# Patient Record
Sex: MALE | Race: WHITE | NOT HISPANIC OR LATINO | Employment: OTHER | URBAN - METROPOLITAN AREA
[De-identification: names, ages, dates, MRNs, and addresses within clinical notes are randomized per-mention and may not be internally consistent; named-entity substitution may affect disease eponyms.]

---

## 2017-01-09 ENCOUNTER — LAB CONVERSION - ENCOUNTER (OUTPATIENT)
Dept: OTHER | Facility: OTHER | Age: 67
End: 2017-01-09

## 2017-01-09 LAB
GLUCOSE (HISTORICAL): 117 MG/DL (ref 65–99)
HBA1C MFR BLD HPLC: 6.1 % OF TOTAL HGB

## 2017-05-25 ENCOUNTER — GENERIC CONVERSION - ENCOUNTER (OUTPATIENT)
Dept: OTHER | Facility: OTHER | Age: 67
End: 2017-05-25

## 2017-06-13 ENCOUNTER — ALLSCRIPTS OFFICE VISIT (OUTPATIENT)
Dept: OTHER | Facility: OTHER | Age: 67
End: 2017-06-13

## 2017-07-10 ENCOUNTER — LAB CONVERSION - ENCOUNTER (OUTPATIENT)
Dept: OTHER | Facility: OTHER | Age: 67
End: 2017-07-10

## 2017-07-10 LAB
A/G RATIO (HISTORICAL): 1.2 (CALC) (ref 1–2.5)
ALBUMIN SERPL BCP-MCNC: 3.8 G/DL (ref 3.6–5.1)
ALP SERPL-CCNC: 118 U/L (ref 40–115)
ALT SERPL W P-5'-P-CCNC: 19 U/L (ref 9–46)
AST SERPL W P-5'-P-CCNC: 16 U/L (ref 10–35)
BASOPHILS # BLD AUTO: 0.5 %
BASOPHILS # BLD AUTO: 40 CELLS/UL (ref 0–200)
BILIRUB SERPL-MCNC: 0.6 MG/DL (ref 0.2–1.2)
BUN SERPL-MCNC: 17 MG/DL (ref 7–25)
BUN/CREA RATIO (HISTORICAL): 12 (CALC) (ref 6–22)
CALCIUM SERPL-MCNC: 8.6 MG/DL (ref 8.6–10.3)
CHLORIDE SERPL-SCNC: 102 MMOL/L (ref 98–110)
CHOLEST SERPL-MCNC: 194 MG/DL (ref 125–200)
CHOLEST/HDLC SERPL: 5.4 (CALC)
CO2 SERPL-SCNC: 25 MMOL/L (ref 20–31)
CREAT SERPL-MCNC: 1.38 MG/DL (ref 0.7–1.25)
CREATININE, RANDOM URINE (HISTORICAL): 77 MG/DL (ref 20–370)
DEPRECATED RDW RBC AUTO: 15 % (ref 11–15)
EGFR AFRICAN AMERICAN (HISTORICAL): 61 ML/MIN/1.73M2
EGFR-AMERICAN CALC (HISTORICAL): 53 ML/MIN/1.73M2
EOSINOPHIL # BLD AUTO: 320 CELLS/UL (ref 15–500)
EOSINOPHIL # BLD AUTO: 4 %
GAMMA GLOBULIN (HISTORICAL): 3.3 G/DL (CALC) (ref 1.9–3.7)
GLUCOSE (HISTORICAL): 130 MG/DL (ref 65–99)
HBA1C MFR BLD HPLC: 6.1 % OF TOTAL HGB
HCT VFR BLD AUTO: 36.1 % (ref 38.5–50)
HDLC SERPL-MCNC: 36 MG/DL
HGB BLD-MCNC: 11.6 G/DL (ref 13.2–17.1)
LDL CHOLESTEROL (HISTORICAL): 109 MG/DL (CALC)
LYMPHOCYTES # BLD AUTO: 1856 CELLS/UL (ref 850–3900)
LYMPHOCYTES # BLD AUTO: 23.2 %
MAGNESIUM, UR (HISTORICAL): 3.1 MG/DL
MCH RBC QN AUTO: 27.1 PG (ref 27–33)
MCHC RBC AUTO-ENTMCNC: 32.1 G/DL (ref 32–36)
MCV RBC AUTO: 84.7 FL (ref 80–100)
MICROALBUMIN/CREATININE RATIO (HISTORICAL): 40 MCG/MG CREAT
MONOCYTES # BLD AUTO: 376 CELLS/UL (ref 200–950)
MONOCYTES (HISTORICAL): 4.7 %
NEUTROPHILS # BLD AUTO: 5408 CELLS/UL (ref 1500–7800)
NEUTROPHILS # BLD AUTO: 67.6 %
NON-HDL-CHOL (CHOL-HDL) (HISTORICAL): 158 MG/DL (CALC)
PLATELET # BLD AUTO: 334 THOUSAND/UL (ref 140–400)
PMV BLD AUTO: 8.1 FL (ref 7.5–12.5)
POTASSIUM SERPL-SCNC: 3.8 MMOL/L (ref 3.5–5.3)
PROSTATE SPECIFIC ANTIGEN TOTAL (HISTORICAL): 4.2 NG/ML
RBC # BLD AUTO: 4.26 MILLION/UL (ref 4.2–5.8)
SODIUM SERPL-SCNC: 136 MMOL/L (ref 135–146)
TOTAL PROTEIN (HISTORICAL): 7.1 G/DL (ref 6.1–8.1)
TRIGL SERPL-MCNC: 243 MG/DL
TSH SERPL DL<=0.05 MIU/L-ACNC: 3.31 MIU/L (ref 0.4–4.5)
URIC ACID (HISTORICAL): 5.9 MG/DL (ref 4–8)
WBC # BLD AUTO: 8 THOUSAND/UL (ref 3.8–10.8)

## 2017-07-11 ENCOUNTER — ALLSCRIPTS OFFICE VISIT (OUTPATIENT)
Dept: OTHER | Facility: OTHER | Age: 67
End: 2017-07-11

## 2017-07-17 ENCOUNTER — GENERIC CONVERSION - ENCOUNTER (OUTPATIENT)
Dept: OTHER | Facility: OTHER | Age: 67
End: 2017-07-17

## 2017-08-29 ENCOUNTER — ALLSCRIPTS OFFICE VISIT (OUTPATIENT)
Dept: OTHER | Facility: OTHER | Age: 67
End: 2017-08-29

## 2017-09-18 ENCOUNTER — LAB CONVERSION - ENCOUNTER (OUTPATIENT)
Dept: OTHER | Facility: OTHER | Age: 67
End: 2017-09-18

## 2017-09-18 LAB
FERRITIN SERPL-MCNC: 6 NG/ML (ref 20–380)
IRON SATN MFR SERPL: 7 % (CALC) (ref 15–60)
IRON SERPL-MCNC: 24 MCG/DL (ref 50–180)
TIBC SERPL-MCNC: 333 MCG/DL (CALC) (ref 250–425)

## 2017-09-26 ENCOUNTER — GENERIC CONVERSION - ENCOUNTER (OUTPATIENT)
Dept: OTHER | Facility: OTHER | Age: 67
End: 2017-09-26

## 2017-09-26 ENCOUNTER — GENERIC CONVERSION - ENCOUNTER (OUTPATIENT)
Dept: FAMILY MEDICINE CLINIC | Facility: HOSPITAL | Age: 67
End: 2017-09-26

## 2017-10-09 ENCOUNTER — GENERIC CONVERSION - ENCOUNTER (OUTPATIENT)
Dept: OTHER | Facility: OTHER | Age: 67
End: 2017-10-09

## 2017-10-23 ENCOUNTER — GENERIC CONVERSION - ENCOUNTER (OUTPATIENT)
Dept: OTHER | Facility: OTHER | Age: 67
End: 2017-10-23

## 2017-11-29 ENCOUNTER — GENERIC CONVERSION - ENCOUNTER (OUTPATIENT)
Dept: OTHER | Facility: OTHER | Age: 67
End: 2017-11-29

## 2018-01-12 NOTE — MISCELLANEOUS
Provider Comments  Provider Comments:   pt no showed for today's appt - task will be sent to have pt reschedule      Signatures   Electronically signed by : Antonino Jamison DO; Aug 29 2017  6:28PM EST                       (Author)

## 2018-01-12 NOTE — PROGRESS NOTES
Assessment    1  Type 2 diabetes mellitus without complication (691 62) (R01 2)   2  Hypertension (401 9) (I10)    Plan  Chronic kidney disease, stage 3, Hypertension    · Lisinopril 40 MG Oral Tablet; TAKE ONE TABLET BY MOUTH ONCE DAILY  Gout    · Allopurinol 100 MG Oral Tablet; TAKE TWO TABLETS BY MOUTH ONCE DAILY  Hypertension    · Metoprolol Tartrate 50 MG Oral Tablet; TAKE ONE-HALF TABLET BY MOUTH TWICE DAILY  Type 2 diabetes mellitus without complication    · (1) CBC/ PLT (NO DIFF); Status:Active; Requested for:55Npy3870;    · (1) COMPREHENSIVE METABOLIC PANEL; Status:Active; Requested for:35Bqk6436;    · (1) HEMOGLOBIN A1C; Status:Active; Requested for:92Xac6770;    · (1) LIPID PANEL, FASTING; Status:Active; Requested for:03Bsg3451;    · (1) MICROALBUMIN CREATININE RATIO, RANDOM URINE; Status:Active; Requested for:86Krl7179;    · (1) TSH WITH FT4 REFLEX; Status:Active; Requested for:43Lwh6255;    · (1) URIC ACID; Status:Active; Requested for:40Mtq7552;     Discussion/Summary  Discussion Summary:   DM type 2 w/o complications - controlled with A1C 7 0 - newly diagnosed at last visit; has lost 18 lbs in 2 mos and is exercising regularly; urged to con't doing so; will recheck FBS/A1C q 6 mo - order given for April; UTD On eye and foot exam    HTN - BP fluctuating at home and better today at end of appt; home cuff is reading higher then our cuff; pt w/o SE; he is eating a low sodium diet and has lost wgt; con't current regimen and see if he can get BP down more with diet/exercise/wgt loss - re-eval in 3 mo    Deferring Prevnar  Counseling Documentation With Imm: The patient was counseled regarding instructions for management, risk factor reductions, impressions  Medication SE Review and Pt Understands Tx: The treatment plan was reviewed with the patient/guardian   The patient/guardian understands and agrees with the treatment plan      Chief Complaint  Chief Complaint Chronic Condition [facility]: Patient is here today for follow up of chronic conditions described in HPI  History of Present Illness  HPI: Pt here for 3 mo follow up    Pts wgt is down 18 lbs since last visit!! He states he is eating much better and is doing stationary bike and walking when he can  He is walking about a mile or so and is on bike for about 5 min  He is newly diagnosed DM  He is not checking his sugars at home and he is on no DM meds  His BP was again elevated  He is checking his BP at home a few times a week and numbers were reviewed he is ranging from 120/69 - 153/85 with an average of low 140/mid 80's  Home cuff read 155/97 and our cuff read 134/82 right before  He notes no HA/dizziness/double vision/CP  Pt is deferring Prevnar today      Review of Systems  Complete-Male:   Constitutional: recent weight loss, but as noted in HPI, no fever and no chills  Eyes: no eyesight problems  Cardiovascular: no chest pain and no palpitations  Respiratory: no shortness of breath and no cough  Gastrointestinal: no nausea and no vomiting  Neurological: no headache and no dizziness  Active Problems    1  Chronic kidney disease, stage 3 (585 3) (N18 3)   2  Colon, diverticulosis (562 10) (K57 30)   3  Colonoscopy (Fiberoptic) Screening   4  Elevated C-reactive protein (790 95) (R79 82)   5  Elevated prostate specific antigen (PSA) (790 93) (R97 2)   6  Essential hypertriglyceridemia (272 1) (E78 1)   7  Gout (274 9) (M10 9)   8  Hypertension (401 9) (I10)   9  Knee stiffness, unspecified laterality (719 56) (M25 669)   10  Need for Tdap vaccination (V06 1) (Z23)   11  Nonspecific low blood pressure reading (796 3) (R03 1)   12  Obesity (278 00) (E66 9)   13  Type 2 diabetes mellitus without complication (672 29) (O71 5)    Past Medical History    1  History of abdominal pain (V13 89) (T20 206)    Surgical History    1  History of Complete Colonoscopy    Family History    1  Family history of Breast cancer   2   Family history of     3  Family history of    4  Family history of Kidney disease   5  Family history of Lung cancer   6  Family history of Smoker    7  Family history of Hypertension    Social History    · Full-time employment   · Marital History - Currently    · Never A Smoker   · Never Drank Alcohol  Social History Reviewed: The social history was reviewed and is unchanged  Current Meds   1  Allopurinol 100 MG Oral Tablet; TAKE TWO TABLETS BY MOUTH ONCE DAILY; Therapy: 2013 to (Evaluate:03Azp4524)  Requested for: 91Snw1599; Last Rx:64Das5880   Ordered   2  AmLODIPine Besylate 10 MG Oral Tablet; take one tablet by mouth daily as directed; Therapy: 20Vea2605 to (Evaluate:2016)  Requested for: ; Last Rx:11Nev8433   Ordered   3  Aspirin 81 MG Oral Tablet; TAKE 1 TABLET DAILY; Therapy: 2013 to (Evaluate:21Cbm0878); Last Rx:2013 Ordered   4  Lisinopril 40 MG Oral Tablet; TAKE ONE TABLET BY MOUTH ONCE DAILY; Therapy: 64UBJ5080 to (Evaluate:04Svh5094)  Requested for: ; Last Rx:02Ccn9616   Ordered   5  Metoprolol Tartrate 50 MG Oral Tablet; TAKE ONE-HALF TABLET BY MOUTH TWICE DAILY; Therapy: 20Rdf1696 to (Evaluate:2016)  Requested for: 83TAX2225; Last Rx:42Etf5948   Ordered  Medication List Reviewed: The medication list was reviewed and updated today  Allergies    1  No Known Drug Allergies    Vitals  Vital Signs [Data Includes: Current Encounter]    Recorded: 61LRM5353 07:14PM Recorded: 97Nke4597 07:07PM   Temperature  97 2 F   Heart Rate  76   Systolic 276 751   Diastolic 82 88   Height  5 ft 9 in   Weight  220 lb    BMI Calculated  32 49   BSA Calculated  2 15     Physical Exam    Constitutional   General appearance: No acute distress, well appearing and well nourished  Pulmonary   Respiratory effort: No increased work of breathing or signs of respiratory distress      Auscultation of lungs: Clear to auscultation, equal breath sounds bilaterally, no wheezes, no rales, no rhonci  Cardiovascular   Auscultation of heart: Normal rate and rhythm, normal S1 and S2, without murmurs      Examination of extremities for edema and/or varicosities: Normal     Musculoskeletal   Gait and station: Normal     Psychiatric   Mood and affect: Normal          Signatures   Electronically signed by : Fady Guevara DO; Feb 2 2016  7:26PM EST                       (Author)

## 2018-01-13 VITALS
SYSTOLIC BLOOD PRESSURE: 160 MMHG | TEMPERATURE: 98.4 F | DIASTOLIC BLOOD PRESSURE: 92 MMHG | HEART RATE: 82 BPM | WEIGHT: 243 LBS | HEIGHT: 69 IN | BODY MASS INDEX: 35.99 KG/M2

## 2018-01-14 VITALS
HEART RATE: 80 BPM | HEIGHT: 69 IN | WEIGHT: 240 LBS | SYSTOLIC BLOOD PRESSURE: 150 MMHG | DIASTOLIC BLOOD PRESSURE: 90 MMHG | TEMPERATURE: 98.6 F | BODY MASS INDEX: 35.55 KG/M2

## 2018-01-16 NOTE — MISCELLANEOUS
Message   Recorded as Task   Date: 05/12/2017 07:32 AM, Created By: Josselyn Lyons   Task Name: Follow Up   Assigned To: EDISON,CLERICAL TEAM   Regarding Patient: Sam Cordero, Status: Active   CommentRostrace Daly - 12 May 2017 7:32 AM     TASK CREATED  please notify pt that I refilled his BP med for 30 days but no further meds will be given until pt is seen - it has been since June 2016 since his last appt with us - has to be seen at least once a year to con't receiving meds   Jesusita Brock - 12 May 2017 8:13 AM     TASK REASSIGNED: Previously Assigned To [address] [facility]  Left message   Chasity Carrizales - 15 May 2017 8:20 AM     TASK REASSIGNED: Previously Assigned To [address] [place] [address] - 25 May 2017 8:32 AM     TASK EDITED  Patient aware and will call back to schedule appt  Active Problems    1  Chronic kidney disease, stage 3 (585 3) (N18 3)   2  Colon cancer screening (V76 51) (Z12 11)   3  Colon, diverticulosis (562 10) (K57 30)   4  Colonoscopy (Fiberoptic) Screening   5  Elevated C-reactive protein (790 95) (R79 82)   6  Elevated prostate specific antigen (PSA) (790 93) (R97 20)   7  Essential hypertriglyceridemia (272 1) (E78 1)   8  Gout (274 9) (M10 9)   9  Hypertension (401 9) (I10)   10  Knee stiffness, unspecified laterality (719 56) (M25 669)   11  Microalbuminuria (791 0) (R80 9)   12  Need for Tdap vaccination (V06 1) (Z23)   13  Obesity (278 00) (E66 9)   14  Thrombocytopenia (287 5) (D69 6)   15  Type 2 diabetes mellitus with microalbuminuria (250 40,791 0) (E11 29,R80 9)    Current Meds   1  Allopurinol 100 MG Oral Tablet; TAKE TWO TABLETS BY MOUTH ONCE DAILY; Therapy: 16Xnj8597 to (Evaluate:53Mnx2981)  Requested for: 20Mar2017; Last   Rx:20Mar2017 Ordered   2  AmLODIPine Besylate 10 MG Oral Tablet; TAKE ONE TABLET BY MOUTH ONCE DAILY   AS  DIRECTED;    Therapy: 70Gso7269 to (Evaluate:11Jun2017)  Requested for: 09RTR9764; Last Rx:10Tts8478 Ordered   3  Aspirin 81 MG TABS; TAKE 1 TABLET DAILY; Therapy: 01Apr2013 to (Evaluate:28Gha9479); Last Rx:01Apr2013 Ordered   4  Lisinopril 40 MG Oral Tablet; TAKE ONE TABLET BY MOUTH ONCE DAILY; Therapy: 17BCG9516 to (Corrie Barrios)  Requested for: 79QZP9152; Last   Rx:[id number] Ordered   5  Metoprolol Tartrate 50 MG Oral Tablet; TAKE ONE-HALF TABLET BY MOUTH TWICE   DAILY; Therapy: 21Jmq2403 to (Katalina Rodriguez)  Requested for: 81FXX0842; Last   Rx:[id number] Ordered    Allergies    1   No Known Drug Allergies    Signatures   Electronically signed by : Viviane Gautam DO; May 25 2017  8:37AM EST                       (Author)

## 2018-01-16 NOTE — RESULT NOTES
Verified Results  (1) CBC/PLT/DIFF 07Oct2016 09:10AM Caitie Magana   REPORT COMMENT:  FASTING:NO     Test Name Result Flag Reference   WHITE BLOOD CELL COUNT 6 5 Thousand/uL  3 8-10 8   RED BLOOD CELL COUNT 5 72 Million/uL  4 20-5 80   HEMOGLOBIN 14 7 g/dL  13 2-17 1   HEMATOCRIT 47 0 %  38 5-50 0   MCV 82 2 fL  80 0-100 0   MCH 25 8 pg L 27 0-33 0   EOSINOPHILS 4 9 %     BASOPHILS 0 9 %     ABSOLUTE MONOCYTES 488 cells/uL  200-950   ABSOLUTE EOSINOPHILS 319 cells/uL     ABSOLUTE BASOPHILS 59 cells/uL  0-200   NEUTROPHILS 66 8 %     LYMPHOCYTES 19 9 %     MONOCYTES 7 5 %     MCHC 31 4 g/dL L 32 0-36 0   RDW 17 2 % H 11 0-15 0   PLATELET COUNT 072 Thousand/uL  140-400   MPV 8 7 fL  7 5-11 5   ABSOLUTE NEUTROPHILS 4342 cells/uL  5455-2534   ABSOLUTE LYMPHOCYTES 1294 cells/uL  850-3900       Discussion/Summary    please notify pt that his CBC was nml - he is not anemic and his platelts were back to normal Patient aware

## 2018-01-18 NOTE — MISCELLANEOUS
Message   Recorded as Task   Date: 07/17/2017 09:50 AM, Created By: Holden Lynn   Task Name: Medical Complaint Callback   Assigned To: [address] [facility]   Regarding Patient: Mika Thurman, Status: Active   Comment:    Holden Lynn - 17 Jul 2017 9:50 AM     TASK CREATED  Caller: PT WIFE; Medical Complaint; [phone]  He is on a new med Pravastatin last week  BP & pulse are ok  He is very tired and huffing and puffing  They spoke to the [place] and he said to stop the med and call Mayi Acevedo - 17 Jul 2017 9:56 AM     TASK REPLIED TO: Previously Assigned To Rubia Dennis          pls advise   Rubia Dennis - 17 Jul 2017 10:47 AM     TASK REPLIED TO: Previously Assigned To Rubia Dennis                      not usual side effects with Pravastatin but if he feels better since stopping the med then stay off of it until next appt - if no better then he needs to be seen as likely not d/t the medication   Mayi Parry - 17 Jul 2017 11:21 AM     TASK REPLIED TO: Previously Assigned To [address] [facility]  pt aware - will call to schedule if not feeling better since stopping (has been only one day)        Active Problems    1  Chronic kidney disease, stage 3 (585 3) (N18 3)   2  Colon cancer screening (V76 51) (Z12 11)   3  Colon, diverticulosis (562 10) (K57 30)   4  Colonoscopy (Fiberoptic) Screening   5  Elevated C-reactive protein (790 95) (R79 82)   6  Elevated prostate specific antigen (PSA) (790 93) (R97 20)   7  Encounter for prostate cancer screening (V76 44) (Z12 5)   8  Essential hypertriglyceridemia (272 1) (E78 1)   9  Gout (274 9) (M10 9)   10  Hypertension (401 9) (I10)   11  Knee stiffness, unspecified laterality (719 56) (M25 669)   12  Microalbuminuria (791 0) (R80 9)   13  Need for Tdap vaccination (V06 1) (Z23)   14  Normocytic anemia (285 9) (D64 9)   15  Obesity (278 00) (E66 9)   16  Thrombocytopenia (287 5) (D69 6)   17   Type 2 diabetes mellitus with microalbuminuria (250 40,791 0) (E11 29,R80 9)    Current Meds   1  Allopurinol 100 MG Oral Tablet; TAKE TWO TABLETS BY MOUTH ONCE DAILY; Therapy: 30Apr2013 to (Evaluate:09Jan2018)  Requested for: 46WFZ5231; Last   Rx:13Jun2017 Ordered   2  AmLODIPine Besylate 10 MG Oral Tablet; TAKE ONE TABLET BY MOUTH ONCE DAILY   AS  DIRECTED; Therapy: 94Bbs6024 to (Evaluate:09Jan2018)  Requested for: 13Jun2017; Last   Rx:13Jun2017 Ordered   3  Aspirin 81 MG TABS; TAKE 1 TABLET DAILY; Therapy: 01Apr2013 to (Evaluate:53Zrj5960); Last Rx:01Apr2013 Ordered   4  Lisinopril 40 MG Oral Tablet; TAKE ONE TABLET BY MOUTH ONCE DAILY; Therapy: 20WFV7161 to [phone])  Requested for: 21VBK0222; Last   Rx:13Jun2017 Ordered   5  Metoprolol Tartrate 50 MG Oral Tablet; TAKE 1 TABLET EVERY 12 HOURS; Therapy: 42Xoa3495 to (Evaluate:08Nov2017)  Requested for: 98DWX6242; Last   Rx:[id number]; Status: ACTIVE - Transmit to [facility] Ordered   6  Pravastatin Sodium 20 MG Oral Tablet; TAKE 1 TABLET AT BEDTIME; Therapy: 03YCD5580 to (Evaluate:06Feb2018)  Requested for: 75KCP0736; Last   Rx:65Lsd4584 Ordered    Allergies    1   No Known Drug Allergies    Signatures   Electronically signed by : Maya Cadet DO; Jul 17 2017 11:44AM EST                       (Author)

## 2018-01-22 VITALS — DIASTOLIC BLOOD PRESSURE: 84 MMHG | SYSTOLIC BLOOD PRESSURE: 128 MMHG

## 2018-01-22 VITALS
TEMPERATURE: 98.4 F | DIASTOLIC BLOOD PRESSURE: 88 MMHG | HEIGHT: 69 IN | SYSTOLIC BLOOD PRESSURE: 152 MMHG | BODY MASS INDEX: 35.99 KG/M2 | WEIGHT: 243 LBS | HEART RATE: 80 BPM

## 2018-02-05 LAB
BASOPHILS # BLD AUTO: 103 CELLS/UL (ref 0–200)
BASOPHILS NFR BLD AUTO: 1.3 %
BUN SERPL-MCNC: 17 MG/DL (ref 7–25)
BUN/CREAT SERPL: ABNORMAL (CALC) (ref 6–22)
CALCIUM SERPL-MCNC: 8.8 MG/DL (ref 8.6–10.3)
CHLORIDE SERPL-SCNC: 104 MMOL/L (ref 98–110)
CHOLEST SERPL-MCNC: 189 MG/DL
CHOLEST/HDLC SERPL: 5.4 (CALC)
CO2 SERPL-SCNC: 25 MMOL/L (ref 20–31)
CREAT SERPL-MCNC: 1.25 MG/DL (ref 0.7–1.25)
EOSINOPHIL # BLD AUTO: 198 CELLS/UL (ref 15–500)
EOSINOPHIL NFR BLD AUTO: 2.5 %
ERYTHROCYTE [DISTWIDTH] IN BLOOD BY AUTOMATED COUNT: 14.2 % (ref 11–15)
FERRITIN SERPL-MCNC: 78 NG/ML (ref 20–380)
GLUCOSE SERPL-MCNC: 134 MG/DL (ref 65–99)
HBA1C MFR BLD: 7 % OF TOTAL HGB
HCT VFR BLD AUTO: 53.5 % (ref 38.5–50)
HDLC SERPL-MCNC: 35 MG/DL
HGB BLD-MCNC: 17.5 G/DL (ref 13.2–17.1)
IRON SATN MFR SERPL: 26 % (CALC) (ref 15–60)
IRON SERPL-MCNC: 66 MCG/DL (ref 50–180)
LDLC SERPL CALC-MCNC: 121 MG/DL (CALC)
LYMPHOCYTES # BLD AUTO: 1912 CELLS/UL (ref 850–3900)
LYMPHOCYTES NFR BLD AUTO: 24.2 %
MCH RBC QN AUTO: 28.5 PG (ref 27–33)
MCHC RBC AUTO-ENTMCNC: 32.7 G/DL (ref 32–36)
MCV RBC AUTO: 87.1 FL (ref 80–100)
MONOCYTES # BLD AUTO: 553 CELLS/UL (ref 200–950)
MONOCYTES NFR BLD AUTO: 7 %
NEUTROPHILS # BLD AUTO: 5135 CELLS/UL (ref 1500–7800)
NEUTROPHILS NFR BLD AUTO: 65 %
NONHDLC SERPL-MCNC: 154 MG/DL (CALC)
PLATELET # BLD AUTO: 260 THOUSAND/UL (ref 140–400)
PMV BLD REES-ECKER: 10.8 FL (ref 7.5–12.5)
POTASSIUM SERPL-SCNC: 4.4 MMOL/L (ref 3.5–5.3)
RBC # BLD AUTO: 6.14 MILLION/UL (ref 4.2–5.8)
SL AMB EGFR AFRICAN AMERICAN: 69 ML/MIN/1.73M2
SL AMB EGFR NON AFRICAN AMERICAN: 59 ML/MIN/1.73M2
SODIUM SERPL-SCNC: 140 MMOL/L (ref 135–146)
TIBC SERPL-MCNC: 255 MCG/DL (CALC) (ref 250–425)
TRIGL SERPL-MCNC: 210 MG/DL
WBC # BLD AUTO: 7.9 THOUSAND/UL (ref 3.8–10.8)

## 2018-02-06 PROBLEM — D50.9 IRON DEFICIENCY ANEMIA: Status: ACTIVE | Noted: 2017-09-26

## 2018-02-06 RX ORDER — METOPROLOL TARTRATE 50 MG/1
1 TABLET, FILM COATED ORAL EVERY 12 HOURS
COMMUNITY
Start: 2012-02-20 | End: 2018-07-13 | Stop reason: SDUPTHER

## 2018-02-06 RX ORDER — MESALAMINE 1.2 G/1
2 TABLET, DELAYED RELEASE ORAL DAILY
COMMUNITY
Start: 2018-01-18 | End: 2018-12-31

## 2018-02-06 RX ORDER — ALLOPURINOL 100 MG/1
2 TABLET ORAL DAILY
COMMUNITY
Start: 2013-04-30 | End: 2018-02-13 | Stop reason: SDUPTHER

## 2018-02-06 RX ORDER — LISINOPRIL 40 MG/1
1 TABLET ORAL DAILY
COMMUNITY
Start: 2012-03-06 | End: 2018-02-13 | Stop reason: SDUPTHER

## 2018-02-06 RX ORDER — AMLODIPINE BESYLATE 10 MG/1
1 TABLET ORAL DAILY
COMMUNITY
Start: 2012-02-20 | End: 2018-08-07 | Stop reason: SDUPTHER

## 2018-02-06 RX ORDER — FERROUS SULFATE 325(65) MG
1 TABLET ORAL DAILY
COMMUNITY
Start: 2017-09-26 | End: 2020-01-24 | Stop reason: HOSPADM

## 2018-02-13 ENCOUNTER — OFFICE VISIT (OUTPATIENT)
Dept: FAMILY MEDICINE CLINIC | Facility: HOSPITAL | Age: 68
End: 2018-02-13
Payer: COMMERCIAL

## 2018-02-13 VITALS
WEIGHT: 247 LBS | HEIGHT: 70 IN | SYSTOLIC BLOOD PRESSURE: 148 MMHG | TEMPERATURE: 98.1 F | HEART RATE: 72 BPM | DIASTOLIC BLOOD PRESSURE: 88 MMHG | BODY MASS INDEX: 35.36 KG/M2

## 2018-02-13 DIAGNOSIS — M1A.9XX0 CHRONIC GOUT WITHOUT TOPHUS, UNSPECIFIED CAUSE, UNSPECIFIED SITE: ICD-10-CM

## 2018-02-13 DIAGNOSIS — E78.5 DYSLIPIDEMIA: ICD-10-CM

## 2018-02-13 DIAGNOSIS — K51.30 ULCERATIVE RECTOSIGMOIDITIS WITHOUT COMPLICATION (HCC): ICD-10-CM

## 2018-02-13 DIAGNOSIS — D50.9 IRON DEFICIENCY ANEMIA, UNSPECIFIED IRON DEFICIENCY ANEMIA TYPE: ICD-10-CM

## 2018-02-13 DIAGNOSIS — R80.9 TYPE 2 DIABETES MELLITUS WITH MICROALBUMINURIA, WITHOUT LONG-TERM CURRENT USE OF INSULIN (HCC): Primary | ICD-10-CM

## 2018-02-13 DIAGNOSIS — I10 HYPERTENSION, UNSPECIFIED TYPE: ICD-10-CM

## 2018-02-13 DIAGNOSIS — E11.29 TYPE 2 DIABETES MELLITUS WITH MICROALBUMINURIA, WITHOUT LONG-TERM CURRENT USE OF INSULIN (HCC): Primary | ICD-10-CM

## 2018-02-13 DIAGNOSIS — E78.1 ESSENTIAL HYPERTRIGLYCERIDEMIA: ICD-10-CM

## 2018-02-13 PROCEDURE — 99214 OFFICE O/P EST MOD 30 MIN: CPT | Performed by: INTERNAL MEDICINE

## 2018-02-13 RX ORDER — PRAVASTATIN SODIUM 20 MG
20 TABLET ORAL DAILY
Qty: 30 TABLET | Refills: 0
Start: 2018-02-13 | End: 2018-02-27 | Stop reason: SINTOL

## 2018-02-13 RX ORDER — LISINOPRIL 40 MG/1
TABLET ORAL
Qty: 45 TABLET | Refills: 3 | Status: SHIPPED | OUTPATIENT
Start: 2018-02-13 | End: 2018-06-18 | Stop reason: SDUPTHER

## 2018-02-13 RX ORDER — ALLOPURINOL 100 MG/1
200 TABLET ORAL DAILY
Qty: 30 TABLET | Refills: 6 | Status: SHIPPED | OUTPATIENT
Start: 2018-02-13 | End: 2018-06-10 | Stop reason: SDUPTHER

## 2018-02-14 NOTE — ASSESSMENT & PLAN NOTE
CBC/iron studies back to normal with current iron supplement, con't current regimen for now - recheck iron studies in 6 mos and if still stable try and cut back to once daily supplement

## 2018-02-14 NOTE — ASSESSMENT & PLAN NOTE
Type 2 DM w/microalbuminuria - borderline uncontrolled with a1C 7 0 - diet treated only - urged low sugar/carb diet and keep active, recheck BW in 6 mos - order given, UTD on foot exam and eye exam due in March, on ACE and going to restart statin

## 2018-02-14 NOTE — ASSESSMENT & PLAN NOTE
New dx based on recent colonoscopy for iron def anemia, started on mesalamine and is doing well - no longer with loose stools, has GI f/u in March, call with new/worsening symptoms, was told would need [place] q 5 yrs now

## 2018-02-14 NOTE — PROGRESS NOTES
Assessment/Plan:    Ulcerative rectosigmoiditis without complication ([facility] [address] )  New dx based on recent colonoscopy for iron def anemia, started on mesalamine and is doing well - no longer with loose stools, has GI f/u in March, call with new/worsening symptoms, was told would need [place] q 5 yrs now    Type 2 diabetes mellitus with microalbuminuria ([facility] [address] )  Type 2 DM w/microalbuminuria - borderline uncontrolled with a1C 7 0 - diet treated only - urged low sugar/carb diet and keep active, recheck BW in 6 mos - order given, UTD on foot exam and eye exam due in March, on ACE and going to restart statin    Hypertension  BP elevated today and high at home - I ncrease Lisinopril to 40 mg 1 5 tab (60 mg) once daily, con't current Metoprolol and Norvasc, recheck BP in 6 - 8 wks, low sodium diet/exercise/wgt loss encouraged    Essential hypertriglyceridemia  Low fat/cholesterol diet and exercise/wgt loss encouraged, going to restart statin    Gout  No current flare, doing well on Allopurinol- needs med refilled - rx sent, call with any flares    Iron deficiency anemia  CBC/iron studies back to normal with current iron supplement, con't current regimen for now - recheck iron studies in 6 mos and if still stable try and cut back to once daily supplement       Diagnoses and all orders for this visit:    Type 2 diabetes mellitus with microalbuminuria, without long-term current use of insulin (HCC)  -     Hemoglobin A1c; Future  -     Glucose, fasting; Future  -     Hemoglobin A1c  -     Glucose, fasting    Dyslipidemia  -     pravastatin (PRAVACHOL) 20 mg tablet; Take 1 tablet (20 mg total) by mouth daily  -     Lipid panel; Future  -     Lipid panel    Essential hypertriglyceridemia  -     Lipid panel; Future  -     Lipid panel    Chronic gout without tophus, unspecified cause, unspecified site  -     allopurinol (ZYLOPRIM) 100 mg tablet; Take 2 tablets (200 mg total) by mouth daily for 30 days  -     Uric acid;  Future  -     Uric acid    Ulcerative rectosigmoiditis without complication (HCC)    Iron deficiency anemia, unspecified iron deficiency anemia type  -     CBC and differential; Future  -     Iron; Future  -     Ferritin; Future  -     TIBC; Future  -     CBC and differential  -     Iron  -     Ferritin  -     TIBC    Hypertension, unspecified type  -     lisinopril (ZESTRIL) 40 mg tablet; 1 5 tab PO q day for total of 60 mg daily          Subjective:      Patient ID: Lisseth Prabhakar is a 80 y o  male  HPI  Pt here for routine follow up appt and BW results  BW results were d/w pt in detail: FBS/A1C up at 134/7 0, CBC with slight elevation of H/H but rest of panel wnl, Ferritin/Iron/TIBC were wnl, rest of BMP was wnl, FLP with TC wnl at 189, HDL low at 35, TG up at 210 and LDL elevated for Dm at 121  Def of controlled vs uncontrolled Dm was reviewed  Diet was reviewed - wgt up 4 lbs from last appt  Pt is diet controlled and not on any DM meds  He is UTD on foot exam and is due for eye exam in march (next month)  He is on ACE and ASA but not a statin  Goal FLP was reviewed with pt  He thinks some of the SE he had with starting the statin was probably related to his iron def anemia (SOB)  He is willing to retry the statin at this time  Pt saw GI since last appt for iron def anemia  He had colo in Oct and was found to have inflammation in rectosigmoid colon c/w Uc  He was started on Mesalamine 2 tab daily  He states it has helped and his stools are more formed  He has f/u appt with him in less then a month - early March  He notes no abd pain/blood in the stool  He is not aware of any family h/o UC or Crohns dz  He was told to f/u with a repeat colo in 5 yrs  He is taking his iron supplement w/o SE bid  He is asking if he can decrease the frequency  Bp up again today  Pt con't to check his BP at home - numbers reviewed and are labile - from 168'D systolic to 062'Y systolic    He is averaging around 140's at this time  He denies missing doses of meds or SE with the meds but did not take them today as he just got out of work and has not gone home yet  He notes no frequent Ha/dizziness/double vision/CP  Review of Systems   Constitutional: Negative for chills, fatigue and fever  HENT: Negative for congestion and sinus pain  Eyes: Negative for pain and redness  Respiratory: Negative for cough, chest tightness and shortness of breath  Cardiovascular: Negative for chest pain, palpitations and leg swelling  Gastrointestinal: Negative for abdominal pain, blood in stool, constipation, diarrhea, nausea and vomiting  Endocrine: Negative for polydipsia and polyuria  Genitourinary: Negative for difficulty urinating and dysuria  Musculoskeletal: Negative for arthralgias and myalgias  Skin: Negative for rash and wound  Neurological: Negative for dizziness and headaches  Hematological: Does not bruise/bleed easily  Psychiatric/Behavioral: Negative for behavioral problems and confusion  Objective:    Vitals:    02/13/18 1935   BP: 148/88   Pulse: 72   Temp: 98 1 °F (36 7 °C)        Physical Exam   Constitutional: He appears well-developed and well-nourished  No distress  HENT:   Head: Normocephalic and atraumatic  Eyes: EOM are normal  Pupils are equal, round, and reactive to light  Right eye exhibits no discharge  Left eye exhibits no discharge  Neck: Neck supple  No tracheal deviation present  Cardiovascular: Normal rate and regular rhythm  No murmur heard  Pulmonary/Chest: Effort normal and breath sounds normal  No respiratory distress  He has no wheezes  He has no rales  Abdominal: Soft  He exhibits no distension  There is no tenderness  Musculoskeletal: He exhibits no edema  Neurological: He is alert  No cranial nerve deficit  Skin: Skin is warm and dry  No rash noted  Psychiatric: He has a normal mood and affect   His behavior is normal    Nursing note and vitals reviewed

## 2018-02-27 ENCOUNTER — OFFICE VISIT (OUTPATIENT)
Dept: FAMILY MEDICINE CLINIC | Facility: HOSPITAL | Age: 68
End: 2018-02-27
Payer: COMMERCIAL

## 2018-02-27 VITALS
BODY MASS INDEX: 35.36 KG/M2 | HEART RATE: 70 BPM | DIASTOLIC BLOOD PRESSURE: 92 MMHG | TEMPERATURE: 97.9 F | HEIGHT: 70 IN | SYSTOLIC BLOOD PRESSURE: 162 MMHG | WEIGHT: 247 LBS

## 2018-02-27 DIAGNOSIS — T50.905A ADVERSE EFFECT OF DRUG, INITIAL ENCOUNTER: Primary | ICD-10-CM

## 2018-02-27 DIAGNOSIS — R06.00 DYSPNEA ON EXERTION: ICD-10-CM

## 2018-02-27 DIAGNOSIS — E78.5 DYSLIPIDEMIA: ICD-10-CM

## 2018-02-27 DIAGNOSIS — I10 HYPERTENSION, UNSPECIFIED TYPE: ICD-10-CM

## 2018-02-27 PROCEDURE — 99214 OFFICE O/P EST MOD 30 MIN: CPT | Performed by: INTERNAL MEDICINE

## 2018-02-27 PROCEDURE — 93000 ELECTROCARDIOGRAM COMPLETE: CPT | Performed by: INTERNAL MEDICINE

## 2018-02-27 RX ORDER — ATORVASTATIN CALCIUM 10 MG/1
10 TABLET, FILM COATED ORAL DAILY
Qty: 30 TABLET | Refills: 3 | Status: SHIPPED | OUTPATIENT
Start: 2018-02-27 | End: 2018-09-04

## 2018-02-27 NOTE — ASSESSMENT & PLAN NOTE
Noting PAGE and sleep issues with Pravastatin - stopped it and symptoms resolved, restarted it and has returned, will try and switch pravastatin to Atorvastatin - if symptoms reoccur will have to keep off statin despite benefit with DM and cholesterol and morbidity and mortality - reviewed with pt and wife

## 2018-02-27 NOTE — PROGRESS NOTES
Assessment/Plan:    Dyslipidemia  Noting PAGE and sleep issues with Pravastatin - stopped it and symptoms resolved, restarted it and has returned, will try and switch pravastatin to Atorvastatin - if symptoms reoccur will have to keep off statin despite benefit with DM and cholesterol and morbidity and mortality - reviewed with pt and wife    Hypertension  Bp better on recheck at end of appt - home numbers look much better -con't current regimen for now       Diagnoses and all orders for this visit:    Adverse effect of drug, initial encounter   - stop Pravastatin d/t PAGE/sleep disorder, pt willing to retry a different statin - rx for atorvastatin sent - advised to start low dose every 2 days and then increase up to qod and then daily if able to tolerate - call with any SE    Dyslipidemia  -     atorvastatin (LIPITOR) 10 mg tablet; Take 1 tablet (10 mg total) by mouth daily for 30 days    Dyspnea on exertion  -     POCT ECG    Hypertension, unspecified type      Risks/benefits of statins d/w pt and wife, ECG done today d/t PAGE and no acute ischemic changes noted, will stop Pravastatin - when symptoms resolve will slowly    Subjective:      Patient ID: Edna Rodriguez is a 80 y o  male  HPI Pt here with SE with new medication  He was started on Pravastatin in July and developed SOB and issues sleeping  He took the med for about 2 wks and then stopped it and symptoms went away  He had been off the med for some time and restarted it 2 wks ago after last appt  He again has noted SOB and difficulty sleeping  He has not been able to go to the [facility] d/t SOB with ambulation  He notes no Cp/palp/edema/orthopnea  He has not been on any other statin  Bp up a bit today on presentation - slightly better by end of appt  He has been checking BP at home and numbers much better - usually 130's/80s  He notes no SE with his med    Review of Systems   Constitutional: Negative for chills, fatigue and fever     HENT: Negative for congestion and sinus pain  Eyes: Negative for pain and redness  Respiratory: Positive for shortness of breath  Negative for cough and chest tightness  Cardiovascular: Negative for chest pain, palpitations and leg swelling  Gastrointestinal: Negative for abdominal pain, diarrhea, nausea and vomiting  Endocrine: Negative for polydipsia and polyuria  Genitourinary: Negative for difficulty urinating and dysuria  Musculoskeletal: Negative for arthralgias and myalgias  Skin: Negative for rash and wound  Neurological: Negative for dizziness and headaches  Hematological: Does not bruise/bleed easily  Psychiatric/Behavioral: Negative for behavioral problems and confusion  Objective:    /92   Pulse 70   Temp 97 9 °F (36 6 °C)   Ht 5' 10" (1 778 m)   Wt 112 kg (247 lb)   BMI 35 44 kg/m²      Physical Exam   Constitutional: He appears well-developed and well-nourished  No distress  HENT:   Head: Normocephalic and atraumatic  Eyes: Conjunctivae are normal  Right eye exhibits no discharge  Left eye exhibits no discharge  Neck: Neck supple  No tracheal deviation present  Cardiovascular: Normal rate and regular rhythm  No murmur heard  Pulmonary/Chest: Effort normal and breath sounds normal  No respiratory distress  He has no wheezes  He has no rales  Abdominal: Soft  He exhibits no distension  There is no tenderness  Musculoskeletal: He exhibits no edema  Neurological: He is alert  No cranial nerve deficit  Skin: Skin is warm and dry  No rash noted  Psychiatric: He has a normal mood and affect  His behavior is normal    Nursing note and vitals reviewed

## 2018-06-10 DIAGNOSIS — M1A.9XX0 CHRONIC GOUT WITHOUT TOPHUS, UNSPECIFIED CAUSE, UNSPECIFIED SITE: ICD-10-CM

## 2018-06-11 RX ORDER — ALLOPURINOL 100 MG/1
TABLET ORAL
Qty: 30 TABLET | Refills: 6 | Status: SHIPPED | OUTPATIENT
Start: 2018-06-11 | End: 2018-09-30 | Stop reason: SDUPTHER

## 2018-06-18 DIAGNOSIS — I10 HYPERTENSION, UNSPECIFIED TYPE: ICD-10-CM

## 2018-06-19 RX ORDER — LISINOPRIL 40 MG/1
TABLET ORAL
Qty: 45 TABLET | Refills: 3 | Status: SHIPPED | OUTPATIENT
Start: 2018-06-19 | End: 2018-09-04 | Stop reason: SDUPTHER

## 2018-07-13 DIAGNOSIS — I10 ESSENTIAL HYPERTENSION: Primary | ICD-10-CM

## 2018-07-13 RX ORDER — METOPROLOL TARTRATE 50 MG/1
TABLET, FILM COATED ORAL
Qty: 60 TABLET | Refills: 6 | Status: SHIPPED | OUTPATIENT
Start: 2018-07-13 | End: 2018-12-31 | Stop reason: SDUPTHER

## 2018-08-07 DIAGNOSIS — I10 ESSENTIAL HYPERTENSION: Primary | ICD-10-CM

## 2018-08-07 RX ORDER — AMLODIPINE BESYLATE 10 MG/1
10 TABLET ORAL DAILY
Qty: 30 TABLET | Refills: 0 | Status: SHIPPED | OUTPATIENT
Start: 2018-08-07 | End: 2018-09-04 | Stop reason: SDUPTHER

## 2018-08-27 LAB
BASOPHILS # BLD AUTO: 92 CELLS/UL (ref 0–200)
BASOPHILS NFR BLD AUTO: 1.5 %
CHOLEST SERPL-MCNC: 226 MG/DL
CHOLEST/HDLC SERPL: 7.5 (CALC)
EOSINOPHIL # BLD AUTO: 360 CELLS/UL (ref 15–500)
EOSINOPHIL NFR BLD AUTO: 5.9 %
ERYTHROCYTE [DISTWIDTH] IN BLOOD BY AUTOMATED COUNT: 13.7 % (ref 11–15)
FERRITIN SERPL-MCNC: 201 NG/ML (ref 20–380)
GLUCOSE SERPL-MCNC: 370 MG/DL (ref 65–99)
HBA1C MFR BLD: 11.9 % OF TOTAL HGB
HCT VFR BLD AUTO: 53.7 % (ref 38.5–50)
HDLC SERPL-MCNC: 30 MG/DL
HGB BLD-MCNC: 17.9 G/DL (ref 13.2–17.1)
IRON SATN MFR SERPL: 25 % (CALC) (ref 15–60)
IRON SERPL-MCNC: 58 MCG/DL (ref 50–180)
LYMPHOCYTES # BLD AUTO: 1647 CELLS/UL (ref 850–3900)
LYMPHOCYTES NFR BLD AUTO: 27 %
MCH RBC QN AUTO: 30.6 PG (ref 27–33)
MCHC RBC AUTO-ENTMCNC: 33.3 G/DL (ref 32–36)
MCV RBC AUTO: 91.8 FL (ref 80–100)
MONOCYTES # BLD AUTO: 390 CELLS/UL (ref 200–950)
MONOCYTES NFR BLD AUTO: 6.4 %
NEUTROPHILS # BLD AUTO: 3611 CELLS/UL (ref 1500–7800)
NEUTROPHILS NFR BLD AUTO: 59.2 %
NONHDLC SERPL-MCNC: 196 MG/DL (CALC)
PLATELET # BLD AUTO: 198 THOUSAND/UL (ref 140–400)
PMV BLD REES-ECKER: 11.1 FL (ref 7.5–12.5)
RBC # BLD AUTO: 5.85 MILLION/UL (ref 4.2–5.8)
TIBC SERPL-MCNC: 232 MCG/DL (CALC) (ref 250–425)
TRIGL SERPL-MCNC: 842 MG/DL
URATE SERPL-MCNC: 5.1 MG/DL (ref 4–8)
WBC # BLD AUTO: 6.1 THOUSAND/UL (ref 3.8–10.8)

## 2018-08-27 PROCEDURE — 3046F HEMOGLOBIN A1C LEVEL >9.0%: CPT | Performed by: INTERNAL MEDICINE

## 2018-09-04 ENCOUNTER — OFFICE VISIT (OUTPATIENT)
Dept: FAMILY MEDICINE CLINIC | Facility: HOSPITAL | Age: 68
End: 2018-09-04
Payer: COMMERCIAL

## 2018-09-04 VITALS
HEIGHT: 70 IN | BODY MASS INDEX: 33.64 KG/M2 | SYSTOLIC BLOOD PRESSURE: 148 MMHG | TEMPERATURE: 98.4 F | HEART RATE: 74 BPM | WEIGHT: 235 LBS | DIASTOLIC BLOOD PRESSURE: 88 MMHG

## 2018-09-04 DIAGNOSIS — E11.29 TYPE 2 DIABETES MELLITUS WITH MICROALBUMINURIA, WITHOUT LONG-TERM CURRENT USE OF INSULIN (HCC): Primary | ICD-10-CM

## 2018-09-04 DIAGNOSIS — E78.1 ESSENTIAL HYPERTRIGLYCERIDEMIA: ICD-10-CM

## 2018-09-04 DIAGNOSIS — R80.9 TYPE 2 DIABETES MELLITUS WITH MICROALBUMINURIA, WITHOUT LONG-TERM CURRENT USE OF INSULIN (HCC): Primary | ICD-10-CM

## 2018-09-04 DIAGNOSIS — N18.30 CHRONIC KIDNEY DISEASE, STAGE 3 (HCC): ICD-10-CM

## 2018-09-04 DIAGNOSIS — I10 HYPERTENSION, UNSPECIFIED TYPE: ICD-10-CM

## 2018-09-04 DIAGNOSIS — D50.9 IRON DEFICIENCY ANEMIA, UNSPECIFIED IRON DEFICIENCY ANEMIA TYPE: ICD-10-CM

## 2018-09-04 DIAGNOSIS — E78.5 DYSLIPIDEMIA: ICD-10-CM

## 2018-09-04 DIAGNOSIS — I10 ESSENTIAL HYPERTENSION: ICD-10-CM

## 2018-09-04 PROCEDURE — 4010F ACE/ARB THERAPY RXD/TAKEN: CPT | Performed by: INTERNAL MEDICINE

## 2018-09-04 PROCEDURE — 3066F NEPHROPATHY DOC TX: CPT | Performed by: INTERNAL MEDICINE

## 2018-09-04 PROCEDURE — 99215 OFFICE O/P EST HI 40 MIN: CPT | Performed by: INTERNAL MEDICINE

## 2018-09-04 PROCEDURE — 3008F BODY MASS INDEX DOCD: CPT | Performed by: INTERNAL MEDICINE

## 2018-09-04 PROCEDURE — 1160F RVW MEDS BY RX/DR IN RCRD: CPT | Performed by: INTERNAL MEDICINE

## 2018-09-04 PROCEDURE — 1036F TOBACCO NON-USER: CPT | Performed by: INTERNAL MEDICINE

## 2018-09-04 RX ORDER — LISINOPRIL 40 MG/1
60 TABLET ORAL DAILY
Qty: 45 TABLET | Refills: 6 | Status: SHIPPED | OUTPATIENT
Start: 2018-09-04 | End: 2018-12-31 | Stop reason: SDUPTHER

## 2018-09-04 RX ORDER — AMLODIPINE BESYLATE 10 MG/1
10 TABLET ORAL DAILY
Qty: 30 TABLET | Refills: 6 | Status: SHIPPED | OUTPATIENT
Start: 2018-09-04 | End: 2019-04-06 | Stop reason: SDUPTHER

## 2018-09-04 RX ORDER — FENOFIBRATE 145 MG/1
145 TABLET, COATED ORAL DAILY
Qty: 30 TABLET | Refills: 3 | Status: SHIPPED | OUTPATIENT
Start: 2018-09-04 | End: 2018-12-31 | Stop reason: SDUPTHER

## 2018-09-04 NOTE — ASSESSMENT & PLAN NOTE
UTT statin, will start fibrate and get TG down and readdress statin in future although pt has been UTT two statins now

## 2018-09-04 NOTE — ASSESSMENT & PLAN NOTE
Lab Results   Component Value Date    HGBA1C 11 9 (H) 08/24/2018       No results for input(s): POCGLU in the last 72 hours  Blood Sugar Average: Last 72 hrs:does not check sugars at home,  today  DM type 2 with microalbuminuria and hyperglycemia - uncontrolled with A1C now 11  9!!!   Having symptoms of hyperglycemia as well - insulin d/w pt but this makes him very anxious as he gets woozy with blood draws, Annie went in and did glucometer teaching with pt today, microvascular SE with  Uncontrolled DM was reviewed, start Invokana and have pt check BS at home - call if readings > 250 after 2 wks, on ASA and ACE but UTT statin, foot exam done today, urged to call for eye exam

## 2018-09-04 NOTE — ASSESSMENT & PLAN NOTE
BP elevated today and only minimally improved by end of appt - pt very anxious and will recheck in 3 mos - if still elevated will need to increase Metoprolol, again diet/exercise/wgt loss encouraged

## 2018-09-04 NOTE — ASSESSMENT & PLAN NOTE
TG very high and in 800's!  Start Fenofibrate and recheck FLP in 3 mos, low fat/cholesterol diet and exercise encouraged

## 2018-09-04 NOTE — PROGRESS NOTES
Assessment/Plan:    Type 2 diabetes mellitus with microalbuminuria (HCC)  Lab Results   Component Value Date    HGBA1C 11 9 (H) 08/24/2018       No results for input(s): POCGLU in the last 72 hours  Blood Sugar Average: Last 72 hrs:does not check sugars at home,  today  DM type 2 with microalbuminuria and hyperglycemia - uncontrolled with A1C now 11  9!!! Having symptoms of hyperglycemia as well - insulin d/w pt but this makes him very anxious as he gets woozy with blood draws, Annie went in and did glucometer teaching with pt today, microvascular SE with  Uncontrolled DM was reviewed, start Invokana and have pt check BS at home - call if readings > 250 after 2 wks, on ASA and ACE but UTT statin, foot exam done today, urged to call for eye exam    Chronic kidney disease, stage 3  Importance of BS/BP control was reviewed - urged avoid NSAIDs and keep hydrated    Essential hypertriglyceridemia  TG very high and in 800's! Start Fenofibrate and recheck FLP in 3 mos, low fat/cholesterol diet and exercise encouraged    Iron deficiency anemia  H/H now elevated and iron studies wnl - decrease iron to 325 mg 1 tab daily and recheck in 3mos - if still good will stop iron all together    Dyslipidemia  UTT statin, will start fibrate and get TG down and readdress statin in future although pt has been UTT two statins now    Hypertension  BP elevated today and only minimally improved by end of appt - pt very anxious and will recheck in 3 mos - if still elevated will need to increase Metoprolol, again diet/exercise/wgt loss encouraged       Diagnoses and all orders for this visit:    Type 2 diabetes mellitus with microalbuminuria, without long-term current use of insulin (HCC)  -     canagliflozin (INVOKANA) 100 mg; Take 1 tablet (100 mg total) by mouth daily before breakfast for 30 days  -     Cancel: Microalbumin / creatinine urine ratio; Future  -     Cancel: Hemoglobin A1C; Future  -     Basic metabolic panel;  Future  - Basic metabolic panel  -     Hemoglobin A1C With EAG; Future  -     Microalbumin, Random Urine (W/Creatinine); Future  -     Hemoglobin A1C With EAG  -     Microalbumin, Random Urine (W/Creatinine)    Essential hypertension  -     amLODIPine (NORVASC) 10 mg tablet; Take 1 tablet (10 mg total) by mouth daily  -     Basic metabolic panel; Future  -     Basic metabolic panel    Hypertension, unspecified type  -     lisinopril (ZESTRIL) 40 mg tablet; Take 1 5 tablets (60 mg total) by mouth daily for 30 days    Essential hypertriglyceridemia  -     fenofibrate (TRICOR) 145 mg tablet; Take 1 tablet (145 mg total) by mouth daily for 30 days    Iron deficiency anemia, unspecified iron deficiency anemia type  -     Iron; Future  -     Ferritin; Future  -     CBC and differential; Future  -     Iron  -     Ferritin  -     CBC and differential    Dyslipidemia    Chronic kidney disease, stage 3      [place] 2017 - good for 5 yrs    I have spent 45 minutes with [facility] and his wife today in which greater than 50% of this time was spent in counseling/coordination of care regarding Diagnostic results, Prognosis, Risks and benefits of tx options, Intructions for management, Patient and family education, Importance of tx compliance, Risk factor reductions and Impressions  Subjective:      Patient ID: Bonnie Mckay is a 80 y o  male  HPI Pt here for follow up appt and BW results    BW results were d/w pt in detail:  FBS/A1C was up significantly at 370 and 11 9, uric acid wnl at 5 1, iron/ferritin wnl, TIBC was a bit low at 232, FLP with TC up at 226, HDL low at 30 and TG up at 842 - LDL unable to be calculated, CBC with elevated H/H at 17 9/53  7  Def of controlled vs uncontrolled DM was reviewed  Diet was reviewed - wgt down 12 lbs from last appt  His wife states his diet was poor with recent vacations but he has gotten back on track the past 2 wks  He is not currently on any DM meds as directed    He has been peeing more and is much more thirsty  He notes dry mouth and is having wgt loss  He is overdue for foot (6/17) and eye exam (3/16)  He is on statin, ASA, and ACE  Goal FLP was d/w pt in detail  SE of uncontrolled TG/cholesterol was reviewed  Pt is not taking Atorvastatin d/t SE - dizziness, stomach ache  Pt is taking his iron twice daily as directed  He notes no SE with the medication  Iron studies reviewed as noted above  BP above goal today and meds were reviewed and no changes have occurred  He denies missing doses of meds or SE with the meds  He does not check his BP outside the office  He notes no frequent Ha's/dizziness/double vision/CP  [place] Oct 2017 - good for 5 yrs    Review of Systems   Constitutional: Positive for unexpected weight change  Negative for chills, fatigue and fever  HENT: Negative for congestion and sinus pain  Eyes: Negative for pain and redness  Respiratory: Negative for cough, chest tightness and shortness of breath  Cardiovascular: Negative for chest pain, palpitations and leg swelling  Gastrointestinal: Negative for abdominal pain, constipation, diarrhea, nausea and vomiting  Endocrine: Positive for polydipsia and polyuria  Genitourinary: Negative for difficulty urinating and dysuria  Musculoskeletal: Negative for arthralgias and myalgias  Skin: Negative for rash and wound  Neurological: Negative for dizziness and headaches  Hematological: Does not bruise/bleed easily  Psychiatric/Behavioral: Negative for behavioral problems and confusion  Objective:    /88   Pulse 74   Temp 98 4 °F (36 9 °C)   Ht 5' 10" (1 778 m)   Wt 107 kg (235 lb)   BMI 33 72 kg/m²      Physical Exam   Constitutional: He appears well-developed and well-nourished  No distress  HENT:   Head: Normocephalic and atraumatic  Mouth/Throat: No oropharyngeal exudate  Eyes: Conjunctivae are normal  Right eye exhibits no discharge   Left eye exhibits no discharge  Neck: Neck supple  No tracheal deviation present  Cardiovascular: Normal rate and regular rhythm  Pulses are no weak pulses  No murmur heard  Pulses:       Dorsalis pedis pulses are 2+ on the right side, and 2+ on the left side  Pulmonary/Chest: Effort normal and breath sounds normal  No respiratory distress  He has no wheezes  He has no rales  Abdominal: Soft  He exhibits no distension  There is no tenderness  Musculoskeletal: He exhibits no deformity  Feet:   Right Foot:   Skin Integrity: Negative for ulcer, skin breakdown, erythema, warmth, callus or dry skin  Left Foot:   Skin Integrity: Negative for ulcer, skin breakdown, erythema, warmth, callus or dry skin  Neurological: He is alert  He exhibits normal muscle tone  Skin: Skin is warm and dry  No rash noted  Psychiatric: He has a normal mood and affect  His behavior is normal    Nursing note and vitals reviewed  Patient's shoes and socks removed  Right Foot/Ankle   Right Foot Inspection  Skin Exam: skin normal and skin intact no dry skin, no warmth, no callus, no erythema, no maceration, no abnormal color, no pre-ulcer, no ulcer and no callus                          Toe Exam: ROM and strength within normal limits  Sensory   Vibration: intact    Monofilament testing: intact  Vascular    The right DP pulse is 2+  Left Foot/Ankle  Left Foot Inspection  Skin Exam: skin normal and skin intactno dry skin, no warmth, no erythema, no maceration, normal color, no pre-ulcer, no ulcer and no callus                         Toe Exam: ROM and strength within normal limits                   Sensory   Vibration: intact    Monofilament: intact  Vascular    The left DP pulse is 2+  Assign Risk Category:  No deformity present; No loss of protective sensation;  No weak pulses       Risk: 0

## 2018-09-04 NOTE — ASSESSMENT & PLAN NOTE
H/H now elevated and iron studies wnl - decrease iron to 325 mg 1 tab daily and recheck in 3mos - if still good will stop iron all together

## 2018-09-06 DIAGNOSIS — R80.9 TYPE 2 DIABETES MELLITUS WITH MICROALBUMINURIA, UNSPECIFIED WHETHER LONG TERM INSULIN USE (HCC): Primary | ICD-10-CM

## 2018-09-06 DIAGNOSIS — E11.29 TYPE 2 DIABETES MELLITUS WITH MICROALBUMINURIA, UNSPECIFIED WHETHER LONG TERM INSULIN USE (HCC): Primary | ICD-10-CM

## 2018-09-06 RX ORDER — LANCETS 28 GAUGE
EACH MISCELLANEOUS
Qty: 100 EACH | Refills: 0 | Status: SHIPPED | OUTPATIENT
Start: 2018-09-06 | End: 2018-09-06 | Stop reason: SDUPTHER

## 2018-09-06 RX ORDER — BLOOD-GLUCOSE METER
1 KIT MISCELLANEOUS AS NEEDED
Qty: 1 EACH | Refills: 0 | Status: SHIPPED | OUTPATIENT
Start: 2018-09-06 | End: 2018-09-06 | Stop reason: SDUPTHER

## 2018-09-06 RX ORDER — LANCETS 28 GAUGE
EACH MISCELLANEOUS AS NEEDED
Qty: 100 EACH | Refills: 0 | Status: SHIPPED | OUTPATIENT
Start: 2018-09-06 | End: 2020-05-28

## 2018-09-06 RX ORDER — BLOOD-GLUCOSE METER
1 KIT MISCELLANEOUS AS NEEDED
Qty: 1 EACH | Refills: 0 | Status: SHIPPED | OUTPATIENT
Start: 2018-09-06 | End: 2020-05-28

## 2018-09-06 NOTE — TELEPHONE ENCOUNTER
DR MCNEAL SAW PATIENT ON TUESDAY - SHE TOLD HIM TO CHECK HIS SUGARS DAILY - THEY DO NOT HAVE A GLUCOMETER AT HOME - THEY WILL NEED AN ORDER FOR MACHINE, TEST STRIPS AND LANCETS - PATIENTS WIFE CALLED THE INSURANCE COMPANY - ALL METERS REQUIRE PRIOR AUTH FIRST  - PLEASE ORDER, PLEASE OBTAIN PRIOR AUTH, AND PLEASE CONTACT ONE OF THESE CONTRACTED PHARMACIES TO PLACE ORDER    [facility] [address] [facility]  [phone]    [address] [place] [address]  [phone]

## 2018-09-10 ENCOUNTER — TELEPHONE (OUTPATIENT)
Dept: FAMILY MEDICINE CLINIC | Facility: HOSPITAL | Age: 68
End: 2018-09-10

## 2018-09-10 NOTE — TELEPHONE ENCOUNTER
Patients wife wanted you to know she purchased a 'smart' meter for [employer] -     Has been using since Saturday    Past three days his morning blood sugar, before breakfast/8 hr fasting - has been in the 152-154 range

## 2018-09-15 LAB
LEFT EYE DIABETIC RETINOPATHY: NORMAL
RIGHT EYE DIABETIC RETINOPATHY: NORMAL

## 2018-09-15 PROCEDURE — 3072F LOW RISK FOR RETINOPATHY: CPT | Performed by: INTERNAL MEDICINE

## 2018-09-30 DIAGNOSIS — M1A.9XX0 CHRONIC GOUT WITHOUT TOPHUS, UNSPECIFIED CAUSE, UNSPECIFIED SITE: ICD-10-CM

## 2018-09-30 RX ORDER — ALLOPURINOL 100 MG/1
TABLET ORAL
Qty: 30 TABLET | Refills: 6 | Status: SHIPPED | OUTPATIENT
Start: 2018-09-30 | End: 2018-12-31 | Stop reason: SDUPTHER

## 2018-11-11 DIAGNOSIS — I10 HYPERTENSION, UNSPECIFIED TYPE: ICD-10-CM

## 2018-11-11 PROCEDURE — 4010F ACE/ARB THERAPY RXD/TAKEN: CPT | Performed by: INTERNAL MEDICINE

## 2018-11-11 RX ORDER — LISINOPRIL 40 MG/1
TABLET ORAL
Qty: 45 TABLET | Refills: 3 | Status: SHIPPED | OUTPATIENT
Start: 2018-11-11 | End: 2018-12-31

## 2018-12-10 DIAGNOSIS — R79.89 ELEVATED SERUM CREATININE: Primary | ICD-10-CM

## 2018-12-10 LAB
ALBUMIN/CREAT UR: 63 MCG/MG CREAT
BASOPHILS # BLD AUTO: 73 CELLS/UL (ref 0–200)
BASOPHILS NFR BLD AUTO: 1.1 %
BUN SERPL-MCNC: 28 MG/DL (ref 7–25)
BUN/CREAT SERPL: 16 (CALC) (ref 6–22)
CALCIUM SERPL-MCNC: 9.2 MG/DL (ref 8.6–10.3)
CHLORIDE SERPL-SCNC: 107 MMOL/L (ref 98–110)
CO2 SERPL-SCNC: 28 MMOL/L (ref 20–32)
CREAT SERPL-MCNC: 1.8 MG/DL (ref 0.7–1.25)
CREAT UR-MCNC: 78 MG/DL (ref 20–320)
EOSINOPHIL # BLD AUTO: 370 CELLS/UL (ref 15–500)
EOSINOPHIL NFR BLD AUTO: 5.6 %
ERYTHROCYTE [DISTWIDTH] IN BLOOD BY AUTOMATED COUNT: 12.4 % (ref 11–15)
EST. AVERAGE GLUCOSE BLD GHB EST-MCNC: 143 (CALC)
EST. AVERAGE GLUCOSE BLD GHB EST-SCNC: 7.9 (CALC)
FERRITIN SERPL-MCNC: 153 NG/ML (ref 20–380)
GLUCOSE SERPL-MCNC: 111 MG/DL (ref 65–99)
HBA1C MFR BLD: 6.6 % OF TOTAL HGB
HCT VFR BLD AUTO: 53.4 % (ref 38.5–50)
HGB BLD-MCNC: 17.7 G/DL (ref 13.2–17.1)
IRON SERPL-MCNC: 128 MCG/DL (ref 50–180)
LYMPHOCYTES # BLD AUTO: 1492 CELLS/UL (ref 850–3900)
LYMPHOCYTES NFR BLD AUTO: 22.6 %
MCH RBC QN AUTO: 29.6 PG (ref 27–33)
MCHC RBC AUTO-ENTMCNC: 33.1 G/DL (ref 32–36)
MCV RBC AUTO: 89.3 FL (ref 80–100)
MICROALBUMIN UR-MCNC: 4.9 MG/DL
MONOCYTES # BLD AUTO: 528 CELLS/UL (ref 200–950)
MONOCYTES NFR BLD AUTO: 8 %
NEUTROPHILS # BLD AUTO: 4138 CELLS/UL (ref 1500–7800)
NEUTROPHILS NFR BLD AUTO: 62.7 %
PLATELET # BLD AUTO: 293 THOUSAND/UL (ref 140–400)
PMV BLD REES-ECKER: 10.2 FL (ref 7.5–12.5)
POTASSIUM SERPL-SCNC: 4.1 MMOL/L (ref 3.5–5.3)
RBC # BLD AUTO: 5.98 MILLION/UL (ref 4.2–5.8)
SL AMB EGFR AFRICAN AMERICAN: 44 ML/MIN/1.73M2
SL AMB EGFR NON AFRICAN AMERICAN: 38 ML/MIN/1.73M2
SODIUM SERPL-SCNC: 143 MMOL/L (ref 135–146)
WBC # BLD AUTO: 6.6 THOUSAND/UL (ref 3.8–10.8)

## 2018-12-28 LAB
BUN SERPL-MCNC: 25 MG/DL (ref 7–25)
BUN/CREAT SERPL: 15 (CALC) (ref 6–22)
CALCIUM SERPL-MCNC: 9.4 MG/DL (ref 8.6–10.3)
CHLORIDE SERPL-SCNC: 105 MMOL/L (ref 98–110)
CO2 SERPL-SCNC: 27 MMOL/L (ref 20–32)
CREAT SERPL-MCNC: 1.67 MG/DL (ref 0.7–1.25)
GLUCOSE SERPL-MCNC: 95 MG/DL (ref 65–99)
POTASSIUM SERPL-SCNC: 4.1 MMOL/L (ref 3.5–5.3)
SL AMB EGFR AFRICAN AMERICAN: 48 ML/MIN/1.73M2
SL AMB EGFR NON AFRICAN AMERICAN: 42 ML/MIN/1.73M2
SODIUM SERPL-SCNC: 142 MMOL/L (ref 135–146)

## 2018-12-31 ENCOUNTER — OFFICE VISIT (OUTPATIENT)
Dept: FAMILY MEDICINE CLINIC | Facility: HOSPITAL | Age: 68
End: 2018-12-31
Payer: COMMERCIAL

## 2018-12-31 VITALS
SYSTOLIC BLOOD PRESSURE: 130 MMHG | BODY MASS INDEX: 31.21 KG/M2 | HEART RATE: 67 BPM | WEIGHT: 218 LBS | HEIGHT: 70 IN | DIASTOLIC BLOOD PRESSURE: 78 MMHG | TEMPERATURE: 97.9 F

## 2018-12-31 DIAGNOSIS — I10 HYPERTENSION, UNSPECIFIED TYPE: ICD-10-CM

## 2018-12-31 DIAGNOSIS — R80.9 MICROALBUMINURIA: ICD-10-CM

## 2018-12-31 DIAGNOSIS — E78.1 ESSENTIAL HYPERTRIGLYCERIDEMIA: ICD-10-CM

## 2018-12-31 DIAGNOSIS — M1A.9XX0 CHRONIC GOUT WITHOUT TOPHUS, UNSPECIFIED CAUSE, UNSPECIFIED SITE: ICD-10-CM

## 2018-12-31 DIAGNOSIS — E11.29 TYPE 2 DIABETES MELLITUS WITH MICROALBUMINURIA, WITHOUT LONG-TERM CURRENT USE OF INSULIN (HCC): Primary | ICD-10-CM

## 2018-12-31 DIAGNOSIS — R80.9 TYPE 2 DIABETES MELLITUS WITH MICROALBUMINURIA, WITHOUT LONG-TERM CURRENT USE OF INSULIN (HCC): Primary | ICD-10-CM

## 2018-12-31 DIAGNOSIS — I10 ESSENTIAL HYPERTENSION: ICD-10-CM

## 2018-12-31 DIAGNOSIS — N18.30 CHRONIC KIDNEY DISEASE, STAGE 3 (HCC): ICD-10-CM

## 2018-12-31 PROCEDURE — 3078F DIAST BP <80 MM HG: CPT | Performed by: INTERNAL MEDICINE

## 2018-12-31 PROCEDURE — 1036F TOBACCO NON-USER: CPT | Performed by: INTERNAL MEDICINE

## 2018-12-31 PROCEDURE — 99214 OFFICE O/P EST MOD 30 MIN: CPT | Performed by: INTERNAL MEDICINE

## 2018-12-31 PROCEDURE — 1160F RVW MEDS BY RX/DR IN RCRD: CPT | Performed by: INTERNAL MEDICINE

## 2018-12-31 PROCEDURE — 3008F BODY MASS INDEX DOCD: CPT | Performed by: INTERNAL MEDICINE

## 2018-12-31 PROCEDURE — 3066F NEPHROPATHY DOC TX: CPT | Performed by: INTERNAL MEDICINE

## 2018-12-31 PROCEDURE — 3075F SYST BP GE 130 - 139MM HG: CPT | Performed by: INTERNAL MEDICINE

## 2018-12-31 PROCEDURE — 4010F ACE/ARB THERAPY RXD/TAKEN: CPT | Performed by: INTERNAL MEDICINE

## 2018-12-31 RX ORDER — LISINOPRIL 40 MG/1
60 TABLET ORAL DAILY
Qty: 135 TABLET | Refills: 1 | Status: SHIPPED | OUTPATIENT
Start: 2018-12-31 | End: 2019-08-20 | Stop reason: SDUPTHER

## 2018-12-31 RX ORDER — METOPROLOL TARTRATE 50 MG/1
50 TABLET, FILM COATED ORAL EVERY 12 HOURS
Qty: 180 TABLET | Refills: 1 | Status: SHIPPED | OUTPATIENT
Start: 2018-12-31 | End: 2019-08-20 | Stop reason: SDUPTHER

## 2018-12-31 RX ORDER — ALLOPURINOL 100 MG/1
200 TABLET ORAL DAILY
Qty: 180 TABLET | Refills: 1 | Status: SHIPPED | OUTPATIENT
Start: 2018-12-31 | End: 2019-07-09 | Stop reason: SDUPTHER

## 2018-12-31 RX ORDER — FENOFIBRATE 145 MG/1
145 TABLET, COATED ORAL DAILY
Qty: 90 TABLET | Refills: 1 | Status: SHIPPED | OUTPATIENT
Start: 2018-12-31 | End: 2019-06-26 | Stop reason: SDUPTHER

## 2018-12-31 NOTE — PROGRESS NOTES
Assessment/Plan:    Type 2 diabetes mellitus with microalbuminuria (HCC)  Lab Results   Component Value Date    HGBA1C 6 6 (H) 12/07/2018       No results for input(s): POCGLU in the last 72 hours  Blood Sugar Average: Last 72 hrs:  Dm type 2 with microalbuminuria/CKD stage 3 - controlled with A1C 6 6 - doing well on Invokana but insurance will not cover it - need to switch to [place] or [place] - d/w pt that Penny recommended avoid use with GFR 30-44 and he is right on borderline at 43 - will try low dose and re-eval in 6 mos, may need to prior auth as Farxiga CI in renal dz as well, UTD on foot (9/18) and eye exam (9/18), on ACE/ASA but no statin    Microalbuminuria  microalbumin up and down - again urged BP/BS control, on an ACE, monitor annually    Chronic kidney disease, stage 3  GFR had gone down early Dec but repeat labs showed back to baseline - again urged to avoid NSAIDs and importance of BP and BS control, con't current meds, repeat BW in 6 mo - order given    Hypertension  Bp better by end of appt and numbers at home lower and acceptable, con't current meds - rx refilled    Essential hypertriglyceridemia  Repeat FLP with next labs - order given    Gout  No recent events, allopurinol refilled upon request today       Diagnoses and all orders for this visit:    Type 2 diabetes mellitus with microalbuminuria, without long-term current use of insulin (HCC)  -     CBC and differential; Future  -     Comprehensive metabolic panel; Future  -     Lipid panel; Future  -     TSH, 3rd generation with Free T4 reflex; Future  -     Hemoglobin A1C With EAG;  Future  -     CBC and differential  -     Comprehensive metabolic panel  -     Lipid panel  -     TSH, 3rd generation with Free T4 reflex  -     Hemoglobin A1C With EAG  -     Empagliflozin 10 MG TABS; Take 1 tablet (10 mg total) by mouth every morning    Chronic kidney disease, stage 3 (HCC)  -     CBC and differential; Future  -     Comprehensive metabolic panel; Future  -     Lipid panel; Future  -     TSH, 3rd generation with Free T4 reflex; Future  -     Hemoglobin A1C With EAG; Future  -     CBC and differential  -     Comprehensive metabolic panel  -     Lipid panel  -     TSH, 3rd generation with Free T4 reflex  -     Hemoglobin A1C With EAG    Essential hypertension  -     metoprolol tartrate (LOPRESSOR) 50 mg tablet; Take 1 tablet (50 mg total) by mouth every 12 (twelve) hours  -     CBC and differential; Future  -     Comprehensive metabolic panel; Future  -     Lipid panel; Future  -     TSH, 3rd generation with Free T4 reflex; Future  -     Hemoglobin A1C With EAG; Future  -     CBC and differential  -     Comprehensive metabolic panel  -     Lipid panel  -     TSH, 3rd generation with Free T4 reflex  -     Hemoglobin A1C With EAG    Hypertension, unspecified type  -     lisinopril (ZESTRIL) 40 mg tablet; Take 1 5 tablets (60 mg total) by mouth daily for 90 days  -     CBC and differential; Future  -     Comprehensive metabolic panel; Future  -     Lipid panel; Future  -     TSH, 3rd generation with Free T4 reflex; Future  -     Hemoglobin A1C With EAG; Future  -     CBC and differential  -     Comprehensive metabolic panel  -     Lipid panel  -     TSH, 3rd generation with Free T4 reflex  -     Hemoglobin A1C With EAG    Essential hypertriglyceridemia  -     fenofibrate (TRICOR) 145 mg tablet; Take 1 tablet (145 mg total) by mouth daily for 90 days  -     CBC and differential; Future  -     Comprehensive metabolic panel; Future  -     Lipid panel; Future  -     TSH, 3rd generation with Free T4 reflex; Future  -     Hemoglobin A1C With EAG; Future  -     CBC and differential  -     Comprehensive metabolic panel  -     Lipid panel  -     TSH, 3rd generation with Free T4 reflex  -     Hemoglobin A1C With EAG    Chronic gout without tophus, unspecified cause, unspecified site  -     allopurinol (ZYLOPRIM) 100 mg tablet;  Take 2 tablets (200 mg total) by mouth daily for 90 days  -     CBC and differential; Future  -     Comprehensive metabolic panel; Future  -     Lipid panel; Future  -     TSH, 3rd generation with Free T4 reflex; Future  -     Hemoglobin A1C With EAG; Future  -     CBC and differential  -     Comprehensive metabolic panel  -     Lipid panel  -     TSH, 3rd generation with Free T4 reflex  -     Hemoglobin A1C With EAG    Microalbuminuria  -     CBC and differential; Future  -     Comprehensive metabolic panel; Future  -     Lipid panel; Future  -     TSH, 3rd generation with Free T4 reflex; Future  -     Hemoglobin A1C With EAG; Future  -     CBC and differential  -     Comprehensive metabolic panel  -     Lipid panel  -     TSH, 3rd generation with Free T4 reflex  -     Hemoglobin A1C With EAG      [place] 2017 - 5 yrs    Subjective:      Patient ID: Aye Dumont is a 80 y o  male  HPI Pt here for follow up appt and BW results    BW results were d/w pt in detail: FBS/A1C 111/6 6, his BUN/Cr did jump up to 28/1 80, rest of BMP/CBC/iron/ferritin was wnl, his urine microalbumin:cr ratio was slightly elevated at 63  Def of controlled vs uncontrolled DM was reviewed  Diet was reviewed - wgt down 17 lbs from Sept 2018  He has changed his diet and is more active as he joined the [email or web address]  He is taking his DM meds as directed but received a letter recently stating Brant Chaudhary will not be covered in new year and needs to switch to Adore Celaya or Adeel Chong He is UTD on foot (9/18) and eye exam (9/18)  He is on ASA and ACE  Pt was notified his kidney tests had gone up with BW early Dec and we subsequently repeated his BMP and his BUN/Cr 12/27/18 was back to baseline 25/1 67 and c/w CKD stage 3  Again importance of avoiding NSAIDs and BP and BS control were reviewed  BP above goal today and meds were reviewed and no changes have occurred  He denies missing doses of meds or SE with the meds    He does check his BP outside the office - he checks every am and states his average at home is usually 130's/70's  He notes no frequent Ha's/dizziness/double vision/CP       [place] 2017 - 6 yrs    Review of Systems      Objective:    /78   Pulse 67   Temp 97 9 °F (36 6 °C)   Ht 5' 10" (1 778 m)   Wt 98 9 kg (218 lb)   BMI 31 28 kg/m²      Physical Exam

## 2018-12-31 NOTE — ASSESSMENT & PLAN NOTE
Lab Results   Component Value Date    HGBA1C 6 6 (H) 12/07/2018       No results for input(s): POCGLU in the last 72 hours      Blood Sugar Average: Last 72 hrs:  Dm type 2 with microalbuminuria/CKD stage 3 - controlled with A1C 6 6 - doing well on Invokana but insurance will not cover it - need to switch to [place] or [place] - d/w pt that Jardiance recommended avoid use with GFR 30-44 and he is right on borderline at 43 - will try low dose and re-eval in 6 mos, may need to prior auth as Farxiga CI in renal dz as well, UTD on foot (9/18) and eye exam (9/18), on ACE/ASA but no statin

## 2018-12-31 NOTE — ASSESSMENT & PLAN NOTE
GFR had gone down early Dec but repeat labs showed back to baseline - again urged to avoid NSAIDs and importance of BP and BS control, con't current meds, repeat BW in 6 mo - order given

## 2019-01-01 DIAGNOSIS — R80.9 TYPE 2 DIABETES MELLITUS WITH MICROALBUMINURIA, WITHOUT LONG-TERM CURRENT USE OF INSULIN (HCC): ICD-10-CM

## 2019-01-01 DIAGNOSIS — E11.29 TYPE 2 DIABETES MELLITUS WITH MICROALBUMINURIA, WITHOUT LONG-TERM CURRENT USE OF INSULIN (HCC): ICD-10-CM

## 2019-01-02 ENCOUNTER — TELEPHONE (OUTPATIENT)
Dept: FAMILY MEDICINE CLINIC | Facility: HOSPITAL | Age: 69
End: 2019-01-02

## 2019-01-02 DIAGNOSIS — E11.29 TYPE 2 DIABETES MELLITUS WITH MICROALBUMINURIA, WITHOUT LONG-TERM CURRENT USE OF INSULIN (HCC): Primary | ICD-10-CM

## 2019-01-02 DIAGNOSIS — R80.9 TYPE 2 DIABETES MELLITUS WITH MICROALBUMINURIA, WITHOUT LONG-TERM CURRENT USE OF INSULIN (HCC): Primary | ICD-10-CM

## 2019-01-02 RX ORDER — CANAGLIFLOZIN 100 MG/1
TABLET, FILM COATED ORAL
Qty: 30 TABLET | Refills: 3 | Status: SHIPPED | OUTPATIENT
Start: 2019-01-02 | End: 2019-08-20

## 2019-01-02 NOTE — TELEPHONE ENCOUNTER
Asap, pt only has 2 invokana pills left and they require prior auth  Pt would prefer to stay on invokana if possible because it works well for him    Prior auth number [id number]

## 2019-01-02 NOTE — TELEPHONE ENCOUNTER
I called and got a prior auth for the invokana for a year  Please order and send to [facility] in [address]  Thanks  I called and canceled Jardiance at [facility]

## 2019-04-06 DIAGNOSIS — I10 ESSENTIAL HYPERTENSION: ICD-10-CM

## 2019-04-07 RX ORDER — AMLODIPINE BESYLATE 10 MG/1
TABLET ORAL
Qty: 30 TABLET | Refills: 6 | Status: SHIPPED | OUTPATIENT
Start: 2019-04-07 | End: 2019-08-20 | Stop reason: SDUPTHER

## 2019-06-26 DIAGNOSIS — E78.1 ESSENTIAL HYPERTRIGLYCERIDEMIA: ICD-10-CM

## 2019-06-26 RX ORDER — FENOFIBRATE 145 MG/1
TABLET, COATED ORAL
Qty: 90 TABLET | Refills: 1 | Status: SHIPPED | OUTPATIENT
Start: 2019-06-26 | End: 2019-12-12 | Stop reason: SDUPTHER

## 2019-07-07 DIAGNOSIS — M1A.9XX0 CHRONIC GOUT WITHOUT TOPHUS, UNSPECIFIED CAUSE, UNSPECIFIED SITE: ICD-10-CM

## 2019-07-09 DIAGNOSIS — M1A.9XX0 CHRONIC GOUT WITHOUT TOPHUS, UNSPECIFIED CAUSE, UNSPECIFIED SITE: ICD-10-CM

## 2019-07-09 RX ORDER — ALLOPURINOL 100 MG/1
200 TABLET ORAL DAILY
Qty: 180 TABLET | Refills: 1 | Status: SHIPPED | OUTPATIENT
Start: 2019-07-09 | End: 2019-08-20

## 2019-07-09 NOTE — TELEPHONE ENCOUNTER
Please send to [phone] [facility] - patient aware tho is out

## 2019-07-14 RX ORDER — ALLOPURINOL 100 MG/1
TABLET ORAL
Qty: 180 TABLET | Refills: 1 | Status: SHIPPED | OUTPATIENT
Start: 2019-07-14 | End: 2020-02-25 | Stop reason: SDUPTHER

## 2019-07-29 DIAGNOSIS — E11.29 TYPE 2 DIABETES MELLITUS WITH MICROALBUMINURIA, WITHOUT LONG-TERM CURRENT USE OF INSULIN (HCC): ICD-10-CM

## 2019-07-29 DIAGNOSIS — R80.9 TYPE 2 DIABETES MELLITUS WITH MICROALBUMINURIA, WITHOUT LONG-TERM CURRENT USE OF INSULIN (HCC): ICD-10-CM

## 2019-07-29 RX ORDER — CANAGLIFLOZIN 100 MG/1
TABLET, FILM COATED ORAL
Qty: 30 TABLET | Refills: 3 | Status: SHIPPED | OUTPATIENT
Start: 2019-07-29 | End: 2019-12-12 | Stop reason: SDUPTHER

## 2019-08-14 LAB
ALBUMIN SERPL-MCNC: 4.3 G/DL (ref 3.6–5.1)
ALBUMIN/GLOB SERPL: 1.4 (CALC) (ref 1–2.5)
ALP SERPL-CCNC: 73 U/L (ref 40–115)
ALT SERPL-CCNC: 15 U/L (ref 9–46)
AST SERPL-CCNC: 17 U/L (ref 10–35)
BASOPHILS # BLD AUTO: 101 CELLS/UL (ref 0–200)
BASOPHILS NFR BLD AUTO: 1.6 %
BILIRUB SERPL-MCNC: 0.7 MG/DL (ref 0.2–1.2)
BUN SERPL-MCNC: 30 MG/DL (ref 7–25)
BUN/CREAT SERPL: 19 (CALC) (ref 6–22)
CALCIUM SERPL-MCNC: 9.2 MG/DL (ref 8.6–10.3)
CHLORIDE SERPL-SCNC: 105 MMOL/L (ref 98–110)
CHOLEST SERPL-MCNC: 182 MG/DL
CHOLEST/HDLC SERPL: 4.1 (CALC)
CO2 SERPL-SCNC: 25 MMOL/L (ref 20–32)
CREAT SERPL-MCNC: 1.62 MG/DL (ref 0.7–1.25)
EOSINOPHIL # BLD AUTO: 309 CELLS/UL (ref 15–500)
EOSINOPHIL NFR BLD AUTO: 4.9 %
ERYTHROCYTE [DISTWIDTH] IN BLOOD BY AUTOMATED COUNT: 13.1 % (ref 11–15)
EST. AVERAGE GLUCOSE BLD GHB EST-MCNC: 134 (CALC)
EST. AVERAGE GLUCOSE BLD GHB EST-SCNC: 7.4 (CALC)
GLOBULIN SER CALC-MCNC: 3.1 G/DL (CALC) (ref 1.9–3.7)
GLUCOSE SERPL-MCNC: 98 MG/DL (ref 65–99)
HBA1C MFR BLD: 6.3 % OF TOTAL HGB
HCT VFR BLD AUTO: 54.8 % (ref 38.5–50)
HDLC SERPL-MCNC: 44 MG/DL
HGB BLD-MCNC: 18.2 G/DL (ref 13.2–17.1)
LDLC SERPL CALC-MCNC: 116 MG/DL (CALC)
LYMPHOCYTES # BLD AUTO: 1399 CELLS/UL (ref 850–3900)
LYMPHOCYTES NFR BLD AUTO: 22.2 %
MCH RBC QN AUTO: 29.2 PG (ref 27–33)
MCHC RBC AUTO-ENTMCNC: 33.2 G/DL (ref 32–36)
MCV RBC AUTO: 87.8 FL (ref 80–100)
MONOCYTES # BLD AUTO: 491 CELLS/UL (ref 200–950)
MONOCYTES NFR BLD AUTO: 7.8 %
NEUTROPHILS # BLD AUTO: 4001 CELLS/UL (ref 1500–7800)
NEUTROPHILS NFR BLD AUTO: 63.5 %
NONHDLC SERPL-MCNC: 138 MG/DL (CALC)
PLATELET # BLD AUTO: 254 THOUSAND/UL (ref 140–400)
PMV BLD REES-ECKER: 10.2 FL (ref 7.5–12.5)
POTASSIUM SERPL-SCNC: 3.9 MMOL/L (ref 3.5–5.3)
PROT SERPL-MCNC: 7.4 G/DL (ref 6.1–8.1)
RBC # BLD AUTO: 6.24 MILLION/UL (ref 4.2–5.8)
SL AMB EGFR AFRICAN AMERICAN: 50 ML/MIN/1.73M2
SL AMB EGFR NON AFRICAN AMERICAN: 43 ML/MIN/1.73M2
SODIUM SERPL-SCNC: 140 MMOL/L (ref 135–146)
TRIGL SERPL-MCNC: 112 MG/DL
TSH SERPL-ACNC: 1.76 MIU/L (ref 0.4–4.5)
WBC # BLD AUTO: 6.3 THOUSAND/UL (ref 3.8–10.8)

## 2019-08-14 PROCEDURE — 3044F HG A1C LEVEL LT 7.0%: CPT | Performed by: INTERNAL MEDICINE

## 2019-08-20 ENCOUNTER — OFFICE VISIT (OUTPATIENT)
Dept: FAMILY MEDICINE CLINIC | Facility: HOSPITAL | Age: 69
End: 2019-08-20
Payer: COMMERCIAL

## 2019-08-20 VITALS
SYSTOLIC BLOOD PRESSURE: 140 MMHG | HEIGHT: 70 IN | TEMPERATURE: 97.8 F | BODY MASS INDEX: 31.5 KG/M2 | HEART RATE: 80 BPM | WEIGHT: 220 LBS | DIASTOLIC BLOOD PRESSURE: 82 MMHG

## 2019-08-20 DIAGNOSIS — I10 ESSENTIAL HYPERTENSION: ICD-10-CM

## 2019-08-20 DIAGNOSIS — Z12.5 PROSTATE CANCER SCREENING: ICD-10-CM

## 2019-08-20 DIAGNOSIS — I10 HYPERTENSION, UNSPECIFIED TYPE: ICD-10-CM

## 2019-08-20 DIAGNOSIS — N18.30 CHRONIC KIDNEY DISEASE, STAGE 3 (HCC): ICD-10-CM

## 2019-08-20 DIAGNOSIS — D75.1 ERYTHROCYTOSIS: ICD-10-CM

## 2019-08-20 DIAGNOSIS — R80.9 TYPE 2 DIABETES MELLITUS WITH MICROALBUMINURIA, WITHOUT LONG-TERM CURRENT USE OF INSULIN (HCC): Primary | ICD-10-CM

## 2019-08-20 DIAGNOSIS — M10.9 GOUT, UNSPECIFIED CAUSE, UNSPECIFIED CHRONICITY, UNSPECIFIED SITE: ICD-10-CM

## 2019-08-20 DIAGNOSIS — E66.9 OBESITY (BMI 30.0-34.9): ICD-10-CM

## 2019-08-20 DIAGNOSIS — E11.29 TYPE 2 DIABETES MELLITUS WITH MICROALBUMINURIA, WITHOUT LONG-TERM CURRENT USE OF INSULIN (HCC): Primary | ICD-10-CM

## 2019-08-20 DIAGNOSIS — E78.5 DYSLIPIDEMIA: ICD-10-CM

## 2019-08-20 PROCEDURE — 3066F NEPHROPATHY DOC TX: CPT | Performed by: INTERNAL MEDICINE

## 2019-08-20 PROCEDURE — 3008F BODY MASS INDEX DOCD: CPT | Performed by: INTERNAL MEDICINE

## 2019-08-20 PROCEDURE — 4010F ACE/ARB THERAPY RXD/TAKEN: CPT | Performed by: INTERNAL MEDICINE

## 2019-08-20 PROCEDURE — 1036F TOBACCO NON-USER: CPT | Performed by: INTERNAL MEDICINE

## 2019-08-20 PROCEDURE — 99214 OFFICE O/P EST MOD 30 MIN: CPT | Performed by: INTERNAL MEDICINE

## 2019-08-20 RX ORDER — AMLODIPINE BESYLATE 10 MG/1
10 TABLET ORAL DAILY
Qty: 30 TABLET | Refills: 6 | Status: ON HOLD | OUTPATIENT
Start: 2019-08-20 | End: 2020-02-03 | Stop reason: SDUPTHER

## 2019-08-20 RX ORDER — LISINOPRIL 40 MG/1
60 TABLET ORAL DAILY
Qty: 45 TABLET | Refills: 6 | Status: SHIPPED | OUTPATIENT
Start: 2019-08-20 | End: 2020-01-24 | Stop reason: HOSPADM

## 2019-08-20 RX ORDER — METOPROLOL TARTRATE 50 MG/1
50 TABLET, FILM COATED ORAL EVERY 12 HOURS
Qty: 60 TABLET | Refills: 6 | Status: SHIPPED | OUTPATIENT
Start: 2019-08-20 | End: 2020-02-25 | Stop reason: SDUPTHER

## 2019-08-20 NOTE — ASSESSMENT & PLAN NOTE
Lab Results   Component Value Date    HGBA1C 6 3 (H) 08/13/2019       No results for input(s): POCGLU in the last 72 hours      Blood Sugar Average: Last 72 hrs:  DM type 2 with microalbuminuria/nephropathy/CKD stage 3 - controlled with A1C 6 3 - encouraged diet/exercise/wgt loss, con't current medication, on ACE but cannot tolerate statin, recheck BW in6 mo - order given, foot exam done today, urged to do eye exam in Sept

## 2019-08-20 NOTE — ASSESSMENT & PLAN NOTE
BP upper end of goal today, advised diet/exercise/ wgt loss - recheck in 6 mos - if still elevated will need to increase BP meds

## 2019-08-20 NOTE — ASSESSMENT & PLAN NOTE
Stable, does not use NSAIDs regularly, importance of BP/BS control reviewed, recheck labs in 6 mo - order given

## 2019-08-20 NOTE — ASSESSMENT & PLAN NOTE
Very slightly elevated - he does not smoke, denies itching/fatigue/bleeding/bruising or blood clots, recheck in 6 mo - will need Heme if con't to go up

## 2019-08-20 NOTE — PATIENT INSTRUCTIONS
Obesity   AMBULATORY CARE:   Obesity  is when your body mass index (BMI) is greater than 30  Your healthcare provider will use your height and weight to measure your BMI  The risks of obesity include  many health problems, such as injuries or physical disability  You may need tests to check for the following:  · Diabetes     · High blood pressure or high cholesterol     · Heart disease     · Gallbladder or liver disease     · Cancer of the colon, breast, prostate, liver, or kidney     · Sleep apnea     · Arthritis or gout  Seek care immediately if:   · You have a severe headache, confusion, or difficulty speaking  · You have weakness on one side of your body  · You have chest pain, sweating, or shortness of breath  Contact your healthcare provider if:   · You have symptoms of gallbladder or liver disease, such as pain in your upper abdomen  · You have knee or hip pain and discomfort while walking  · You have symptoms of diabetes, such as intense hunger and thirst, and frequent urination  · You have symptoms of sleep apnea, such as snoring or daytime sleepiness  · You have questions or concerns about your condition or care  Treatment for obesity  focuses on helping you lose weight to improve your health  Even a small decrease in BMI can reduce the risk for many health problems  Your healthcare provider will help you set a weight-loss goal   · Lifestyle changes  are the first step in treating obesity  These include making healthy food choices and getting regular physical activity  Your healthcare provider may suggest a weight-loss program that involves coaching, education, and therapy  · Medicine  may help you lose weight when it is used with a healthy diet and physical activity  · Surgery  can help you lose weight if you are very obese and have other health problems  There are several types of weight-loss surgery  Ask your healthcare provider for more information    Be successful losing weight:   · Set small, realistic goals  An example of a small goal is to walk for 20 minutes 5 days a week  Anther goal is to lose 5% of your body weight  · Tell friends, family members, and coworkers about your goals  and ask for their support  Ask a friend to lose weight with you, or join a weight-loss support group  · Identify foods or triggers that may cause you to overeat , and find ways to avoid them  Remove tempting high-calorie foods from your home and workplace  Place a bowl of fresh fruit on your kitchen counter  If stress causes you to eat, then find other ways to cope with stress  · Keep a diary to track what you eat and drink  Also write down how many minutes of physical activity you do each day  Weigh yourself once a week and record it in your diary  Eating changes: You will need to eat 500 to 1,000 fewer calories each day than you currently eat to lose 1 to 2 pounds a week  The following changes will help you cut calories:  · Eat smaller portions  Use small plates, no larger than 9 inches in diameter  Fill your plate half full of fruits and vegetables  Measure your food using measuring cups until you know what a serving size looks like  · Eat 3 meals and 1 or 2 snacks each day  Plan your meals in advance  Radhika Malagon and eat at home most of the time  Eat slowly  · Eat fruits and vegetables at every meal   They are low in calories and high in fiber, which makes you feel full  Do not add butter, margarine, or cream sauce to vegetables  Use herbs to season steamed vegetables  · Eat less fat and fewer fried foods  Eat more baked or grilled chicken and fish  These protein sources are lower in calories and fat than red meat  Limit fast food  Dress your salads with olive oil and vinegar instead of bottled dressing  · Limit the amount of sugar you eat  Do not drink sugary beverages  Limit alcohol  Activity changes:  Physical activity is good for your body in many ways   It helps you burn calories and build strong muscles  It decreases stress and depression, and improves your mood  It can also help you sleep better  Talk to your healthcare provider before you begin an exercise program   · Exercise for at least 30 minutes 5 days a week  Start slowly  Set aside time each day for physical activity that you enjoy and that is convenient for you  It is best to do both weight training and an activity that increases your heart rate, such as walking, bicycling, or swimming  · Find ways to be more active  Do yard work and housecleaning  Walk up the stairs instead of using elevators  Spend your leisure time going to events that require walking, such as outdoor festivals or fairs  This extra physical activity can help you lose weight and keep it off  Follow up with your healthcare provider as directed: You may need to meet with a dietitian  Write down your questions so you remember to ask them during your visits  © 2017 [employer] Information is for End User's use only and may not be sold, redistributed or otherwise used for commercial purposes  All illustrations and images included in CareNotes® are the copyrighted property of [employer]  or [employer] Mcqueen  The above information is an  only  It is not intended as medical advice for individual conditions or treatments  Talk to your doctor, nurse or pharmacist before following any medical regimen to see if it is safe and effective for you  Weight Management   AMBULATORY CARE:   Why it is important to manage your weight:  Being overweight increases your risk of health conditions such as heart disease, high blood pressure, type 2 diabetes, and certain types of cancer  It can also increase your risk for osteoarthritis, sleep apnea, and other respiratory problems  Aim for a slow, steady weight loss  Even a small amount of weight loss can lower your risk of health problems    How to lose weight safely:  A safe and healthy way to lose weight is to eat fewer calories and get regular exercise  You can lose up about 1 pound a week by decreasing the number of calories you eat by 500 calories each day  You can decrease calories by eating smaller portion sizes or by cutting out high-calorie foods  Read labels to find out how many calories are in the foods you eat  You can also burn calories with exercise such as walking, swimming, or biking  You will be more likely to keep weight off if you make these changes part of your lifestyle  Healthy meal plan for weight management:  A healthy meal plan includes a variety of foods, contains fewer calories, and helps you stay healthy  A healthy meal plan includes the following:  · Eat whole-grain foods more often  A healthy meal plan should contain fiber  Fiber is the part of grains, fruits, and vegetables that is not broken down by your body  Whole-grain foods are healthy and provide extra fiber in your diet  Some examples of whole-grain foods are whole-wheat breads and pastas, oatmeal, brown rice, and bulgur  · Eat a variety of vegetables every day  Include dark, leafy greens such as spinach, kale, samreen greens, and mustard greens  Eat yellow and orange vegetables such as carrots, sweet potatoes, and winter squash  · Eat a variety of fruits every day  Choose fresh or canned fruit (canned in its own juice or light syrup) instead of juice  Fruit juice has very little or no fiber  · Eat low-fat dairy foods  Drink fat-free (skim) milk or 1% milk  Eat fat-free yogurt and low-fat cottage cheese  Try low-fat cheeses such as mozzarella and other reduced-fat cheeses  · Choose meat and other protein foods that are low in fat  Choose beans or other legumes such as split peas or lentils  Choose fish, skinless poultry (chicken or turkey), or lean cuts of red meat (beef or pork)  Before you cook meat or poultry, cut off any visible fat  · Use less fat and oil  Try baking foods instead of frying them  Add less fat, such as margarine, sour cream, regular salad dressing and mayonnaise to foods  Eat fewer high-fat foods  Some examples of high-fat foods include french fries, doughnuts, ice cream, and cakes  · Eat fewer sweets  Limit foods and drinks that are high in sugar  This includes candy, cookies, regular soda, and sweetened drinks  Ways to decrease calories:   · Eat smaller portions  ¨ Use a small plate with smaller servings  ¨ Do not eat second helpings  ¨ When you eat at a restaurant, ask for a box and place half of your meal in the box before you eat  ¨ Share an entrée with someone else  · Replace high-calorie snacks with healthy, low-calorie snacks  ¨ Choose fresh fruit, vegetables, fat-free rice cakes, or air-popped popcorn instead of potato chips, nuts, or chocolate  ¨ Choose water or calorie-free drinks instead of soda or sweetened drinks  · Eat regular meals  Skipping meals can lead to overeating later in the day  Eat a healthy snack in place of a meal if you do not have time to eat a regular meal      · Do not shop for groceries when you are hungry  You may be more likely to make unhealthy food choices  Take a grocery list of healthy foods and shop after you have eaten  Exercise:  Exercise at least 30 minutes per day on most days of the week  Some examples of exercise include walking, biking, dancing, and swimming  You can also fit in more physical activity by taking the stairs instead of the elevator or parking farther away from stores  Ask your healthcare provider about the best exercise plan for you  Other things to consider as you try to lose weight:   · Be aware of situations that may give you the urge to overeat, such as eating while watching television  Find ways to avoid these situations  For example, read a book, go for a walk, or do crafts      · Meet with a weight loss support group or friends who are also trying to lose weight  This may help you stay motivated to continue working on your weight loss goals  © 2017 [employer] Information is for End User's use only and may not be sold, redistributed or otherwise used for commercial purposes  All illustrations and images included in CareNotes® are the copyrighted property of [employer]  or [employer]queen  The above information is an  only  It is not intended as medical advice for individual conditions or treatments  Talk to your doctor, nurse or pharmacist before following any medical regimen to see if it is safe and effective for you  Heart Healthy Diet   AMBULATORY CARE:   A heart healthy diet  is an eating plan low in total fat, unhealthy fats, and sodium (salt)  A heart healthy diet helps decrease your risk for heart disease and stroke  Limit the amount of fat you eat to 25% to 35% of your total daily calories  Limit sodium to less than 2,300 mg each day  Healthy fats:  Healthy fats can help improve cholesterol levels  The risk for heart disease is decreased when cholesterol levels are normal  Choose healthy fats, such as the following:  · Unsaturated fat  is found in foods such as soybean, canola, olive, corn, and safflower oils  It is also found in soft tub margarine that is made with liquid vegetable oil  · Omega-3 fat  is found in certain fish, such as salmon, tuna, and trout, and in walnuts and flaxseed  Unhealthy fats:  Unhealthy fats can cause unhealthy cholesterol levels in your blood and increase your risk of heart disease  Limit unhealthy fats, such as the following:  · Cholesterol  is found in animal foods, such as eggs and lobster, and in dairy products made from whole milk  Limit cholesterol to less than 300 milligrams (mg) each day  You may need to limit cholesterol to 200 mg each day if you have heart disease  · Saturated fat  is found in meats, such as staton and hamburger   It is also found in chicken or turkey skin, whole milk, and butter  Limit saturated fat to less than 7% of your total daily calories  Limit saturated fat to less than 6% if you have heart disease or are at increased risk for it  · Trans fat  is found in packaged foods, such as potato chips and cookies  It is also in hard margarine, some fried foods, and shortening  Avoid trans fats as much as possible    Heart healthy foods and drinks to include:  Ask your dietitian or healthcare provider how many servings to have from each of the following food groups:  · Grains:      ¨ Whole-wheat breads, cereals, and pastas, and brown rice    ¨ Low-fat, low-sodium crackers and chips    · Vegetables:      ¨ Broccoli, green beans, green peas, and spinach    ¨ Collards, kale, and lima beans    ¨ Carrots, sweet potatoes, tomatoes, and peppers    ¨ Canned vegetables with no salt added    · Fruits:      ¨ Bananas, peaches, pears, and pineapple    ¨ Grapes, raisins, and dates    ¨ Oranges, tangerines, grapefruit, orange juice, and grapefruit juice    ¨ Apricots, mangoes, melons, and papaya    ¨ Raspberries and strawberries    ¨ Canned fruit with no added sugar    · Low-fat dairy products:      ¨ Nonfat (skim) milk, 1% milk, and low-fat almond, cashew, or soy milks fortified with calcium    ¨ Low-fat cheese, regular or frozen yogurt, and cottage cheese    · Meats and proteins , such as lean cuts of beef and pork (loin, leg, round), skinless chicken and turkey, legumes, soy products, egg whites, and nuts  Foods and drinks to limit or avoid:  Ask your dietitian or healthcare provider about these and other foods that are high in unhealthy fat, sodium, and sugar:  · Snack or packaged foods , such as frozen dinners, cookies, macaroni and cheese, and cereals with more than 300 mg of sodium per serving    · Canned or dry mixes  for cakes, soups, sauces, or gravies    · Vegetables with added sodium , such as instant potatoes, vegetables with added sauces, or regular canned vegetables    · Other foods high in sodium , such as ketchup, barbecue sauce, salad dressing, pickles, olives, soy sauce, and miso    · High-fat dairy foods  such as whole or 2% milk, cream cheese, or sour cream, and cheeses     · High-fat protein foods  such as high-fat cuts of beef (T-bone steaks, ribs), chicken or turkey with skin, and organ meats, such as liver    · Cured or smoked meats , such as hot dogs, staton, and sausage    · Unhealthy fats and oils , such as butter, stick margarine, shortening, and cooking oils such as coconut or palm oil    · Food and drinks high in sugar , such as soft drinks (soda), sports drinks, sweetened tea, candy, cake, cookies, pies, and doughnuts  Other diet guidelines to follow:   · Eat more foods containing omega-3 fats  Eat fish high in omega-3 fats at least 2 times a week  · Limit alcohol  Too much alcohol can damage your heart and raise your blood pressure  Women should limit alcohol to 1 drink a day  Men should limit alcohol to 2 drinks a day  A drink of alcohol is 12 ounces of beer, 5 ounces of wine, or 1½ ounces of liquor  · Choose low-sodium foods  High-sodium foods can lead to high blood pressure  Add little or no salt to food you prepare  Use herbs and spices in place of salt  · Eat more fiber  to help lower cholesterol levels  Eat at least 5 servings of fruits and vegetables each day  Eat 3 ounces of whole-grain foods each day  Legumes (beans) are also a good source of fiber  Lifestyle guidelines:   · Do not smoke  Nicotine and other chemicals in cigarettes and cigars can cause lung and heart damage  Ask your healthcare provider for information if you currently smoke and need help to quit  E-cigarettes or smokeless tobacco still contain nicotine  Talk to your healthcare provider before you use these products  · Exercise regularly  to help you maintain a healthy weight and improve your blood pressure and cholesterol levels   Ask your healthcare provider about the best exercise plan for you  Do not start an exercise program without asking your healthcare provider  Follow up with your healthcare provider as directed:  Write down your questions so you remember to ask them during your visits  © 2017 [employer] Information is for End User's use only and may not be sold, redistributed or otherwise used for commercial purposes  All illustrations and images included in CareNotes® are the copyrighted property of [employer]  or [employer]que[employer]  The above information is an  only  It is not intended as medical advice for individual conditions or treatments  Talk to your doctor, nurse or pharmacist before following any medical regimen to see if it is safe and effective for you

## 2019-08-20 NOTE — PROGRESS NOTES
Assessment/Plan:    Type 2 diabetes mellitus with microalbuminuria (HCC)  Lab Results   Component Value Date    HGBA1C 6 3 (H) 08/13/2019       No results for input(s): POCGLU in the last 72 hours  Blood Sugar Average: Last 72 hrs:  DM type 2 with microalbuminuria/nephropathy/CKD stage 3 - controlled with A1C 6 3 - encouraged diet/exercise/wgt loss, con't current medication, on ACE but cannot tolerate statin, recheck BW in6 mo - order given, foot exam done today, urged to do eye exam in Sept    Hypertension  BP upper end of goal today, advised diet/exercise/ wgt loss - recheck in 6 mos - if still elevated will need to increase BP meds    Chronic kidney disease, stage 3  Stable, does not use NSAIDs regularly, importance of BP/BS control reviewed, recheck labs in 6 mo - order given    Dyslipidemia  LDL not at goal for DM - 10 yr ASCVD was 43 7% - diet/exercise/wgt loss encouraged, statin recommended - urged pt to call if he is willing to try another one, recheck FLP in 6 mos    Gout  NO recent events, on allopurinol, check uric acid with next labs - order given    Erythrocytosis  Very slightly elevated - he does not smoke, denies itching/fatigue/bleeding/bruising or blood clots, recheck in 6 mo - will need Heme if con't to go up       Diagnoses and all orders for this visit:    Type 2 diabetes mellitus with microalbuminuria, without long-term current use of insulin (HCC)  -     CBC and differential; Future  -     Lipid panel; Future  -     Basic metabolic panel; Future  -     Hemoglobin A1C With EAG; Future  -     Microalbumin, Random Urine (W/Creatinine); Future  -     CBC and differential  -     Lipid panel  -     Basic metabolic panel  -     Hemoglobin A1C With EAG  -     Microalbumin, Random Urine (W/Creatinine)    Obesity (BMI 30 0-34 9)  -     CBC and differential; Future  -     Lipid panel; Future  -     Basic metabolic panel; Future  -     Hemoglobin A1C With EAG;  Future  -     Microalbumin, Random Urine (W/Creatinine); Future  -     CBC and differential  -     Lipid panel  -     Basic metabolic panel  -     Hemoglobin A1C With EAG  -     Microalbumin, Random Urine (W/Creatinine)    BMI 31 0-31 9,adult  Comments:  Diet/exercise/wgt loss encouraged  Orders:  -     CBC and differential; Future  -     Lipid panel; Future  -     Basic metabolic panel; Future  -     Hemoglobin A1C With EAG; Future  -     Microalbumin, Random Urine (W/Creatinine); Future  -     CBC and differential  -     Lipid panel  -     Basic metabolic panel  -     Hemoglobin A1C With EAG  -     Microalbumin, Random Urine (W/Creatinine)    Dyslipidemia  -     CBC and differential; Future  -     Lipid panel; Future  -     Basic metabolic panel; Future  -     Hemoglobin A1C With EAG; Future  -     Microalbumin, Random Urine (W/Creatinine); Future  -     CBC and differential  -     Lipid panel  -     Basic metabolic panel  -     Hemoglobin A1C With EAG  -     Microalbumin, Random Urine (W/Creatinine)    Chronic kidney disease, stage 3 (HCC)  -     CBC and differential; Future  -     Lipid panel; Future  -     Basic metabolic panel; Future  -     Hemoglobin A1C With EAG; Future  -     Microalbumin, Random Urine (W/Creatinine); Future  -     CBC and differential  -     Lipid panel  -     Basic metabolic panel  -     Hemoglobin A1C With EAG  -     Microalbumin, Random Urine (W/Creatinine)    Hypertension, unspecified type  -     lisinopril (ZESTRIL) 40 mg tablet; Take 1 5 tablets (60 mg total) by mouth daily for 30 days    Erythrocytosis  -     CBC and differential; Future  -     Lipid panel; Future  -     Basic metabolic panel; Future  -     Hemoglobin A1C With EAG; Future  -     Microalbumin, Random Urine (W/Creatinine);  Future  -     CBC and differential  -     Lipid panel  -     Basic metabolic panel  -     Hemoglobin A1C With EAG  -     Microalbumin, Random Urine (W/Creatinine)    Essential hypertension  -     CBC and differential; Future  -     Lipid panel; Future  -     Basic metabolic panel; Future  -     Hemoglobin A1C With EAG; Future  -     Microalbumin, Random Urine (W/Creatinine); Future  -     CBC and differential  -     Lipid panel  -     Basic metabolic panel  -     Hemoglobin A1C With EAG  -     Microalbumin, Random Urine (W/Creatinine)  -     metoprolol tartrate (LOPRESSOR) 50 mg tablet; Take 1 tablet (50 mg total) by mouth every 12 (twelve) hours  -     amLODIPine (NORVASC) 10 mg tablet; Take 1 tablet (10 mg total) by mouth daily    Gout, unspecified cause, unspecified chronicity, unspecified site  -     Uric acid; Future  -     Uric acid    Prostate cancer screening  -     PSA, Total Screen; Future  -     PSA, Total Screen      Colonoscopy 10/17 - 5 yrs    Subjective:      Patient ID: Dora Schmidt is a 77 y o  male  HPI Pt here for follow up appt and BW results    BW results were d/w pt in detail: FBS/A1C 98/6 3, BUN/Cr 30/1 62 (GFR 43), rest of CMP/TSH was wnl, FLP with LDL borderline at 116 but TC good at 182, , and HDL 44, CBC with elevated H/H at 18;2/54 8, WBC/plt wnl  Nml H/H values reviewed  He does not smoke  He denies known family h/o stroke/blood clots  Def of controlled vs uncontrolled DM was reviewed  Diet was reviewed - wgt up 2 lbs from Dec 2018  He is taking his DM meds as directed  He is UTD on foot (9/18) and eye exam (9/18)  He is on fenofibrate daily but not on a statin  He is on an ACE  BMI was reviewed  CKD was reviewed    Goal FLP was d/w pt in detail  Diet/exercise reviewed as noted above  He is taking his fenofibrate daily as directed w/o Se but he is NOT on a statin  He has tried 2 different statins and had SE to both  He notes no stroke/TIA symptoms/CP    The 10-year ASCVD risk score (Stef Delacruz et al , 2013) is: 37%    Values used to calculate the score:      Age: 77 years      Sex: Male      Is Non- : No      Diabetic: Yes      Tobacco smoker: No      Systolic Blood Pressure: 140 mmHg      Is BP treated: Yes      HDL Cholesterol: 44 mg/dL      Total Cholesterol: 182 mg/dL    BP above goal today and meds were reviewed and no changes have occurred  He denies missing doses of meds or SE with the meds  He does check his BP outside the office - average BP is about 120-130's/70's  He notes no frequent Ha's/dizziness/double vision/CP  Colonoscopy 10/17 - 5 yrs    Review of Systems   Constitutional: Negative for chills, fatigue and fever  HENT: Negative for congestion and sinus pain  Eyes: Negative for pain and visual disturbance  Respiratory: Negative for cough, chest tightness and shortness of breath  Cardiovascular: Negative for chest pain, palpitations and leg swelling  Gastrointestinal: Negative for abdominal pain, blood in stool, constipation, diarrhea, nausea and vomiting  Endocrine: Negative for polydipsia and polyuria  Genitourinary: Negative for difficulty urinating and dysuria  Musculoskeletal: Negative for arthralgias and myalgias  Skin: Negative for rash and wound  Neurological: Negative for dizziness and headaches  Hematological: Does not bruise/bleed easily  Psychiatric/Behavioral: Negative for behavioral problems and confusion  Objective:    /82   Pulse 80   Temp 97 8 °F (36 6 °C)   Ht 5' 10" (1 778 m)   Wt 99 8 kg (220 lb)   BMI 31 57 kg/m²      Physical Exam   Constitutional: He appears well-developed and well-nourished  No distress  HENT:   Head: Normocephalic and atraumatic  Mouth/Throat: No oropharyngeal exudate  Eyes: Conjunctivae are normal  Right eye exhibits no discharge  Left eye exhibits no discharge  Neck: Neck supple  No tracheal deviation present  Cardiovascular: Normal rate, regular rhythm and normal heart sounds  Pulses are no weak pulses  No murmur heard  Pulses:       Dorsalis pedis pulses are 2+ on the right side, and 2+ on the left side     Pulmonary/Chest: Effort normal and breath sounds normal  No respiratory distress  He has no wheezes  He has no rales  Abdominal: Soft  He exhibits no distension  There is no tenderness  Musculoskeletal: He exhibits no deformity  Feet:   Right Foot:   Skin Integrity: Negative for ulcer, skin breakdown, erythema, warmth, callus or dry skin  Left Foot:   Skin Integrity: Negative for ulcer, skin breakdown, erythema, warmth, callus or dry skin  Neurological: He is alert  He exhibits normal muscle tone  Skin: Skin is warm and dry  No rash noted  Psychiatric: He has a normal mood and affect  His behavior is normal    Nursing note and vitals reviewed  Patient's shoes and socks removed  Right Foot/Ankle   Right Foot Inspection  Skin Exam: skin normal and skin intact no dry skin, no warmth, no callus, no erythema, no maceration, no abnormal color, no pre-ulcer, no ulcer and no callus                          Toe Exam: ROM and strength within normal limits  Sensory   Vibration: intact    Monofilament testing: intact  Vascular    The right DP pulse is 2+  Left Foot/Ankle  Left Foot Inspection  Skin Exam: skin normal and skin intactno dry skin, no warmth, no erythema, no maceration, normal color, no pre-ulcer, no ulcer and no callus                         Toe Exam: ROM and strength within normal limits                   Sensory   Vibration: intact    Monofilament: intact  Vascular    The left DP pulse is 2+  Assign Risk Category:  No deformity present; No loss of protective sensation; No weak pulses       Risk: 0      BMI Counseling: Body mass index is 31 57 kg/m²  Discussed the patient's BMI with him  The BMI is above average  BMI counseling and education was provided to the patient  Nutrition recommendations include reducing portion sizes, 3-5 servings of fruits/vegetables daily, consuming healthier snacks, moderation in carbohydrate intake, increasing intake of lean protein and reducing intake of saturated fat and trans fat   Exercise recommendations include moderate aerobic physical activity for 150 minutes/week and exercising 3-5 times per week

## 2019-08-20 NOTE — ASSESSMENT & PLAN NOTE
LDL not at goal for DM - 10 yr ASCVD was 43 7% - diet/exercise/wgt loss encouraged, statin recommended - urged pt to call if he is willing to try another one, recheck FLP in 6 mos

## 2019-10-19 LAB
LEFT EYE DIABETIC RETINOPATHY: NORMAL
RIGHT EYE DIABETIC RETINOPATHY: NORMAL

## 2019-12-12 DIAGNOSIS — E11.29 TYPE 2 DIABETES MELLITUS WITH MICROALBUMINURIA, WITHOUT LONG-TERM CURRENT USE OF INSULIN (HCC): ICD-10-CM

## 2019-12-12 DIAGNOSIS — E78.1 ESSENTIAL HYPERTRIGLYCERIDEMIA: ICD-10-CM

## 2019-12-12 DIAGNOSIS — R80.9 TYPE 2 DIABETES MELLITUS WITH MICROALBUMINURIA, WITHOUT LONG-TERM CURRENT USE OF INSULIN (HCC): ICD-10-CM

## 2019-12-13 RX ORDER — CANAGLIFLOZIN 100 MG/1
TABLET, FILM COATED ORAL
Qty: 30 TABLET | Refills: 3 | Status: SHIPPED | OUTPATIENT
Start: 2019-12-13 | End: 2020-02-25 | Stop reason: SDUPTHER

## 2019-12-13 RX ORDER — FENOFIBRATE 145 MG/1
TABLET, COATED ORAL
Qty: 90 TABLET | Refills: 1 | Status: SHIPPED | OUTPATIENT
Start: 2019-12-13 | End: 2020-02-25 | Stop reason: SDUPTHER

## 2020-01-22 ENCOUNTER — APPOINTMENT (EMERGENCY)
Dept: RADIOLOGY | Facility: HOSPITAL | Age: 70
DRG: 066 | End: 2020-01-22
Payer: COMMERCIAL

## 2020-01-22 ENCOUNTER — HOSPITAL ENCOUNTER (INPATIENT)
Facility: HOSPITAL | Age: 70
LOS: 2 days | Discharge: HOME | DRG: 066 | End: 2020-01-24
Attending: EMERGENCY MEDICINE | Admitting: FAMILY MEDICINE
Payer: COMMERCIAL

## 2020-01-22 ENCOUNTER — APPOINTMENT (EMERGENCY)
Dept: CT IMAGING | Facility: HOSPITAL | Age: 70
DRG: 066 | End: 2020-01-22
Payer: COMMERCIAL

## 2020-01-22 ENCOUNTER — TELEPHONE (OUTPATIENT)
Dept: OTHER | Facility: OTHER | Age: 70
End: 2020-01-22

## 2020-01-22 DIAGNOSIS — G45.9 TIA (TRANSIENT ISCHEMIC ATTACK): Primary | ICD-10-CM

## 2020-01-22 DIAGNOSIS — D75.1 ERYTHROCYTOSIS: ICD-10-CM

## 2020-01-22 DIAGNOSIS — I66.01 OCCLUSION AND STENOSIS OF RIGHT MIDDLE CEREBRAL ARTERY: ICD-10-CM

## 2020-01-22 DIAGNOSIS — R80.9 TYPE 2 DIABETES MELLITUS WITH MICROALBUMINURIA, WITHOUT LONG-TERM CURRENT USE OF INSULIN (HCC): ICD-10-CM

## 2020-01-22 DIAGNOSIS — E11.29 TYPE 2 DIABETES MELLITUS WITH MICROALBUMINURIA, WITHOUT LONG-TERM CURRENT USE OF INSULIN (HCC): ICD-10-CM

## 2020-01-22 DIAGNOSIS — I63.9 CVA (CEREBRAL VASCULAR ACCIDENT) (HCC): ICD-10-CM

## 2020-01-22 LAB
ANION GAP SERPL CALCULATED.3IONS-SCNC: 7 MMOL/L (ref 4–13)
APTT PPP: 33 SECONDS (ref 23–37)
BUN SERPL-MCNC: 27 MG/DL (ref 5–25)
CALCIUM SERPL-MCNC: 9.7 MG/DL (ref 8.3–10.1)
CHLORIDE SERPL-SCNC: 107 MMOL/L (ref 100–108)
CO2 SERPL-SCNC: 32 MMOL/L (ref 21–32)
CREAT SERPL-MCNC: 1.56 MG/DL (ref 0.6–1.3)
ERYTHROCYTE [DISTWIDTH] IN BLOOD BY AUTOMATED COUNT: 14.9 % (ref 11.6–15.1)
GFR SERPL CREATININE-BSD FRML MDRD: 45 ML/MIN/1.73SQ M
GLUCOSE SERPL-MCNC: 100 MG/DL (ref 65–140)
GLUCOSE SERPL-MCNC: 116 MG/DL (ref 65–140)
HCT VFR BLD AUTO: 57.7 % (ref 36.5–49.3)
HGB BLD-MCNC: 18.8 G/DL (ref 12–17)
INR PPP: 0.97 (ref 0.84–1.19)
MCH RBC QN AUTO: 29.2 PG (ref 26.8–34.3)
MCHC RBC AUTO-ENTMCNC: 32.6 G/DL (ref 31.4–37.4)
MCV RBC AUTO: 90 FL (ref 82–98)
PLATELET # BLD AUTO: 260 THOUSANDS/UL (ref 149–390)
PMV BLD AUTO: 10 FL (ref 8.9–12.7)
POTASSIUM SERPL-SCNC: 3.8 MMOL/L (ref 3.5–5.3)
PROTHROMBIN TIME: 12.6 SECONDS (ref 11.6–14.5)
RBC # BLD AUTO: 6.44 MILLION/UL (ref 3.88–5.62)
SODIUM SERPL-SCNC: 146 MMOL/L (ref 136–145)
TROPONIN I SERPL-MCNC: <0.02 NG/ML
WBC # BLD AUTO: 8.32 THOUSAND/UL (ref 4.31–10.16)

## 2020-01-22 PROCEDURE — 84484 ASSAY OF TROPONIN QUANT: CPT | Performed by: EMERGENCY MEDICINE

## 2020-01-22 PROCEDURE — 3066F NEPHROPATHY DOC TX: CPT | Performed by: PSYCHIATRY & NEUROLOGY

## 2020-01-22 PROCEDURE — 85027 COMPLETE CBC AUTOMATED: CPT | Performed by: EMERGENCY MEDICINE

## 2020-01-22 PROCEDURE — 85730 THROMBOPLASTIN TIME PARTIAL: CPT | Performed by: EMERGENCY MEDICINE

## 2020-01-22 PROCEDURE — 96360 HYDRATION IV INFUSION INIT: CPT

## 2020-01-22 PROCEDURE — 36415 COLL VENOUS BLD VENIPUNCTURE: CPT | Performed by: EMERGENCY MEDICINE

## 2020-01-22 PROCEDURE — 70450 CT HEAD/BRAIN W/O DYE: CPT

## 2020-01-22 PROCEDURE — 85610 PROTHROMBIN TIME: CPT | Performed by: EMERGENCY MEDICINE

## 2020-01-22 PROCEDURE — 82948 REAGENT STRIP/BLOOD GLUCOSE: CPT

## 2020-01-22 PROCEDURE — 99285 EMERGENCY DEPT VISIT HI MDM: CPT

## 2020-01-22 PROCEDURE — 80048 BASIC METABOLIC PNL TOTAL CA: CPT | Performed by: EMERGENCY MEDICINE

## 2020-01-22 PROCEDURE — 71045 X-RAY EXAM CHEST 1 VIEW: CPT

## 2020-01-22 PROCEDURE — 93005 ELECTROCARDIOGRAM TRACING: CPT

## 2020-01-22 PROCEDURE — 99285 EMERGENCY DEPT VISIT HI MDM: CPT | Performed by: EMERGENCY MEDICINE

## 2020-01-22 RX ORDER — CLOPIDOGREL BISULFATE 75 MG/1
300 TABLET ORAL ONCE
Status: COMPLETED | OUTPATIENT
Start: 2020-01-22 | End: 2020-01-22

## 2020-01-22 RX ORDER — ATORVASTATIN CALCIUM 40 MG/1
20 TABLET, FILM COATED ORAL ONCE
Status: COMPLETED | OUTPATIENT
Start: 2020-01-22 | End: 2020-01-22

## 2020-01-22 RX ORDER — ASPIRIN 81 MG/1
324 TABLET, CHEWABLE ORAL ONCE
Status: COMPLETED | OUTPATIENT
Start: 2020-01-22 | End: 2020-01-22

## 2020-01-22 RX ADMIN — ASPIRIN 81 MG 324 MG: 81 TABLET ORAL at 21:23

## 2020-01-22 RX ADMIN — CLOPIDOGREL BISULFATE 300 MG: 75 TABLET ORAL at 21:23

## 2020-01-22 RX ADMIN — ATORVASTATIN CALCIUM 20 MG: 40 TABLET, FILM COATED ORAL at 22:04

## 2020-01-22 RX ADMIN — SODIUM CHLORIDE 1000 ML: 0.9 INJECTION, SOLUTION INTRAVENOUS at 21:03

## 2020-01-23 ENCOUNTER — APPOINTMENT (INPATIENT)
Dept: NON INVASIVE DIAGNOSTICS | Facility: HOSPITAL | Age: 70
DRG: 066 | End: 2020-01-23
Payer: COMMERCIAL

## 2020-01-23 ENCOUNTER — APPOINTMENT (INPATIENT)
Dept: MRI IMAGING | Facility: HOSPITAL | Age: 70
DRG: 066 | End: 2020-01-23
Payer: COMMERCIAL

## 2020-01-23 ENCOUNTER — APPOINTMENT (INPATIENT)
Dept: CT IMAGING | Facility: HOSPITAL | Age: 70
DRG: 066 | End: 2020-01-23
Payer: COMMERCIAL

## 2020-01-23 PROBLEM — G45.9 TIA (TRANSIENT ISCHEMIC ATTACK): Status: ACTIVE | Noted: 2020-01-23

## 2020-01-23 PROBLEM — I63.9 CVA (CEREBRAL VASCULAR ACCIDENT) (HCC): Status: ACTIVE | Noted: 2020-01-23

## 2020-01-23 LAB
ANION GAP SERPL CALCULATED.3IONS-SCNC: 10 MMOL/L (ref 4–13)
BASOPHILS # BLD AUTO: 0.09 THOUSANDS/ΜL (ref 0–0.1)
BASOPHILS NFR BLD AUTO: 1 % (ref 0–1)
BUN SERPL-MCNC: 26 MG/DL (ref 5–25)
CALCIUM SERPL-MCNC: 8.5 MG/DL (ref 8.3–10.1)
CHLORIDE SERPL-SCNC: 111 MMOL/L (ref 100–108)
CHOLEST SERPL-MCNC: 156 MG/DL (ref 50–200)
CO2 SERPL-SCNC: 24 MMOL/L (ref 21–32)
CREAT SERPL-MCNC: 1.39 MG/DL (ref 0.6–1.3)
EOSINOPHIL # BLD AUTO: 0.36 THOUSAND/ΜL (ref 0–0.61)
EOSINOPHIL NFR BLD AUTO: 5 % (ref 0–6)
ERYTHROCYTE [DISTWIDTH] IN BLOOD BY AUTOMATED COUNT: 14.1 % (ref 11.6–15.1)
EST. AVERAGE GLUCOSE BLD GHB EST-MCNC: 131 MG/DL
GFR SERPL CREATININE-BSD FRML MDRD: 51 ML/MIN/1.73SQ M
GLUCOSE SERPL-MCNC: 102 MG/DL (ref 65–140)
GLUCOSE SERPL-MCNC: 125 MG/DL (ref 65–140)
GLUCOSE SERPL-MCNC: 151 MG/DL (ref 65–140)
GLUCOSE SERPL-MCNC: 89 MG/DL (ref 65–140)
HBA1C MFR BLD: 6.2 % (ref 4.2–6.3)
HCT VFR BLD AUTO: 53.1 % (ref 36.5–49.3)
HDLC SERPL-MCNC: 31 MG/DL
HGB BLD-MCNC: 17.2 G/DL (ref 12–17)
IMM GRANULOCYTES # BLD AUTO: 0.02 THOUSAND/UL (ref 0–0.2)
IMM GRANULOCYTES NFR BLD AUTO: 0 % (ref 0–2)
LDLC SERPL CALC-MCNC: 93 MG/DL (ref 0–100)
LYMPHOCYTES # BLD AUTO: 1.03 THOUSANDS/ΜL (ref 0.6–4.47)
LYMPHOCYTES NFR BLD AUTO: 15 % (ref 14–44)
MAGNESIUM SERPL-MCNC: 1.9 MG/DL (ref 1.6–2.6)
MCH RBC QN AUTO: 29.2 PG (ref 26.8–34.3)
MCHC RBC AUTO-ENTMCNC: 32.4 G/DL (ref 31.4–37.4)
MCV RBC AUTO: 90 FL (ref 82–98)
MONOCYTES # BLD AUTO: 0.57 THOUSAND/ΜL (ref 0.17–1.22)
MONOCYTES NFR BLD AUTO: 8 % (ref 4–12)
NEUTROPHILS # BLD AUTO: 4.99 THOUSANDS/ΜL (ref 1.85–7.62)
NEUTS SEG NFR BLD AUTO: 71 % (ref 43–75)
NRBC BLD AUTO-RTO: 0 /100 WBCS
PHOSPHATE SERPL-MCNC: 3 MG/DL (ref 2.3–4.1)
PLATELET # BLD AUTO: 222 THOUSANDS/UL (ref 149–390)
PMV BLD AUTO: 9.7 FL (ref 8.9–12.7)
POTASSIUM SERPL-SCNC: 3.6 MMOL/L (ref 3.5–5.3)
RBC # BLD AUTO: 5.89 MILLION/UL (ref 3.88–5.62)
SODIUM SERPL-SCNC: 145 MMOL/L (ref 136–145)
TRIGL SERPL-MCNC: 162 MG/DL
WBC # BLD AUTO: 7.06 THOUSAND/UL (ref 4.31–10.16)

## 2020-01-23 PROCEDURE — 97161 PT EVAL LOW COMPLEX 20 MIN: CPT

## 2020-01-23 PROCEDURE — 80061 LIPID PANEL: CPT | Performed by: PHYSICIAN ASSISTANT

## 2020-01-23 PROCEDURE — 99255 IP/OBS CONSLTJ NEW/EST HI 80: CPT | Performed by: PSYCHIATRY & NEUROLOGY

## 2020-01-23 PROCEDURE — 83036 HEMOGLOBIN GLYCOSYLATED A1C: CPT | Performed by: PHYSICIAN ASSISTANT

## 2020-01-23 PROCEDURE — 70551 MRI BRAIN STEM W/O DYE: CPT

## 2020-01-23 PROCEDURE — 99223 1ST HOSP IP/OBS HIGH 75: CPT | Performed by: PHYSICIAN ASSISTANT

## 2020-01-23 PROCEDURE — 83735 ASSAY OF MAGNESIUM: CPT | Performed by: PHYSICIAN ASSISTANT

## 2020-01-23 PROCEDURE — 82948 REAGENT STRIP/BLOOD GLUCOSE: CPT

## 2020-01-23 PROCEDURE — 85025 COMPLETE CBC W/AUTO DIFF WBC: CPT | Performed by: PHYSICIAN ASSISTANT

## 2020-01-23 PROCEDURE — 70496 CT ANGIOGRAPHY HEAD: CPT

## 2020-01-23 PROCEDURE — 97165 OT EVAL LOW COMPLEX 30 MIN: CPT

## 2020-01-23 PROCEDURE — 70498 CT ANGIOGRAPHY NECK: CPT

## 2020-01-23 PROCEDURE — 93306 TTE W/DOPPLER COMPLETE: CPT

## 2020-01-23 PROCEDURE — 84100 ASSAY OF PHOSPHORUS: CPT | Performed by: PHYSICIAN ASSISTANT

## 2020-01-23 PROCEDURE — 93306 TTE W/DOPPLER COMPLETE: CPT | Performed by: INTERNAL MEDICINE

## 2020-01-23 PROCEDURE — 80048 BASIC METABOLIC PNL TOTAL CA: CPT | Performed by: PHYSICIAN ASSISTANT

## 2020-01-23 RX ORDER — ATORVASTATIN CALCIUM 40 MG/1
40 TABLET, FILM COATED ORAL
Status: DISCONTINUED | OUTPATIENT
Start: 2020-01-24 | End: 2020-01-24 | Stop reason: HOSPADM

## 2020-01-23 RX ORDER — FERROUS SULFATE 325(65) MG
325 TABLET ORAL DAILY
Status: DISCONTINUED | OUTPATIENT
Start: 2020-01-23 | End: 2020-01-24

## 2020-01-23 RX ORDER — CLOPIDOGREL BISULFATE 75 MG/1
75 TABLET ORAL DAILY
Status: DISCONTINUED | OUTPATIENT
Start: 2020-01-23 | End: 2020-01-24 | Stop reason: HOSPADM

## 2020-01-23 RX ORDER — ASPIRIN 81 MG/1
81 TABLET, CHEWABLE ORAL DAILY
Status: DISCONTINUED | OUTPATIENT
Start: 2020-01-23 | End: 2020-01-24 | Stop reason: HOSPADM

## 2020-01-23 RX ORDER — METOPROLOL TARTRATE 50 MG/1
50 TABLET, FILM COATED ORAL EVERY 12 HOURS
Status: DISCONTINUED | OUTPATIENT
Start: 2020-01-23 | End: 2020-01-24 | Stop reason: HOSPADM

## 2020-01-23 RX ORDER — HEPARIN SODIUM 5000 [USP'U]/ML
5000 INJECTION, SOLUTION INTRAVENOUS; SUBCUTANEOUS EVERY 8 HOURS SCHEDULED
Status: DISCONTINUED | OUTPATIENT
Start: 2020-01-23 | End: 2020-01-24 | Stop reason: HOSPADM

## 2020-01-23 RX ORDER — AMLODIPINE BESYLATE 5 MG/1
10 TABLET ORAL DAILY
Status: DISCONTINUED | OUTPATIENT
Start: 2020-01-23 | End: 2020-01-24 | Stop reason: HOSPADM

## 2020-01-23 RX ORDER — ALLOPURINOL 100 MG/1
200 TABLET ORAL DAILY
Status: DISCONTINUED | OUTPATIENT
Start: 2020-01-23 | End: 2020-01-24 | Stop reason: HOSPADM

## 2020-01-23 RX ORDER — ATORVASTATIN CALCIUM 20 MG/1
20 TABLET, FILM COATED ORAL
Status: DISCONTINUED | OUTPATIENT
Start: 2020-01-23 | End: 2020-01-23

## 2020-01-23 RX ADMIN — HEPARIN SODIUM 5000 UNITS: 5000 INJECTION, SOLUTION INTRAVENOUS; SUBCUTANEOUS at 18:25

## 2020-01-23 RX ADMIN — HEPARIN SODIUM 5000 UNITS: 5000 INJECTION, SOLUTION INTRAVENOUS; SUBCUTANEOUS at 10:08

## 2020-01-23 RX ADMIN — METOPROLOL TARTRATE 50 MG: 50 TABLET, FILM COATED ORAL at 10:07

## 2020-01-23 RX ADMIN — IODIXANOL 85 ML: 320 INJECTION, SOLUTION INTRAVASCULAR at 15:16

## 2020-01-23 RX ADMIN — INSULIN LISPRO 1 UNITS: 100 INJECTION, SOLUTION INTRAVENOUS; SUBCUTANEOUS at 12:18

## 2020-01-23 RX ADMIN — ASPIRIN 81 MG 81 MG: 81 TABLET ORAL at 10:07

## 2020-01-23 RX ADMIN — HEPARIN SODIUM 5000 UNITS: 5000 INJECTION, SOLUTION INTRAVENOUS; SUBCUTANEOUS at 00:15

## 2020-01-23 RX ADMIN — CLOPIDOGREL BISULFATE 75 MG: 75 TABLET ORAL at 10:08

## 2020-01-23 RX ADMIN — ATORVASTATIN CALCIUM 20 MG: 20 TABLET, FILM COATED ORAL at 18:25

## 2020-01-23 RX ADMIN — ALLOPURINOL 200 MG: 100 TABLET ORAL at 10:07

## 2020-01-23 RX ADMIN — FERROUS SULFATE TAB 325 MG (65 MG ELEMENTAL FE) 325 MG: 325 (65 FE) TAB at 10:08

## 2020-01-23 RX ADMIN — AMLODIPINE BESYLATE 10 MG: 5 TABLET ORAL at 10:08

## 2020-01-23 NOTE — PHYSICAL THERAPY NOTE
PHYSICAL THERAPY EVAL       01/23/20 0940   Note Type   Note type Eval only   Pain Assessment   Pain Assessment No/denies pain   Pain Score No Pain   Home Living   Type of [address] One level;Stairs to enter with rails  (4STE)   Prior Function   Level of [place] Independent with ADLs and functional mobility   Lives With Spouse   ADL Assistance Independent   IADLs Independent   Falls in the last 6 months 0   Vocational Full time employment   Comments +drives   Restrictions/Precautions   Weight Bearing Precautions Per Order No   General   Family/Caregiver Present Yes   Cognition   Overall Cognitive Status WFL   Arousal/Participation Alert   Orientation Level Oriented X4   Memory Within functional limits   Following Commands Follows all commands and directions without difficulty   Comments Patient reports slow processing with word finding difficulty  (Also reports that this is improving)   RLE Assessment   RLE Assessment WFL   LLE Assessment   LLE Assessment WFL   Coordination   Movements are Fluid and Coordinated 1   Sensation WFL   Light Touch   RLE Light Touch Grossly intact   LLE Light Touch Grossly intact   Proprioception   RLE Proprioception Grossly intact   LLE Proprioception Grossly Intact   Bed Mobility   Supine to Sit 7  Independent   Additional Comments seated edge of bed at end of session with call bell and all needs met     Transfers   Sit to Stand 6  Modified independent   Additional items Armrests   Stand to Sit 6  Modified independent   Additional items Armrests   Ambulation/Elevation   Gait pattern   (slow steady gait)   Gait Assistance 7  Independent   Distance 100ft   Stair Management Assistance 6  Modified independent   Stair Management Technique One rail R;Reciprocal   Number of Stairs 12   Balance   Static Sitting Normal   Dynamic Sitting Normal   Static Standing Normal   Dynamic Standing Normal Ambulatory Normal   Endurance Deficit   Endurance Deficit No   Activity Tolerance   Activity Tolerance Patient tolerated treatment well   Medical Staff Made Aware LUKASZ chen   Nurse Made Aware BRUCE Bellamy   Assessment   Prognosis Good   Assessment Patient is a 65y/o M Who presented with facial droop, slurred speech and inability to identify objects  CT: negative  MRI ordered  PMH significant for dylipidemia, DM, anemia, gout, CKD  Patient lives with spouse in a single story home with steps to enter  Was independent with all mobility prior to admission and was working full time  Current medical status includes mossimo, word finding difficulties  Patient performed bed mobility, transfers, amb and stairs independently  No strength, sensation, coordination or proprioceptive deficits observed  Patient appears to be at his functional baseline  No further inpatient P T  Needs at this time  Discharge P T  Please re-consult if status changes  Barriers to Discharge None   Goals   Patient Goals To go home   Recommendation   Recommendation D/C PT   Additional Comments Patient at functional baseline   Modified [place] Scale   Modified [place] Scale 0   Vidhi Westbrook, PT            Patient Name: Linda De Los Santos  FZRRM'C Date: 1/23/2020

## 2020-01-23 NOTE — PLAN OF CARE
Problem: Potential for Falls  Goal: Patient will remain free of falls  Description  INTERVENTIONS:  - Assess patient frequently for physical needs  -  Identify cognitive and physical deficits and behaviors that affect risk of falls    -  [place] fall precautions as indicated by assessment   - Educate patient/family on patient safety including physical limitations  - Instruct patient to call for assistance with activity based on assessment  - Modify environment to reduce risk of injury  - Consider OT/PT consult to assist with strengthening/mobility  1/23/2020 1147 by Ariel Awad RN  Outcome: Progressing  1/23/2020 0725 by Ariel Awad RN  Outcome: Progressing     Problem: PAIN - ADULT  Goal: Verbalizes/displays adequate comfort level or baseline comfort level  Description  Interventions:  - Encourage patient to monitor pain and request assistance  - Assess pain using appropriate pain scale  - Administer analgesics based on type and severity of pain and evaluate response  - Implement non-pharmacological measures as appropriate and evaluate response  - Consider cultural and social influences on pain and pain management  - Notify physician/advanced practitioner if interventions unsuccessful or patient reports new pain  1/23/2020 1147 by Ariel Awad RN  Outcome: Progressing  1/23/2020 0725 by Ariel Awad RN  Outcome: Progressing     Problem: INFECTION - ADULT  Goal: Absence or prevention of progression during hospitalization  Description  INTERVENTIONS:  - Assess and monitor for signs and symptoms of infection  - Monitor lab/diagnostic results  - Monitor all insertion sites, i e  indwelling lines, tubes, and drains  - Monitor endotracheal if appropriate and nasal secretions for changes in amount and color  - [place] appropriate cooling/warming therapies per order  - Administer medications as ordered  - Instruct and encourage patient and family to use good hand hygiene technique  - Identify and instruct in appropriate isolation precautions for identified infection/condition  1/23/2020 1147 by Michelle Butt RN  Outcome: Progressing  1/23/2020 0725 by Michelle Butt RN  Outcome: Progressing     Problem: SAFETY ADULT  Goal: Maintain or return to baseline ADL function  Description  INTERVENTIONS:  -  Assess patient's ability to carry out ADLs; assess patient's baseline for ADL function and identify physical deficits which impact ability to perform ADLs (bathing, care of mouth/teeth, toileting, grooming, dressing, etc )  - Assess/evaluate cause of self-care deficits   - Assess range of motion  - Assess patient's mobility; develop plan if impaired  - Assess patient's need for assistive devices and provide as appropriate  - Encourage maximum independence but intervene and supervise when necessary  - Involve family in performance of ADLs  - Assess for home care needs following discharge   - Consider OT consult to assist with ADL evaluation and planning for discharge  - Provide patient education as appropriate  1/23/2020 1147 by Michelle Butt RN  Outcome: Progressing  1/23/2020 0725 by Michelle Butt RN  Outcome: Progressing  Goal: Maintain or return mobility status to optimal level  Description  INTERVENTIONS:  - Assess patient's baseline mobility status (ambulation, transfers, stairs, etc )    - Identify cognitive and physical deficits and behaviors that affect mobility  - Identify mobility aids required to assist with transfers and/or ambulation (gait belt, sit-to-stand, lift, walker, cane, etc )  - [place] fall precautions as indicated by assessment  - Record patient progress and toleration of activity level on Mobility SBAR; progress patient to next Phase/Stage  - Instruct patient to call for assistance with activity based on assessment  - Consider rehabilitation consult to assist with strengthening/weightbearing, etc   1/23/2020 1147 by Michelle Butt RN  Outcome: Progressing  1/23/2020 0725 by Kisha aWng RN  Outcome: Progressing     Problem: DISCHARGE PLANNING  Goal: Discharge to home or other facility with appropriate resources  Description  INTERVENTIONS:  - Identify barriers to discharge w/patient and caregiver  - Arrange for needed discharge resources and transportation as appropriate  - Identify discharge learning needs (meds, wound care, etc )  - Arrange for interpretive services to assist at discharge as needed  - Refer to Case Management Department for coordinating discharge planning if the patient needs post-hospital services based on physician/advanced practitioner order or complex needs related to functional status, cognitive ability, or social support system  1/23/2020 1147 by Kisha Wang RN  Outcome: Progressing  1/23/2020 0725 by Kisha Wang RN  Outcome: Progressing     Problem: Knowledge Deficit  Goal: Patient/family/caregiver demonstrates understanding of disease process, treatment plan, medications, and discharge instructions  Description  Complete learning assessment and assess knowledge base  Interventions:  - Provide teaching at level of understanding  - Provide teaching via preferred learning methods  1/23/2020 1147 by Kisha Wang RN  Outcome: Progressing  1/23/2020 0725 by Kisha Wang RN  Outcome: Progressing     Problem: Nutrition/Hydration-ADULT  Goal: Nutrient/Hydration intake appropriate for improving, restoring or maintaining nutritional needs  Description  Monitor and assess patient's nutrition/hydration status for malnutrition  Collaborate with interdisciplinary team and initiate plan and interventions as ordered  Monitor patient's weight and dietary intake as ordered or per policy  Utilize nutrition screening tool and intervene as necessary  Determine patient's food preferences and provide high-protein, high-caloric foods as appropriate       INTERVENTIONS:  - Monitor oral intake, urinary output, labs, and treatment plans  - Assess nutrition and hydration status and recommend course of action  - Evaluate amount of meals eaten  - Assist patient with eating if necessary   - Allow adequate time for meals  - Recommend/ encourage appropriate diets, oral nutritional supplements, and vitamin/mineral supplements  - Order, calculate, and assess calorie counts as needed  - Recommend, monitor, and adjust tube feedings and TPN/PPN based on assessed needs  - Assess need for intravenous fluids  - Provide specific nutrition/hydration education as appropriate  - Include patient/family/caregiver in decisions related to nutrition  1/23/2020 1147 by Wing Leonidas RN  Outcome: Progressing  1/23/2020 0725 by Wing Leonidas RN  Outcome: Progressing     Problem: DISCHARGE PLANNING - CARE MANAGEMENT  Goal: Discharge to post-acute care or home with appropriate resources  Description  INTERVENTIONS:  - Conduct assessment to determine patient/family and health care team treatment goals, and need for post-acute services based on payer coverage, community resources, and patient preferences, and barriers to discharge  - Address psychosocial, clinical, and financial barriers to discharge as identified in assessment in conjunction with the patient/family and health care team  - Arrange appropriate level of post-acute services according to patient's   needs and preference and payer coverage in collaboration with the physician and health care team  - Communicate with and update the patient/family, physician, and health care team regarding progress on the discharge plan  - Arrange appropriate transportation to post-acute venues   1/23/2020 1147 by Wing Leonidas RN  Outcome: Progressing  1/23/2020 0725 by Wing Leonidas RN  Outcome: Progressing     Problem: NEUROSENSORY - ADULT  Goal: Achieves stable or improved neurological status  Description  INTERVENTIONS  - Monitor and report changes in neurological status  - Monitor vital signs such as temperature, blood pressure, glucose, and any other labs ordered   - Initiate measures to prevent increased intracranial pressure  - Monitor for seizure activity and implement precautions if appropriate      1/23/2020 1147 by Mateus Allen RN  Outcome: Progressing  1/23/2020 0725 by Mateus Allen RN  Outcome: Progressing  Goal: Remains free of injury related to seizures activity  Description  INTERVENTIONS  - Maintain airway, patient safety  and administer oxygen as ordered  - Monitor patient for seizure activity, document and report duration and description of seizure to physician/advanced practitioner  - If seizure occurs,  ensure patient safety during seizure  - Reorient patient post seizure  - Seizure pads on all 4 side rails  - Instruct patient/family to notify RN of any seizure activity including if an aura is experienced  - Instruct patient/family to call for assistance with activity based on nursing assessment  - Administer anti-seizure medications if ordered    1/23/2020 1147 by Mateus Allen RN  Outcome: Nigel Mast  1/23/2020 0725 by Mateus Allen RN  Outcome: Progressing  Goal: Achieves maximal functionality and self care  Description  INTERVENTIONS  - Monitor swallowing and airway patency with patient fatigue and changes in neurological status  - Encourage and assist patient to increase activity and self care     - Encourage visually impaired, hearing impaired and aphasic patients to use assistive/communication devices  1/23/2020 1147 by Mateus Allen RN  Outcome: Progressing  1/23/2020 0725 by Mateus Allen RN  Outcome: Progressing     Problem: METABOLIC, FLUID AND ELECTROLYTES - ADULT  Goal: Electrolytes maintained within normal limits  Description  INTERVENTIONS:  - Monitor labs and assess patient for signs and symptoms of electrolyte imbalances  - Administer electrolyte replacement as ordered  - Monitor response to electrolyte replacements, including repeat lab results as appropriate  - Instruct patient on fluid and nutrition as appropriate  1/23/2020 1147 by Wolf Tomlinson RN  Outcome: Progressing  1/23/2020 0725 by Wolf Tomlinson RN  Outcome: Progressing  Goal: Fluid balance maintained  Description  INTERVENTIONS:  - Monitor labs   - Monitor I/O and WT  - Instruct patient on fluid and nutrition as appropriate  - Assess for signs & symptoms of volume excess or deficit  1/23/2020 1147 by Wolf Tomlinson RN  Outcome: Progressing  1/23/2020 0725 by Wolf Tomlinson RN  Outcome: Progressing  Goal: Glucose maintained within target range  Description  INTERVENTIONS:  - Monitor Blood Glucose as ordered  - Assess for signs and symptoms of hyperglycemia and hypoglycemia  - Administer ordered medications to maintain glucose within target range  - Assess nutritional intake and initiate nutrition service referral as needed  1/23/2020 1147 by Wolf Tomlinson RN  Outcome: Progressing  1/23/2020 0725 by Wolf Tomlinson RN  Outcome: Progressing

## 2020-01-23 NOTE — UTILIZATION REVIEW
Initial Clinical Review    Admission: Date/Time/Statement: Inpatient Admission Orders (From admission, onward)     Ordered        01/22/20 2138  Inpatient Admission (expected length of stay for this patient Order details is greater than two midnights)  Once                   Orders Placed This Encounter   Procedures    Inpatient Admission (expected length of stay for this patient Order details is greater than two midnights)     Standing Status:   Standing     Number of Occurrences:   1     Order Specific Question:   Admitting Physician     Answer:   Jodi Sanders [[id number]]     Order Specific Question:   Level of Care     Answer:   Med Surg [[id number]]     Order Specific Question:   Estimated length of stay     Answer:   More than 2 Midnights     Order Specific Question:   Certification     Answer:   I certify that inpatient services are medically necessary for this patient for a duration of greater than two midnights  See H&P and MD Progress Notes for additional information about the patient's course of treatment  ED Arrival Information     Expected Arrival Acuity Means of Arrival Escorted By Service Admission Type    - 1/22/2020 19:39 Urgent Walk-In Spouse Hospitalist Urgent    Arrival Complaint    Possible Stroke        Chief Complaint   Patient presents with    CVA/TIA-like Symptoms     wife states pt was confused and displayed facial droop at approximately 1830 hours today  currently complaint free  Assessment/Plan: 73 yo male to ED from home w/ stroke like symptoms started in am , when she came home he had a mild facial droop   Made him a sandwich he took a nap and when he woke up had slurred speech and unable to identify objects  No deficits on arrival   Admitted IP status to r/o TIA/stroke  Plan to consult neuro , outside of window  for thrombolytics, started on asa, plavix , neuro consult , CT head in am , MRI brain and echo pending        1/23 Neuro consult   Ct head neg -TIA symptoms versus small stroke  Dyslipidemia  Stroke protocol , MRI brain , echo , tele, lipid profile , statin, BP control , cont antiplatelet , asa, plavix for 21 days then plavix     ED Triage Vitals   Temperature Pulse Respirations Blood Pressure SpO2   01/22/20 1954 01/22/20 1948 01/22/20 1948 01/22/20 1948 01/22/20 1948   98 °F (36 7 °C) 84 20 (!) 179/11 98 %      Temp Source Heart Rate Source Patient Position - Orthostatic VS BP Location FiO2 (%)   01/23/20 0744 01/22/20 1948 01/22/20 1948 01/22/20 1948 --   Oral Monitor Lying Right arm       Pain Score       01/22/20 1948       No Pain        Wt Readings from Last 1 Encounters:   01/23/20 97 7 kg (215 lb 6 4 oz)     Additional Vital Signs:   01/23/20 14:28:13  98 2 °F (36 8 °C)  72  18  132/85  101  94 %  None (Room air)     01/23/20 10:06:43    83  18  152/102Abnormal   119  93 %       01/23/20 07:44:06  98 °F (36 7 °C)  75  17  155/94  114  97 %       01/23/20 06:01:09  98 °F (36 7 °C)  71  18  142/90  107  97 %       01/23/20 04:04:36  98 3 °F (36 8 °C)  69  16  138/84  102  94 %       01/23/20 0200  98 7 °F (37 1 °C)   80   18   124/75            Temp: taken during downtime at 01/23/20 0200   Pulse: taken during downtime at 01/23/20 0200   Resp: taken during downtime at 01/23/20 0200   BP: taken during downtime at 01/23/20 0200   01/23/20 01:03:53  98 7 °F (37 1 °C)  72  18  129/79  96  94 %       01/23/20 00:06:39  98 6 °F (37 °C)  76  20  145/92  110  92 %       01/22/20 22:45:37  98 4 °F (36 9 °C)  78  17  154/100  118  96 %       01/22/20 2200    78  22  165/86    93 %  None (Room air)  Lying   01/22/20 2130    77  21  157/80    94 %  None (Room air)     01/22/20 2100    77  25Abnormal   154/84    94 %       01/22/20 1959              None (Room air)     01/22/20 1954  98 °F (36 7 °C)                     Pertinent Labs/Diagnostic Test Results:   1/23 EKG -NSR   1/23 Echo - pending   1/22 EKG - NSR  1/22 CT head - wnl   1/22 PCXR - wnl 1/23 MRI brain- pending   1/23 CTA head - pending   Results from last 7 days   Lab Units 01/23/20  0527 01/22/20  1956   WBC Thousand/uL 7 06 8 32   HEMOGLOBIN g/dL 17 2* 18 8*   HEMATOCRIT % 53 1* 57 7*   PLATELETS Thousands/uL 222 260   NEUTROS ABS Thousands/µL 4 99  --      Results from last 7 days   Lab Units 01/23/20  0527 01/22/20  1956   SODIUM mmol/L 145 146*   POTASSIUM mmol/L 3 6 3 8   CHLORIDE mmol/L 111* 107   CO2 mmol/L 24 32   ANION GAP mmol/L 10 7   BUN mg/dL 26* 27*   CREATININE mg/dL 1 39* 1 56*   EGFR ml/min/1 73sq m 51 45   CALCIUM mg/dL 8 5 9 7   MAGNESIUM mg/dL 1 9  --    PHOSPHORUS mg/dL 3 0  --      Results from last 7 days   Lab Units 01/23/20  1202 01/23/20  0811 01/22/20  1953   POC GLUCOSE mg/dl 151* 125 100     Results from last 7 days   Lab Units 01/23/20  0527 01/22/20 1956   GLUCOSE RANDOM mg/dL 102 116     Results from last 7 days   Lab Units 01/23/20 0527   HEMOGLOBIN A1C % 6 2   EAG mg/dl 131     Results from last 7 days   Lab Units 01/22/20 1956   TROPONIN I ng/mL <0 02     Results from last 7 days   Lab Units 01/22/20 1956   PROTIME seconds 12 6   INR  0 97   PTT seconds 33     ED Treatment:   Medication Administration from 01/22/2020 1939 to 01/22/2020 2240       Date/Time Order Dose Route Action     01/22/2020 2103 sodium chloride 0 9 % bolus 1,000 mL 1,000 mL Intravenous New Bag     01/22/2020 2123 clopidogrel (PLAVIX) tablet 300 mg 300 mg Oral Given     01/22/2020 2123 aspirin chewable tablet 324 mg 324 mg Oral Given     01/22/2020 2204 atorvastatin (LIPITOR) tablet 20 mg 20 mg Oral Given        Past Medical History:   Diagnosis Date    Diabetes mellitus ([facility] [address] )     Hypertension     Renal disorder      Present on Admission:   Chronic kidney disease, stage 3 ([facility] [address] )   TIA (transient ischemic attack)   Iron deficiency anemia   Gout   Dyslipidemia   Type 2 diabetes mellitus with microalbuminuria (HCC)      Admitting Diagnosis: TIA (transient ischemic attack) [G45 9]  CVA (cerebral vascular accident) ([facility] [address] ) [I63 9]  Age/Sex: 72 y o  male  Admission Orders:  Scheduled Medications:    Medications:  allopurinol 200 mg Oral Daily   amLODIPine 10 mg Oral Daily   aspirin 81 mg Oral Daily   atorvastatin 20 mg Oral Daily With Dinner   clopidogrel 75 mg Oral Daily   ferrous sulfate 325 mg Oral Daily   heparin (porcine) 5,000 Units Subcutaneous Q8H [address]   insulin lispro 1-5 Units Subcutaneous HS   insulin lispro 1-6 Units Subcutaneous TID AC   metoprolol tartrate 50 mg Oral Q12H     Continuous IV Infusions:     PRN Meds:     Reg diet   Daily weight   Neuro checks q4hr   Speech eval   Tele   Fingerstick ac and hs     IP CONSULT TO NEUROLOGY  IP CONSULT TO PHYSICAL MEDICINE REHAB  IP CONSULT TO NEUROLOGY  IP CONSULT TO CASE MANAGEMENT  IP CONSULT TO NUTRITION SERVICES    Network Utilization Review Department  [email or web address]  ATTENTION: Please call with any questions or concerns to [phone] and carefully listen to the prompts so that you are directed to the right person  All voicemails are confidential   Felicitas Gonzalez all requests for admission clinical reviews, approved or denied determinations and any other requests to dedicated fax number below belonging to the campus where the patient is receiving treatment   List of dedicated fax numbers for the Facilities:  [address] [place] [address] DENIALS (Administrative/Medical Necessity) [phone]   [address] (Maternity/NICU/Pediatrics) [phone]   Tomás Caicedo [phone]   [facility] [phone]   Vic Chaves [phone]   Margret Roe [phone]   [address] [place] [address] [phone]   Onel Lorenzo [phone]   [address] [facility] [address]  [address] [facility] [address] [facility] [phone]

## 2020-01-23 NOTE — H&P
H&P- Tomasz Nicholson 1950, 72 y o  male MRN: [id number]    Unit/Bed#: -01 Encounter: [id number]    Primary Care Provider: Awa Haro DO   Date and time admitted to hospital: 1/22/2020  7:45 PM        * TIA (transient ischemic attack)  Assessment & Plan  Neurology was called admitted for stroke stroke pathway TIA  CTA head and CTA of the head and neck was negative  Was outside the window for thrombolytics  At this point NIH scale is 0  Started on aspirin loading dose 325 mg now 81 mg daily  Plavix 300 mg loaded start on 75 mg daily  Neurology counsult   CT head in the morning, MRI brain and echocardiogram is pending      Dyslipidemia  Assessment & Plan  Continue statin    Type 2 diabetes mellitus with microalbuminuria ([facility] [address] )  Assessment & Plan  Lab Results   Component Value Date    HGBA1C 6 3 (H) 08/13/2019       Recent Labs     01/22/20 1953   POCGLU 100       Blood Sugar Average: Last 72 hrs:  (P) 100     Insulin sliding scale    Iron deficiency anemia  Assessment & Plan  Continue iron    Gout  Assessment & Plan  Continue up urine a    Chronic kidney disease, stage 3 (HCC)  Assessment & Plan  Stable at the baseline rate of 1 4-1 8  Avoid NSAID  Avoid nephrotoxic       VTE Prophylaxis: Heparin  / sequential compression device   Code Status: level 1  POLST: POLST form is not discussed and not completed at this time  Anticipated Length of Stay:  Patient will be admitted on an Inpatient basis with an anticipated length of stay of  > 2 midnights  Justification for Hospital Stay: TIA    Total Time for Visit, including Counseling / Coordination of Care: 30 minutes  Greater than 50% of this total time spent on direct patient counseling and coordination of care      Chief Complaint:   TIA    History of Present Illness:    Tomasz Nicholson is a 72 y o  male brought in by his wife for evaluation of stroke-like symptoms she stated as he had the 1st symptoms in the morning and then in the evening when she came home he had a mild facial droop but stated that he did not eat at all during the day she made him a sandwich and he get better than after nap on 7:00 p m  His wife for work him up and he had slurred speech and was unable to identify objects she would then brought the patient to the ER  During my evaluation patient's NIH score was 0 follow all commands did not show any deficit  Will admit to TIA stroke all studies were negative so far    Review of Systems:    Review of Systems   Constitutional: Positive for fatigue  Negative for appetite change, chills and fever  HENT: Negative for drooling, ear pain, facial swelling, rhinorrhea, trouble swallowing and voice change  Eyes: Negative  Negative for pain and redness  Respiratory: Negative  Negative for cough, chest tightness, shortness of breath, wheezing and stridor  Cardiovascular: Negative  Negative for chest pain, palpitations and leg swelling  Gastrointestinal: Negative  Negative for abdominal pain, blood in stool, nausea and vomiting  Endocrine: Negative  Negative for polydipsia, polyphagia and polyuria  Genitourinary: Negative for difficulty urinating, dysuria, flank pain, frequency, hematuria and urgency  Musculoskeletal: Negative  Negative for arthralgias, joint swelling, myalgias, neck pain and neck stiffness  Skin: Negative  Negative for pallor, rash and wound  Allergic/Immunologic: Negative  Negative for environmental allergies, food allergies and immunocompromised state  Neurological: Positive for facial asymmetry, speech difficulty and weakness  Negative for dizziness, tremors, seizures, syncope, light-headedness, numbness and headaches  Hematological: Negative  Does not bruise/bleed easily  Psychiatric/Behavioral: Negative  Negative for agitation, confusion, hallucinations, self-injury and sleep disturbance  The patient is not hyperactive          Past Medical and Surgical History:     Past Medical History: Diagnosis Date    Diabetes mellitus ([facility] [address] )     Hypertension     Renal disorder        Past Surgical History:   Procedure Laterality Date    COLONOSCOPY  09/18/2006    complete; 10 yrs    COLONOSCOPY  10/23/2017    complete; 5 yrs       Meds/Allergies:    Prior to Admission medications    Medication Sig Start Date End Date Taking? Authorizing Provider   allopurinol (ZYLOPRIM) 100 mg tablet TAKE 2 TABLETS BY MOUTH DAILY 7/14/19  Yes Ammy Jackson DO   amLODIPine (NORVASC) 10 mg tablet Take 1 tablet (10 mg total) by mouth daily 8/20/19  Yes Ammy Jackson DO   aspirin 81 MG tablet Take 1 tablet by mouth daily 4/1/13  Yes Historical Provider, MD   fenofibrate (TRICOR) 145 mg tablet TAKE 1 TABLET BY MOUTH ONCE DAILY 12/13/19  Yes Ammy Jackson DO   glucose monitoring kit (FREESTYLE) monitoring kit 1 each by Does not apply route as needed (Test daily) 9/6/18  Yes Ammy Jackson DO   INVOKANA 100 MG TAKE 1 TABLET BY MOUTH ONCE DAILY BEFORE BREAKFAST 12/13/19  Yes Ammy Jackson DO   Lancets (FREESTYLE) lancets by Other route as needed (Test Daily) Use as instructed--test Daily 9/6/18  Yes Ammy Jackson DO   metoprolol tartrate (LOPRESSOR) 50 mg tablet Take 1 tablet (50 mg total) by mouth every 12 (twelve) hours 8/20/19  Yes Ammy Jackson DO   ferrous sulfate 325 (65 Fe) mg tablet Take 1 tablet by mouth daily  9/26/17   Historical Provider, MD   lisinopril (ZESTRIL) 40 mg tablet Take 1 5 tablets (60 mg total) by mouth daily for 30 days 8/20/19 9/19/19  Ammy Jackson DO     I have reviewed home medications using allscripts      Allergies: No Known Allergies    Social History:     Marital Status: /Civil Union   Occupation: employed  Patient Pre-hospital Living Situation: home   Patient Pre-hospital Level of Mobility: normal  Patient Pre-hospital Diet Restrictions:  Diet  Substance Use History:   Social History     Substance and Sexual Activity   Alcohol Use Never    Frequency: Never    Binge frequency: Never     Social History     Tobacco Use   Smoking Status Never Smoker   Smokeless Tobacco Never Used     Social History     Substance and Sexual Activity   Drug Use Never       Family History:    Family History   Problem Relation Age of Onset   Georgi Lau Breast cancer Mother     Kidney disease Father     Lung cancer Father     Other Father         smoker    Hypertension Other        Physical Exam:     Vitals:   Blood Pressure: 145/92 (01/23/20 0006)  Pulse: 76 (01/23/20 0006)  Temperature: 98 6 °F (37 °C) (01/23/20 0006)  Respirations: 20 (01/23/20 0006)  Weight - Scale: 101 kg (223 lb 1 7 oz) (01/23/20 0006)  SpO2: 92 % (01/23/20 0006)    Physical Exam   Constitutional: He is oriented to person, place, and time  He appears well-developed and well-nourished  No distress  HENT:   Head: Normocephalic and atraumatic  Eyes: Pupils are equal, round, and reactive to light  Conjunctivae and EOM are normal  No scleral icterus  Neck: Normal range of motion  Neck supple  No JVD present  No tracheal deviation present  Cardiovascular: Normal rate, regular rhythm, normal heart sounds and intact distal pulses  Exam reveals no friction rub  No murmur heard  Pulmonary/Chest: Effort normal and breath sounds normal  No stridor  No respiratory distress  He has no wheezes  He has no rales  Abdominal: Soft  Bowel sounds are normal    Musculoskeletal: Normal range of motion  He exhibits edema  Neurological: He is alert and oriented to person, place, and time  He has normal reflexes  GCS eye subscore is 4  GCS verbal subscore is 5  GCS motor subscore is 6  NIH scale 0   Skin: Skin is warm and dry  He is not diaphoretic  Nursing note and vitals reviewed  Additional Data:     Lab Results: I have personally reviewed pertinent reports        Results from last 7 days   Lab Units 01/22/20 1956   WBC Thousand/uL 8 32   HEMOGLOBIN g/dL 18 8*   HEMATOCRIT % 57 7*   PLATELETS Thousands/uL 260     Results from last 7 days   Lab Units 01/22/20 1956   POTASSIUM mmol/L 3 8   CHLORIDE mmol/L 107   CO2 mmol/L 32   BUN mg/dL 27*   CREATININE mg/dL 1 56*   CALCIUM mg/dL 9 7     Results from last 7 days   Lab Units 01/22/20 1956   INR  0 97       Imaging: I have personally reviewed pertinent reports  Ct Head Without Contrast    Result Date: 1/22/2020  Narrative: CT BRAIN - WITHOUT CONTRAST INDICATION:   TIA, initial exam   15-year-old male with history of diabetes and hypertension  COMPARISON:  None  TECHNIQUE:  CT examination of the brain was performed  In addition to axial images, coronal 2D reformatted images were created and submitted for interpretation  Radiation dose length product (DLP) for this visit:  840 mGy-cm   This examination, like all CT scans performed in the [facility], was performed utilizing techniques to minimize radiation dose exposure, including the use of iterative reconstruction and automated exposure control  IMAGE QUALITY:  Diagnostic  FINDINGS: PARENCHYMA:  No intracranial mass, mass effect or midline shift  No CT signs of acute infarction  No acute parenchymal hemorrhage  Decreased white matter attenuation, most likely due to chronic small vessel white matter ischemic changes  VENTRICLES AND EXTRA-AXIAL SPACES:  Normal for the patient's age  No extra-axial blood  VISUALIZED ORBITS AND PARANASAL SINUSES:  Unremarkable  CALVARIUM AND EXTRACRANIAL SOFT TISSUES:  Normal      Impression: No acute intracranial abnormality  Workstation performed: [id number]       EKG, Pathology, and Other Studies Reviewed on Admission:   · EKG: NSR    Epic / Care Everywhere Records Reviewed: Yes     ** Please Note: This note has been constructed using a voice recognition system   **

## 2020-01-23 NOTE — SOCIAL WORK
LOS: 1 day  PATIENT IS NOT A MEDICARE BUNDLE OR A 30 DAY READMISSION  Met with patient and spouse Mike Pierce at bedside  Explained role of care management  Patient lives with spouse in a one story home with 4 XAVIER  He is independent adl's and ambulation, drives, works  DME - glucometer, BP cuff  Past services - denies history of VNA, SNF, MH or D&A  He does not have a POA/AD, information given  Pharmacy of choice is [facility] in [place]  He has a prescription plan and is able to afford his medications  Spouse or children would help at home if needed  Patient prefers to return home at discharge and does not anticipate any discharge needs  Will follow  CM reviewed d/c planning process including the following: identifying help at home, patient preference for d/c planning needs,  availability of treatment team to discuss questions or concerns patient and/or family may have regarding understanding medications and recognizing signs and symptoms once discharged  CM also encouraged patient to follow up with all recommended appointments after discharge  Patient advised of importance for patient and family to participate in managing patients medical well being

## 2020-01-23 NOTE — PROGRESS NOTES
Pleasant man  Spouse at bedside  Both receptive to   Both reported that they were coping well  Both closely connected to their Temple  Their  visits and supports  Erika is a major source of comfort, strength, and hope  Good family support  01/23/20 [address] Involvement Patient active with Temple;Yazidism active in [address] Aware/Contacted Leader/Temple aware of patient's status   Spiritual Beliefs/Perceptions   Concept of God Accepting; Other (Comment)  (Source of strength and hope)   Relationship with God Close   Spiritual Strengths Deep long term [employer] erika  Prayer is important  Bible as source of strength  Support Systems Spouse/significant other;Family members;Yazidism/erika community   Stress Factors   Patient Stress Factors Health changes   Family Stress Factors Health changes   Coping Responses   Patient Coping Accepting; Fearful;Open/discussion   Family Coping Accepting; Fearful;Open/discussion   Plan of Care   Assessment Completed by: Unit visit

## 2020-01-23 NOTE — PROGRESS NOTES
Pastoral Care Progress Note    2020  Patient: Valente Brady : 1950  Admission Date & Time: 2020  MRN: [id number] CSN: [id number]                     Chaplaincy Interventions Utilized:   Empowerment: Clarified, confirmed, or reviewed information from treatment team  and Provided chaplaincy education    Exploration: Explored emotional needs & resources, Explored relational needs & resources, Explored spiritual needs & resources and Facilitated story telling    Relationship Building: Cultivated a relationship of care and support and Listened empathically    Ritual: Provided prayer    Chaplaincy Outcomes Achieved:  Expressed gratitude, Expressed intermediate hope, Expressed ultimate hope and Identified priorities    Spiritual Coping Strategies Utilized:   Spiritual comfort, Spiritual empowerment, Spiritual meaning and Spiritual community       [address] Involvement Patient active with [employer];Protestant active in [address] Aware/Contacted Leader/[employer] aware of patient's status   Spiritual Beliefs/Perceptions   Concept of God Accepting; Other (Comment)  (Source of strength and hope)   Relationship with God Close   Spiritual Strengths Deep long term Christianity rima  Prayer is important  Bible as source of strength  Support Systems Spouse/significant other;Family members;Protestant/rima community   Stress Factors   Patient Stress Factors Health changes   Family Stress Factors Health changes   Coping Responses   Patient Coping Accepting; Fearful;Open/discussion   Family Coping Accepting; Fearful;Open/discussion   Plan of Care   Assessment Completed by: Unit visit

## 2020-01-23 NOTE — CONSULTS
Consultation - Neurology   Krystle Tesfaye 72 y o  male MRN: [id number]  Unit/Bed#: -01 Encounter: [id number]    Assessment/Plan   41-year-old male with past medical history as listed below no prior neurological encounters in the chart, yesterday around 7:00 a m  he did develop difficulty with speech, words was slow and seemed to be searching for his words and confused, later that evening his wife noted left facial drooling/droop stumbling of his walk and slow slurred speech, later that evening after a nap at 7:00 p m  this persisted and the patient was brought to the hospital   Ct of head was completed - which showed no acute intracranial abnormality  TIA symptoms versus small stroke  Dyslipidemia  Type 2 diabetes  Gout  CKD    -  Stroke protocol   -  MRI of brain with out contrast if able  -  Echocardiogram  -  Telemetry  -  Check Lipid panel and hemoglobin A1c  -  Secondary stroke / TIA  risk factor modification  -- Intensive statin medication, however, intolerant in past, started on Lipitor 20 mg daily  -- Blood pressure control   -  Continue Antiplatelet, ASA and Plavix for 21 days than, Plavix  -  Keep euvolemic, normothermic  -  Treat high blood sugars  -  Dysphagia screen  -  Therapies to include PT/OT/ST  -  Frequent neurological check and notify neurology with any changes in neurological condition  -  Continue to monitor for infectious and metabolic derangements and treat as arises  -  Follow up with neurology as an out patient  History of Present Illness     Reason for Consult / Principal Problem: TIA    HPI: Krystle Tesfaye is a 72 y o   male with past medical history as listed below, patient does have a history of diabetes and hypertension, no prior neurological encounters in the chart  He presents to [facility] for his wife yesterday for stroke-like symptoms - per documentation it was reported that the patient wife arrived home at 5:00 p m   Yesterday evening after work and his speech head seemed weaker than usual he was drooling from the left side of his mouth with a mild facial droop  Is reported that the blood sugars were checked, which were 128 at that time  The patient took a nap at 7:00 p m , his wife noted that he had slurred speech, he seemed confused, symptoms lasted for 20 minutes prior to resolving  Is reported that he did call a friend in the morning at 7:00 a m , and may have been experiencing changes in speech and confusion at that time  Per patient at bedside, no prior neurological hx  The wife and patient report that symptoms started at 7 a m , friends he talked to on the radio, words slow /slurred and searching for words, confused  He went to work all day and no one reported he was slurring his words, he did not notice this, although he works alone  At 5 p m  after wife returned from work, she noticed that his speech was slurred and slow, stumbling, left side of face drooping and drooling onto his chest   He said he did not eat all day, made salad, sit on couch  He had difficulty with eating slow, he feel asleep on the  after at 7 p m , asked for remote control, handed wrong remote  Eyes were glazed starring past him  At that point he brought her to hospital    His symptoms improved when he got to the hospital, he still has some left sided facial drooping  CT of the head was completed which showed no acute intracranial abnormality  Labs were completed as below the patient did have a BMP completed which did show an elevated BUN and creatinine and GFR 45  CBC did not show leukocytosis, hemoglobin was 18 8 which was high  INR was normal, please see results below       Inpatient consult to Neurology  Consult performed by: Nilda Larson PA-C  Consult ordered by: Len Canales PA-C    Inpatient consult to Neurology  Consult performed by: Nilda Larson PA-C  Consult ordered by: Len Canales PA-C        Review of Systems Constitutional: Negative  Eyes: Negative  Respiratory: Negative  Cardiovascular: Negative  Gastrointestinal: Negative  Genitourinary: Negative  Musculoskeletal: Negative  Allergic/Immunologic: Negative  Neurological: Positive for facial asymmetry and speech difficulty  Hematological: Negative  Psychiatric/Behavioral: Negative  Historical Information   Past Medical History:   Diagnosis Date    Diabetes mellitus ([address] )     Hypertension     Renal disorder      Past Surgical History:   Procedure Laterality Date    COLONOSCOPY  09/18/2006    complete; 10 yrs    COLONOSCOPY  10/23/2017    complete; 5 yrs     Social History   Social History     Substance and Sexual Activity   Alcohol Use Never    Frequency: Never    Binge frequency: Never     Social History     Substance and Sexual Activity   Drug Use Never     Social History     Tobacco Use   Smoking Status Never Smoker   Smokeless Tobacco Never Used     Family History:   Family History   Problem Relation Age of Onset   [facility] Breast cancer Mother     Kidney disease Father     Lung cancer Father     Other Father         smoker    Hypertension Other        Review of previous medical records was completed, no prior neurological encounters noted in the chart       Meds/Allergies   all current active meds have been reviewed, current meds:   Current Facility-Administered Medications   Medication Dose Route Frequency    allopurinol (ZYLOPRIM) tablet 200 mg  200 mg Oral Daily    amLODIPine (NORVASC) tablet 10 mg  10 mg Oral Daily    aspirin chewable tablet 81 mg  81 mg Oral Daily    atorvastatin (LIPITOR) tablet 20 mg  20 mg Oral Daily With Dinner    clopidogrel (PLAVIX) tablet 75 mg  75 mg Oral Daily    ferrous sulfate tablet 325 mg  325 mg Oral Daily    heparin (porcine) subcutaneous injection 5,000 Units  5,000 Units Subcutaneous Q8H [facility]    insulin lispro (HumaLOG) 100 units/mL subcutaneous injection 1-5 Units  1-5 Units Subcutaneous HS    insulin lispro (HumaLOG) 100 units/mL subcutaneous injection 1-6 Units  1-6 Units Subcutaneous TID AC    metoprolol tartrate (LOPRESSOR) tablet 50 mg  50 mg Oral Q12H    and PTA meds:   Prior to Admission Medications   Prescriptions Last Dose Informant Patient Reported? Taking? INVOKANA 100 MG 2020 at Unknown time  No Yes   Sig: TAKE 1 TABLET BY MOUTH ONCE DAILY BEFORE BREAKFAST   Lancets (FREESTYLE) lancets 2020 at Unknown time  No Yes   Sig: by Other route as needed (Test Daily) Use as instructed--test Daily   allopurinol (ZYLOPRIM) 100 mg tablet 2020 at Unknown time  No Yes   Sig: TAKE 2 TABLETS BY MOUTH DAILY   amLODIPine (NORVASC) 10 mg tablet 2020 at Unknown time  No Yes   Sig: Take 1 tablet (10 mg total) by mouth daily   aspirin 81 MG tablet 2020 at Unknown time  Yes Yes   Sig: Take 1 tablet by mouth daily   fenofibrate (TRICOR) 145 mg tablet 2020 at Unknown time  No Yes   Sig: TAKE 1 TABLET BY MOUTH ONCE DAILY   ferrous sulfate 325 (65 Fe) mg tablet   Yes No   Sig: Take 1 tablet by mouth daily    glucose monitoring kit (FREESTYLE) monitoring kit 2020 at Unknown time  No Yes   Si each by Does not apply route as needed (Test daily)   lisinopril (ZESTRIL) 40 mg tablet   No No   Sig: Take 1 5 tablets (60 mg total) by mouth daily for 30 days   metoprolol tartrate (LOPRESSOR) 50 mg tablet 2020 at    No Yes   Sig: Take 1 tablet (50 mg total) by mouth every 12 (twelve) hours      Facility-Administered Medications: None     No Known Allergies    Objective   Vitals:Blood pressure 142/90, pulse 71, temperature 98 °F (36 7 °C), resp  rate 18, height 5' 9" (1 753 m), weight 97 7 kg (215 lb 6 4 oz), SpO2 97 %  ,Body mass index is 31 81 kg/m²  No intake or output data in the 24 hours ending 20 0713    Invasive Devices:    Invasive Devices     Peripheral Intravenous Line            Peripheral IV 20 Left Antecubital less than 1 day Physical Exam   Constitutional: He is oriented to person, place, and time  He appears well-developed and well-nourished  No distress  HENT:   Head: Normocephalic and atraumatic  Eyes: EOM are normal    Neck: No thyromegaly present  Cardiovascular: Normal rate  Pulmonary/Chest: Effort normal and breath sounds normal  No respiratory distress  Abdominal: He exhibits no distension  Musculoskeletal: Normal range of motion  He exhibits no edema  Neurological: He is oriented to person, place, and time  He has normal strength  He has an abnormal Finger-Nose-Finger Test (mild decrease to ftn on the right no ataxia)  Gait normal    Reflex Scores:       Tricep reflexes are 2+ on the right side and 2+ on the left side  Bicep reflexes are 2+ on the right side and 2+ on the left side  Brachioradialis reflexes are 2+ on the right side and 2+ on the left side  Patellar reflexes are 1+ on the left side  Achilles reflexes are 2+ on the right side and 1+ on the left side  Skin: Skin is warm and dry  He is not diaphoretic  Psychiatric: His speech is normal    Vitals reviewed  Neurologic Exam     Mental Status   Oriented to person, place, and time  Oriented to year, month, date, day and season  Follows 2 step commands  Attention: normal  Concentration: normal    Speech: speech is normal   Level of consciousness: alert  Knowledge: good  Able to perform simple calculations  Able to name object  Able to read  Able to repeat  Normal comprehension  No evidence of dysarthria or aphasia, there is slowness to his speech, slow to respond at times  However able to recognize objects  He was able to do calculations, thought slower on the more complex  He was able to spell world forward and backwards  Cranial Nerves   Cranial nerves II through XII intact  CN III, IV, VI   Extraocular motions are normal    Except mild decrease in nasolabial fold on the left     Hearing loss to testing bilaterally  Motor Exam   Muscle bulk: normal  Overall muscle tone: normal  Right arm pronator drift: absent  Left arm pronator drift: absent    Strength   Strength 5/5 throughout  Sensory Exam   Light touch normal    Vibration normal    No dense neglect, no focal weakness to touch, temperature and vibratory sense  Gait, Coordination, and Reflexes     Gait  Gait: normal    Coordination   Finger to nose coordination: abnormal (mild decrease to ftn on the right no ataxia)    Tremor   Resting tremor: absent  Intention tremor: absent    Reflexes   Right brachioradialis: 2+  Left brachioradialis: 2+  Right biceps: 2+  Left biceps: 2+  Right triceps: 2+  Left triceps: 2+  Left patellar: 1+  Right achilles: 2+  Left achilles: 1+  Right plantar: normal  Left plantar: upgoingNo evidence of myelopathy, no evidence of parkinsonism features  No seizure activity observed        Lab Results:    Recent Results (from the past 24 hour(s))   Fingerstick Glucose (POCT)    Collection Time: 01/22/20  7:53 PM   Result Value Ref Range    POC Glucose 100 65 - 140 mg/dl   Basic metabolic panel    Collection Time: 01/22/20  7:56 PM   Result Value Ref Range    Sodium 146 (H) 136 - 145 mmol/L    Potassium 3 8 3 5 - 5 3 mmol/L    Chloride 107 100 - 108 mmol/L    CO2 32 21 - 32 mmol/L    ANION GAP 7 4 - 13 mmol/L    BUN 27 (H) 5 - 25 mg/dL    Creatinine 1 56 (H) 0 60 - 1 30 mg/dL    Glucose 116 65 - 140 mg/dL    Calcium 9 7 8 3 - 10 1 mg/dL    eGFR 45 ml/min/1 73sq m   CBC and Platelet    Collection Time: 01/22/20  7:56 PM   Result Value Ref Range    WBC 8 32 4 31 - 10 16 Thousand/uL    RBC 6 44 (H) 3 88 - 5 62 Million/uL    Hemoglobin 18 8 (H) 12 0 - 17 0 g/dL    Hematocrit 57 7 (H) 36 5 - 49 3 %    MCV 90 82 - 98 fL    MCH 29 2 26 8 - 34 3 pg    MCHC 32 6 31 4 - 37 4 g/dL    RDW 14 9 11 6 - 15 1 %    Platelets 069 189 - 468 Thousands/uL    MPV 10 0 8 9 - 12 7 fL   Protime-INR    Collection Time: 01/22/20  7:56 PM Result Value Ref Range    Protime 12 6 11 6 - 14 5 seconds    INR 0 97 0 84 - 1 19   APTT    Collection Time: 01/22/20  7:56 PM   Result Value Ref Range    PTT 33 23 - 37 seconds   Troponin I    Collection Time: 01/22/20  7:56 PM   Result Value Ref Range    Troponin I <0 02 <=0 04 ng/mL   Lipid Panel with Direct LDL reflex    Collection Time: 01/23/20  5:27 AM   Result Value Ref Range    Cholesterol 156 50 - 200 mg/dL    Triglycerides 162 (H) <=150 mg/dL    HDL, Direct 31 (L) >=40 mg/dL    LDL Calculated 93 0 - 100 mg/dL   Basic metabolic panel    Collection Time: 01/23/20  5:27 AM   Result Value Ref Range    Sodium 145 136 - 145 mmol/L    Potassium 3 6 3 5 - 5 3 mmol/L    Chloride 111 (H) 100 - 108 mmol/L    CO2 24 21 - 32 mmol/L    ANION GAP 10 4 - 13 mmol/L    BUN 26 (H) 5 - 25 mg/dL    Creatinine 1 39 (H) 0 60 - 1 30 mg/dL    Glucose 102 65 - 140 mg/dL    Calcium 8 5 8 3 - 10 1 mg/dL    eGFR 51 ml/min/1 73sq m   Magnesium    Collection Time: 01/23/20  5:27 AM   Result Value Ref Range    Magnesium 1 9 1 6 - 2 6 mg/dL   Phosphorus    Collection Time: 01/23/20  5:27 AM   Result Value Ref Range    Phosphorus 3 0 2 3 - 4 1 mg/dL   CBC and differential    Collection Time: 01/23/20  5:27 AM   Result Value Ref Range    WBC 7 06 4 31 - 10 16 Thousand/uL    RBC 5 89 (H) 3 88 - 5 62 Million/uL    Hemoglobin 17 2 (H) 12 0 - 17 0 g/dL    Hematocrit 53 1 (H) 36 5 - 49 3 %    MCV 90 82 - 98 fL    MCH 29 2 26 8 - 34 3 pg    MCHC 32 4 31 4 - 37 4 g/dL    RDW 14 1 11 6 - 15 1 %    MPV 9 7 8 9 - 12 7 fL    Platelets 493 542 - 234 Thousands/uL    nRBC 0 /100 WBCs    Neutrophils Relative 71 43 - 75 %    Immat GRANS % 0 0 - 2 %    Lymphocytes Relative 15 14 - 44 %    Monocytes Relative 8 4 - 12 %    Eosinophils Relative 5 0 - 6 %    Basophils Relative 1 0 - 1 %    Neutrophils Absolute 4 99 1 85 - 7 62 Thousands/µL    Immature Grans Absolute 0 02 0 00 - 0 20 Thousand/uL    Lymphocytes Absolute 1 03 0 60 - 4 47 Thousands/µL Monocytes Absolute 0 57 0 17 - 1 22 Thousand/µL    Eosinophils Absolute 0 36 0 00 - 0 61 Thousand/µL    Basophils Absolute 0 09 0 00 - 0 10 Thousands/µL     Per radiology interpretation -    "  Procedure: Ct Head Without Contrast    Result Date: 1/22/2020  Narrative: CT BRAIN - WITHOUT CONTRAST INDICATION:   TIA, initial exam   52-year-old male with history of diabetes and hypertension  COMPARISON:  None  TECHNIQUE:  CT examination of the brain was performed  In addition to axial images, coronal 2D reformatted images were created and submitted for interpretation  Radiation dose length product (DLP) for this visit:  840 mGy-cm   This examination, like all CT scans performed in the [facility], was performed utilizing techniques to minimize radiation dose exposure, including the use of iterative reconstruction and automated exposure control  IMAGE QUALITY:  Diagnostic  FINDINGS: PARENCHYMA:  No intracranial mass, mass effect or midline shift  No CT signs of acute infarction  No acute parenchymal hemorrhage  Decreased white matter attenuation, most likely due to chronic small vessel white matter ischemic changes  VENTRICLES AND EXTRA-AXIAL SPACES:  Normal for the patient's age  No extra-axial blood  VISUALIZED ORBITS AND PARANASAL SINUSES:  Unremarkable  CALVARIUM AND EXTRACRANIAL SOFT TISSUES:  Normal      Impression: No acute intracranial abnormality  Workstation performed: [id number]   "  Imaging Studies: I have personally reviewed pertinent reports  EKG, Pathology, and Other Studies: I have personally reviewed pertinent reports  Code Status: Level 1 - Full Code    Counseling / Coordination of Care  Total time spent today 60 minutes  Greater than 50% of total time was spent with the patient and / or family counseling and / or coordination of care   A description of the counseling / coordination of care: floor time, reviewing records physical examination

## 2020-01-23 NOTE — ED PROVIDER NOTES
History  Chief Complaint   Patient presents with    CVA/TIA-like Symptoms     wife states pt was confused and displayed facial droop at approximately 1830 hours today  currently complaint free  71-year-old male brought in by his wife for evaluation of stroke-like symptoms  Patient's wife states that he arrived home at 5:00 p m  this evening after work and his speech had seemed weaker than usual and he was drooling from the left side of the mouth with mild facial droop  She checked his blood sugar which was 128 and had him eat a sandwich as he had not eaten lunch today due to forgetting to bring it with him to work  Patient took a nap and at 7:00 p m , his wife woke him and noticed that he had slurred speech   She states he seemed confused at that time  Patient's wife states that she had asked him to hand her the remote control, but he gave her something else instead insisting it was the remote control  She states she asked him to say the pedge of the allegiance and he was unable to do so which she states is not typical for him  Symptoms lasted for 20 minutes prior to resolving  Patient's wife states his voice still sounds weaker than usual, but he no longer sounds slurred to her nor does she notice at droop  Patient's wife states that after calling his friends who he he had spoken with by ham radio this morning at 7 am, patient may have been exhibiting changes in his speech with mild confusion at that time        History provided by:  Patient and spouse  CVA/TIA-like Symptoms   Presenting symptoms: confusion and language symptoms    Presenting symptoms: no headaches and no weakness    Language symptoms:     Difficulty in expression: no      Difficulty understanding: no      Slurring of speech: yes      Severity:  Mild  Date/time of last known well:  1/22/2020 7:00 AM  Onset quality:  Gradual  Duration:  12 hours  Timing:  Intermittent  Progression:  Waxing and waning  Associated symptoms: no chest pain, no fever, no nausea, no neck pain and no vomiting        Prior to Admission Medications   Prescriptions Last Dose Informant Patient Reported? Taking? INVOKANA 100 MG 2020 at Unknown time  No Yes   Sig: TAKE 1 TABLET BY MOUTH ONCE DAILY BEFORE BREAKFAST   Lancets (FREESTYLE) lancets 2020 at Unknown time  No Yes   Sig: by Other route as needed (Test Daily) Use as instructed--test Daily   allopurinol (ZYLOPRIM) 100 mg tablet 2020 at Unknown time  No Yes   Sig: TAKE 2 TABLETS BY MOUTH DAILY   amLODIPine (NORVASC) 10 mg tablet 2020 at Unknown time  No Yes   Sig: Take 1 tablet (10 mg total) by mouth daily   aspirin 81 MG tablet 2020 at Unknown time  Yes Yes   Sig: Take 1 tablet by mouth daily   fenofibrate (TRICOR) 145 mg tablet 2020 at Unknown time  No Yes   Sig: TAKE 1 TABLET BY MOUTH ONCE DAILY   ferrous sulfate 325 (65 Fe) mg tablet   Yes No   Sig: Take 1 tablet by mouth daily    glucose monitoring kit (FREESTYLE) monitoring kit 2020 at Unknown time  No Yes   Si each by Does not apply route as needed (Test daily)   lisinopril (ZESTRIL) 40 mg tablet   No No   Sig: Take 1 5 tablets (60 mg total) by mouth daily for 30 days   metoprolol tartrate (LOPRESSOR) 50 mg tablet 2020 at    No Yes   Sig: Take 1 tablet (50 mg total) by mouth every 12 (twelve) hours      Facility-Administered Medications: None       Past Medical History:   Diagnosis Date    Diabetes mellitus ([facility] [address] )     Hypertension     Renal disorder        Past Surgical History:   Procedure Laterality Date    COLONOSCOPY  2006    complete; 10 yrs    COLONOSCOPY  10/23/2017    complete; 5 yrs       Family History   Problem Relation Age of Onset    Breast cancer Mother     Kidney disease Father     Lung cancer Father     Other Father         smoker    Hypertension Other      I have reviewed and agree with the history as documented      Social History     Tobacco Use    Smoking status: Never Smoker    Smokeless tobacco: Never Used   Substance Use Topics    Alcohol use: Never     Frequency: Never     Binge frequency: Never    Drug use: Never        Review of Systems   Constitutional: Negative for appetite change, diaphoresis, fatigue and fever  HENT: Negative for congestion, rhinorrhea and sore throat  Respiratory: Negative for cough, chest tightness and shortness of breath  Cardiovascular: Negative for chest pain, palpitations and leg swelling  Gastrointestinal: Negative for abdominal pain, constipation, diarrhea, nausea and vomiting  Genitourinary: Negative for difficulty urinating, dysuria, frequency and hematuria  Musculoskeletal: Negative for myalgias, neck pain and neck stiffness  Skin: Negative for pallor  Neurological: Positive for speech difficulty  Negative for syncope, weakness and headaches  Psychiatric/Behavioral: Positive for confusion  All other systems reviewed and are negative  Physical Exam  Physical Exam   Constitutional: He is oriented to person, place, and time  He appears well-developed and well-nourished  Non-toxic appearance  No distress  HENT:   Head: Normocephalic and atraumatic  Eyes: Pupils are equal, round, and reactive to light  EOM are normal    Neck: Normal range of motion  No tracheal deviation present  No thyromegaly present  Cardiovascular: Normal rate, regular rhythm, normal heart sounds and intact distal pulses  Pulmonary/Chest: Effort normal and breath sounds normal    Abdominal: Soft  Bowel sounds are normal  He exhibits no distension  There is no tenderness  Lymphadenopathy:     He has no cervical adenopathy  Neurological: He is alert and oriented to person, place, and time  He has normal strength  No cranial nerve deficit or sensory deficit  He exhibits normal muscle tone  Coordination normal  GCS eye subscore is 4  GCS verbal subscore is 5  GCS motor subscore is 6  Skin: Skin is warm and dry  He is not diaphoretic     Nursing note and vitals reviewed        Vital Signs  ED Triage Vitals   Temperature Pulse Respirations Blood Pressure SpO2   01/22/20 1954 01/22/20 1948 01/22/20 1948 01/22/20 1948 01/22/20 1948   98 °F (36 7 °C) 84 20 (!) 179/11 98 %      Temp src Heart Rate Source Patient Position - Orthostatic VS BP Location FiO2 (%)   -- 01/22/20 1948 01/22/20 1948 01/22/20 1948 --    Monitor Lying Right arm       Pain Score       01/22/20 1948       No Pain           Vitals:    01/22/20 2100 01/22/20 2130 01/22/20 2200 01/22/20 2245   BP: 154/84 157/80 165/86 154/100   Pulse: 77 77 78 78   Patient Position - Orthostatic VS:   Lying          Visual Acuity  Visual Acuity      Most Recent Value   L Pupil Size (mm)  3   R Pupil Size (mm)  3          ED Medications  Medications   sodium chloride 0 9 % bolus 1,000 mL (1,000 mL Intravenous New Bag 1/22/20 2103)   clopidogrel (PLAVIX) tablet 300 mg (300 mg Oral Given 1/22/20 2123)   aspirin chewable tablet 324 mg (324 mg Oral Given 1/22/20 2123)   atorvastatin (LIPITOR) tablet 20 mg (20 mg Oral Given 1/22/20 2204)       Diagnostic Studies  Results Reviewed     Procedure Component Value Units Date/Time    Basic metabolic panel [[id number]]  (Abnormal) Collected:  01/22/20 1956    Lab Status:  Final result Specimen:  Blood from Arm, Left Updated:  01/22/20 2108     Sodium 146 mmol/L      Potassium 3 8 mmol/L      Chloride 107 mmol/L      CO2 32 mmol/L      ANION GAP 7 mmol/L      BUN 27 mg/dL      Creatinine 1 56 mg/dL      Glucose 116 mg/dL      Calcium 9 7 mg/dL      eGFR 45 ml/min/1 73sq m     Narrative:       [place] guidelines for Chronic Kidney Disease (CKD):     Stage 1 with normal or high GFR (GFR > 90 mL/min/1 73 square meters)    Stage 2 Mild CKD (GFR = 60-89 mL/min/1 73 square meters)    Stage 3A Moderate CKD (GFR = 45-59 mL/min/1 73 square meters)    Stage 3B Moderate CKD (GFR = 30-44 mL/min/1 73 square meters)    Stage 4 Severe CKD (GFR = 15-29 mL/min/1 73 square meters)    Stage 5 End Stage CKD (GFR <15 mL/min/1 73 square meters)  Note: GFR calculation is accurate only with a steady state creatinine    Troponin I [[id number]]  (Normal) Collected:  01/22/20 1956    Lab Status:  Final result Specimen:  Blood from Arm, Left Updated:  01/22/20 2058     Troponin I <0 02 ng/mL     APTT [[id number]]  (Normal) Collected:  01/22/20 1956    Lab Status:  Final result Specimen:  Blood from Arm, Left Updated:  01/22/20 2043     PTT 33 seconds     Protime-INR [[id number]]  (Normal) Collected:  01/22/20 1956    Lab Status:  Final result Specimen:  Blood from Arm, Left Updated:  01/22/20 2043     Protime 12 6 seconds      INR 0 97    CBC and Platelet [[id number]]  (Abnormal) Collected:  01/22/20 1956    Lab Status:  Final result Specimen:  Blood from Arm, Left Updated:  01/22/20 2008     WBC 8 32 Thousand/uL      RBC 6 44 Million/uL      Hemoglobin 18 8 g/dL      Hematocrit 57 7 %      MCV 90 fL      MCH 29 2 pg      MCHC 32 6 g/dL      RDW 14 9 %      Platelets 146 Thousands/uL      MPV 10 0 fL     Fingerstick Glucose (POCT) [[id number]]  (Normal) Collected:  01/22/20 1953    Lab Status:  Final result Updated:  01/22/20 1954     POC Glucose 100 mg/dl                  X-ray chest 1 view portable   ED Interpretation by Gerardo Landaverde MD (01/22 2018)   No acute pulmonary pathology      CT head without contrast   Final Result by Edi Verdin MD (01/22 2030)      No acute intracranial abnormality                    Workstation performed: [id number]                    Procedures  ECG 12 Lead Documentation Only  Date/Time: 1/22/2020 7:51 PM  Performed by: Gerardo Landaverde MD  Authorized by: Gerardo Landaverde MD     Indications / Diagnosis:  TIA  ECG reviewed by me, the ED Provider: yes    Patient location:  ED  Previous ECG:     Previous ECG:  Unavailable  Interpretation:     Interpretation: non-specific    Rate:     ECG rate:  82    ECG rate assessment: normal    Rhythm:     Rhythm: sinus rhythm    Ectopy:     Ectopy: none    QRS:     QRS axis:  Normal    QRS intervals:  Normal  Conduction:     Conduction: normal    ST segments:     ST segments:  Non-specific    Depression:  II and aVF  T waves:     T waves: normal               ED Course  ED Course as of Jan 22 2259 Wed Jan 22, 2020 2116 1 62 five months ago   Creatinine(!): 1 56                               MDM  Number of Diagnoses or Management Options  TIA (transient ischemic attack): new and requires workup  Diagnosis management comments: 69-year-old male brought in by his wife for evaluation of stroke-like symptoms toe since resolved  NIH Stroke Scale on arrival 0  CT head negative  Last known normal 7:00 a m  History of CKD which is stable  Elevated hemoglobin  Mild dehydration  IV fluids ordered  Case discussed with Dr Marv Kelly from Neurology  Patient given 300 mg of Plavix as well as 324 of aspirin  Patient currently taking fenofibrate for hypercholesterolemia  He states that in the past he has had stomach upset with statins  Neurology recommending switching from fenofibrate to statin  Patient agreeable to re-attempting Lipitor  Patient admitted for further evaluation and management         Amount and/or Complexity of Data Reviewed  Clinical lab tests: ordered and reviewed  Tests in the radiology section of CPT®: ordered and reviewed  Discuss the patient with other providers: yes  Independent visualization of images, tracings, or specimens: yes    Patient Progress  Patient progress: stable        Disposition  Final diagnoses:   TIA (transient ischemic attack)     Time reflects when diagnosis was documented in both MDM as applicable and the Disposition within this note     Time User Action Codes Description Comment    1/22/2020  9:37 PM Hugo Christian Add [G45 9] TIA (transient ischemic attack)       ED Disposition     ED Disposition Condition Date/Time Comment    Admit Stable Wed Jan 22, 2020  9:37 PM Case was discussed with SLIM and the patient's admission status was agreed to be Admission Status: inpatient status to the service of Dr Mireille Munoz           Follow-up Information    None         Current Discharge Medication List      CONTINUE these medications which have NOT CHANGED    Details   allopurinol (ZYLOPRIM) 100 mg tablet TAKE 2 TABLETS BY MOUTH DAILY  Qty: 180 tablet, Refills: 1    Associated Diagnoses: Chronic gout without tophus, unspecified cause, unspecified site      amLODIPine (NORVASC) 10 mg tablet Take 1 tablet (10 mg total) by mouth daily  Qty: 30 tablet, Refills: 6    Comments: Please consider 90 day supplies to promote better adherence  Associated Diagnoses: Essential hypertension      aspirin 81 MG tablet Take 1 tablet by mouth daily      fenofibrate (TRICOR) 145 mg tablet TAKE 1 TABLET BY MOUTH ONCE DAILY  Qty: 90 tablet, Refills: 1    Associated Diagnoses: Essential hypertriglyceridemia      glucose monitoring kit (FREESTYLE) monitoring kit 1 each by Does not apply route as needed (Test daily)  Qty: 1 each, Refills: 0    Associated Diagnoses: Type 2 diabetes mellitus with microalbuminuria, unspecified whether long term insulin use (HCC)      INVOKANA 100 MG TAKE 1 TABLET BY MOUTH ONCE DAILY BEFORE BREAKFAST  Qty: 30 tablet, Refills: 3    Comments: Please consider 90 day supplies to promote better adherence  Associated Diagnoses: Type 2 diabetes mellitus with microalbuminuria, without long-term current use of insulin (HCC)      Lancets (FREESTYLE) lancets by Other route as needed (Test Daily) Use as instructed--test Daily  Qty: 100 each, Refills: 0    Associated Diagnoses: Type 2 diabetes mellitus with microalbuminuria, unspecified whether long term insulin use (HCC)      metoprolol tartrate (LOPRESSOR) 50 mg tablet Take 1 tablet (50 mg total) by mouth every 12 (twelve) hours  Qty: 60 tablet, Refills: 6    Comments: Please consider 90 day supplies to promote better adherence  Associated Diagnoses: Essential hypertension      ferrous sulfate 325 (65 Fe) mg tablet Take 1 tablet by mouth daily       lisinopril (ZESTRIL) 40 mg tablet Take 1 5 tablets (60 mg total) by mouth daily for 30 days  Qty: 45 tablet, Refills: 6    Comments: Please consider 90 day supplies to promote better adherence  Associated Diagnoses: Hypertension, unspecified type           No discharge procedures on file      ED Provider  Electronically Signed by           Ranjith Otoole MD  01/22/20 4141

## 2020-01-23 NOTE — ASSESSMENT & PLAN NOTE
Lab Results   Component Value Date    HGBA1C 6 3 (H) 08/13/2019       Recent Labs     01/22/20 1953   POCGLU 100       Blood Sugar Average: Last 72 hrs:  (P) 100     Insulin sliding scale

## 2020-01-23 NOTE — OCCUPATIONAL THERAPY NOTE
Occupational Therapy Evaluation      Aimee Deluca    1/23/2020    Principal Problem:    TIA (transient ischemic attack)  Active Problems:    Chronic kidney disease, stage 3 (HCC)    Gout    Iron deficiency anemia    Type 2 diabetes mellitus with microalbuminuria ([facility] [address] )    Dyslipidemia      Past Medical History:   Diagnosis Date    Diabetes mellitus ([facility] [address] )     Hypertension     Renal disorder        Past Surgical History:   Procedure Laterality Date    COLONOSCOPY  09/18/2006    complete; 10 yrs    COLONOSCOPY  10/23/2017    complete; 5 yrs        01/23/20 8775   Note Type   Note type Eval only   Restrictions/Precautions   Weight Bearing Precautions Per Order No   Pain Assessment   Pain Assessment No/denies pain   Home Living   Type of [address] [facility] One level  (4STE w railing)   Home Equipment   (denies)   Prior Function   Level of [place] Independent with ADLs and functional mobility   Lives With Spouse   ADL Assistance Independent   IADLs Independent   Falls in the last 6 months 0   Vocational Full time employment   Comments Pt works for [employer] in [place] and reports he is mostly sedentary at work   Psychosocial   Psychosocial ([employer] [facility] of Time/Family Visitation   (Wife, son, DIL)   Subjective   Subjective "I feel normal"   ADL   Eating Assistance 7  Independent   Grooming Assistance 7  Independent   UB Bathing Assistance 7  Independent   LB Bathing Assistance 7  Independent   UB Dressing Assistance 7  Independent   LB Dressing Assistance 7  [address]   Supine to Sit 7  Independent   Sit to Supine 7  Independent   Transfers   Sit to Stand 7  Independent   Stand to Sit 7  Independent   Stand pivot 7  Independent   Functional Mobility   Functional Mobility 7  Independent   Activity Tolerance   Activity Tolerance Patient tolerated treatment well   Medical Staff Made Aware PT Karo   Nurse Made Aware BRUCE GAONA Assessment   RUE Assessment WFL   LUE Assessment   LUE Assessment WFL   Hand Function   Gross Motor Coordination Functional   Fine Motor Coordination Functional   Sensation   Additional Comments No sensation changes noted   Vision-Basic Assessment   Current Vision Wears glasses all the time   Vision - Complex Assessment   Additional Comments No visual changes noted   Perception   Inattention/Neglect Appears intact   Motor Planning Appears intact   Cognition   Overall Cognitive Status WFL   Arousal/Participation Alert; Cooperative   Attention Within functional limits   Orientation Level Oriented X4   Memory Within functional limits   Following Commands Follows all commands and directions without difficulty   Comments Pt notes slight word-finding difficulties; better than it was but not completely resolved   Assessment   Prognosis Good   Assessment Pt is a 72 y o  male seen for OT evaluation at [address] [place] [address], admitted 1/22/2020 w/ TIA (transient ischemic attack)  OT completed brief review of pt's medical and social history  Comorbidities affecting pt's functional performance at time of assessment include: CKD, gout, anemia, DM type 2, HTN, obesity  Prior to admission, pt was living in [facility] with spouse and was independent with ADL, IADL, ambulation, driving, and full time employment  Upon evaluation, pt presents to OT at functional baseline  Based on findings, pt is of low complexity  At this time, OT recommendations at time of discharge are home with family support  No further acute OT needs indicated at this time - Recommend pt continue to be OOB for meals, ambulation to/from BR, perform self care tasks, and mobility in hallway with nursing  D/C from OT caseload with above recommendations     Goals   Patient Goals go home today   Plan   OT Frequency Eval only   Recommendation   OT Discharge Recommendation Home with family support   OT - OK to Discharge Yes  (when medically cleared)     Negra Jackson MS, OTR/L

## 2020-01-23 NOTE — PLAN OF CARE
Problem: Potential for Falls  Goal: Patient will remain free of falls  Description  INTERVENTIONS:  - Assess patient frequently for physical needs  -  Identify cognitive and physical deficits and behaviors that affect risk of falls    -  [place] fall precautions as indicated by assessment   - Educate patient/family on patient safety including physical limitations  - Instruct patient to call for assistance with activity based on assessment  - Modify environment to reduce risk of injury  - Consider OT/PT consult to assist with strengthening/mobility  1/23/2020 1149 by Michelle Butt RN  Outcome: Progressing  1/23/2020 1148 by Michelle Butt RN  Outcome: Progressing  1/23/2020 1147 by Michelle Butt RN  Outcome: Progressing  1/23/2020 1147 by Michelle Butt RN  Outcome: Progressing  1/23/2020 0725 by Michelle Butt RN  Outcome: Progressing     Problem: PAIN - ADULT  Goal: Verbalizes/displays adequate comfort level or baseline comfort level  Description  Interventions:  - Encourage patient to monitor pain and request assistance  - Assess pain using appropriate pain scale  - Administer analgesics based on type and severity of pain and evaluate response  - Implement non-pharmacological measures as appropriate and evaluate response  - Consider cultural and social influences on pain and pain management  - Notify physician/advanced practitioner if interventions unsuccessful or patient reports new pain  1/23/2020 1149 by Michelle Butt RN  Outcome: Progressing  1/23/2020 1148 by Michelle Butt RN  Outcome: Progressing  1/23/2020 1147 by Michelle Butt RN  Outcome: Progressing  1/23/2020 1147 by Michelle Butt RN  Outcome: Progressing  1/23/2020 0725 by Michelle Butt RN  Outcome: Progressing     Problem: INFECTION - ADULT  Goal: Absence or prevention of progression during hospitalization  Description  INTERVENTIONS:  - Assess and monitor for signs and symptoms of infection  - Monitor lab/diagnostic results  - Monitor all insertion sites, i e  indwelling lines, tubes, and drains  - Monitor endotracheal if appropriate and nasal secretions for changes in amount and color  - [place] appropriate cooling/warming therapies per order  - Administer medications as ordered  - Instruct and encourage patient and family to use good hand hygiene technique  - Identify and instruct in appropriate isolation precautions for identified infection/condition  1/23/2020 1149 by Alexa Vieira RN  Outcome: Progressing  1/23/2020 1148 by Alexa Vieira RN  Outcome: Progressing  1/23/2020 1147 by Alexa Vieira RN  Outcome: Progressing  1/23/2020 1147 by Alexa Vieira RN  Outcome: Progressing  1/23/2020 0725 by Alexa Vieira RN  Outcome: Progressing     Problem: SAFETY ADULT  Goal: Maintain or return to baseline ADL function  Description  INTERVENTIONS:  -  Assess patient's ability to carry out ADLs; assess patient's baseline for ADL function and identify physical deficits which impact ability to perform ADLs (bathing, care of mouth/teeth, toileting, grooming, dressing, etc )  - Assess/evaluate cause of self-care deficits   - Assess range of motion  - Assess patient's mobility; develop plan if impaired  - Assess patient's need for assistive devices and provide as appropriate  - Encourage maximum independence but intervene and supervise when necessary  - Involve family in performance of ADLs  - Assess for home care needs following discharge   - Consider OT consult to assist with ADL evaluation and planning for discharge  - Provide patient education as appropriate  1/23/2020 1149 by Alexa Vieira RN  Outcome: Progressing  1/23/2020 1148 by Alexa Vieira RN  Outcome: Progressing  1/23/2020 1147 by Alexa Vieira RN  Outcome: Progressing  1/23/2020 1147 by Alexa Vieira RN  Outcome: Progressing  1/23/2020 0725 by Alexa Vieira RN  Outcome: Progressing  Goal: Maintain or return mobility status to optimal level  Description  INTERVENTIONS:  - Assess patient's baseline mobility status (ambulation, transfers, stairs, etc )    - Identify cognitive and physical deficits and behaviors that affect mobility  - Identify mobility aids required to assist with transfers and/or ambulation (gait belt, sit-to-stand, lift, walker, cane, etc )  - [place] fall precautions as indicated by assessment  - Record patient progress and toleration of activity level on Mobility SBAR; progress patient to next Phase/Stage  - Instruct patient to call for assistance with activity based on assessment  - Consider rehabilitation consult to assist with strengthening/weightbearing, etc   1/23/2020 1149 by Brianna Martinez RN  Outcome: Progressing  1/23/2020 1148 by Brianna Martinez RN  Outcome: Progressing  1/23/2020 1147 by Brianna Martinez RN  Outcome: Progressing  1/23/2020 1147 by Brianna Martinez RN  Outcome: Progressing  1/23/2020 0725 by Brianna Martinez RN  Outcome: Progressing     Problem: DISCHARGE PLANNING  Goal: Discharge to home or other facility with appropriate resources  Description  INTERVENTIONS:  - Identify barriers to discharge w/patient and caregiver  - Arrange for needed discharge resources and transportation as appropriate  - Identify discharge learning needs (meds, wound care, etc )  - Arrange for interpretive services to assist at discharge as needed  - Refer to Case Management Department for coordinating discharge planning if the patient needs post-hospital services based on physician/advanced practitioner order or complex needs related to functional status, cognitive ability, or social support system  1/23/2020 1149 by Brianna Martinez RN  Outcome: Progressing  1/23/2020 1148 by Brianna Martinez RN  Outcome: Progressing  1/23/2020 1147 by Brianna Martinez RN  Outcome: Progressing  1/23/2020 1147 by Devika Leon Stephen Mcwilliams RN  Outcome: Progressing  1/23/2020 0725 by Liz Mcnulty RN  Outcome: Progressing     Problem: Knowledge Deficit  Goal: Patient/family/caregiver demonstrates understanding of disease process, treatment plan, medications, and discharge instructions  Description  Complete learning assessment and assess knowledge base  Interventions:  - Provide teaching at level of understanding  - Provide teaching via preferred learning methods  1/23/2020 1149 by Liz Mcnulty RN  Outcome: Progressing  1/23/2020 1148 by Liz Mcnulty RN  Outcome: Progressing  1/23/2020 1147 by Liz Mcnulty RN  Outcome: Progressing  1/23/2020 1147 by Liz Mcnulty RN  Outcome: Progressing  1/23/2020 0725 by Liz Mcnulty RN  Outcome: Progressing     Problem: Nutrition/Hydration-ADULT  Goal: Nutrient/Hydration intake appropriate for improving, restoring or maintaining nutritional needs  Description  Monitor and assess patient's nutrition/hydration status for malnutrition  Collaborate with interdisciplinary team and initiate plan and interventions as ordered  Monitor patient's weight and dietary intake as ordered or per policy  Utilize nutrition screening tool and intervene as necessary  Determine patient's food preferences and provide high-protein, high-caloric foods as appropriate       INTERVENTIONS:  - Monitor oral intake, urinary output, labs, and treatment plans  - Assess nutrition and hydration status and recommend course of action  - Evaluate amount of meals eaten  - Assist patient with eating if necessary   - Allow adequate time for meals  - Recommend/ encourage appropriate diets, oral nutritional supplements, and vitamin/mineral supplements  - Order, calculate, and assess calorie counts as needed  - Recommend, monitor, and adjust tube feedings and TPN/PPN based on assessed needs  - Assess need for intravenous fluids  - Provide specific nutrition/hydration education as appropriate  - Include patient/family/caregiver in decisions related to nutrition  1/23/2020 1149 by Marianna Galeas RN  Outcome: Progressing  1/23/2020 1148 by Marianna Galeas RN  Outcome: Progressing  1/23/2020 1147 by Marianna Galeas RN  Outcome: Progressing  1/23/2020 1147 by Marianna Galeas RN  Outcome: Progressing  1/23/2020 0725 by Marianna Galeas RN  Outcome: Progressing     Problem: DISCHARGE PLANNING - CARE MANAGEMENT  Goal: Discharge to post-acute care or home with appropriate resources  Description  INTERVENTIONS:  - Conduct assessment to determine patient/family and health care team treatment goals, and need for post-acute services based on payer coverage, community resources, and patient preferences, and barriers to discharge  - Address psychosocial, clinical, and financial barriers to discharge as identified in assessment in conjunction with the patient/family and health care team  - Arrange appropriate level of post-acute services according to patient's   needs and preference and payer coverage in collaboration with the physician and health care team  - Communicate with and update the patient/family, physician, and health care team regarding progress on the discharge plan  - Arrange appropriate transportation to post-acute venues   1/23/2020 1149 by Marianna Galeas RN  Outcome: Progressing  1/23/2020 1148 by Marianna Galeas RN  Outcome: Progressing  1/23/2020 1147 by Marianna Galeas RN  Outcome: Progressing  1/23/2020 1147 by Marianna Galeas RN  Outcome: Progressing  1/23/2020 0725 by Marianna Galeas RN  Outcome: Progressing     Problem: NEUROSENSORY - ADULT  Goal: Achieves stable or improved neurological status  Description  INTERVENTIONS  - Monitor and report changes in neurological status  - Monitor vital signs such as temperature, blood pressure, glucose, and any other labs ordered   - Initiate measures to prevent increased intracranial pressure  - Monitor for seizure activity and implement precautions if appropriate      1/23/2020 1149 by Skylar Lay RN  Outcome: Progressing  1/23/2020 1148 by Skylar Lay RN  Outcome: Progressing  1/23/2020 1147 by Skylar Lay RN  Outcome: Progressing  1/23/2020 1147 by Skylar Lay RN  Outcome: Progressing  1/23/2020 0725 by Skylar Lay RN  Outcome: Progressing  Goal: Remains free of injury related to seizures activity  Description  INTERVENTIONS  - Maintain airway, patient safety  and administer oxygen as ordered  - Monitor patient for seizure activity, document and report duration and description of seizure to physician/advanced practitioner  - If seizure occurs,  ensure patient safety during seizure  - Reorient patient post seizure  - Seizure pads on all 4 side rails  - Instruct patient/family to notify RN of any seizure activity including if an aura is experienced  - Instruct patient/family to call for assistance with activity based on nursing assessment  - Administer anti-seizure medications if ordered    1/23/2020 1149 by Skylar Lay RN  Outcome: Progressing  1/23/2020 1148 by Skylar Lay RN  Outcome: Progressing  1/23/2020 1147 by Skylar Lay RN  Outcome: Progressing  1/23/2020 1147 by Skylar Lay RN  Outcome: Progressing  1/23/2020 0725 by Skylar Lay RN  Outcome: Progressing  Goal: Achieves maximal functionality and self care  Description  INTERVENTIONS  - Monitor swallowing and airway patency with patient fatigue and changes in neurological status  - Encourage and assist patient to increase activity and self care     - Encourage visually impaired, hearing impaired and aphasic patients to use assistive/communication devices  1/23/2020 1149 by Skylar Lay RN  Outcome: Progressing  1/23/2020 1148 by Skylar Lay RN  Outcome: Progressing  1/23/2020 1147 by Skylar Lay RN  Outcome: Progressing  1/23/2020 1147 by Shahnaz Sanchez BRUCE Hawkins  Outcome: Progressing  1/23/2020 0725 by Zeeshan Campbell RN  Outcome: Progressing     Problem: METABOLIC, FLUID AND ELECTROLYTES - ADULT  Goal: Electrolytes maintained within normal limits  Description  INTERVENTIONS:  - Monitor labs and assess patient for signs and symptoms of electrolyte imbalances  - Administer electrolyte replacement as ordered  - Monitor response to electrolyte replacements, including repeat lab results as appropriate  - Instruct patient on fluid and nutrition as appropriate  1/23/2020 1149 by Zeeshan Campbell RN  Outcome: Progressing  1/23/2020 1148 by Zeeshan Campbell RN  Outcome: Progressing  1/23/2020 1147 by Zeeshan Campbell RN  Outcome: Progressing  1/23/2020 1147 by Zeeshan Campbell RN  Outcome: Progressing  1/23/2020 0725 by Zeeshan Campbell RN  Outcome: Progressing  Goal: Fluid balance maintained  Description  INTERVENTIONS:  - Monitor labs   - Monitor I/O and WT  - Instruct patient on fluid and nutrition as appropriate  - Assess for signs & symptoms of volume excess or deficit  1/23/2020 1149 by Zeeshan Campebll RN  Outcome: Progressing  1/23/2020 1148 by Zeeshan Campbell RN  Outcome: Progressing  1/23/2020 1147 by Zeeshan Campbell RN  Outcome: Progressing  1/23/2020 1147 by Zeeshan Campbell RN  Outcome: Progressing  1/23/2020 0725 by Zeeshan Campbell RN  Outcome: Progressing  Goal: Glucose maintained within target range  Description  INTERVENTIONS:  - Monitor Blood Glucose as ordered  - Assess for signs and symptoms of hyperglycemia and hypoglycemia  - Administer ordered medications to maintain glucose within target range  - Assess nutritional intake and initiate nutrition service referral as needed  1/23/2020 1149 by Zeeshan Campbell RN  Outcome: Progressing  1/23/2020 1148 by Zeeshan Campbell RN  Outcome: Progressing  1/23/2020 1147 by Zeeshan Campbell RN  Outcome: Progressing  1/23/2020 1147 by Reva Grove Shruthi RN  Outcome: Progressing  1/23/2020 0725 by Liz Mcnulty RN  Outcome: Progressing

## 2020-01-23 NOTE — PLAN OF CARE
Problem: Potential for Falls  Goal: Patient will remain free of falls  Description  INTERVENTIONS:  - Assess patient frequently for physical needs  -  Identify cognitive and physical deficits and behaviors that affect risk of falls    -  [place] fall precautions as indicated by assessment   - Educate patient/family on patient safety including physical limitations  - Instruct patient to call for assistance with activity based on assessment  - Modify environment to reduce risk of injury  - Consider OT/PT consult to assist with strengthening/mobility  1/23/2020 1148 by Julianna Mendez RN  Outcome: Progressing  1/23/2020 1147 by Julianna Mendez RN  Outcome: Progressing  1/23/2020 1147 by Julianna Mendez RN  Outcome: Progressing  1/23/2020 0725 by Julianna Mendez RN  Outcome: Progressing     Problem: PAIN - ADULT  Goal: Verbalizes/displays adequate comfort level or baseline comfort level  Description  Interventions:  - Encourage patient to monitor pain and request assistance  - Assess pain using appropriate pain scale  - Administer analgesics based on type and severity of pain and evaluate response  - Implement non-pharmacological measures as appropriate and evaluate response  - Consider cultural and social influences on pain and pain management  - Notify physician/advanced practitioner if interventions unsuccessful or patient reports new pain  1/23/2020 1148 by Julianna Mendez RN  Outcome: Progressing  1/23/2020 1147 by Julianna Mendez RN  Outcome: Progressing  1/23/2020 1147 by Julianna Mendez RN  Outcome: Progressing  1/23/2020 0725 by Julianna Mendez RN  Outcome: Progressing     Problem: INFECTION - ADULT  Goal: Absence or prevention of progression during hospitalization  Description  INTERVENTIONS:  - Assess and monitor for signs and symptoms of infection  - Monitor lab/diagnostic results  - Monitor all insertion sites, i e  indwelling lines, tubes, and drains  - Monitor endotracheal if appropriate and nasal secretions for changes in amount and color  - [place] appropriate cooling/warming therapies per order  - Administer medications as ordered  - Instruct and encourage patient and family to use good hand hygiene technique  - Identify and instruct in appropriate isolation precautions for identified infection/condition  1/23/2020 1148 by Xiang Pendleton RN  Outcome: Progressing  1/23/2020 1147 by Xiang Pendleton RN  Outcome: Progressing  1/23/2020 1147 by Xiang Pendleton RN  Outcome: Progressing  1/23/2020 0725 by Xiang Pendleton RN  Outcome: Progressing     Problem: SAFETY ADULT  Goal: Maintain or return to baseline ADL function  Description  INTERVENTIONS:  -  Assess patient's ability to carry out ADLs; assess patient's baseline for ADL function and identify physical deficits which impact ability to perform ADLs (bathing, care of mouth/teeth, toileting, grooming, dressing, etc )  - Assess/evaluate cause of self-care deficits   - Assess range of motion  - Assess patient's mobility; develop plan if impaired  - Assess patient's need for assistive devices and provide as appropriate  - Encourage maximum independence but intervene and supervise when necessary  - Involve family in performance of ADLs  - Assess for home care needs following discharge   - Consider OT consult to assist with ADL evaluation and planning for discharge  - Provide patient education as appropriate  1/23/2020 1148 by Xiang Pendleton RN  Outcome: Progressing  1/23/2020 1147 by Xiang Pendleton RN  Outcome: Progressing  1/23/2020 1147 by Xiang Pendleton RN  Outcome: Progressing  1/23/2020 0725 by Xiang Pendleton RN  Outcome: Progressing  Goal: Maintain or return mobility status to optimal level  Description  INTERVENTIONS:  - Assess patient's baseline mobility status (ambulation, transfers, stairs, etc )    - Identify cognitive and physical deficits and behaviors that affect mobility  - Identify mobility aids required to assist with transfers and/or ambulation (gait belt, sit-to-stand, lift, walker, cane, etc )  - [place] fall precautions as indicated by assessment  - Record patient progress and toleration of activity level on Mobility SBAR; progress patient to next Phase/Stage  - Instruct patient to call for assistance with activity based on assessment  - Consider rehabilitation consult to assist with strengthening/weightbearing, etc   1/23/2020 1148 by Wing Leonidas RN  Outcome: Progressing  1/23/2020 1147 by Wing Leonidas RN  Outcome: Progressing  1/23/2020 1147 by Wing Leonidas RN  Outcome: Progressing  1/23/2020 0725 by Wing Leonidas RN  Outcome: Progressing     Problem: DISCHARGE PLANNING  Goal: Discharge to home or other facility with appropriate resources  Description  INTERVENTIONS:  - Identify barriers to discharge w/patient and caregiver  - Arrange for needed discharge resources and transportation as appropriate  - Identify discharge learning needs (meds, wound care, etc )  - Arrange for interpretive services to assist at discharge as needed  - Refer to Case Management Department for coordinating discharge planning if the patient needs post-hospital services based on physician/advanced practitioner order or complex needs related to functional status, cognitive ability, or social support system  1/23/2020 1148 by Wing Leonidas RN  Outcome: Progressing  1/23/2020 1147 by Wing Leonidas RN  Outcome: Progressing  1/23/2020 1147 by Wing Leonidas RN  Outcome: Progressing  1/23/2020 0725 by Wing Leonidas RN  Outcome: Progressing     Problem: Knowledge Deficit  Goal: Patient/family/caregiver demonstrates understanding of disease process, treatment plan, medications, and discharge instructions  Description  Complete learning assessment and assess knowledge base    Interventions:  - Provide teaching at level of understanding  - Provide teaching via preferred learning methods  1/23/2020 1148 by Skylar Lay RN  Outcome: Progressing  1/23/2020 1147 by Skylar Lay RN  Outcome: Progressing  1/23/2020 1147 by Skylar Lay RN  Outcome: Progressing  1/23/2020 0725 by Skylar Lay RN  Outcome: Progressing     Problem: Nutrition/Hydration-ADULT  Goal: Nutrient/Hydration intake appropriate for improving, restoring or maintaining nutritional needs  Description  Monitor and assess patient's nutrition/hydration status for malnutrition  Collaborate with interdisciplinary team and initiate plan and interventions as ordered  Monitor patient's weight and dietary intake as ordered or per policy  Utilize nutrition screening tool and intervene as necessary  Determine patient's food preferences and provide high-protein, high-caloric foods as appropriate       INTERVENTIONS:  - Monitor oral intake, urinary output, labs, and treatment plans  - Assess nutrition and hydration status and recommend course of action  - Evaluate amount of meals eaten  - Assist patient with eating if necessary   - Allow adequate time for meals  - Recommend/ encourage appropriate diets, oral nutritional supplements, and vitamin/mineral supplements  - Order, calculate, and assess calorie counts as needed  - Recommend, monitor, and adjust tube feedings and TPN/PPN based on assessed needs  - Assess need for intravenous fluids  - Provide specific nutrition/hydration education as appropriate  - Include patient/family/caregiver in decisions related to nutrition  1/23/2020 1148 by Skylar Lay RN  Outcome: Progressing  1/23/2020 1147 by Skylar Lay RN  Outcome: Progressing  1/23/2020 1147 by Skylar Lay RN  Outcome: Progressing  1/23/2020 0725 by Skylar Lay RN  Outcome: Progressing     Problem: DISCHARGE PLANNING - CARE MANAGEMENT  Goal: Discharge to post-acute care or home with appropriate resources  Description  INTERVENTIONS:  - Conduct assessment to determine patient/family and health care team treatment goals, and need for post-acute services based on payer coverage, community resources, and patient preferences, and barriers to discharge  - Address psychosocial, clinical, and financial barriers to discharge as identified in assessment in conjunction with the patient/family and health care team  - Arrange appropriate level of post-acute services according to patient's   needs and preference and payer coverage in collaboration with the physician and health care team  - Communicate with and update the patient/family, physician, and health care team regarding progress on the discharge plan  - Arrange appropriate transportation to post-acute venues   1/23/2020 1148 by Guadalupe Rome RN  Outcome: Progressing  1/23/2020 1147 by Guadalupe Rome RN  Outcome: Progressing  1/23/2020 1147 by Guadalupe Rome RN  Outcome: Progressing  1/23/2020 0725 by Guadalupe Rome RN  Outcome: Progressing     Problem: NEUROSENSORY - ADULT  Goal: Achieves stable or improved neurological status  Description  INTERVENTIONS  - Monitor and report changes in neurological status  - Monitor vital signs such as temperature, blood pressure, glucose, and any other labs ordered   - Initiate measures to prevent increased intracranial pressure  - Monitor for seizure activity and implement precautions if appropriate      1/23/2020 1148 by Guadalupe Rome RN  Outcome: Progressing  1/23/2020 1147 by Guadalupe Rome RN  Outcome: Progressing  1/23/2020 1147 by Guadalupe Rome RN  Outcome: Progressing  1/23/2020 0725 by Guadalupe Rome RN  Outcome: Progressing  Goal: Remains free of injury related to seizures activity  Description  INTERVENTIONS  - Maintain airway, patient safety  and administer oxygen as ordered  - Monitor patient for seizure activity, document and report duration and description of seizure to physician/advanced practitioner  - If seizure occurs,  ensure patient safety during seizure  - Reorient patient post seizure  - Seizure pads on all 4 side rails  - Instruct patient/family to notify RN of any seizure activity including if an aura is experienced  - Instruct patient/family to call for assistance with activity based on nursing assessment  - Administer anti-seizure medications if ordered    1/23/2020 1148 by Pat Monte RN  Outcome: Progressing  1/23/2020 1147 by Pat Monte RN  Outcome: Progressing  1/23/2020 1147 by Pat Monte RN  Outcome: Progressing  1/23/2020 0725 by Pat Monte RN  Outcome: Progressing  Goal: Achieves maximal functionality and self care  Description  INTERVENTIONS  - Monitor swallowing and airway patency with patient fatigue and changes in neurological status  - Encourage and assist patient to increase activity and self care     - Encourage visually impaired, hearing impaired and aphasic patients to use assistive/communication devices  1/23/2020 1148 by Pat Monte RN  Outcome: Progressing  1/23/2020 1147 by Pat Monte RN  Outcome: Progressing  1/23/2020 1147 by Pat Monte RN  Outcome: Progressing  1/23/2020 0725 by Pat Monte RN  Outcome: Progressing     Problem: METABOLIC, FLUID AND ELECTROLYTES - ADULT  Goal: Electrolytes maintained within normal limits  Description  INTERVENTIONS:  - Monitor labs and assess patient for signs and symptoms of electrolyte imbalances  - Administer electrolyte replacement as ordered  - Monitor response to electrolyte replacements, including repeat lab results as appropriate  - Instruct patient on fluid and nutrition as appropriate  1/23/2020 1148 by Pat Monte RN  Outcome: Progressing  1/23/2020 1147 by Pat Monte RN  Outcome: Progressing  1/23/2020 1147 by Pat Monte RN  Outcome: Progressing  1/23/2020 0725 by Pat Monte RN  Outcome: Progressing  Goal: Fluid balance maintained  Description  INTERVENTIONS:  - Monitor labs   - Monitor I/O and WT  - Instruct patient on fluid and nutrition as appropriate  - Assess for signs & symptoms of volume excess or deficit  1/23/2020 1148 by Curtis Mitchell RN  Outcome: Progressing  1/23/2020 1147 by Curtis Mitchell RN  Outcome: Progressing  1/23/2020 1147 by Curtis Mitchell RN  Outcome: Progressing  1/23/2020 0725 by Curtis Mitchell RN  Outcome: Progressing  Goal: Glucose maintained within target range  Description  INTERVENTIONS:  - Monitor Blood Glucose as ordered  - Assess for signs and symptoms of hyperglycemia and hypoglycemia  - Administer ordered medications to maintain glucose within target range  - Assess nutritional intake and initiate nutrition service referral as needed  1/23/2020 1148 by Curtis Mitchell RN  Outcome: Progressing  1/23/2020 1147 by Curtis Mitchell RN  Outcome: Progressing  1/23/2020 1147 by Curtis Mitchell RN  Outcome: Progressing  1/23/2020 0725 by Curtis Mitchell RN  Outcome: Progressing

## 2020-01-23 NOTE — ASSESSMENT & PLAN NOTE
Neurology was called admitted for stroke stroke pathway TIA  CTA head and CTA of the head and neck was negative  Was outside the window for thrombolytics  At this point NIH scale is 0  Started on aspirin loading dose 325 mg now 81 mg daily  Plavix 300 mg loaded start on 75 mg daily  Neurology counsult   CT head in the morning, MRI brain and echocardiogram is pending

## 2020-01-23 NOTE — SPEECH THERAPY NOTE
Speech Language/Pathology  Pt adm w/ speech disturbance, facial droop  NIH 0, passed nsg screen  Has been tolerating po, some word finding noted by pt to staff which is improving  No formal dysphagia eval at this time  Out patient  Speech services should word finding issues persist during stay  Has been making needs known so far

## 2020-01-23 NOTE — PLAN OF CARE
Problem: Potential for Falls  Goal: Patient will remain free of falls  Description  INTERVENTIONS:  - Assess patient frequently for physical needs  -  Identify cognitive and physical deficits and behaviors that affect risk of falls    -  [place] fall precautions as indicated by assessment   - Educate patient/family on patient safety including physical limitations  - Instruct patient to call for assistance with activity based on assessment  - Modify environment to reduce risk of injury  - Consider OT/PT consult to assist with strengthening/mobility  Outcome: Progressing     Problem: PAIN - ADULT  Goal: Verbalizes/displays adequate comfort level or baseline comfort level  Description  Interventions:  - Encourage patient to monitor pain and request assistance  - Assess pain using appropriate pain scale  - Administer analgesics based on type and severity of pain and evaluate response  - Implement non-pharmacological measures as appropriate and evaluate response  - Consider cultural and social influences on pain and pain management  - Notify physician/advanced practitioner if interventions unsuccessful or patient reports new pain  Outcome: Progressing     Problem: INFECTION - ADULT  Goal: Absence or prevention of progression during hospitalization  Description  INTERVENTIONS:  - Assess and monitor for signs and symptoms of infection  - Monitor lab/diagnostic results  - Monitor all insertion sites, i e  indwelling lines, tubes, and drains  - Monitor endotracheal if appropriate and nasal secretions for changes in amount and color  - [place] appropriate cooling/warming therapies per order  - Administer medications as ordered  - Instruct and encourage patient and family to use good hand hygiene technique  - Identify and instruct in appropriate isolation precautions for identified infection/condition  Outcome: Progressing     Problem: SAFETY ADULT  Goal: Maintain or return to baseline ADL function  Description  INTERVENTIONS:  -  Assess patient's ability to carry out ADLs; assess patient's baseline for ADL function and identify physical deficits which impact ability to perform ADLs (bathing, care of mouth/teeth, toileting, grooming, dressing, etc )  - Assess/evaluate cause of self-care deficits   - Assess range of motion  - Assess patient's mobility; develop plan if impaired  - Assess patient's need for assistive devices and provide as appropriate  - Encourage maximum independence but intervene and supervise when necessary  - Involve family in performance of ADLs  - Assess for home care needs following discharge   - Consider OT consult to assist with ADL evaluation and planning for discharge  - Provide patient education as appropriate  Outcome: Progressing  Goal: Maintain or return mobility status to optimal level  Description  INTERVENTIONS:  - Assess patient's baseline mobility status (ambulation, transfers, stairs, etc )    - Identify cognitive and physical deficits and behaviors that affect mobility  - Identify mobility aids required to assist with transfers and/or ambulation (gait belt, sit-to-stand, lift, walker, cane, etc )  - [place] fall precautions as indicated by assessment  - Record patient progress and toleration of activity level on Mobility SBAR; progress patient to next Phase/Stage  - Instruct patient to call for assistance with activity based on assessment  - Consider rehabilitation consult to assist with strengthening/weightbearing, etc   Outcome: Progressing     Problem: DISCHARGE PLANNING  Goal: Discharge to home or other facility with appropriate resources  Description  INTERVENTIONS:  - Identify barriers to discharge w/patient and caregiver  - Arrange for needed discharge resources and transportation as appropriate  - Identify discharge learning needs (meds, wound care, etc )  - Arrange for interpretive services to assist at discharge as needed  - Refer to Case Management Department for coordinating discharge planning if the patient needs post-hospital services based on physician/advanced practitioner order or complex needs related to functional status, cognitive ability, or social support system  Outcome: Progressing     Problem: Knowledge Deficit  Goal: Patient/family/caregiver demonstrates understanding of disease process, treatment plan, medications, and discharge instructions  Description  Complete learning assessment and assess knowledge base  Interventions:  - Provide teaching at level of understanding  - Provide teaching via preferred learning methods  Outcome: Progressing     Problem: Nutrition/Hydration-ADULT  Goal: Nutrient/Hydration intake appropriate for improving, restoring or maintaining nutritional needs  Description  Monitor and assess patient's nutrition/hydration status for malnutrition  Collaborate with interdisciplinary team and initiate plan and interventions as ordered  Monitor patient's weight and dietary intake as ordered or per policy  Utilize nutrition screening tool and intervene as necessary  Determine patient's food preferences and provide high-protein, high-caloric foods as appropriate       INTERVENTIONS:  - Monitor oral intake, urinary output, labs, and treatment plans  - Assess nutrition and hydration status and recommend course of action  - Evaluate amount of meals eaten  - Assist patient with eating if necessary   - Allow adequate time for meals  - Recommend/ encourage appropriate diets, oral nutritional supplements, and vitamin/mineral supplements  - Order, calculate, and assess calorie counts as needed  - Recommend, monitor, and adjust tube feedings and TPN/PPN based on assessed needs  - Assess need for intravenous fluids  - Provide specific nutrition/hydration education as appropriate  - Include patient/family/caregiver in decisions related to nutrition  Outcome: Progressing     Problem: DISCHARGE PLANNING - CARE MANAGEMENT  Goal: Discharge to post-acute care or home with appropriate resources  Description  INTERVENTIONS:  - Conduct assessment to determine patient/family and health care team treatment goals, and need for post-acute services based on payer coverage, community resources, and patient preferences, and barriers to discharge  - Address psychosocial, clinical, and financial barriers to discharge as identified in assessment in conjunction with the patient/family and health care team  - Arrange appropriate level of post-acute services according to patient's   needs and preference and payer coverage in collaboration with the physician and health care team  - Communicate with and update the patient/family, physician, and health care team regarding progress on the discharge plan  - Arrange appropriate transportation to post-acute venues   Outcome: Progressing     Problem: NEUROSENSORY - ADULT  Goal: Achieves stable or improved neurological status  Description  INTERVENTIONS  - Monitor and report changes in neurological status  - Monitor vital signs such as temperature, blood pressure, glucose, and any other labs ordered   - Initiate measures to prevent increased intracranial pressure  - Monitor for seizure activity and implement precautions if appropriate      Outcome: Progressing  Goal: Remains free of injury related to seizures activity  Description  INTERVENTIONS  - Maintain airway, patient safety  and administer oxygen as ordered  - Monitor patient for seizure activity, document and report duration and description of seizure to physician/advanced practitioner  - If seizure occurs,  ensure patient safety during seizure  - Reorient patient post seizure  - Seizure pads on all 4 side rails  - Instruct patient/family to notify RN of any seizure activity including if an aura is experienced  - Instruct patient/family to call for assistance with activity based on nursing assessment  - Administer anti-seizure medications if ordered    Outcome: Progressing  Goal: Achieves maximal functionality and self care  Description  INTERVENTIONS  - Monitor swallowing and airway patency with patient fatigue and changes in neurological status  - Encourage and assist patient to increase activity and self care     - Encourage visually impaired, hearing impaired and aphasic patients to use assistive/communication devices  Outcome: Progressing     Problem: METABOLIC, FLUID AND ELECTROLYTES - ADULT  Goal: Electrolytes maintained within normal limits  Description  INTERVENTIONS:  - Monitor labs and assess patient for signs and symptoms of electrolyte imbalances  - Administer electrolyte replacement as ordered  - Monitor response to electrolyte replacements, including repeat lab results as appropriate  - Instruct patient on fluid and nutrition as appropriate  Outcome: Progressing  Goal: Fluid balance maintained  Description  INTERVENTIONS:  - Monitor labs   - Monitor I/O and WT  - Instruct patient on fluid and nutrition as appropriate  - Assess for signs & symptoms of volume excess or deficit  Outcome: Progressing  Goal: Glucose maintained within target range  Description  INTERVENTIONS:  - Monitor Blood Glucose as ordered  - Assess for signs and symptoms of hyperglycemia and hypoglycemia  - Administer ordered medications to maintain glucose within target range  - Assess nutritional intake and initiate nutrition service referral as needed  Outcome: Progressing

## 2020-01-24 ENCOUNTER — TELEPHONE (OUTPATIENT)
Dept: HEMATOLOGY ONCOLOGY | Facility: CLINIC | Age: 70
End: 2020-01-24

## 2020-01-24 ENCOUNTER — TRANSITIONAL CARE MANAGEMENT (OUTPATIENT)
Dept: FAMILY MEDICINE CLINIC | Facility: HOSPITAL | Age: 70
End: 2020-01-24

## 2020-01-24 ENCOUNTER — TELEPHONE (OUTPATIENT)
Dept: SURGICAL ONCOLOGY | Facility: CLINIC | Age: 70
End: 2020-01-24

## 2020-01-24 VITALS
WEIGHT: 212.3 LBS | BODY MASS INDEX: 31.44 KG/M2 | SYSTOLIC BLOOD PRESSURE: 153 MMHG | HEART RATE: 61 BPM | TEMPERATURE: 98.4 F | RESPIRATION RATE: 24 BRPM | OXYGEN SATURATION: 95 % | HEIGHT: 69 IN | DIASTOLIC BLOOD PRESSURE: 93 MMHG

## 2020-01-24 PROBLEM — I63.9 CVA (CEREBRAL VASCULAR ACCIDENT) (HCC): Status: ACTIVE | Noted: 2020-01-23

## 2020-01-24 LAB
ANION GAP SERPL CALCULATED.3IONS-SCNC: 10 MMOL/L (ref 4–13)
ATRIAL RATE: 82 BPM
BASOPHILS # BLD AUTO: 0.06 THOUSANDS/ΜL (ref 0–0.1)
BASOPHILS NFR BLD AUTO: 1 % (ref 0–1)
BUN SERPL-MCNC: 26 MG/DL (ref 5–25)
CALCIUM SERPL-MCNC: 9.3 MG/DL (ref 8.3–10.1)
CHLORIDE SERPL-SCNC: 111 MMOL/L (ref 100–108)
CO2 SERPL-SCNC: 23 MMOL/L (ref 21–32)
CREAT SERPL-MCNC: 1.36 MG/DL (ref 0.6–1.3)
EOSINOPHIL # BLD AUTO: 0.36 THOUSAND/ΜL (ref 0–0.61)
EOSINOPHIL NFR BLD AUTO: 6 % (ref 0–6)
ERYTHROCYTE [DISTWIDTH] IN BLOOD BY AUTOMATED COUNT: 14 % (ref 11.6–15.1)
GFR SERPL CREATININE-BSD FRML MDRD: 53 ML/MIN/1.73SQ M
GLUCOSE SERPL-MCNC: 101 MG/DL (ref 65–140)
GLUCOSE SERPL-MCNC: 104 MG/DL (ref 65–140)
GLUCOSE SERPL-MCNC: 148 MG/DL (ref 65–140)
GLUCOSE SERPL-MCNC: 99 MG/DL (ref 65–140)
HCT VFR BLD AUTO: 54 % (ref 36.5–49.3)
HGB BLD-MCNC: 17.6 G/DL (ref 12–17)
IMM GRANULOCYTES # BLD AUTO: 0.02 THOUSAND/UL (ref 0–0.2)
IMM GRANULOCYTES NFR BLD AUTO: 0 % (ref 0–2)
LYMPHOCYTES # BLD AUTO: 1.14 THOUSANDS/ΜL (ref 0.6–4.47)
LYMPHOCYTES NFR BLD AUTO: 19 % (ref 14–44)
MAGNESIUM SERPL-MCNC: 2.3 MG/DL (ref 1.6–2.6)
MCH RBC QN AUTO: 29.3 PG (ref 26.8–34.3)
MCHC RBC AUTO-ENTMCNC: 32.6 G/DL (ref 31.4–37.4)
MCV RBC AUTO: 90 FL (ref 82–98)
MONOCYTES # BLD AUTO: 0.51 THOUSAND/ΜL (ref 0.17–1.22)
MONOCYTES NFR BLD AUTO: 8 % (ref 4–12)
NEUTROPHILS # BLD AUTO: 4.01 THOUSANDS/ΜL (ref 1.85–7.62)
NEUTS SEG NFR BLD AUTO: 66 % (ref 43–75)
NRBC BLD AUTO-RTO: 0 /100 WBCS
P AXIS: 61 DEGREES
PLATELET # BLD AUTO: 232 THOUSANDS/UL (ref 149–390)
PMV BLD AUTO: 10.1 FL (ref 8.9–12.7)
POTASSIUM SERPL-SCNC: 3.7 MMOL/L (ref 3.5–5.3)
PR INTERVAL: 158 MS
QRS AXIS: 52 DEGREES
QRSD INTERVAL: 98 MS
QT INTERVAL: 402 MS
QTC INTERVAL: 469 MS
RBC # BLD AUTO: 6.01 MILLION/UL (ref 3.88–5.62)
SODIUM SERPL-SCNC: 144 MMOL/L (ref 136–145)
T WAVE AXIS: 34 DEGREES
VENTRICULAR RATE: 82 BPM
WBC # BLD AUTO: 6.1 THOUSAND/UL (ref 4.31–10.16)

## 2020-01-24 PROCEDURE — 82668 ASSAY OF ERYTHROPOIETIN: CPT | Performed by: INTERNAL MEDICINE

## 2020-01-24 PROCEDURE — 80048 BASIC METABOLIC PNL TOTAL CA: CPT | Performed by: INTERNAL MEDICINE

## 2020-01-24 PROCEDURE — 99239 HOSP IP/OBS DSCHRG MGMT >30: CPT | Performed by: INTERNAL MEDICINE

## 2020-01-24 PROCEDURE — 93010 ELECTROCARDIOGRAM REPORT: CPT | Performed by: INTERNAL MEDICINE

## 2020-01-24 PROCEDURE — 82948 REAGENT STRIP/BLOOD GLUCOSE: CPT

## 2020-01-24 PROCEDURE — 99233 SBSQ HOSP IP/OBS HIGH 50: CPT | Performed by: NURSE PRACTITIONER

## 2020-01-24 PROCEDURE — 81270 JAK2 GENE: CPT | Performed by: INTERNAL MEDICINE

## 2020-01-24 PROCEDURE — 85025 COMPLETE CBC W/AUTO DIFF WBC: CPT | Performed by: INTERNAL MEDICINE

## 2020-01-24 PROCEDURE — 83735 ASSAY OF MAGNESIUM: CPT | Performed by: INTERNAL MEDICINE

## 2020-01-24 RX ORDER — ATORVASTATIN CALCIUM 40 MG/1
40 TABLET, FILM COATED ORAL
Qty: 30 TABLET | Refills: 0 | Status: SHIPPED | OUTPATIENT
Start: 2020-01-24 | End: 2020-02-25 | Stop reason: SDUPTHER

## 2020-01-24 RX ORDER — CLOPIDOGREL BISULFATE 75 MG/1
75 TABLET ORAL DAILY
Qty: 30 TABLET | Refills: 0 | Status: SHIPPED | OUTPATIENT
Start: 2020-01-25 | End: 2020-02-25 | Stop reason: SDUPTHER

## 2020-01-24 RX ADMIN — METOPROLOL TARTRATE 50 MG: 50 TABLET, FILM COATED ORAL at 08:46

## 2020-01-24 RX ADMIN — AMLODIPINE BESYLATE 10 MG: 5 TABLET ORAL at 08:46

## 2020-01-24 RX ADMIN — FERROUS SULFATE TAB 325 MG (65 MG ELEMENTAL FE) 325 MG: 325 (65 FE) TAB at 08:46

## 2020-01-24 RX ADMIN — CLOPIDOGREL BISULFATE 75 MG: 75 TABLET ORAL at 08:46

## 2020-01-24 RX ADMIN — METOPROLOL TARTRATE 50 MG: 50 TABLET, FILM COATED ORAL at 00:29

## 2020-01-24 RX ADMIN — HEPARIN SODIUM 5000 UNITS: 5000 INJECTION, SOLUTION INTRAVENOUS; SUBCUTANEOUS at 08:45

## 2020-01-24 RX ADMIN — HEPARIN SODIUM 5000 UNITS: 5000 INJECTION, SOLUTION INTRAVENOUS; SUBCUTANEOUS at 00:30

## 2020-01-24 RX ADMIN — ALLOPURINOL 200 MG: 100 TABLET ORAL at 08:46

## 2020-01-24 RX ADMIN — ASPIRIN 81 MG 81 MG: 81 TABLET ORAL at 08:46

## 2020-01-24 NOTE — ASSESSMENT & PLAN NOTE
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    Moderate to severe focal left carotid stenosis at the paraophthalmic segment intracranially    Mild stenosis right carotid bifurcation the neck with atheromatous plaque noted  Patient did not receive tPA  At this point NIH scale is 0  MRI brain showed  1  Punctate acute infarcts in the right frontoparietal region in a watershed territory distribution consistent with known proximal right MCA occlusion or high-grade stenosis  2   Chronic deep white matter, basal ganglia and brainstem lacunar infarcts  Advanced microangiopathic disease  3   Nonspecific foci of chronic microhemorrhage in the right cerebellar hemisphere and brachium pontis  Echocardiogram showed ejection fraction of 55% with no regional wall motion abnormality  Neurology consult and follow-up instructions noted  As per Neurology patient needs to be on aspirin and Plavix for 3 months and then switched to Plavix alone  Continue on Lipitor  Outpatient follow-up with Neurosurgery and Neurology  P T /OT saw the patient  Patient will be discharged home and does not require any home care services

## 2020-01-24 NOTE — PLAN OF CARE
Problem: Potential for Falls  Goal: Patient will remain free of falls  Description  INTERVENTIONS:  - Assess patient frequently for physical needs  -  Identify cognitive and physical deficits and behaviors that affect risk of falls    -  [place] fall precautions as indicated by assessment   - Educate patient/family on patient safety including physical limitations  - Instruct patient to call for assistance with activity based on assessment  - Modify environment to reduce risk of injury  - Consider OT/PT consult to assist with strengthening/mobility  Outcome: Adequate for Discharge     Problem: PAIN - ADULT  Goal: Verbalizes/displays adequate comfort level or baseline comfort level  Description  Interventions:  - Encourage patient to monitor pain and request assistance  - Assess pain using appropriate pain scale  - Administer analgesics based on type and severity of pain and evaluate response  - Implement non-pharmacological measures as appropriate and evaluate response  - Consider cultural and social influences on pain and pain management  - Notify physician/advanced practitioner if interventions unsuccessful or patient reports new pain  Outcome: Adequate for Discharge     Problem: INFECTION - ADULT  Goal: Absence or prevention of progression during hospitalization  Description  INTERVENTIONS:  - Assess and monitor for signs and symptoms of infection  - Monitor lab/diagnostic results  - Monitor all insertion sites, i e  indwelling lines, tubes, and drains  - Monitor endotracheal if appropriate and nasal secretions for changes in amount and color  - [place] appropriate cooling/warming therapies per order  - Administer medications as ordered  - Instruct and encourage patient and family to use good hand hygiene technique  - Identify and instruct in appropriate isolation precautions for identified infection/condition  Outcome: Adequate for Discharge     Problem: SAFETY ADULT  Goal: Maintain or return to baseline ADL function  Description  INTERVENTIONS:  -  Assess patient's ability to carry out ADLs; assess patient's baseline for ADL function and identify physical deficits which impact ability to perform ADLs (bathing, care of mouth/teeth, toileting, grooming, dressing, etc )  - Assess/evaluate cause of self-care deficits   - Assess range of motion  - Assess patient's mobility; develop plan if impaired  - Assess patient's need for assistive devices and provide as appropriate  - Encourage maximum independence but intervene and supervise when necessary  - Involve family in performance of ADLs  - Assess for home care needs following discharge   - Consider OT consult to assist with ADL evaluation and planning for discharge  - Provide patient education as appropriate  Outcome: Adequate for Discharge  Goal: Maintain or return mobility status to optimal level  Description  INTERVENTIONS:  - Assess patient's baseline mobility status (ambulation, transfers, stairs, etc )    - Identify cognitive and physical deficits and behaviors that affect mobility  - Identify mobility aids required to assist with transfers and/or ambulation (gait belt, sit-to-stand, lift, walker, cane, etc )  - [place] fall precautions as indicated by assessment  - Record patient progress and toleration of activity level on Mobility SBAR; progress patient to next Phase/Stage  - Instruct patient to call for assistance with activity based on assessment  - Consider rehabilitation consult to assist with strengthening/weightbearing, etc   Outcome: Adequate for Discharge     Problem: DISCHARGE PLANNING  Goal: Discharge to home or other facility with appropriate resources  Description  INTERVENTIONS:  - Identify barriers to discharge w/patient and caregiver  - Arrange for needed discharge resources and transportation as appropriate  - Identify discharge learning needs (meds, wound care, etc )  - Arrange for interpretive services to assist at discharge as needed  - Refer to Case Management Department for coordinating discharge planning if the patient needs post-hospital services based on physician/advanced practitioner order or complex needs related to functional status, cognitive ability, or social support system  Outcome: Adequate for Discharge     Problem: Knowledge Deficit  Goal: Patient/family/caregiver demonstrates understanding of disease process, treatment plan, medications, and discharge instructions  Description  Complete learning assessment and assess knowledge base  Interventions:  - Provide teaching at level of understanding  - Provide teaching via preferred learning methods  Outcome: Adequate for Discharge     Problem: Nutrition/Hydration-ADULT  Goal: Nutrient/Hydration intake appropriate for improving, restoring or maintaining nutritional needs  Description  Monitor and assess patient's nutrition/hydration status for malnutrition  Collaborate with interdisciplinary team and initiate plan and interventions as ordered  Monitor patient's weight and dietary intake as ordered or per policy  Utilize nutrition screening tool and intervene as necessary  Determine patient's food preferences and provide high-protein, high-caloric foods as appropriate       INTERVENTIONS:  - Monitor oral intake, urinary output, labs, and treatment plans  - Assess nutrition and hydration status and recommend course of action  - Evaluate amount of meals eaten  - Assist patient with eating if necessary   - Allow adequate time for meals  - Recommend/ encourage appropriate diets, oral nutritional supplements, and vitamin/mineral supplements  - Order, calculate, and assess calorie counts as needed  - Recommend, monitor, and adjust tube feedings and TPN/PPN based on assessed needs  - Assess need for intravenous fluids  - Provide specific nutrition/hydration education as appropriate  - Include patient/family/caregiver in decisions related to nutrition  Outcome: Adequate for Discharge     Problem: DISCHARGE PLANNING - CARE MANAGEMENT  Goal: Discharge to post-acute care or home with appropriate resources  Description  INTERVENTIONS:  - Conduct assessment to determine patient/family and health care team treatment goals, and need for post-acute services based on payer coverage, community resources, and patient preferences, and barriers to discharge  - Address psychosocial, clinical, and financial barriers to discharge as identified in assessment in conjunction with the patient/family and health care team  - Arrange appropriate level of post-acute services according to patient's   needs and preference and payer coverage in collaboration with the physician and health care team  - Communicate with and update the patient/family, physician, and health care team regarding progress on the discharge plan  - Arrange appropriate transportation to post-acute venues   Outcome: Adequate for Discharge     Problem: NEUROSENSORY - ADULT  Goal: Achieves stable or improved neurological status  Description  INTERVENTIONS  - Monitor and report changes in neurological status  - Monitor vital signs such as temperature, blood pressure, glucose, and any other labs ordered   - Initiate measures to prevent increased intracranial pressure  - Monitor for seizure activity and implement precautions if appropriate      Outcome: Adequate for Discharge  Goal: Remains free of injury related to seizures activity  Description  INTERVENTIONS  - Maintain airway, patient safety  and administer oxygen as ordered  - Monitor patient for seizure activity, document and report duration and description of seizure to physician/advanced practitioner  - If seizure occurs,  ensure patient safety during seizure  - Reorient patient post seizure  - Seizure pads on all 4 side rails  - Instruct patient/family to notify RN of any seizure activity including if an aura is experienced  - Instruct patient/family to call for assistance with activity based on nursing assessment  - Administer anti-seizure medications if ordered    Outcome: Adequate for Discharge  Goal: Achieves maximal functionality and self care  Description  INTERVENTIONS  - Monitor swallowing and airway patency with patient fatigue and changes in neurological status  - Encourage and assist patient to increase activity and self care  - Encourage visually impaired, hearing impaired and aphasic patients to use assistive/communication devices  Outcome: Adequate for Discharge     Problem: METABOLIC, FLUID AND ELECTROLYTES - ADULT  Goal: Electrolytes maintained within normal limits  Description  INTERVENTIONS:  - Monitor labs and assess patient for signs and symptoms of electrolyte imbalances  - Administer electrolyte replacement as ordered  - Monitor response to electrolyte replacements, including repeat lab results as appropriate  - Instruct patient on fluid and nutrition as appropriate  Outcome: Adequate for Discharge  Goal: Fluid balance maintained  Description  INTERVENTIONS:  - Monitor labs   - Monitor I/O and WT  - Instruct patient on fluid and nutrition as appropriate  - Assess for signs & symptoms of volume excess or deficit  Outcome: Adequate for Discharge  Goal: Glucose maintained within target range  Description  INTERVENTIONS:  - Monitor Blood Glucose as ordered  - Assess for signs and symptoms of hyperglycemia and hypoglycemia  - Administer ordered medications to maintain glucose within target range  - Assess nutritional intake and initiate nutrition service referral as needed  Outcome: Adequate for Discharge     Problem: Neurological Deficit  Goal: Neurological status is stable or improving  Description  Interventions:  - Monitor and assess patient's level of consciousness, motor function, sensory function, and level of assistance needed for ADLs  - Monitor and report changes from baseline  Collaborate with interdisciplinary team to initiate plan and implement interventions as ordered     - Provide and maintain a safe environment  - Consider seizure precautions  - Consider fall precautions  - Consider aspiration precautions  - Consider bleeding precautions  Outcome: Adequate for Discharge     Problem: Activity Intolerance/Impaired Mobility  Goal: Mobility/activity is maintained at optimum level for patient  Description  Interventions:  - Assess and monitor patient  barriers to mobility and need for assistive/adaptive devices  - Assess patient's emotional response to limitations  - Collaborate with interdisciplinary team and initiate plans and interventions as ordered  - Encourage independent activity per ability   - Maintain proper body alignment  - Perform active/passive rom as tolerated/ordered  - Plan activities to conserve energy   - Turn patient as appropriate  Outcome: Adequate for Discharge     Problem: Communication Impairment  Goal: Ability to express needs and understand communication  Description  Assess patient's communication skills and ability to understand information  Patient will demonstrate use of effective communication techniques, alternative methods of communication and understanding even if not able to speak  - Encourage communication and provide alternate methods of communication as needed  - Collaborate with case management/ for discharge needs  - Include patient/family/caregiver in decisions related to communication  Outcome: Adequate for Discharge     Problem: Potential for Aspiration  Goal: Non-ventilated patient's risk of aspiration is minimized  Description  Assess and monitor vital signs, respiratory status, and labs (WBC)  Monitor for signs of aspiration (tachypnea, cough, rales, wheezing, cyanosis, fever)  - Assess and monitor patient's ability to swallow  - Place patient up in chair to eat if possible  - HOB up at 90 degrees to eat if unable to get patient up into chair   - Supervise patient during oral intake     - Instruct patient/ family to take small bites  - Instruct patient/ family to take small single sips when taking liquids  - Follow patient-specific strategies generated by speech pathologist   Outcome: Adequate for Discharge  Goal: Ventilated patient's risk of aspiration is minimized  Description  Assess and monitor vital signs, respiratory status, airway cuff pressure, and labs (WBC)  Monitor for signs of aspiration (tachypnea, cough, rales, wheezing, cyanosis, fever)  - Elevate head of bed 30 degrees if patient has tube feeding   - Monitor tube feeding  Outcome: Adequate for Discharge     Problem: Nutrition  Goal: Nutrition/Hydration status is improving  Description  Monitor and assess patient's nutrition/hydration status for malnutrition (ex- brittle hair, bruises, dry skin, pale skin and conjunctiva, muscle wasting, smooth red tongue, and disorientation)  Collaborate with interdisciplinary team and initiate plan and interventions as ordered  Monitor patient's weight and dietary intake as ordered or per policy  Utilize nutrition screening tool and intervene per policy  Determine patient's food preferences and provide high-protein, high-caloric foods as appropriate  - Assist patient with eating   - Allow adequate time for meals   - Encourage patient to take dietary supplement as ordered  - Collaborate with clinical nutritionist   - Include patient/family/caregiver in decisions related to nutrition    Outcome: Adequate for Discharge

## 2020-01-24 NOTE — NURSING NOTE
Pt's wife reported to the nurse that she noticed drool on both sides of mouth  This is new onset for the pt  Nurse at that time assessed the pt and no drool was present, pt was eating cheerio's with out difficulty, face symmetrical, no slurred speech, no difficulty finding words  Nurse informed Neurologist Ronel

## 2020-01-24 NOTE — PLAN OF CARE
Problem: Potential for Falls  Goal: Patient will remain free of falls  Description  INTERVENTIONS:  - Assess patient frequently for physical needs  -  Identify cognitive and physical deficits and behaviors that affect risk of falls    -  [place] fall precautions as indicated by assessment   - Educate patient/family on patient safety including physical limitations  - Instruct patient to call for assistance with activity based on assessment  - Modify environment to reduce risk of injury  - Consider OT/PT consult to assist with strengthening/mobility  Outcome: Progressing     Problem: PAIN - ADULT  Goal: Verbalizes/displays adequate comfort level or baseline comfort level  Description  Interventions:  - Encourage patient to monitor pain and request assistance  - Assess pain using appropriate pain scale  - Administer analgesics based on type and severity of pain and evaluate response  - Implement non-pharmacological measures as appropriate and evaluate response  - Consider cultural and social influences on pain and pain management  - Notify physician/advanced practitioner if interventions unsuccessful or patient reports new pain  Outcome: Progressing     Problem: INFECTION - ADULT  Goal: Absence or prevention of progression during hospitalization  Description  INTERVENTIONS:  - Assess and monitor for signs and symptoms of infection  - Monitor lab/diagnostic results  - Monitor all insertion sites, i e  indwelling lines, tubes, and drains  - Monitor endotracheal if appropriate and nasal secretions for changes in amount and color  - [place] appropriate cooling/warming therapies per order  - Administer medications as ordered  - Instruct and encourage patient and family to use good hand hygiene technique  - Identify and instruct in appropriate isolation precautions for identified infection/condition  Outcome: Progressing     Problem: SAFETY ADULT  Goal: Maintain or return to baseline ADL function  Description  INTERVENTIONS:  -  Assess patient's ability to carry out ADLs; assess patient's baseline for ADL function and identify physical deficits which impact ability to perform ADLs (bathing, care of mouth/teeth, toileting, grooming, dressing, etc )  - Assess/evaluate cause of self-care deficits   - Assess range of motion  - Assess patient's mobility; develop plan if impaired  - Assess patient's need for assistive devices and provide as appropriate  - Encourage maximum independence but intervene and supervise when necessary  - Involve family in performance of ADLs  - Assess for home care needs following discharge   - Consider OT consult to assist with ADL evaluation and planning for discharge  - Provide patient education as appropriate  Outcome: Progressing  Goal: Maintain or return mobility status to optimal level  Description  INTERVENTIONS:  - Assess patient's baseline mobility status (ambulation, transfers, stairs, etc )    - Identify cognitive and physical deficits and behaviors that affect mobility  - Identify mobility aids required to assist with transfers and/or ambulation (gait belt, sit-to-stand, lift, walker, cane, etc )  - [place] fall precautions as indicated by assessment  - Record patient progress and toleration of activity level on Mobility SBAR; progress patient to next Phase/Stage  - Instruct patient to call for assistance with activity based on assessment  - Consider rehabilitation consult to assist with strengthening/weightbearing, etc   Outcome: Progressing     Problem: DISCHARGE PLANNING  Goal: Discharge to home or other facility with appropriate resources  Description  INTERVENTIONS:  - Identify barriers to discharge w/patient and caregiver  - Arrange for needed discharge resources and transportation as appropriate  - Identify discharge learning needs (meds, wound care, etc )  - Arrange for interpretive services to assist at discharge as needed  - Refer to Case Management Department for coordinating discharge planning if the patient needs post-hospital services based on physician/advanced practitioner order or complex needs related to functional status, cognitive ability, or social support system  Outcome: Progressing     Problem: Knowledge Deficit  Goal: Patient/family/caregiver demonstrates understanding of disease process, treatment plan, medications, and discharge instructions  Description  Complete learning assessment and assess knowledge base  Interventions:  - Provide teaching at level of understanding  - Provide teaching via preferred learning methods  Outcome: Progressing     Problem: Nutrition/Hydration-ADULT  Goal: Nutrient/Hydration intake appropriate for improving, restoring or maintaining nutritional needs  Description  Monitor and assess patient's nutrition/hydration status for malnutrition  Collaborate with interdisciplinary team and initiate plan and interventions as ordered  Monitor patient's weight and dietary intake as ordered or per policy  Utilize nutrition screening tool and intervene as necessary  Determine patient's food preferences and provide high-protein, high-caloric foods as appropriate       INTERVENTIONS:  - Monitor oral intake, urinary output, labs, and treatment plans  - Assess nutrition and hydration status and recommend course of action  - Evaluate amount of meals eaten  - Assist patient with eating if necessary   - Allow adequate time for meals  - Recommend/ encourage appropriate diets, oral nutritional supplements, and vitamin/mineral supplements  - Order, calculate, and assess calorie counts as needed  - Recommend, monitor, and adjust tube feedings and TPN/PPN based on assessed needs  - Assess need for intravenous fluids  - Provide specific nutrition/hydration education as appropriate  - Include patient/family/caregiver in decisions related to nutrition  Outcome: Progressing     Problem: DISCHARGE PLANNING - CARE MANAGEMENT  Goal: Discharge to post-acute care or home with appropriate resources  Description  INTERVENTIONS:  - Conduct assessment to determine patient/family and health care team treatment goals, and need for post-acute services based on payer coverage, community resources, and patient preferences, and barriers to discharge  - Address psychosocial, clinical, and financial barriers to discharge as identified in assessment in conjunction with the patient/family and health care team  - Arrange appropriate level of post-acute services according to patient's   needs and preference and payer coverage in collaboration with the physician and health care team  - Communicate with and update the patient/family, physician, and health care team regarding progress on the discharge plan  - Arrange appropriate transportation to post-acute venues   Outcome: Progressing     Problem: NEUROSENSORY - ADULT  Goal: Achieves stable or improved neurological status  Description  INTERVENTIONS  - Monitor and report changes in neurological status  - Monitor vital signs such as temperature, blood pressure, glucose, and any other labs ordered   - Initiate measures to prevent increased intracranial pressure  - Monitor for seizure activity and implement precautions if appropriate      Outcome: Progressing  Goal: Remains free of injury related to seizures activity  Description  INTERVENTIONS  - Maintain airway, patient safety  and administer oxygen as ordered  - Monitor patient for seizure activity, document and report duration and description of seizure to physician/advanced practitioner  - If seizure occurs,  ensure patient safety during seizure  - Reorient patient post seizure  - Seizure pads on all 4 side rails  - Instruct patient/family to notify RN of any seizure activity including if an aura is experienced  - Instruct patient/family to call for assistance with activity based on nursing assessment  - Administer anti-seizure medications if ordered    Outcome: Progressing  Goal: Achieves maximal functionality and self care  Description  INTERVENTIONS  - Monitor swallowing and airway patency with patient fatigue and changes in neurological status  - Encourage and assist patient to increase activity and self care     - Encourage visually impaired, hearing impaired and aphasic patients to use assistive/communication devices  Outcome: Progressing     Problem: METABOLIC, FLUID AND ELECTROLYTES - ADULT  Goal: Electrolytes maintained within normal limits  Description  INTERVENTIONS:  - Monitor labs and assess patient for signs and symptoms of electrolyte imbalances  - Administer electrolyte replacement as ordered  - Monitor response to electrolyte replacements, including repeat lab results as appropriate  - Instruct patient on fluid and nutrition as appropriate  Outcome: Progressing  Goal: Fluid balance maintained  Description  INTERVENTIONS:  - Monitor labs   - Monitor I/O and WT  - Instruct patient on fluid and nutrition as appropriate  - Assess for signs & symptoms of volume excess or deficit  Outcome: Progressing  Goal: Glucose maintained within target range  Description  INTERVENTIONS:  - Monitor Blood Glucose as ordered  - Assess for signs and symptoms of hyperglycemia and hypoglycemia  - Administer ordered medications to maintain glucose within target range  - Assess nutritional intake and initiate nutrition service referral as needed  Outcome: Progressing

## 2020-01-24 NOTE — TELEPHONE ENCOUNTER
New Patient Encounter    New Patient Intake Form   Patient Details:  Chris Bray  1950  [id number]    Background Information:  [id number] starts by opening a telephone encounter and gathering the following information   Who is calling to schedule? If not self, relationship to patient? wife   Referring Provider Hospital discharge   What is the diagnosis? Elevated hemoglobin   Is this diagnosis confirmed Yes   Is there a confirmed diagnosis from a biopsy/tissue reviewed by pathology? n/a   Is there any prior history of Cancer? No   If yes, please explain    When was the diagnosis? Is patient aware of diagnosis? Yes   Reason for visit? Hospital F/U   Have you had any testing done? If so: when, where? Yes   Are records in [place]? yes   Was the patient told to bring a disk? no   Scheduling Information:   Preferred [place]:  Mira Mantilla     Requesting Specific Provider? ali   Are there any dates/time the patient cannot be seen? no      Miscellaneous: n/a   After completing the above information, please route to Financial Counselor and the appropriate Nurse Navigator for review

## 2020-01-24 NOTE — PROGRESS NOTES
Progress Note - Neurology   Ruth Buckley 72 y o  male MRN: [id number]  Unit/Bed#: -01 Encounter: [id number]        Assessment/Plan :  73 yo male with HTN, DM type 2, HLD who presented to ED on 1/22/20 with left facial droop, drooling, gait imbalance, and speech disturbance  MRI Brain with "punctate acute infarcts in the right frontoparietal region in a watershed territory distribution consistent with known proximal right MCA occlusion or high-grade stenosis  Chronic deep white matter, basal ganglia and brainstem lacunar infarcts "     Plan:  - recommend referral to Vascular Neurosurgery, Dr [facility] for right MCA occlusion  - patient also with elevated Hgb and Hematocrit during admission  Concern for possible polycemia vera  Recommend follow up with Hematology outpatient for further workup  - continue DAPT: ASA 81 mg and Plavix 75 mg daily x 3 months; then remain on Plavix 75 mg daily   - continue Lipitor 40 mg daily  - recommend permissive elevation in BP goal with SBP goal 140-150 mmHg   - recommend glycemic control  - stroke education   - PT/OT/SL following; appreciate recommendations  - recommend referral to Sleep medicine   - appointment requested for follow up with [address] [place] [address] [facility] within 4-6 weeks    Results reviewed:  - labs: HgA1c 6 2, triglycerides 162, HDL 31, LDL 93, troponin < 0 02, Hgb 18 8, 17 2, 17 6 and HCT 57, 53 1, 54  - Echocardiogram: LVEF 55, no regional wall abnormalities, grade 1 diastolic dysfunction, trace TR,   - 1/23/20 MRI Brain wo contrast: Punctate acute infarcts in the right frontoparietal region in a watershed territory distribution consistent with known proximal right MCA occlusion or high-grade stenosis  Chronic deep white matter, basal ganglia and brainstem lacunar infarcts  Advanced microangiopathic disease    Nonspecific foci of chronic microhemorrhage in the right cerebellar hemisphere and brachium pontis  - 1/23/20 CTA head and neck: Right M2 occlusion at the MCA bifurcation  - 1/22/20 CT Head wo contrast: No acute intracranial abnormality  Moderate to severe focal left carotid stenosis at the paraophthalmic segment intracranially  Mild stenosis right carotid bifurcation the neck with atheromatous plaque noted  Subjective: Today, patient reports he is back close to his baseline  He has no new complaints at this time  Spouse reports, patient's speech is improved and he is walking better  She does however note that patients lips appear to darken to a bluish color at times and he had and episode of drooling earlier today  Review of Systems - conducted and negative except as noted in subjective  Vitals: Blood pressure 150/89, pulse 59, temperature 98 °F (36 7 °C), resp  rate 20, height 5' 9" (1 753 m), weight 96 3 kg (212 lb 4 9 oz), SpO2 96 %  ,Body mass index is 31 35 kg/m²  MEDS:    Current Facility-Administered Medications:  allopurinol 200 mg Oral Daily Rosalva Mckeon PA-C   amLODIPine 10 mg Oral Daily Rosalva I LUIS ANTONIO Mckeon   aspirin 81 mg Oral Daily [place] I LUIS ANTONIO Mckeon   atorvastatin 40 mg Oral Daily With [employer] C Ronel, DO   clopidogrel 75 mg Oral Daily [place] I LUIS ANTONIO Mcekon   ferrous sulfate 325 mg Oral Daily [place] I LUIS ANTONIO Mckeon   heparin (porcine) 5,000 Units Subcutaneous Q8H [address] [place] I LUIS ANTONIO Mckeon   insulin lispro 1-5 Units Subcutaneous HS Rosalva I LUIS ANTONIO Mckeon   insulin lispro 1-6 Units Subcutaneous TID AC [place] I LUIS ANTONIO Mckeon   metoprolol tartrate 50 mg Oral Q12H [place] I LUIS ANTONIO Mckeon     Physical Exam   Constitutional: He is oriented to person, place, and time  He appears well-developed and well-nourished  No distress  HENT:   Head: Normocephalic and atraumatic  Mouth/Throat: Oropharynx is clear and moist    Eyes: Pupils are equal, round, and reactive to light  Conjunctivae and EOM are normal  Right eye exhibits no discharge   Left eye exhibits no discharge  Neck: Normal range of motion  Cardiovascular: Exam reveals no gallop and no friction rub  No murmur heard  Pulmonary/Chest: Effort normal and breath sounds normal  No respiratory distress  He has no wheezes  He has no rales  He exhibits no tenderness  Abdominal: Soft  Bowel sounds are normal  He exhibits no distension  There is no tenderness  Musculoskeletal: Normal range of motion  He exhibits no edema  Neurological: He is alert and oriented to person, place, and time  He has normal strength  Finger-nose-finger test: Slow LUE  Skin: Skin is warm and dry  He is not diaphoretic  Psychiatric: He has a normal mood and affect  His speech is normal and behavior is normal  Judgment and thought content normal    Vitals reviewed  Neurologic Exam     Mental Status   Oriented to person, place, and time  Oriented to person  Oriented to year, month and date  Attention: normal  Concentration: normal    Speech: speech is normal   Level of consciousness: alert  Knowledge: good  Able to perform simple calculations  Able to name object  Able to read  Able to repeat  Cranial Nerves     CN II   Visual fields full to confrontation  CN III, IV, VI   Pupils are equal, round, and reactive to light  Extraocular motions are normal    Right pupil: Size: 2 mm  Shape: regular  Left pupil: Size: 2 mm  Shape: regular  CN V-XII intact except mild left facial droop     Motor Exam   Muscle bulk: normal  Overall muscle tone: normal  Right arm pronator drift: absent  Left arm pronator drift: absent    Strength   Strength 5/5 throughout  Sensory Exam   Light touch normal      Gait, Coordination, and Reflexes     Coordination   Finger-nose-finger test: Slow LUE  Lab Results:   I have personally reviewed pertinent reports      CBC:   Results from last 7 days   Lab Units 01/24/20  0500 01/23/20  0527 01/22/20  1956   WBC Thousand/uL 6 10 7 06 8 32   RBC Million/uL 6 01* 5 89* 6 44* HEMOGLOBIN g/dL 17 6* 17 2* 18 8*   HEMATOCRIT % 54 0* 53 1* 57 7*   MCV fL 90 90 90   PLATELETS Thousands/uL 232 222 260    BMP/CMP:   Results from last 7 days   Lab Units 01/24/20  0500 01/23/20  0527 01/22/20 1956   SODIUM mmol/L 144 145 146*   POTASSIUM mmol/L 3 7 3 6 3 8   CHLORIDE mmol/L 111* 111* 107   CO2 mmol/L 23 24 32   BUN mg/dL 26* 26* 27*   CREATININE mg/dL 1 36* 1 39* 1 56*   CALCIUM mg/dL 9 3 8 5 9 7   EGFR ml/min/1 73sq m 53 51 45   , Vitamin B12:   , HgBA1C:   Results from last 7 days   Lab Units 01/23/20 0527   HEMOGLOBIN A1C % 6 2   Coagulation:   Results from last 7 days   Lab Units 01/22/20 1956   INR  0 97    Lipid Profile:   Results from last 7 days   Lab Units 01/23/20 0527   HDL mg/dL 31*   LDL CALC mg/dL 93   TRIGLYCERIDES mg/dL 162*     Imaging Studies: I have personally reviewed pertinent reports  and I have personally reviewed pertinent films in PACS     EEG, Pathology, and Other Studies: I have personally reviewed pertinent reports  and I have personally reviewed pertinent films in PACS    VTE Prophylaxis: Sequential compression device (Venodyne)      Counseling / Coordination of Care  Total time spent today 40 minutes  Greater than 50% of total time was spent with the patient and / or family counseling and / or coordination of care  A description of the counseling / coordination of care: coordination of care with SLIM Attending   Discussion of diagnsotic test results, plan of care reguarding medication regimen, and follow up with Stroke clinic, Vascular neurosurgy, Hematology and Sleep medicine

## 2020-01-24 NOTE — DISCHARGE SUMMARY
Discharge- Lance Pay 1950, 72 y o  male MRN: [id number]    Unit/Bed#: -01 Encounter: [id number]    Primary Care Provider: Robert Muñiz DO   Date and time admitted to hospital: 1/22/2020  7:45 PM        Erythrocytosis  Assessment & Plan  Hemoglobin this morning is 17 6  Will order erythropoietin level and JAK2 mutation  Discussed with patient and wife at length and recommended to follow-up with hematologist as outpatient for workup of polycythemia  Patient verbalized the understanding of instruction will follow-up with Dr Marcia Lo as outpatient  Repeat CBC in 1 week    Dyslipidemia  Assessment & Plan  Continue statin    Type 2 diabetes mellitus with microalbuminuria [facility])  Assessment & Plan  Lab Results   Component Value Date    HGBA1C 6 2 01/23/2020       Recent Labs     01/23/20  1712 01/24/20  0023 01/24/20  0739 01/24/20  1057   POCGLU 89 104 99 148*       Blood Sugar Average: Last 72 hrs:  (P) 116 [id number]     Insulin sliding scale    Gout  Assessment & Plan  Continue on allopurinol    Chronic kidney disease, stage 3 (HCC)  Assessment & Plan  Stable at the baseline rate of 1 4-1 8  At baseline  Avoid NSAID      * CVA (cerebral vascular accident) [facility])  Assessment & Plan  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    Moderate to severe focal left carotid stenosis at the paraophthalmic segment intracranially    Mild stenosis right carotid bifurcation the neck with atheromatous plaque noted  Patient did not receive tPA  At this point NIH scale is 0  MRI brain showed  1  Punctate acute infarcts in the right frontoparietal region in a watershed territory distribution consistent with known proximal right MCA occlusion or high-grade stenosis  2   Chronic deep white matter, basal ganglia and brainstem lacunar infarcts  Advanced microangiopathic disease    3   Nonspecific foci of chronic microhemorrhage in the right cerebellar hemisphere and brachium pontis  Echocardiogram showed ejection fraction of 55% with no regional wall motion abnormality  Neurology consult and follow-up instructions noted  As per Neurology patient needs to be on aspirin and Plavix for 3 months and then switched to Plavix alone  Continue on Lipitor  Outpatient follow-up with Neurosurgery and Neurology  P T /OT saw the patient  Patient will be discharged home and does not require any home care services  Hospital Course:     Chris Bray is a 72 y o  male patient who originally presented to the hospital on   Admission Orders (From admission, onward)     Ordered        01/22/20 2138  Inpatient Admission (expected length of stay for this patient Order details is greater than two midnights)  Once                  due to stroke-like symptoms  Patient was noticed to have left-sided facial droop and slurred speech and was brought to ER  Patient was admitted and started on stroke pathway  Patient had CT head and CT head and neck done in ER  Please refer to results as above  Patient was seen by Neurology  Patient was placed on telemetry and did not have any evidence of atrial fibrillation  Patient had MRI of brain which showed multiple punctate area of acute ischemia in the right MCA territory  Echocardiogram was unremarkable  As per Neurology patient can be discharged on dual antiplatelet therapy for 3 months and then Plavix alone  Continue on Lipitor  Patient needs to follow-up with neurosurgery as outpatient  Patient was also found to have erythrocytosis with hemoglobin of 17 6  Erythropoietin and JAK2 mutation blood work was done prior to discharge  Patient was recommended to follow-up with Hematology Dr Avila Valdez as outpatient  As per wife patient was on iron supplementation in the past but stopped it months ago    Patient and wife understand the instructions and will follow up with Neurosurgery, Neurology and Hematology as outpatient  They were also instructed to repeat CBC in 1 week  Patient was seen by Physical therapy and  and does not require home care services  Follow-up with PCP in 2 weeks  Follow-up with neurology as outpatient  Follow-up with neurosurgery in 1-2 weeks  Follow-up with Hematology in 1-2 weeks  Repeat CBC in 1 week  Patient is cleared to return to work on January 27th without restrictions  Return to ER with any new symptoms of facial droop, slurred speech, weakness of extremities, numbness or tingling or any other alarming symptoms    Please see above list of diagnoses and related plan for additional information  Condition at Discharge:  good      Discharge instructions/Information to patient and family:   See after visit summary for information provided to patient and family  Provisions for Follow-Up Care:  See after visit summary for information related to follow-up care and any pertinent home health orders  Disposition:     Home       Discharge Statement:  I spent 40 minutes discharging the patient  This time was spent on the day of discharge  I had direct contact with the patient on the day of discharge  Greater than 50% of the total time was spent examining patient, answering all patient questions, arranging and discussing plan of care with patient as well as directly providing post-discharge instructions  Additional time then spent on discharge activities  Discharge Medications:  See after visit summary for reconciled discharge medications provided to patient and family        ** Please Note: This note has been constructed using a voice recognition system **

## 2020-01-24 NOTE — DISCHARGE INSTRUCTIONS
Neurology recommendations:  - you were started on ASA 81 mg and Plavix 75 mg daily x 3 months  Then after3 months please remain on the Plavix 75 daily only  - continue Lipitor 40 mg daily  - recommend higher systolic blood pressure 286-347'O mmHg  - recommend follow up with Vascular Neurosurgery Dr Maile Logan or Dr Tami Desouza outpatient  - follow up appointment was requested with the Stroke clinic in 4-6 weeks      Follow-up with PCP in 2 weeks  Follow-up with neurology as outpatient  Follow-up with neurosurgery in 1-2 weeks  Follow-up with Hematology in 1-2 weeks  Repeat CBC in 1 week  Patient is cleared to return to work on January 27th without restrictions  Return to ER with any new symptoms of facial droop, slurred speech, weakness of extremities, numbness or tingling or any other alarming symptoms

## 2020-01-24 NOTE — ASSESSMENT & PLAN NOTE
Lab Results   Component Value Date    HGBA1C 6 2 01/23/2020       Recent Labs     01/23/20  1712 01/24/20  0023 01/24/20  0739 01/24/20  1057   POCGLU 89 104 99 148*       Blood Sugar Average: Last 72 hrs:  (P) 116 [id number]     Insulin sliding scale

## 2020-01-24 NOTE — ASSESSMENT & PLAN NOTE
Hemoglobin this morning is 17 6  Will order erythropoietin level and JAK2 mutation  Discussed with patient and wife at length and recommended to follow-up with hematologist as outpatient for workup of polycythemia  Patient verbalized the understanding of instruction will follow-up with Dr Lore Bose as outpatient    Repeat CBC in 1 week

## 2020-01-25 LAB — EPO SERPL-ACNC: 10.8 MIU/ML (ref 2.6–18.5)

## 2020-01-27 LAB
CREAT ?TM UR-SCNC: 93 UMOL/L
EXT MICROALBUMIN URINE RANDOM: 11.1
HBA1C MFR BLD HPLC: 6.2 %
MICROALBUMIN/CREAT UR: 119 MG/G{CREAT}

## 2020-01-28 ENCOUNTER — APPOINTMENT (EMERGENCY)
Dept: RADIOLOGY | Facility: HOSPITAL | Age: 70
End: 2020-01-28
Payer: COMMERCIAL

## 2020-01-28 ENCOUNTER — APPOINTMENT (EMERGENCY)
Dept: CT IMAGING | Facility: HOSPITAL | Age: 70
End: 2020-01-28
Payer: COMMERCIAL

## 2020-01-28 ENCOUNTER — HOSPITAL ENCOUNTER (EMERGENCY)
Facility: HOSPITAL | Age: 70
End: 2020-01-28
Attending: EMERGENCY MEDICINE | Admitting: EMERGENCY MEDICINE
Payer: COMMERCIAL

## 2020-01-28 ENCOUNTER — HOSPITAL ENCOUNTER (INPATIENT)
Facility: HOSPITAL | Age: 70
LOS: 6 days | DRG: 065 | End: 2020-02-03
Attending: INTERNAL MEDICINE | Admitting: INTERNAL MEDICINE
Payer: COMMERCIAL

## 2020-01-28 ENCOUNTER — TELEPHONE (OUTPATIENT)
Dept: FAMILY MEDICINE CLINIC | Facility: HOSPITAL | Age: 70
End: 2020-01-28

## 2020-01-28 VITALS
BODY MASS INDEX: 29.39 KG/M2 | TEMPERATURE: 96 F | RESPIRATION RATE: 17 BRPM | WEIGHT: 198.41 LBS | HEART RATE: 92 BPM | OXYGEN SATURATION: 98 % | DIASTOLIC BLOOD PRESSURE: 80 MMHG | HEIGHT: 69 IN | SYSTOLIC BLOOD PRESSURE: 161 MMHG

## 2020-01-28 DIAGNOSIS — I10 ESSENTIAL HYPERTENSION: ICD-10-CM

## 2020-01-28 DIAGNOSIS — I63.9 CEREBROVASCULAR ACCIDENT (CVA), UNSPECIFIED MECHANISM (HCC): Primary | ICD-10-CM

## 2020-01-28 DIAGNOSIS — I63.9 STROKE (HCC): Primary | ICD-10-CM

## 2020-01-28 DIAGNOSIS — D75.1 ERYTHROCYTOSIS: ICD-10-CM

## 2020-01-28 LAB
ALBUMIN/CREAT UR: 119 MCG/MG CREAT
ANION GAP SERPL CALCULATED.3IONS-SCNC: 13 MMOL/L (ref 4–13)
APTT PPP: 34 SECONDS (ref 23–37)
ATRIAL RATE: 91 BPM
BACTERIA UR QL AUTO: ABNORMAL /HPF
BASOPHILS # BLD AUTO: 110 CELLS/UL (ref 0–200)
BASOPHILS NFR BLD AUTO: 1.5 %
BILIRUB UR QL STRIP: NEGATIVE
BUN SERPL-MCNC: 33 MG/DL (ref 7–25)
BUN SERPL-MCNC: 41 MG/DL (ref 5–25)
BUN/CREAT SERPL: 20 (CALC) (ref 6–22)
CALCIUM SERPL-MCNC: 9.4 MG/DL (ref 8.3–10.1)
CALCIUM SERPL-MCNC: 9.7 MG/DL (ref 8.6–10.3)
CHLORIDE SERPL-SCNC: 106 MMOL/L (ref 98–110)
CHLORIDE SERPL-SCNC: 108 MMOL/L (ref 100–108)
CHOLEST SERPL-MCNC: 139 MG/DL
CHOLEST/HDLC SERPL: 3.8 (CALC)
CLARITY UR: ABNORMAL
CLARITY, POC: ABNORMAL
CO2 SERPL-SCNC: 22 MMOL/L (ref 21–32)
CO2 SERPL-SCNC: 27 MMOL/L (ref 20–32)
COLOR UR: YELLOW
COLOR, POC: YELLOW
CREAT SERPL-MCNC: 1.61 MG/DL (ref 0.7–1.25)
CREAT SERPL-MCNC: 1.82 MG/DL (ref 0.6–1.3)
CREAT UR-MCNC: 93 MG/DL (ref 20–320)
EOSINOPHIL # BLD AUTO: 219 CELLS/UL (ref 15–500)
EOSINOPHIL NFR BLD AUTO: 3 %
ERYTHROCYTE [DISTWIDTH] IN BLOOD BY AUTOMATED COUNT: 14.9 % (ref 11.6–15.1)
ERYTHROCYTE [DISTWIDTH] IN BLOOD BY AUTOMATED COUNT: 15 % (ref 11–15)
EST. AVERAGE GLUCOSE BLD GHB EST-MCNC: 131 (CALC)
EST. AVERAGE GLUCOSE BLD GHB EST-SCNC: 7.3 (CALC)
EXT BILIRUBIN, UA: NEGATIVE
EXT BLOOD URINE: ABNORMAL
EXT GLUCOSE, UA: ABNORMAL
EXT KETONES: ABNORMAL
EXT NITRITE, UA: POSITIVE
EXT PH, UA: 5
EXT PROTEIN, UA: NEGATIVE
EXT SPECIFIC GRAVITY, UA: 1.01
EXT UROBILINOGEN: 0.2
GFR SERPL CREATININE-BSD FRML MDRD: 37 ML/MIN/1.73SQ M
GLUCOSE SERPL-MCNC: 140 MG/DL (ref 65–140)
GLUCOSE SERPL-MCNC: 142 MG/DL (ref 65–140)
GLUCOSE SERPL-MCNC: 86 MG/DL (ref 65–140)
GLUCOSE SERPL-MCNC: 90 MG/DL (ref 65–99)
GLUCOSE UR STRIP-MCNC: ABNORMAL MG/DL
HBA1C MFR BLD: 6.2 % OF TOTAL HGB
HCT VFR BLD AUTO: 57.1 % (ref 36.5–49.3)
HCT VFR BLD AUTO: 59.7 % (ref 38.5–50)
HDLC SERPL-MCNC: 37 MG/DL
HGB BLD-MCNC: 19.1 G/DL (ref 12–17)
HGB BLD-MCNC: 19.7 G/DL (ref 13.2–17.1)
HGB UR QL STRIP.AUTO: ABNORMAL
INR PPP: 1.03 (ref 0.84–1.19)
KETONES UR STRIP-MCNC: NEGATIVE MG/DL
LDLC SERPL CALC-MCNC: 79 MG/DL (CALC)
LEUKOCYTE ESTERASE UR QL STRIP: ABNORMAL
LYMPHOCYTES # BLD AUTO: 1380 CELLS/UL (ref 850–3900)
LYMPHOCYTES NFR BLD AUTO: 18.9 %
MCH RBC QN AUTO: 28.9 PG (ref 26.8–34.3)
MCH RBC QN AUTO: 29.1 PG (ref 27–33)
MCHC RBC AUTO-ENTMCNC: 33 G/DL (ref 32–36)
MCHC RBC AUTO-ENTMCNC: 33.5 G/DL (ref 31.4–37.4)
MCV RBC AUTO: 87 FL (ref 82–98)
MCV RBC AUTO: 88.1 FL (ref 80–100)
MICROALBUMIN UR-MCNC: 11.1 MG/DL
MONOCYTES # BLD AUTO: 460 CELLS/UL (ref 200–950)
MONOCYTES NFR BLD AUTO: 6.3 %
NEUTROPHILS # BLD AUTO: 5132 CELLS/UL (ref 1500–7800)
NEUTROPHILS NFR BLD AUTO: 70.3 %
NITRITE UR QL STRIP: POSITIVE
NON-SQ EPI CELLS URNS QL MICRO: ABNORMAL /HPF
NONHDLC SERPL-MCNC: 102 MG/DL (CALC)
P AXIS: 51 DEGREES
PH UR STRIP.AUTO: 5.5 [PH]
PLATELET # BLD AUTO: 244 THOUSANDS/UL (ref 149–390)
PLATELET # BLD AUTO: 274 THOUSAND/UL (ref 140–400)
PMV BLD AUTO: 10.4 FL (ref 8.9–12.7)
PMV BLD REES-ECKER: 10.3 FL (ref 7.5–12.5)
POTASSIUM SERPL-SCNC: 4 MMOL/L (ref 3.5–5.3)
POTASSIUM SERPL-SCNC: 4 MMOL/L (ref 3.5–5.3)
PR INTERVAL: 154 MS
PROT UR STRIP-MCNC: NEGATIVE MG/DL
PROTHROMBIN TIME: 13.2 SECONDS (ref 11.6–14.5)
PSA SERPL-MCNC: 5.1 NG/ML
QRS AXIS: -11 DEGREES
QRSD INTERVAL: 96 MS
QT INTERVAL: 378 MS
QTC INTERVAL: 464 MS
RBC # BLD AUTO: 6.6 MILLION/UL (ref 3.88–5.62)
RBC # BLD AUTO: 6.78 MILLION/UL (ref 4.2–5.8)
RBC #/AREA URNS AUTO: ABNORMAL /HPF
SL AMB EGFR AFRICAN AMERICAN: 50 ML/MIN/1.73M2
SL AMB EGFR NON AFRICAN AMERICAN: 43 ML/MIN/1.73M2
SODIUM SERPL-SCNC: 143 MMOL/L (ref 136–145)
SODIUM SERPL-SCNC: 144 MMOL/L (ref 135–146)
SP GR UR STRIP.AUTO: 1.01 (ref 1–1.03)
T WAVE AXIS: 76 DEGREES
TRIGL SERPL-MCNC: 130 MG/DL
TROPONIN I SERPL-MCNC: <0.02 NG/ML
URATE SERPL-MCNC: 6.4 MG/DL (ref 4–8)
UROBILINOGEN UR QL STRIP.AUTO: 0.2 E.U./DL
VENTRICULAR RATE: 91 BPM
WBC # BLD AUTO: 7.3 THOUSAND/UL (ref 3.8–10.8)
WBC # BLD AUTO: 8.32 THOUSAND/UL (ref 4.31–10.16)
WBC # BLD EST: NEGATIVE 10*3/UL
WBC #/AREA URNS AUTO: ABNORMAL /HPF
WBC CLUMPS # UR AUTO: PRESENT /UL

## 2020-01-28 PROCEDURE — 96360 HYDRATION IV INFUSION INIT: CPT

## 2020-01-28 PROCEDURE — 93005 ELECTROCARDIOGRAM TRACING: CPT

## 2020-01-28 PROCEDURE — 70496 CT ANGIOGRAPHY HEAD: CPT

## 2020-01-28 PROCEDURE — 96366 THER/PROPH/DIAG IV INF ADDON: CPT

## 2020-01-28 PROCEDURE — 70498 CT ANGIOGRAPHY NECK: CPT

## 2020-01-28 PROCEDURE — 93010 ELECTROCARDIOGRAM REPORT: CPT | Performed by: INTERNAL MEDICINE

## 2020-01-28 PROCEDURE — 96365 THER/PROPH/DIAG IV INF INIT: CPT

## 2020-01-28 PROCEDURE — 81001 URINALYSIS AUTO W/SCOPE: CPT | Performed by: EMERGENCY MEDICINE

## 2020-01-28 PROCEDURE — 96361 HYDRATE IV INFUSION ADD-ON: CPT

## 2020-01-28 PROCEDURE — 85730 THROMBOPLASTIN TIME PARTIAL: CPT | Performed by: EMERGENCY MEDICINE

## 2020-01-28 PROCEDURE — 36415 COLL VENOUS BLD VENIPUNCTURE: CPT | Performed by: EMERGENCY MEDICINE

## 2020-01-28 PROCEDURE — 99285 EMERGENCY DEPT VISIT HI MDM: CPT

## 2020-01-28 PROCEDURE — 99241 PR OFFICE CONSULTATION NEW/ESTAB PATIENT 15 MIN: CPT | Performed by: NEUROLOGICAL SURGERY

## 2020-01-28 PROCEDURE — 99285 EMERGENCY DEPT VISIT HI MDM: CPT | Performed by: EMERGENCY MEDICINE

## 2020-01-28 PROCEDURE — 84484 ASSAY OF TROPONIN QUANT: CPT | Performed by: EMERGENCY MEDICINE

## 2020-01-28 PROCEDURE — 87077 CULTURE AEROBIC IDENTIFY: CPT | Performed by: EMERGENCY MEDICINE

## 2020-01-28 PROCEDURE — 85610 PROTHROMBIN TIME: CPT | Performed by: EMERGENCY MEDICINE

## 2020-01-28 PROCEDURE — 3061F NEG MICROALBUMINURIA REV: CPT | Performed by: PSYCHIATRY & NEUROLOGY

## 2020-01-28 PROCEDURE — 82948 REAGENT STRIP/BLOOD GLUCOSE: CPT

## 2020-01-28 PROCEDURE — 99245 OFF/OP CONSLTJ NEW/EST HI 55: CPT | Performed by: PSYCHIATRY & NEUROLOGY

## 2020-01-28 PROCEDURE — 85027 COMPLETE CBC AUTOMATED: CPT | Performed by: EMERGENCY MEDICINE

## 2020-01-28 PROCEDURE — 87086 URINE CULTURE/COLONY COUNT: CPT | Performed by: EMERGENCY MEDICINE

## 2020-01-28 PROCEDURE — 80048 BASIC METABOLIC PNL TOTAL CA: CPT | Performed by: EMERGENCY MEDICINE

## 2020-01-28 PROCEDURE — 71045 X-RAY EXAM CHEST 1 VIEW: CPT

## 2020-01-28 PROCEDURE — 87186 SC STD MICRODIL/AGAR DIL: CPT | Performed by: EMERGENCY MEDICINE

## 2020-01-28 RX ORDER — CEPHALEXIN 250 MG/1
250 CAPSULE ORAL ONCE
Status: COMPLETED | OUTPATIENT
Start: 2020-01-28 | End: 2020-01-28

## 2020-01-28 RX ORDER — HEPARIN SODIUM 10000 [USP'U]/100ML
3-20 INJECTION, SOLUTION INTRAVENOUS
Status: DISCONTINUED | OUTPATIENT
Start: 2020-01-28 | End: 2020-01-28 | Stop reason: HOSPADM

## 2020-01-28 RX ADMIN — IOHEXOL 100 ML: 350 INJECTION, SOLUTION INTRAVENOUS at 17:45

## 2020-01-28 RX ADMIN — CEPHALEXIN 250 MG: 250 CAPSULE ORAL at 22:23

## 2020-01-28 RX ADMIN — HEPARIN SODIUM AND DEXTROSE 11.1 UNITS/KG/HR: 10000; 5 INJECTION INTRAVENOUS at 19:48

## 2020-01-28 RX ADMIN — SODIUM CHLORIDE 500 ML: 0.9 INJECTION, SOLUTION INTRAVENOUS at 18:10

## 2020-01-28 NOTE — CONSULTS
Consultation - Stroke   Anju Pichardo 72 y o  male MRN: [id number]  Unit/Bed#: ED 05 Encounter: [id number]      Assessment/Plan   Discussed patient with neurovascular and it was suggest that patient be started on heparin and transferred to [facility] for further evaluation and treatment  TPA Decision: Patient not a TPA candidate  Recent significant trauma/stroke  01/28/2020:  CT head:  IMPRESSION:   1   No evidence of acute vascular territorial infarction however given recent subcortical/watershed territory infarcts, MRI may be helpful for further evaluation  2   No intracranial hemorrhage or mass effect  1/28/2020 - CTA head -   IMPRESSION:   1   Stable moderate (50-69%) stenosis of the paraclinoid segments of the bilateral internal carotid arteries, more prominent on the right  2   Stable occlusion and severe stenoses in proximal right M2 branches with distal reperfusion suggesting good collateral flow  3   Stable severe (70-90%) stenosis within a left M2 branch, with normal distal flow, suggesting atherosclerotic plaque      Reason for Consult / Principal Problem:  Worsening of his stroke symptoms  Hx and PE limited by:  None  Patient last known well:  Early this morning  Stroke alert called:  5:40  Neurology time of arrival:  5:44    HPI: Anju Pichardo is a 72 y o  right handed male who presents with left facial droop, left lower extremity weakness and difficulty walking  Per family patient early this morning noted that he was having more difficulty walking due to left lower extremity weakness which is new for the patient  NIH stroke scale:  5    From his previous note:  " past medical history as listed below no prior neurological encounters in the chart, yesterday around 7:00 a m (1/22/2020)   he did develop difficulty with speech, words was slow and seemed to be searching for his words and confused, later that evening his wife noted left facial drooling/droop stumbling of his walk and slow slurred speech, later that evening after a nap at 7:00 p m  this persisted and the patient was brought to the hospital   Ct of head was completed - which showed no acute intracranial abnormality "      Historical Information   Past Medical History:   Diagnosis Date    Diabetes mellitus ([address] )     Hypertension     Renal disorder      Past Surgical History:   Procedure Laterality Date    COLONOSCOPY  2006    complete; 10 yrs    COLONOSCOPY  10/23/2017    complete; 5 yrs     Social History   Social History     Substance and Sexual Activity   Alcohol Use Never    Frequency: Never    Binge frequency: Never     Social History     Substance and Sexual Activity   Drug Use Never     Social History     Tobacco Use   Smoking Status Never Smoker   Smokeless Tobacco Never Used       No Known Allergies    Objective   Vitals:Height 5' 9" (1 753 m), weight 96 3 kg (212 lb 4 9 oz)  ,Body mass index is 31 35 kg/m²  No intake or output data in the 24 hours ending 20 1730    Invasive Devices: Invasive Devices     None                 Physical Exam  Neurologic Exam    NIHSS:  1a Level of Consciousness: 0 = Alert   1b  LOC Questions: 0 = Answers both correctly   1c  LOC Commands: 0 = Obeys both correctly   2  Best Gaze: 0 = Normal   3  Visual: 0 = No visual field loss   4  Facial Palsy: 2=Partial paralysis (total or near total paralysis of the lower face)   5a  Motor Right Arm: 0=No drift, limb holds 90 (or 45) degrees for full 10 seconds   5b  Motor Left Arm: 0=No drift, limb holds 90 (or 45) degrees for full 10 seconds   6a  Motor Right Le=No drift, limb holds 90 (or 45) degrees for full 10 seconds   6b  Motor Left Le=Drift, limb holds 90 (or 45) degrees but drifts down before full 10 seconds: does not hit bed   7  Limb Ataxia:  1=Present in one limb   8  Sensory: 0=Normal; no sensory loss   9  Best Language:  0=No aphasia, normal   10  Dysarthria: 0=Normal articulation   11   Extinction and Inattention (formerly Neglect): 0=No abnormality   Total Score: 4     Time NIHSS was completed:  6:30    Lab Results: I have personally reviewed pertinent reports  Imaging Studies: I have personally reviewed pertinent reports  EKG, Pathology, and Other Studies: I have personally reviewed pertinent reports

## 2020-01-28 NOTE — TELEPHONE ENCOUNTER
SOUMYAI     Wife called   Kallie Espinoza went back to work today  Was coming home early     due to fatigue   Employer called wife to come  patient   Shruthi Duong he was     disoriented left leg weak and vicente   encouraged wife  to take to ED for     evaluated

## 2020-01-28 NOTE — ED PROVIDER NOTES
History  Chief Complaint   Patient presents with    Extremity Weakness     pt presents to ED c/o left leg weakness that began last wednesday     Slurred Speech       History provided by:  Patient   used: No      Patient is a 72year old male presenting to emergency department with left leg and left arm weakness  Patient was in the hospital last week, had a stroke, was discharged with antiplatelets  Today morning noticed weakness and left leg and left arm  Difficulty ambulating  Presents to getting worse  Does have an old facial droop on left  No falls  No trauma  No nausea vomiting  No chest pain pain or shortness of breath  MDM based on new symptoms will call stroke alert, patient is not tPA candidate since symptoms onset is unknown and potential more than 4 5 hours      Prior to Admission Medications   Prescriptions Last Dose Informant Patient Reported? Taking?    INVOKANA 100 MG 2020 at Unknown time  No Yes   Sig: TAKE 1 TABLET BY MOUTH ONCE DAILY BEFORE BREAKFAST   Lancets (FREESTYLE) lancets 2020 at Unknown time  No Yes   Sig: by Other route as needed (Test Daily) Use as instructed--test Daily   allopurinol (ZYLOPRIM) 100 mg tablet 2020 at Unknown time  No Yes   Sig: TAKE 2 TABLETS BY MOUTH DAILY   amLODIPine (NORVASC) 10 mg tablet 2020 at Unknown time  No Yes   Sig: Take 1 tablet (10 mg total) by mouth daily   aspirin 81 MG tablet 2020 at Unknown time  Yes Yes   Sig: Take 1 tablet by mouth daily   atorvastatin (LIPITOR) 40 mg tablet 2020 at Unknown time  No Yes   Sig: Take 1 tablet (40 mg total) by mouth daily with dinner   clopidogrel (PLAVIX) 75 mg tablet 2020 at Unknown time  No Yes   Sig: Take 1 tablet (75 mg total) by mouth daily   fenofibrate (TRICOR) 145 mg tablet 2020 at Unknown time  No Yes   Sig: TAKE 1 TABLET BY MOUTH ONCE DAILY   glucose monitoring kit (FREESTYLE) monitoring kit 2020 at Unknown time  No Yes   Si each by Does not apply route as needed (Test daily)   metoprolol tartrate (LOPRESSOR) 50 mg tablet 1/28/2020 at Unknown time  No Yes   Sig: Take 1 tablet (50 mg total) by mouth every 12 (twelve) hours      Facility-Administered Medications: None       Past Medical History:   Diagnosis Date    Diabetes mellitus ([facility] [address] )     Hypertension     Renal disorder        Past Surgical History:   Procedure Laterality Date    COLONOSCOPY  09/18/2006    complete; 10 yrs    COLONOSCOPY  10/23/2017    complete; 5 yrs       Family History   Problem Relation Age of Onset   [de-identified] Breast cancer Mother     Kidney disease Father     Lung cancer Father     Other Father         smoker    Hypertension Other      I have reviewed and agree with the history as documented  Social History     Tobacco Use    Smoking status: Never Smoker    Smokeless tobacco: Never Used   Substance Use Topics    Alcohol use: Never     Frequency: Never     Binge frequency: Never    Drug use: Never        Review of Systems   Constitutional: Negative for chills, diaphoresis and fever  HENT: Negative for congestion and sore throat  Respiratory: Negative for cough, shortness of breath, wheezing and stridor  Cardiovascular: Negative for chest pain, palpitations and leg swelling  Gastrointestinal: Negative for abdominal pain, blood in stool, diarrhea, nausea and vomiting  Genitourinary: Negative for dysuria, frequency and urgency  Musculoskeletal: Negative for neck pain and neck stiffness  Skin: Negative for pallor and rash  Neurological: Positive for facial asymmetry and weakness  Negative for dizziness, syncope, light-headedness and headaches  All other systems reviewed and are negative  Physical Exam  Physical Exam   Constitutional: He is oriented to person, place, and time  He appears well-developed and well-nourished  HENT:   Head: Normocephalic and atraumatic  Eyes: Pupils are equal, round, and reactive to light     Neck: Neck supple  Cardiovascular: Normal rate, regular rhythm, normal heart sounds and intact distal pulses  Pulmonary/Chest: Effort normal and breath sounds normal  No respiratory distress  Abdominal: Soft  Bowel sounds are normal  There is no tenderness  Musculoskeletal: Normal range of motion  He exhibits no edema, tenderness or deformity  Neurological: He is alert and oriented to person, place, and time  Left-sided facial droop  Left upper lower extremity weakness  Ataxia left upper extremity   Skin: Skin is warm and dry  Capillary refill takes less than 2 seconds  No rash noted  No erythema  No pallor  Vitals reviewed        Vital Signs  ED Triage Vitals   Temperature Pulse Respirations Blood Pressure SpO2   01/28/20 1712 01/28/20 1700 01/28/20 1701 01/28/20 1717 01/28/20 1700   (!) 96 °F (35 6 °C) 94 (!) 23 165/89 94 %      Temp Source Heart Rate Source Patient Position - Orthostatic VS BP Location FiO2 (%)   01/28/20 1717 01/28/20 1717 01/28/20 1845 -- --   Temporal Monitor Lying        Pain Score       --                  Vitals:    01/28/20 2215 01/28/20 2230 01/28/20 2245 01/28/20 2300   BP: 152/85 148/78 153/74 161/80   Pulse: 94 90 88 92   Patient Position - Orthostatic VS: Lying Lying Lying Lying         Visual Acuity  Visual Acuity      Most Recent Value   L Pupil Size (mm)  3   R Pupil Size (mm)  3          ED Medications  Medications   iohexol (OMNIPAQUE) 350 MG/ML injection (MULTI-DOSE) 100 mL (100 mL Intravenous Given 1/28/20 1745)   sodium chloride 0 9 % bolus 500 mL (0 mL Intravenous Stopped 1/28/20 1948)   cephalexin (KEFLEX) capsule 250 mg (250 mg Oral Given 1/28/20 2223)       Diagnostic Studies  Results Reviewed     Procedure Component Value Units Date/Time    Urine Microscopic [[id number]]  (Abnormal) Collected:  01/28/20 2125    Lab Status:  Final result Specimen:  Urine, Clean Catch Updated:  01/28/20 2156     RBC, UA 2-4 /hpf      WBC, UA 10-20 /hpf      Epithelial Cells None Seen /hpf      Bacteria, UA Moderate /hpf      WBC Clumps Present    Urine culture [[id number]] Collected:  01/28/20 2125    Lab Status: In process Specimen:  Urine, Clean Catch Updated:  01/28/20 2153    UA w Reflex to Microscopic w Reflex to Culture [[id number]]  (Abnormal) Collected:  01/28/20 2125    Lab Status:  Final result Specimen:  Urine, Clean Catch Updated:  01/28/20 2138     Color, UA Yellow     Clarity, UA Slightly Cloudy     Specific McGee, UA 1 010     pH, UA 5 5     Leukocytes, UA Elevated glucose may cause decreased leukocyte values   See urine microscopic for [facility] result/     Nitrite, UA Positive     Protein, UA Negative mg/dl      Glucose, UA >=1000 (1%) mg/dl      Ketones, UA Negative mg/dl      Urobilinogen, UA 0 2 E U /dl      Bilirubin, UA Negative     Blood, UA Small    POCT urinalysis dipstick [[id number]]  (Abnormal) Resulted:  01/28/20 2107    Lab Status:  Final result Specimen:  Urine Updated:  01/28/20 2108     Color, UA YELLOW     Clarity, UA HAZY     Glucose, UA (Ref: Negative) 2,000+     Bilirubin, UA (Ref: Negative) NEGATIVE     Ketones, UA (Ref: Negative) TRACE     Spec Grav, UA (Ref:1 003-1 030) 1 010     Blood, UA (Ref: Negative) MODERATE     pH, UA (Ref: 4 5-8 0) 5 0     Protein, UA (Ref: Negative) NEGATIVE     Urobilinogen, UA (Ref: 0 2- 1 0) 0 2      Leukocytes, UA (Ref: Negative) NEGATIVE     Nitrite, UA (Ref: Negative) POSITIVE    Fingerstick Glucose (POCT) [[id number]]  (Normal) Collected:  01/28/20 1900    Lab Status:  Final result Updated:  01/28/20 1901     POC Glucose 86 mg/dl     Troponin I [[id number]]  (Normal) Collected:  01/28/20 1757    Lab Status:  Final result Specimen:  Blood from Arm, Left Updated:  01/28/20 1833     Troponin I <0 02 ng/mL     Basic metabolic panel [[id number]]  (Abnormal) Collected:  01/28/20 1723    Lab Status:  Final result Specimen:  Blood from Arm, Right Updated:  01/28/20 1750     Sodium 143 mmol/L      Potassium 4 0 mmol/L      Chloride 108 mmol/L      CO2 22 mmol/L      ANION GAP 13 mmol/L      BUN 41 mg/dL      Creatinine 1 82 mg/dL      Glucose 140 mg/dL      Calcium 9 4 mg/dL      eGFR 37 ml/min/1 73sq m     Narrative:       [place] guidelines for Chronic Kidney Disease (CKD):     Stage 1 with normal or high GFR (GFR > 90 mL/min/1 73 square meters)    Stage 2 Mild CKD (GFR = 60-89 mL/min/1 73 square meters)    Stage 3A Moderate CKD (GFR = 45-59 mL/min/1 73 square meters)    Stage 3B Moderate CKD (GFR = 30-44 mL/min/1 73 square meters)    Stage 4 Severe CKD (GFR = 15-29 mL/min/1 73 square meters)    Stage 5 End Stage CKD (GFR <15 mL/min/1 73 square meters)  Note: GFR calculation is accurate only with a steady state creatinine    Protime-INR [[id number]]  (Normal) Collected:  01/28/20 1723    Lab Status:  Final result Specimen:  Blood from Arm, Right Updated:  01/28/20 1745     Protime 13 2 seconds      INR 1 03    APTT [[id number]]  (Normal) Collected:  01/28/20 1723    Lab Status:  Final result Specimen:  Blood from Arm, Right Updated:  01/28/20 1745     PTT 34 seconds     CBC and Platelet [[id number]]  (Abnormal) Collected:  01/28/20 1722    Lab Status:  Final result Specimen:  Blood from Arm, Right Updated:  01/28/20 1728     WBC 8 32 Thousand/uL      RBC 6 60 Million/uL      Hemoglobin 19 1 g/dL      Hematocrit 57 1 %      MCV 87 fL      MCH 28 9 pg      MCHC 33 5 g/dL      RDW 14 9 %      Platelets 401 Thousands/uL      MPV 10 4 fL     Fingerstick Glucose (POCT) [[id number]]  (Abnormal) Collected:  01/28/20 1705    Lab Status:  Final result Updated:  01/28/20 1706     POC Glucose 142 mg/dl                  CTA stroke alert (head/neck)   Final Result by Baldemar Freeman MD (01/28 1833)         1  Stable moderate (50-69%) stenosis of the paraclinoid segments of the bilateral internal carotid arteries, more prominent on the right     2   Stable occlusion and severe stenoses in proximal right M2 branches with distal reperfusion suggesting good collateral flow  3   Stable severe (70-90%) stenosis within a left M2 branch, with normal distal flow, suggesting atherosclerotic plaque  Findings were directly discussed with CHACHA HALEY on 1/28/2020 5:55 PM                      Workstation performed: [id number]         CT stroke alert brain   Final Result by Phyllis Chihsolm MD (01/28 1832)         1  No evidence of acute vascular territorial infarction however given recent subcortical/watershed territory infarcts, MRI may be helpful for further evaluation  2   No intracranial hemorrhage or mass effect  Findings were directly discussed with CHACHA HALEY on 1/28/2020 5:48 PM       Workstation performed: [id number]         X-ray chest 1 view portable    (Results Pending)              Procedures  Procedures         ED Course  ED Course as of Jan 29 0720 Tue Jan 28, 2020   1752 ECG shows rate of 91, sinus, normal axis, nonspecific ST and T-waves, normal intervals, independently interpreted by me                Stroke Assessment     Row Name 01/28/20 1834             NIH Stroke Scale    Interval  Baseline      Level of Consciousness (1a )  0      LOC Questions (1b )  0      LOC Commands (1c )  0      Best Gaze (2 )  0      Visual (3 )  0      Facial Palsy (4 )  2      Motor Arm, Left (5a )  1      Motor Arm, Right (5b )  0      Motor Leg, Left (6a )  1      Motor Leg, Right (6b )  0      Limb Ataxia (7 )  1      Sensory (8 )  0      Best Language (9 )  0      Dysarthria (10 )  0      Extinction and Inattention (11 ) (Formerly Neglect)  0      Total  5          First Filed Value   TPA Decision  Patient not a TPA candidate  Patient is not a candidate options  Unclear time of onset outside appropriate time window                          MDM      Disposition  Final diagnoses:   Stroke [facility])     Time reflects when diagnosis was documented in both MDM as applicable and the Disposition within this note     Time User Action Codes Description Comment    1/28/2020  8:02 PM Rachid GAYTAN Add [I63 9] Stroke [facility])       ED Disposition     ED Disposition Condition Date/Time Comment    Transfer to Another Facility-In Network  Tue Jan 28, 2020  8:07 PM Mark Young should be transferred out to [facility]          MD Documentation      Most Recent Value   Patient Condition  The patient has been stabilized such that within reasonable medical probability, no material deterioration of the patient condition or the condition of the unborn child(macy) is likely to result from the transfer   Benefits of Transfer  Specialized equipment and/or services available at the receiving facility (Include comment)________________________ Bernabe Claude intervention]   Accepting Physician  Dr Guillermina Parham Name, Rima Perez   Sending MD  Dr Sandra Saleem   Provider Certification  General risk, such as traffic hazards, adverse weather conditions, rough terrain or turbulence, possible failure of equipment (including vehicle or aircraft), or consequences of actions of persons outside the control of the transport personnel, Unanticipated needs of medical equipment and personnel during transport, Risk of worsening condition, The possibility of a transport vehicle being unavailable      RN Documentation      [facility] [address] [facility] Name, [address]   [facility]      Follow-up Information    None         Discharge Medication List as of 1/28/2020 11:41 PM      CONTINUE these medications which have NOT CHANGED    Details   allopurinol (ZYLOPRIM) 100 mg tablet TAKE 2 TABLETS BY MOUTH DAILY, Normal      amLODIPine (NORVASC) 10 mg tablet Take 1 tablet (10 mg total) by mouth daily, Starting Tue 8/20/2019, Normal      aspirin 81 MG tablet Take 1 tablet by mouth daily, Starting Mon 4/1/2013, Historical Med      atorvastatin (LIPITOR) 40 mg tablet Take 1 tablet (40 mg total) by mouth daily with dinner, Starting Fri 1/24/2020, Until Sun 2/23/2020, Normal      clopidogrel (PLAVIX) 75 mg tablet Take 1 tablet (75 mg total) by mouth daily, Starting Sat 1/25/2020, Until Mon 2/24/2020, Normal      fenofibrate (TRICOR) 145 mg tablet TAKE 1 TABLET BY MOUTH ONCE DAILY, Normal      INVOKANA 100 MG TAKE 1 TABLET BY MOUTH ONCE DAILY BEFORE BREAKFAST, Normal      metoprolol tartrate (LOPRESSOR) 50 mg tablet Take 1 tablet (50 mg total) by mouth every 12 (twelve) hours, Starting Tue 8/20/2019, Normal      glucose monitoring kit (FREESTYLE) monitoring kit 1 each by Does not apply route as needed (Test daily), Starting Thu 9/6/2018, Normal      Lancets (FREESTYLE) lancets by Other route as needed (Test Daily) Use as instructed--test Daily, Starting Thu 9/6/2018, Normal           No discharge procedures on file      ED Provider  Electronically Signed by           Anni Moran MD  01/29/20 6351

## 2020-01-28 NOTE — UTILIZATION REVIEW
Notification of Discharge  This is a Notification of Discharge from our facility [address]  Please be advised that this patient has been discharge from our facility  Below you will find the admission and discharge date and time including the patients disposition  PRESENTATION DATE: 1/22/2020  7:45 PM  OBS ADMISSION DATE:   IP ADMISSION DATE: 1/22/20 2138   DISCHARGE DATE: 1/24/2020  3:30 PM  DISPOSITION: Home/Self Care Home/Self Care   Admission Orders listed below:  Admission Orders (From admission, onward)     Ordered        01/22/20 2138  Inpatient Admission (expected length of stay for this patient Order details is greater than two midnights)  Once                   Please contact the UR Department if additional information is required to close this patient's authorization/case  [facility] Utilization Review Department  Main: [phone] x carefully listen to the prompts  All voicemails are confidential   [email or web address]  Send all requests for admission clinical reviews, approved or denied determinations and any other requests to dedicated fax number below belonging to the campus where the patient is receiving treatment   List of dedicated fax numbers:  [address] DENIALS (Administrative/Medical Necessity) [phone]   [address]  (Maternity/NICU/Pediatrics) [phone]   Jaqueline Killian [phone]   Veryl Lagjuan carlos [phone]   Senthil Watters [phone]   Sindy Alfonso [facility] [address] [facility] [phone]   [facility]  [phone]   [address] [place] [address], [facility]  [address] [facility] 1000 [facility] [phone]

## 2020-01-28 NOTE — ED CARE HANDOFF
Emergency Department Sign Out Note        Sign out and transfer of care from Dr Ashley Wilson  See Separate Emergency Department note  The patient, Erik Orourke, was evaluated by the previous provider for stroke with left sided weakness and dysmetria  Workup Completed:  Patient has severe left M2 stenosis and moderated bilateral internal carotid stenosis on imaging  Neurology consulted  Interventional reviewing the case to determine if patient needs to be started on heparin and transferred to \[facility]\"" for intervention  ED Course / Workup Pending (followup): Discussed case with Dr Jonathan Alfaro  Patient started on Heparin ACS low and transferred to [facility] for possible intervention  Stroke Assessment     Row Name 01/28/20 1834             NIH Stroke Scale    Interval  Baseline      Level of Consciousness (1a )  0      LOC Questions (1b )  0      LOC Commands (1c )  0      Best Gaze (2 )  0      Visual (3 )  0      Facial Palsy (4 )  2      Motor Arm, Left (5a )  1      Motor Arm, Right (5b )  0      Motor Leg, Left (6a )  1      Motor Leg, Right (6b )  0      Limb Ataxia (7 )  1      Sensory (8 )  0      Best Language (9 )  0      Dysarthria (10 )  0      Extinction and Inattention (11 ) (Formerly Neglect)  0      Total  5          First Filed Value   TPA Decision  Patient not a TPA candidate  Patient is not a candidate options  Unclear time of onset outside appropriate time window  ED Course as of Jan 29 0048   Tue Jan 28, 2020   1935 Spoke with Dr Jonathan Alfaro   Patient to be started on heparin acs low, no bolus and then transferred to \[facility]\"" for possible intervention      2205 Bacteria, UA(!): Moderate     Procedures  MDM    Disposition  Final diagnoses:   Stroke [facility])     Time reflects when diagnosis was documented in both MDM as applicable and the Disposition within this note     Time User Action Codes Description Comment    1/28/2020  8:02 PM Katja Tomlinson Add [I63 9] Stroke [facility]) ED Disposition     ED Disposition Condition Date/Time Comment    Transfer to Another Facility-In Network  Tue Jan 28, 2020  8:07 PM Britney Vieyra should be transferred out to [facility]          MD Documentation      Most Recent Value   Patient Condition  The patient has been stabilized such that within reasonable medical probability, no material deterioration of the patient condition or the condition of the unborn child(macy) is likely to result from the transfer   Benefits of Transfer  Specialized equipment and/or services available at the receiving facility (Include comment)________________________ Ben Garcia intervention]   Accepting Physician  Dr Michael Felton Name, [address]    Sending MD  Dr Nelson Patricio   Provider Certification  General risk, such as traffic hazards, adverse weather conditions, rough terrain or turbulence, possible failure of equipment (including vehicle or aircraft), or consequences of actions of persons outside the control of the transport personnel, Unanticipated needs of medical equipment and personnel during transport, Risk of worsening condition, The possibility of a transport vehicle being unavailable      RN Documentation      [facility] [address] [facility] Name, [address]   [facility]      Follow-up Information    None       Discharge Medication List as of 1/28/2020 11:41 PM      CONTINUE these medications which have NOT CHANGED    Details   allopurinol (ZYLOPRIM) 100 mg tablet TAKE 2 TABLETS BY MOUTH DAILY, Normal      amLODIPine (NORVASC) 10 mg tablet Take 1 tablet (10 mg total) by mouth daily, Starting Tue 8/20/2019, Normal      aspirin 81 MG tablet Take 1 tablet by mouth daily, Starting Mon 4/1/2013, Historical Med      atorvastatin (LIPITOR) 40 mg tablet Take 1 tablet (40 mg total) by mouth daily with dinner, Starting Fri 1/24/2020, Until Sun 2/23/2020, Normal      clopidogrel (PLAVIX) 75 mg tablet Take 1 tablet (75 mg total) by mouth daily, Starting Sat 1/25/2020, Until Mon 2/24/2020, Normal      fenofibrate (TRICOR) 145 mg tablet TAKE 1 TABLET BY MOUTH ONCE DAILY, Normal      INVOKANA 100 MG TAKE 1 TABLET BY MOUTH ONCE DAILY BEFORE BREAKFAST, Normal      metoprolol tartrate (LOPRESSOR) 50 mg tablet Take 1 tablet (50 mg total) by mouth every 12 (twelve) hours, Starting Tue 8/20/2019, Normal      glucose monitoring kit (FREESTYLE) monitoring kit 1 each by Does not apply route as needed (Test daily), Starting Thu 9/6/2018, Normal      Lancets (FREESTYLE) lancets by Other route as needed (Test Daily) Use as instructed--test Daily, Starting Thu 9/6/2018, Normal           No discharge procedures on file         ED Provider  Electronically Signed by     Karmen Curtis MD  01/29/20 9585

## 2020-01-29 ENCOUNTER — APPOINTMENT (INPATIENT)
Dept: RADIOLOGY | Facility: HOSPITAL | Age: 70
DRG: 065 | End: 2020-01-29
Payer: COMMERCIAL

## 2020-01-29 LAB
ALBUMIN SERPL BCP-MCNC: 3.7 G/DL (ref 3.5–5)
ALP SERPL-CCNC: 85 U/L (ref 46–116)
ALT SERPL W P-5'-P-CCNC: 27 U/L (ref 12–78)
ANION GAP SERPL CALCULATED.3IONS-SCNC: 9 MMOL/L (ref 4–13)
APTT PPP: 51 SECONDS (ref 23–37)
APTT PPP: 74 SECONDS (ref 23–37)
APTT PPP: 82 SECONDS (ref 23–37)
AST SERPL W P-5'-P-CCNC: 21 U/L (ref 5–45)
BACTERIA UR QL AUTO: ABNORMAL /HPF
BILIRUB SERPL-MCNC: 0.91 MG/DL (ref 0.2–1)
BILIRUB UR QL STRIP: NEGATIVE
BUN SERPL-MCNC: 34 MG/DL (ref 5–25)
CALCIUM SERPL-MCNC: 9.2 MG/DL (ref 8.3–10.1)
CHLORIDE SERPL-SCNC: 115 MMOL/L (ref 100–108)
CHOLEST SERPL-MCNC: 122 MG/DL (ref 50–200)
CLARITY UR: CLEAR
CO2 SERPL-SCNC: 23 MMOL/L (ref 21–32)
COLOR UR: YELLOW
CREAT SERPL-MCNC: 1.6 MG/DL (ref 0.6–1.3)
ERYTHROCYTE [DISTWIDTH] IN BLOOD BY AUTOMATED COUNT: 14.8 % (ref 11.6–15.1)
EST. AVERAGE GLUCOSE BLD GHB EST-MCNC: 126 MG/DL
GFR SERPL CREATININE-BSD FRML MDRD: 43 ML/MIN/1.73SQ M
GLUCOSE SERPL-MCNC: 103 MG/DL (ref 65–140)
GLUCOSE SERPL-MCNC: 118 MG/DL (ref 65–140)
GLUCOSE SERPL-MCNC: 118 MG/DL (ref 65–140)
GLUCOSE SERPL-MCNC: 121 MG/DL (ref 65–140)
GLUCOSE SERPL-MCNC: 79 MG/DL (ref 65–140)
GLUCOSE SERPL-MCNC: 96 MG/DL (ref 65–140)
GLUCOSE UR STRIP-MCNC: ABNORMAL MG/DL
HBA1C MFR BLD: 6 % (ref 4.2–6.3)
HCT VFR BLD AUTO: 56.9 % (ref 36.5–49.3)
HDLC SERPL-MCNC: 35 MG/DL
HGB BLD-MCNC: 19 G/DL (ref 12–17)
HGB UR QL STRIP.AUTO: NEGATIVE
HYALINE CASTS #/AREA URNS LPF: ABNORMAL /LPF
INR PPP: 1.08 (ref 0.84–1.19)
KETONES UR STRIP-MCNC: NEGATIVE MG/DL
LDLC SERPL CALC-MCNC: 67 MG/DL (ref 0–100)
LEUKOCYTE ESTERASE UR QL STRIP: ABNORMAL
MAGNESIUM SERPL-MCNC: 2.5 MG/DL (ref 1.6–2.6)
MCH RBC QN AUTO: 29.5 PG (ref 26.8–34.3)
MCHC RBC AUTO-ENTMCNC: 33.4 G/DL (ref 31.4–37.4)
MCV RBC AUTO: 89 FL (ref 82–98)
NITRITE UR QL STRIP: NEGATIVE
NON-SQ EPI CELLS URNS QL MICRO: ABNORMAL /HPF
PA ADP BLD-ACNC: 74 PRU (ref 194–418)
PH UR STRIP.AUTO: 5 [PH]
PHOSPHATE SERPL-MCNC: 2.8 MG/DL (ref 2.3–4.1)
PLATELET # BLD AUTO: 264 THOUSANDS/UL (ref 149–390)
PMV BLD AUTO: 10.2 FL (ref 8.9–12.7)
POTASSIUM SERPL-SCNC: 3.9 MMOL/L (ref 3.5–5.3)
PROT SERPL-MCNC: 7.9 G/DL (ref 6.4–8.2)
PROT UR STRIP-MCNC: NEGATIVE MG/DL
PROTHROMBIN TIME: 13.6 SECONDS (ref 11.6–14.5)
RBC # BLD AUTO: 6.43 MILLION/UL (ref 3.88–5.62)
RBC #/AREA URNS AUTO: ABNORMAL /HPF
SODIUM SERPL-SCNC: 147 MMOL/L (ref 136–145)
SP GR UR STRIP.AUTO: 1.03 (ref 1–1.03)
TRIGL SERPL-MCNC: 99 MG/DL
TSH SERPL DL<=0.05 MIU/L-ACNC: 3.46 UIU/ML (ref 0.36–3.74)
UROBILINOGEN UR QL STRIP.AUTO: 1 E.U./DL
WBC # BLD AUTO: 7.81 THOUSAND/UL (ref 4.31–10.16)
WBC #/AREA URNS AUTO: ABNORMAL /HPF

## 2020-01-29 PROCEDURE — 83036 HEMOGLOBIN GLYCOSYLATED A1C: CPT | Performed by: INTERNAL MEDICINE

## 2020-01-29 PROCEDURE — 80061 LIPID PANEL: CPT | Performed by: INTERNAL MEDICINE

## 2020-01-29 PROCEDURE — 85027 COMPLETE CBC AUTOMATED: CPT | Performed by: INTERNAL MEDICINE

## 2020-01-29 PROCEDURE — 85730 THROMBOPLASTIN TIME PARTIAL: CPT | Performed by: INTERNAL MEDICINE

## 2020-01-29 PROCEDURE — 80053 COMPREHEN METABOLIC PANEL: CPT | Performed by: INTERNAL MEDICINE

## 2020-01-29 PROCEDURE — 85610 PROTHROMBIN TIME: CPT | Performed by: INTERNAL MEDICINE

## 2020-01-29 PROCEDURE — 84100 ASSAY OF PHOSPHORUS: CPT | Performed by: INTERNAL MEDICINE

## 2020-01-29 PROCEDURE — 99233 SBSQ HOSP IP/OBS HIGH 50: CPT | Performed by: PSYCHIATRY & NEUROLOGY

## 2020-01-29 PROCEDURE — 84443 ASSAY THYROID STIM HORMONE: CPT | Performed by: INTERNAL MEDICINE

## 2020-01-29 PROCEDURE — 99255 IP/OBS CONSLTJ NEW/EST HI 80: CPT | Performed by: PHYSICIAN ASSISTANT

## 2020-01-29 PROCEDURE — 81001 URINALYSIS AUTO W/SCOPE: CPT | Performed by: INTERNAL MEDICINE

## 2020-01-29 PROCEDURE — NC001 PR NO CHARGE: Performed by: PSYCHIATRY & NEUROLOGY

## 2020-01-29 PROCEDURE — 70551 MRI BRAIN STEM W/O DYE: CPT

## 2020-01-29 PROCEDURE — 99222 1ST HOSP IP/OBS MODERATE 55: CPT | Performed by: INTERNAL MEDICINE

## 2020-01-29 PROCEDURE — 99254 IP/OBS CNSLTJ NEW/EST MOD 60: CPT | Performed by: PHYSICAL MEDICINE & REHABILITATION

## 2020-01-29 PROCEDURE — 99223 1ST HOSP IP/OBS HIGH 75: CPT | Performed by: INTERNAL MEDICINE

## 2020-01-29 PROCEDURE — 97163 PT EVAL HIGH COMPLEX 45 MIN: CPT

## 2020-01-29 PROCEDURE — 85576 BLOOD PLATELET AGGREGATION: CPT | Performed by: PHYSICIAN ASSISTANT

## 2020-01-29 PROCEDURE — 83735 ASSAY OF MAGNESIUM: CPT | Performed by: INTERNAL MEDICINE

## 2020-01-29 PROCEDURE — 92610 EVALUATE SWALLOWING FUNCTION: CPT

## 2020-01-29 PROCEDURE — 82948 REAGENT STRIP/BLOOD GLUCOSE: CPT

## 2020-01-29 RX ORDER — HEPARIN SODIUM 1000 [USP'U]/ML
4000 INJECTION, SOLUTION INTRAVENOUS; SUBCUTANEOUS AS NEEDED
Status: DISCONTINUED | OUTPATIENT
Start: 2020-01-29 | End: 2020-01-30

## 2020-01-29 RX ORDER — DOCUSATE SODIUM 100 MG/1
100 CAPSULE, LIQUID FILLED ORAL 2 TIMES DAILY PRN
Status: DISCONTINUED | OUTPATIENT
Start: 2020-01-29 | End: 2020-02-03 | Stop reason: HOSPADM

## 2020-01-29 RX ORDER — ATORVASTATIN CALCIUM 40 MG/1
40 TABLET, FILM COATED ORAL
Status: DISCONTINUED | OUTPATIENT
Start: 2020-01-29 | End: 2020-02-03 | Stop reason: HOSPADM

## 2020-01-29 RX ORDER — HEPARIN SODIUM 1000 [USP'U]/ML
2000 INJECTION, SOLUTION INTRAVENOUS; SUBCUTANEOUS AS NEEDED
Status: DISCONTINUED | OUTPATIENT
Start: 2020-01-29 | End: 2020-01-30

## 2020-01-29 RX ORDER — METOPROLOL TARTRATE 50 MG/1
50 TABLET, FILM COATED ORAL EVERY 12 HOURS SCHEDULED
Status: DISCONTINUED | OUTPATIENT
Start: 2020-01-29 | End: 2020-01-29

## 2020-01-29 RX ORDER — AMLODIPINE BESYLATE 10 MG/1
10 TABLET ORAL DAILY
Status: DISCONTINUED | OUTPATIENT
Start: 2020-01-29 | End: 2020-01-29

## 2020-01-29 RX ORDER — ASPIRIN 81 MG/1
81 TABLET, CHEWABLE ORAL DAILY
Status: DISCONTINUED | OUTPATIENT
Start: 2020-01-29 | End: 2020-02-03 | Stop reason: HOSPADM

## 2020-01-29 RX ORDER — HYDRALAZINE HYDROCHLORIDE 20 MG/ML
5 INJECTION INTRAMUSCULAR; INTRAVENOUS EVERY 6 HOURS PRN
Status: DISCONTINUED | OUTPATIENT
Start: 2020-01-29 | End: 2020-02-03 | Stop reason: HOSPADM

## 2020-01-29 RX ORDER — ALLOPURINOL 100 MG/1
200 TABLET ORAL DAILY
Status: DISCONTINUED | OUTPATIENT
Start: 2020-01-29 | End: 2020-02-03 | Stop reason: HOSPADM

## 2020-01-29 RX ORDER — ACETAMINOPHEN 325 MG/1
650 TABLET ORAL EVERY 4 HOURS PRN
Status: DISCONTINUED | OUTPATIENT
Start: 2020-01-29 | End: 2020-02-03 | Stop reason: HOSPADM

## 2020-01-29 RX ORDER — ONDANSETRON 2 MG/ML
4 INJECTION INTRAMUSCULAR; INTRAVENOUS EVERY 6 HOURS PRN
Status: DISCONTINUED | OUTPATIENT
Start: 2020-01-29 | End: 2020-02-03 | Stop reason: HOSPADM

## 2020-01-29 RX ORDER — CLOPIDOGREL BISULFATE 75 MG/1
75 TABLET ORAL DAILY
Status: DISCONTINUED | OUTPATIENT
Start: 2020-01-29 | End: 2020-01-29

## 2020-01-29 RX ORDER — HEPARIN SODIUM 10000 [USP'U]/100ML
3-20 INJECTION, SOLUTION INTRAVENOUS
Status: DISCONTINUED | OUTPATIENT
Start: 2020-01-29 | End: 2020-01-30

## 2020-01-29 RX ORDER — FENOFIBRATE 145 MG/1
145 TABLET, COATED ORAL DAILY
Status: DISCONTINUED | OUTPATIENT
Start: 2020-01-29 | End: 2020-02-03 | Stop reason: HOSPADM

## 2020-01-29 RX ORDER — MAGNESIUM HYDROXIDE/ALUMINUM HYDROXICE/SIMETHICONE 120; 1200; 1200 MG/30ML; MG/30ML; MG/30ML
5 SUSPENSION ORAL EVERY 6 HOURS PRN
Status: DISCONTINUED | OUTPATIENT
Start: 2020-01-29 | End: 2020-02-03 | Stop reason: HOSPADM

## 2020-01-29 RX ADMIN — CLOPIDOGREL BISULFATE 75 MG: 75 TABLET ORAL at 08:29

## 2020-01-29 RX ADMIN — HEPARIN SODIUM AND DEXTROSE 13.1 UNITS/KG/HR: 10000; 5 INJECTION INTRAVENOUS at 16:04

## 2020-01-29 RX ADMIN — HEPARIN SODIUM 2000 UNITS: 1000 INJECTION INTRAVENOUS; SUBCUTANEOUS at 01:35

## 2020-01-29 RX ADMIN — ATORVASTATIN CALCIUM 40 MG: 40 TABLET, FILM COATED ORAL at 17:14

## 2020-01-29 RX ADMIN — ALLOPURINOL 200 MG: 100 TABLET ORAL at 08:29

## 2020-01-29 RX ADMIN — ASPIRIN 81 MG 81 MG: 81 TABLET ORAL at 08:29

## 2020-01-29 RX ADMIN — METOPROLOL TARTRATE 50 MG: 50 TABLET, FILM COATED ORAL at 08:29

## 2020-01-29 RX ADMIN — METOPROLOL TARTRATE 50 MG: 50 TABLET, FILM COATED ORAL at 00:41

## 2020-01-29 RX ADMIN — AMLODIPINE BESYLATE 10 MG: 10 TABLET ORAL at 08:30

## 2020-01-29 RX ADMIN — FENOFIBRATE 145 MG: 145 TABLET, FILM COATED ORAL at 08:30

## 2020-01-29 NOTE — PROGRESS NOTES
[facility] Internal Medicine  Progress Note - Bonni Kawasaki 1950, 72 y o  male MRN: [id number]    [facility] Encounter: [id number]    Primary Care Provider: Rashel Andrea DO   Date and time admitted to hospital: 1/28/2020 11:56 PM        * CVA (cerebral vascular accident) [facility])  Assessment & Plan  · Patient recently admitted and discharged last Friday, with CVA and left-sided weakness and the assessment as follows:     · Noted worsening of left-sided weakness with no dysphonia  Currently significant for left-sided drifting; placed on stroke protocol  As per recommendations of Neurology; started heparin drip at low dose  Dr Iveth Abdi of neurosurgery was also consulted and hence a consult is placed  Here does recommend the P2Y12 platelet assay  Results pending      · Continue stroke protocol  MRI to detect any changes from last admission to current admission--Ischemic burden within the right MCA distribution has progressed  No hemorrhage or significant mass effect  Abnormal flow characteristics right MCA branches  · Permissive hypertension   · Continue Plavix and Lipitor    Chronic kidney disease, stage 3 (HCC)  Assessment & Plan  · Creat yesterday 1 82 and 1 60 today   · Baseline recently appears to be 1 35-1 67   · Continue to monitor     Erythrocytosis  Assessment & Plan  · Will continue to watch  Currently hemoglobin is at 19  Hematology consult  Reticulocyte count  17-18 last admission last week     Hypertension  Assessment & Plan  · Continue metoprolol 50 mg Q 12; amlodipine 10 mg daily  Essential hypertriglyceridemia  Assessment & Plan  · Continue Tricor and Lipitor  Type 2 diabetes mellitus with microalbuminuria [facility])  Assessment & Plan  Lab Results   Component Value Date    HGBA1C 6 0 01/29/2020     Patient without dysphonia or dysphagia; will continue diabetic/cardiac diet  Invokana is non formulary    However will check blood sugars before meals and bedtime with Accu-Cheks ordered and insulin sliding scale as per protocol  Recent Labs     20  1705 20  1900 20  0638 20  0718   POCGLU 142* 86 79 118       Blood Sugar Average: Last 72 hrs:  (P) 98 5    Gout  Assessment & Plan  · Continue allopurinol 200 mg daily  VTE Pharmacologic Prophylaxis:   Pharmacologic: Heparin Drip  Mechanical VTE Prophylaxis in Place: Yes    Patient Centered Rounds: I have performed bedside rounds with nursing staff today  Discussions with Specialists or Other Care Team Provider: none    Education and Discussions with Family / Patient: patient    Time Spent for Care: 20 minutes  More than 50% of total time spent on counseling and coordination of care as described above  Current Length of Stay: 1 day(s)    Current Patient Status: Inpatient   Certification Statement: The patient will continue to require additional inpatient hospital stay due to further evaluation and monitor sp stroke     Discharge Plan: pending clinical improvement     Code Status: Level 1 - Full Code      Subjective:   Patient resting in bed  States he still feels slight left-sided weakness but improved since yesterday  Offers no further complaints  Objective:     Vitals:   Temp (24hrs), Av 4 °F (36 3 °C), Min:96 °F (35 6 °C), Max:98 2 °F (36 8 °C)    Temp:  [96 °F (35 6 °C)-98 2 °F (36 8 °C)] 98 2 °F (36 8 °C)  HR:  [64-97] 64  Resp:  [13-23] 18  BP: (132-174)/() 153/91  SpO2:  [94 %-98 %] 95 %  Body mass index is 30 24 kg/m²  Input and Output Summary (last 24 hours): Intake/Output Summary (Last 24 hours) at 2020 1038  Last data filed at 2020 0800  Gross per 24 hour   Intake 303 92 ml   Output    Net 303 92 ml       Physical Exam:     Physical Exam   Constitutional: He is oriented to person, place, and time  He appears well-developed and well-nourished  Obese   HENT:   Head: Normocephalic and atraumatic  Eyes: Pupils are equal, round, and reactive to light  EOM are normal    Neck: Normal range of motion  Pulmonary/Chest: Effort normal and breath sounds normal    Abdominal: Soft  Bowel sounds are normal    Musculoskeletal: Normal range of motion  He exhibits no edema  Neurological: He is alert and oriented to person, place, and time  Left sided grasp 4/5    Skin: Skin is warm and dry  Psychiatric: He has a normal mood and affect  His behavior is normal  Judgment and thought content normal          Additional Data:     Labs:    Results from last 7 days   Lab Units 01/29/20  0015  01/27/20  1303   WBC Thousand/uL 7 81   < >  --    WHITE BLOOD CELL COUNT  Thousand/uL  --   --  7 3   HEMOGLOBIN g/dL 19 0*   < >  --    HEMOGLOBIN  g/dL  --   --  19 7*   HEMATOCRIT % 56 9*   < >  --    HEMATOCRIT  %  --   --  59 7*   PLATELETS Thousands/uL 264   < >  --    PLATELETS  Thousand/uL  --   --  274   NEUTROS PCT  %  --   --  70 3   LYMPHS PCT  %  --   --  18 9   MONOS PCT  %  --   --  6 3   EOS PCT  %  --   --  3 0    < > = values in this interval not displayed  Results from last 7 days   Lab Units 01/29/20  0636   SODIUM mmol/L 147*   POTASSIUM mmol/L 3 9   CHLORIDE mmol/L 115*   CO2 mmol/L 23   BUN mg/dL 34*   CREATININE mg/dL 1 60*   ANION GAP mmol/L 9   CALCIUM mg/dL 9 2   ALBUMIN g/dL 3 7   TOTAL BILIRUBIN mg/dL 0 91   ALK PHOS U/L 85   ALT U/L 27   AST U/L 21   GLUCOSE RANDOM mg/dL 96     Results from last 7 days   Lab Units 01/29/20  0015   INR  1 08     Results from last 7 days   Lab Units 01/29/20  0718 01/29/20  0638 01/28/20  1900 01/28/20  1705 01/24/20  1057 01/24/20  0739 01/24/20  0023 01/23/20  1712 01/23/20  1202 01/23/20  0811 01/22/20  1953   POC GLUCOSE mg/dl 118 79 86 142* 148* 99 104 89 151* 125 100     Results from last 7 days   Lab Units 01/29/20  0015 01/27/20  1303 01/23/20  0527   HEMOGLOBIN A1C % 6 0 6 2* 6 2               * I Have Reviewed All Lab Data Listed Above  * Additional Pertinent Lab Tests Reviewed:  Elmer Gaspar Admission Reviewed    Imaging:    Imaging Reports Reviewed Today Include: mri  Imaging Personally Reviewed by Myself Includes:  none    Recent Cultures (last 7 days):           Last 24 Hours Medication List:     Current Facility-Administered Medications:  acetaminophen 650 mg Oral Q4H PRN Thea Agosto MD    allopurinol 200 mg Oral Daily Thea Agosto MD    aluminum-magnesium hydroxide-simethicone 5 mL Oral Q6H PRN Thea Agosto MD    amLODIPine 10 mg Oral Daily Thea Agosto MD    aspirin 81 mg Oral Daily Thea Agosto MD    atorvastatin 40 mg Oral Daily With Vic Hickey MD    clopidogrel 75 mg Oral Daily Thea Agosto MD    docusate sodium 100 mg Oral BID PRN Thea Agosto MD    fenofibrate 145 mg Oral Daily Thea Agosto MD    heparin (porcine) 3-20 Units/kg/hr (Order-Specific) Intravenous Titrated Thea Agosto MD Last Rate: 13 1 Units/kg/hr (01/29/20 0136)   heparin (porcine) 2,000 Units Intravenous PRN Thea Agosto MD    heparin (porcine) 4,000 Units Intravenous PRN Thea Agosto MD    insulin lispro 1-5 Units Subcutaneous HS Thea Agosto MD    insulin lispro 1-6 Units Subcutaneous TID AC Thea Agosto MD    metoprolol tartrate 50 mg Oral Q12H [facility] Thea Agosto MD    ondansetron 4 mg Intravenous Q6H PRN Thea Agosto MD         Today, Patient Was Seen By: ROSALINA Wilson    ** Please Note: Dictation voice to text software may have been used in the creation of this document   **

## 2020-01-29 NOTE — ASSESSMENT & PLAN NOTE
Lab Results   Component Value Date    HGBA1C 6 0 01/29/2020     Patient without dysphonia or dysphagia; will continue diabetic/cardiac diet  Invokana is non formulary  However will check blood sugars before meals and bedtime with Accu-Cheks ordered and insulin sliding scale as per protocol    Recent Labs     01/28/20  1705 01/28/20  1900 01/29/20  0638 01/29/20  0718   POCGLU 142* 86 79 118       Blood Sugar Average: Last 72 hrs:  (P) 98 5

## 2020-01-29 NOTE — CONSULTS
PHYSICAL MEDICINE AND REHABILITATION CONSULT NOTE  Joanna Wisdom 72 y o  male MRN: [id number]  Unit/Bed#: [facility] 725-01 Encounter: [id number]    Requested by (Physician/Service): Yady Wadsworth MD  Reason for Consultation:  Assessment of rehabilitation needs    Assessment:  Rehabilitation Diagnosis:    CVA with left sided weakness   Right M2 occlusion     Recommendations:  Rehabilitation Plan:   Continue PT/OT while on acute care   Therapy evaluations are pending however the patient may be a candidate for acute inpatient rehabilitation once medically stable  Medical Co-morbidities Plan:  · Right frontoparietal infarcts  · M2 occlusion on heparin drip  · Left sided weakness  · Erythrocytosis  · Dyslipidemia  · Diabetes type 2  · Gout  · CDK stage 3    Thank you for this consultation  Do not hesitate to contact service with further questions  ROSALINA Harmon  PM&R    History of Present Illness:  Joanna Wisdom is a 72 y o  male with a PMH of CVA with residual left sided weakness who [facility] on 1/29/2020 with increased weakness of the left side  He was recently discharged from [facility] on 1/24/2020  Plan at that time was to d/c on DAPT for 3 months then montherapy with plavix  He was also found to have Erythrocytosis and was to follow up with hematology for further work up  Imaging on this admission showed known severe occlusion and stenosis but no new vascular findings  He was on DAPT and P2Y12 was drawn  He was placed on a heparin drip and allowed permissive hypertension  He was transferred from [facility] to [place] for formal arteriogram and possible intervention  MRI showed punctate acute infarcts in the right frontoparietal region in a watershed territory distribution consistent with known proximal right MCA occlusion and high grade stenosis    There was also chronic micro-hemorrhage in the right cerebellar hemisphere and brachium pontis  He is currently on bed rest   PM&R are consulted for rehabilitation recommendations  The patient was see in his room with his spouse at bedside  He continues with left sided weakness and feels it is worse then his baseline  He denies any decreased sensation on the left  Per spouse he was discharged with no needs last time and attempted to go back to work, however his boss called her and said he was not ready to back due to his weakness  He has no other complaints  Review of Systems: 10 point ROS negative except for what is noted in HPI    Function:  Prior level of function and living situation:  He was independent and still working for a [employer] company  He lives with his spouse in a one story  home with 4 XAVIER  Current level of function:  Physical Therapy:  Pending  Occupational Therapy:  Pending      Physical Exam:  /91   Pulse 64   Temp 98 2 °F (36 8 °C)   Resp 18   Ht 5' 9" (1 753 m)   Wt 92 9 kg (204 lb 12 8 oz)   SpO2 95%   BMI 30 24 kg/m²        Intake/Output Summary (Last 24 hours) at 1/29/2020 0912  Last data filed at 1/29/2020 0701  Gross per 24 hour   Intake 63 92 ml   Output    Net 63 92 ml       Body mass index is 30 24 kg/m²  Physical Exam   Constitutional: He is oriented to person, place, and time  He appears well-developed and well-nourished  HENT:   Head: Normocephalic and atraumatic  Cardiovascular: Regular rhythm  Pulmonary/Chest: Effort normal    Musculoskeletal:   LUE:  3+/5 SAB otherwise 4/5 throughout  LLE: 4/5 throughout   Neurological: He is alert and oriented to person, place, and time  Skin: Skin is warm and dry  Psychiatric: He has a normal mood and affect              Social History:    Social History     Socioeconomic History    Marital status: /Civil Union     Spouse name: Not on file    Number of children: Not on file    Years of education: Not on file    Highest education level: Not on file   Occupational History  Occupation: full-time employment   Social Needs    Financial resource strain: Not on file    Food insecurity:     Worry: Not on file     Inability: Not on file    Transportation needs:     Medical: Not on file     Non-medical: Not on file   Tobacco Use    Smoking status: Never Smoker    Smokeless tobacco: Never Used   Substance and Sexual Activity    Alcohol use: Never     Frequency: Never     Binge frequency: Never    Drug use: Never    Sexual activity: Yes   Lifestyle    Physical activity:     Days per week: Not on file     Minutes per session: Not on file    Stress: Not on file   Relationships    Social connections:     Talks on phone: Not on file     Gets together: Not on file     Attends Latter-day service: Not on file     Active member of club or organization: Not on file     Attends meetings of clubs or organizations: Not on file     Relationship status: Not on file    Intimate partner violence:     Fear of current or ex partner: Not on file     Emotionally abused: Not on file     Physically abused: Not on file     Forced sexual activity: Not on file   Other Topics Concern    Not on file   Social History Narrative    Not on file        Family History:    Family History   Problem Relation Age of Onset    Breast cancer Mother     Kidney disease Father     Lung cancer Father     Other Father         smoker    Hypertension Other          Medications:     Current Facility-Administered Medications:     acetaminophen (TYLENOL) tablet 650 mg, 650 mg, Oral, Q4H PRN, Scott Fox MD    allopurinol (ZYLOPRIM) tablet 200 mg, 200 mg, Oral, Daily, Scott Fox MD, 200 mg at 01/29/20 0829    aluminum-magnesium hydroxide-simethicone (MYLANTA) 200-200-20 mg/5 mL oral suspension 5 mL, 5 mL, Oral, Q6H PRN, Scott Fox MD    amLODIPine (NORVASC) tablet 10 mg, 10 mg, Oral, Daily, Scott Fox MD, 10 mg at 01/29/20 0830    aspirin chewable tablet 81 mg, 81 mg, Oral, Daily, Donya Pain Damion May MD, 81 mg at 01/29/20 2743    atorvastatin (LIPITOR) tablet 40 mg, 40 mg, Oral, Daily With Dinner, Bhupendra Cui MD    clopidogrel (PLAVIX) tablet 75 mg, 75 mg, Oral, Daily, Bhupendra Cui MD, 75 mg at 01/29/20 3543    docusate sodium (COLACE) capsule 100 mg, 100 mg, Oral, BID PRN, Bhupendra Cui MD    fenofibrate (TRICOR) tablet 145 mg, 145 mg, Oral, Daily, Bhupendra Cui MD, 145 mg at 01/29/20 0830    heparin (porcine) 25,000 units in 250 mL infusion (premix), 3-20 Units/kg/hr (Order-Specific), Intravenous, Titrated, Bhupendra Cui MD, Last Rate: 11 8 mL/hr at 01/29/20 0136, 13 1 Units/kg/hr at 01/29/20 0136    heparin (porcine) injection 2,000 Units, 2,000 Units, Intravenous, PRN, Bhupendra Cui MD, 2,000 Units at 01/29/20 0135    heparin (porcine) injection 4,000 Units, 4,000 Units, Intravenous, PRN, Bhupendra Cui MD    insulin lispro (HumaLOG) 100 units/mL subcutaneous injection 1-5 Units, 1-5 Units, Subcutaneous, HS, Bhupendra Cui MD    insulin lispro (HumaLOG) 100 units/mL subcutaneous injection 1-6 Units, 1-6 Units, Subcutaneous, TID AC **AND** Fingerstick Glucose (POCT), , , TID AC, Bhupendra Cui MD    metoprolol tartrate (LOPRESSOR) tablet 50 mg, 50 mg, Oral, Q12H [address], Bhupendra Cui MD, 50 mg at 01/29/20 0829    ondansetron (ZOFRAN) injection 4 mg, 4 mg, Intravenous, Q6H PRN, Bhupendra Cui MD    Past Medical History:     Past Medical History:   Diagnosis Date    Diabetes mellitus ([facility] [address] )     Hypertension     Renal disorder         Past Surgical History:     Past Surgical History:   Procedure Laterality Date    COLONOSCOPY  09/18/2006    complete; 10 yrs    COLONOSCOPY  10/23/2017    complete; 5 yrs         Allergies:     No Known Allergies        LABORATORY RESULTS:      Lab Results   Component Value Date    HGB 19 0 (H) 01/29/2020    HGB 11 6 (L) 07/07/2017    HCT 56 9 (H) 01/29/2020    HCT 36 1 (L) 07/07/2017    WBC 7 81 01/29/2020    WBC 8 0 07/07/2017 Lab Results   Component Value Date    BUN 34 (H) 01/29/2020    BUN 33 (H) 01/27/2020     07/07/2017    K 3 9 01/29/2020    K 4 0 01/27/2020     (H) 01/29/2020     01/27/2020    CREATININE 1 60 (H) 01/29/2020    CREATININE 1 38 (H) 07/07/2017     Lab Results   Component Value Date    PROTIME 13 6 01/29/2020    INR 1 08 01/29/2020        DIAGNOSTIC STUDIES: Reviewed  X-ray Chest 1 View Portable    Result Date: 1/29/2020  Impression: No acute cardiopulmonary disease  Workstation performed: [id number]     Mri Brain Wo Contrast    Result Date: 1/29/2020  Impression: Ischemic burden within the right MCA distribution has progressed  No hemorrhage or significant mass effect  Abnormal flow characteristics right MCA branches  Moderately advanced chronic small vessel disease  Workstation performed: [id number]     Ct Stroke Alert Brain    Result Date: 1/28/2020  Impression: 1  No evidence of acute vascular territorial infarction however given recent subcortical/watershed territory infarcts, MRI may be helpful for further evaluation  2   No intracranial hemorrhage or mass effect  Findings were directly discussed with CHACHA HALEY on 1/28/2020 5:48 PM  Workstation performed: [id number]     Cta Stroke Alert (head/neck)    Result Date: 1/28/2020  Impression: 1  Stable moderate (50-69%) stenosis of the paraclinoid segments of the bilateral internal carotid arteries, more prominent on the right  2   Stable occlusion and severe stenoses in proximal right M2 branches with distal reperfusion suggesting good collateral flow  3   Stable severe (70-90%) stenosis within a left M2 branch, with normal distal flow, suggesting atherosclerotic plaque   Findings were directly discussed with CHACHA HALEY on 1/28/2020 5:55 PM  Workstation performed: [id number]

## 2020-01-29 NOTE — ASSESSMENT & PLAN NOTE
· Creat yesterday 1 82 and 1 60 today   · Baseline recently appears to be 1 35-1 67   · Continue to monitor

## 2020-01-29 NOTE — CONSULTS
Consult- Lance Shannon 1950, 72 y o  male MRN: [id number]    [facility] Encounter: [id number]    Primary Care Provider: Robert Muñiz DO   Date and time admitted to hospital: 1/28/2020 11:56 PM    Inpatient consult to Neurosurgery  Consult performed by: Chandrika Hastings PA-C  Consult ordered by: Rubi Rowell MD        * CVA (cerebral vascular accident) [facility])  Assessment & Plan  · Patient originally presented with left facial and arm weakness  Low NIH score and was discharge home  · Presented again yesterday with new concern for facial weakness and confusion per patients wife  Imaging:   · CTA 1/28/2020: Stable moderate stenosis of bilateral paraclinoid ICA  Stable occlusion and severe stenosis of proximal right M2 with distal reperfusion  Stable severe stenosis within left M2 branch  · MRI brain 1/29/2020: Ischemic burden within the right MCA distribution has progressed  No hemorrhage or mass effect  Abnormal flow characteristics of right MCA distribution  Plan:   · Hep gtt   · Permissive HTN but recommend systolic <967   · Continue ASA and plavix at this time  If patient will require intervention then it is vital that he remain on Plavix for AP function  · Will review imaging further and discuss with neurology determination and diagnostic/theraputic benefit of cerebral angiogram    · Timing to be determined  · Appreciate neurology recommendations and management entailing stroke ischemic workup  · No other recommendation from neurosurgical standpoint at this time  · Will continue to see patient as needed until timing determined for angiogram  Call with other questions or concerns       Hypertension  Assessment & Plan  · Allowing permissive HTN at this time   · Would still recommend treating BP >585 systolic       History of Present Illness   HPI: Lance Shannon is a 72 y o  male with PMH including CKD, hypertension, diabetes who presents with complaint of stroke-like symptoms  Patient 1st presented to the hospital on 01/22/2020 as a stroke alert initially after presenting with left-sided facial and arm weakness  He initially had an NIH scale of 0 and was subsequently discharged home afterward a brief hospital stay  Patient's wife states he was doing well and slowly improving until the other day when he began to have a deeper more slurred worse  This was also slowly improving  Patient states that yesterday he was feeling a bit foggy in the morning but still decided to drive to work in [address]  He works for approximately 4 hours before he was trying to leave and his boss told them that he was not allowed to drive  Patient's wife came affective up and took him home  They called their PCP who did recommend that the patient go to the ER for evaluation  Patient's wife also states that she request the patient to pass the remote and he was not able to tell her with a remote was so she took him to [address] where he was found to have new facial weakness again  Repeat CTA revealed stable intracranial atherosclerotic disease causing more right-sided MCA distribution stenosis  Neurosurgery was called and recommended patient be placed on heparin drip with permissive hypertension in transferred to [facility] for further evaluation  Today patient states he feels lose doing quite well as does his wife  He denies any headaches, changes in vision, trouble speech, dizziness, lightheadedness, confusion  Patient denies any arm or leg weakness, sensation changes  Patient's wife admits that he was previously complaining of mild left-sided weakness  She states that his speech has continued to improve  Review of Systems   Constitutional: Positive for activity change and fatigue  Negative for appetite change and diaphoresis  HENT: Positive for voice change  Negative for congestion and facial swelling  Eyes: Negative for visual disturbance     Respiratory: Negative for cough and chest tightness  Cardiovascular: Negative for chest pain and leg swelling  Gastrointestinal: Negative for abdominal pain, diarrhea, nausea and vomiting  Genitourinary: Negative for difficulty urinating  Musculoskeletal: Negative for back pain, neck pain and neck stiffness  Skin: Negative for rash and wound  Neurological: Positive for speech difficulty and weakness  Negative for dizziness, numbness and headaches  Psychiatric/Behavioral: Negative for agitation, behavioral problems and confusion         Historical Information   Past Medical History:   Diagnosis Date    Diabetes mellitus ([address] )     Hypertension     Renal disorder      Past Surgical History:   Procedure Laterality Date    COLONOSCOPY  09/18/2006    complete; 10 yrs    COLONOSCOPY  10/23/2017    complete; 5 yrs     Social History     Substance and Sexual Activity   Alcohol Use Never    Frequency: Never    Binge frequency: Never     Social History     Substance and Sexual Activity   Drug Use Never     Social History     Tobacco Use   Smoking Status Never Smoker   Smokeless Tobacco Never Used     Family History   Problem Relation Age of Onset    Breast cancer Mother     Kidney disease Father     Lung cancer Father     Other Father         smoker    Hypertension Other        Meds/Allergies   all current active meds have been reviewed, current meds:   Current Facility-Administered Medications   Medication Dose Route Frequency    acetaminophen (TYLENOL) tablet 650 mg  650 mg Oral Q4H PRN    allopurinol (ZYLOPRIM) tablet 200 mg  200 mg Oral Daily    aluminum-magnesium hydroxide-simethicone (MYLANTA) 200-200-20 mg/5 mL oral suspension 5 mL  5 mL Oral Q6H PRN    aspirin chewable tablet 81 mg  81 mg Oral Daily    atorvastatin (LIPITOR) tablet 40 mg  40 mg Oral Daily With Dinner    docusate sodium (COLACE) capsule 100 mg  100 mg Oral BID PRN    fenofibrate (TRICOR) tablet 145 mg  145 mg Oral Daily    heparin (porcine) 25,000 units in 250 mL infusion (premix)  3-20 Units/kg/hr (Order-Specific) Intravenous Titrated    heparin (porcine) injection 2,000 Units  2,000 Units Intravenous PRN    heparin (porcine) injection 4,000 Units  4,000 Units Intravenous PRN    hydrALAZINE (APRESOLINE) injection 5 mg  5 mg Intravenous Q6H PRN    insulin lispro (HumaLOG) 100 units/mL subcutaneous injection 1-5 Units  1-5 Units Subcutaneous HS    insulin lispro (HumaLOG) 100 units/mL subcutaneous injection 1-6 Units  1-6 Units Subcutaneous TID AC    ondansetron (ZOFRAN) injection 4 mg  4 mg Intravenous Q6H PRN    and PTA meds:   Prior to Admission Medications   Prescriptions Last Dose Informant Patient Reported? Taking? INVOKANA 100 MG   No Yes   Sig: TAKE 1 TABLET BY MOUTH ONCE DAILY BEFORE BREAKFAST   Lancets (FREESTYLE) lancets   No No   Sig: by Other route as needed (Test Daily) Use as instructed--test Daily   allopurinol (ZYLOPRIM) 100 mg tablet   No Yes   Sig: TAKE 2 TABLETS BY MOUTH DAILY   amLODIPine (NORVASC) 10 mg tablet   No Yes   Sig: Take 1 tablet (10 mg total) by mouth daily   aspirin 81 MG tablet   Yes Yes   Sig: Take 1 tablet by mouth daily   atorvastatin (LIPITOR) 40 mg tablet   No Yes   Sig: Take 1 tablet (40 mg total) by mouth daily with dinner   clopidogrel (PLAVIX) 75 mg tablet   No Yes   Sig: Take 1 tablet (75 mg total) by mouth daily   fenofibrate (TRICOR) 145 mg tablet   No Yes   Sig: TAKE 1 TABLET BY MOUTH ONCE DAILY   glucose monitoring kit (FREESTYLE) monitoring kit   No No   Si each by Does not apply route as needed (Test daily)   metoprolol tartrate (LOPRESSOR) 50 mg tablet   No Yes   Sig: Take 1 tablet (50 mg total) by mouth every 12 (twelve) hours      Facility-Administered Medications: None     No Known Allergies    Objective   I/O        07    P  O    240    I V  (mL/kg)  28 3 (0 3) 35 6 (0 4)    Total Intake(mL/kg)  28 3 (0 3) 275 6 (3)    Urine (mL/kg/hr)   220 (0 3)    Total Output   220    Net  +28 3 +55 6                 Physical Exam   Constitutional: He is oriented to person, place, and time  He appears well-developed and well-nourished  HENT:   Head: Normocephalic  Eyes: Pupils are equal, round, and reactive to light  Conjunctivae and EOM are normal  Right eye exhibits no discharge  Neck: Normal range of motion  Neck supple  No JVD present  Cardiovascular: Normal rate  Pulmonary/Chest: Effort normal    Abdominal: Soft  He exhibits no distension  There is no tenderness  Musculoskeletal: Normal range of motion  He exhibits no edema, tenderness or deformity  Neurological: He is alert and oriented to person, place, and time  He has normal reflexes  A cranial nerve deficit is present  No sensory deficit  He exhibits normal muscle tone  Finger-nose-finger test: slight past point with LUE  Reflex Scores:       Bicep reflexes are 2+ on the right side and 2+ on the left side  Brachioradialis reflexes are 2+ on the right side and 2+ on the left side  Patellar reflexes are 2+ on the right side and 2+ on the left side  Achilles reflexes are 2+ on the right side and 2+ on the left side  Skin: Skin is warm and dry  Psychiatric: He has a normal mood and affect  His speech is normal and behavior is normal  Thought content normal      Neurologic Exam     Mental Status   Oriented to person, place, and time  Follows 2 step commands  Attention: normal  Concentration: normal    Speech: speech is normal   Level of consciousness: alert  Knowledge: good and consistent with education  Able to perform simple calculations  Able to name object  Able to repeat  Normal comprehension  Oriented to person, place, month and year  Cranial Nerves     CN II   Visual fields full to confrontation  CN III, IV, VI   Pupils are equal, round, and reactive to light  Extraocular motions are normal    Right pupil: Size: 4 mm   Shape: regular  Reactivity: brisk  Left pupil: Size: 4 mm  Shape: regular  Reactivity: brisk  CN III: no CN III palsy  CN VI: no CN VI palsy  Upgaze: normal  Downgaze: normal    CN V   Facial sensation intact  CN VII   Left facial weakness: central (some correction of weakness with expression  )    CN VIII   CN VIII normal    Hearing: intact    CN XI   CN XI normal      CN XII   CN XII normal    Tongue: not atrophic  Tongue deviation: none    Motor Exam   Muscle bulk: normal  Overall muscle tone: normal  Right arm tone: normal  Left arm tone: normal  Right arm pronator drift: absent  Left arm pronator drift: present  Right leg tone: normal  Left leg tone: normal    Strength   Right deltoid: 5/5  Left deltoid: 4/5  Right biceps: 5/5  Left biceps: 4/5  Right triceps: 5/5  Left triceps: 5/5  Right wrist flexion: 5/5  Left wrist flexion: 5/5  Right wrist extension: 5/5  Left wrist extension: 5/5  Right iliopsoas: 5/5  Left iliopsoas: 5/5  Right quadriceps: 5/5  Left quadriceps: 5/5  Right hamstrin/5  Left hamstrin/5  Right anterior tibial: 5/5  Left anterior tibial: 5/5  Right gastroc: 5/5  Left gastroc: 5/5    Sensory Exam   Light touch normal    Proprioception normal      Gait, Coordination, and Reflexes     Coordination   Finger-nose-finger test: slight past point with LUE  Tremor   Resting tremor: absent  Action tremor: absent    Reflexes   Right brachioradialis: 2+  Left brachioradialis: 2+  Right biceps: 2+  Left biceps: 2+  Right patellar: 2+  Left patellar: 2+  Right achilles: 2+  Left achilles: 2+  Right Sorensen: absent  Left Sorensen: absent      Vitals:Blood pressure 146/88, pulse 65, temperature 98 5 °F (36 9 °C), resp  rate 18, height 5' 9" (1 753 m), weight 92 9 kg (204 lb 12 8 oz), SpO2 93 %  ,Body mass index is 30 24 kg/m²       Lab Results:   Results from last 7 days   Lab Units 20  0015 20  1722 20  1303 20  0500 20  0527   WBC Thousand/uL 7 81 8 32  --  6 10 7  06   WHITE BLOOD CELL COUNT  Thousand/uL  --   --  7 3  --   --    HEMOGLOBIN g/dL 19 0* 19 1*  --  17 6* 17 2*   HEMOGLOBIN  g/dL  --   --  19 7*  --   --    HEMATOCRIT % 56 9* 57 1*  --  54 0* 53 1*   HEMATOCRIT  %  --   --  59 7*  --   --    PLATELETS Thousands/uL 264 244  --  232 222   PLATELETS  Thousand/uL  --   --  274  --   --    NEUTROS PCT %  --   --   --  66 71   NEUTROS PCT  %  --   --  70 3  --   --    MONOS PCT %  --   --   --  8 8   MONOS PCT  %  --   --  6 3  --   --      Results from last 7 days   Lab Units 01/29/20  0636 01/28/20  1723 01/27/20  1303   POTASSIUM mmol/L 3 9 4 0 4 0   CHLORIDE mmol/L 115* 108 106   CO2 mmol/L 23 22 27   BUN mg/dL 34* 41* 33*   CREATININE mg/dL 1 60* 1 82* 1 61*   SL AMB CALCIUM mg/dL  --   --  9 7   CALCIUM mg/dL 9 2 9 4  --    ALK PHOS U/L 85  --   --    ALT U/L 27  --   --    AST U/L 21  --   --      Results from last 7 days   Lab Units 01/29/20  0636 01/24/20  0500 01/23/20  0527   MAGNESIUM mg/dL 2 5 2 3 1 9     Results from last 7 days   Lab Units 01/29/20  0636 01/23/20  0527   PHOSPHORUS mg/dL 2 8 3 0     Results from last 7 days   Lab Units 01/29/20  0636 01/29/20  0015 01/28/20  1723 01/22/20  1956   INR   --  1 08 1 03 0 97   PTT seconds 82* 51* 34 33     No results found for: TROPONINT  ABG:No results found for: PHART, KEW7HKN, PO2ART, DDK4WHW, G5XHBAJC, BEART, SOURCE    Imaging Studies: I have personally reviewed pertinent reports  and I have personally reviewed pertinent films in PACS    X-ray Chest 1 View Portable    Result Date: 1/29/2020  Impression: No acute cardiopulmonary disease  Workstation performed: [id number]     Mri Brain Wo Contrast    Result Date: 1/29/2020  Impression: Ischemic burden within the right MCA distribution has progressed  No hemorrhage or significant mass effect  Abnormal flow characteristics right MCA branches  Moderately advanced chronic small vessel disease   Workstation performed: [id number]     Ct Stroke Alert Brain    Result Date: 1/28/2020  Impression: 1  No evidence of acute vascular territorial infarction however given recent subcortical/watershed territory infarcts, MRI may be helpful for further evaluation  2   No intracranial hemorrhage or mass effect  Findings were directly discussed with CHACHA HALEY on 1/28/2020 5:48 PM  Workstation performed: [id number]     Cta Stroke Alert (head/neck)    Result Date: 1/28/2020  Impression: 1  Stable moderate (50-69%) stenosis of the paraclinoid segments of the bilateral internal carotid arteries, more prominent on the right  2   Stable occlusion and severe stenoses in proximal right M2 branches with distal reperfusion suggesting good collateral flow  3   Stable severe (70-90%) stenosis within a left M2 branch, with normal distal flow, suggesting atherosclerotic plaque  Findings were directly discussed with CHACHA HALEY on 1/28/2020 5:55 PM  Workstation performed: [id number]       EKG, Pathology, and Other Studies: I have personally reviewed pertinent reports  and I have personally reviewed pertinent films in PACS    VTE Prophylaxis: Sequential compression device (Venodyne)  and Heparin gtt, ASA, plavix    Code Status: Level 1 - Full Code  Advance Directive and Living Will:      Power of :    POLST:      Counseling / Coordination of Care  I spent 30 minutes with the patient

## 2020-01-29 NOTE — UTILIZATION REVIEW
Initial Clinical Review    Admission: Date/Time/Statement: Inpatient Admission Orders (From admission, onward)     Ordered        01/29/20 0001  Inpatient Admission  Once                   Orders Placed This Encounter   Procedures    Inpatient Admission     Standing Status:   Standing     Number of Occurrences:   1     Order Specific Question:   Admitting Physician     Answer:   Claudia Crews [[id number]]     Order Specific Question:   Level of Care     Answer:   Med Surg [[id number]]     Order Specific Question:   Estimated length of stay     Answer:   More than 2 Midnights     Order Specific Question:   Certification     Answer:   I certify that inpatient services are medically necessary for this patient for a duration of greater than two midnights  See H&P and MD Progress Notes for additional information about the patient's course of treatment  1/28/2020 (6 hours)  [address] Emergency Department  Stroke  Transfer to [facility]  PRIor to arrival : keflex, heparin gtt       Assessment/Plan:    72year old male received from [facility] ed for evaluation and treatment of stroke  PMHX recent stroke with residual left sided weakness  Discharged on lipitor, aspirin and plavix  He has developed progressive left sided weakness especially within the past 2 days  CTA shows moderate to severe occlusion and stenosis  MRI shows MCA ischemia burden has progressed  Admit to inpatient medical surgical for CVA  Plan includes consult neurology, hematology,physical medicine/rehab,neuro surgery, continue heparin gtt,  PT/OT/Speech eval and treat, telemetry, neuro checks q4 hr, dysphagia assessment prior to po intake, Ct cerebral perfusion study, lipid panel, hba1c aptt, p2y12 platelet inhibitor          Reason for not initiating IV thrombolytic  Last known well >4 5 hr  or  unable to determine    Consult neurology   Worsening of patient's left-sided symptoms in setting of known significant right MCA stenosis and progression of patient's right MCA distribution CVA              -MRI brain demonstrates extensive embolic and watershed appearing ischemic burden in the right MCA territory distribution without over hemorrhage or mass effect              -CTA head and neck grossly stable from previous, demonstrating moderate stenosis of the paraclinoid segments of the bilateral internal carotid arteries, more prominent on the right, stable occlusion and severe stenosis in the proximal right M2 branch, and stable severe stenosis within the left M2 branch              -CT head without acute intracranial abnormality              -CT perfusion scan pending              -recommend permissive hypertension with correction of SBP >180 only  Baseline, patient will likely need to be above high normal for blood pressure management moving forward                          -recommend discontinue amlodipine and metoprolol                          -per Neurosurgery, head of bed flat              -continue heparin GGT and aspirin                          -P2Y12 found to be therapeutic, will discontinue Plavix for now, unsure if this is representative of Plavix failure verses insufficient time on dual antiplatelet therapy and continuation of prior CVA process              -appreciate Neurosurgery and await their recommendations              -PT/OT/speech/ PM&R              -will continue to follow closely, please monitor exam and notify with changes      Neuro surgery consult  Assessment & Plan  · Patient originally presented with left facial and arm weakness  Low NIH score and was discharge home  · Presented again yesterday with new concern for facial weakness and confusion per patients wife       Plan:   · Hep gtt   · Permissive HTN but recommend systolic <656   · Continue ASA and plavix at this time  If patient will require intervention then it is vital that he remain on Plavix for AP function     · Will review imaging further and discuss with neurology determination and diagnostic/theraputic benefit of cerebral angiogram    · Timing to be determined  · Appreciate neurology recommendations and management entailing stroke ischemic workup  · No other recommendation from neurosurgical standpoint at this time  · Will continue to see patient as needed until timing determined for angiogram        Hematology consult   ) stroke  2) polycythemia      - polycythemia, unclear this is primary versus secondary  Reviewing his lab, this appears to be relatively new  usually polycythemia is a small risk factor for  Thrombosis, especially secondary polycythemia    - proceed with checking BCR-ABL and JAK2 mutation to differentiate primary versus secondary      Will start gentle IV fluid, if hemoglobin and hematocrit is not able to decrease, patient's symptom is not improving much, will consider phlebotomy  Usually are called for phlebotomy if hemoglobin above 20 or HCT is more than 60% for 2nd polycythemia            ED Triage Vitals   01/29/20 0004 01/29/20 0004 01/29/20 0004 01/29/20 0004 01/29/20 0004   98 1 °F (36 7 °C) 96 17 (!) 162/108 95 %      Oral          No Pain       01/29/20 92 9 kg (204 lb 12 8 oz)     Additional Vital Signs:    Date/Time  Temp  Pulse  Resp  BP  MAP (mmHg)  SpO2   01/29/20 15:17:02  98 5 °F (36 9 °C)  65  18  146/88  107  93 %   01/29/20 10:56:36    67  18  150/91  111  95 %   01/29/20 07:16:32  98 2 °F (36 8 °C)  64  18  153/91  112  95 %   01/29/20 03:10:05  98 1 °F (36 7 °C)  66  18  132/88  103  94 %   01/29/20 0045  98 1 °F (36 7 °C)    18  162/108Abnormal                    Pertinent Labs/Diagnostic Test Results:     MRI brain wo contrast   Final  (01/29 0756)      Ischemic burden within the right MCA distribution has progressed  No hemorrhage or significant mass effect  Abnormal flow characteristics right MCA branches  Moderately advanced chronic small vessel disease              CT cerebral perfusion    (Results Pending)     Results from last 7 days   Lab Units 01/29/20  0015 01/28/20  1722 01/27/20  1303 01/24/20  0500 01/23/20  0527   WBC Thousand/uL 7 81 8 32  --  6 10 7 06   WHITE BLOOD CELL COUNT  Thousand/uL  --   --  7 3  --   --    HEMOGLOBIN g/dL 19 0* 19 1*  --  17 6* 17 2*   HEMOGLOBIN  g/dL  --   --  19 7*  --   --    HEMATOCRIT % 56 9* 57 1*  --  54 0* 53 1*   HEMATOCRIT  %  --   --  59 7*  --   --    PLATELETS Thousands/uL 264 244  --  232 222   PLATELETS  Thousand/uL  --   --  274  --   --    NEUTROS ABS Thousands/µL  --   --   --  4 01 4 99   NEUTROS ABS   cells/uL  --   --  5,132  --   --          Results from last 7 days   Lab Units 01/29/20  0636 01/28/20  1723 01/27/20  1303 01/24/20  0500 01/23/20  0527 01/22/20  1956   SODIUM mmol/L 147* 143 144 144 145 146*   POTASSIUM mmol/L 3 9 4 0 4 0 3 7 3 6 3 8   CHLORIDE mmol/L 115* 108 106 111* 111* 107   CO2 mmol/L 23 22 27 23 24 32   ANION GAP mmol/L 9 13  --  10 10 7   BUN mg/dL 34* 41* 33* 26* 26* 27*   CREATININE mg/dL 1 60* 1 82* 1 61* 1 36* 1 39* 1 56*   EGFR ml/min/1 73sq m 43 37  --  53 51 45   SL AMB CALCIUM mg/dL  --   --  9 7  --   --   --    CALCIUM mg/dL 9 2 9 4  --  9 3 8 5 9 7   MAGNESIUM mg/dL 2 5  --   --  2 3 1 9  --    PHOSPHORUS mg/dL 2 8  --   --   --  3 0  --      Results from last 7 days   Lab Units 01/29/20  0636   AST U/L 21   ALT U/L 27   ALK PHOS U/L 85   TOTAL PROTEIN g/dL 7 9   ALBUMIN g/dL 3 7   TOTAL BILIRUBIN mg/dL 0 91     Results from last 7 days   Lab Units 01/29/20  1055 01/29/20  0718 01/29/20  0638 01/28/20  1900 01/28/20  1705 01/24/20  1057 01/24/20  0739 01/24/20  0023 01/23/20  1712 01/23/20  1202   POC GLUCOSE mg/dl 121 118 79 86 142* 148* 99 104 89 151*     Results from last 7 days   Lab Units 01/29/20  0636 01/28/20  1723 01/27/20  1303 01/24/20  0500 01/23/20  0527 01/22/20  1956   GLUCOSE RANDOM mg/dL 96 140 90 101 102 116         Results from last 7 days   Lab Units 01/29/20  0015 01/27/20  1303 01/23/20  0527   HEMOGLOBIN A1C % 6 0 6 2* 6 2   EAG mg/dl 126 131  7 3 131       Results from last 7 days   Lab Units 01/28/20  1757 01/22/20 1956   TROPONIN I ng/mL <0 02 <0 02         Results from last 7 days   Lab Units 01/29/20  0636 01/29/20  0015 01/28/20  1723 01/22/20  1956   PROTIME seconds  --  13 6 13 2 12 6   INR   --  1 08 1 03 0 97   PTT seconds 82* 51* 34 33     Results from last 7 days   Lab Units 01/29/20  0636   TSH 3RD GENERATON uIU/mL 3 460       Results from last 7 days   Lab Units 01/29/20  1345 01/28/20  2125 01/28/20  2107 01/27/20  1303   CLARITY UA  Clear Slightly Cloudy HAZY  --    COLOR UA  Yellow Yellow YELLOW  --    SPEC GRAV UA  1 034* 1 010  --   --    SPEC GRAV US   --   --  1 010  --    PH UA  5 0 5 5 5 0  --    GLUCOSE UA mg/dl >=1000 (1%)* >=1000 (1%)* 2,000+  --    KETONES UA mg/dl Negative Negative TRACE  --    BLOOD UA  Negative Small* MODERATE  --    PROTEIN UA mg/dl Negative Negative NEGATIVE  --    NITRITE UA  Negative Positive* POSITIVE  --    BILIRUBIN UA  Negative Negative  --   --    BILIRUBIN, UA   --   --  NEGATIVE  --    UROBILINOGEN UA E U /dl 1 0 0 2 0 2  --    LEUKOCYTES UA  Small* Elevated glucose may cause decreased leukocyte values   See urine microscopic for [facility] result/* NEGATIVE  --    WBC UA /hpf Innumerable* 10-20*  --   --    RBC UA /hpf None Seen 2-4*  --   --    BACTERIA UA /hpf Occasional Moderate*  --   --    EPITHELIAL CELLS WET PREP /hpf None Seen None Seen  --   --    CREATININE UR mg/dL  --   --   --  93       ED Treatment:   Medication Administration - No Administrations Displayed (No Start Event Found)     None        Past Medical History:   Diagnosis Date    Diabetes mellitus ([facility] )     Hypertension     Renal disorder      Present on Admission:   Chronic kidney disease, stage 3 (HCC)   Essential hypertriglyceridemia   Gout   Hypertension   Type 2 diabetes mellitus with microalbuminuria (HCC)   Erythrocytosis   CVA (cerebral vascular accident) [facility])      Admitting Diagnosis:   Stroke (cerebrum) ([facility] ) [I63 9]    Age/Sex: 72 y o  male     Admission Orders:    Scheduled Medications:    Medications:  allopurinol 200 mg Oral Daily   aspirin 81 mg Oral Daily   atorvastatin 40 mg Oral Daily With Dinner   fenofibrate 145 mg Oral Daily   insulin lispro 1-5 Units Subcutaneous HS   insulin lispro 1-6 Units Subcutaneous TID AC     Continuous IV Infusions:    heparin (porcine) 3-20 Units/kg/hr (Order-Specific) Intravenous Titrated     PRN Meds:    acetaminophen 650 mg Oral Q4H PRN   aluminum-magnesium hydroxide-simethicone 5 mL Oral Q6H PRN   docusate sodium 100 mg Oral BID PRN   heparin (porcine) 2,000 Units Intravenous PRN   heparin (porcine) 4,000 Units Intravenous PRN   hydrALAZINE 5 mg Intravenous Q6H PRN   ondansetron 4 mg Intravenous Q6H PRN       IP CONSULT TO PHYSICAL MEDICINE REHAB  IP CONSULT TO NEUROLOGY  IP CONSULT TO CASE MANAGEMENT  IP CONSULT TO NUTRITION SERVICES  IP CONSULT TO NEUROSURGERY  IP CONSULT TO HEMATOLOGY    Network Utilization Review Department  [email or web address]  ATTENTION: Please call with any questions or concerns to [phone] and carefully listen to the prompts so that you are directed to the right person  All voicemails are confidential   [employer] all requests for admission clinical reviews, approved or denied determinations and any other requests to dedicated fax number below belonging to the campus where the patient is receiving treatment   List of dedicated fax numbers for the Facilities:  FACILITY NAME UR FAX NUMBER   ADMISSION DENIALS (Administrative/Medical Necessity) [phone]   [address]  (Maternity/NICU/Pediatrics) [phone]   Parveen Zaidi [phone]   True Constant [phone]   Jose D Standing [phone]   [address] [facility] [phone]   [address] [facility]  [phone]   [address] [place] [address] [facility] [phone]

## 2020-01-29 NOTE — SPEECH THERAPY NOTE
Speech-Language Pathology Bedside Swallow Evaluation    Patient Name: Tiffany Cornejo    ENTCZ'Z Date: 1/29/2020     Problem List  Principal Problem:    CVA (cerebral vascular accident) [facility])  Active Problems:    Chronic kidney disease, stage 3 ([facility] )    Essential hypertriglyceridemia    Gout    Hypertension    Type 2 diabetes mellitus with microalbuminuria ([facility] )    Erythrocytosis      Past Medical History  Past Medical History:   Diagnosis Date    Diabetes mellitus ([place] [address] )     Hypertension     Renal disorder        Past Surgical History  Past Surgical History:   Procedure Laterality Date    COLONOSCOPY  09/18/2006    complete; 10 yrs    COLONOSCOPY  10/23/2017    complete; 5 yrs       Summary   Pt presented with s/s suggestive of mild oral dysphagia and suspected WFL pharyngeal swallow  Symptoms or concerns included L side facial weakness, L lingual residual coating, L labial residual with reduced sensation to same  Speech is mildly dysarthric  Pharyngeal swallow appears WFL, and no s/s aspiration were observed during evaluation  Risk/s for Aspiration: suspect low    Recommended Diet: regular diet and thin liquids   Recommended Form of Meds: whole with liquid   Aspiration precautions and swallowing strategies: upright posture, only feed when fully alert, slow rate of feeding, small bites/sips and lingual speep/liquid wash to clear oral residual      Current Medical Status per Dr Quinn Ferguson H&P 1/29/2020  Tiffany Cornejo is a 72 y o  male who has a recent history of acute stroke with residual left-sided weakness  The pertinent findings as noted above  He is also discharged with Lipitor, it aspirin and Plavix  However over the past week especially within the past 2 days, there is increasing weakness of the left side  Although he does not have any dysphonia or dysphagia, the patient was noted to be dragging his left foot and somewhat trying to throw it in front of his body in order to be able to walk  Likewise noted to was drifting and somewhat tendency to go towards the left side  Patient does not have any judy-neglect  He also denies any numbness or tingling sensation  Because of that, the patient's family was very concerned and therefore brought him to [facility]; while the findings revealed moderate to severe occlusion and stenosis; there were no new vascular findings  Likewise, there is no evidence of acute vascular territorial infarction however still they recommended MRI to give a better assessment  The patient was referred to neurology as well as Neurosurgery; and they recommended patient transfer for further evaluation here in [facility]  Currently, patient is generally stable  He shows left-sided weakness with note of drifting  There is also left facial droop  Noted the patient is able to speak with no problem and no dysphonia or dysphagia  No drooling  Current Precautions:  Fall     Special Studies:  MRI 1/29/2020 IMPRESSION:  Ischemic burden within the right MCA distribution has progressed  No hemorrhage or significant mass effect  Abnormal flow characteristics right MCA branches  Moderately advanced chronic small vessel disease  CXR 1/28/20 IMPRESSION:  No acute cardiopulmonary disease  Social/Education/Vocational Hx:  Pt lives with family    Swallow Information   Current Risks for Dysphagia & Aspiration: CVA  Current Diet: regular diet and thin liquids   Baseline Diet: regular diet and thin liquids      Baseline Assessment   Behavior/Cognition: alert  Speech/Language Status: able to participate in basic conversation and able to follow commands  Patient Positioning: upright in bed  Pain Status/Interventions/Response to Interventions:  No report of or nonverbal indications of pain         Swallow Mechanism Exam  Facial: left facial droop  Labial: Decreased ROM R side  Lingual: Suspect reduced sensation R side  Velum: symmetrical  Mandible: adequate ROM  Dentition: adequate  Vocal quality:clear/adequate       Consistencies Assessed and Performance   Consistencies Administered: pt seen eating his lunch - water, salad, and stuffed shells     Oral Stage: mild  Mastication was adequate with the materials administered today  Bolus formation and transfer were mildly disorganized for regular solids with L lingual residue noted however no excessive pocketing  No anterior bolus loss with liquids  Pharyngeal Stage: WFL  Swallow Mechanics: Swallowing initiation appeared prompt  Laryngeal rise was palpated and judged to be within functional limits  No coughing, throat clearing, change in vocal quality or respiratory status noted today  Esophageal Concerns: none reported    Strategies and Efficacy: Liquid wash for oral residual clearance    Summary and Recommendations (see above)    Results Reviewed with: patient, RN and family       Treatment Recommended: ST f/u for oral dysphagia - exercises    Frequency of treatment: 2-3x      Dysphagia LTG per SLP  -Patient will demonstrate optimum safety and efficacy of oral intake and swallowing function without overt s/sx of penetration or aspiration for the highest appropriate diet level       Short Term Goals:  -Pt will tolerate regular diet and thin liquid with no significant s/s oral or pharyngeal dysphagia across 1-3 diagnostic sessions/    -Patient will complete daily labial and facial exercises to increase buccal tension to [facility] to eliminate pocketing/retention of food in the anterior and lateral sulci with no cues needed     -Patient will complete daily oral motor exercise to increase labial strength, range of motion and coordination with min cues to 90% effectiveness to strengthen oral musculature and prevent food or liquid spillage from the oral cavity/maximize oral control with no cues needed

## 2020-01-29 NOTE — ASSESSMENT & PLAN NOTE
Patient recently admitted and discharged last Wednesday, with CVA and left-sided weakness and the assessment as follows:  Assessment & Plan  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    Moderate to severe focal left carotid stenosis at the paraophthalmic segment intracranially    Mild stenosis right carotid bifurcation the neck with atheromatous plaque noted  Patient did not receive tPA  At this point NIH scale is 0  MRI brain showed  1  Punctate acute infarcts in the right frontoparietal region in a watershed territory distribution consistent with known proximal right MCA occlusion or high-grade stenosis  2   Chronic deep white matter, basal ganglia and brainstem lacunar infarcts   Advanced microangiopathic disease  3   Nonspecific foci of chronic microhemorrhage in the right cerebellar hemisphere and brachium pontis  Echocardiogram showed ejection fraction of 55% with no regional wall motion abnormality  Neurology consult and follow-up instructions noted  As per Neurology patient needs to be on aspirin and Plavix for 3 months and then switched to Plavix alone  Continue on Lipitor  Outpatient follow-up with Neurosurgery and Neurology  P T /OT saw the patient  Patient will be discharged home and does not require any home care services      Noted worsening of left-sided weakness with no dysphonia  Currently significant for left-sided drifting; placed on stroke protocol  As per recommendations of Neurology; started heparin drip at low dose  Dr Jaxson Romero of neurosurgery was also consulted and hence a consult is placed  Here does recommend the P2Y12 platelet assay  Ordered for tomorrow  Continue stroke protocol  MRI to detect any changes from last admission to current admission  Continue Plavix and Lipitor

## 2020-01-29 NOTE — RESTORATIVE TECHNICIAN NOTE
Restorative Specialist Mobility Note       Activity: Ambulate in purcell, Ambulate in room, Bathroom privileges, Chair, Dangle, Stand at bedside(Educated/encouraged pt to ambulate with assistance 3-4 x's/day  Bed alarm on   Pt callbell, phone/tray within reach )     Assistive Device: Front wheel walker       Shaun MAC, Restorative Technician, [employer]

## 2020-01-29 NOTE — PROGRESS NOTES
Progress Note - Neurology   Britney Vieyra 72 y o  male MRN: [id number]  [facility] Encounter: [id number]    Assessment/ Plan:  1)  Worsening of patient's left-sided symptoms in setting of known significant right MCA stenosis and progression of patient's right MCA distribution CVA   -MRI brain demonstrates extensive embolic and watershed appearing ischemic burden in the right MCA territory distribution without over hemorrhage or mass effect   -CTA head and neck grossly stable from previous, demonstrating moderate stenosis of the paraclinoid segments of the bilateral internal carotid arteries, more prominent on the right, stable occlusion and severe stenosis in the proximal right M2 branch, and stable severe stenosis within the left M2 branch   -CT head without acute intracranial abnormality   -CT perfusion scan pending   -recommend permissive hypertension with correction of SBP >180 only  Baseline, patient will likely need to be above high normal for blood pressure management moving forward    -recommend discontinue amlodipine and metoprolol    -per Neurosurgery, head of bed flat   -continue heparin GGT and aspirin    -P2Y12 found to be therapeutic, will discontinue Plavix for now, unsure if this is representative of Plavix failure verses insufficient time on dual antiplatelet therapy and continuation of prior CVA process   -appreciate Neurosurgery and await their recommendations   -PT/OT/speech/ PM&R   -will continue to follow closely, please monitor exam and notify with changes       Subjective:   Patient is a 71-year-old male with HTN, DM 2, HLD, who originally presented to the [facility] Emergency Department on 01/22/2020 with left facial droop, drooling, and gait imbalance with speech disturbance  The patient was found to have an acute right frontal temporal watershed distribution CVA, consistent with known proximal right MCA high-grade stenosis    The patient was stabilized and discharged, with plan for dual anti-platelet therapy x3 months then Plavix monotherapy  On 01/28/2019, the patient re-presented to the [facility] upper buttocks and urgency Department secondary to worsening of his CVA symptoms  The patient was transferred to [facility] for higher level of care and possible neuro vascular intervention  Heparin drip was initiated  MRI brain demonstrated worsening of patient's right MCA territory infarctions, both embolic appearing and watershed  No additional acute events overnight  On exam today, the patient is resting comfortably in bed in no acute distress  Twelve point review systems was completed as negative  Patient demonstrates a grossly nonfocal neurological exam with exception of left central facial droop and left upper extremity pronator drift  Strength is full throughout  Remainder of neurological exam as detailed below          ROS:  See Subjective     Medication:  Scheduled Meds:  Current Facility-Administered Medications:  acetaminophen 650 mg Oral Q4H PRN Danika Lambert MD    allopurinol 200 mg Oral Daily Danika Lambert MD    aluminum-magnesium hydroxide-simethicone 5 mL Oral Q6H PRN Danika Lambert MD    amLODIPine 10 mg Oral Daily Danika Lambert MD    aspirin 81 mg Oral Daily Danika Lambert MD    atorvastatin 40 mg Oral Daily With Claudette Darnel, MD    clopidogrel 75 mg Oral Daily Danika Lambert MD    docusate sodium 100 mg Oral BID PRN Danika Lambert MD    fenofibrate 145 mg Oral Daily Danika Lambert MD    heparin (porcine) 3-20 Units/kg/hr (Order-Specific) Intravenous Titrated Danika Lambert MD Last Rate: 13 1 Units/kg/hr (01/29/20 0136)   heparin (porcine) 2,000 Units Intravenous PRN Danika Lambert MD    heparin (porcine) 4,000 Units Intravenous PRN Danika Lambert MD    insulin lispro 1-5 Units Subcutaneous HS Danika Lambert MD    insulin lispro 1-6 Units Subcutaneous TID AC Danika Lambert MD    metoprolol tartrate 50 mg Oral Q12H [facility] Medardo Silva MD    ondansetron 4 mg Intravenous Q6H PRN Medardo Silva MD      Continuous Infusions:  heparin (porcine) 3-20 Units/kg/hr (Order-Specific) Last Rate: 13 1 Units/kg/hr (01/29/20 0136)     PRN Meds:   acetaminophen    aluminum-magnesium hydroxide-simethicone    docusate sodium    heparin (porcine)    heparin (porcine)    ondansetron      Vitals: Blood pressure 153/91, pulse 64, temperature 98 2 °F (36 8 °C), resp  rate 18, height 5' 9" (1 753 m), weight 92 9 kg (204 lb 12 8 oz), SpO2 95 %  ,Body mass index is 30 24 kg/m²  Physical Exam:  Physical Exam   Constitutional: He is oriented to person, place, and time  He appears well-developed and well-nourished  No distress  HENT:   Head: Normocephalic and atraumatic  Right Ear: External ear normal    Left Ear: External ear normal    Mouth/Throat: Oropharynx is clear and moist  No oropharyngeal exudate  Eyes: Conjunctivae are normal  Right eye exhibits no discharge  Left eye exhibits no discharge  No scleral icterus  Neck: Normal range of motion  Neck supple  Pulmonary/Chest: Effort normal  No respiratory distress  Musculoskeletal: Normal range of motion  He exhibits no edema, tenderness or deformity  Neurological: He is oriented to person, place, and time  He has normal strength  Skin: Skin is warm and dry  No rash noted  He is not diaphoretic  No erythema  No pallor  Psychiatric: He has a normal mood and affect  His behavior is normal    Nursing note and vitals reviewed  Neurologic Exam     Mental Status   Oriented to person, place, and time  Follows 2 step commands  Attention: normal  Concentration: normal    Level of consciousness: alert  Knowledge: good  Normal comprehension  Cranial Nerves   Cranial nerves II through XII intact     With exception of left central facial droop     Motor Exam   Muscle bulk: normal  Overall muscle tone: normal  Right arm pronator drift: absent  Left arm pronator drift: present    Strength   Strength 5/5 throughout  Sensory Exam   Light touch normal      Gait, Coordination, and Reflexes     Gait  Gait: (Deferred for safety)    Tremor   Resting tremor: absent    Reflexes   Right plantar: normal  Left plantar: upgoing      Lab, Imaging and other studies: I have personally reviewed pertinent reports       I have personally reviewed pertinent imaging in PACs  Recent Results (from the past 24 hour(s))   ECG 12 lead    Collection Time: 01/28/20  5:02 PM   Result Value Ref Range    Ventricular Rate 91 BPM    Atrial Rate 91 BPM    SD Interval 154 ms    QRSD Interval 96 ms    QT Interval 378 ms    QTC Interval 464 ms    P [place] 51 degrees    QRS Axis -11 degrees    T Wave Axis 76 degrees   Fingerstick Glucose (POCT)    Collection Time: 01/28/20  5:05 PM   Result Value Ref Range    POC Glucose 142 (H) 65 - 140 mg/dl   CBC and Platelet    Collection Time: 01/28/20  5:22 PM   Result Value Ref Range    WBC 8 32 4 31 - 10 16 Thousand/uL    RBC 6 60 (H) 3 88 - 5 62 Million/uL    Hemoglobin 19 1 (H) 12 0 - 17 0 g/dL    Hematocrit 57 1 (H) 36 5 - 49 3 %    MCV 87 82 - 98 fL    MCH 28 9 26 8 - 34 3 pg    MCHC 33 5 31 4 - 37 4 g/dL    RDW 14 9 11 6 - 15 1 %    Platelets 682 312 - 203 Thousands/uL    MPV 10 4 8 9 - 12 7 fL   Basic metabolic panel    Collection Time: 01/28/20  5:23 PM   Result Value Ref Range    Sodium 143 136 - 145 mmol/L    Potassium 4 0 3 5 - 5 3 mmol/L    Chloride 108 100 - 108 mmol/L    CO2 22 21 - 32 mmol/L    ANION GAP 13 4 - 13 mmol/L    BUN 41 (H) 5 - 25 mg/dL    Creatinine 1 82 (H) 0 60 - 1 30 mg/dL    Glucose 140 65 - 140 mg/dL    Calcium 9 4 8 3 - 10 1 mg/dL    eGFR 37 ml/min/1 73sq m   Protime-INR    Collection Time: 01/28/20  5:23 PM   Result Value Ref Range    Protime 13 2 11 6 - 14 5 seconds    INR 1 03 0 84 - 1 19   APTT    Collection Time: 01/28/20  5:23 PM   Result Value Ref Range    PTT 34 23 - 37 seconds   Troponin I    Collection Time: 01/28/20  5:57 PM   Result Value Ref Range    Troponin I <0 02 <=0 04 ng/mL   Fingerstick Glucose (POCT)    Collection Time: 01/28/20  7:00 PM   Result Value Ref Range    POC Glucose 86 65 - 140 mg/dl   POCT urinalysis dipstick    Collection Time: 01/28/20  9:07 PM   Result Value Ref Range    Color, UA YELLOW     Clarity, UA HAZY     Glucose, UA (Ref: Negative) 2,000+     Bilirubin, UA (Ref: Negative) NEGATIVE     Ketones, UA (Ref: Negative) TRACE     Spec Grav, UA (Ref:1 003-1 030) 1 010     Blood, UA (Ref: Negative) MODERATE     pH, UA (Ref: 4 5-8 0) 5 0     Protein, UA (Ref: Negative) NEGATIVE     Urobilinogen, UA (Ref: 0 2- 1 0) 0 2      Leukocytes, UA (Ref: Negative) NEGATIVE     Nitrite, UA (Ref: Negative) POSITIVE    UA w Reflex to Microscopic w Reflex to Culture    Collection Time: 01/28/20  9:25 PM   Result Value Ref Range    Color, UA Yellow     Clarity, UA Slightly Cloudy     Specific [place], UA 1 010 1 003 - 1 030    pH, UA 5 5 4 5, 5 0, 5 5, 6 0, 6 5, 7 0, 7 5, 8 0    Leukocytes, UA (A) Negative     Elevated glucose may cause decreased leukocyte values   See urine microscopic for [facility] result/    Nitrite, UA Positive (A) Negative    Protein, UA Negative Negative mg/dl    Glucose, UA >=1000 (1%) (A) Negative mg/dl    Ketones, UA Negative Negative mg/dl    Urobilinogen, UA 0 2 0 2, 1 0 E U /dl E U /dl    Bilirubin, UA Negative Negative    Blood, UA Small (A) Negative   Urine Microscopic    Collection Time: 01/28/20  9:25 PM   Result Value Ref Range    RBC, UA 2-4 (A) None Seen, 0-5 /hpf    WBC, UA 10-20 (A) None Seen, 0-5, 5-55, 5-65 /hpf    Epithelial Cells None Seen None Seen, Occasional /hpf    Bacteria, UA Moderate (A) None Seen, Occasional /hpf    WBC Clumps Present    Hemoglobin A1c w/EAG Estimation (Orders if not completed within the last 90 days)    Collection Time: 01/29/20 12:15 AM   Result Value Ref Range    Hemoglobin A1C 6 0 4 2 - 6 3 %     mg/dl   APTT six (6) hours after Heparin bolus/drip initiation or dosing change    Collection Time: 01/29/20 12:15 AM   Result Value Ref Range    PTT 51 (H) 23 - 37 seconds   CBC    Collection Time: 01/29/20 12:15 AM   Result Value Ref Range    WBC 7 81 4 31 - 10 16 Thousand/uL    RBC 6 43 (H) 3 88 - 5 62 Million/uL    Hemoglobin 19 0 (H) 12 0 - 17 0 g/dL    Hematocrit 56 9 (H) 36 5 - 49 3 %    MCV 89 82 - 98 fL    MCH 29 5 26 8 - 34 3 pg    MCHC 33 4 31 4 - 37 4 g/dL    RDW 14 8 11 6 - 15 1 %    Platelets 521 484 - 201 Thousands/uL    MPV 10 2 8 9 - 12 7 fL   Protime-INR    Collection Time: 01/29/20 12:15 AM   Result Value Ref Range    Protime 13 6 11 6 - 14 5 seconds    INR 1 08 0 84 - 1 19   Lipid Panel with Direct LDL reflex    Collection Time: 01/29/20  6:36 AM   Result Value Ref Range    Cholesterol 122 50 - 200 mg/dL    Triglycerides 99 <=150 mg/dL    HDL, Direct 35 (L) >=40 mg/dL    LDL Calculated 67 0 - 100 mg/dL   TSH, 3rd generation    Collection Time: 01/29/20  6:36 AM   Result Value Ref Range    TSH 3RD GENERATON 3 460 0 358 - 3 740 uIU/mL   Comprehensive metabolic panel    Collection Time: 01/29/20  6:36 AM   Result Value Ref Range    Sodium 147 (H) 136 - 145 mmol/L    Potassium 3 9 3 5 - 5 3 mmol/L    Chloride 115 (H) 100 - 108 mmol/L    CO2 23 21 - 32 mmol/L    ANION GAP 9 4 - 13 mmol/L    BUN 34 (H) 5 - 25 mg/dL    Creatinine 1 60 (H) 0 60 - 1 30 mg/dL    Glucose 96 65 - 140 mg/dL    Calcium 9 2 8 3 - 10 1 mg/dL    AST 21 5 - 45 U/L    ALT 27 12 - 78 U/L    Alkaline Phosphatase 85 46 - 116 U/L    Total Protein 7 9 6 4 - 8 2 g/dL    Albumin 3 7 3 5 - 5 0 g/dL    Total Bilirubin 0 91 0 20 - 1 00 mg/dL    eGFR 43 ml/min/1 73sq m   Magnesium    Collection Time: 01/29/20  6:36 AM   Result Value Ref Range    Magnesium 2 5 1 6 - 2 6 mg/dL   Phosphorus    Collection Time: 01/29/20  6:36 AM   Result Value Ref Range    Phosphorus 2 8 2 3 - 4 1 mg/dL   APTT    Collection Time: 01/29/20  6:36 AM   Result Value Ref Range    PTT 82 (H) 23 - 37 seconds Fingerstick Glucose (POCT)    Collection Time: 01/29/20  6:38 AM   Result Value Ref Range    POC Glucose 79 65 - 140 mg/dl   Fingerstick Glucose (POCT)    Collection Time: 01/29/20  7:18 AM   Result Value Ref Range    POC Glucose 118 65 - 140 mg/dl   P2Y12 Platelet inhibitor    Collection Time: 01/29/20 10:29 AM   Result Value Ref Range    P2Y12 74 (L) 194 - 418 PRU   Fingerstick Glucose (POCT)    Collection Time: 01/29/20 10:55 AM   Result Value Ref Range    POC Glucose 121 65 - 140 mg/dl   ]    VTE Prophylaxis: Sequential compression device (Venodyne)  and Heparin GGT      Counseling / Coordination of Care  Total time spent today 35 minutes  Greater than 50% of total time was spent with the patient and / or family counseling and / or coordination of care  A description of the counseling / coordination of care: Sue Starkey The pt was seen and examined by myself and the attending physician  The chart was reviewed thoroughly, including laboratory values and imaging studies  The pt was counseled in the room  The patient was discussed extensively with Internal Medicine and Neurosurgery

## 2020-01-29 NOTE — ASSESSMENT & PLAN NOTE
· Patient originally presented with left facial and arm weakness  Low NIH score and was discharge home  · Presented again yesterday with new concern for facial weakness and confusion per patients wife  Imaging:   · CTA 1/28/2020: Stable moderate stenosis of bilateral paraclinoid ICA  Stable occlusion and severe stenosis of proximal right M2 with distal reperfusion  Stable severe stenosis within left M2 branch  · MRI brain 1/29/2020: Ischemic burden within the right MCA distribution has progressed  No hemorrhage or mass effect  Abnormal flow characteristics of right MCA distribution  Plan:   · Hep gtt   · Permissive HTN but recommend systolic <206   · Continue ASA and plavix at this time  If patient will require intervention then it is vital that he remain on Plavix for AP function  · Will review imaging further and discuss with neurology determination and diagnostic/theraputic benefit of cerebral angiogram    · Timing to be determined  · Appreciate neurology recommendations and management entailing stroke ischemic workup  · No other recommendation from neurosurgical standpoint at this time  · Will continue to see patient as needed until timing determined for angiogram  Call with other questions or concerns

## 2020-01-29 NOTE — PLAN OF CARE
Problem: PHYSICAL THERAPY ADULT  Goal: Performs mobility at highest level of function for planned discharge setting  See evaluation for individualized goals  Description  Treatment/Interventions: Functional transfer training, LE strengthening/ROM, Therapeutic exercise, Elevations, Endurance training, Patient/family training, Equipment eval/education, Bed mobility, Gait training  Equipment Recommended: Francy Mitchell       See flowsheet documentation for full assessment, interventions and recommendations  Note:   Prognosis: Good  Problem List: Decreased strength, Decreased endurance, Impaired balance, Decreased mobility, Decreased coordination, Impaired tone  Assessment: Pt seen for high complexity physical therapy evaluation  Pt is a 71 y/o male w/ history/comorbidities of recent CVA- just d/c home w/ little to no residual, DM, HTN who is now admitted as a transfer rom [address] w/ worsening L sided weakness, L sided lean and foot drag w/ gait  Per wife, had not been taking meds, did not eat on 1st day back to work  Wife also reports some cog changes  Undergoing w/u  Due to acute medical issues, need for hospital re-admit, need for transfer to higher level of care, pain, fall risk, note unstable clinical picture  PT consulted to assess mobility, d/c needs  Pt presents w/ decreased functional mob, standing balance, endurance, L sided strength and coordination, barriers at home  will benefit from skilled PT to correct for the above problems  Recommend rehab at d/c presently        Recommendation: Post acute IP rehab(recommend rehab at d/c)     PT - OK to Discharge: (S) Yes(when stable to rehab)    See flowsheet documentation for full assessment

## 2020-01-29 NOTE — H&P
H&P- Cesar Villarreal 1950, 72 y o  male MRN: [id number]    Unit/Bed#: [facility] 725-01 Encounter: [id number]    Primary Care Provider: Zoe Serrano DO   Date and time admitted to hospital: 1/28/2020 11:56 PM        * CVA (cerebral vascular accident) [facility])  Assessment & Plan  Patient recently admitted and discharged last Wednesday, with CVA and left-sided weakness and the assessment as follows:  Assessment & Plan  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    Moderate to severe focal left carotid stenosis at the paraophthalmic segment intracranially    Mild stenosis right carotid bifurcation the neck with atheromatous plaque noted  Patient did not receive tPA  At this point NIH scale is 0  MRI brain showed  1  Punctate acute infarcts in the right frontoparietal region in a watershed territory distribution consistent with known proximal right MCA occlusion or high-grade stenosis  2   Chronic deep white matter, basal ganglia and brainstem lacunar infarcts   Advanced microangiopathic disease  3   Nonspecific foci of chronic microhemorrhage in the right cerebellar hemisphere and brachium pontis  Echocardiogram showed ejection fraction of 55% with no regional wall motion abnormality  Neurology consult and follow-up instructions noted  As per Neurology patient needs to be on aspirin and Plavix for 3 months and then switched to Plavix alone  Continue on Lipitor  Outpatient follow-up with Neurosurgery and Neurology  P T /OT saw the patient  Patient will be discharged home and does not require any home care services      Noted worsening of left-sided weakness with no dysphonia  Currently significant for left-sided drifting; placed on stroke protocol  As per recommendations of Neurology; started heparin drip at low dose  Dr Shahida Flores of neurosurgery was also consulted and hence a consult is placed    Here does recommend the P2Y12 platelet assay  Ordered for tomorrow  Continue stroke protocol  MRI to detect any changes from last admission to current admission  Continue Plavix and Lipitor  Erythrocytosis  Assessment & Plan  Will continue to watch  Currently hemoglobin is at 19  Hematology consult  Reticulocyte count  Type 2 diabetes mellitus with microalbuminuria [facility])  Assessment & Plan  Lab Results   Component Value Date    HGBA1C 6 2 (H) 01/27/2020     Patient without dysphonia or dysphagia; will continue diabetic/cardiac diet  Invokana is non formulary  However will check blood sugars before meals and bedtime with Accu-Cheks ordered and insulin sliding scale as per protocol  Recent Labs     01/28/20  1705 01/28/20  1900   POCGLU 142* 86       Blood Sugar Average: Last 72 hrs:      Hypertension  Assessment & Plan  Continue metoprolol 50 mg Q 12; amlodipine 10 mg daily  Gout  Assessment & Plan  Continue allopurinol 200 mg daily  Essential hypertriglyceridemia  Assessment & Plan  Continue Tricor and Lipitor  Chronic kidney disease, stage 3 ([address] )  Assessment & Plan  Will continue to watch and metabolic profile for tomorrow  VTE Prophylaxis: Heparin Drip  / foot pump applied   Code Status: Level 1 - Full Code as discussed with patient  POLST: There is no POLST form on file for this patient (pre-hospital)    Anticipated Length of Stay:  Patient will be admitted on an Inpatient basis with an anticipated length of stay of  greater than 2 midnights  Justification for Hospital Stay: Please see detailed plans noted above  Chief Complaint:     Worsening left-sided weakness  History of Present Illness:  Chris Bray is a 72 y o  male who has a recent history of acute stroke with residual left-sided weakness  The pertinent findings as noted above  He is also discharged with Lipitor, it aspirin and Plavix    However over the past week especially within the past 2 days, there is increasing weakness of the left side   Although he does not have any dysphonia or dysphagia, the patient was noted to be dragging his left foot and somewhat trying to throw it in front of his body in order to be able to walk  Likewise noted to was drifting and somewhat tendency to go towards the left side  Patient does not have any judy-neglect  He also denies any numbness or tingling sensation  Because of that, the patient's family was very concerned and therefore brought him to [facility]; while the findings revealed moderate to severe occlusion and stenosis; there were no new vascular findings  Likewise, there is no evidence of acute vascular territorial infarction however still they recommended MRI to give a better assessment  The patient was referred to neurology as well as Neurosurgery; and they recommended patient transfer for further evaluation here in [place]  Currently, patient is generally stable  He shows left-sided weakness with note of drifting  There is also left facial droop  Noted the patient is able to speak with no problem and no dysphonia or dysphagia  No drooling  Review of Systems:    Constitutional:  Denies fever or chills   Eyes:  Denies change in visual acuity   HENT:  Denies nasal congestion or sore throat   Respiratory:  Denies cough or shortness of breath   Cardiovascular:  Denies chest pain or edema   GI:  Denies abdominal pain, nausea, vomiting, bloody stools or diarrhea   :  Denies dysuria   Musculoskeletal:  Denies back pain or joint pain   Integument:  Denies rash   Neurologic:  Denies headache, with note of left-sided drifting and weakness of upper extremity and lower extremity    or sensory changes   Endocrine:  Denies polyuria or polydipsia   Lymphatic:  Denies swollen glands   Psychiatric:  Denies depression or anxiety     Past Medical and Surgical History:   Past Medical History:   Diagnosis Date    Diabetes mellitus ([facility] [address] )     Hypertension     Renal disorder      Past Surgical History:   Procedure Laterality Date    COLONOSCOPY  09/18/2006    complete; 10 yrs    COLONOSCOPY  10/23/2017    complete; 5 yrs       Meds/Allergies:  Medications Prior to Admission   Medication    allopurinol (ZYLOPRIM) 100 mg tablet    amLODIPine (NORVASC) 10 mg tablet    aspirin 81 MG tablet    atorvastatin (LIPITOR) 40 mg tablet    clopidogrel (PLAVIX) 75 mg tablet    fenofibrate (TRICOR) 145 mg tablet    INVOKANA 100 MG    metoprolol tartrate (LOPRESSOR) 50 mg tablet    glucose monitoring kit (FREESTYLE) monitoring kit    Lancets (FREESTYLE) lancets       Allergies: No Known Allergies  History:  Marital Status: /Civil Union   Occupation:  Currently works as [employer] placement with [employer]    Patient Pre-hospital Living Situation:  Lives at home with wife and son  Patient Pre-hospital Level of Mobility:  Normally ambulatory but currently with much difficulty  Patient Pre-hospital Diet Restrictions:  Cardiac and diabetic  Substance Use History:   Social History     Substance and Sexual Activity   Alcohol Use Never    Frequency: Never    Binge frequency: Never     Social History     Tobacco Use   Smoking Status Never Smoker   Smokeless Tobacco Never Used     Social History     Substance and Sexual Activity   Drug Use Never       Family History:  Family History   Problem Relation Age of Onset    Breast cancer Mother     Kidney disease Father     Lung cancer Father     Other Father         smoker    Hypertension Other        Physical Exam:     Vitals:   Blood Pressure: (!) 162/108 (01/29/20 0004)  Pulse: 96 (01/29/20 0004)  Temperature: 98 1 °F (36 7 °C) (01/29/20 0004)  Respirations: 17 (01/29/20 0004)  SpO2: 95 % (01/29/20 0004)    Constitutional:  Well developed, well nourished, no acute distress, non-toxic appearance   Eyes:  PERRL, conjunctiva normal   HENT:  Atraumatic, external ears normal, nose normal, oropharynx moist, no pharyngeal exudates  Neck- normal range of motion, no tenderness, supple   Respiratory:  No respiratory distress, normal breath sounds, no rales, no wheezing   Cardiovascular:  Normal rate, normal rhythm, no murmurs, no gallops, no rubs   GI:  Soft, nondistended, normal bowel sounds, nontender, no organomegaly, no mass, no rebound, no guarding   :  No costovertebral angle tenderness   Musculoskeletal:  No edema, no tenderness, no deformities  Back- no tenderness  Integument:  Well hydrated, no rash   Lymphatic:  No lymphadenopathy noted   Neurologic:  Alert &awake, communicative, with no dysphonia or dysphagia, pupils are equal   They are reactive  Noted left facial droop; no drooling  There is also left-sided upper extremity drifting  The strength at the left side is 4/5 while at the right side is 5/5   Psychiatric:  Speech and behavior appropriate       Lab Results: I have personally reviewed pertinent reports  Results from last 7 days   Lab Units 01/29/20  0015  01/27/20  1303   WBC Thousand/uL 7 81   < >  --    WHITE BLOOD CELL COUNT  Thousand/uL  --   --  7 3   HEMOGLOBIN g/dL 19 0*   < >  --    HEMOGLOBIN  g/dL  --   --  19 7*   HEMATOCRIT % 56 9*   < >  --    HEMATOCRIT  %  --   --  59 7*   PLATELETS Thousands/uL 264   < >  --    PLATELETS  Thousand/uL  --   --  274   NEUTROS PCT  %  --   --  70 3   LYMPHS PCT  %  --   --  18 9   MONOS PCT  %  --   --  6 3   EOS PCT  %  --   --  3 0    < > = values in this interval not displayed  Results from last 7 days   Lab Units 01/28/20  1723   POTASSIUM mmol/L 4 0   CHLORIDE mmol/L 108   CO2 mmol/L 22   BUN mg/dL 41*   CREATININE mg/dL 1 82*   CALCIUM mg/dL 9 4     Results from last 7 days   Lab Units 01/29/20  0015   INR  1 08           Imaging: I have personally reviewed pertinent reports        Cta Head And Neck W Wo Contrast    Result Date: 1/23/2020  Narrative: CTA NECK AND BRAIN WITH AND WITHOUT CONTRAST INDICATION: TIA, initial exam COMPARISON:   CT January 22, 2020 TECHNIQUE:  Routine CT imaging of the Brain without contrast   Post contrast imaging was performed after administration of iodinated contrast through the neck and brain  Post contrast axial 0 625 mm images timed to opacify the arterial system  3D rendering was performed on an independent workstation  MIP reconstructions performed  Coronal reconstructions were performed of the noncontrast portion of the brain  Radiation dose length product (DLP) for this visit:  1178 mGy-cm   This examination, like all CT scans performed in the [facility], was performed utilizing techniques to minimize radiation dose exposure, including the use of iterative reconstruction and automated exposure control  IV Contrast:  85 mL of iodixanol (VISIPAQUE)  IMAGE QUALITY:   Diagnostic FINDINGS: NONCONTRAST BRAIN PARENCHYMA: Decreased attenuation is noted in periventricular and subcortical white matter demonstrating an appearance that is statistically most likely to represent moderate microangiopathic change; this appearance is similar when compared to most recent prior examination  No CT signs of acute infarction  No intracranial mass, mass effect or midline shift  No acute parenchymal hemorrhage  VENTRICLES AND EXTRA-AXIAL SPACES:  Normal for the patient's age  VISUALIZED ORBITS AND PARANASAL SINUSES:  Unremarkable  CERVICAL VASCULATURE AORTIC ARCH AND GREAT VESSELS:  Normal aortic arch and great vessel origins  Normal visualized subclavian vessels  RIGHT VERTEBRAL ARTERY CERVICAL SEGMENT:  Normal origin  The vessel is normal in caliber throughout the neck  LEFT VERTEBRAL ARTERY CERVICAL SEGMENT:  Normal origin  The vessel is normal in caliber throughout the neck  RIGHT EXTRACRANIAL CAROTID SEGMENT:  There is atheromatous plaque right carotid bifurcation resulting in less than 50% stenosis  Remainder of the right ICA is widely patent  LEFT EXTRACRANIAL CAROTID SEGMENT:  Normal caliber common carotid artery  Normal bifurcation and cervical internal carotid artery  No stenosis or dissection  NASCET criteria was used to determine the degree of internal carotid artery diameter stenosis  INTRACRANIAL VASCULATURE INTERNAL CAROTID ARTERIES:  Atherosclerotic calcification and atheromatous plaque at the ophthalmic region bilaterally results in moderate to severe left stenosis at the ophthalmic segment of the left internal carotid artery and mild right paraophthalmic  stenosis  Bilateral internal carotid arteries are patent to the carotid terminus with mild supraclinoid left ICA stenosis  ANTERIOR CIRCULATION:  Symmetric A1 segments and anterior cerebral arteries with normal enhancement  Normal anterior communicating artery  MIDDLE CEREBRAL ARTERY CIRCULATION:  M1 segments are patent bilaterally  There is an occlusion of a right MCA M2 branch at the MCA trifurcation  DISTAL VERTEBRAL ARTERIES:  Normal distal vertebral arteries  Posterior inferior cerebellar artery origins are normal  Normal vertebral basilar junction  BASILAR ARTERY:  Basilar artery is normal in caliber  Normal superior cerebellar arteries  POSTERIOR CEREBRAL ARTERIES: Both posterior cerebral arteries arises from the basilar tip  Both arteries demonstrate normal enhancement  DURAL VENOUS SINUSES:  Normal  NON VASCULAR ANATOMY BONY STRUCTURES:  No acute osseous abnormality  SOFT TISSUES OF THE NECK:  Normal  THORACIC INLET:  Unremarkable  Impression: Right M2 occlusion at the MCA bifurcation  Moderate to severe focal left carotid stenosis at the paraophthalmic segment intracranially  Mild stenosis right carotid bifurcation the neck with atheromatous plaque noted  I personally discussed this study with Lalito Kinsey on 1/23/2020 at 3:57 PM   Workstation performed: [id number]     X-ray Chest 1 View Portable    Result Date: 1/23/2020  Narrative: CHEST INDICATION:   Stroke   COMPARISON:  None EXAM PERFORMED/VIEWS:  XR CHEST PORTABLE FINDINGS: Cardiomediastinal silhouette appears unremarkable  The lungs are clear  No pneumothorax or pleural effusion  Osseous structures appear within normal limits for patient age  Impression: No acute cardiopulmonary disease  Workstation performed: [id number]     Ct Head Without Contrast    Result Date: 1/22/2020  Narrative: CT BRAIN - WITHOUT CONTRAST INDICATION:   TIA, initial exam   72-year-old male with history of diabetes and hypertension  COMPARISON:  None  TECHNIQUE:  CT examination of the brain was performed  In addition to axial images, coronal 2D reformatted images were created and submitted for interpretation  Radiation dose length product (DLP) for this visit:  840 mGy-cm   This examination, like all CT scans performed in the [facility], was performed utilizing techniques to minimize radiation dose exposure, including the use of iterative reconstruction and automated exposure control  IMAGE QUALITY:  Diagnostic  FINDINGS: PARENCHYMA:  No intracranial mass, mass effect or midline shift  No CT signs of acute infarction  No acute parenchymal hemorrhage  Decreased white matter attenuation, most likely due to chronic small vessel white matter ischemic changes  VENTRICLES AND EXTRA-AXIAL SPACES:  Normal for the patient's age  No extra-axial blood  VISUALIZED ORBITS AND PARANASAL SINUSES:  Unremarkable  CALVARIUM AND EXTRACRANIAL SOFT TISSUES:  Normal      Impression: No acute intracranial abnormality  Workstation performed: [id number]     Mri Brain Wo Contrast    Result Date: 1/23/2020  Narrative: MRI BRAIN WITHOUT CONTRAST INDICATION: Transient speech difficulty  COMPARISON:   1/23/2020 fibula  TECHNIQUE:  Sagittal T1, axial T2, axial FLAIR, axial T1, axial [place] and axial diffusion imaging  IMAGE QUALITY:  Diagnostic  FINDINGS: BRAIN PARENCHYMA: Punctate acute to early subacute infarcts in the right frontoparietal subcortical and deep white matter in a watershed distribution    Punctate acute infarct in the right insular subcortical white matter  Periventricular and subcortical T2/FLAIR hyperintense foci, partially confluent, consistent with advanced microangiopathic disease  Chronic lacunar infarct in the left thalamus and chronic deep white matter infarcts in the bilateral frontoparietal region  Chronic lacunar infarct in the right aspect of the scotty  Punctate foci of susceptibility artifact in the right brachium pontis and right cerebellar hemisphere likely to represent chronic microhemorrhage  No evidence of acute intracranial hemorrhage or mass effect  VENTRICLES:  No hydrocephalus or extra-axial collection  SELLA AND PITUITARY GLAND:  Normal  ORBITS:  Normal  PARANASAL SINUSES:  Mucous retention cyst in the left maxillary sinus  VASCULATURE:  Hyperintensity in the proximal right middle cerebral artery involving the genu and at least one proximal M2 branch, consistent with findings on the CT angiogram  CALVARIUM AND SKULL BASE:  Normal  EXTRACRANIAL SOFT TISSUES:  Normal      Impression: 1  Punctate acute infarcts in the right frontoparietal region in a watershed territory distribution consistent with known proximal right MCA occlusion or high-grade stenosis  2   Chronic deep white matter, basal ganglia and brainstem lacunar infarcts  Advanced microangiopathic disease  3   Nonspecific foci of chronic microhemorrhage in the right cerebellar hemisphere and brachium pontis  The study was marked in [facility] for immediate notification  Workstation performed: [id number]     Ct Stroke Alert Brain    Result Date: 1/28/2020  Narrative: CT BRAIN - STROKE ALERT PROTOCOL INDICATION: Left-sided weakness  COMPARISON:  1/23/2020  TECHNIQUE:  CT examination of the brain was performed  In addition to axial images, coronal reformatted images were created and submitted for interpretation  Radiation dose length product (DLP) for this visit:  855 12 mGy-cm     This examination, like all CT scans performed in the [facility], was performed utilizing techniques to minimize radiation dose exposure, including the use of iterative  reconstruction and automated exposure control  IMAGE QUALITY:  Diagnostic  FINDINGS:  PARENCHYMA:  Stable embolic infarcts in the right corona radiata, right lentiform nuclei and subinsular white matter, many of which are subacute and consistent with findings on the recent MRI  Chronic lacunar infarct in the left thalamus  Periventricular and subcortical hypoattenuating foci consistent with microangiopathic disease  No acute intracranial hemorrhage or mass effect  Increased density throughout the major vessels of the [place] and dural venous sinuses possibly secondary to recent contrast administration  VENTRICLES AND EXTRA-AXIAL SPACES:  No hydrocephalus or extra-axial collection  VISUALIZED ORBITS AND PARANASAL SINUSES:  Intact globes and orbits  No paranasal sinus disease  CALVARIUM AND EXTRACRANIAL SOFT TISSUES:  No lytic or blastic lesion  Impression: 1  No evidence of acute vascular territorial infarction however given recent subcortical/watershed territory infarcts, MRI may be helpful for further evaluation  2   No intracranial hemorrhage or mass effect  Findings were directly discussed with CHACHA HALEY on 1/28/2020 5:48 PM  Workstation performed: [id number]     Cta Stroke Alert (head/neck)    Result Date: 1/28/2020  Narrative: CTA NECK AND BRAIN WITH CONTRAST INDICATION: Left-sided weakness COMPARISON:   1/23/2020  TECHNIQUE:   Post contrast imaging was performed after administration of iodinated contrast through the neck and brain  Post contrast axial 0 625 mm images timed to opacify the arterial system  3D rendering was performed on an independent workstation  MIP reconstructions performed  Coronal reconstructions were performed of the noncontrast portion of the brain  Radiation dose length product (DLP) for this visit:  389 91 mGy-cm     This examination, like all CT scans performed in the [facility], was performed utilizing techniques to minimize radiation dose exposure, including the use of iterative  reconstruction and automated exposure control  IV Contrast:  100 mL of iohexol (OMNIPAQUE)  IMAGE QUALITY:   Diagnostic FINDINGS: CERVICAL VASCULATURE AORTIC ARCH AND GREAT VESSELS:  Three-vessel configuration of the aortic arch  No significant stenosis in the subclavian arteries  RIGHT VERTEBRAL ARTERY CERVICAL SEGMENT:  Normal origin  The vessel is normal in caliber throughout the neck  LEFT VERTEBRAL ARTERY CERVICAL SEGMENT:  Normal origin  The vessel is normal in caliber throughout the neck  RIGHT EXTRACRANIAL CAROTID SEGMENT:  Normal caliber common carotid artery  Calcified and significant noncalcified plaque at the internal carotid artery origin resulting in stable, mild (less than 50%) stenosis  LEFT EXTRACRANIAL CAROTID SEGMENT:  Normal caliber common carotid artery  Minimal calcified plaque at the internal carotid artery bifurcation without stenosis  NASCET criteria was used to determine the degree of internal carotid artery diameter stenosis  INTRACRANIAL VASCULATURE INTERNAL CAROTID ARTERIES:  Calcified plaque resulting in stable moderate (50-69%) narrowing the bilateral paraclinoid segments  ANTERIOR CIRCULATION:  Anterior communicating artery not visualized  No stenosis in the anterior cerebral arteries  MIDDLE CEREBRAL ARTERY CIRCULATION:  Stable appearance of the right MCA demonstrating occlusion and severe stenosis in  proximal M2 branches, with distal reperfusion suggesting good collateral flow  Stable focal, severe (70-90%) stenosis in a left M2 branch with normal distal perfusion  DISTAL VERTEBRAL ARTERIES:  Calcified plaque along the intradural segment of the left vertebral artery without significant stenosis  Posterior inferior cerebellar artery origins are normal  Normal vertebral basilar junction   BASILAR ARTERY:  Stable mild irregularity in the proximal basilar artery without significant stenosis  Normal superior cerebellar arteries  Patent superior cerebellar arteries  POSTERIOR CEREBRAL ARTERIES: No stenosis in the posterior cerebral arteries  Posterior communicating arteries not visualized  DURAL VENOUS SINUSES:  Patent  NON VASCULAR ANATOMY BONY STRUCTURES:  No lytic or blastic lesion  SOFT TISSUES OF THE NECK:  No mass or lymphadenopathy  THORACIC INLET:  Clear lung apices  Impression: 1  Stable moderate (50-69%) stenosis of the paraclinoid segments of the bilateral internal carotid arteries, more prominent on the right  2   Stable occlusion and severe stenoses in proximal right M2 branches with distal reperfusion suggesting good collateral flow  3   Stable severe (70-90%) stenosis within a left M2 branch, with normal distal flow, suggesting atherosclerotic plaque  Findings were directly discussed with CHACHA HALEY on 1/28/2020 5:55 PM  Workstation performed: [id number]         ** Please Note: Dragon 360 Dictation voice to text software was used in the creation of this document   **

## 2020-01-29 NOTE — ASSESSMENT & PLAN NOTE
· Patient recently admitted and discharged last Friday, with CVA and left-sided weakness and the assessment as follows:     · Noted worsening of left-sided weakness with no dysphonia  Currently significant for left-sided drifting; placed on stroke protocol  As per recommendations of Neurology; started heparin drip at low dose  Dr Lolita Chandra of neurosurgery was also consulted and hence a consult is placed  Here does recommend the P2Y12 platelet assay  Results pending      · Continue stroke protocol  MRI to detect any changes from last admission to current admission--Ischemic burden within the right MCA distribution has progressed  No hemorrhage or significant mass effect  Abnormal flow characteristics right MCA branches    · Permissive hypertension   · Continue Plavix and Lipitor

## 2020-01-29 NOTE — ASSESSMENT & PLAN NOTE
· Will continue to watch  Currently hemoglobin is at 19  Hematology consult  Reticulocyte count   17-18 last admission last week

## 2020-01-29 NOTE — ASSESSMENT & PLAN NOTE
Lab Results   Component Value Date    HGBA1C 6 2 (H) 01/27/2020     Patient without dysphonia or dysphagia; will continue diabetic/cardiac diet  [employer] is non formulary  However will check blood sugars before meals and bedtime with Accu-Cheks ordered and insulin sliding scale as per protocol    Recent Labs     01/28/20  1705 01/28/20  1900   POCGLU 142* 86       Blood Sugar Average: Last 72 hrs:

## 2020-01-29 NOTE — EMTALA/ACUTE CARE TRANSFER
[facility] EMERGENCY DEPARTMENT  [address]   [facility] [address]  Dept: [phone]      UOXUAM TRANSFER CONSENT    NAME Lyndon DOSS 1950                              MRN [id number]    I have been informed of my rights regarding examination, treatment, and transfer   by Dr Wilfred Box MD    Benefits: Specialized equipment and/or services available at the receiving facility (Include comment)________________________(neuro intervention)    Risks:        Consent for Transfer:  I acknowledge that my medical condition has been evaluated and explained to me by the emergency department physician or other qualified medical person and/or my attending physician, who has recommended that I be transferred to the service of  Accepting Physician: Dr Jak Pelayo at [address] [facility] Name, [address] : [place]  The above potential benefits of such transfer, the potential risks associated with such transfer, and the probable risks of not being transferred have been explained to me, and I fully understand them  The doctor has explained that, in my case, the benefits of transfer outweigh the risks  I agree to be transferred  I authorize the performance of emergency medical procedures and treatments upon me in both transit and upon arrival at the receiving facility  Additionally, I authorize the release of any and all medical records to the receiving facility and request they be transported with me, if possible  I understand that the safest mode of transportation during a medical emergency is an ambulance and that the Hospital advocates the use of this mode of transport  Risks of traveling to the receiving facility by car, including absence of medical control, life sustaining equipment, such as oxygen, and medical personnel has been explained to me and I fully understand them      (EVA CORRECT BOX BELOW)  [  ]  I consent to the stated transfer and to be transported by ambulance/helicopter  [  ]  I consent to the stated transfer, but refuse transportation by ambulance and accept full responsibility for my transportation by car  I understand the risks of non-ambulance transfers and I exonerate the Hospital and its staff from any deterioration in my condition that results from this refusal     X___________________________________________    DATE  20  TIME________  Signature of patient or legally responsible individual signing on patient behalf           RELATIONSHIP TO PATIENT_________________________          Provider Certification    NAME Momo Seo                                         1950                              MRN [id number]    A medical screening exam was performed on the above named patient  Based on the examination:    Condition Necessitating Transfer The encounter diagnosis was Stroke [facility])  Patient Condition: The patient has been stabilized such that within reasonable medical probability, no material deterioration of the patient condition or the condition of the unborn child(macy) is likely to result from the transfer    Reason for Transfer:      Transfer Requirements: Facility [facility]   · Space available and qualified personnel available for treatment as acknowledged by    · Agreed to accept transfer and to provide appropriate medical treatment as acknowledged by       Dr Sima Noyola  · Appropriate medical records of the examination and treatment of the patient are provided at the time of transfer   [address], [address] _______  · Transfer will be performed by qualified personnel from    and appropriate transfer equipment as required, including the use of necessary and appropriate life support measures      Provider Certification: I have examined the patient and explained the following risks and benefits of being transferred/refusing transfer to the patient/family:  General risk, such as traffic hazards, adverse weather conditions, rough terrain or turbulence, possible failure of equipment (including vehicle or aircraft), or consequences of actions of persons outside the control of the transport personnel, Unanticipated needs of medical equipment and personnel during transport, Risk of worsening condition, The possibility of a transport vehicle being unavailable      Based on these reasonable risks and benefits to the patient and/or the unborn child(macy), and based upon the information available at the time of the patients examination, I certify that the medical benefits reasonably to be expected from the provision of appropriate medical treatments at another medical facility outweigh the increasing risks, if any, to the individuals medical condition, and in the case of labor to the unborn child, from effecting the transfer      X____________________________________________ DATE 01/28/20        TIME_______      ORIGINAL - SEND TO MEDICAL RECORDS   COPY - SEND WITH PATIENT DURING TRANSFER

## 2020-01-29 NOTE — PROGRESS NOTES
Patient known to Neurology service with significant right MCA stenosis, recently started on dual anti-platelet therapy  Had originally presented with left facial and arm weakness  His NIHSS was low and was ultimately discharged  Unfortunately he we presents today with new concern for facial weakness  This began early this morning  Repeat CTA reveals stable vascular findings  Would recommended initiation of heparin drip, permissive hypertension, flat head of bed, and transferred to higher acuity care at [facility]  At that time P2Y12 level will be tested and can consider formal arteriogram for evaluation of possible intervention

## 2020-01-29 NOTE — PHYSICAL THERAPY NOTE
Physical Therapy Evaluation    Patient's Name: Aravind Lanier    Admitting Diagnosis  Stroke (cerebrum) [facility]) [I63 9]    Problem List  Patient Active Problem List   Diagnosis    Chronic kidney disease, stage 3 ([place] [address] )    Colon, diverticulosis    Elevated C-reactive protein    Elevated prostate specific antigen (PSA)    Essential hypertriglyceridemia    Gout    Hypertension    Microalbuminuria    Obesity    Type 2 diabetes mellitus with microalbuminuria ([place] [address] )    Ulcerative rectosigmoiditis without complication (HCC)    Dyslipidemia    Erythrocytosis    CVA (cerebral vascular accident) [facility])       Past Medical History  Past Medical History:   Diagnosis Date    Diabetes mellitus ([place] [address] )     Hypertension     Renal disorder        Past Surgical History  Past Surgical History:   Procedure Laterality Date    COLONOSCOPY  09/18/2006    complete; 10 yrs    COLONOSCOPY  10/23/2017    complete; 5 yrs        01/29/20 1035   Note Type   Note type Eval only   Pain Assessment   Pain Assessment 0-10   Pain Score 2   Pain Type Acute pain;Chronic pain   Pain Location Head   Patient's Stated Pain Goal No pain   Hospital Pain Intervention(s) Ambulation/increased activity   Response to Interventions unchanged   Home Living   Type of Home House   Additional Comments Resides w/ wife in 1 story home, few XAVIER  Indep self care, drives, works FT for [employer]  Was back to work x 1 day p recent admit   Prior Function   Level of [place] Independent with ADLs and functional mobility   Falls in the last 6 months 0   Restrictions/Precautions   Weight Bearing Precautions Per Order No   Other Precautions Multiple lines; Fall Risk;Pain   General   Family/Caregiver Present Yes  (wife)   Cognition   Overall Cognitive Status WFL   Arousal/Participation Responsive   Orientation Level Oriented X4   Memory Unable to assess   Following Commands Follows all commands and directions without difficulty   Comments flat affect but cooperative RLE Assessment   RLE Assessment   (strength grossly 4+/5)   LLE Assessment   LLE Assessment   (strength grossly 3+/5, weaker distally)   Coordination   Movements are Fluid and Coordinated 0   Coordination and Movement Description L sided ataxia   Bed Mobility   Supine to Sit 4  Minimal assistance   Additional items Assist x 1   Sit to Supine 5  Supervision   Additional items Assist x 1   Transfers   Sit to Stand 4  Minimal assistance   Additional items Assist x 1   Stand to Sit 4  Minimal assistance   Additional items Assist x 1   Ambulation/Elevation   Gait pattern   (slow, ataxia, L foot drag, worsening L sided lean as fatigue)   Gait Assistance 4  Minimal assist   Additional items Assist x 1   Assistive Device Rolling walker   Distance 80'x1   Balance   Static Sitting Good   Dynamic Sitting Fair +   Static Standing Fair -   Dynamic Standing Poor +   Ambulatory Poor +   Endurance Deficit   Endurance Deficit Yes   Endurance Deficit Description weakness, fatigue, pain   Activity Tolerance   Activity Tolerance Patient limited by fatigue;Patient limited by pain;Treatment limited secondary to medical complications (Comment)   Nurse Made Aware yes   Assessment   Prognosis Good   Problem List Decreased strength;Decreased endurance; Impaired balance;Decreased mobility; Decreased coordination; Impaired tone   Assessment Pt seen for high complexity physical therapy evaluation  Pt is a 73 y/o male w/ history/comorbidities of recent CVA- just d/c home w/ little to no residual, DM, HTN who is now admitted as a transfer rom [address] w/ worsening L sided weakness, L sided lean and foot drag w/ gait  Per wife, had not been taking meds, did not eat on 1st day back to work  Wife also reports some cog changes  Undergoing w/u  Due to acute medical issues, need for hospital re-admit, need for transfer to higher level of care, pain, fall risk, note unstable clinical picture  PT consulted to assess mobility, d/c needs    Pt presents w/ decreased functional mob, standing balance, endurance, L sided strength and coordination, barriers at home  will benefit from skilled PT to correct for the above problems  Recommend rehab at d/c presently   Goals   Patient Goals to get better   STG Expiration Date 02/12/20   Short Term Goal #1 1-2 wks: bed mob and transfers w  indep, standing balance to good/normal w/ device as needed, ambulate 200-300 ft w  RW vs least restrictive device and S/mod I, increase B LE strength by 1/2 -1 grade, increase B LE strength by 1/2 -1 grade, ambulate 2-3 XAVIER w/ S   PT Treatment Day 0   Plan   Treatment/Interventions Functional transfer training;LE strengthening/ROM; Therapeutic exercise;Elevations; Endurance training;Patient/family training;Equipment eval/education; Bed mobility;Gait training   PT Frequency Weekend; Other (Comment)  (4-6x/wk)   Recommendation   Recommendation Post acute IP rehab  (recommend rehab at d/c)   Equipment Recommended Karolina Bernard   PT - OK to Discharge Yes  (when stable to rehab)   Modified [place] Scale   Modified [place] Scale 4   Barthel Index   Feeding 10   Bathing 0   Grooming Score 5   Dressing Score 5   Bladder Score 10   Bowels Score 10   Toilet Use Score 5   Transfers (Bed/Chair) Score 10   Mobility (Level Surface) Score 0   Stairs Score 0   Barthel Index Score 55           Monalisa Cristina PT, DPT, CSRS

## 2020-01-29 NOTE — CONSULTS
Patient: Lance Shannon  Patient MRN: [id number]  Service date: 1/29/2020  Attending Physician:       CHIEF COMPLAIN  No chief complaint on file  Heme / Oncology history:  Lance Shannon is a 72 y o  male       1, no prior hematology oncology issues    HISTORY OF PRESENT ILLNESS:  Lance Shannon is a 72 y o  male who has history of hypertension diabetes, recently CVA, discharge with aspirin statin, Plavix , presents with worsening of left-sided weakness, patient was found having polycythemia, hematology was consulted for comanagement  Patient reported is a nonsmoker, possible have sleep apnea, sleep study is scheduled to be done  No history of venous thrombosis  No family history of venous thrombosis  Patient reported left-sided weakness slightly better since admission  ASSESSMENT/PLAN:  Lance Shannon is a 72 y o  male with:    1) stroke  2) polycythemia      - polycythemia, unclear this is primary versus secondary  Reviewing his lab, this appears to be relatively new  usually polycythemia is a small risk factor for  Thrombosis, especially secondary polycythemia  - proceed with checking BCR-ABL and JAK2 mutation to differentiate primary versus secondary  Will start gentle IV fluid, if hemoglobin and hematocrit is not able to decrease, patient's symptom is not improving much, will consider phlebotomy  Usually are called for phlebotomy if hemoglobin above 20 or HCT is more than 60% for 2nd polycythemia         - patient should follow Hematology in the months after discharge  minutes were spent face to face with patient with greater than 50% of the time spent in counseling or coordination of care including discussions of treatment instructions  All of the patient's questions were answered to their satisfactory during this discussion         Leela Sears MD PhD  Hematology / Oncology              PROBLEM LIST:  Patient Active Problem List   Diagnosis    Chronic kidney disease, stage 3 (HCC)    Colon, diverticulosis    Elevated C-reactive protein    Elevated prostate specific antigen (PSA)    Essential hypertriglyceridemia    Gout    Hypertension    Microalbuminuria    Obesity    Type 2 diabetes mellitus with microalbuminuria (HCC)    Ulcerative rectosigmoiditis without complication (HCC)    Dyslipidemia    Erythrocytosis    CVA (cerebral vascular accident) ([facility] )                     PAST MEDICAL HISTORY:   has a past medical history of Diabetes mellitus ([facility] ), Hypertension, and Renal disorder  PAST SURGICAL HISTORY:   has a past surgical history that includes Colonoscopy (09/18/2006) and Colonoscopy (10/23/2017)      CURRENT MEDICATIONS  Scheduled Meds:  Current Facility-Administered Medications:  acetaminophen 650 mg Oral Q4H PRN Will MD Aylin    allopurinol 200 mg Oral Daily Will MD Aylin    aluminum-magnesium hydroxide-simethicone 5 mL Oral Q6H PRN Will MD Aylin    aspirin 81 mg Oral Daily Will MD Aylin    atorvastatin 40 mg Oral Daily With Lauren Cummins MD    docusate sodium 100 mg Oral BID PRN Will MD Aylin    fenofibrate 145 mg Oral Daily Will MD Aylin    heparin (porcine) 3-20 Units/kg/hr (Order-Specific) Intravenous Titrated Will MD Aylin Last Rate: 13 1 Units/kg/hr (01/29/20 0136)   heparin (porcine) 2,000 Units Intravenous PRN Will MD Aylin    heparin (porcine) 4,000 Units Intravenous PRN Will MD Aylin    hydrALAZINE 5 mg Intravenous Q6H PRN Doran Libman, CRNP    insulin lispro 1-5 Units Subcutaneous HS Will MD Aylin    insulin lispro 1-6 Units Subcutaneous TID AC Will MD Aylin    ondansetron 4 mg Intravenous Q6H PRN Will MD Aylin      Continuous Infusions:  heparin (porcine) 3-20 Units/kg/hr (Order-Specific) Last Rate: 13 1 Units/kg/hr (01/29/20 0136)     PRN Meds:   acetaminophen    aluminum-magnesium hydroxide-simethicone    docusate sodium    heparin (porcine)   heparin (porcine)    hydrALAZINE    ondansetron    SOCIAL HISTORY:   reports that he has never smoked  He has never used smokeless tobacco  He reports that he does not drink alcohol or use drugs  FAMILY HISTORY:  family history includes Breast cancer in his mother; Hypertension in his other; Kidney disease in his father; Lung cancer in his father; Other in his father  ALLERGIES:  has No Known Allergies  REVIEW OF SYSTEMS:  Please note that a 14-point review of systems was performed to include Constitutional, HEENT, Respiratory, CVS, GI, , Musculoskeletal, Integumentary, Neurologic, Rheumatologic, Endocrinologic, Psychiatric, Lymphatic, and Hematologic/Oncologic systems were reviewed and are negative unless otherwise stated in HPI  Positive and negative findings pertinent to this evaluation are incorporated into the history of present illness  PHYSICAL EXAMINATION:  Vital Signs: /91   Pulse 67   Temp 98 2 °F (36 8 °C)   Resp 18   Ht 5' 9" (1 753 m)   Wt 92 9 kg (204 lb 12 8 oz)   SpO2 95%   BMI 30 24 kg/m²   Body surface area is 2 09 meters squared  Ht Readings from Last 3 Encounters:   01/29/20 5' 9" (1 753 m)   01/28/20 5' 9" (1 753 m)   01/23/20 5' 9" (1 753 m)     Wt Readings from Last 3 Encounters:   01/29/20 92 9 kg (204 lb 12 8 oz)   01/28/20 90 kg (198 lb 6 6 oz)   01/24/20 96 3 kg (212 lb 4 9 oz)      Temp Readings from Last 3 Encounters:   01/29/20 98 2 °F (36 8 °C)   01/28/20 (!) 96 °F (35 6 °C) (Temporal)   01/24/20 98 4 °F (36 9 °C)      BP Readings from Last 3 Encounters:   01/29/20 150/91   01/28/20 161/80   01/24/20 153/93       Pulse Readings from Last 3 Encounters:   01/29/20 67   01/28/20 92   01/24/20 61         Constitutional: Alert and oriented    HEENT: Anicteric, PERRLA  Chest: Decreased breathing sound bilaterally, No wheezes/rales/rhonchi  CVS: Regular rhythm  Normal rate  Abdomen: Soft, nontender, nondistended  No palpable organomegaly    Extremities: No cyanosis/clubbing/edema  Integumentary: No obvious rashes or bruises  Musculoskeletal: No obvious bony or joint deformities  Psychiatric: Appropriate affect and mood  Lymph Node Survey: No palpable preauricular, submandibular, cervical, supraclavicular, axillary, epitrochlear or inguinal lymphadenopathy  LABS:  Results from last 7 days   Lab Units 01/28/20  1757 01/22/20 1956   TROPONIN I ng/mL <0 02 <0 02     CBC with diff: Results from last 7 days   Lab Units 01/29/20  0015 01/28/20  1722 01/27/20  1303 01/24/20  0500 01/23/20  0527 01/22/20 1956   WBC Thousand/uL 7 81 8 32  --  6 10 7 06 8 32   WHITE BLOOD CELL COUNT  Thousand/uL  --   --  7 3  --   --   --    HEMOGLOBIN g/dL 19 0* 19 1*  --  17 6* 17 2* 18 8*   HEMOGLOBIN  g/dL  --   --  19 7*  --   --   --    HEMATOCRIT % 56 9* 57 1*  --  54 0* 53 1* 57 7*   HEMATOCRIT  %  --   --  59 7*  --   --   --    MCV fL 89 87  --  90 90 90   MCV  fL  --   --  88 1  --   --   --    PLATELETS Thousands/uL 264 244  --  232 222 260   PLATELETS   Thousand/uL  --   --  274  --   --   --    MCH pg 29 5 28 9  --  29 3 29 2 29 2   MCH  pg  --   --  29 1  --   --   --    MCHC g/dL 33 4 33 5  --  32 6 32 4 32 6   MCHC  g/dL  --   --  33 0  --   --   --    RDW % 14 8 14 9  --  14 0 14 1 14 9   RDW  %  --   --  15 0  --   --   --    MPV fL 10 2 10 4  --  10 1 9 7 10 0   NRBC AUTO /100 WBCs  --   --   --  0 0  --        CMP:  Results from last 7 days   Lab Units 01/29/20  0636 01/28/20  1723 01/27/20  1303 01/24/20  0500 01/23/20  0527 01/22/20 1956   POTASSIUM mmol/L 3 9 4 0 4 0 3 7 3 6 3 8   CHLORIDE mmol/L 115* 108 106 111* 111* 107   CO2 mmol/L 23 22 27 23 24 32   BUN mg/dL 34* 41* 33* 26* 26* 27*   CREATININE mg/dL 1 60* 1 82* 1 61* 1 36* 1 39* 1 56*   SL AMB CALCIUM mg/dL  --   --  9 7  --   --   --    CALCIUM mg/dL 9 2 9 4  --  9 3 8 5 9 7   AST U/L 21  --   --   --   --   --    ALT U/L 27  --   --   --   --   --    ALK PHOS U/L 85  --   --   --   --   --    EGFR ml/min/1 73sq m 43 37  --  53 51 45       Results from last 7 days   Lab Units 01/29/20  0636 01/29/20  0015 01/28/20  1723 01/22/20 1956   INR   --  1 08 1 03 0 97   PTT seconds 82* 51* 34 33           Invalid input(s): TNI,  PCT              IMAGING:  MRI brain wo contrast   Final Result      Ischemic burden within the right MCA distribution has progressed  No hemorrhage or significant mass effect  Abnormal flow characteristics right MCA branches  Moderately advanced chronic small vessel disease        Workstation performed: [id number]         CT cerebral perfusion    (Results Pending)

## 2020-01-30 ENCOUNTER — APPOINTMENT (INPATIENT)
Dept: RADIOLOGY | Facility: HOSPITAL | Age: 70
DRG: 065 | End: 2020-01-30
Payer: COMMERCIAL

## 2020-01-30 ENCOUNTER — TELEPHONE (OUTPATIENT)
Dept: NEUROLOGY | Facility: CLINIC | Age: 70
End: 2020-01-30

## 2020-01-30 LAB
ANION GAP SERPL CALCULATED.3IONS-SCNC: 4 MMOL/L (ref 4–13)
APTT PPP: 59 SECONDS (ref 23–37)
APTT PPP: 97 SECONDS (ref 23–37)
BUN SERPL-MCNC: 28 MG/DL (ref 5–25)
CALCIUM SERPL-MCNC: 9.2 MG/DL (ref 8.3–10.1)
CHLORIDE SERPL-SCNC: 114 MMOL/L (ref 100–108)
CO2 SERPL-SCNC: 25 MMOL/L (ref 21–32)
CREAT SERPL-MCNC: 1.55 MG/DL (ref 0.6–1.3)
ERYTHROCYTE [DISTWIDTH] IN BLOOD BY AUTOMATED COUNT: 14.9 % (ref 11.6–15.1)
GFR SERPL CREATININE-BSD FRML MDRD: 45 ML/MIN/1.73SQ M
GLUCOSE SERPL-MCNC: 101 MG/DL (ref 65–140)
GLUCOSE SERPL-MCNC: 107 MG/DL (ref 65–140)
GLUCOSE SERPL-MCNC: 121 MG/DL (ref 65–140)
GLUCOSE SERPL-MCNC: 127 MG/DL (ref 65–140)
GLUCOSE SERPL-MCNC: 97 MG/DL (ref 65–140)
HCT VFR BLD AUTO: 54.8 % (ref 36.5–49.3)
HGB BLD-MCNC: 18.2 G/DL (ref 12–17)
MCH RBC QN AUTO: 29.7 PG (ref 26.8–34.3)
MCHC RBC AUTO-ENTMCNC: 33.2 G/DL (ref 31.4–37.4)
MCV RBC AUTO: 90 FL (ref 82–98)
PA ADP BLD-ACNC: 64 PRU (ref 194–418)
PLATELET # BLD AUTO: 233 THOUSANDS/UL (ref 149–390)
PMV BLD AUTO: 10.3 FL (ref 8.9–12.7)
POTASSIUM SERPL-SCNC: 3.5 MMOL/L (ref 3.5–5.3)
RBC # BLD AUTO: 6.12 MILLION/UL (ref 3.88–5.62)
SODIUM SERPL-SCNC: 143 MMOL/L (ref 136–145)
WBC # BLD AUTO: 5.43 THOUSAND/UL (ref 4.31–10.16)

## 2020-01-30 PROCEDURE — 81206 BCR/ABL1 GENE MAJOR BP: CPT | Performed by: INTERNAL MEDICINE

## 2020-01-30 PROCEDURE — 85576 BLOOD PLATELET AGGREGATION: CPT | Performed by: INTERNAL MEDICINE

## 2020-01-30 PROCEDURE — 99232 SBSQ HOSP IP/OBS MODERATE 35: CPT | Performed by: PSYCHIATRY & NEUROLOGY

## 2020-01-30 PROCEDURE — 97167 OT EVAL HIGH COMPLEX 60 MIN: CPT

## 2020-01-30 PROCEDURE — 82948 REAGENT STRIP/BLOOD GLUCOSE: CPT

## 2020-01-30 PROCEDURE — 97116 GAIT TRAINING THERAPY: CPT

## 2020-01-30 PROCEDURE — 85730 THROMBOPLASTIN TIME PARTIAL: CPT | Performed by: INTERNAL MEDICINE

## 2020-01-30 PROCEDURE — 99232 SBSQ HOSP IP/OBS MODERATE 35: CPT | Performed by: NURSE PRACTITIONER

## 2020-01-30 PROCEDURE — 81207 BCR/ABL1 GENE MINOR BP: CPT | Performed by: INTERNAL MEDICINE

## 2020-01-30 PROCEDURE — 99231 SBSQ HOSP IP/OBS SF/LOW 25: CPT | Performed by: INTERNAL MEDICINE

## 2020-01-30 PROCEDURE — 92526 ORAL FUNCTION THERAPY: CPT

## 2020-01-30 PROCEDURE — 85027 COMPLETE CBC AUTOMATED: CPT | Performed by: NURSE PRACTITIONER

## 2020-01-30 PROCEDURE — 81270 JAK2 GENE: CPT | Performed by: INTERNAL MEDICINE

## 2020-01-30 PROCEDURE — 97112 NEUROMUSCULAR REEDUCATION: CPT

## 2020-01-30 PROCEDURE — 0042T HB CT PERFUSION W/CONTRAST CBF: CPT

## 2020-01-30 PROCEDURE — 80048 BASIC METABOLIC PNL TOTAL CA: CPT | Performed by: NURSE PRACTITIONER

## 2020-01-30 RX ORDER — CLOPIDOGREL BISULFATE 75 MG/1
75 TABLET ORAL DAILY
Status: DISCONTINUED | OUTPATIENT
Start: 2020-01-30 | End: 2020-02-03 | Stop reason: HOSPADM

## 2020-01-30 RX ADMIN — CLOPIDOGREL BISULFATE 75 MG: 75 TABLET ORAL at 16:43

## 2020-01-30 RX ADMIN — ASPIRIN 81 MG 81 MG: 81 TABLET ORAL at 08:05

## 2020-01-30 RX ADMIN — ALLOPURINOL 200 MG: 100 TABLET ORAL at 08:05

## 2020-01-30 RX ADMIN — FENOFIBRATE 145 MG: 145 TABLET, FILM COATED ORAL at 08:05

## 2020-01-30 RX ADMIN — IOHEXOL 80 ML: 350 INJECTION, SOLUTION INTRAVENOUS at 22:09

## 2020-01-30 RX ADMIN — ATORVASTATIN CALCIUM 40 MG: 40 TABLET, FILM COATED ORAL at 16:43

## 2020-01-30 NOTE — PLAN OF CARE
Problem: PHYSICAL THERAPY ADULT  Goal: Performs mobility at highest level of function for planned discharge setting  See evaluation for individualized goals  Description  Treatment/Interventions: Functional transfer training, LE strengthening/ROM, Therapeutic exercise, Elevations, Endurance training, Patient/family training, Equipment eval/education, Bed mobility, Gait training  Equipment Recommended: Lizzie Dunaway       See flowsheet documentation for full assessment, interventions and recommendations  Outcome: Progressing  Note:   Prognosis: Good  Problem List: Decreased strength, Decreased endurance, Impaired balance, Decreased mobility, Decreased coordination, Decreased cognition, Impaired sensation, Impaired tone  Assessment: Pt seen for PT session w/ focus on neuro redu gait training and transfers w/ spouse present  MRI (+) per 1/29 read=(Ischemic burden within the right MCA distribution has progressed  No hemorrhage or significant mass effect  Abnormal flow characteristics right MCA branches ) pt able to increased gait distances and trials- initiated therex at wall rail w/ focus on L LLE  Pt continues to require [place] for safety w/ xfers and ambulation w/ o L hemiparetic gait   Pt eager to go to rehab and will benefit from ongoing skilled PT to address deficits s/p CVA- pt has excellent support of spouse  Will cont to follow  Recommendation: Post acute IP rehab     PT - OK to Discharge: (to rehab )    See flowsheet documentation for full assessment

## 2020-01-30 NOTE — PROGRESS NOTES
Progress Note - Neurology   Progress Note - Neurology   Tiffany Cornejo 72 y o  male MRN: [id number]  [facility] Encounter: [id number]    Assessment/ Plan:  1)  Worsening of patient's left-sided symptoms in setting of known significant right MCA stenosis and progression of patient's right MCA distribution CVA   -MRI brain demonstrates extensive embolic and watershed appearing ischemic burden in the right MCA territory distribution without over hemorrhage or mass effect   -CTA head and neck grossly stable from previous, demonstrating moderate stenosis of the paraclinoid segments of the bilateral internal carotid arteries, more prominent on the right, stable occlusion and severe stenosis in the proximal right M2 branch, and stable severe stenosis within the left M2 branch   -CT head without acute intracranial abnormality   -CT perfusion scan pending   -recommend permissive hypertension with correction of SBP >180 only  Baseline, patient will likely need to be above high normal for blood pressure management moving forward   -may discontinue heparin GGT and initiate ASA and Plavix DAPT   -appreciate Neurosurgery and await their recommendations   -PT/OT/speech/ PM&R   -will continue to follow closely, please monitor exam and notify with changes       Subjective:   Patient is a 42-year-old male with HTN, DM 2, HLD, who originally presented to the [facility] Emergency Department on 01/22/2020 with left facial droop, drooling, and gait imbalance with speech disturbance  The patient was found to have an acute right frontal temporal watershed distribution CVA, consistent with known proximal right MCA high-grade stenosis  The patient was stabilized and discharged, with plan for dual anti-platelet therapy x3 months then Plavix monotherapy  On 01/28/2019, the patient re-presented to the [facility] secondary to worsening of his CVA symptoms    The patient was transferred to [facility] for higher level of care and possible neuro vascular intervention  Heparin drip was initiated  MRI brain demonstrated worsening of patient's right MCA territory infarctions, both embolic appearing and watershed  CT perfusion scan is pending  No additional acute events overnight  On exam today, the patient is resting comfortably in bed in no acute distress  He reports that he has been both walking and in a chair today without increased symptoms  Patient demonstrates an improved neurological exam as detailed below        ROS:  See Subjective     Medication:  Scheduled Meds:    Current Facility-Administered Medications:  acetaminophen 650 mg Oral Q4H PRN Mika Freitas MD    allopurinol 200 mg Oral Daily Mika Freitas MD    aluminum-magnesium hydroxide-simethicone 5 mL Oral Q6H PRN Mika Freitas MD    aspirin 81 mg Oral Daily Mika Freitas MD    atorvastatin 40 mg Oral Daily With Micheal Moore MD    docusate sodium 100 mg Oral BID PRN Mika Freitas MD    fenofibrate 145 mg Oral Daily Mika Freitas MD    heparin (porcine) 3-20 Units/kg/hr (Order-Specific) Intravenous Titrated Mika Freitas MD Last Rate: 11 1 Units/kg/hr (01/30/20 0802)   heparin (porcine) 2,000 Units Intravenous PRN Mika Freitas MD    heparin (porcine) 4,000 Units Intravenous PRN Mika Freitas MD    hydrALAZINE 5 mg Intravenous Q6H PRN ROSALINA Fulton    insulin lispro 1-5 Units Subcutaneous HS Mika Freitas MD    insulin lispro 1-6 Units Subcutaneous TID AC Mika Freitas MD    ondansetron 4 mg Intravenous Q6H PRN Mika Freitas MD      Continuous Infusions:    heparin (porcine) 3-20 Units/kg/hr (Order-Specific) Last Rate: 11 1 Units/kg/hr (01/30/20 0802)     PRN Meds:   acetaminophen    aluminum-magnesium hydroxide-simethicone    docusate sodium    heparin (porcine)    heparin (porcine)    hydrALAZINE    ondansetron      Vitals: Blood pressure 146/93, pulse 86, temperature 97 9 °F (36 6 °C), resp  rate 18, height 5' 9" (1 753 m), weight 94 1 kg (207 lb 7 2 oz), SpO2 94 %  ,Body mass index is 30 64 kg/m²  Physical Exam:  Physical Exam   Constitutional: He is oriented to person, place, and time  He appears well-developed and well-nourished  No distress  HENT:   Head: Normocephalic and atraumatic  Right Ear: External ear normal    Left Ear: External ear normal    Mouth/Throat: Oropharynx is clear and moist  No oropharyngeal exudate  Eyes: Conjunctivae are normal  Right eye exhibits no discharge  Left eye exhibits no discharge  No scleral icterus  Neck: Normal range of motion  Pulmonary/Chest: Effort normal  No respiratory distress  Musculoskeletal: Normal range of motion  He exhibits no edema, tenderness or deformity  Neurological: He is oriented to person, place, and time  He has normal strength  Skin: Skin is warm and dry  No rash noted  He is not diaphoretic  No erythema  No pallor  Psychiatric: He has a normal mood and affect  His speech is normal and behavior is normal    Nursing note and vitals reviewed  Neurologic Exam     Mental Status   Oriented to person, place, and time  Follows 2 step commands  Attention: normal  Concentration: normal    Speech: speech is normal   Level of consciousness: alert  Knowledge: good  Normal comprehension  Cranial Nerves   Cranial nerves II through XII intact  Exception of mild left central facial droop     Motor Exam   Muscle bulk: normal  Overall muscle tone: normal    Strength   Strength 5/5 throughout  Patient with left upper extremity pronation but no drift     Sensory Exam   Light touch normal      Gait, Coordination, and Reflexes     Gait  Gait: (Deferred for safety)    Tremor   Resting tremor: absent      Lab, Imaging and other studies: I have personally reviewed pertinent reports       I have personally reviewed pertinent imaging in PACs  Recent Results (from the past 24 hour(s))   Fingerstick Glucose (POCT)    Collection Time: 01/29/20  4:07 PM   Result Value Ref Range    POC Glucose 118 65 - 140 mg/dl   APTT    Collection Time: 01/29/20  4:11 PM   Result Value Ref Range    PTT 74 (H) 23 - 37 seconds   Fingerstick Glucose (POCT)    Collection Time: 01/29/20  9:16 PM   Result Value Ref Range    POC Glucose 103 65 - 140 mg/dl   Fingerstick Glucose (POCT)    Collection Time: 01/30/20  6:36 AM   Result Value Ref Range    POC Glucose 107 65 - 140 mg/dl   P2Y12 Platelet inhibitor    Collection Time: 01/30/20  7:27 AM   Result Value Ref Range    P2Y12 64 (L) 194 - 418 PRU   APTT    Collection Time: 01/30/20  7:27 AM   Result Value Ref Range    PTT 97 (H) 23 - 37 seconds   Basic metabolic panel    Collection Time: 01/30/20  7:37 AM   Result Value Ref Range    Sodium 143 136 - 145 mmol/L    Potassium 3 5 3 5 - 5 3 mmol/L    Chloride 114 (H) 100 - 108 mmol/L    CO2 25 21 - 32 mmol/L    ANION GAP 4 4 - 13 mmol/L    BUN 28 (H) 5 - 25 mg/dL    Creatinine 1 55 (H) 0 60 - 1 30 mg/dL    Glucose 101 65 - 140 mg/dL    Calcium 9 2 8 3 - 10 1 mg/dL    eGFR 45 ml/min/1 73sq m   CBC and Platelet    Collection Time: 01/30/20  7:37 AM   Result Value Ref Range    WBC 5 43 4 31 - 10 16 Thousand/uL    RBC 6 12 (H) 3 88 - 5 62 Million/uL    Hemoglobin 18 2 (H) 12 0 - 17 0 g/dL    Hematocrit 54 8 (H) 36 5 - 49 3 %    MCV 90 82 - 98 fL    MCH 29 7 26 8 - 34 3 pg    MCHC 33 2 31 4 - 37 4 g/dL    RDW 14 9 11 6 - 15 1 %    Platelets 126 224 - 911 Thousands/uL    MPV 10 3 8 9 - 12 7 fL   Fingerstick Glucose (POCT)    Collection Time: 01/30/20 10:57 AM   Result Value Ref Range    POC Glucose 121 65 - 140 mg/dl   ]    VTE Prophylaxis: Sequential compression device (Venodyne)  and Heparin GGT      Counseling / Coordination of Care  Total time spent today 25 minutes  Greater than 50% of total time was spent with the patient and / or family counseling and / or coordination of care   A description of the counseling / coordination of care: Librado Willis The pt was seen and examined by myself and the attending physician  The chart was reviewed thoroughly, including laboratory values and imaging studies  The pt was counseled in the room

## 2020-01-30 NOTE — PROGRESS NOTES
Patient: Aravind Lanier  Patient MRN: [id number]  Service date: 1/30/2020  Attending Physician:       CHIEF COMPLAIN  No chief complaint on file  Heme / Oncology history:  Aravind Lanier is a 72 y o  male       1, no prior hematology oncology issues    HISTORY OF PRESENT ILLNESS:  Aravind Lanier is a 72 y o  male who has history of hypertension diabetes, recently CVA, discharge with aspirin statin, Plavix , presents with worsening of left-sided weakness, patient was found having polycythemia, hematology was consulted for comanagement  Doing okay, no new focal neuro deficits  ASSESSMENT/PLAN:  Aravind Lanier is a 72 y o  male with:    1) stroke  2) polycythemia      - patient's symptoms fairly stable  - Hemoglobin is trending down  Will follow JAK2, BCR-ABL status  No indication for phlebotomy for now                   minutes were spent face to face with patient with greater than 50% of the time spent in counseling or coordination of care including discussions of treatment instructions  All of the patient's questions were answered to their satisfactory during this discussion  Sundar Esteban MD PhD  Hematology / Oncology              PROBLEM LIST:    Patient Active Problem List   Diagnosis    Chronic kidney disease, stage 3 (HCC)    Colon, diverticulosis    Elevated C-reactive protein    Elevated prostate specific antigen (PSA)    Essential hypertriglyceridemia    Gout    Hypertension    Microalbuminuria    Obesity    Type 2 diabetes mellitus with microalbuminuria (HCC)    Ulcerative rectosigmoiditis without complication (HCC)    Dyslipidemia    Erythrocytosis    CVA (cerebral vascular accident) ([address] )                     PAST MEDICAL HISTORY:   has a past medical history of Diabetes mellitus ([address] ), Hypertension, and Renal disorder  PAST SURGICAL HISTORY:   has a past surgical history that includes Colonoscopy (09/18/2006) and Colonoscopy (10/23/2017)      CURRENT MEDICATIONS  Scheduled Meds:    Current Facility-Administered Medications:  acetaminophen 650 mg Oral Q4H PRN Scott Fox MD    allopurinol 200 mg Oral Daily Scott Fox MD    aluminum-magnesium hydroxide-simethicone 5 mL Oral Q6H PRN Scott Fox MD    aspirin 81 mg Oral Daily Scott Fox MD    atorvastatin 40 mg Oral Daily With Micah Sylvester MD    docusate sodium 100 mg Oral BID PRN Scott Fox MD    fenofibrate 145 mg Oral Daily Scott Fox MD    heparin (porcine) 3-20 Units/kg/hr (Order-Specific) Intravenous Titrated Scott Fox MD Last Rate: 11 1 Units/kg/hr (01/30/20 0802)   heparin (porcine) 2,000 Units Intravenous PRN Scott Fox MD    heparin (porcine) 4,000 Units Intravenous PRN Scott Fox MD    hydrALAZINE 5 mg Intravenous Q6H PRN ROSALINA Sol    insulin lispro 1-5 Units Subcutaneous HS Scott Fox MD    insulin lispro 1-6 Units Subcutaneous TID AC Scott Fox MD    ondansetron 4 mg Intravenous Q6H PRN Scott Fox MD      Continuous Infusions:    heparin (porcine) 3-20 Units/kg/hr (Order-Specific) Last Rate: 11 1 Units/kg/hr (01/30/20 0802)     PRN Meds:   acetaminophen    aluminum-magnesium hydroxide-simethicone    docusate sodium    heparin (porcine)    heparin (porcine)    hydrALAZINE    ondansetron    SOCIAL HISTORY:   reports that he has never smoked  He has never used smokeless tobacco  He reports that he does not drink alcohol or use drugs  FAMILY HISTORY:  family history includes Breast cancer in his mother; Hypertension in his other; Kidney disease in his father; Lung cancer in his father; Other in his father  ALLERGIES:  has No Known Allergies      REVIEW OF SYSTEMS:  Please note that a 14-point review of systems was performed to include Constitutional, HEENT, Respiratory, CVS, GI, , Musculoskeletal, Integumentary, Neurologic, Rheumatologic, Endocrinologic, Psychiatric, Lymphatic, and Hematologic/Oncologic systems were reviewed and are negative unless otherwise stated in HPI  Positive and negative findings pertinent to this evaluation are incorporated into the history of present illness  PHYSICAL EXAMINATION:  Vital Signs: /93   Pulse 86   Temp 97 9 °F (36 6 °C)   Resp 18   Ht 5' 9" (1 753 m)   Wt 94 1 kg (207 lb 7 2 oz)   SpO2 94%   BMI 30 64 kg/m²   Body surface area is 2 1 meters squared  Ht Readings from Last 3 Encounters:   01/29/20 5' 9" (1 753 m)   01/28/20 5' 9" (1 753 m)   01/23/20 5' 9" (1 753 m)       Wt Readings from Last 3 Encounters:   01/30/20 94 1 kg (207 lb 7 2 oz)   01/28/20 90 kg (198 lb 6 6 oz)   01/24/20 96 3 kg (212 lb 4 9 oz)        Temp Readings from Last 3 Encounters:   01/30/20 97 9 °F (36 6 °C)   01/28/20 (!) 96 °F (35 6 °C) (Temporal)   01/24/20 98 4 °F (36 9 °C)        BP Readings from Last 3 Encounters:   01/30/20 146/93   01/28/20 161/80   01/24/20 153/93         Pulse Readings from Last 3 Encounters:   01/30/20 86   01/28/20 92   01/24/20 61         Constitutional: Alert and oriented    HEENT: Anicteric, PERRLA  Chest: Decreased breathing sound bilaterally, No wheezes/rales/rhonchi  CVS: Regular rhythm  Normal rate  Abdomen: Soft, nontender, nondistended  No palpable organomegaly  Extremities: No cyanosis/clubbing/edema  Integumentary: No obvious rashes or bruises  Musculoskeletal: No obvious bony or joint deformities  Psychiatric: Appropriate affect and mood  Lymph Node Survey: No palpable preauricular, submandibular, cervical, supraclavicular, axillary, epitrochlear or inguinal lymphadenopathy  LABS:    Results from last 7 days   Lab Units 01/28/20  1757   TROPONIN I ng/mL <0 02     CBC with diff:   Results from last 7 days   Lab Units 01/30/20  0737 01/29/20  0015 01/28/20  1722 01/27/20  1303 01/24/20  0500   WBC Thousand/uL 5 43 7 81 8 32  --  6 10   WHITE BLOOD CELL COUNT   Thousand/uL  --   --   --  7 3  --    HEMOGLOBIN g/dL 18 2* 19 0* 19 1*  --  17 6*   HEMOGLOBIN  g/dL  --   --   --  19 7*  --    HEMATOCRIT % 54 8* 56 9* 57 1*  --  54 0*   HEMATOCRIT  %  --   --   --  59 7*  --    MCV fL 90 89 87  --  90   MCV  fL  --   --   --  88 1  --    PLATELETS Thousands/uL 233 264 244  --  232   PLATELETS  Thousand/uL  --   --   --  274  --    MCH pg 29 7 29 5 28 9  --  29 3   MCH  pg  --   --   --  29 1  --    MCHC g/dL 33 2 33 4 33 5  --  32 6   MCHC  g/dL  --   --   --  33 0  --    RDW % 14 9 14 8 14 9  --  14 0   RDW  %  --   --   --  15 0  --    MPV fL 10 3 10 2 10 4  --  10 1   NRBC AUTO /100 WBCs  --   --   --   --  0       CMP:  Results from last 7 days   Lab Units 01/30/20  0737 01/29/20  0636 01/28/20  1723 01/27/20  1303 01/24/20  0500   POTASSIUM mmol/L 3 5 3 9 4 0 4 0 3 7   CHLORIDE mmol/L 114* 115* 108 106 111*   CO2 mmol/L 25 23 22 27 23   BUN mg/dL 28* 34* 41* 33* 26*   CREATININE mg/dL 1 55* 1 60* 1 82* 1 61* 1 36*   SL AMB CALCIUM mg/dL  --   --   --  9 7  --    CALCIUM mg/dL 9 2 9 2 9 4  --  9 3   AST U/L  --  21  --   --   --    ALT U/L  --  27  --   --   --    ALK PHOS U/L  --  85  --   --   --    EGFR ml/min/1 73sq m 45 43 37  --  53         Results from last 7 days   Lab Units 01/30/20  0727 01/29/20  1611 01/29/20  0636 01/29/20  0015 01/28/20  1723   INR   --   --   --  1 08 1 03   PTT seconds 97* 74* 82* 51* 34           Invalid input(s): TNI,  PCT              IMAGING:    MRI brain wo contrast   Final Result      Ischemic burden within the right MCA distribution has progressed  No hemorrhage or significant mass effect  Abnormal flow characteristics right MCA branches  Moderately advanced chronic small vessel disease        Workstation performed: [id number]         CT cerebral perfusion    (Results Pending)

## 2020-01-30 NOTE — PLAN OF CARE
Problem: OCCUPATIONAL THERAPY ADULT  Goal: Performs self-care activities at highest level of function for planned discharge setting  See evaluation for individualized goals  Description  Treatment Interventions: ADL retraining, Functional transfer training, UE strengthening/ROM, Endurance training, Cognitive reorientation, Patient/family training, Equipment evaluation/education, Compensatory technique education, Energy conservation, Activityengagement, Fine motor coordination activities          See flowsheet documentation for full assessment, interventions and recommendations  Note:   Limitation: Decreased ADL status, Decreased UE strength, Decreased endurance, Decreased self-care trans, Decreased high-level ADLs, Decreased fine motor control     Assessment: Pt is a 72 y o  male seen for OT evaluation s/p admit to [facility] on 1/28/2020 w/ CVA (cerebral vascular accident) [address] [address] )  Pt with L leg weakness, L facial droop  Comorbidities affecting pt's functional performance at time of assessment include: DM, HTN, obesity and gout, diverticulosis, R MCA stenosis  Personal factors affecting pt at time of IE include:steps to enter environment, difficulty performing ADLS and difficulty performing IADLS   Prior to admission, pt was living at home w wife in one story home  He was fully independent w all self care, mobiltiy, home management as well as driving and working full time  He is L handed at baseline  Upon evaluation: Pt requires min assist for completion of most self care (mod assist LB dressing), min assist for sit to stand and spt using RW  He does present with   the following deficits impacting occupational performance: weakness, decreased strength, decreased balance, decreased tolerance and impaired [address] [place]  Pt with 4/5 LUE strength, decreased coordination speed and FM skills  Pt is L handed at baseline   Pt to benefit from continued skilled OT tx while in the hospital to address deficits as defined above and maximize level of functional independence w ADL's and functional mobility  Occupational Performance areas to address include: grooming, bathing/shower, toilet hygiene, dressing, functional mobility and clothing management  Pt scored   50/100 on the barthel index  Based on findings from OT evaluation and functional performance deficits, pt has been identified as a high complexity evaluation  From OT standpoint, recommendation at time of d/c would be short term rehab as patient is currently functioning below baseline  OT Discharge Recommendation: Short Term Rehab  OT - OK to Discharge:  Yes

## 2020-01-30 NOTE — ASSESSMENT & PLAN NOTE
· Will continue to watch  Currently hemoglobin is at 19 yesterday 18 2 today   · Hematology consult    Trending labs, does not neef phlebotomy at this time

## 2020-01-30 NOTE — TELEPHONE ENCOUNTER
----- Message from Sherri Sterling sent at 1/24/2020  4:51 PM EST -----  Regarding: FW: HFU  Rick Middleton  Update:   Patient will remain on DAPT x 3 months, then plavix  Referral to Neurosurgery Dr Wtason Sahu for occlusion,  Referral to hematology for elevated Hgb and HCT  Referral to sleep medicine    ----- Message -----  From: ROSALINA Sterling  Sent: 1/24/2020  10:13 AM EST  To: Giancarlo Schilling RN, Neurology [facility]  Subject: HFU                                              Diagnosis/Reason for follow-up: punctate acute infarct right frontoparietal region, Right M2 occlusion  Subspecialty for follow-up: stroke clinic  Existing neurologist: none  Recommended timing for follow-up: within 4-6 weeks  Tests/Labs/Imaging ordered: none  AP/Attending: attending  Additional notes: DAPT x 3 weeks  ASA and plavix then remain on plavix only    Thank You so much!

## 2020-01-30 NOTE — ASSESSMENT & PLAN NOTE
· Creat on admission 1 82 and 1 60 yesterday 1 55 today   · Baseline recently appears to be 1 35-1 67   · Continue to monitor

## 2020-01-30 NOTE — SPEECH THERAPY NOTE
Speech Language/Pathology    Speech/Language Pathology Progress Note    Patient Name: Bibi Alvarado  QGBBR'I Date: 1/30/2020     Problem List  Principal Problem:    CVA (cerebral vascular accident) [facility])  Active Problems:    Chronic kidney disease, stage 3 ([facility] [address] )    Essential hypertriglyceridemia    Gout    Hypertension    Type 2 diabetes mellitus with microalbuminuria ([facility] [address] )    Erythrocytosis       Past Medical History  Past Medical History:   Diagnosis Date    [facility]     Hypertension     Renal disorder         Past Surgical History  Past Surgical History:   Procedure Laterality Date    COLONOSCOPY  09/18/2006    complete; 10 yrs    COLONOSCOPY  10/23/2017    complete; 5 yrs         Subjective:  Pt seen for dysphagia tx  Pt sitting upright in bed, wife at bedside  Pt cooperative, somewhat flat affect  Objective:  Pt is on a regular diet and thin liquids  Both pt and his wife report he is tolerating this well, and without difficulty  During tx, pt ate several bites of bong food cake, and vini crackers  He drank water from a water bottle  Lip seal was good  Mastication was prompt, as was bolus formation and transfer  No oral residue or pocketing  Pharyngeal swallows appeared prompt, with hyolaryngeal elevation observed  There were no s/s of aspiration  Mild L side labial droop/weakness was noted, but does not appear to effect the swallow negatively  Speech and language appear WNL during conversation; pt and wife confirm no deficits  Exercises to improve lingual/labial strength were reviewed with patient, and he was encouraged to complete independently if facial/lingual weakness does not fully resolve  He demonstrated and verbalized understanding  Assessment:  Good toleration of regular diet and thin liquids  No s/s of aspiration  Speech/language appear WNL  Plan/Recommendations:  No further ST recommended at this time   Discharge from [address] [place] [address]

## 2020-01-30 NOTE — OCCUPATIONAL THERAPY NOTE
Occupational Therapy Evaluation      Beronica Galeana    1/30/2020    Principal Problem:    CVA (cerebral vascular accident) ([facility] )  Active Problems:    Chronic kidney disease, stage 3 ([facility] )    Essential hypertriglyceridemia    Gout    Hypertension    Type 2 diabetes mellitus with microalbuminuria ([facility] )    Erythrocytosis      Past Medical History:   Diagnosis Date    Diabetes mellitus ([facility] )     Hypertension     Renal disorder        Past Surgical History:   Procedure Laterality Date    COLONOSCOPY  09/18/2006    complete; 10 yrs    COLONOSCOPY  10/23/2017    complete; 5 yrs        01/30/20 1110   Note Type   Note type Eval only   Restrictions/Precautions   Weight Bearing Precautions Per Order No   Other Precautions Fall Risk;Multiple lines;Telemetry   Pain Assessment   Pain Assessment No/denies pain   Pain Score No Pain   Home Living   Type of [address] One level  (4STE)   Bathroom Shower/Tub Tub/shower unit   Bathroom Toilet Standard   Bathroom Equipment Shower chair   Additional Comments wife reports buying a shower seat   Prior Function   Level of [place] Independent with ADLs and functional mobility   Lives With Spouse  (works outside of home)   [address] [employer] in the last 6 months 0   Vocational Full time employment   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[employer], programming department   Lifestyle   Autonomy pt reports being fully independt w all self care, mobility, driving and working full time   Reciprocal Relationships supportive wife   Psychosocial   Psychosocial (WDL) WDL   ADL   Eating Assistance 5  Supervision/Setup   Eating Deficit Increased time to complete  (started using his L hand to feed)   Grooming Assistance 4  Minimal Assistance   Grooming Deficit Increased time to complete;Wash/dry hands; Wash/dry face; Teeth care;Brushing hair;Shaving   UB Bathing Assistance 4  Minimal Assistance   UB Bathing Deficit Increased time to complete   LB Bathing Assistance 4  Minimal Assistance   LB Bathing Deficit Increased time to complete   UB Dressing Assistance 4  Minimal Assistance   LB Dressing Assistance 3  Moderate Assistance   LB Dressing Deficit Thread LLE into pants; Don/doff L sock;Pull up over hips; Fasteners   Bed Mobility   Additional Comments pt up in chair upon arrival   Transfers   Sit to Stand 4  Minimal assistance   Additional items Assist x 1; Increased time required;Verbal cues   Stand to Sit 4  Minimal assistance   Additional items Assist x 1; Increased time required;Verbal cues   Stand pivot 4  Minimal assistance   Additional items Assist x 1; Increased time required  (use of RW)   Functional Mobility   Functional Mobility 4  Minimal assistance   Additional Comments pt able to walk using RW, min assist needed for steadying  more assist needed when fatigued   Additional items Rolling walker   Balance   Static Sitting Good   Dynamic Sitting Fair +   Static Standing Fair   Dynamic Standing Poor +   Activity Tolerance   Activity Tolerance Patient limited by fatigue;Patient tolerated treatment well   Nurse Made Aware appropriate to see   RUE Assessment   RUE Assessment WFL   LUE Assessment   LUE Assessment X  (slightly slower to get to full ROM)   LUE Strength   LUE Overall Strength Deficits   Hand Function   Gross Motor Coordination Functional  (slower L UE)   Fine Motor Coordination Functional  (Slower L hand, decreased coordination speed)   Vision - Complex Assessment   Tracking Able to track stimulus in all quads without difficulty   Acuity Able to read newspaper without difficulty   Perception   Inattention/Neglect Appears intact   Cognition   Overall Cognitive Status WFL   Attention Within functional limits   Orientation Level Oriented X4   Memory Within functional limits   Following Commands Follows one step commands without difficulty   Assessment   Limitation Decreased ADL status; Decreased UE strength;Decreased endurance;Decreased self-care trans;Decreased high-level ADLs; Decreased fine motor control   Assessment Pt is a 72 y o  male seen for OT evaluation s/p admit to [facility] on 1/28/2020 w/ CVA (cerebral vascular accident) ([facility] )  Pt with L leg weakness, L facial droop  Comorbidities affecting pt's functional performance at time of assessment include: DM, HTN, obesity and gout, diverticulosis, R MCA stenosis  Personal factors affecting pt at time of IE include:steps to enter environment, difficulty performing ADLS and difficulty performing IADLS   Prior to admission, pt was living at home w wife in one story home  He was fully independent w all self care, mobiltiy, home management as well as driving and working full time  He is L handed at baseline  Upon evaluation: Pt requires min assist for completion of most self care (mod assist LB dressing), min assist for sit to stand and spt using RW  He does present with   the following deficits impacting occupational performance: weakness, decreased strength, decreased balance, decreased tolerance and impaired [address]  Pt with 4/5 LUE strength, decreased coordination speed and FM skills  Pt is L handed at baseline  Pt to benefit from continued skilled OT tx while in the hospital to address deficits as defined above and maximize level of functional independence w ADL's and functional mobility  Occupational Performance areas to address include: grooming, bathing/shower, toilet hygiene, dressing, functional mobility and clothing management  Pt scored   50/100 on the barthel index  Based on findings from OT evaluation and functional performance deficits, pt has been identified as a high complexity evaluation  From OT standpoint, recommendation at time of d/c would be short term rehab as patient is currently functioning below baseline      Goals   Patient Goals to go to rehab and get better   STG Time Frame 3-5   Short Term Goal #1 pt will complete bedmobiltiy mod I w good safety    Short Term Goal #2 tolearte sitting unsupported x 15 min for increased independence w self care   Short Term Goal  complete simple grooming mod I standing at sinkside w min assist for steadying   LTG Time Frame 7-10   Long Term Goal #1 pt will tolerate standing x 10 min w good balance for increased safety w self care, home management  Long Term Goal #2 pt will demonstrate good ECT/self pacing skills w self care/simulated home managment activites   Long Term Goal complete functional mobiltiy in room and bathroom indpendently w good safety    Functional Transfer Goals   Pt Will Perform All Functional Transfers With mod indep; With good judgment/safety   ADL Goals   Pt Will Perform Grooming Independently   Pt Will Perform Bathing With mod indep   Pt Will Perform UE Dressing Independently   Pt Will Perform LE Dressing With mod indep   Pt Will Perform Toileting With mod indep   Plan   Treatment Interventions ADL retraining;Functional transfer training;UE strengthening/ROM; Endurance training;Cognitive reorientation;Patient/family training;Equipment evaluation/education; Compensatory technique education; Energy conservation; Activityengagement; Fine motor coordination activities   Goal Expiration Date 02/10/20   OT Frequency 3-5x/wk   Recommendation   OT Discharge Recommendation Short Term Rehab   OT - OK to Discharge Yes   Barthel Index   Feeding 10   Bathing 0   Grooming Score 0   Dressing Score 5   Bladder Score 10   Bowels Score 10   Toilet Use Score 5   Transfers (Bed/Chair) Score 10   Mobility (Level Surface) Score 0   Stairs Score 0   Barthel Index Score 50 no

## 2020-01-30 NOTE — PHYSICAL THERAPY NOTE
Physical Therapy Treatment   Patient Name: Stewart Ngo    FTREO'Q Date: 1/30/2020     Problem List  Principal Problem:    CVA (cerebral vascular accident) [facility])  Active Problems:    Chronic kidney disease, stage 3 ([facility] [address] )    Essential hypertriglyceridemia    Gout    Hypertension    Type 2 diabetes mellitus with [facility]albuminuria [facility] )    Erythrocytosis       Past Medical History  Past Medical History:   Diagnosis Date    Diabetes mellitus ([facility] )     Hypertension     Renal disorder         Past Surgical History  Past Surgical History:   Procedure Laterality Date    COLONOSCOPY  09/18/2006    complete; 10 yrs    COLONOSCOPY  10/23/2017    complete; 5 yrs           01/30/20 1125   Pain Assessment   Pain Assessment No/denies pain   Pain Score No Pain   Restrictions/Precautions   Other Precautions Fall Risk;Telemetry;Multiple lines;Visual impairment   General   Chart Reviewed Yes   Family/Caregiver Present Yes   Cognition   Overall Cognitive Status WFL   Attention Within functional limits   Orientation Level Oriented X4   Comments flat affect- cooperative and motivated    Subjective   Subjective pt and spouse report eager to get to rehab    Bed Mobility   Additional Comments OOB on arrival    Transfers   Sit to Stand 4  Minimal assistance   Additional items Increased time required;Verbal cues   Stand to Sit 4  Minimal assistance   Additional items Assist x 1; Increased time required;Verbal cues   Stand pivot 4  Minimal assistance   Additional items Assist x 1; Increased time required;Verbal cues   Ambulation/Elevation   Gait pattern Decreased foot clearance;L Foot drag;L Hemiparesis; Improper Weight shift; Ataxia   Gait Assistance 4  Minimal assist   Additional items Assist x 1;Verbal cues; Tactile cues   Assistive Device Rolling walker;None;Other (Comment)  (wall rail)   Distance 40' 60' 70' initially w/ RW then to wall rail - pt w/ 0 heel strike L poor motor control and planning - L llateral lean w/ LOB to L w/ increaesd fatigue (pt is LHD)    Balance   Static Sitting Good   Dynamic Sitting Fair +   Static Standing Fair   Dynamic Standing Poor +   Ambulatory Poor +   Endurance Deficit   Endurance Deficit Yes   Endurance Deficit Description weakness fatigue    Activity Tolerance   Activity Tolerance Patient limited by fatigue   Nurse Made Aware cleared for session    Exercises   Heel Cord Stretch 5 reps   Balance Training Standing   Squat Standing;10 reps  (x2 w/ B/L UE support on RW)   Neuro re-ed standing trunk rotations- reaching past midline   Balance training  STS from chair and bed; marching; weight shifting adn forward backward walking at [place] rail- manual input for L knee stability    Assessment   Prognosis Good   Problem List Decreased strength;Decreased endurance; Impaired balance;Decreased mobility; Decreased coordination;Decreased cognition; Impaired sensation; Impaired tone   Assessment Pt seen for PT session w/ focus on neuro redu gait training and transfers w/ spouse present  MRI (+) per 1/29 read=(Ischemic burden within the right MCA distribution has progressed  No hemorrhage or significant mass effect  Abnormal flow characteristics right MCA branches ) pt able to increased gait distances and trials- initiated therex at [facility] w/ focus on L LLE  Pt continues to require [place] for safety w/ xfers and ambulation w/ o L hemiparetic gait   Pt eager to go to rehab and will benefit from ongoing skilled PT to address deficits s/p CVA- pt has excellent support of spouse  Will cont to follow  Goals   Patient Goals go to rehab   PT Treatment Day 1   Plan   Treatment/Interventions Functional transfer training;LE strengthening/ROM; Elevations; Therapeutic exercise; Endurance training;Patient/family training;Equipment eval/education; Bed mobility;Gait training;Spoke to nursing;Spoke to case management; Family;OT;Spoke to MD   Progress Progressing toward goals   PT Frequency (4-6x/wk )   Recommendation   Recommendation Post acute IP rehab   PT - OK to Discharge   (to rehab )     Dianna Pardo PT

## 2020-01-30 NOTE — PROGRESS NOTES
[facility]  Progress Note - Lyndon Ballard 1950, 72 y o  male MRN: [id number]    Unit/Bed#: [facility] 725-01 Encounter: [id number]    Primary Care Provider: Finn Wilson DO   Date and time admitted to hospital: 1/28/2020 11:56 PM        * CVA (cerebral vascular accident) [facility])  Assessment & Plan  · Patient recently admitted and discharged last Friday, with CVA and left-sided weakness and the assessment as follows:     · Noted worsening of left-sided weakness with no dysphonia  Currently significant for left-sided drifting; placed on stroke protocol  As per recommendations of Neurology; started heparin drip at low dose  Dr Oli Keller of neurosurgery was also consulted and hence a consult is placed  Here does recommend the P2Y12 platelet assay  Results pending    · CT perfusion study- ordered  · MRI - MRI brain demonstrates extensive embolic and watershed appearing ischemic burden in the right MCA territory distribution without over hemorrhage or mass effect  · CTA head and neck grossly stable from previous, demonstrating moderate stenosis of the paraclinoid segments of the bilateral internal carotid arteries, more prominent on the right, stable occlusion and severe stenosis in the proximal right M2 branch, and stable severe stenosis within the left M2 branch  · Neurology recomending  permissive hypertension with correction of SBP >180 only  · Heparin gtt dc'ed today  ASA and Plavix ordered   · PT OT     Chronic kidney disease, stage 3 (HCC)  Assessment & Plan  · Creat on admission 1 82 and 1 60 yesterday 1 55 today   · Baseline recently appears to be 1 35-1 67   · Continue to monitor     Erythrocytosis  Assessment & Plan  · Will continue to watch  Currently hemoglobin is at 19 yesterday 18 2 today   · Hematology consult  Trending labs, does not neef phlebotomy at this time    Hypertension  Assessment & Plan  · Continue metoprolol 50 mg Q 12; amlodipine 10 mg daily    · Permissive htn    Essential hypertriglyceridemia  Assessment & Plan  · Continue Tricor and Lipitor  Type 2 diabetes mellitus with microalbuminuria [facility])  Assessment & Plan  Lab Results   Component Value Date    HGBA1C 6 0 2020     Patient without dysphonia or dysphagia; will continue diabetic/cardiac diet  Invokana is non formulary  However will check blood sugars before meals and bedtime with Accu-Cheks ordered and insulin sliding scale as per protocol  Recent Labs     20  2116 20  0636 20  1057 20  1551   POCGLU 103 107 121 127       Blood Sugar Average: Last 72 hrs:  (P) 111 75    Gout  Assessment & Plan  · Continue allopurinol 200 mg daily  VTE Pharmacologic Prophylaxis:   Pharmacologic: Heparin Drip  Mechanical VTE Prophylaxis in Place: Yes    Patient Centered Rounds: I have performed bedside rounds with nursing staff today  Discussions with Specialists or Other Care Team Provider: neurology and case management     Education and Discussions with Family / Patient: patient and spouse    Time Spent for Care: 20 minutes  More than 50% of total time spent on counseling and coordination of care as described above  Current Length of Stay: 2 day(s)    Current Patient Status: Inpatient   Certification Statement: The patient will continue to require additional inpatient hospital stay due to further evaluation and monitoring    Discharge Plan: pending clinical improvement     Code Status: Level 1 - Full Code      Subjective:   Patient sitting up just got washed  Reporting some weakness still in left hand  Offers no further complaints  Objective:     Vitals:   Temp (24hrs), Av °F (36 7 °C), Min:97 6 °F (36 4 °C), Max:98 4 °F (36 9 °C)    Temp:  [97 6 °F (36 4 °C)-98 4 °F (36 9 °C)] 98 1 °F (36 7 °C)  HR:  [67-86] 79  Resp:  [18-20] 18  BP: (141-151)/(85-96) 148/96  SpO2:  [91 %-96 %] 94 %  Body mass index is 30 64 kg/m²       Input and Output Summary (last 24 hours): Intake/Output Summary (Last 24 hours) at 1/30/2020 1644  Last data filed at 1/30/2020 1530  Gross per 24 hour   Intake 1349 86 ml   Output 375 ml   Net 974 86 ml       Physical Exam:     Physical Exam   Constitutional: He is oriented to person, place, and time  He appears well-developed and well-nourished  HENT:   Head: Normocephalic and atraumatic  Eyes: Pupils are equal, round, and reactive to light  Neck: Normal range of motion  Neck supple  Cardiovascular: Normal rate and regular rhythm  Murmur heard  Pulmonary/Chest: He is in respiratory distress  He has wheezes  Abdominal: Soft  Bowel sounds are normal    Musculoskeletal: Normal range of motion  He exhibits edema  Neurological: He is alert and oriented to person, place, and time  Skin: Skin is warm and dry  Psychiatric: He has a normal mood and affect  His behavior is normal  Judgment and thought content normal    Vitals reviewed  Additional Data:     Labs:    Results from last 7 days   Lab Units 01/30/20  0737  01/27/20  1303   WBC Thousand/uL 5 43   < >  --    WHITE BLOOD CELL COUNT  Thousand/uL  --   --  7 3   HEMOGLOBIN g/dL 18 2*   < >  --    HEMOGLOBIN  g/dL  --   --  19 7*   HEMATOCRIT % 54 8*   < >  --    HEMATOCRIT  %  --   --  59 7*   PLATELETS Thousands/uL 233   < >  --    PLATELETS  Thousand/uL  --   --  274   NEUTROS PCT  %  --   --  70 3   LYMPHS PCT  %  --   --  18 9   MONOS PCT  %  --   --  6 3   EOS PCT  %  --   --  3 0    < > = values in this interval not displayed       Results from last 7 days   Lab Units 01/30/20  0737 01/29/20  0636   SODIUM mmol/L 143 147*   POTASSIUM mmol/L 3 5 3 9   CHLORIDE mmol/L 114* 115*   CO2 mmol/L 25 23   BUN mg/dL 28* 34*   CREATININE mg/dL 1 55* 1 60*   ANION GAP mmol/L 4 9   CALCIUM mg/dL 9 2 9 2   ALBUMIN g/dL  --  3 7   TOTAL BILIRUBIN mg/dL  --  0 91   ALK PHOS U/L  --  85   ALT U/L  --  27   AST U/L  --  21   GLUCOSE RANDOM mg/dL 101 96     Results from last 7 days   Lab Units 01/29/20  0015   INR  1 08     Results from last 7 days   Lab Units 01/30/20  1551 01/30/20  1057 01/30/20  0636 01/29/20  2116 01/29/20  1607 01/29/20  1055 01/29/20  0718 01/29/20  0638 01/28/20  1900 01/28/20  1705 01/24/20  1057 01/24/20  0739   POC GLUCOSE mg/dl 127 121 107 103 118 121 118 79 86 142* 148* 99     Results from last 7 days   Lab Units 01/29/20  0015 01/27/20  1303   HEMOGLOBIN A1C % 6 0 6 2*               * I Have Reviewed All Lab Data Listed Above  * Additional Pertinent Lab Tests Reviewed: [facility] [facility] Admission Reviewed    Imaging:    Imaging Reports Reviewed Today Include: none  Imaging Personally Reviewed by Myself Includes:  none    Recent Cultures (last 7 days):     Results from last 7 days   Lab Units 01/28/20  2125   URINE CULTURE  >100,000 cfu/ml Escherichia coli*       Last 24 Hours Medication List:     Current Facility-Administered Medications:  acetaminophen 650 mg Oral Q4H PRN Talib Baptiste MD   allopurinol 200 mg Oral Daily Talib Baptiste MD   aluminum-magnesium hydroxide-simethicone 5 mL Oral Q6H PRN Talib Baptiste MD   aspirin 81 mg Oral Daily Talib Baptiste MD   atorvastatin 40 mg Oral Daily With Ina Leiva MD   clopidogrel 75 mg Oral Daily ROSALINA Ortega   docusate sodium 100 mg Oral BID PRN Talib Baptiste MD   fenofibrate 145 mg Oral Daily Talib Baptiste MD   hydrALAZINE 5 mg Intravenous Q6H PRN ROSALINA Ortega   insulin lispro 1-5 Units Subcutaneous HS Talib Baptiste MD   insulin lispro 1-6 Units Subcutaneous TID AC Talib Baptiste MD   ondansetron 4 mg Intravenous Q6H PRN Talib Baptiste MD        Today, Patient Was Seen By: ROSALINA Ortega    ** Please Note: Dictation voice to text software may have been used in the creation of this document   **

## 2020-01-30 NOTE — RESTORATIVE TECHNICIAN NOTE
Restorative Specialist Mobility Note       Activity: Ambulate in purcell, Ambulate in room, Bathroom privileges, Chair, Dangle, Stand at bedside(Educated/encouraged pt to ambulate with assistance 3-4 x's/day  Bed alarm on   Pt callbell, phone/tray within reach )     Assistive Device: Front wheel walker       [employer] BS, Restorative Technician, [employer]

## 2020-01-30 NOTE — ASSESSMENT & PLAN NOTE
Lab Results   Component Value Date    HGBA1C 6 0 01/29/2020     Patient without dysphonia or dysphagia; will continue diabetic/cardiac diet  Invokana is non formulary  However will check blood sugars before meals and bedtime with Accu-Cheks ordered and insulin sliding scale as per protocol    Recent Labs     01/29/20  2116 01/30/20  0636 01/30/20  1057 01/30/20  1551   POCGLU 103 107 121 127       Blood Sugar Average: Last 72 hrs:  (P) 111 75

## 2020-01-31 PROBLEM — K62.5 BRBPR (BRIGHT RED BLOOD PER RECTUM): Status: ACTIVE | Noted: 2020-01-31

## 2020-01-31 LAB
ANION GAP SERPL CALCULATED.3IONS-SCNC: 5 MMOL/L (ref 4–13)
BACTERIA UR CULT: ABNORMAL
BUN SERPL-MCNC: 24 MG/DL (ref 5–25)
CALCIUM SERPL-MCNC: 9.2 MG/DL (ref 8.3–10.1)
CHLORIDE SERPL-SCNC: 112 MMOL/L (ref 100–108)
CO2 SERPL-SCNC: 25 MMOL/L (ref 21–32)
CREAT SERPL-MCNC: 1.51 MG/DL (ref 0.6–1.3)
ERYTHROCYTE [DISTWIDTH] IN BLOOD BY AUTOMATED COUNT: 14.6 % (ref 11.6–15.1)
GFR SERPL CREATININE-BSD FRML MDRD: 46 ML/MIN/1.73SQ M
GLUCOSE SERPL-MCNC: 112 MG/DL (ref 65–140)
GLUCOSE SERPL-MCNC: 124 MG/DL (ref 65–140)
GLUCOSE SERPL-MCNC: 132 MG/DL (ref 65–140)
GLUCOSE SERPL-MCNC: 142 MG/DL (ref 65–140)
GLUCOSE SERPL-MCNC: 64 MG/DL (ref 65–140)
GLUCOSE SERPL-MCNC: 97 MG/DL (ref 65–140)
HCT VFR BLD AUTO: 55.4 % (ref 36.5–49.3)
HGB BLD-MCNC: 17.9 G/DL (ref 12–17)
MCH RBC QN AUTO: 28.8 PG (ref 26.8–34.3)
MCHC RBC AUTO-ENTMCNC: 32.3 G/DL (ref 31.4–37.4)
MCV RBC AUTO: 89 FL (ref 82–98)
PLATELET # BLD AUTO: 222 THOUSANDS/UL (ref 149–390)
PMV BLD AUTO: 10 FL (ref 8.9–12.7)
POTASSIUM SERPL-SCNC: 3.5 MMOL/L (ref 3.5–5.3)
RBC # BLD AUTO: 6.21 MILLION/UL (ref 3.88–5.62)
SODIUM SERPL-SCNC: 142 MMOL/L (ref 136–145)
WBC # BLD AUTO: 6 THOUSAND/UL (ref 4.31–10.16)

## 2020-01-31 PROCEDURE — 99232 SBSQ HOSP IP/OBS MODERATE 35: CPT | Performed by: PHYSICIAN ASSISTANT

## 2020-01-31 PROCEDURE — 82948 REAGENT STRIP/BLOOD GLUCOSE: CPT

## 2020-01-31 PROCEDURE — 99232 SBSQ HOSP IP/OBS MODERATE 35: CPT | Performed by: PSYCHIATRY & NEUROLOGY

## 2020-01-31 PROCEDURE — 80048 BASIC METABOLIC PNL TOTAL CA: CPT | Performed by: PHYSICIAN ASSISTANT

## 2020-01-31 PROCEDURE — 85027 COMPLETE CBC AUTOMATED: CPT | Performed by: PHYSICIAN ASSISTANT

## 2020-01-31 RX ORDER — METOPROLOL TARTRATE 50 MG/1
50 TABLET, FILM COATED ORAL EVERY 12 HOURS SCHEDULED
Status: DISCONTINUED | OUTPATIENT
Start: 2020-01-31 | End: 2020-02-03 | Stop reason: HOSPADM

## 2020-01-31 RX ORDER — AMLODIPINE BESYLATE 5 MG/1
5 TABLET ORAL DAILY
Status: DISCONTINUED | OUTPATIENT
Start: 2020-02-01 | End: 2020-02-03 | Stop reason: HOSPADM

## 2020-01-31 RX ADMIN — ASPIRIN 81 MG 81 MG: 81 TABLET ORAL at 10:41

## 2020-01-31 RX ADMIN — FENOFIBRATE 145 MG: 145 TABLET, FILM COATED ORAL at 10:41

## 2020-01-31 RX ADMIN — METOPROLOL TARTRATE 50 MG: 50 TABLET, FILM COATED ORAL at 21:16

## 2020-01-31 RX ADMIN — ALLOPURINOL 200 MG: 100 TABLET ORAL at 10:41

## 2020-01-31 RX ADMIN — ATORVASTATIN CALCIUM 40 MG: 40 TABLET, FILM COATED ORAL at 17:53

## 2020-01-31 RX ADMIN — CLOPIDOGREL BISULFATE 75 MG: 75 TABLET ORAL at 10:41

## 2020-01-31 NOTE — ASSESSMENT & PLAN NOTE
· Will continue to watch  Most recent hemoglobin 18 2  · Hematology consult appreciated     Trending labs, does not need phlebotomy at this time per Heme/Onc  · CBC now and in AM as above

## 2020-01-31 NOTE — RESTORATIVE TECHNICIAN NOTE
Restorative Specialist Mobility Note       Activity: Ambulate in purcell, Ambulate in room, Bathroom privileges, Chair, Dangle, Stand at bedside(Educated/encouraged pt to ambulate with assistance 3-4 x's/day  Bed alarm on   Pt callbell, phone/tray within reach )     Assistive Device: Front wheel walker       Kristen MAC, Restorative Technician, [employer] [facility] Epidermal Sutures: 4-0 [place]

## 2020-01-31 NOTE — SOCIAL WORK
Initial interview:     CIPRIANO met with the patient and his wife Michelle Olsen at bedside to review the CM role and discuss possible dc needs  Pt lives with his wife in a single story home in [place], [place]  [address]  Pt is independent, drives and works full-time  No assist device use  No DME  No hx of VNA or IP rehab  Pt denied drug, etoh or mental illness history  No POA or LW  Prescriptions are filled at [facility] in [place]  Main contact:   Wife Helder Dietz [phone]  Dtr Sandy Sims [phone]    BG Plan:  A post acute care recommendation was made by your care team for Acute Rehab  Discussed [place] of Choice with patient  List of facilities given to patient via in person  patient aware the list is custom filtered for them by preference  and that [facility] post acute providers are designated  The patient and his wife Michelle Olsen are agreeable to Acute rehab, are reviewing the rehab list and stated they wish to speak with their children to assist with making choices  MSW CIPRIANO Kilpatrick advised  CM will follow  Wife reported that she will have Short-term disability paperwork soon and requested assist with having MD complete the initial form  CM reviewed d/c planning process including the following: identifying help at home, patient preference for d/c planning needs, Discharge Lounge, [facility] Meds to Bed program, availability of treatment team to discuss questions or concerns patient and/or family may have regarding understanding medications and recognizing signs and symptoms once discharged  CM also encouraged patient to follow up with all recommended appointments after discharge  Patient advised of importance for patient and family to participate in managing patients medical well being

## 2020-01-31 NOTE — ASSESSMENT & PLAN NOTE
Lab Results   Component Value Date    HGBA1C 6 0 01/29/2020     Recent Labs     01/30/20  1551 01/30/20  2123 01/31/20  0640 01/31/20  1113   POCGLU 127 97 97 64*       Blood Sugar Average: Last 72 hrs:  (P) 104 [id number]     · Orals on hold while hospitalized  · With HYPOglycemia at lunch today, glucose 64  · Hypoglycemia protocol   · Continue glucose checks TID AC and QHS with SSI coverage PRN

## 2020-01-31 NOTE — ASSESSMENT & PLAN NOTE
· Noted by patient's wife overnight; reported drops of bright red blood on Depends  · Patient has history of hemorrhoids, suspect hemorrhoidal bleeding  · Heparin GTT was discontinued on 01/30/2020  · Currently on DAPT as above  · Hemoglobin stable but was down-trending- repeat CBC now and monitor   · FOBT pending

## 2020-01-31 NOTE — ASSESSMENT & PLAN NOTE
· Patient was recently admitted at [address] [place] [address] on 01/22/2020 due to stroke-like symptoms (facial droop and slurred speech)  An MRI of the brain that admission revealed punctate acute infarcts in the right frontoparietal region in a watershed territory distribution consistent with known proximal right MCA occlusion or high-grade stenosis  Chronic deep white matter basal ganglia and brainstem lacunar infarcts with advanced microangiopathic disease also noted  Patient was discharged on 01/24/2020 with plan for DAPT x3 months, then Plavix alone  · On 01/28/2020 the patient re-presented to [address] [place] [address] with worsening symptoms (left facial droop, LLE weakness, and difficulty walking)  Patient was deemed not to be a TPA candidate given recent stroke  He was placed on a heparin GTT transferred to [facility]  · MRI brain this admission revealed progression of ischemic burden within the right MCA distribution with no hemorrhage or significant mass effect    · CT cerebral perfusion performed - defer antiplatelet decision vs neurosurgical intervention to Neurology and Neurosurgery  · Heparin GTT was discontinued on 01/30/2020  · Currently on DAPT  · Neurology recommending permissive hypertension with correction only if SBP>180  · Home antihypertensives are on hold

## 2020-01-31 NOTE — SOCIAL WORK
Acute Rehab Follow up:     CM received call from [facility], [phone]  [facility] reported that they can accept the patient and asked CM to initiate insurance authorization  [facility] is patient's preferred choice  Auth intiated  [facility] NPI #[id number]  Dr Thien Morton NPI #[id number]  [address] [facility], [address] [place] [address] Ave  Report [phone]  Fax [phone]    CM called and spoke with Ruma at [facility]; [phone]  Case was opened:    [id number]  Clinicals were faxed to [phone] at 1658 hrs  1735 hrs:   CM met with the patient and his wife Philip Brock to provide update on referral process and pending authorization to go to [facility] rehab; pt and wife agreeable to dc plan  CM will follow

## 2020-01-31 NOTE — PROGRESS NOTES
Reviewed chart, patient on stroke pathway  Met with patient and wife Ruthie Bosworth to discuss follow up care, stroke education, and discharge planning  Explained neurovascular nurse navigator role  Patient resides with wife  She manages appointments and medications for patient  Provided them a stroke education binder and my card  Theyr bother verbalize understanding of stroke type, symptoms, personal risk factors and management, and medications  Reviewed resources  Plan, per therapy recommendations, at this time is for patient to be discharged to inpatient rehab when medically cleared  Answered all their questions  Neither wife nor patient have any further questions or concerns at this time

## 2020-01-31 NOTE — PROGRESS NOTES
Progress Note - Neurology   Progress Note - Neurology   Sugey Connor 72 y o  male MRN: [id number]  [facility] Encounter: [id number]    Assessment/ Plan:  1)  Worsening of patient's left-sided symptoms in setting of known significant right MCA stenosis and progression of patient's right MCA distribution CVA   -MRI brain demonstrates extensive embolic and watershed appearing ischemic burden in the right MCA territory distribution without over hemorrhage or mass effect   -CTA head and neck grossly stable from previous, demonstrating moderate stenosis of the paraclinoid segments of the bilateral internal carotid arteries, more prominent on the right, stable occlusion and severe stenosis in the proximal right M2 branch, and stable severe stenosis within the left M2 branch   -CT head without acute intracranial abnormality   -CT perfusion scan demonstrates 67 milliliters of mismatch   -recommend slowly reintroducing antihypertensives  Would recommend continue home dose of metoprolol 50 milligrams p o  B i d  And half of home Norvasc dose    -will initiate amlodipine 5 mg p o  B i d    -patient is s/p heparin GGT, currently maintained on ASA and Plavix     -in setting of patient's worsened hemorrhoidal bleeding, will not initiate 30 anti-platelet agent   -appreciate Neurosurgery and await their recommendations   -PT/OT/speech/ PM&R   -will continue to follow closely, please monitor exam and notify with changes       Subjective:   Patient is a 23-year-old male with HTN, DM 2, HLD, who originally presented to the [address] [place] [address] Emergency Department on 01/22/2020 with left facial droop, drooling, and gait imbalance with speech disturbance  The patient was found to have an acute right frontal temporal watershed distribution CVA, consistent with known proximal right MCA high-grade stenosis    The patient was stabilized and discharged, with plan for dual anti-platelet therapy x3 months then Plavix monotherapy  On 01/28/2019, the patient re-presented to the [facility] and urgency Department secondary to worsening of his CVA symptoms  The patient was transferred to [facility] for higher level of care and possible neuro vascular intervention  Heparin drip was initiated  MRI brain demonstrated worsening of patient's right MCA territory infarctions, both embolic appearing and watershed  CT perfusion scan was completed and does demonstrate mismatch, indicative of ischemic penumbra at risk for infarction  Patient's blood pressures have been high stable over the course of the day  He reports no increase in symptoms with ambulation or positional changes  Of note, the patient's wife reports that he has had increased bleeding in his hemorrhoids recently  He has maintained normal hemoglobin, though it is slightly trending down throughout hospitalization  On exam today, the patient is resting comfortably in bed in no acute distress  Twelve point review systems was completed and is negative  Patient demonstrates an improved neurological exam as detailed below        ROS:  See Subjective     Medication:  Scheduled Meds:    Current Facility-Administered Medications:  acetaminophen 650 mg Oral Q4H PRN Bereket Chun MD   allopurinol 200 mg Oral Daily Bereket Chun MD   aluminum-magnesium hydroxide-simethicone 5 mL Oral Q6H PRN Bereket Chun MD   aspirin 81 mg Oral Daily Bereket Chun MD   atorvastatin 40 mg Oral Daily With Ala Lipoma, MD   clopidogrel 75 mg Oral Daily ROSALINA Harris   docusate sodium 100 mg Oral BID PRN Bereket Chun MD   fenofibrate 145 mg Oral Daily Bereket Chun MD   hydrALAZINE 5 mg Intravenous Q6H PRN ROSALINA Harris   insulin lispro 1-5 Units Subcutaneous HS Bereket Chun MD   insulin lispro 1-6 Units Subcutaneous TID AC Bereket Chun MD   ondansetron 4 mg Intravenous Q6H PRN Bereket Chun MD Continuous Infusions:     PRN Meds:   acetaminophen    aluminum-magnesium hydroxide-simethicone    docusate sodium    hydrALAZINE    ondansetron      Vitals: Blood pressure (!) 160/101, pulse 98, temperature 98 4 °F (36 9 °C), resp  rate 17, height 5' 9" (1 753 m), weight 94 1 kg (207 lb 7 2 oz), SpO2 96 %  ,Body mass index is 30 64 kg/m²  Physical Exam:  Physical Exam   Constitutional: He is oriented to person, place, and time  He appears well-developed and well-nourished  No distress  HENT:   Head: Normocephalic and atraumatic  Right Ear: External ear normal    Left Ear: External ear normal    Mouth/Throat: Oropharynx is clear and moist  No oropharyngeal exudate  Eyes: Conjunctivae are normal  Right eye exhibits no discharge  Left eye exhibits no discharge  No scleral icterus  Neck: Normal range of motion  Neck supple  Pulmonary/Chest: Effort normal  No respiratory distress  Musculoskeletal: Normal range of motion  He exhibits no edema, tenderness or deformity  Neurological: He is oriented to person, place, and time  He has normal strength  Skin: Skin is warm and dry  No rash noted  He is not diaphoretic  No erythema  No pallor  Psychiatric: He has a normal mood and affect  His behavior is normal    Nursing note and vitals reviewed  Neurologic Exam     Mental Status   Oriented to person, place, and time  Follows 2 step commands  Attention: normal  Concentration: normal    Level of consciousness: alert  Knowledge: good  Normal comprehension  Cranial Nerves   Cranial nerves II through XII intact  Improved left central facial droop     Motor Exam   Muscle bulk: normal  Overall muscle tone: normal  Left arm pronator drift: present    Strength   Strength 5/5 throughout   Patient now with pronation without drift on the left upper extremity     Sensory Exam   Light touch normal      Gait, Coordination, and Reflexes     Gait  Gait: (Deferred for safety)    Tremor   Resting tremor: absent      Lab, Imaging and other studies: I have personally reviewed pertinent reports  I have personally reviewed pertinent imaging in PACs  Recent Results (from the past 24 hour(s))   Fingerstick Glucose (POCT)    Collection Time: 01/30/20 10:57 AM   Result Value Ref Range    POC Glucose 121 65 - 140 mg/dl   APTT    Collection Time: 01/30/20  2:24 PM   Result Value Ref Range    PTT 59 (H) 23 - 37 seconds   Fingerstick Glucose (POCT)    Collection Time: 01/30/20  3:51 PM   Result Value Ref Range    POC Glucose 127 65 - 140 mg/dl   Fingerstick Glucose (POCT)    Collection Time: 01/30/20  9:23 PM   Result Value Ref Range    POC Glucose 97 65 - 140 mg/dl   Fingerstick Glucose (POCT)    Collection Time: 01/31/20  6:40 AM   Result Value Ref Range    POC Glucose 97 65 - 140 mg/dl   ]    VTE Prophylaxis: Sequential compression device (Venodyne)  and Heparin GGT      Counseling / Coordination of Care  Total time spent today 25 minutes  Greater than 50% of total time was spent with the patient and / or family counseling and / or coordination of care  A description of the counseling / coordination of care: Meghann Delacruz The pt was seen and examined by myself and the attending physician  The chart was reviewed thoroughly, including laboratory values and imaging studies  The pt was counseled in the room

## 2020-01-31 NOTE — PROGRESS NOTES
Progress Note - Momo Seo 1950, 72 y o  male MRN: [id number]    Unit/Bed#: [facility] 725-01 Encounter: [id number]    Primary Care Provider: Saleem Lake DO   Date and time admitted to hospital: 1/28/2020 11:56 PM        * CVA (cerebral vascular accident) [facility])  Assessment & Plan  · Patient was recently admitted at [address] [place] [address] on 01/22/2020 due to stroke-like symptoms (facial droop and slurred speech)  An MRI of the brain that admission revealed punctate acute infarcts in the right frontoparietal region in a watershed territory distribution consistent with known proximal right MCA occlusion or high-grade stenosis  Chronic deep white matter basal ganglia and brainstem lacunar infarcts with advanced microangiopathic disease also noted  Patient was discharged on 01/24/2020 with plan for DAPT x3 months, then Plavix alone  · On 01/28/2020 the patient re-presented to [address] [place] [address] with worsening symptoms (left facial droop, LLE weakness, and difficulty walking)  Patient was deemed not to be a TPA candidate given recent stroke  He was placed on a heparin GTT transferred to [facility]  · MRI brain this admission revealed progression of ischemic burden within the right MCA distribution with no hemorrhage or significant mass effect    · CT cerebral perfusion performed - defer antiplatelet decision vs neurosurgical intervention to Neurology and Neurosurgery  · Heparin GTT was discontinued on 01/30/2020  · Currently on DAPT  · Neurology recommending permissive hypertension with correction only if SBP>180  · Home antihypertensives are on hold     BRBPR (bright red blood per rectum)  Assessment & Plan  · Noted by patient's wife overnight; reported drops of bright red blood on Depends  · Patient has history of hemorrhoids, suspect hemorrhoidal bleeding  · Heparin GTT was discontinued on 01/30/2020  · Currently on DAPT as above  · Hemoglobin stable but was down-trending- repeat CBC now and monitor   · FOBT pending     Chronic kidney disease, stage 3 (HCC)  Assessment & Plan  · Creat on admission 1 82   · Baseline recently appears to be 1 3s-1 6s  · Continue to monitor     Erythrocytosis  Assessment & Plan  · Will continue to watch  Most recent hemoglobin 18 2  · Hematology consult appreciated   Trending labs, does not need phlebotomy at this time per Heme/Onc  · CBC now and in AM as above    Type 2 diabetes mellitus with stage 3 chronic kidney disease, without long-term current use of insulin [facility])  Assessment & Plan  Lab Results   Component Value Date    HGBA1C 6 0 01/29/2020     Recent Labs     01/30/20  1551 01/30/20  2123 01/31/20  0640 01/31/20  1113   POCGLU 127 97 97 64*       Blood Sugar Average: Last 72 hrs:  (P) 104 [id number]     · Orals on hold while hospitalized  · With HYPOglycemia at lunch today, glucose 64  · Hypoglycemia protocol   · Continue glucose checks TID AC and QHS with SSI coverage PRN    Hypertension  Assessment & Plan  · Antihypertensives on hold to allow for permissive hypertension as above     Gout  Assessment & Plan  · Continue allopurinol 200 mg daily  VTE Pharmacologic Prophylaxis:   Pharmacologic: Pharmacologic VTE Prophylaxis contraindicated due to BRBPR, on DAPT, hemoglobin downtrending   Mechanical VTE Prophylaxis in Place: Yes    Patient Centered Rounds: I have performed bedside rounds with nursing staff today  Discussions with Specialists or Other Care Team Provider: Neurology    Education and Discussions with Family / Patient: patient and wife at bedside     Time Spent for Care: 30 minutes  More than 50% of total time spent on counseling and coordination of care as described above  Current Length of Stay: 3 day(s)    Current Patient Status: Inpatient   Certification Statement: The patient will continue to require additional inpatient hospital stay due to pending Neurology/Neurosurgical plan regarding perfusion scan   ALso with reported BRBPR with down-trending hemoglobin    Discharge Plan: pending neurology/neurosurgical recs and hemoglobin stability, possibly 24 hours    Code Status: Level 1 - Full Code      Subjective:   Mr  Nithya Murphy reports he is feeling well today  Denies complaint  Per his wife his left-sided facial droop is improved    Objective:     Vitals:   Temp (24hrs), Av 2 °F (36 8 °C), Min:98 1 °F (36 7 °C), Max:98 5 °F (36 9 °C)    Temp:  [98 1 °F (36 7 °C)-98 5 °F (36 9 °C)] 98 4 °F (36 9 °C)  HR:  [78-98] 98  Resp:  [17-18] 17  BP: (148-171)/() 160/101  SpO2:  [90 %-96 %] 96 %  Body mass index is 30 64 kg/m²  Input and Output Summary (last 24 hours): Intake/Output Summary (Last 24 hours) at 2020 1157  Last data filed at 2020 0700  Gross per 24 hour   Intake 659 86 ml   Output 375 ml   Net 284 86 ml       Physical Exam:     Physical Exam   Constitutional: He is oriented to person, place, and time  Patient seen sitting upright comfortably resting in bedside chair with wife and nurse present  Pleasant and cooperative  NAD   Cardiovascular: Normal rate and regular rhythm  Pulmonary/Chest: Effort normal and breath sounds normal  No respiratory distress  He has no wheezes  Abdominal: Soft  Bowel sounds are normal  There is no tenderness  Musculoskeletal: He exhibits no edema  Neurological: He is alert and oriented to person, place, and time  Left facial droop   Skin: Skin is warm  He is not diaphoretic  Psychiatric: He has a normal mood and affect  His behavior is normal    Vitals reviewed  Additional Data:     Labs:    Results from last 7 days   Lab Units 20  0737  20  1303   WBC Thousand/uL 5 43   < >  --    WHITE BLOOD CELL COUNT  Thousand/uL  --   --  7 3   HEMOGLOBIN g/dL 18 2*   < >  --    HEMOGLOBIN  g/dL  --   --  19 7*   HEMATOCRIT % 54 8*   < >  --    HEMATOCRIT  %  --   --  59 7*   PLATELETS Thousands/uL 233   < >  --    PLATELETS   Thousand/uL  --   --  274   NEUTROS PCT  %  --   --  70 3   LYMPHS PCT  %  --   -- 18 9   MONOS PCT  %  --   --  6 3   EOS PCT  %  --   --  3 0    < > = values in this interval not displayed  Results from last 7 days   Lab Units 01/30/20  0737 01/29/20  0636   SODIUM mmol/L 143 147*   POTASSIUM mmol/L 3 5 3 9   CHLORIDE mmol/L 114* 115*   CO2 mmol/L 25 23   BUN mg/dL 28* 34*   CREATININE mg/dL 1 55* 1 60*   ANION GAP mmol/L 4 9   CALCIUM mg/dL 9 2 9 2   ALBUMIN g/dL  --  3 7   TOTAL BILIRUBIN mg/dL  --  0 91   ALK PHOS U/L  --  85   ALT U/L  --  27   AST U/L  --  21   GLUCOSE RANDOM mg/dL 101 96     Results from last 7 days   Lab Units 01/29/20  0015   INR  1 08     Results from last 7 days   Lab Units 01/31/20  1113 01/31/20  0640 01/30/20  2123 01/30/20  1551 01/30/20  1057 01/30/20  0636 01/29/20  2116 01/29/20  1607 01/29/20  1055 01/29/20  0718 01/29/20  0638 01/28/20  1900   POC GLUCOSE mg/dl 64* 97 97 127 121 107 103 118 121 118 79 86     Results from last 7 days   Lab Units 01/29/20  0015 01/27/20  1303   HEMOGLOBIN A1C % 6 0 6 2*               * I Have Reviewed All Lab Data Listed Above  * Additional Pertinent Lab Tests Reviewed:  All Labs Within Last 24 Hours Reviewed    Imaging:    Imaging Reports Reviewed Today Include: MRIs and CTs as above  Imaging Personally Reviewed by Myself Includes:  None    Recent Cultures (last 7 days):     Results from last 7 days   Lab Units 01/28/20 2125   URINE CULTURE  >100,000 cfu/ml Escherichia coli*       Last 24 Hours Medication List:     Current Facility-Administered Medications:  acetaminophen 650 mg Oral Q4H PRN Clearance MD Ankit   allopurinol 200 mg Oral Daily Clearance MD Ankit   aluminum-magnesium hydroxide-simethicone 5 mL Oral Q6H PRN Clearance MD Ankit   aspirin 81 mg Oral Daily Clearance MD Ankit   atorvastatin 40 mg Oral Daily With Hyun Fair MD   clopidogrel 75 mg Oral Daily Alfornia Rash, CRNP   docusate sodium 100 mg Oral BID PRN Clearance MD Ankit   fenofibrate 145 mg Oral Daily Clearance MD Ankit hydrALAZINE 5 mg Intravenous Q6H PRN ROSALINA Sol   insulin lispro 1-5 Units Subcutaneous HS Scott Fox MD   insulin lispro 1-6 Units Subcutaneous TID AC Scott Fox MD   ondansetron 4 mg Intravenous Q6H PRN Scott Fox MD        Today, Patient Was Seen By: Karine Hamilton PA-C    ** Please Note: Dictation voice to text software may have been used in the creation of this document   **

## 2020-01-31 NOTE — SOCIAL WORK
Acute Rehab choice follow up:    CM received call from pt's Wife Theo Fiore with request for referrals to:  1st choice -[facility]  2nd choice - [facility] [address] referrals sent as requested

## 2020-02-01 LAB
ANION GAP SERPL CALCULATED.3IONS-SCNC: 4 MMOL/L (ref 4–13)
BUN SERPL-MCNC: 24 MG/DL (ref 5–25)
CALCIUM SERPL-MCNC: 9.1 MG/DL (ref 8.3–10.1)
CHLORIDE SERPL-SCNC: 113 MMOL/L (ref 100–108)
CO2 SERPL-SCNC: 25 MMOL/L (ref 21–32)
CREAT SERPL-MCNC: 1.5 MG/DL (ref 0.6–1.3)
ERYTHROCYTE [DISTWIDTH] IN BLOOD BY AUTOMATED COUNT: 14.6 % (ref 11.6–15.1)
GFR SERPL CREATININE-BSD FRML MDRD: 47 ML/MIN/1.73SQ M
GLUCOSE SERPL-MCNC: 101 MG/DL (ref 65–140)
GLUCOSE SERPL-MCNC: 107 MG/DL (ref 65–140)
GLUCOSE SERPL-MCNC: 112 MG/DL (ref 65–140)
GLUCOSE SERPL-MCNC: 90 MG/DL (ref 65–140)
GLUCOSE SERPL-MCNC: 98 MG/DL (ref 65–140)
HCT VFR BLD AUTO: 54.4 % (ref 36.5–49.3)
HGB BLD-MCNC: 17.7 G/DL (ref 12–17)
MCH RBC QN AUTO: 28.9 PG (ref 26.8–34.3)
MCHC RBC AUTO-ENTMCNC: 32.5 G/DL (ref 31.4–37.4)
MCV RBC AUTO: 89 FL (ref 82–98)
PLATELET # BLD AUTO: 233 THOUSANDS/UL (ref 149–390)
PMV BLD AUTO: 10.6 FL (ref 8.9–12.7)
POTASSIUM SERPL-SCNC: 4 MMOL/L (ref 3.5–5.3)
RBC # BLD AUTO: 6.13 MILLION/UL (ref 3.88–5.62)
SODIUM SERPL-SCNC: 142 MMOL/L (ref 136–145)
WBC # BLD AUTO: 6.73 THOUSAND/UL (ref 4.31–10.16)

## 2020-02-01 PROCEDURE — 85027 COMPLETE CBC AUTOMATED: CPT | Performed by: PHYSICIAN ASSISTANT

## 2020-02-01 PROCEDURE — 97535 SELF CARE MNGMENT TRAINING: CPT

## 2020-02-01 PROCEDURE — 80048 BASIC METABOLIC PNL TOTAL CA: CPT | Performed by: PHYSICIAN ASSISTANT

## 2020-02-01 PROCEDURE — 82948 REAGENT STRIP/BLOOD GLUCOSE: CPT

## 2020-02-01 PROCEDURE — 99232 SBSQ HOSP IP/OBS MODERATE 35: CPT | Performed by: PHYSICIAN ASSISTANT

## 2020-02-01 PROCEDURE — 97110 THERAPEUTIC EXERCISES: CPT

## 2020-02-01 PROCEDURE — 97116 GAIT TRAINING THERAPY: CPT

## 2020-02-01 RX ADMIN — ATORVASTATIN CALCIUM 40 MG: 40 TABLET, FILM COATED ORAL at 18:06

## 2020-02-01 RX ADMIN — ALLOPURINOL 200 MG: 100 TABLET ORAL at 09:32

## 2020-02-01 RX ADMIN — METOPROLOL TARTRATE 50 MG: 50 TABLET, FILM COATED ORAL at 09:32

## 2020-02-01 RX ADMIN — AMLODIPINE BESYLATE 5 MG: 5 TABLET ORAL at 09:32

## 2020-02-01 RX ADMIN — FENOFIBRATE 145 MG: 145 TABLET, FILM COATED ORAL at 09:32

## 2020-02-01 RX ADMIN — CLOPIDOGREL BISULFATE 75 MG: 75 TABLET ORAL at 09:32

## 2020-02-01 RX ADMIN — ASPIRIN 81 MG 81 MG: 81 TABLET ORAL at 09:32

## 2020-02-01 RX ADMIN — METOPROLOL TARTRATE 50 MG: 50 TABLET, FILM COATED ORAL at 22:54

## 2020-02-01 NOTE — OCCUPATIONAL THERAPY NOTE
Occupational Therapy Treatment Note       02/01/20 1418   Restrictions/Precautions   Weight Bearing Precautions Per Order No   Other Precautions Fall Risk;Visual impairment;Telemetry   Lifestyle   Autonomy pt reports being fully independt w all self care, mobility, driving and working full time   Reciprocal Relationships supportive wife   Pain Assessment   Pain Assessment No/denies pain   Pain Score No Pain   ADL   Where Assessed Standing at sink   Grooming Assistance 5  Supervision/Setup   Grooming Deficit Teeth care   LB Dressing Assistance 4  Minimal Assistance   LB Dressing Deficit Thread LLE into pants   LB Dressing Comments while seated at edge of pt bed    Bed Mobility   Supine to Sit 5  Supervision   Additional items Increased time required   Transfers   Sit to Stand 4  Minimal assistance   Additional items Assist x 1   Stand to Sit 4  Minimal assistance   Additional items Assist x 1   Functional Mobility   Functional Mobility 4  Minimal assistance   Additional items Rolling walker   Cognition   Overall Cognitive Status WFL   Arousal/Participation Alert; Cooperative   Attention Within functional limits   Orientation Level Oriented X4   Memory Within functional limits   Following Commands Follows one step commands without difficulty   Assessment   Assessment Pt participated in occupational therapy with focus on activity tolerance,  bed mob, functional transfers/mob, standing balance and tolerance for pt engagement in functional self-care task/oral care and LB self-care  Pt cleared by RN/Nella for pt participation in occupational therapy  Pt received HOB raised/supine pt sitting upright and agreeable to therapy following pt Identifiers confirmed  Pt reported his goal to go to rehab to get stronger  Pt family member/ pt wife Latanya Cash present during session and supportive of pt throughout session  Pt requires assist with functional  Transfers/mob with RW 2* pt noted ataxic gait   Pt described feeling as though "my hip is going to give out"  Pt able to complete grooming task in stance at bathroom sink with chair set up  Pt will benefit from in-pt rehab to continue to address pt deficits with decreased over strength and coordination which currently impair pt ADL and functional mob  Plan   Treatment Interventions ADL retraining;Patient/family training; Endurance training   Goal Expiration Date 02/10/20   OT Treatment Day 1   OT Frequency 3-5x/wk   Recommendation   OT Discharge Recommendation Short Term Rehab   Barthel Index   Feeding 10   Bathing 0   Grooming Score 0   Dressing Score 5   Bladder Score 10   Bowels Score 10   Toilet Use Score 5   Transfers (Bed/Chair) Score 10   Mobility (Level Surface) Score 0   Stairs Score 0   Barthel Index Score 50   Modified [place] Scale   Modified Nasra Scale 4     Wendy OROZCO/L

## 2020-02-01 NOTE — TREATMENT PLAN
Discussion was held yesterday between myself and Neurology over further management moving forward  At this time given patient's stability will defer any further neurosurgical intervention  Plan was for triple anti-platelet therapy but given patient's lower GI bleed Neurology planning for dual anti-platelet therapy  Agree with this management  Given presence of lower GI bleed, this would be more of an indication that no intervention should be performed acutely as patient would not be able to stop taking his DAPT therapy if he would be status post stenting  At this time patient should continue follow-up with stroke Neurology  No planned follow-up for Neurosurgery at this time  If patient presents with further ischemic events would recommend re-consultation Neurosurgery or outpatient referral placed for potential intracranial stenting  Call if any questions or concerns  We appreciate you allowing us to partake in this patient's care

## 2020-02-01 NOTE — ASSESSMENT & PLAN NOTE
· Will continue to watch  Most recent hemoglobin 17 7  · Hematology consult appreciated     Trending labs, does not need phlebotomy at this time per Heme/Onc  · CBC in AM

## 2020-02-01 NOTE — PLAN OF CARE
Problem: OCCUPATIONAL THERAPY ADULT  Goal: Performs self-care activities at highest level of function for planned discharge setting  See evaluation for individualized goals  Description  Treatment Interventions: ADL retraining, Functional transfer training, UE strengthening/ROM, Endurance training, Cognitive reorientation, Patient/family training, Equipment evaluation/education, Compensatory technique education, Energy conservation, Activityengagement, Fine motor coordination activities          See flowsheet documentation for full assessment, interventions and recommendations  Outcome: Progressing  Note:   Limitation: Decreased ADL status, Decreased UE strength, Decreased endurance, Decreased self-care trans, Decreased high-level ADLs, Decreased fine motor control     Assessment: Pt participated in occupational therapy with focus on activity tolerance,  bed mob, functional transfers/mob, standing balance and tolerance for pt engagement in functional self-care task/oral care and LB self-care  Pt cleared by RN/Nella for pt participation in occupational therapy  Pt received HOB raised/supine pt sitting upright and agreeable to therapy following pt Identifiers confirmed  Pt reported his goal to go to rehab to get stronger  Pt family member/ pt wife Beau Dave present during session and supportive of pt throughout session  Pt requires assist with functional  Transfers/mob with RW 2* pt noted ataxic gait  Pt described feeling as though "my hip is going to give out"  Pt able to complete grooming task in stance at bathroom sink with chair set up  Pt will benefit from in-pt rehab to continue to address pt deficits with decreased over strength and coordination which currently impair pt ADL and functional mob  OT Discharge Recommendation: Short Term Rehab  OT - OK to Discharge:  Yes

## 2020-02-01 NOTE — PLAN OF CARE
Problem: PHYSICAL THERAPY ADULT  Goal: Performs mobility at highest level of function for planned discharge setting  See evaluation for individualized goals  Description  Treatment/Interventions: Functional transfer training, LE strengthening/ROM, Therapeutic exercise, Elevations, Endurance training, Patient/family training, Equipment eval/education, Bed mobility, Gait training  Equipment Recommended: William Paget       See flowsheet documentation for full assessment, interventions and recommendations  Outcome: Progressing  Note:   Prognosis: Good  Problem List: Decreased strength, Decreased endurance, Impaired balance, Decreased mobility, Decreased coordination, Decreased cognition, Impaired sensation  Assessment: Patient seated in bed side recliner, agreeable and eager to participate in therapy  He was able to stand min A at chair and ambulate with use of RW  Patient cued to improve GLEN throughout session and assisted with standing in the RW appropriately, cued for ataxia correction  He was able to ambulate near household distances with no losses of balance while focusing forward, but challenged with dynamic head movements during mobility  He was able to perform seated TE with assistance, noted fatigue  He is motivated to participate in therapy with supportive wife and would continue to benefit from skilled PT to maximize functional independence  Recommendation: Post acute IP rehab     PT - OK to Discharge: Yes(to rehab when medically stable)    See flowsheet documentation for full assessment

## 2020-02-01 NOTE — ASSESSMENT & PLAN NOTE
· Noted by patient's wife overnight 01/31/2020; reported drops of bright red blood on Depends  · Patient has history of hemorrhoids, suspect hemorrhoidal bleeding  · Heparin GTT was discontinued on 01/30/2020  · Currently on DAPT as above  · Hemoglobin stable but slight down-trending- repeat CBC now and monitor   · FOBT pending

## 2020-02-01 NOTE — PROGRESS NOTES
Progress Note - Sugey Connor 1950, 72 y o  male MRN: [id number]    Unit/Bed#: [facility] 725-01 Encounter: [id number]    Primary Care Provider: Ann Marie Inman DO   Date and time admitted to hospital: 1/28/2020 11:56 PM        * CVA (cerebral vascular accident) [facility])  Assessment & Plan  · Patient was recently admitted at [address] [place] [address] on 01/22/2020 due to stroke-like symptoms (facial droop and slurred speech)  An MRI of the brain that admission revealed punctate acute infarcts in the right frontoparietal region in a watershed territory distribution consistent with known proximal right MCA occlusion or high-grade stenosis  Chronic deep white matter basal ganglia and brainstem lacunar infarcts with advanced microangiopathic disease also noted  Patient was discharged on 01/24/2020 with plan for DAPT x3 months, then Plavix alone  · On 01/28/2020 the patient re-presented to [address] [place] [address] with worsening symptoms (left facial droop, LLE weakness, and difficulty walking)  Patient was deemed not to be a TPA candidate given recent stroke  He was placed on a heparin GTT transferred to [facility]  · MRI brain this admission revealed progression of ischemic burden within the right MCA distribution with no hemorrhage or significant mass effect  · CT cerebral perfusion performed  · Appreciate Neurology and Neurosurgery recs  They recommend DAPT and no neurosurgical intervention at this time   · Heparin GTT was discontinued on 01/30/2020  · Neurology recommending re-introducing antihypertensives   On metoprolol tartrate 50mg BID (home dose) and amlodipine 5mg daily (1/2 home dose) at this time    BRBPR (bright red blood per rectum)  Assessment & Plan  · Noted by patient's wife overnight 01/31/2020; reported drops of bright red blood on Depends  · Patient has history of hemorrhoids, suspect hemorrhoidal bleeding  · Heparin GTT was discontinued on 01/30/2020  · Currently on DAPT as above  · Hemoglobin stable but slight down-trending- repeat CBC now and monitor   · FOBT pending     Chronic kidney disease, stage 3 (HCC)  Assessment & Plan  · Creatinine on admission 1 82   · Baseline recently appears to be 1 3s-1 6s  · Renal function stable  Monitor     Erythrocytosis  Assessment & Plan  · Will continue to watch  Most recent hemoglobin 17 7  · Hematology consult appreciated   Trending labs, does not need phlebotomy at this time per Heme/Onc  · CBC in AM    Type 2 diabetes mellitus with stage 3 chronic kidney disease, without long-term current use of insulin [facility])  Assessment & Plan  Lab Results   Component Value Date    HGBA1C 6 0 01/29/2020     Recent Labs     01/31/20  1605 01/31/20  2113 02/01/20  0725 02/01/20  1111   POCGLU 112 124 90 112       Blood Sugar Average: Last 72 hrs:  (P) 108 25     · Orals on hold while hospitalized  · Hypoglycemia protocol on board  · Continue glucose checks TID AC and QHS with SSI coverage PRN    Hypertension  Assessment & Plan  · Antihypertensives reintroduced on 01/31/2020 as above    Gout  Assessment & Plan  · Continue allopurinol 200 mg daily  VTE Pharmacologic Prophylaxis:   Pharmacologic: Pharmacologic VTE Prophylaxis contraindicated due to hemorrhoidal bleeding  Mechanical VTE Prophylaxis in Place: Yes    Patient Centered Rounds: I have performed bedside rounds with nursing staff today  Lexie Olmos    Discussions with Specialists or Other Care Team Provider:  and Neurologist     Education and Discussions with Family / Patient: patient and wife at bedside    Time Spent for Care: 30 minutes  More than 50% of total time spent on counseling and coordination of care as described above      Current Length of Stay: 4 day(s)    Current Patient Status: Inpatient   Certification Statement: The patient will continue to require additional inpatient hospital stay due to pending insurance [facility] for rehab    Discharge Plan: to rehab once insurance auth obtained    Code Status: Level 1 - Full Code      Subjective:     Madeline Head reports feeling well today  Wife reports bright red streak in Depends and when patient wipes after BM but no blood reported in stool  Objective:     Vitals:   Temp (24hrs), Av 5 °F (36 9 °C), Min:98 °F (36 7 °C), Max:99 3 °F (37 4 °C)    Temp:  [98 °F (36 7 °C)-99 3 °F (37 4 °C)] 98 °F (36 7 °C)  HR:  [61-82] 61  Resp:  [16-20] 20  BP: (124-148)/(80-99) 133/80  SpO2:  [91 %-93 %] 93 %  Body mass index is 30 49 kg/m²  Input and Output Summary (last 24 hours): Intake/Output Summary (Last 24 hours) at 2020 1328  Last data filed at 2020 1114  Gross per 24 hour   Intake 420 ml   Output 525 ml   Net -105 ml       Physical Exam:     Physical Exam   Constitutional: He is oriented to person, place, and time  No distress  Patient seen lying in bed comfortably resting with his wife present at bedside  Pleasant and cooperative  No acute distress  Cardiovascular: Normal rate and regular rhythm  Pulmonary/Chest: Effort normal and breath sounds normal  No respiratory distress  He has no wheezes  Abdominal: Soft  Bowel sounds are normal  There is no tenderness  Musculoskeletal: He exhibits no edema  Neurological: He is alert and oriented to person, place, and time  Left facial droop noted   Skin: Skin is warm  He is not diaphoretic  Psychiatric: He has a normal mood and affect  His behavior is normal    Vitals reviewed  Additional Data:     Labs:    Results from last 7 days   Lab Units 20  0611  20  1303   WBC Thousand/uL 6 73   < >  --    WHITE BLOOD CELL COUNT  Thousand/uL  --   --  7 3   HEMOGLOBIN g/dL 17 7*   < >  --    HEMOGLOBIN  g/dL  --   --  19 7*   HEMATOCRIT % 54 4*   < >  --    HEMATOCRIT  %  --   --  59 7*   PLATELETS Thousands/uL 233   < >  --    PLATELETS  Thousand/uL  --   --  274   NEUTROS PCT  %  --   --  70 3   LYMPHS PCT  %  --   --  18 9   MONOS PCT  %  --   --  6 3   EOS PCT  %  --   --  3 0    < > = values in this interval not displayed  Results from last 7 days   Lab Units 02/01/20  0611  01/29/20  0636   SODIUM mmol/L 142   < > 147*   POTASSIUM mmol/L 4 0   < > 3 9   CHLORIDE mmol/L 113*   < > 115*   CO2 mmol/L 25   < > 23   BUN mg/dL 24   < > 34*   CREATININE mg/dL 1 50*   < > 1 60*   ANION GAP mmol/L 4   < > 9   CALCIUM mg/dL 9 1   < > 9 2   ALBUMIN g/dL  --   --  3 7   TOTAL BILIRUBIN mg/dL  --   --  0 91   ALK PHOS U/L  --   --  85   ALT U/L  --   --  27   AST U/L  --   --  21   GLUCOSE RANDOM mg/dL 101   < > 96    < > = values in this interval not displayed  Results from last 7 days   Lab Units 01/29/20  0015   INR  1 08     Results from last 7 days   Lab Units 02/01/20  1111 02/01/20  0725 01/31/20  2113 01/31/20  1605 01/31/20  1315 01/31/20  1113 01/31/20  0640 01/30/20  2123 01/30/20  1551 01/30/20  1057 01/30/20  0636 01/29/20  2116   POC GLUCOSE mg/dl 112 90 124 112 142* 64* 97 97 127 121 107 103     Results from last 7 days   Lab Units 01/29/20  0015 01/27/20  1303   HEMOGLOBIN A1C % 6 0 6 2*               * I Have Reviewed All Lab Data Listed Above  * Additional Pertinent Lab Tests Reviewed:  All Labs Within Last 24 Hours Reviewed    Imaging:    Imaging Reports Reviewed Today Include: None  Imaging Personally Reviewed by Myself Includes:  None    Recent Cultures (last 7 days):     Results from last 7 days   Lab Units 01/28/20 2125   URINE CULTURE  >100,000 cfu/ml Escherichia coli*       Last 24 Hours Medication List:     Current Facility-Administered Medications:  acetaminophen 650 mg Oral Q4H PRN Mayte Mckinley MD   allopurinol 200 mg Oral Daily Mayte Mckinley MD   aluminum-magnesium hydroxide-simethicone 5 mL Oral Q6H PRN Mayte Mckinley MD   amLODIPine 5 mg Oral Daily Deanna Killian PA-C   aspirin 81 mg Oral Daily Mayte Mckinley MD   atorvastatin 40 mg Oral Daily With Carlos Mantilla MD   clopidogrel 75 mg Oral Daily ROSALINA Garnica   docusate sodium 100 mg Oral BID PRN Mayte Mckinley MD fenofibrate 145 mg Oral Daily Maycol Hitchcock MD   hydrALAZINE 5 mg Intravenous Q6H PRN ROSALINA Mckeon   insulin lispro 1-5 Units Subcutaneous HS Maycol Hitchcock MD   insulin lispro 1-6 Units Subcutaneous TID AC Maycol Hitchcock MD   metoprolol tartrate 50 mg Oral Q12H [address] Deanna Killian PA-C   ondansetron 4 mg Intravenous Q6H PRN Maycol Hitchcock MD        Today, Patient Was Seen By: Tiny Casiano PA-C    ** Please Note: Dictation voice to text software may have been used in the creation of this document   **

## 2020-02-01 NOTE — ASSESSMENT & PLAN NOTE
· Patient was recently admitted at [address] [place] [address] on 01/22/2020 due to stroke-like symptoms (facial droop and slurred speech)  An MRI of the brain that admission revealed punctate acute infarcts in the right frontoparietal region in a watershed territory distribution consistent with known proximal right MCA occlusion or high-grade stenosis  Chronic deep white matter basal ganglia and brainstem lacunar infarcts with advanced microangiopathic disease also noted  Patient was discharged on 01/24/2020 with plan for DAPT x3 months, then Plavix alone  · On 01/28/2020 the patient re-presented to [address] [place] [address] with worsening symptoms (left facial droop, LLE weakness, and difficulty walking)  Patient was deemed not to be a TPA candidate given recent stroke  He was placed on a heparin GTT transferred to [facility]  · MRI brain this admission revealed progression of ischemic burden within the right MCA distribution with no hemorrhage or significant mass effect  · CT cerebral perfusion performed  · Appreciate Neurology and Neurosurgery recs  They recommend DAPT and no neurosurgical intervention at this time   · Heparin GTT was discontinued on 01/30/2020  · Neurology recommending re-introducing antihypertensives   On metoprolol tartrate 50mg BID (home dose) and amlodipine 5mg daily (1/2 home dose) at this time

## 2020-02-01 NOTE — ASSESSMENT & PLAN NOTE
· Creatinine on admission 1 82   · Baseline recently appears to be 1 3s-1 6s  · Renal function stable   Monitor

## 2020-02-01 NOTE — SOCIAL WORK
Acute Rehab Auth follow up:    LUIS ANTONIO Lara advised that the patient is medically cleared for dc  CM called and spoke with Chastity Vogel at [facility], [phone] to ask if they had received a fax with an authorization; Chastity Vogel advised no Florissant Butner has been received yet  CM called and spoke with Cynthia Segura at [facility], [phone], he advised that [id number] is still processing and he reported that the auth will probably be completed Monday  CM advised LUIS ANTONIO King

## 2020-02-01 NOTE — PHYSICAL THERAPY NOTE
Physical Therapy Progress Note        02/01/20 0847   Pain Assessment   Pain Assessment No/denies pain   Pain Score No Pain   Hospital Pain Intervention(s) Ambulation/increased activity;Repositioned   Response to Interventions Tolerated   Restrictions/Precautions   Weight Bearing Precautions Per Order No   Other Precautions Fall Risk;Telemetry; Visual impairment   General   Chart Reviewed Yes   Response to Previous Treatment Patient with no complaints from previous session  Family/Caregiver Present Yes   Cognition   Overall Cognitive Status WFL   Arousal/Participation Alert; Cooperative   Comments Cooperative throughout session   Transfers   Sit to Stand 4  Minimal assistance   Additional items Assist x 1; Armrests; Increased time required;Verbal cues   Stand to Sit 4  Minimal assistance   Additional items Assist x 1; Armrests; Increased time required;Verbal cues   Stand pivot 4  Minimal assistance   Additional items Assist x 1; Increased time required;Verbal cues   Ambulation/Elevation   Gait pattern Decreased foot clearance;L Foot drag;L Hemiparesis; Ataxia; Excessively slow; Improper Weight shift   Gait Assistance 4  Minimal assist   Additional items Assist x 1; Tactile cues; Verbal cues   Assistive Device Rolling walker   Distance 70 feet x 2    Balance   Static Sitting Good   Dynamic Sitting Fair +   Static Standing Fair   Dynamic Standing Fair -   Ambulatory Poor +   Endurance Deficit   Endurance Deficit Yes   Endurance Deficit Description Fatigue, weakness   Activity Tolerance   Activity Tolerance Patient limited by fatigue   Nurse Made Aware Appropriate to see per RN   Exercises   Hip Flexion Sitting;10 reps;AROM; Bilateral   Hip Abduction Sitting;10 reps;AROM; Bilateral   Hip Adduction Sitting;10 reps;AROM; Bilateral   Knee AROM Long Arc Quad Sitting;10 reps;AROM; Bilateral   Assessment   Prognosis Good   Problem List Decreased strength;Decreased endurance; Impaired balance;Decreased mobility; Decreased coordination;Decreased cognition; Impaired sensation   Assessment Patient seated in bed side recliner, agreeable and eager to participate in therapy  He was able to stand min A at chair and ambulate with use of RW  Patient cued to improve GLEN throughout session and assisted with standing in the RW appropriately, cued for ataxia correction  He was able to ambulate near household distances with no losses of balance while focusing forward, but challenged with dynamic head movements during mobility  He was able to perform seated TE with assistance, noted fatigue  He is motivated to participate in therapy with supportive wife and would continue to benefit from skilled PT to maximize functional independence  Goals   Patient Goals To go to rehab   STG Expiration Date 02/12/20   PT Treatment Day 2   Plan   Treatment/Interventions Functional transfer training;LE strengthening/ROM; Therapeutic exercise; Endurance training;Gait training;Spoke to nursing;Family   Progress Progressing toward goals   PT Frequency   (4-6x a week)   Recommendation   Recommendation Post acute IP rehab   Equipment Recommended Walker  (RW)   PT - OK to Discharge Yes  (to rehab when medically stable)     Fransisco Castañedagm, PTA

## 2020-02-02 LAB
ERYTHROCYTE [DISTWIDTH] IN BLOOD BY AUTOMATED COUNT: 14.2 % (ref 11.6–15.1)
GLUCOSE SERPL-MCNC: 106 MG/DL (ref 65–140)
GLUCOSE SERPL-MCNC: 115 MG/DL (ref 65–140)
GLUCOSE SERPL-MCNC: 126 MG/DL (ref 65–140)
GLUCOSE SERPL-MCNC: 93 MG/DL (ref 65–140)
HCT VFR BLD AUTO: 53.7 % (ref 36.5–49.3)
HGB BLD-MCNC: 17.3 G/DL (ref 12–17)
MCH RBC QN AUTO: 28.8 PG (ref 26.8–34.3)
MCHC RBC AUTO-ENTMCNC: 32.2 G/DL (ref 31.4–37.4)
MCV RBC AUTO: 89 FL (ref 82–98)
PLATELET # BLD AUTO: 238 THOUSANDS/UL (ref 149–390)
PMV BLD AUTO: 10.5 FL (ref 8.9–12.7)
RBC # BLD AUTO: 6.01 MILLION/UL (ref 3.88–5.62)
WBC # BLD AUTO: 6.7 THOUSAND/UL (ref 4.31–10.16)

## 2020-02-02 PROCEDURE — 85027 COMPLETE CBC AUTOMATED: CPT | Performed by: PHYSICIAN ASSISTANT

## 2020-02-02 PROCEDURE — 82948 REAGENT STRIP/BLOOD GLUCOSE: CPT

## 2020-02-02 PROCEDURE — 99232 SBSQ HOSP IP/OBS MODERATE 35: CPT | Performed by: PHYSICIAN ASSISTANT

## 2020-02-02 RX ADMIN — ALLOPURINOL 200 MG: 100 TABLET ORAL at 09:46

## 2020-02-02 RX ADMIN — ATORVASTATIN CALCIUM 40 MG: 40 TABLET, FILM COATED ORAL at 17:16

## 2020-02-02 RX ADMIN — FENOFIBRATE 145 MG: 145 TABLET, FILM COATED ORAL at 09:46

## 2020-02-02 RX ADMIN — METOPROLOL TARTRATE 50 MG: 50 TABLET, FILM COATED ORAL at 21:40

## 2020-02-02 RX ADMIN — CLOPIDOGREL BISULFATE 75 MG: 75 TABLET ORAL at 09:46

## 2020-02-02 RX ADMIN — AMLODIPINE BESYLATE 5 MG: 5 TABLET ORAL at 09:46

## 2020-02-02 RX ADMIN — METOPROLOL TARTRATE 50 MG: 50 TABLET, FILM COATED ORAL at 09:46

## 2020-02-02 RX ADMIN — ASPIRIN 81 MG 81 MG: 81 TABLET ORAL at 09:46

## 2020-02-02 NOTE — ASSESSMENT & PLAN NOTE
· Noted by patient's wife overnight 01/31/2020; reported drops of bright red blood on Depends  · Patient has history of hemorrhoids, suspect hemorrhoidal bleeding  · Heparin GTT was discontinued on 01/30/2020  · Currently on DAPT as above  · Hemoglobin stable but continued slight down-trending- repeat CBC in AM and monitor   · FOBT pending

## 2020-02-02 NOTE — PROGRESS NOTES
Progress Note - Luca Escalante 1950, 72 y o  male MRN: [id number]    Unit/Bed#: [facility] 725-01 Encounter: [id number]    Primary Care Provider: Chip Simms DO   Date and time admitted to hospital: 1/28/2020 11:56 PM        * CVA (cerebral vascular accident) [facility])  Assessment & Plan  · Patient was recently admitted at [address] [place] [address] on 01/22/2020 due to stroke-like symptoms (facial droop and slurred speech)  An MRI of the brain that admission revealed punctate acute infarcts in the right frontoparietal region in a watershed territory distribution consistent with known proximal right MCA occlusion or high-grade stenosis  Chronic deep white matter basal ganglia and brainstem lacunar infarcts with advanced microangiopathic disease also noted  Patient was discharged on 01/24/2020 with plan for DAPT x3 months, then Plavix alone  · On 01/28/2020 the patient re-presented to [address] [place] [address] with worsening symptoms (left facial droop, LLE weakness, and difficulty walking)  Patient was deemed not to be a TPA candidate given recent stroke  He was placed on a heparin GTT transferred to [facility]  · MRI brain this admission revealed progression of ischemic burden within the right MCA distribution with no hemorrhage or significant mass effect  · CT cerebral perfusion performed  · Appreciate Neurology and Neurosurgery recs  They recommend DAPT and no neurosurgical intervention at this time   · Heparin GTT was discontinued on 01/30/2020  · Neurology recommending re-introducing antihypertensives   On metoprolol tartrate 50mg BID (home dose) and amlodipine 5mg daily (1/2 home dose) at this time    BRBPR (bright red blood per rectum)  Assessment & Plan  · Noted by patient's wife overnight 01/31/2020; reported drops of bright red blood on Depends  · Patient has history of hemorrhoids, suspect hemorrhoidal bleeding  · Heparin GTT was discontinued on 01/30/2020  · Currently on DAPT as above  · Hemoglobin stable but continued slight down-trending- repeat CBC in AM and monitor   · FOBT pending     Chronic kidney disease, stage 3 (HCC)  Assessment & Plan  · Creatinine on admission 1 82   · Baseline recently appears to be 1 3s-1 6s  · Renal function stable  Monitor   · BMP in AM    Erythrocytosis  Assessment & Plan  · Will continue to watch  Most recent hemoglobin 17 3  · Hematology consult appreciated   Trending labs, does not need phlebotomy at this time per Heme/Onc  · CBC in AM    Type 2 diabetes mellitus with stage 3 chronic kidney disease, without long-term current use of insulin [facility])  Assessment & Plan  Lab Results   Component Value Date    HGBA1C 6 0 01/29/2020     Recent Labs     02/01/20  1111 02/01/20  1623 02/01/20  2056 02/02/20  0701   POCGLU 112 98 107 106       Blood Sugar Average: Last 72 hrs:  (P) 285 [id number]     · Orals on hold while hospitalized  · Hypoglycemia protocol on board  · Continue glucose checks TID AC and QHS with SSI coverage PRN    Hypertension  Assessment & Plan  · Antihypertensives reintroduced on 01/31/2020 as above    Gout  Assessment & Plan  · Continue allopurinol 200 mg daily  Essential hypertriglyceridemia  Assessment & Plan  · Continue Tricor and Lipitor  VTE Pharmacologic Prophylaxis:   Pharmacologic: Pharmacologic VTE Prophylaxis contraindicated due to hemorrhoidal bleeding  Mechanical VTE Prophylaxis in Place: Yes     Patient Centered Rounds: I have performed bedside rounds with nursing staff today  Michael Alcocer    Discussions with Specialists or Other Care Team Provider: nurse    Education and Discussions with Family / Patient: patient and wife at bedside     Time Spent for Care: 20 minutes  More than 50% of total time spent on counseling and coordination of care as described above      Current Length of Stay: 5 day(s)    Current Patient Status: Inpatient   Certification Statement: The patient will continue to require additional inpatient hospital stay due to pending placement    Discharge Plan: to rehab  Per discussion with CM yesterday likely will not have auth until Monday    Code Status: Level 1 - Full Code      Subjective:   Mr Iram Vora denies complaint this AM  Says he is feeling well  His wife reports his voice is stronger this AM  Denies num,bness, tingling, weakness, CP, SOB, abdominal pain  Reported bright red blood when he wiped after AM BM    Objective:     Vitals:   Temp (24hrs), Av 3 °F (36 8 °C), Min:97 9 °F (36 6 °C), Max:98 8 °F (37 1 °C)    Temp:  [97 9 °F (36 6 °C)-98 8 °F (37 1 °C)] 98 1 °F (36 7 °C)  HR:  [60-77] 60  Resp:  [17-18] 18  BP: (116-158)/(78-95) 116/79  SpO2:  [91 %-96 %] 96 %  Body mass index is 30 42 kg/m²  Input and Output Summary (last 24 hours): Intake/Output Summary (Last 24 hours) at 2020 0837  Last data filed at 2020 0827  Gross per 24 hour   Intake 420 ml   Output 225 ml   Net 195 ml       Physical Exam:     Physical Exam   Constitutional: He is oriented to person, place, and time  No distress  Patient seen lying in bed comfortably resting with his nurse and wife present  Pleasant and cooperative  No acute distress   Cardiovascular: Normal rate and regular rhythm  Pulmonary/Chest: Effort normal and breath sounds normal  No respiratory distress  He has no wheezes  Abdominal: Soft  Bowel sounds are normal  There is no tenderness  Musculoskeletal: He exhibits no edema  Neurological: He is alert and oriented to person, place, and time  Left-sided facial droop  Skin: Skin is warm  He is not diaphoretic  Psychiatric: He has a normal mood and affect  His behavior is normal    Vitals reviewed  Additional Data:     Labs:    Results from last 7 days   Lab Units 20  0531  20  1303   WBC Thousand/uL 6 70   < >  --    WHITE BLOOD CELL COUNT   Thousand/uL  --   --  7 3   HEMOGLOBIN g/dL 17 3*   < >  --    HEMOGLOBIN  g/dL  --   --  19 7*   HEMATOCRIT % 53 7*   < >  --    HEMATOCRIT  %  --   --  59 7*   PLATELETS Thousands/uL 238 < >  --    PLATELETS  Thousand/uL  --   --  274   NEUTROS PCT  %  --   --  70 3   LYMPHS PCT  %  --   --  18 9   MONOS PCT  %  --   --  6 3   EOS PCT  %  --   --  3 0    < > = values in this interval not displayed  Results from last 7 days   Lab Units 02/01/20  0611  01/29/20  0636   SODIUM mmol/L 142   < > 147*   POTASSIUM mmol/L 4 0   < > 3 9   CHLORIDE mmol/L 113*   < > 115*   CO2 mmol/L 25   < > 23   BUN mg/dL 24   < > 34*   CREATININE mg/dL 1 50*   < > 1 60*   ANION GAP mmol/L 4   < > 9   CALCIUM mg/dL 9 1   < > 9 2   ALBUMIN g/dL  --   --  3 7   TOTAL BILIRUBIN mg/dL  --   --  0 91   ALK PHOS U/L  --   --  85   ALT U/L  --   --  27   AST U/L  --   --  21   GLUCOSE RANDOM mg/dL 101   < > 96    < > = values in this interval not displayed  Results from last 7 days   Lab Units 01/29/20  0015   INR  1 08     Results from last 7 days   Lab Units 02/02/20  0701 02/01/20  2056 02/01/20  1623 02/01/20  1111 02/01/20  0725 01/31/20  2113 01/31/20  1605 01/31/20  1315 01/31/20  1113 01/31/20  0640 01/30/20  2123 01/30/20  1551   POC GLUCOSE mg/dl 106 107 98 112 90 124 112 142* 64* 97 97 127     Results from last 7 days   Lab Units 01/29/20  0015 01/27/20  1303   HEMOGLOBIN A1C % 6 0 6 2*               * I Have Reviewed All Lab Data Listed Above  * Additional Pertinent Lab Tests Reviewed:  All Labs Within Last 24 Hours Reviewed    Imaging:    Imaging Reports Reviewed Today Include: None  Imaging Personally Reviewed by Myself Includes:  None    Recent Cultures (last 7 days):     Results from last 7 days   Lab Units 01/28/20 2125   URINE CULTURE  >100,000 cfu/ml Escherichia coli*       Last 24 Hours Medication List:     Current Facility-Administered Medications:  acetaminophen 650 mg Oral Q4H PRN Medardo Silva MD   allopurinol 200 mg Oral Daily Medardo Silva MD   aluminum-magnesium hydroxide-simethicone 5 mL Oral Q6H PRN Medardo Silva MD   amLODIPine 5 mg Oral Daily Deanna Killian PA-C   aspirin 81 mg Oral Daily Mayte Mckinley MD   atorvastatin 40 mg Oral Daily With Carlos Mantilla MD   clopidogrel 75 mg Oral Daily ROSALINA Garnica   docusate sodium 100 mg Oral BID PRN Mayte Mckinley MD   fenofibrate 145 mg Oral Daily Mayte Mckinley MD   hydrALAZINE 5 mg Intravenous Q6H PRN ROSALINA Garnica   insulin lispro 1-5 Units Subcutaneous HS Mayte Mckinley MD   insulin lispro 1-6 Units Subcutaneous TID AC Mayte Mckinley MD   metoprolol tartrate 50 mg Oral Q12H [address] Deannaanthony Killian PA-C   ondansetron 4 mg Intravenous Q6H PRN Mayte Mckinley MD        Today, Patient Was Seen By: Enrique Hunter PA-C    ** Please Note: Dictation voice to text software may have been used in the creation of this document   **

## 2020-02-02 NOTE — ASSESSMENT & PLAN NOTE
· Creatinine on admission 1 82   · Baseline recently appears to be 1 3s-1 6s  · Renal function stable   Monitor   · BMP in AM

## 2020-02-02 NOTE — ASSESSMENT & PLAN NOTE
· Patient was recently admitted at [address] [place] [address] on 01/22/2020 due to stroke-like symptoms (facial droop and slurred speech)  An MRI of the brain that admission revealed punctate acute infarcts in the right frontoparietal region in a watershed territory distribution consistent with known proximal right MCA occlusion or high-grade stenosis  Chronic deep white matter basal ganglia and brainstem lacunar infarcts with advanced microangiopathic disease also noted  Patient was discharged on 01/24/2020 with plan for DAPT x3 months, then Plavix alone  · On 01/28/2020 the patient re-presented to [address] [place] [address] with worsening symptoms (left facial droop, LLE weakness, and difficulty walking)  Patient was deemed not to be a TPA candidate given recent stroke  He was placed on a heparin GTT transferred to \[facility]\""  · MRI brain this admission revealed progression of ischemic burden within the right MCA distribution with no hemorrhage or significant mass effect  · CT cerebral perfusion performed  · Appreciate Neurology and Neurosurgery recs  They recommend DAPT and no neurosurgical intervention at this time   · Heparin GTT was discontinued on 01/30/2020  · Neurology recommending re-introducing antihypertensives   On metoprolol tartrate 50mg BID (home dose) and amlodipine 5mg daily (1/2 home dose) at this time

## 2020-02-02 NOTE — ASSESSMENT & PLAN NOTE
· Will continue to watch  Most recent hemoglobin 17 3  · Hematology consult appreciated     Trending labs, does not need phlebotomy at this time per Heme/Onc  · CBC in AM

## 2020-02-02 NOTE — ASSESSMENT & PLAN NOTE
Lab Results   Component Value Date    HGBA1C 6 0 01/29/2020     Recent Labs     02/01/20  1111 02/01/20  1623 02/01/20 2056 02/02/20  0701   POCGLU 112 98 107 106       Blood Sugar Average: Last 72 hrs:  (P) 802 [id number]     · Orals on hold while hospitalized  · Hypoglycemia protocol on board  · Continue glucose checks TID AC and QHS with SSI coverage PRN

## 2020-02-03 VITALS
DIASTOLIC BLOOD PRESSURE: 81 MMHG | SYSTOLIC BLOOD PRESSURE: 143 MMHG | RESPIRATION RATE: 18 BRPM | TEMPERATURE: 98.7 F | WEIGHT: 214.95 LBS | OXYGEN SATURATION: 94 % | BODY MASS INDEX: 31.84 KG/M2 | HEIGHT: 69 IN | HEART RATE: 73 BPM

## 2020-02-03 LAB
ANION GAP SERPL CALCULATED.3IONS-SCNC: 7 MMOL/L (ref 4–13)
BUN SERPL-MCNC: 30 MG/DL (ref 5–25)
CALCIUM SERPL-MCNC: 9.2 MG/DL (ref 8.3–10.1)
CHLORIDE SERPL-SCNC: 112 MMOL/L (ref 100–108)
CO2 SERPL-SCNC: 24 MMOL/L (ref 21–32)
CREAT SERPL-MCNC: 1.53 MG/DL (ref 0.6–1.3)
ERYTHROCYTE [DISTWIDTH] IN BLOOD BY AUTOMATED COUNT: 14.4 % (ref 11.6–15.1)
GFR SERPL CREATININE-BSD FRML MDRD: 46 ML/MIN/1.73SQ M
GLUCOSE SERPL-MCNC: 112 MG/DL (ref 65–140)
GLUCOSE SERPL-MCNC: 117 MG/DL (ref 65–140)
GLUCOSE SERPL-MCNC: 139 MG/DL (ref 65–140)
GLUCOSE SERPL-MCNC: 96 MG/DL (ref 65–140)
HCT VFR BLD AUTO: 54.4 % (ref 36.5–49.3)
HGB BLD-MCNC: 17.6 G/DL (ref 12–17)
MCH RBC QN AUTO: 29 PG (ref 26.8–34.3)
MCHC RBC AUTO-ENTMCNC: 32.4 G/DL (ref 31.4–37.4)
MCV RBC AUTO: 90 FL (ref 82–98)
PLATELET # BLD AUTO: 240 THOUSANDS/UL (ref 149–390)
PMV BLD AUTO: 10.6 FL (ref 8.9–12.7)
POTASSIUM SERPL-SCNC: 3.6 MMOL/L (ref 3.5–5.3)
RBC # BLD AUTO: 6.07 MILLION/UL (ref 3.88–5.62)
SCAN RESULT: NORMAL
SODIUM SERPL-SCNC: 143 MMOL/L (ref 136–145)
WBC # BLD AUTO: 7.88 THOUSAND/UL (ref 4.31–10.16)

## 2020-02-03 PROCEDURE — 80048 BASIC METABOLIC PNL TOTAL CA: CPT | Performed by: PHYSICIAN ASSISTANT

## 2020-02-03 PROCEDURE — 97110 THERAPEUTIC EXERCISES: CPT

## 2020-02-03 PROCEDURE — 97116 GAIT TRAINING THERAPY: CPT

## 2020-02-03 PROCEDURE — 85027 COMPLETE CBC AUTOMATED: CPT | Performed by: PHYSICIAN ASSISTANT

## 2020-02-03 PROCEDURE — 82948 REAGENT STRIP/BLOOD GLUCOSE: CPT

## 2020-02-03 PROCEDURE — 97535 SELF CARE MNGMENT TRAINING: CPT

## 2020-02-03 PROCEDURE — 99239 HOSP IP/OBS DSCHRG MGMT >30: CPT | Performed by: PHYSICIAN ASSISTANT

## 2020-02-03 RX ORDER — AMLODIPINE BESYLATE 10 MG/1
5 TABLET ORAL DAILY
Qty: 15 TABLET | Refills: 0
Start: 2020-02-04 | End: 2020-02-25 | Stop reason: SDUPTHER

## 2020-02-03 RX ADMIN — ALLOPURINOL 200 MG: 100 TABLET ORAL at 09:17

## 2020-02-03 RX ADMIN — CLOPIDOGREL BISULFATE 75 MG: 75 TABLET ORAL at 09:17

## 2020-02-03 RX ADMIN — ATORVASTATIN CALCIUM 40 MG: 40 TABLET, FILM COATED ORAL at 17:07

## 2020-02-03 RX ADMIN — ASPIRIN 81 MG 81 MG: 81 TABLET ORAL at 09:17

## 2020-02-03 RX ADMIN — AMLODIPINE BESYLATE 5 MG: 5 TABLET ORAL at 09:17

## 2020-02-03 RX ADMIN — FENOFIBRATE 145 MG: 145 TABLET, FILM COATED ORAL at 09:17

## 2020-02-03 RX ADMIN — METOPROLOL TARTRATE 50 MG: 50 TABLET, FILM COATED ORAL at 09:18

## 2020-02-03 NOTE — ASSESSMENT & PLAN NOTE
Lab Results   Component Value Date    HGBA1C 6 0 01/29/2020     Recent Labs     02/02/20  1612 02/02/20  2127 02/03/20  0649 02/03/20  1231   POCGLU 115 126 117 139       Blood Sugar Average: Last 72 hrs:  (P) 169 [id number]     · Orals on hold while hospitalized  · Hypoglycemia protocol on board  · Continue glucose checks TID AC and QHS with SSI coverage PRN

## 2020-02-03 NOTE — UTILIZATION REVIEW
Continued Stay Review    Date: 2-3-20                 Current Patient Class: inpatient  Current Level of Care: med surg    HPI:70 y o  male initially admitted on 1-28-29  For CVA    Assessment/Plan    PT and OT continue to treat for functional disabilities and recommend acute level rehab when medically stable for discharge  Continue to monitor creatinine and bun closely           Pertinent Labs/Diagnostic Results:         Results from last 7 days   Lab Units 02/03/20  0454 02/02/20  0531 02/01/20  0611 01/31/20  1833 01/30/20  0737   WBC Thousand/uL 7 88 6 70 6 73 6 00 5 43   HEMOGLOBIN g/dL 17 6* 17 3* 17 7* 17 9* 18 2*   HEMATOCRIT % 54 4* 53 7* 54 4* 55 4* 54 8*   PLATELETS Thousands/uL 240 238 233 222 233         Results from last 7 days   Lab Units 02/03/20  0454 02/01/20  0611 01/31/20  1833 01/30/20  0737 01/29/20  0636   SODIUM mmol/L 143 142 142 143 147*   POTASSIUM mmol/L 3 6 4 0 3 5 3 5 3 9   CHLORIDE mmol/L 112* 113* 112* 114* 115*   CO2 mmol/L 24 25 25 25 23   ANION GAP mmol/L 7 4 5 4 9   BUN mg/dL 30* 24 24 28* 34*   CREATININE mg/dL 1 53* 1 50* 1 51* 1 55* 1 60*   EGFR ml/min/1 73sq m 46 47 46 45 43   CALCIUM mg/dL 9 2 9 1 9 2 9 2 9 2   MAGNESIUM mg/dL  --   --   --   --  2 5   PHOSPHORUS mg/dL  --   --   --   --  2 8     Results from last 7 days   Lab Units 01/29/20  0636   AST U/L 21   ALT U/L 27   ALK PHOS U/L 85   TOTAL PROTEIN g/dL 7 9   ALBUMIN g/dL 3 7   TOTAL BILIRUBIN mg/dL 0 91     Results from last 7 days   Lab Units 02/03/20  1231 02/03/20  0649 02/02/20  2127 02/02/20  1612 02/02/20  1115 02/02/20  0701 02/01/20  2056 02/01/20  1623 02/01/20  1111 02/01/20  0725   POC GLUCOSE mg/dl 139 117 126 115 93 106 107 98 112 90     Results from last 7 days   Lab Units 02/03/20  0454 02/01/20  0611 01/31/20  1833 01/30/20  0737 01/29/20  0636 01/28/20  1723   GLUCOSE RANDOM mg/dL 112 101 132 101 96 140         Results from last 7 days   Lab Units 01/29/20  0015   HEMOGLOBIN A1C % 6 0   EAG mg/dl 126       Results from last 7 days   Lab Units 01/28/20  1757   TROPONIN I ng/mL <0 02         Results from last 7 days   Lab Units 01/30/20  1424 01/30/20  0727 01/29/20  1611  01/29/20  0015 01/28/20  1723   PROTIME seconds  --   --   --   --  13 6 13 2   INR   --   --   --   --  1 08 1 03   PTT seconds 59* 97* 74*   < > 51* 34    < > = values in this interval not displayed  Results from last 7 days   Lab Units 01/29/20  0636   TSH 3RD GENERATON uIU/mL 3 460       Results from last 7 days   Lab Units 01/29/20  1345 01/28/20 2125 01/28/20 2107   CLARITY UA  Clear Slightly Cloudy HAZY   COLOR UA  Yellow Yellow YELLOW   SPEC GRAV UA  1 034* 1 010  --    SPEC GRAV US   --   --  1 010   PH UA  5 0 5 5 5 0   GLUCOSE UA mg/dl >=1000 (1%)* >=1000 (1%)* 2,000+   KETONES UA mg/dl Negative Negative TRACE   BLOOD UA  Negative Small* MODERATE   PROTEIN UA mg/dl Negative Negative NEGATIVE   NITRITE UA  Negative Positive* POSITIVE   BILIRUBIN UA  Negative Negative  --    BILIRUBIN, UA   --   --  NEGATIVE   UROBILINOGEN UA E U /dl 1 0 0 2 0 2   LEUKOCYTES UA  Small* Elevated glucose may cause decreased leukocyte values   See urine microscopic for UWBC result/* NEGATIVE   WBC UA /hpf Innumerable* 10-20*  --    RBC UA /hpf None Seen 2-4*  --    BACTERIA UA /hpf Occasional Moderate*  --    EPITHELIAL CELLS WET PREP /hpf None Seen None Seen  --        Results from last 7 days   Lab Units 01/28/20 2125   URINE CULTURE  >100,000 cfu/ml Escherichia coli*               Vital Signs:    Vitals:    02/03/20 0600 02/03/20 0755 02/03/20 0834 02/03/20 0921   BP:  (!) 155/131 131/80 143/81   BP Location:    Right arm   Pulse:  78  73   Resp:  18     Temp:  98 7 °F (37 1 °C)     TempSrc:       SpO2:  94%     Weight: 97 5 kg (214 lb 15 2 oz)      Height:           Medications:   Scheduled Medications:    Medications:  allopurinol 200 mg Oral Daily   amLODIPine 5 mg Oral Daily   aspirin 81 mg Oral Daily   atorvastatin 40 mg Oral Daily With Dinner   clopidogrel 75 mg Oral Daily   fenofibrate 145 mg Oral Daily   insulin lispro 1-5 Units Subcutaneous HS   insulin lispro 1-6 Units Subcutaneous TID AC   metoprolol tartrate 50 mg Oral Q12H [address]     Continuous IV Infusions:     PRN Meds:    acetaminophen 650 mg Oral Q4H PRN   aluminum-magnesium hydroxide-simethicone 5 mL Oral Q6H PRN   docusate sodium 100 mg Oral BID PRN   hydrALAZINE 5 mg Intravenous Q6H PRN   ondansetron 4 mg Intravenous Q6H PRN       Discharge Plan: to be determined     Network Utilization Review Department  [email or web address]  ATTENTION: Please call with any questions or concerns to [phone] and carefully listen to the prompts so that you are directed to the right person  All voicemails are confidential   Román Wood all requests for admission clinical reviews, approved or denied determinations and any other requests to dedicated fax number below belonging to the campus where the patient is receiving treatment   List of dedicated fax numbers for the Facilities:  [address] DENIALS (Administrative/Medical Necessity) [phone]   [address] (Maternity/NICU/Pediatrics) [phone]   [facility] [phone]   Mitchell Leger [phone]   Geno Dorado [phone]   Héctor Varela [phone]   [address] [place] [address] [phone]   [facility]  [phone]   [address] [facility]  [address] [place] [address] [facility] [phone]

## 2020-02-03 NOTE — ASSESSMENT & PLAN NOTE
· Patient was recently admitted at [address] [place] [address] on 01/22/2020 due to stroke-like symptoms (facial droop and slurred speech)  An MRI of the brain that admission revealed punctate acute infarcts in the right frontoparietal region in a watershed territory distribution consistent with known proximal right MCA occlusion or high-grade stenosis  Chronic deep white matter basal ganglia and brainstem lacunar infarcts with advanced microangiopathic disease also noted  Patient was discharged on 01/24/2020 with plan for DAPT x3 months, then Plavix alone  · On 01/28/2020 the patient re-presented to [address] [place] [address] with worsening symptoms (left facial droop, LLE weakness, and difficulty walking)  Patient was deemed not to be a TPA candidate given recent stroke  He was placed on a heparin GTT transferred to [facility]  · MRI brain this admission revealed progression of ischemic burden within the right MCA distribution with no hemorrhage or significant mass effect  · CT cerebral perfusion performed  · Appreciate Neurology and Neurosurgery recs  They recommend DAPT and no neurosurgical intervention at this time   · Heparin GTT was discontinued on 01/30/2020  · Neurology recommending re-introducing antihypertensives   On metoprolol tartrate 50mg BID (home dose) and amlodipine 5mg daily (1/2 home dose) at this time

## 2020-02-03 NOTE — ASSESSMENT & PLAN NOTE
· Noted by patient's wife overnight 01/31/2020; reported drops of bright red blood on Depends  · Patient has history of hemorrhoids, suspect hemorrhoidal bleeding  · Heparin GTT was discontinued on 01/30/2020  · Currently on DAPT as above  · Hemoglobin stable  · FOBT pending

## 2020-02-03 NOTE — PHYSICAL THERAPY NOTE
Physical Therapy Progress Note     02/03/20 1158   Pain Assessment   Pain Assessment No/denies pain   Pain Score No Pain   Restrictions/Precautions   Weight Bearing Precautions Per Order No   Other Precautions Impulsive; Fall Risk   General   Family/Caregiver Present Yes  (wife)   Cognition   Overall Cognitive Status WFL   Arousal/Participation Alert; Cooperative   Transfers   Sit to Stand 5  Supervision   Additional items Assist x 1;Verbal cues; Impulsive   Stand to Sit 5  Supervision   Additional items Assist x 1;Verbal cues   Ambulation/Elevation   Gait pattern L Foot drag;Narrow GLEN  (slow, short step length)   Gait Assistance 4  Minimal assist   Additional items Assist x 1;Verbal cues   Assistive Device Rolling walker   Distance 50 feet x 2   Balance   Static Sitting Fair   Static Standing Fair   Dynamic Standing Fair -   Ambulatory Poor +   Endurance Deficit   Endurance Deficit Yes   Endurance Deficit Description fatigue   Activity Tolerance   Activity Tolerance Patient limited by fatigue   Nurse [address] to see per BRUCE Sibley   Exercises   Hip Flexion Sitting;20 reps;AROM; Bilateral   Knee AROM Long Arc Quad Sitting;20 reps;AROM; Bilateral   Ankle Pumps Sitting;20 reps;AROM; Bilateral   Assessment   Prognosis Good   Problem List Decreased strength;Decreased endurance; Impaired balance;Decreased mobility; Decreased coordination; Impaired judgement;Decreased safety awareness   Assessment Pt is making steady progress with the use of a rolling walker  Wife present today  Impulsive with transfers requiring education for safety  Cues for hand placement as well for sit to stand transfers  Gait is limited by L foot drag requiring min assist for safe walker management  Completed seated BLE exercises to conclude session  Pt would benefit from continued physical therapy to maximize functional independence     Goals   Patient Goals Get better   STG Expiration Date 02/12/20   Short Term Goal #1 1-2 wks: bed mob and transfers w  indep, standing balance to good/normal w/ device as needed, ambulate 200-300 ft w  RW vs least restrictive device and S/mod I, increase B LE strength by 1/2 -1 grade, increase B LE strength by 1/2 -1 grade, ambulate 2-3 XAVIER w/ S   PT Treatment Day 3   Plan   Treatment/Interventions Functional transfer training;LE strengthening/ROM; Therapeutic exercise; Endurance training;Patient/family training;Bed mobility;Gait training;Spoke to nursing   Progress Progressing toward goals   PT Frequency   (4-6x/week)   Recommendation   Recommendation Post acute IP rehab   Equipment Recommended Brenita Laigail; Wheelchair  (RW)     Kd Kulkarni, KELLY

## 2020-02-03 NOTE — PLAN OF CARE
Problem: OCCUPATIONAL THERAPY ADULT  Goal: Performs self-care activities at highest level of function for planned discharge setting  See evaluation for individualized goals  Description  Treatment Interventions: ADL retraining, Functional transfer training, UE strengthening/ROM, Endurance training, Cognitive reorientation, Patient/family training, Equipment evaluation/education, Compensatory technique education, Energy conservation, Activityengagement, Fine motor coordination activities          See flowsheet documentation for full assessment, interventions and recommendations  Note:   Limitation: Decreased ADL status, Decreased UE strength, Decreased endurance, Decreased self-care trans, Decreased high-level ADLs, Decreased fine motor control     Assessment: pt seen for skilled OT Treatment session w focus on self care and functional mobility in room and bathoom  Pt c/o still having decreased FM skills/clumsiness LUE (pt is L handed)  Pt able to get OOB w S/elevated HOB  good balance sitting at EOB  Min assist needed for mobility walking into bathroom w use of RW  slow, ataxic movments, cues for safety  He was able to get on/off standard toilet w min assist, min assist for steadying standing at sinkside for simple grooming  Pt w fair - balance in stance  He tolerated standing x 3 min w min assist  Pt also particiapted in functional FM skills exercises L hand such as trying to tie/untie strap, open/closing containers/items  educated on continued use of LUE as much as possible  based on PLOF, and current deficits, pt will need inpatient rehab upon dc from acute care  His goal is to be able to return to work for [employer] as a   this also involves driving 46-26 min to get to work  OT will continue to follow while in acute care  OT Discharge Recommendation: Short Term Rehab  OT - OK to Discharge:  Yes

## 2020-02-03 NOTE — SOCIAL WORK
Acute rehab auth follow up:    CIPRIANO received voicemail from Tram Banuelos at [facility]; she reported that they have not yet received an authorization from the insurance  CIPRIANO advised RN CIPRIANO Villegas and MSNARENDRA Lombardo via LoggedIn      [id number] hrs:   CIPRIANO called and advised RN CIPRIANO Villegas

## 2020-02-03 NOTE — ASSESSMENT & PLAN NOTE
· Will continue to watch  Most recent hemoglobin 17 6  · Hematology consult appreciated     Trending labs, does not need phlebotomy at this time per Heme/Onc  · CBC in AM

## 2020-02-03 NOTE — DISCHARGE SUMMARY
Discharge Summary - [place] [address] Internal Medicine    Patient Information: Mariela Ruiz 72 y o  male MRN: [id number]  Unit/Bed#: -01 Encounter: [id number]    Discharging Physician / Practitioner: Tiny Casiano PA-C  PCP: Sadie Velez DO  Admission Date: 1/28/2020  Discharge Date: 02/03/20    Reason for Admission: stroke-like symptoms     Discharge Diagnoses:     Principal Problem:    CVA (cerebral vascular accident) [facility]  Active Problems:    BRBPR (bright red blood per rectum)    Chronic kidney disease, stage 3 ([facility] )    Erythrocytosis    Type 2 diabetes mellitus with stage 3 chronic kidney disease, without long-term current use of insulin ([place] [address] )    Hypertension    Gout    Essential hypertriglyceridemia  Resolved Problems:    * No resolved hospital problems  *      Consultations During Hospital Stay:  · Neurology  · Neurosurgery  · PMR  · Heme/Onc  · PT  · OT  · SLP    Procedures Performed:   · CT stroke alert brain  · CTA stroke alert head/neck  · Chest x-ray  · MRI brain without contrast  · CT cerebral perfusion  · CBC  · BMP/CMP  · Troponin  · UA  · Urine culture  · HbA1c  · Lipid panel  · TSH  · P2Y12  · BCR/ABL  · JAK2    Significant Findings:   · CT stroke alert brain:  No evidence of acute vascular territorial infarct however given recent subcortical/watershed territory infarcts, MRI may be helpful for further evaluation  No intracranial hemorrhage or mass effect  · CTA stroke alert head/neck:  Stable moderate 50-69% stenosis of the para clinoid segments of the bilateral internal carotid arteries more prominent on the right  Stable occlusion and severe stenosis in proximal right M2 branches with distal reperfusion suggesting good collateral flow    Stable severe 70-90% stenosis within a left M2 branch with normal distal flow suggesting atherosclerotic plaque  · Chest x-ray:  No acute cardiopulmonary disease  · MRI brain without contrast:  Ischemic burden within the right MCA distribution has progressed  No hemorrhage or significant mass effect  Abnormal flow characteristics right MCA branches  Moderately advanced chronic small-vessel disease  · CT cerebral perfusion was performed  Data available on PACS  · Hemoglobin:  19 1, 19 0, 18 2, 17 9, 17 7, 17 3, 17 6  · Creatinine:  1 82, 1 60, 1 55, 1 51, 1 50, 1 53  · Troponin: <0 02  · Hemoglobin A1c:  6 0  · Lipid panel:  Cholesterol 122, HDL 35, triglycerides 99, LDL 67  · TSH:  3 460  · P2Y12:  74, 64  · Urine culture: >100,000 pan-susceptible E  coli    Incidental Findings:   · None     Test Results Pending at Discharge (will require follow up): · BCR/ABL  · JAK2     Outpatient Tests Requested:  · Follow up with Neurology  · Follow up with PCP  · Follow up with Hematology    Complications:  None    Hospital Course:     Erik Orourke is a 72 y o  male with CKD3, erythrocytosis, DM2, HTN, and gout who was recently admitted at [address] [place] [address] on 01/22/2020 due to stroke-like symptoms (facial droop and slurred speech)  An MRI was done that admission and revealed punctate acute infarcts in the right frontoparietal region in a watershed territory distribution consistent with known proximal right MCA occlusion or high-grade stenosis  Chronic deep white matter basal ganglia and brainstem lacunar infarcts with advanced microangiopathic disease also noted  The patient was discharged on 01/24/2020 with plan for DAPT x3 months then monotherapy with Plavix alone  He presented back to the hospital on 1/28/2020 due to worsening symptoms (left facial droop, LLE weakness, and difficulty walking)  He was a stroke alert and had CT and CTA head/neck  He was not a tPA candidate given recent stroke  He was placed on e heparin GTT and transferred to [facility]    MRI revealed progression of ischemic burden within the right MCA distribution, no hemorrhage or significant mass effect, abnormal flow characteristics of the right MCA branches and moderately advanced chronic small-vessel disease  A CT cerebral perfusion was also performed with results in PACS - confirms area of hypoperfusion in territory of right M2   He was seen in consultation by both Neurology and Neurosurgery  Initial consideration was made for possible triple antiplatelet therapy, however patient had hemorrhoidal bleeding and they recommended DAPT and no surgical intervention at this time  Heparin GTT was discontinued on 01/30/2020  Her BP was gradually reduced  Hemoglobin was monitored and is stable, 17 6, on day of discharge  Creatinine was monitored and is at baseline  For his erythrocytosis he was seen in consultation by Heme/Onc who felt that there was no need for phlebotomy  BCR/ABL and JAK2 are pending at time of discharge and will need to follow up with Heme/Onc  If patient has further symptoms or ischemic events would recommend re-consult with Neurosurgery for possible intracranial stenting  He was seen in consult by PT/OT/PMR who recommended rehab and they wanted to go to [facility] arranged  Of note, the patient had a UA with culture performed which revealed >100,000 pan-susceptible E  coli however denies urinary complaint and is afebrile and without leukocytosis, suspect asymptomatic bacteriuria  Condition at Discharge: stable     Discharge Day Visit / Exam:     Subjective:    Mr Yolanda Angel reports he is feeling well today  Denies any new symptoms  The ice new numbness, tingling, weakness, vision change, headache, chest pain, shortness of breath, abdominal pain  Vitals: Blood Pressure: 143/81 (02/03/20 0921)  Pulse: 73 (02/03/20 0921)  Temperature: 98 7 °F (37 1 °C) (02/03/20 0755)  Temp Source: Oral (02/01/20 2251)  Respirations: 18 (02/03/20 0755)  Height: 5' 9" (175 3 cm) (01/29/20 0045)  Weight - Scale: 97 5 kg (214 lb 15 2 oz) (02/03/20 0600)  SpO2: 94 % (02/03/20 0755)  Exam:   Physical Exam   Constitutional: He is oriented to person, place, and time  No distress     Patient seen sitting upright comfortably resting in bedside chair  Pleasant and cooperative  No acute distress  Wife and nurse present   Cardiovascular: Normal rate and regular rhythm  Pulmonary/Chest: Effort normal and breath sounds normal  No respiratory distress  He has no wheezes  Abdominal: Soft  Bowel sounds are normal  He exhibits no distension  There is no tenderness  Musculoskeletal: He exhibits no edema  Neurological: He is alert and oriented to person, place, and time  Left facial droop   Skin: Skin is warm  He is not diaphoretic  Psychiatric: He has a normal mood and affect  His behavior is normal    Vitals reviewed  Discharge instructions/Information to patient and family:   See after visit summary for information provided to patient and family  Provisions for Follow-Up Care:  See after visit summary for information related to follow-up care and any pertinent home health orders  Disposition:     Acute Rehab at [facility]    For Discharges to [facility] SNF:   · Not Applicable to this Patient - Not Applicable to this Patient    Planned Readmission: None     Discharge Statement:  I spent 46 minutes discharging the patient  This time was spent on the day of discharge  I had direct contact with the patient on the day of discharge  Greater than 50% of the total time was spent examining patient, answering all patient questions, arranging and discussing plan of care with patient as well as directly providing post-discharge instructions  Additional time then spent on discharge activities  Discharge Medications:  See after visit summary for reconciled discharge medications provided to patient and family  ** Please Note: Dragon 360 Dictation voice to text software may have been used in the creation of this document   **

## 2020-02-03 NOTE — PLAN OF CARE
Problem: PHYSICAL THERAPY ADULT  Goal: Performs mobility at highest level of function for planned discharge setting  See evaluation for individualized goals  Description  Treatment/Interventions: Functional transfer training, LE strengthening/ROM, Therapeutic exercise, Elevations, Endurance training, Patient/family training, Equipment eval/education, Bed mobility, Gait training  Equipment Recommended: Audelia Palafox       See flowsheet documentation for full assessment, interventions and recommendations  Outcome: Progressing  Note:   Prognosis: Good  Problem List: Decreased strength, Decreased endurance, Impaired balance, Decreased mobility, Decreased coordination, Impaired judgement, Decreased safety awareness  Assessment: Pt is making steady progress with the use of a rolling walker  Wife present today  Impulsive with transfers requiring education for safety  Cues for hand placement as well for sit to stand transfers  Gait is limited by L foot drag requiring min assist for safe walker management  Completed seated BLE exercises to conclude session  Pt would benefit from continued physical therapy to maximize functional independence  Recommendation: Post acute IP rehab     PT - OK to Discharge: Yes(to rehab when medically stable)    See flowsheet documentation for full assessment

## 2020-02-03 NOTE — OCCUPATIONAL THERAPY NOTE
Occupational Therapy Treatment Note      Abelinoalisa Crew    2/3/2020    Principal Problem:    CVA (cerebral vascular accident) [facility])  Active Problems:    Chronic kidney disease, stage 3 ([facility] )    Essential hypertriglyceridemia    Gout    Hypertension    Type 2 diabetes mellitus with stage 3 chronic kidney disease, without long-term current use of insulin ([facility])    Erythrocytosis    BRBPR (bright red blood per rectum)      Past Medical History:   Diagnosis Date    Diabetes mellitus ([facility] )     Hypertension     Renal disorder        Past Surgical History:   Procedure Laterality Date    COLONOSCOPY  09/18/2006    complete; 10 yrs    COLONOSCOPY  10/23/2017    complete; 5 yrs        02/03/20 1134   Restrictions/Precautions   Weight Bearing Precautions Per Order No   Other Precautions Fall Risk   General   Response to Previous Treatment Patient with no complaints from previous session   Lifestyle   Autonomy pt reports being fully independt w all self care, mobility, driving and working full time   Reciprocal Relationships supportive wife   Pain Assessment   Pain Assessment No/denies pain   Pain Score No Pain   ADL   Grooming Assistance 5  Supervision/Setup   Grooming Deficit Increased time to complete;Standing with assistive device; Wash/dry hands; Wash/dry face; Teeth care   UB Bathing Assistance 4  Minimal Assistance   UB Bathing Deficit Increased time to complete;Right arm;Perineal area; Buttocks   LB Bathing Assistance 4  Minimal Assistance   LB Bathing Deficit Increased time to complete;Right upper leg;Left upper leg;Right lower leg including foot; Left lower leg including foot   UB Dressing Assistance 4  Minimal Assistance   UB Dressing Deficit Increased time to complete;Pull over head;Pull around back   LB Dressing Assistance 4  Minimal Assistance   LB Dressing Deficit Increased time to complete; Thread LLE into pants;Pull up over hips   Toileting Assistance  4  Minimal Assistance   Functional Standing Tolerance   Time 3 min    Activity standing at sinkside w support of RW, min assist of one for steadying   Bed Mobility   Supine to Sit 5  Supervision   Additional items Increased time required;HOB elevated   Transfers   Sit to Stand 5  Supervision   Additional items Verbal cues   Stand to Sit 5  Supervision   Additional items Verbal cues   Stand pivot 4  Minimal assistance   Additional items Assist x 1; Increased time required   Toilet transfer 4  Minimal assistance   Additional items Assist x 1;Verbal cues;Standard toilet   Functional Mobility   Functional Mobility 4  Minimal assistance   Additional items Rolling walker   Toilet Transfers   Toilet Transfer From Rolling walker   Toilet Transfer Type To and from   Toilet Transfer to Standard toilet   Toilet Transfer Technique Ambulating   Toilet Transfers Minimal assistance;Verbal cues   Cognition   Overall Cognitive Status [facility]   Arousal/Participation Alert; Cooperative   Attention Within functional limits   Orientation Level Oriented X4   Memory Within functional limits   Following Commands Follows multistep commands without difficulty   Comments pleasant, cooperative and motivated   Activity Tolerance   Activity Tolerance Patient tolerated treatment well   Assessment   Assessment pt seen for skilled OT Treatment session w focus on self care and functional mobility in room and bathoom  Pt c/o still having decreased FM skills/clumsiness LUE (pt is L handed)  Pt able to get OOB w S/elevated HOB  good balance sitting at EOB  Min assist needed for mobility walking into bathroom w use of RW  slow, ataxic movments, cues for safety  He was able to get on/off standard toilet w min assist, min assist for steadying standing at sinkside for simple grooming  Pt w fair - balance in stance  He tolerated standing x 3 min w min assist  Pt also particiapted in functional FM skills exercises L hand such as trying to tie/untie strap, open/closing containers/items   educated on continued use of LUE as much as possible  based on PLOF, and current deficits, pt will need inpatient rehab upon dc from acute care  His goal is to be able to return to work for [employer] as a   this also involves driving 85-32 min to get to work  OT will continue to follow while in acute care  Plan   Treatment Interventions ADL retraining;Functional transfer training;UE strengthening/ROM; Endurance training;Patient/family training; Compensatory technique education; Energy conservation; Activityengagement; Neuromuscular reeducation   Goal Expiration Date 02/10/20   OT Treatment Day 2   OT Frequency 3-5x/wk   Recommendation   OT Discharge Recommendation Short Term Rehab   OT - OK to Discharge Yes

## 2020-02-03 NOTE — SOCIAL WORK
Cm reviewed patient  Cm received phone call from Cheri Vora at [facility]  She stated that she will have an RN review chart, but that updated PT/OT is needed for insurance authorization  Cm sent messages to therapies requesting updated notes  Cm received a phone call from [address] [place] [address] stating that they finale received authorization for transfer  Authorization is good from 2/3-2/10  Cm spoke with patient patient and patient's wife in regards to transport  Per liaison with [facility], the team there will assist with patient getting out of family's car  Patient's wife stated she and son will assist with patient's transfer  Son is en route to transport  Patient will need a CD of imaging prior to dc  CM informed that CD will be sent to nursing station  All parties aware and in agreement with dc plan

## 2020-02-04 LAB — SCAN RESULT: NORMAL

## 2020-02-07 ENCOUNTER — TELEPHONE (OUTPATIENT)
Dept: NEUROLOGY | Facility: CLINIC | Age: 70
End: 2020-02-07

## 2020-02-07 NOTE — TELEPHONE ENCOUNTER
Post hospital discharge follow up call    Hospitalization 1/28-2/3/20    The purpose of this phone call is to assess patient's general wellbeing or for any assistance needed with follow-up care  -    According to chart, patient dischagred to [facility] [phone]  Called facility, reached Jeff Baca  States nurse caring for patient is not available at this time  Left my number for call back

## 2020-02-07 NOTE — TELEPHONE ENCOUNTER
Received message from [address] [place] [address] stating to call her back at [phone]  Called, reached unit clerk [facility]  Transferred call to [facility]  Since discharge, patient has not experienced any new or worsening stroke symptoms  Continues to have left leg weakness and drags at times  No weakness of the left arm  Regular diet, no dysphagia noted  Oriented, continent  Ambulates with a walker and supervision  Transferred with supervision  Performing his own ADLs with supervision  Last /81, average blood glucose 107-92  I reviewed medications with her  There have not been any changes since discharge  No reported missed doses or medication side effects  No signs of bleeding  Estimated discharge date 2/11, plan for home with wife  Stroke hospital follow up appointment scheduled 3/4 with Dr Linda Silver  Nurse Magda does not have any questions or concerns at this time

## 2020-02-10 ENCOUNTER — TELEPHONE (OUTPATIENT)
Dept: INFUSION CENTER | Facility: HOSPITAL | Age: 70
End: 2020-02-10

## 2020-02-10 ENCOUNTER — OFFICE VISIT (OUTPATIENT)
Dept: HEMATOLOGY ONCOLOGY | Facility: HOSPITAL | Age: 70
End: 2020-02-10
Payer: COMMERCIAL

## 2020-02-10 VITALS
DIASTOLIC BLOOD PRESSURE: 86 MMHG | SYSTOLIC BLOOD PRESSURE: 142 MMHG | RESPIRATION RATE: 16 BRPM | OXYGEN SATURATION: 96 % | TEMPERATURE: 97.3 F | HEART RATE: 61 BPM

## 2020-02-10 DIAGNOSIS — D75.1 ERYTHROCYTOSIS: Primary | ICD-10-CM

## 2020-02-10 PROCEDURE — 1111F DSCHRG MED/CURRENT MED MERGE: CPT | Performed by: INTERNAL MEDICINE

## 2020-02-10 PROCEDURE — 1160F RVW MEDS BY RX/DR IN RCRD: CPT | Performed by: INTERNAL MEDICINE

## 2020-02-10 PROCEDURE — 99214 OFFICE O/P EST MOD 30 MIN: CPT | Performed by: INTERNAL MEDICINE

## 2020-02-10 PROCEDURE — 3077F SYST BP >= 140 MM HG: CPT | Performed by: INTERNAL MEDICINE

## 2020-02-10 PROCEDURE — 1036F TOBACCO NON-USER: CPT | Performed by: INTERNAL MEDICINE

## 2020-02-10 PROCEDURE — 2022F DILAT RTA XM EVC RTNOPTHY: CPT | Performed by: INTERNAL MEDICINE

## 2020-02-10 PROCEDURE — 3044F HG A1C LEVEL LT 7.0%: CPT | Performed by: INTERNAL MEDICINE

## 2020-02-10 PROCEDURE — 3079F DIAST BP 80-89 MM HG: CPT | Performed by: INTERNAL MEDICINE

## 2020-02-10 PROCEDURE — 3066F NEPHROPATHY DOC TX: CPT | Performed by: INTERNAL MEDICINE

## 2020-02-10 NOTE — PROGRESS NOTES
Hematology/Oncology Outpatient Follow- up Note  Rosa Nuñez 72 y o  male MRN: @ Encounter: [id number]        Date:  2/10/2020    Presenting Complaint/Diagnosis : Recent stroke with an elevated hemoglobin      Previous Hematologic/ Oncologic History:    Workup    Current Hematologic/ Oncologic Treatment:    Workup    Interval History:    The patient returns for follow-up visit  He was last seen in the hospital by my partner Dr Jayce Cordova when his hemoglobin was elevated and he had had a CVA  Myeloproliferative workup was done which showed a negative JAK2 mutation and negative ECR for BCR/ABL  The patient's white count has always been normal  Hemoglobin has been elevated in January 2020  Prior to that it was normal  Hematocrit has been running above 50  The most recent blood work shows a white count of 7 8 with a hemoglobin of 17 6 and a platelet count of 579  This was on 3 February  On the sixth ER she had more recent blood work which showed a white count of 5 8 hemoglobin 17 7 platelet count was 978  The patient is currently in rehabilitation  Denies any nausea denies any vomiting denies any diarrhea  Has left-sided weakness in both his arm and leg  The rest of his 14 point review of systems today was negative  Test Results:    Imaging: Cta Head And Neck W Wo Contrast    Result Date: 1/23/2020  Narrative: CTA NECK AND BRAIN WITH AND WITHOUT CONTRAST INDICATION: TIA, initial exam COMPARISON:   CT January 22, 2020 TECHNIQUE:  Routine CT imaging of the Brain without contrast   Post contrast imaging was performed after administration of iodinated contrast through the neck and brain  Post contrast axial 0 625 mm images timed to opacify the arterial system  3D rendering was performed on an independent workstation  MIP reconstructions performed  Coronal reconstructions were performed of the noncontrast portion of the brain  Radiation dose length product (DLP) for this visit:  1178 mGy-cm     This examination, like all CT scans performed in the [facility], was performed utilizing techniques to minimize radiation dose exposure, including the use of iterative reconstruction and automated exposure control  IV Contrast:  85 mL of iodixanol (VISIPAQUE)  IMAGE QUALITY:   Diagnostic FINDINGS: NONCONTRAST BRAIN PARENCHYMA: Decreased attenuation is noted in periventricular and subcortical white matter demonstrating an appearance that is statistically most likely to represent moderate microangiopathic change; this appearance is similar when compared to most recent prior examination  No CT signs of acute infarction  No intracranial mass, mass effect or midline shift  No acute parenchymal hemorrhage  VENTRICLES AND EXTRA-AXIAL SPACES:  Normal for the patient's age  VISUALIZED ORBITS AND PARANASAL SINUSES:  Unremarkable  CERVICAL VASCULATURE AORTIC ARCH AND GREAT VESSELS:  Normal aortic arch and great vessel origins  Normal visualized subclavian vessels  RIGHT VERTEBRAL ARTERY CERVICAL SEGMENT:  Normal origin  The vessel is normal in caliber throughout the neck  LEFT VERTEBRAL ARTERY CERVICAL SEGMENT:  Normal origin  The vessel is normal in caliber throughout the neck  RIGHT EXTRACRANIAL CAROTID SEGMENT:  There is atheromatous plaque right carotid bifurcation resulting in less than 50% stenosis  Remainder of the right ICA is widely patent  LEFT EXTRACRANIAL CAROTID SEGMENT:  Normal caliber common carotid artery  Normal bifurcation and cervical internal carotid artery  No stenosis or dissection  NASCET criteria was used to determine the degree of internal carotid artery diameter stenosis  INTRACRANIAL VASCULATURE INTERNAL CAROTID ARTERIES:  Atherosclerotic calcification and atheromatous plaque at the ophthalmic region bilaterally results in moderate to severe left stenosis at the ophthalmic segment of the left internal carotid artery and mild right paraophthalmic  stenosis    Bilateral internal carotid arteries are patent to the carotid terminus with mild supraclinoid left ICA stenosis  ANTERIOR CIRCULATION:  Symmetric A1 segments and anterior cerebral arteries with normal enhancement  Normal anterior communicating artery  MIDDLE CEREBRAL ARTERY CIRCULATION:  M1 segments are patent bilaterally  There is an occlusion of a right MCA M2 branch at the MCA trifurcation  DISTAL VERTEBRAL ARTERIES:  Normal distal vertebral arteries  Posterior inferior cerebellar artery origins are normal  Normal vertebral basilar junction  BASILAR ARTERY:  Basilar artery is normal in caliber  Normal superior cerebellar arteries  POSTERIOR CEREBRAL ARTERIES: Both posterior cerebral arteries arises from the basilar tip  Both arteries demonstrate normal enhancement  DURAL VENOUS SINUSES:  Normal  NON VASCULAR ANATOMY BONY STRUCTURES:  No acute osseous abnormality  SOFT TISSUES OF THE NECK:  Normal  THORACIC INLET:  Unremarkable  Impression: Right M2 occlusion at the MCA bifurcation  Moderate to severe focal left carotid stenosis at the paraophthalmic segment intracranially  Mild stenosis right carotid bifurcation the neck with atheromatous plaque noted  I personally discussed this study with Nikos Moncada on 1/23/2020 at 3:57 PM   Workstation performed: [id number]     X-ray Chest 1 View Portable    Result Date: 1/29/2020  Narrative: CHEST INDICATION:   Stroke  COMPARISON:  1/22/2020 EXAM PERFORMED/VIEWS:  XR CHEST PORTABLE FINDINGS:  Monitor wires are noted  Hypoventilation is seen  Cardiomediastinal silhouette appears unremarkable  The lungs are clear  No pneumothorax or pleural effusion  Osseous structures appear within normal limits for patient age  Impression: No acute cardiopulmonary disease  Workstation performed: [id number]     X-ray Chest 1 View Portable    Result Date: 1/23/2020  Narrative: CHEST INDICATION:   Stroke   COMPARISON:  None EXAM PERFORMED/VIEWS:  XR CHEST PORTABLE FINDINGS: Cardiomediastinal silhouette appears unremarkable  The lungs are clear  No pneumothorax or pleural effusion  Osseous structures appear within normal limits for patient age  Impression: No acute cardiopulmonary disease  Workstation performed: [id number]     Ct Head Without Contrast    Result Date: 1/22/2020  Narrative: CT BRAIN - WITHOUT CONTRAST INDICATION:   TIA, initial exam   80-year-old male with history of diabetes and hypertension  COMPARISON:  None  TECHNIQUE:  CT examination of the brain was performed  In addition to axial images, coronal 2D reformatted images were created and submitted for interpretation  Radiation dose length product (DLP) for this visit:  840 mGy-cm   This examination, like all CT scans performed in the [facility], was performed utilizing techniques to minimize radiation dose exposure, including the use of iterative reconstruction and automated exposure control  IMAGE QUALITY:  Diagnostic  FINDINGS: PARENCHYMA:  No intracranial mass, mass effect or midline shift  No CT signs of acute infarction  No acute parenchymal hemorrhage  Decreased white matter attenuation, most likely due to chronic small vessel white matter ischemic changes  VENTRICLES AND EXTRA-AXIAL SPACES:  Normal for the patient's age  No extra-axial blood  VISUALIZED ORBITS AND PARANASAL SINUSES:  Unremarkable  CALVARIUM AND EXTRACRANIAL SOFT TISSUES:  Normal      Impression: No acute intracranial abnormality  Workstation performed: [id number]     Mri Brain Wo Contrast    Result Date: 1/29/2020  Narrative: MRI BRAIN WITHOUT CONTRAST INDICATION: weakness  COMPARISON:   MR 1/23/2020, CT/CTA 1/28/2020 TECHNIQUE:  Sagittal T1, axial T2, axial FLAIR, axial T1, axial [place] and axial diffusion imaging  IMAGE QUALITY:  Diagnostic  FINDINGS: BRAIN PARENCHYMA:  Ischemic burden within the right MCA arterial distribution has increased with multiple coalescing regions of ischemic edema  No overt hemorrhage  No significant mass effect   Moderately chronic small vessel disease, multifocal stable hemosiderin cerebellum  VENTRICLES:  Normal for the patient's age  SELLA AND PITUITARY GLAND:  Normal  ORBITS:  Normal  PARANASAL SINUSES:  Left maxillary retention cyst or polyp  VASCULATURE:  Abnormal flow characteristics to within several right M2 branches are stable  CALVARIUM AND SKULL BASE:  Normal  EXTRACRANIAL SOFT TISSUES:  Normal      Impression: Ischemic burden within the right MCA distribution has progressed  No hemorrhage or significant mass effect  Abnormal flow characteristics right MCA branches  Moderately advanced chronic small vessel disease  Workstation performed: [id number]     Mri Brain Wo Contrast    Result Date: 1/23/2020  Narrative: MRI BRAIN WITHOUT CONTRAST INDICATION: Transient speech difficulty  COMPARISON:   1/23/2020 fibula  TECHNIQUE:  Sagittal T1, axial T2, axial FLAIR, axial T1, axial [place] and axial diffusion imaging  IMAGE QUALITY:  Diagnostic  FINDINGS: BRAIN PARENCHYMA: Punctate acute to early subacute infarcts in the right frontoparietal subcortical and deep white matter in a watershed distribution  Punctate acute infarct in the right insular subcortical white matter  Periventricular and subcortical T2/FLAIR hyperintense foci, partially confluent, consistent with advanced microangiopathic disease  Chronic lacunar infarct in the left thalamus and chronic deep white matter infarcts in the bilateral frontoparietal region  Chronic lacunar infarct in the right aspect of the scotty  Punctate foci of susceptibility artifact in the right brachium pontis and right cerebellar hemisphere likely to represent chronic microhemorrhage  No evidence of acute intracranial hemorrhage or mass effect  VENTRICLES:  No hydrocephalus or extra-axial collection  SELLA AND PITUITARY GLAND:  Normal  ORBITS:  Normal  PARANASAL SINUSES:  Mucous retention cyst in the left maxillary sinus   VASCULATURE:  Hyperintensity in the proximal right middle cerebral artery involving the genu and at least one proximal M2 branch, consistent with findings on the CT angiogram  CALVARIUM AND SKULL BASE:  Normal  EXTRACRANIAL SOFT TISSUES:  Normal      Impression: 1  Punctate acute infarcts in the right frontoparietal region in a watershed territory distribution consistent with known proximal right MCA occlusion or high-grade stenosis  2   Chronic deep white matter, basal ganglia and brainstem lacunar infarcts  Advanced microangiopathic disease  3   Nonspecific foci of chronic microhemorrhage in the right cerebellar hemisphere and brachium pontis  The study was marked in [facility] for immediate notification  Workstation performed: [id number]     Ct Stroke Alert Brain    Result Date: 1/28/2020  Narrative: CT BRAIN - STROKE ALERT PROTOCOL INDICATION: Left-sided weakness  COMPARISON:  1/23/2020  TECHNIQUE:  CT examination of the brain was performed  In addition to axial images, coronal reformatted images were created and submitted for interpretation  Radiation dose length product (DLP) for this visit:  855 12 mGy-cm   This examination, like all CT scans performed in the [facility], was performed utilizing techniques to minimize radiation dose exposure, including the use of iterative  reconstruction and automated exposure control  IMAGE QUALITY:  Diagnostic  FINDINGS:  PARENCHYMA:  Stable embolic infarcts in the right corona radiata, right lentiform nuclei and subinsular white matter, many of which are subacute and consistent with findings on the recent MRI  Chronic lacunar infarct in the left thalamus  Periventricular and subcortical hypoattenuating foci consistent with microangiopathic disease  No acute intracranial hemorrhage or mass effect  Increased density throughout the major vessels of the [place] and dural venous sinuses possibly secondary to recent contrast administration   VENTRICLES AND EXTRA-AXIAL SPACES:  No hydrocephalus or extra-axial collection  VISUALIZED ORBITS AND PARANASAL SINUSES:  Intact globes and orbits  No paranasal sinus disease  CALVARIUM AND EXTRACRANIAL SOFT TISSUES:  No lytic or blastic lesion  Impression: 1  No evidence of acute vascular territorial infarction however given recent subcortical/watershed territory infarcts, MRI may be helpful for further evaluation  2   No intracranial hemorrhage or mass effect  Findings were directly discussed with CHACHA HALEY on 1/28/2020 5:48 PM  Workstation performed: [id number]     Ct Cerebral Perfusion    Result Date: 1/31/2020  Narrative: CT PERFUSION INDICATION:  Neuro deficit, acute, persistent or progressing  COMPARISON:  Series of recent exams to include MR performed one day earlier  TECHNIQUE: CT perfusion study was performed  A total of 8 cm of brain tissue was evaluated in two different volumetric acquisitions  iSch[employer] Realtime RAPID software was utilized to calculate cerebral blood flow, cerebral blood volume, mean transit time, and  Tmax  Radiation dose length product (DLP) for this visit:  2756 97 mGy-cm   This examination, like all CT scans performed in the [facility], was performed utilizing techniques to minimize radiation dose exposure, including the use of iterative reconstruction and automated exposure control  IV Contrast:  80 mL of iohexol (OMNIPAQUE)  IMAGE QUALITY:  Diagnostic  FINDINGS: Hemisphere affected:  Right  Total CBF<30% volume:  0 mL Total Tmax>6 0s volume:  67 mL Total Mismatch difference:  67 mL Total Mismatch ratio:  Infinite Hypoperfusion Index (Tmax>10s/Tmax  6s): [0 1 Additional comments:     Impression: CT perfusion performed  Data available on PACS  Workstation performed: [id number]     Cta Stroke Alert (head/neck)    Result Date: 1/28/2020  Narrative: CTA NECK AND BRAIN WITH CONTRAST INDICATION: Left-sided weakness COMPARISON:   1/23/2020   TECHNIQUE:   Post contrast imaging was performed after administration of iodinated contrast through the neck and brain  Post contrast axial 0 625 mm images timed to opacify the arterial system  3D rendering was performed on an independent workstation  MIP reconstructions performed  Coronal reconstructions were performed of the noncontrast portion of the brain  Radiation dose length product (DLP) for this visit:  389 91 mGy-cm   This examination, like all CT scans performed in the [facility], was performed utilizing techniques to minimize radiation dose exposure, including the use of iterative  reconstruction and automated exposure control  IV Contrast:  100 mL of iohexol (OMNIPAQUE)  IMAGE QUALITY:   Diagnostic FINDINGS: CERVICAL VASCULATURE AORTIC ARCH AND GREAT VESSELS:  Three-vessel configuration of the aortic arch  No significant stenosis in the subclavian arteries  RIGHT VERTEBRAL ARTERY CERVICAL SEGMENT:  Normal origin  The vessel is normal in caliber throughout the neck  LEFT VERTEBRAL ARTERY CERVICAL SEGMENT:  Normal origin  The vessel is normal in caliber throughout the neck  RIGHT EXTRACRANIAL CAROTID SEGMENT:  Normal caliber common carotid artery  Calcified and significant noncalcified plaque at the internal carotid artery origin resulting in stable, mild (less than 50%) stenosis  LEFT EXTRACRANIAL CAROTID SEGMENT:  Normal caliber common carotid artery  Minimal calcified plaque at the internal carotid artery bifurcation without stenosis  NASCET criteria was used to determine the degree of internal carotid artery diameter stenosis  INTRACRANIAL VASCULATURE INTERNAL CAROTID ARTERIES:  Calcified plaque resulting in stable moderate (50-69%) narrowing the bilateral paraclinoid segments  ANTERIOR CIRCULATION:  Anterior communicating artery not visualized  No stenosis in the anterior cerebral arteries   MIDDLE CEREBRAL ARTERY CIRCULATION:  Stable appearance of the right MCA demonstrating occlusion and severe stenosis in  proximal M2 branches, with distal reperfusion suggesting good collateral flow  Stable focal, severe (70-90%) stenosis in a left M2 branch with normal distal perfusion  DISTAL VERTEBRAL ARTERIES:  Calcified plaque along the intradural segment of the left vertebral artery without significant stenosis  Posterior inferior cerebellar artery origins are normal  Normal vertebral basilar junction  BASILAR ARTERY:  Stable mild irregularity in the proximal basilar artery without significant stenosis  Normal superior cerebellar arteries  Patent superior cerebellar arteries  POSTERIOR CEREBRAL ARTERIES: No stenosis in the posterior cerebral arteries  Posterior communicating arteries not visualized  DURAL VENOUS SINUSES:  Patent  NON VASCULAR ANATOMY BONY STRUCTURES:  No lytic or blastic lesion  SOFT TISSUES OF THE NECK:  No mass or lymphadenopathy  THORACIC INLET:  Clear lung apices  Impression: 1  Stable moderate (50-69%) stenosis of the paraclinoid segments of the bilateral internal carotid arteries, more prominent on the right  2   Stable occlusion and severe stenoses in proximal right M2 branches with distal reperfusion suggesting good collateral flow  3   Stable severe (70-90%) stenosis within a left M2 branch, with normal distal flow, suggesting atherosclerotic plaque   Findings were directly discussed with CHACHA HALEY on 1/28/2020 5:55 PM  Workstation performed: [id number]       Labs:   Lab Results   Component Value Date    WBC 7 88 02/03/2020    HGB 17 6 (H) 02/03/2020    HCT 54 4 (H) 02/03/2020    MCV 90 02/03/2020     02/03/2020     Lab Results   Component Value Date     07/07/2017    K 3 6 02/03/2020     (H) 02/03/2020    CO2 24 02/03/2020    BUN 30 (H) 02/03/2020    CREATININE 1 53 (H) 02/03/2020    CALCIUM 9 2 02/03/2020    AST 21 01/29/2020    ALT 27 01/29/2020    ALKPHOS 85 01/29/2020    PROT 7 1 07/07/2017    BILITOT 0 6 07/07/2017    EGFR 46 02/03/2020       Lab Results   Component Value Date    PSA 5 1 (H) 01/27/2020 Lab Results   Component Value Date    IRON 128 12/07/2018    TIBC 232 (L) 08/24/2018    FERRITIN 153 12/07/2018     ROS: As stated in the history of present illness otherwise his 14 point review of systems today was negative  Active Problems:   Patient Active Problem List   Diagnosis    Chronic kidney disease, stage 3 (HCC)    Colon, diverticulosis    Elevated C-reactive protein    Elevated prostate specific antigen (PSA)    Essential hypertriglyceridemia    Gout    Hypertension    Microalbuminuria    Obesity    Type 2 diabetes mellitus with stage 3 chronic kidney disease, without long-term current use of insulin (HCC)    Ulcerative rectosigmoiditis without complication (HCC)    Dyslipidemia    Erythrocytosis    CVA [facility]cerebral vascular accident) [facility] [address] [facility]    BRBPR (bright red blood per rectum)       Past Medical History:   Past Medical History:   Diagnosis Date    [facility]     Hypertension     Renal disorder        Surgical History:   Past Surgical History:   Procedure Laterality Date    COLONOSCOPY  09/18/2006    complete; 10 yrs    COLONOSCOPY  10/23/2017    complete; 5 yrs       Family History:    Family History   Problem Relation Age of Onset    Breast cancer Mother     Kidney disease Father     Lung cancer Father     Other Father         smoker    Hypertension Other        Cancer-related family history includes Breast cancer in his mother; Lung cancer in his father      Social History:   Social History     Socioeconomic History    Marital status: /Civil Union     Spouse name: Not on file    Number of children: Not on file    Years of education: Not on file    Highest education level: Not on file   Occupational History    Occupation: full-time employment   Social Needs    Financial resource strain: Not on file    Food insecurity:     Worry: Not on file     Inability: Not on file    Transportation needs:     Medical: Not on file     Non-medical: Not on file   Tobacco Use    Smoking status: Never Smoker    Smokeless tobacco: Never Used   Substance and Sexual Activity    Alcohol use: Never     Frequency: Never     Binge frequency: Never    Drug use: Never    Sexual activity: Yes   Lifestyle    Physical activity:     Days per week: Not on file     Minutes per session: Not on file    Stress: Not on file   Relationships    Social connections:     Talks on phone: Not on file     Gets together: Not on file     Attends Orthodox service: Not on file     Active member of club or organization: Not on file     Attends meetings of clubs or organizations: Not on file     Relationship status: Not on file    Intimate partner violence:     Fear of current or ex partner: Not on file     Emotionally abused: Not on file     Physically abused: Not on file     Forced sexual activity: Not on file   Other Topics Concern    Not on file   Social History Narrative    Not on file       Current Medications:   Current Outpatient Medications   Medication Sig Dispense Refill    allopurinol (ZYLOPRIM) 100 mg tablet TAKE 2 TABLETS BY MOUTH DAILY 180 tablet 1    amLODIPine (NORVASC) 10 mg tablet Take 0 5 tablets (5 mg total) by mouth daily 15 tablet 0    aspirin 81 MG tablet Take 1 tablet by mouth daily      atorvastatin (LIPITOR) 40 mg tablet Take 1 tablet (40 mg total) by mouth daily with dinner 30 tablet 0    clopidogrel (PLAVIX) 75 mg tablet Take 1 tablet (75 mg total) by mouth daily 30 tablet 0    fenofibrate (TRICOR) 145 mg tablet TAKE 1 TABLET BY MOUTH ONCE DAILY 90 tablet 1    glucose monitoring kit (FREESTYLE) monitoring kit 1 each by Does not apply route as needed (Test daily) 1 each 0    INVOKANA 100 MG TAKE 1 TABLET BY MOUTH ONCE DAILY BEFORE BREAKFAST 30 tablet 3    Lancets (FREESTYLE) lancets by Other route as needed (Test Daily) Use as instructed--test Daily 100 each 0    metoprolol tartrate (LOPRESSOR) 50 mg tablet Take 1 tablet (50 mg total) by mouth every 12 (twelve) hours 60 tablet 6     No current facility-administered medications for this visit  Allergies: No Known Allergies    Physical Exam:    There is no height or weight on file to calculate BSA  Wt Readings from Last 3 Encounters:   02/03/20 97 5 kg (214 lb 15 2 oz)   01/28/20 90 kg (198 lb 6 6 oz)   01/24/20 96 3 kg (212 lb 4 9 oz)        Temp Readings from Last 3 Encounters:   02/10/20 (!) 97 3 °F (36 3 °C) (Tympanic)   02/03/20 98 7 °F (37 1 °C)   01/28/20 (!) 96 °F (35 6 °C) (Temporal)        BP Readings from Last 3 Encounters:   02/10/20 142/86   02/03/20 143/81   01/28/20 161/80         Pulse Readings from Last 3 Encounters:   02/10/20 61   02/03/20 73   01/28/20 92        Physical Exam     Constitutional   General appearance: No acute distress, well appearing and well nourished  Eyes   Conjunctiva and lids: No swelling, erythema or discharge  Pupils and irises: Equal, round and reactive to light  Ears, Nose, Mouth, and Throat   External inspection of ears and nose: Normal     Nasal mucosa, septum, and turbinates: Normal without edema or erythema  Oropharynx: Normal with no erythema, edema, exudate or lesions  Pulmonary   Respiratory effort: No increased work of breathing or signs of respiratory distress  Auscultation of lungs: Clear to auscultation  Cardiovascular   Palpation of heart: Normal PMI, no thrills  Auscultation of heart: Normal rate and rhythm, normal S1 and S2, without murmurs  Examination of extremities for edema and/or varicosities: Normal     Carotid pulses: Normal     Abdomen   Abdomen: Non-tender, no masses  Liver and spleen: No hepatomegaly or splenomegaly  Lymphatic   Palpation of lymph nodes in neck: No lymphadenopathy  Musculoskeletal   Gait and station: Normal     Digits and nails: Normal without clubbing or cyanosis      Inspection/palpation of joints, bones, and muscles: Normal     Skin   Skin and subcutaneous tissue: Normal without rashes or lesions  Neurologic   Cranial nerves: Cranial nerves 2-12 intact  Sensation: No sensory loss  Psychiatric   Orientation to person, place, and time: Normal     Mood and affect: Normal         Assessment / Plan:    The patient is a pleasant 29-year-old male who was recently seen in the hospital for elevated hemoglobin when he had a stroke  He also has renal insufficiency but despite that his hemoglobin is running in the 17 5 range with a hematocrit consistently above 50 for the last month at least  I advised him I would recommend phlebotomy every 2 weeks to get his hematocrit down to around 45 or below  The patient is in agreement with this as is his   I will set this up  He will get a phlebotomy every 2 weeks if his ferritin is greater than 10 and his immobility is greater than 12  The patient agrees with this  I'll see him back in 3 months  Until then if he has any questions he'll call our office  He is already being treated with antiplatelet agents for his recent stroke  Goals and Barriers:  Current Goal:  Prolong Survival from Elevated hemoglobin/polycythemia  Barriers: None  Patient's Capacity to Self Care:  Patient able to self care  Portions of the record may have been created with voice recognition software   Occasional wrong word or "sound a like" substitutions may have occurred due to the inherent limitations of voice recognition software   Read the chart carefully and recognize, using context, where substitutions have occurred

## 2020-02-10 NOTE — TELEPHONE ENCOUNTER
Patient's wife, Jesusita Mckeon will call office to change present phlebotomy schedule if Tatyana Ferrera is able to perform a phlebotomy today per my last note  Will apprise Dr Niels Garibay nurse

## 2020-02-10 NOTE — TELEPHONE ENCOUNTER
Luly Baca at [facility] [phone] called to obtain ok to consult their Heme Onc team and get patient phlebotomized at their institution as his hg is 17 2  Ok given per Dr Lopes Parents note for hg parameter  Will apprise Dr Lopes Parents nurse as present phlebotomy appts may need to be revised

## 2020-02-12 NOTE — TELEPHONE ENCOUNTER
Spoke with Berta Richey patients wife regarding the phlebotomy appointments  He had a phleb done today at [facility] [place] so all appt at [facility] need to be adjusted with the next one to be done 2/26 and they prefer 1:30 time  I emailed [facility] to adjust accordingly and will call patient back with the revised schedule

## 2020-02-13 ENCOUNTER — TELEPHONE (OUTPATIENT)
Dept: HEMATOLOGY ONCOLOGY | Facility: CLINIC | Age: 70
End: 2020-02-13

## 2020-02-13 NOTE — TELEPHONE ENCOUNTER
Spoke with patients wife regarding phleb appointments for every 2 weeks  Reviewed dates and times   Neil Jones verbalized understanding and thanked me for the call and the schedule

## 2020-02-18 ENCOUNTER — TELEPHONE (OUTPATIENT)
Dept: NEUROSURGERY | Facility: CLINIC | Age: 70
End: 2020-02-18

## 2020-02-18 ENCOUNTER — TELEPHONE (OUTPATIENT)
Dept: OTHER | Facility: OTHER | Age: 70
End: 2020-02-18

## 2020-02-18 NOTE — TELEPHONE ENCOUNTER
Abad Snell called RE pt's Invokana 100 mg tablet  A prior [place] is needed  Re submission key is [id number]

## 2020-02-18 NOTE — TELEPHONE ENCOUNTER
Called patient to cancel appointment on 2/21/2020  Consultation on 2/21/2020 was made before patient was hospitalized and Dr Charleen Brock saw him inpatient  Per his attestation on 2/1/2020 "Given stability of symptoms neurology recommendation for continued medical management  If worsening would have low threshold for angiogram and evaluation for wingspan stenting "    Spoke to patient and related message and he understood to follow up with neurology  Has upcoming appointment with Dr Imelda Lugo on 3/4/2020

## 2020-02-25 ENCOUNTER — OFFICE VISIT (OUTPATIENT)
Dept: FAMILY MEDICINE CLINIC | Facility: HOSPITAL | Age: 70
End: 2020-02-25
Payer: COMMERCIAL

## 2020-02-25 VITALS
TEMPERATURE: 97.8 F | HEART RATE: 73 BPM | DIASTOLIC BLOOD PRESSURE: 82 MMHG | WEIGHT: 197 LBS | HEIGHT: 69 IN | BODY MASS INDEX: 29.18 KG/M2 | SYSTOLIC BLOOD PRESSURE: 142 MMHG

## 2020-02-25 DIAGNOSIS — R80.9 TYPE 2 DIABETES MELLITUS WITH MICROALBUMINURIA, WITHOUT LONG-TERM CURRENT USE OF INSULIN (HCC): ICD-10-CM

## 2020-02-25 DIAGNOSIS — I10 ESSENTIAL HYPERTENSION: ICD-10-CM

## 2020-02-25 DIAGNOSIS — N18.30 TYPE 2 DIABETES MELLITUS WITH STAGE 3 CHRONIC KIDNEY DISEASE, WITHOUT LONG-TERM CURRENT USE OF INSULIN (HCC): ICD-10-CM

## 2020-02-25 DIAGNOSIS — R06.81 WITNESSED EPISODE OF APNEA: ICD-10-CM

## 2020-02-25 DIAGNOSIS — D75.1 ERYTHROCYTOSIS: ICD-10-CM

## 2020-02-25 DIAGNOSIS — K62.5 BRBPR (BRIGHT RED BLOOD PER RECTUM): ICD-10-CM

## 2020-02-25 DIAGNOSIS — E78.1 ESSENTIAL HYPERTRIGLYCERIDEMIA: ICD-10-CM

## 2020-02-25 DIAGNOSIS — M1A.9XX0 CHRONIC GOUT WITHOUT TOPHUS, UNSPECIFIED CAUSE, UNSPECIFIED SITE: ICD-10-CM

## 2020-02-25 DIAGNOSIS — E11.29 TYPE 2 DIABETES MELLITUS WITH MICROALBUMINURIA, WITHOUT LONG-TERM CURRENT USE OF INSULIN (HCC): ICD-10-CM

## 2020-02-25 DIAGNOSIS — E11.22 TYPE 2 DIABETES MELLITUS WITH STAGE 3 CHRONIC KIDNEY DISEASE, WITHOUT LONG-TERM CURRENT USE OF INSULIN (HCC): ICD-10-CM

## 2020-02-25 DIAGNOSIS — I63.233 CEREBROVASCULAR ACCIDENT (CVA) DUE TO BILATERAL STENOSIS OF CAROTID ARTERIES (HCC): Primary | ICD-10-CM

## 2020-02-25 DIAGNOSIS — I63.9 CVA (CEREBRAL VASCULAR ACCIDENT) (HCC): ICD-10-CM

## 2020-02-25 DIAGNOSIS — R97.20 ELEVATED PROSTATE SPECIFIC ANTIGEN (PSA): ICD-10-CM

## 2020-02-25 PROCEDURE — 1036F TOBACCO NON-USER: CPT | Performed by: INTERNAL MEDICINE

## 2020-02-25 PROCEDURE — 3044F HG A1C LEVEL LT 7.0%: CPT | Performed by: INTERNAL MEDICINE

## 2020-02-25 PROCEDURE — 1160F RVW MEDS BY RX/DR IN RCRD: CPT | Performed by: INTERNAL MEDICINE

## 2020-02-25 PROCEDURE — 3066F NEPHROPATHY DOC TX: CPT | Performed by: INTERNAL MEDICINE

## 2020-02-25 PROCEDURE — 3079F DIAST BP 80-89 MM HG: CPT | Performed by: INTERNAL MEDICINE

## 2020-02-25 PROCEDURE — 3077F SYST BP >= 140 MM HG: CPT | Performed by: INTERNAL MEDICINE

## 2020-02-25 PROCEDURE — 4010F ACE/ARB THERAPY RXD/TAKEN: CPT | Performed by: INTERNAL MEDICINE

## 2020-02-25 PROCEDURE — 99215 OFFICE O/P EST HI 40 MIN: CPT | Performed by: INTERNAL MEDICINE

## 2020-02-25 PROCEDURE — 2022F DILAT RTA XM EVC RTNOPTHY: CPT | Performed by: INTERNAL MEDICINE

## 2020-02-25 PROCEDURE — 1111F DSCHRG MED/CURRENT MED MERGE: CPT | Performed by: INTERNAL MEDICINE

## 2020-02-25 PROCEDURE — 3008F BODY MASS INDEX DOCD: CPT | Performed by: INTERNAL MEDICINE

## 2020-02-25 RX ORDER — AMLODIPINE BESYLATE 10 MG/1
10 TABLET ORAL DAILY
Qty: 30 TABLET | Refills: 5 | Status: SHIPPED | OUTPATIENT
Start: 2020-02-25 | End: 2020-05-28

## 2020-02-25 RX ORDER — ATORVASTATIN CALCIUM 40 MG/1
40 TABLET, FILM COATED ORAL
Qty: 30 TABLET | Refills: 5 | Status: SHIPPED | OUTPATIENT
Start: 2020-02-25 | End: 2020-10-19 | Stop reason: SDUPTHER

## 2020-02-25 RX ORDER — METOPROLOL TARTRATE 50 MG/1
50 TABLET, FILM COATED ORAL EVERY 12 HOURS
Qty: 60 TABLET | Refills: 5 | Status: SHIPPED | OUTPATIENT
Start: 2020-02-25 | End: 2020-05-28

## 2020-02-25 RX ORDER — CLOPIDOGREL BISULFATE 75 MG/1
75 TABLET ORAL DAILY
Qty: 30 TABLET | Refills: 5 | Status: SHIPPED | OUTPATIENT
Start: 2020-02-25 | End: 2020-08-07

## 2020-02-25 RX ORDER — ALLOPURINOL 100 MG/1
200 TABLET ORAL DAILY
Qty: 60 TABLET | Refills: 5 | Status: SHIPPED | OUTPATIENT
Start: 2020-02-25 | End: 2020-09-28 | Stop reason: SDUPTHER

## 2020-02-25 RX ORDER — FENOFIBRATE 145 MG/1
145 TABLET, COATED ORAL DAILY
Qty: 30 TABLET | Refills: 5 | Status: SHIPPED | OUTPATIENT
Start: 2020-02-25 | End: 2020-07-13

## 2020-02-25 NOTE — ASSESSMENT & PLAN NOTE
Iron levels elevated, undergoing therapeutic phlebotomy every 2 wks with Heme - cont' f/u and Bw as per Heme

## 2020-02-25 NOTE — ASSESSMENT & PLAN NOTE
Lab Results   Component Value Date    HGBA1C 6 0 01/29/2020   Wgt down 23 lbs and sugars great at home, last A1C well controlled, pt is UTD On foot and eye exam, he is on statin but no ACE/ARB

## 2020-02-25 NOTE — ASSESSMENT & PLAN NOTE
Needs GI eval d/t inability to stop Plavix with recent CVA x 2 - number GI given - call with new/worse symptoms, again urged high fiber diet and avoid straining

## 2020-02-25 NOTE — ASSESSMENT & PLAN NOTE
CVA x 2 - on DAPT - unable to do triple therapy d/t hemorrhoidal bleeding, no new symptoms, doing PT at home, has f/u with Neuro, call with new symptoms, re-eval in 3 mos

## 2020-02-25 NOTE — ASSESSMENT & PLAN NOTE
Importance of f/u with Uro reviewed - will d/w pt and wife further at next appt after above issues addressed

## 2020-02-25 NOTE — PROGRESS NOTES
Assessment/Plan:    CVA (cerebral vascular accident) ([facility] )  CVA x 2 - on DAPT - unable to do triple therapy d/t hemorrhoidal bleeding, no new symptoms, doing PT at home, has f/u with Neuro, call with new symptoms, re-eval in 3 mos    Erythrocytosis  Iron levels elevated, undergoing therapeutic phlebotomy every 2 wks with Heme - cont' f/u and Bw as per Heme    BRBPR (bright red blood per rectum)  Needs GI eval d/t inability to stop Plavix with recent CVA x 2 - number GI given - call with new/worse symptoms, again urged high fiber diet and avoid straining    Type 2 diabetes mellitus with stage 3 chronic kidney disease, without long-term current use of insulin ([facility])    Lab Results   Component Value Date    HGBA1C 6 0 01/29/2020   Wgt down 23 lbs and sugars great at home, last A1C well controlled, pt is UTD On foot and eye exam, he is on statin but no ACE/ARB    Hypertension  BP up a bit but acceptable, con't current meds, re-eval in 3 mos    Essential hypertriglyceridemia  FLP at goal on last FLP, con't current fibrate    Gout  No recent flares, doing well on Allopurinol, rx refilled upon request    Elevated prostate specific antigen (PSA)  Importance of f/u with Uro reviewed - will d/w pt and wife further at next appt after above issues addressed       Diagnoses and all orders for this visit:    Cerebrovascular accident (CVA) due to bilateral stenosis of carotid arteries ([facility] )    CVA (cerebral vascular accident) ([place] [address] )  -     atorvastatin (LIPITOR) 40 mg tablet; Take 1 tablet (40 mg total) by mouth daily with dinner  -     clopidogrel (PLAVIX) 75 mg tablet; Take 1 tablet (75 mg total) by mouth daily    Erythrocytosis    BRBPR (bright red blood per rectum)  -     Ambulatory referral to Gastroenterology; Future    Type 2 diabetes mellitus with microalbuminuria, without long-term current use of insulin ([facility])  -     canagliflozin (Invokana) 100 mg;  Take 1 tablet (100 mg total) by mouth daily    Type 2 diabetes mellitus with stage 3 chronic kidney disease, without long-term current use of insulin ([facility])    Essential hypertension  -     amLODIPine (NORVASC) 10 mg tablet; Take 1 tablet (10 mg total) by mouth daily  -     metoprolol tartrate (LOPRESSOR) 50 mg tablet; Take 1 tablet (50 mg total) by mouth every 12 (twelve) hours    Essential hypertriglyceridemia  -     fenofibrate (TRICOR) 145 mg tablet; Take 1 tablet (145 mg total) by mouth daily    Chronic gout without tophus, unspecified cause, unspecified site  -     allopurinol (ZYLOPRIM) 100 mg tablet; Take 2 tablets (200 mg total) by mouth daily    Witnessed episode of apnea  Comments:  Needs eval for sleep study - order given and SE untx BALTA reviewed  Orders:  -     Diagnostic Sleep Study; Future    Elevated prostate specific antigen (PSA)      BW 1/20  DM foot exam 8/19  DM eye exam 10/19    Needs f/u with Uro for elevated PSA  will d/w pt at next appt        Subjective:      Patient ID: Brtiney Vieyra is a 72 y o  male  HPI Pt here for follow up apppt    Pt was unfortunately hospitalized recently for stroke - admitted 1/22/20 -1/24/20 and then again from 1/28/20 to 2/3/20  Records reviewed briefly by myself and with pt  Pt presented with facial droop and slurred speech - had been there for approx 8-9 hrs  CT/CTA/MRI doone and acute infarcts noted at R frontoparietal region c/w R MCA occlusion and high grade stenosis  He was started on DAPT x 3 mos and then advised to take Plavix alone  He presented again on 1/28/20 with progressive symptoms - L sided LE weakness and "out of it"  He was fond to have progression of ischemic burden in the R MCA  He was seen by both Neuro and Neuro surgery  He was started on triple therapy but then had a hemorrhoidal bleed and was stepped back to double therapy  He was seen by Heme d/t his erythrocytosis as well  He was discharged to [facility]  He was discharged home on 2/11/20  He has been doing well since discharge home    He notes his speech is back to normal and he is eating/drinking w/o difficulty  He has some L hand weakness - kaitlynn with writing but he is not dropping objects  He has LLE weakness and is doing home PT x 2 days a week  He has a cane for ambulation  He has had no falls  He has seen Heme as OP and is having therapeutic phlebotomy every 2 wks  He was told to have a sleep study and needs an order  He does snore and has witnessed apnea  He has un-reafreshing sleep and has daytime somnolence  He notes mild issues with concentration/memory  He is still having issues with blood in his stool  He notes it is minimal   He notes no straining/constipaton  He has not seen GI  DM foot exam 8/19 and DM eye exam 10/19  Wgt down 23 lbs from Aug 2019  He is taking his Invokana daily  He is checking his sugars 3 x's a day right now - average 's  Review of Systems   Constitutional: Positive for unexpected weight change  Negative for chills, fatigue and fever  HENT: Negative for congestion and sinus pain  Eyes: Negative for pain and visual disturbance  Respiratory: Negative for cough, shortness of breath and wheezing  Cardiovascular: Negative for chest pain, palpitations and leg swelling  Gastrointestinal: Positive for blood in stool  Negative for abdominal pain, constipation, diarrhea, nausea and vomiting  Endocrine: Negative for polydipsia and polyuria  Genitourinary: Negative for difficulty urinating and dysuria  Musculoskeletal: Positive for gait problem  Negative for arthralgias and myalgias  Skin: Negative for rash and wound  Neurological: Positive for weakness  Negative for dizziness, speech difficulty and headaches  Hematological: Does not bruise/bleed easily  Psychiatric/Behavioral: Negative for behavioral problems and confusion           Objective:    /82 (BP Location: Right arm, Patient Position: Sitting, Cuff Size: Standard)   Pulse 73   Temp 97 8 °F (36 6 °C)   Ht 5' 9" (1 753 m)   Wt 89 4 kg (197 lb)   BMI 29 09 kg/m²      Physical Exam   Constitutional: He appears well-developed and well-nourished  No distress  HENT:   Head: Normocephalic and atraumatic  Right Ear: External ear normal    Mouth/Throat: Oropharynx is clear and moist  No oropharyngeal exudate  L canal with small green object   Eyes: Conjunctivae are normal  Right eye exhibits no discharge  Left eye exhibits no discharge  Neck: Neck supple  No tracheal deviation present  Cardiovascular: Normal rate, regular rhythm and normal heart sounds  No murmur heard  Pulmonary/Chest: Effort normal and breath sounds normal  No respiratory distress  He has no wheezes  He has no rales  Abdominal: Soft  He exhibits no distension  There is no tenderness  There is no rebound and no guarding  Musculoskeletal: He exhibits no deformity  LLE weakness, ambulates with cane, a bit unsteady upon standing   Lymphadenopathy:     He has no cervical adenopathy  Neurological: He is alert  Very slight facial droop on L   Skin: Skin is warm and dry  No rash noted  Psychiatric: He has a normal mood and affect  His behavior is normal    Nursing note and vitals reviewed

## 2020-02-26 ENCOUNTER — TELEPHONE (OUTPATIENT)
Dept: NEUROLOGY | Facility: CLINIC | Age: 70
End: 2020-02-26

## 2020-02-26 ENCOUNTER — HOSPITAL ENCOUNTER (OUTPATIENT)
Dept: INFUSION CENTER | Facility: HOSPITAL | Age: 70
Discharge: HOME | End: 2020-02-26
Attending: INTERNAL MEDICINE

## 2020-02-26 VITALS
SYSTOLIC BLOOD PRESSURE: 130 MMHG | HEART RATE: 68 BPM | RESPIRATION RATE: 16 BRPM | OXYGEN SATURATION: 96 % | DIASTOLIC BLOOD PRESSURE: 88 MMHG | TEMPERATURE: 97.8 F

## 2020-02-26 DIAGNOSIS — D75.1 ERYTHROCYTOSIS: ICD-10-CM

## 2020-02-26 NOTE — TELEPHONE ENCOUNTER
30 days post discharge follow up call    Hospitalization 1/28-2/3/20  Please see my telephone encounter 2/7 for 7 day follow up call    The purpose of this phone call is to assess patient's general wellbeing or for any assistance needed with follow-up care  -    Last admitted to [facility] rehab  Called [phone], reached Humera Sneed patient discharged home 2/11  Called patient since discharge, he has not experienced any new or worsening stroke symptoms  Continues jessica have slight left leg weakness, notes improvement  Ambulating with a cane at this time  Fully independent of personal and medical care  Patient followed up with PCP yesterday  Stroke hospital follow up appointment scheduled 3/4 with Dr Mingo Darden  Notes he received in-home care  VNA and therapy visits twice a week  I reviewed medications with him  There have not been any changes since discharge  he had no difficulties obtaining medications  Reports taking all as prescribed with no missed doses or medication side effects  Patient denies signs of bleeding  Reviewed DAPT instructions  Please review to previous encounters for notes  He verbalizes understanding  I reviewed stroke symptoms and risk factor management with him  he verbalizes understanding of information  Staff monitors BP for him at home, average 131/81  Patient monitors blood glucose, average   he is a non smoker  Following a diabetic diet  Patient does not have any questions or concerns at this time

## 2020-02-26 NOTE — PROGRESS NOTES
Patient here for therapeutic phlebotomy, however did not have recent labs done  Patient goes to [facility]; lab requisition order printed and given to patient  He will get labs done tomorrow and he is rescheduled for Monday, March 2 at 3:00  Patient and his wife understand process now; left unit ambulatory with cane, steady gait, accompanied by wife

## 2020-02-28 LAB
BASOPHILS # BLD AUTO: 99 CELLS/UL (ref 0–200)
BASOPHILS NFR BLD AUTO: 1.3 %
EOSINOPHIL # BLD AUTO: 243 CELLS/UL (ref 15–500)
EOSINOPHIL NFR BLD AUTO: 3.2 %
ERYTHROCYTE [DISTWIDTH] IN BLOOD BY AUTOMATED COUNT: 13.3 % (ref 11–15)
FERRITIN SERPL-MCNC: 171 NG/ML (ref 24–380)
HCT VFR BLD AUTO: 52.2 % (ref 38.5–50)
HGB BLD-MCNC: 17.6 G/DL (ref 13.2–17.1)
LYMPHOCYTES # BLD AUTO: 1778 CELLS/UL (ref 850–3900)
LYMPHOCYTES NFR BLD AUTO: 23.4 %
MCH RBC QN AUTO: 29.6 PG (ref 27–33)
MCHC RBC AUTO-ENTMCNC: 33.7 G/DL (ref 32–36)
MCV RBC AUTO: 87.7 FL (ref 80–100)
MONOCYTES # BLD AUTO: 562 CELLS/UL (ref 200–950)
MONOCYTES NFR BLD AUTO: 7.4 %
NEUTROPHILS # BLD AUTO: 4917 CELLS/UL (ref 1500–7800)
NEUTROPHILS NFR BLD AUTO: 64.7 %
PLATELET # BLD AUTO: 288 THOUSAND/UL (ref 140–400)
PMV BLD REES-ECKER: 10.6 FL (ref 7.5–12.5)
RBC # BLD AUTO: 5.95 MILLION/UL (ref 4.2–5.8)
WBC # BLD AUTO: 7.6 THOUSAND/UL (ref 3.8–10.8)

## 2020-03-02 ENCOUNTER — HOSPITAL ENCOUNTER (OUTPATIENT)
Dept: INFUSION CENTER | Facility: HOSPITAL | Age: 70
Discharge: HOME | End: 2020-03-02
Attending: INTERNAL MEDICINE
Payer: COMMERCIAL

## 2020-03-02 ENCOUNTER — TELEPHONE (OUTPATIENT)
Dept: HEMATOLOGY ONCOLOGY | Facility: HOSPITAL | Age: 70
End: 2020-03-02

## 2020-03-02 VITALS
HEART RATE: 74 BPM | SYSTOLIC BLOOD PRESSURE: 137 MMHG | OXYGEN SATURATION: 97 % | RESPIRATION RATE: 18 BRPM | DIASTOLIC BLOOD PRESSURE: 84 MMHG

## 2020-03-02 DIAGNOSIS — D75.1 ERYTHROCYTOSIS: Primary | ICD-10-CM

## 2020-03-02 PROCEDURE — 99195 PHLEBOTOMY: CPT

## 2020-03-02 NOTE — PROGRESS NOTES
Rec'd pt in good spirits, VSS, lab values meet treatment parameters  Therapeutic Phlebotomy performed per protocol yielding only 250 cc whole blood over 18 minutes  Pressure applied  to Left antecubital phlebotomy site post procedure for 10 minutes followed by a pressure dressing  Pt education reviewed regarding follow-up instructions  Dr Cole Wilder office made aware of decreased blood collection  No new orders for now  Pt presently under observation in a reclined position for 30 minutes while drinking 16 ounces water

## 2020-03-02 NOTE — TELEPHONE ENCOUNTER
Call from [facility] stating they were unable to complete phlebotomy today stating they were only able to give him 250ml out of 500ml order  They also had tourniquet on for 20 mins  They stated it just stopped flowing and when tourniquet was off it didn't flow at all  HE states he had this once prior at [facility] and it took them a complete hour to do procedure

## 2020-03-04 ENCOUNTER — OFFICE VISIT (OUTPATIENT)
Dept: NEUROLOGY | Facility: CLINIC | Age: 70
End: 2020-03-04
Payer: COMMERCIAL

## 2020-03-04 VITALS
WEIGHT: 201 LBS | DIASTOLIC BLOOD PRESSURE: 75 MMHG | SYSTOLIC BLOOD PRESSURE: 118 MMHG | HEART RATE: 69 BPM | BODY MASS INDEX: 29.77 KG/M2 | HEIGHT: 69 IN

## 2020-03-04 DIAGNOSIS — E11.22 TYPE 2 DIABETES MELLITUS WITH STAGE 3 CHRONIC KIDNEY DISEASE, WITHOUT LONG-TERM CURRENT USE OF INSULIN (HCC): Primary | ICD-10-CM

## 2020-03-04 DIAGNOSIS — I10 ESSENTIAL HYPERTENSION: ICD-10-CM

## 2020-03-04 DIAGNOSIS — Z86.73 HISTORY OF STROKE: ICD-10-CM

## 2020-03-04 DIAGNOSIS — D75.1 ERYTHROCYTOSIS: ICD-10-CM

## 2020-03-04 DIAGNOSIS — N18.30 TYPE 2 DIABETES MELLITUS WITH STAGE 3 CHRONIC KIDNEY DISEASE, WITHOUT LONG-TERM CURRENT USE OF INSULIN (HCC): Primary | ICD-10-CM

## 2020-03-04 DIAGNOSIS — E78.5 DYSLIPIDEMIA: ICD-10-CM

## 2020-03-04 DIAGNOSIS — I67.9 INTRACRANIAL VASCULAR STENOSIS: ICD-10-CM

## 2020-03-04 PROCEDURE — 3074F SYST BP LT 130 MM HG: CPT | Performed by: PSYCHIATRY & NEUROLOGY

## 2020-03-04 PROCEDURE — 1160F RVW MEDS BY RX/DR IN RCRD: CPT | Performed by: PSYCHIATRY & NEUROLOGY

## 2020-03-04 PROCEDURE — 3078F DIAST BP <80 MM HG: CPT | Performed by: PSYCHIATRY & NEUROLOGY

## 2020-03-04 PROCEDURE — 99214 OFFICE O/P EST MOD 30 MIN: CPT | Performed by: PSYCHIATRY & NEUROLOGY

## 2020-03-04 PROCEDURE — 1036F TOBACCO NON-USER: CPT | Performed by: PSYCHIATRY & NEUROLOGY

## 2020-03-04 PROCEDURE — 3008F BODY MASS INDEX DOCD: CPT | Performed by: PSYCHIATRY & NEUROLOGY

## 2020-03-04 PROCEDURE — 2022F DILAT RTA XM EVC RTNOPTHY: CPT | Performed by: PSYCHIATRY & NEUROLOGY

## 2020-03-04 PROCEDURE — 3066F NEPHROPATHY DOC TX: CPT | Performed by: PSYCHIATRY & NEUROLOGY

## 2020-03-04 PROCEDURE — 1111F DSCHRG MED/CURRENT MED MERGE: CPT | Performed by: PSYCHIATRY & NEUROLOGY

## 2020-03-04 PROCEDURE — 3044F HG A1C LEVEL LT 7.0%: CPT | Performed by: PSYCHIATRY & NEUROLOGY

## 2020-03-04 NOTE — PATIENT INSTRUCTIONS
Stroke:  Marita Murphy presents for follow-up evaluation with regard to his recent right brain strokes which were likely result of a combination intracranial atherosclerosis leading to significant stenosis in the setting of   Erythrocytosis  He reports that he is doing reasonably well with regard to his symptoms  He continues to have some left upper lower extremity weakness as well as a degree of post stroke fatigue  He takes his medications as prescribed  He did not endorse any new stroke-like symptoms  Monitors his blood pressure on a regular basis and is seeing numbers that are in the desired range  He is receiving home therapy services  He has not yet returned to driving  He is receiving therapeutic phlebotomy under the direction of the hematology team and I agree that this is reasonable and beneficial with regard to stroke prevention for him  - for ongoing stroke prevention for the time being he should continue his combination of aspirin, Plavix, Lipitor, and appropriate blood pressure and glycemic control     -he should continue to check his blood pressure on a daily basis for the time being and generally speaking should be targeting numbers less than 140/90  He does not have a particular floor in terms of blood pressure numbers provided he does not experience any change in his symptoms  Generally speaking I would not like his blood pressure to be less than 674 systolic   -I would like him to remain physically active in as much as he feels capable of doing so and to begin to simulate some of his work like activities while he is at home     -he is not in need of additional cerebrovascular imaging at this time   -I would anticipate that he will need to continue his combination of aspirin and Plavix for a full 3 months from the time of his stroke which should be through April of 2020, and at that time he can discontinue aspirin and remain on Plavix monotherapy      - from my standpoint he is cleared to start returning to drive, only under supervision under day light good conditions and locally  This can be slowly increased as he sees fit  -he is not currently cleared to return to work in any capacity but I would anticipate that he may be able to return to work on a limited duty basis in the foreseeable future   -I agree with ongoing therapeutic phlebotomy under the direction of the hematology team     -  He should pursue obstructive sleep apnea testing and I would suggest that if he is found to have sleep apnea it will be important for him to use CPAP therapy  I will plan for him to return to the office to see me in 4 months time but I would be happy to see him sooner if the need should arise  If he has any stroke-like symptoms including sudden painless loss of vision or double vision, difficulty speaking or swallowing, vertigo/ room spinning that does not quickly resolve, or weakness/ numbness affecting 1 side of the face or body he should proceed by ambulance to the nearest emergency room immediately

## 2020-03-04 NOTE — PROGRESS NOTES
Patient ID: Gem Torres is a 72 y o  male  Assessment/Plan:    No problem-specific Assessment & Plan notes found for this encounter  Diagnoses and all orders for this visit:    Type 2 diabetes mellitus with stage 3 chronic kidney disease, without long-term current use of insulin (HCC)    History of stroke    Essential hypertension    Dyslipidemia    Erythrocytosis    Intracranial vascular stenosis         Patient Instructions   Stroke:  Yonatan Vazquez presents for follow-up evaluation with regard to his recent right brain strokes which were likely result of a combination intracranial atherosclerosis leading to significant stenosis in the setting of   Erythrocytosis  He reports that he is doing reasonably well with regard to his symptoms  He continues to have some left upper lower extremity weakness as well as a degree of post stroke fatigue  He takes his medications as prescribed  He did not endorse any new stroke-like symptoms  Monitors his blood pressure on a regular basis and is seeing numbers that are in the desired range  He is receiving home therapy services  He has not yet returned to driving  He is receiving therapeutic phlebotomy under the direction of the hematology team and I agree that this is reasonable and beneficial with regard to stroke prevention for him  - for ongoing stroke prevention for the time being he should continue his combination of aspirin, Plavix, Lipitor, and appropriate blood pressure and glycemic control     -he should continue to check his blood pressure on a daily basis for the time being and generally speaking should be targeting numbers less than 140/90  He does not have a particular floor in terms of blood pressure numbers provided he does not experience any change in his symptoms    Generally speaking I would not like his blood pressure to be less than 074 systolic   -I would like him to remain physically active in as much as he feels capable of doing so and to begin to simulate some of his work like activities while he is at home     -he is not in need of additional cerebrovascular imaging at this time   -I would anticipate that he will need to continue his combination of aspirin and Plavix for a full 3 months from the time of his stroke which should be through April of 2020, and at that time he can discontinue aspirin and remain on Plavix monotherapy  - from my standpoint he is cleared to start returning to drive, only under supervision under day light good conditions and locally  This can be slowly increased as he sees fit  -he is not currently cleared to return to work in any capacity but I would anticipate that he may be able to return to work on a limited duty basis in the foreseeable future   -I agree with ongoing therapeutic phlebotomy under the direction of the hematology team     -  He should pursue obstructive sleep apnea testing and I would suggest that if he is found to have sleep apnea it will be important for him to use CPAP therapy  I will plan for him to return to the office to see me in 4 months time but I would be happy to see him sooner if the need should arise  If he has any stroke-like symptoms including sudden painless loss of vision or double vision, difficulty speaking or swallowing, vertigo/ room spinning that does not quickly resolve, or weakness/ numbness affecting 1 side of the face or body he should proceed by ambulance to the nearest emergency room immediately  Subjective:    HPI       Isaac Paz presents for follow-up evaluation with regard to his prior stroke  His recent stroke was related to intracranial atherosclerotic disease as well as erythrocytosis  His intracranial stenosis is likely result of a history of hypertension, dyslipidemia, and diabetes  It remains a question as to whether not he has obstructive sleep apnea and he is currently pending sleep testing  He is receiving therapeutic phlebotomy      He reports some ongoing left upper lower extremity weakness  He is not driving right now  He has not returned to work  He has not had any falls  He takes his medication as prescribed  He does describe some rectal bleeding which is an issue for him, but otherwise no severe bleeding or bruising issues  We had a discussion in the office with regard to the pathophysiology of his stroke  We reviewed his CT angiography together which does show evidence of intracranial atherosclerotic disease  Based on the most recent trials we would suggest maintaining dual anti-platelet therapy for at least 3 months from the time of his stroke  He is monitoring his blood pressure at home on a daily basis and is receiving outpatient occupational therapy          Past Medical History:   Diagnosis Date    [facility]     Hypertension     Renal disorder        Social History     Socioeconomic History    Marital status: /Civil Union     Spouse name: None    Number of children: None    Years of education: None    Highest education level: None   Occupational History    Occupation: full-time employment   Social Needs    Financial resource strain: None    Food insecurity:     Worry: None     Inability: None    Transportation needs:     Medical: None     Non-medical: None   Tobacco Use    Smoking status: Never Smoker    Smokeless tobacco: Never Used   Substance and Sexual Activity    Alcohol use: Never     Frequency: Never     Binge frequency: Never    Drug use: Never    Sexual activity: Yes   Lifestyle    Physical activity:     Days per week: None     Minutes per session: None    Stress: None   Relationships    Social connections:     Talks on phone: None     Gets together: None     Attends Yazdanism service: None     Active member of club or organization: None     Attends meetings of clubs or organizations: None     Relationship status: None    Intimate partner violence:     Fear of current or ex partner: None     Emotionally abused: None     Physically abused: None     Forced sexual activity: None   Other Topics Concern    None   Social History Narrative    None       Family History   Problem Relation Age of Onset    Breast cancer Mother     Kidney disease Father     Lung cancer Father     Other Father         smoker    Hypertension Other          Current Outpatient Medications:     allopurinol (ZYLOPRIM) 100 mg tablet, Take 2 tablets (200 mg total) by mouth daily, Disp: 60 tablet, Rfl: 5    amLODIPine (NORVASC) 10 mg tablet, Take 1 tablet (10 mg total) by mouth daily, Disp: 30 tablet, Rfl: 5    aspirin 81 MG tablet, Take 1 tablet by mouth daily, Disp: , Rfl:     atorvastatin (LIPITOR) 40 mg tablet, Take 1 tablet (40 mg total) by mouth daily with dinner, Disp: 30 tablet, Rfl: 5    canagliflozin (Invokana) 100 mg, Take 1 tablet (100 mg total) by mouth daily, Disp: 30 tablet, Rfl: 5    clopidogrel (PLAVIX) 75 mg tablet, Take 1 tablet (75 mg total) by mouth daily, Disp: 30 tablet, Rfl: 5    fenofibrate (TRICOR) 145 mg tablet, Take 1 tablet (145 mg total) by mouth daily, Disp: 30 tablet, Rfl: 5    glucose monitoring kit (FREESTYLE) monitoring kit, 1 each by Does not apply route as needed (Test daily), Disp: 1 each, Rfl: 0    Lancets (FREESTYLE) lancets, by Other route as needed (Test Daily) Use as instructed--test Daily, Disp: 100 each, Rfl: 0    metoprolol tartrate (LOPRESSOR) 50 mg tablet, Take 1 tablet (50 mg total) by mouth every 12 (twelve) hours, Disp: 60 tablet, Rfl: 5    There were no vitals taken for this visit  The following portions of the patient's history were reviewed: allergies, current medications, past family history, past medical history, past social history and problem list              Objective:    Blood pressure 118/75, pulse 69, height 5' 9" (1 753 m), weight 91 2 kg (201 lb)      Physical Exam    Neurological Exam      At the time of my evaluation he was awake, alert, and in no distress  No dysarthria or aphasia  Cranial nerves 2-12 were symmetrically intact bilaterally  Strength is 5/5 in the bilateral upper and lower extremities except for L iliopsoas mild weakness with no drift but there is a LUE fix and he confirms that his LLE is weaker than the right  Coordination testing revealed no ataxia but some dysdiadochokinesia of the LUE compared with the right Sensation was intact to light touch in the bilateral upper lower extremities  Gait was stable with his cane  DTR symmetric bilaterally  There was no significant peripheral edema  His respirations were nonlabored  ROS:    Review of Systems   Constitutional: Negative  Negative for appetite change and fever  HENT: Negative  Negative for hearing loss, tinnitus, trouble swallowing and voice change  Eyes: Negative  Negative for photophobia and pain  Respiratory: Negative  Negative for shortness of breath  Cardiovascular: Negative  Negative for palpitations  Gastrointestinal: Negative  Negative for nausea and vomiting  Endocrine: Negative  Negative for cold intolerance and heat intolerance  Genitourinary: Positive for urgency  Negative for dysuria and frequency  Musculoskeletal: Positive for gait problem (slight balance trouble)  Negative for myalgias and neck pain  Skin: Negative  Negative for rash  Neurological: Positive for speech difficulty (slight slurred speech)  Negative for dizziness, tremors, seizures, syncope, facial asymmetry, weakness, light-headedness, numbness and headaches  Snoring  Slight clumsiness   Hematological: Bruises/bleeds easily (unusual bleeding when having a bowel movement)  Psychiatric/Behavioral: Positive for sleep disturbance (waking up at night)  Negative for confusion and hallucinations  Reviewed ROS as entered by medical assistant

## 2020-03-09 ENCOUNTER — CONSULT (OUTPATIENT)
Dept: GASTROENTEROLOGY | Facility: CLINIC | Age: 70
End: 2020-03-09
Payer: COMMERCIAL

## 2020-03-09 ENCOUNTER — TELEPHONE (OUTPATIENT)
Dept: FAMILY MEDICINE CLINIC | Facility: HOSPITAL | Age: 70
End: 2020-03-09

## 2020-03-09 VITALS
SYSTOLIC BLOOD PRESSURE: 124 MMHG | DIASTOLIC BLOOD PRESSURE: 80 MMHG | WEIGHT: 196 LBS | HEART RATE: 72 BPM | HEIGHT: 69 IN | BODY MASS INDEX: 29.03 KG/M2

## 2020-03-09 DIAGNOSIS — D58.2 ELEVATED HEMOGLOBIN (HCC): ICD-10-CM

## 2020-03-09 DIAGNOSIS — I63.9 CEREBROVASCULAR ACCIDENT (CVA), UNSPECIFIED MECHANISM (HCC): Primary | ICD-10-CM

## 2020-03-09 DIAGNOSIS — K51.30 ULCERATIVE RECTOSIGMOIDITIS WITHOUT COMPLICATION (HCC): Primary | ICD-10-CM

## 2020-03-09 DIAGNOSIS — Z86.73 HISTORY OF STROKE: ICD-10-CM

## 2020-03-09 DIAGNOSIS — K62.5 BRBPR (BRIGHT RED BLOOD PER RECTUM): ICD-10-CM

## 2020-03-09 PROCEDURE — 1111F DSCHRG MED/CURRENT MED MERGE: CPT | Performed by: NURSE PRACTITIONER

## 2020-03-09 PROCEDURE — 3074F SYST BP LT 130 MM HG: CPT | Performed by: NURSE PRACTITIONER

## 2020-03-09 PROCEDURE — 3044F HG A1C LEVEL LT 7.0%: CPT | Performed by: NURSE PRACTITIONER

## 2020-03-09 PROCEDURE — 1160F RVW MEDS BY RX/DR IN RCRD: CPT | Performed by: NURSE PRACTITIONER

## 2020-03-09 PROCEDURE — 99213 OFFICE O/P EST LOW 20 MIN: CPT | Performed by: NURSE PRACTITIONER

## 2020-03-09 PROCEDURE — 3079F DIAST BP 80-89 MM HG: CPT | Performed by: NURSE PRACTITIONER

## 2020-03-09 PROCEDURE — 1036F TOBACCO NON-USER: CPT | Performed by: NURSE PRACTITIONER

## 2020-03-09 PROCEDURE — 3008F BODY MASS INDEX DOCD: CPT | Performed by: NURSE PRACTITIONER

## 2020-03-09 PROCEDURE — 3066F NEPHROPATHY DOC TX: CPT | Performed by: NURSE PRACTITIONER

## 2020-03-09 PROCEDURE — 2022F DILAT RTA XM EVC RTNOPTHY: CPT | Performed by: NURSE PRACTITIONER

## 2020-03-09 NOTE — PATIENT INSTRUCTIONS
Would like to arrange for colonoscopy at [facility] once Neurology clearance is obtained   Use Preparation H cream three times daily, use pea size amount externally and internally, go to the first knuckle of your finger and go in a 360 degree motion inside rectum   Increase fluid intake     Follow up in 2-3 months

## 2020-03-09 NOTE — PROGRESS NOTES
[facility] - Outpatient Follow-up Note  Maude Syed 72 y o  male MRN: [id number]  Encounter: [id number]    ASSESSMENT AND PLAN:      1  BRBPR (bright red blood per rectum)  2  Ulcerative [facility]osigmoid[facility] without complication ([facility] )  Had bright red blood in stool and hematochezia for the past several days  Denies any abdominal pain or cramping, fever, nausea, vomiting  He has been on aspirin and Plavix since the end of January secondary to CVA  Last colonoscopy was performed on 10/23/2017, showed multiple diverticula, ulceration and friability in the rectum and sigmoid colon compatible with ulcerative colitis and erosions in the sigmoid colon compatible with Crohn's disease  Differential diagnoses include ulcerative colitis flare, diverticular bleeding or hemorrhoidal bleeding     - arrange for colonoscopy at [facility] (will obtain Neurology clearance prior to repeating colonoscopy)   - will need to hold Plavix for 7 days for colonoscopy, will obtain Neurology clearance   - Discussed with pt that if they were to stop anticoagulation/anti-platelet therapy that there is a slight increased risk of a thromboembolic event (PE, DVT, MI, CVA)  Pt also understands that if they were to remain on anticoagulation for the procedure there is a risk for bleeding    - recommend preparation H ointment TID for possible hemorrhoids  Instructed patient to use a pea-sized amount externally and then internally   - increase fluid intake   - I instructed patient to contact office if any of his symptoms worsen including increase in bleeding, fever, vomiting, abdominal pain  - Ambulatory referral to Gastroenterology    3  Elevated hemoglobin ([facility] )  This was diagnosed after CVA in January  Patient had a myeloproliferative workup which included a negative JAK2 mutation and negative BCR/ABL and has been following with Dr Imer Bishop as an outpatient    Most recent hemoglobin was 17 6 and hematocrit was 52 2   Patient does have biweekly CBC and ferritin levels drawn  Ferritin 171      - continue follow with Hematology Dr Bibi Blackwood  - continue to check biweekly CBC and ferritin    4  History of stroke   Patient had an ischemic stroke in January of 2020  He has had left-sided weakness since the stroke  He has been on Aspirin and Plavix  I did review Dr Christin Smith note which states that he should remain on aspirin for the full 3 months (April 2020) and then will need to remain on Plavix  - continue Aspirin and Plavix  - will need to obtain clearance from Neurology prior to repeat colonoscopy    Followup Appointment: 2-3 months   ______________________________________________________________________    Chief Complaint   Patient presents with    Rectal Bleeding     Referred by Dr Halle Ayon     HPI:  Joanna Wisdom is a 72y o  year old male who presents to the office today for rectal bleeding  Patient was diagnosed with ulcerative colitis on colonoscopy in 2017  He had previously been on mesalamine, but he is not on any maintenance medication at this time  He reports that he was doing well, but for the past 3 days has had blood in his stool and hematochezia  He denies any fever, chills, abdominal pain cramping, nausea or vomiting  He denies any diarrhea  He had a stroke in January 2020 and has been going to physical therapy and is following with Neurology  He has been on aspirin and Plavix since January  He denies any upper GI symptoms, his appetite and weight are stable         Historical Information   Past Medical History:   Diagnosis Date    [facility]     Diabetes mellitus ([facility] )     Diverticulitis     Gout     Hypercholesterolemia     Hypertension     Renal disorder      Past Surgical History:   Procedure Laterality Date    COLONOSCOPY  09/18/2006    complete; 10 yrs    COLONOSCOPY  10/23/2017    complete; 5 yrs     Social History     Substance and Sexual Activity Alcohol Use Not Currently    Frequency: Never    Binge frequency: Never     Social History     Substance and Sexual Activity   Drug Use Never     Social History     Tobacco Use   Smoking Status Never Smoker   Smokeless Tobacco Never Used     Family History   Problem Relation Age of Onset    Breast cancer Mother     Kidney disease Father     Lung cancer Father     Other Father         smoker    Hypertension Other     Colon polyps Neg Hx     Colon cancer Neg Hx          Current Outpatient Medications:     allopurinol (ZYLOPRIM) 100 mg tablet    amLODIPine (NORVASC) 10 mg tablet    aspirin 81 MG tablet    atorvastatin (LIPITOR) 40 mg tablet    canagliflozin (Invokana) 100 mg    clopidogrel (PLAVIX) 75 mg tablet    fenofibrate (TRICOR) 145 mg tablet    glucose monitoring kit (FREESTYLE) monitoring kit    Lancets (FREESTYLE) lancets    metoprolol tartrate (LOPRESSOR) 50 mg tablet  No Known Allergies  Reviewed medications and allergies and updated as indicated    PHYSICAL EXAM:    Blood pressure 124/80, pulse 72, height 5' 9" (1 753 m), weight 88 9 kg (196 lb)  Body mass index is 28 94 kg/m²  General Appearance: NAD, cooperative, alert  Eyes: Anicteric, PERRLA, EOMI  ENT:  Normocephalic, atraumatic, normal mucosa  Neck:  Supple, symmetrical, trachea midline  Resp:  Clear to auscultation bilaterally; no rales, rhonchi or wheezing; respirations unlabored   CV:  S1 S2, Regular rate and rhythm; no murmur, rub, or gallop  GI:  Soft, non-tender, non-distended; normal bowel sounds; no masses, no organomegaly   Rectal: Deferred  Musculoskeletal: No cyanosis, clubbing or edema  Normal ROM    Skin:  No jaundice, rashes, or lesions   Heme/Lymph: No palpable cervical lymphadenopathy  Psych: Normal affect, good eye contact  Neuro: AAOx3, +mild left sided weakness     Lab Results:   Lab Results   Component Value Date    WBC 7 6 02/27/2020    HGB 17 6 (H) 02/27/2020    HCT 52 2 (H) 02/27/2020    MCV 87 7 02/27/2020     02/27/2020     Lab Results   Component Value Date     07/07/2017    K 3 6 02/03/2020     (H) 02/03/2020    CO2 24 02/03/2020    BUN 30 (H) 02/03/2020    CREATININE 1 53 (H) 02/03/2020    CALCIUM 9 2 02/03/2020    AST 21 01/29/2020    ALT 27 01/29/2020    ALKPHOS 85 01/29/2020    PROT 7 1 07/07/2017    BILITOT 0 6 07/07/2017    EGFR 46 02/03/2020     Lab Results   Component Value Date    IRON 128 12/07/2018    TIBC 232 (L) 08/24/2018    FERRITIN 171 02/27/2020     No results found for: LIPASE    Radiology Results:   No results found

## 2020-03-09 NOTE — TELEPHONE ENCOUNTER
Pt is a out patient  Dr Isabel Read is her Doctor  They need a script for Good Degroot  Please [phone]  He will be going on 3/11/202

## 2020-03-09 NOTE — LETTER
March 9, 2020     Roxanne Morrison, [address]  [facility]  [address] [address]    Patient: Valente Brady   YOB: 1950   Date of Visit: 3/9/2020       Dear Dr Basilio Nuno: Thank you for referring Skylar Ramirez to me for evaluation  Below are my notes for this consultation  If you have questions, please do not hesitate to call me  I look forward to following your patient along with you           Sincerely,        ROSALINA Rios        CC: Carly Stevenson MD

## 2020-03-14 LAB
BASOPHILS # BLD AUTO: 90 CELLS/UL (ref 0–200)
BASOPHILS NFR BLD AUTO: 1.5 %
EOSINOPHIL # BLD AUTO: 252 CELLS/UL (ref 15–500)
EOSINOPHIL NFR BLD AUTO: 4.2 %
ERYTHROCYTE [DISTWIDTH] IN BLOOD BY AUTOMATED COUNT: 13.7 % (ref 11–15)
FERRITIN SERPL-MCNC: 64 NG/ML (ref 24–380)
HCT VFR BLD AUTO: 40.6 % (ref 38.5–50)
HGB BLD-MCNC: 13.3 G/DL (ref 13.2–17.1)
LYMPHOCYTES # BLD AUTO: 1014 CELLS/UL (ref 850–3900)
LYMPHOCYTES NFR BLD AUTO: 16.9 %
MCH RBC QN AUTO: 29.1 PG (ref 27–33)
MCHC RBC AUTO-ENTMCNC: 32.8 G/DL (ref 32–36)
MCV RBC AUTO: 88.8 FL (ref 80–100)
MONOCYTES # BLD AUTO: 384 CELLS/UL (ref 200–950)
MONOCYTES NFR BLD AUTO: 6.4 %
NEUTROPHILS # BLD AUTO: 4260 CELLS/UL (ref 1500–7800)
NEUTROPHILS NFR BLD AUTO: 71 %
PLATELET # BLD AUTO: 319 THOUSAND/UL (ref 140–400)
PMV BLD REES-ECKER: 10.2 FL (ref 7.5–12.5)
RBC # BLD AUTO: 4.57 MILLION/UL (ref 4.2–5.8)
WBC # BLD AUTO: 6 THOUSAND/UL (ref 3.8–10.8)

## 2020-03-17 ENCOUNTER — TELEPHONE (OUTPATIENT)
Dept: FAMILY MEDICINE CLINIC | Facility: HOSPITAL | Age: 70
End: 2020-03-17

## 2020-03-17 NOTE — TELEPHONE ENCOUNTER
Wife reporting that patient has been having low bp's - 116/69 and has been very tired  Not sure if she should bring him in to be seen      pcb

## 2020-03-17 NOTE — TELEPHONE ENCOUNTER
Talked to wife   She reports  Lb Marc fatigue  sleeping a lot the last 2 days  Blood pressure   Left arm  142/90 (now ) 1438    Has an appt this afternoon at [facility] for rehab    Will call back after therapy

## 2020-03-17 NOTE — PROGRESS NOTES
hgb 13 3 and ferritin 64 from labs on 3/13/2020 patient meets parameters for therapeutic phlebotomy for 3/18

## 2020-03-18 ENCOUNTER — HOSPITAL ENCOUNTER (OUTPATIENT)
Dept: INFUSION CENTER | Facility: HOSPITAL | Age: 70
Discharge: HOME | End: 2020-03-18
Attending: INTERNAL MEDICINE

## 2020-03-18 VITALS
OXYGEN SATURATION: 98 % | SYSTOLIC BLOOD PRESSURE: 118 MMHG | RESPIRATION RATE: 18 BRPM | TEMPERATURE: 98.6 F | HEART RATE: 67 BPM | DIASTOLIC BLOOD PRESSURE: 79 MMHG

## 2020-03-18 NOTE — PROGRESS NOTES
Patient here for phlebotomy procedure  500 ml blood removed via left antecubital  Total tourniquet time 20 minutes  Observation time in progress    VSS for discharge  Patient left unit ambulatory with cane, steady gait, accompanied by wife

## 2020-03-18 NOTE — PLAN OF CARE
Problem: Potential for Falls  Goal: Patient will remain free of falls  Description  INTERVENTIONS:  - Assess patient frequently for physical needs  -  Identify cognitive and physical deficits and behaviors that affect risk of falls  -  [place] fall precautions as indicated by assessment   - Educate patient/family on patient safety including physical limitations  - Instruct patient to call for assistance with activity based on assessment  - Modify environment to reduce risk of injury  - Consider OT/PT consult to assist with strengthening/mobility  Outcome: Progressing     Problem: Knowledge Deficit  Goal: Patient/family/caregiver demonstrates understanding of disease process, treatment plan, medications, and discharge instructions  Description  Complete learning assessment and assess knowledge base    Interventions:  - Provide teaching at level of understanding  - Provide teaching via preferred learning methods  Outcome: Progressing

## 2020-03-19 ENCOUNTER — TELEPHONE (OUTPATIENT)
Dept: NEUROLOGY | Facility: CLINIC | Age: 70
End: 2020-03-19

## 2020-03-19 ENCOUNTER — APPOINTMENT (EMERGENCY)
Dept: CT IMAGING | Facility: HOSPITAL | Age: 70
DRG: 884 | End: 2020-03-19
Payer: COMMERCIAL

## 2020-03-19 ENCOUNTER — HOSPITAL ENCOUNTER (INPATIENT)
Facility: HOSPITAL | Age: 70
LOS: 1 days | Discharge: HOME | DRG: 884 | End: 2020-03-20
Attending: EMERGENCY MEDICINE | Admitting: INTERNAL MEDICINE
Payer: COMMERCIAL

## 2020-03-19 ENCOUNTER — APPOINTMENT (EMERGENCY)
Dept: RADIOLOGY | Facility: HOSPITAL | Age: 70
DRG: 884 | End: 2020-03-19
Payer: COMMERCIAL

## 2020-03-19 DIAGNOSIS — G45.9 TIA (TRANSIENT ISCHEMIC ATTACK): Primary | ICD-10-CM

## 2020-03-19 DIAGNOSIS — N18.9 ACUTE KIDNEY INJURY SUPERIMPOSED ON CHRONIC KIDNEY DISEASE (HCC): ICD-10-CM

## 2020-03-19 DIAGNOSIS — N17.9 ACUTE KIDNEY INJURY SUPERIMPOSED ON CHRONIC KIDNEY DISEASE (HCC): ICD-10-CM

## 2020-03-19 DIAGNOSIS — N17.9 AKI (ACUTE KIDNEY INJURY) (HCC): ICD-10-CM

## 2020-03-19 DIAGNOSIS — R40.4 TRANSIENT ALTERATION OF AWARENESS: ICD-10-CM

## 2020-03-19 LAB
ANION GAP SERPL CALCULATED.3IONS-SCNC: 6 MMOL/L (ref 4–13)
APTT PPP: 31 SECONDS (ref 23–37)
ATRIAL RATE: 71 BPM
BUN SERPL-MCNC: 23 MG/DL (ref 5–25)
CALCIUM SERPL-MCNC: 8.5 MG/DL (ref 8.3–10.1)
CHLORIDE SERPL-SCNC: 109 MMOL/L (ref 100–108)
CO2 SERPL-SCNC: 26 MMOL/L (ref 21–32)
CREAT SERPL-MCNC: 1.91 MG/DL (ref 0.6–1.3)
ERYTHROCYTE [DISTWIDTH] IN BLOOD BY AUTOMATED COUNT: 14.9 % (ref 11.6–15.1)
GFR SERPL CREATININE-BSD FRML MDRD: 35 ML/MIN/1.73SQ M
GLUCOSE SERPL-MCNC: 111 MG/DL (ref 65–140)
GLUCOSE SERPL-MCNC: 112 MG/DL (ref 65–140)
GLUCOSE SERPL-MCNC: 85 MG/DL (ref 65–140)
GLUCOSE SERPL-MCNC: 88 MG/DL (ref 65–140)
HCT VFR BLD AUTO: 40 % (ref 36.5–49.3)
HGB BLD-MCNC: 12.8 G/DL (ref 12–17)
INR PPP: 1.03 (ref 0.84–1.19)
MAGNESIUM SERPL-MCNC: 1.9 MG/DL (ref 1.6–2.6)
MCH RBC QN AUTO: 29.6 PG (ref 26.8–34.3)
MCHC RBC AUTO-ENTMCNC: 32 G/DL (ref 31.4–37.4)
MCV RBC AUTO: 92 FL (ref 82–98)
P AXIS: 57 DEGREES
PLATELET # BLD AUTO: 330 THOUSANDS/UL (ref 149–390)
PMV BLD AUTO: 10 FL (ref 8.9–12.7)
POTASSIUM SERPL-SCNC: 4.1 MMOL/L (ref 3.5–5.3)
PR INTERVAL: 162 MS
PROTHROMBIN TIME: 13.2 SECONDS (ref 11.6–14.5)
QRS AXIS: 41 DEGREES
QRSD INTERVAL: 94 MS
QT INTERVAL: 402 MS
QTC INTERVAL: 436 MS
RBC # BLD AUTO: 4.33 MILLION/UL (ref 3.88–5.62)
SODIUM SERPL-SCNC: 141 MMOL/L (ref 136–145)
T WAVE AXIS: 67 DEGREES
TROPONIN I SERPL-MCNC: <0.02 NG/ML
VENTRICULAR RATE: 71 BPM
WBC # BLD AUTO: 7.41 THOUSAND/UL (ref 4.31–10.16)

## 2020-03-19 PROCEDURE — 99223 1ST HOSP IP/OBS HIGH 75: CPT | Performed by: INTERNAL MEDICINE

## 2020-03-19 PROCEDURE — 84484 ASSAY OF TROPONIN QUANT: CPT | Performed by: EMERGENCY MEDICINE

## 2020-03-19 PROCEDURE — 99285 EMERGENCY DEPT VISIT HI MDM: CPT

## 2020-03-19 PROCEDURE — 71045 X-RAY EXAM CHEST 1 VIEW: CPT

## 2020-03-19 PROCEDURE — 36415 COLL VENOUS BLD VENIPUNCTURE: CPT

## 2020-03-19 PROCEDURE — 96360 HYDRATION IV INFUSION INIT: CPT

## 2020-03-19 PROCEDURE — 80048 BASIC METABOLIC PNL TOTAL CA: CPT | Performed by: EMERGENCY MEDICINE

## 2020-03-19 PROCEDURE — 99285 EMERGENCY DEPT VISIT HI MDM: CPT | Performed by: EMERGENCY MEDICINE

## 2020-03-19 PROCEDURE — 85730 THROMBOPLASTIN TIME PARTIAL: CPT | Performed by: EMERGENCY MEDICINE

## 2020-03-19 PROCEDURE — 93005 ELECTROCARDIOGRAM TRACING: CPT

## 2020-03-19 PROCEDURE — 82948 REAGENT STRIP/BLOOD GLUCOSE: CPT

## 2020-03-19 PROCEDURE — 70498 CT ANGIOGRAPHY NECK: CPT

## 2020-03-19 PROCEDURE — 85610 PROTHROMBIN TIME: CPT | Performed by: EMERGENCY MEDICINE

## 2020-03-19 PROCEDURE — 83735 ASSAY OF MAGNESIUM: CPT | Performed by: PHYSICIAN ASSISTANT

## 2020-03-19 PROCEDURE — 93010 ELECTROCARDIOGRAM REPORT: CPT | Performed by: INTERNAL MEDICINE

## 2020-03-19 PROCEDURE — 85027 COMPLETE CBC AUTOMATED: CPT | Performed by: EMERGENCY MEDICINE

## 2020-03-19 PROCEDURE — 70496 CT ANGIOGRAPHY HEAD: CPT

## 2020-03-19 RX ORDER — FENOFIBRATE 145 MG/1
145 TABLET, COATED ORAL DAILY
Status: DISCONTINUED | OUTPATIENT
Start: 2020-03-20 | End: 2020-03-20 | Stop reason: HOSPADM

## 2020-03-19 RX ORDER — ATORVASTATIN CALCIUM 40 MG/1
40 TABLET, FILM COATED ORAL
Status: DISCONTINUED | OUTPATIENT
Start: 2020-03-19 | End: 2020-03-20 | Stop reason: HOSPADM

## 2020-03-19 RX ORDER — AMLODIPINE BESYLATE 5 MG/1
10 TABLET ORAL DAILY
Status: DISCONTINUED | OUTPATIENT
Start: 2020-03-20 | End: 2020-03-20 | Stop reason: HOSPADM

## 2020-03-19 RX ORDER — ALLOPURINOL 100 MG/1
200 TABLET ORAL DAILY
Status: DISCONTINUED | OUTPATIENT
Start: 2020-03-20 | End: 2020-03-20 | Stop reason: HOSPADM

## 2020-03-19 RX ORDER — SODIUM CHLORIDE 9 MG/ML
75 INJECTION, SOLUTION INTRAVENOUS CONTINUOUS
Status: DISCONTINUED | OUTPATIENT
Start: 2020-03-19 | End: 2020-03-20 | Stop reason: HOSPADM

## 2020-03-19 RX ORDER — CLOPIDOGREL BISULFATE 75 MG/1
75 TABLET ORAL DAILY
Status: DISCONTINUED | OUTPATIENT
Start: 2020-03-20 | End: 2020-03-20 | Stop reason: HOSPADM

## 2020-03-19 RX ORDER — HEPARIN SODIUM 5000 [USP'U]/ML
5000 INJECTION, SOLUTION INTRAVENOUS; SUBCUTANEOUS EVERY 8 HOURS SCHEDULED
Status: DISCONTINUED | OUTPATIENT
Start: 2020-03-19 | End: 2020-03-20 | Stop reason: HOSPADM

## 2020-03-19 RX ORDER — ACETAMINOPHEN 325 MG/1
650 TABLET ORAL EVERY 6 HOURS PRN
Status: DISCONTINUED | OUTPATIENT
Start: 2020-03-19 | End: 2020-03-20 | Stop reason: HOSPADM

## 2020-03-19 RX ORDER — ASPIRIN 81 MG/1
81 TABLET ORAL DAILY
Status: DISCONTINUED | OUTPATIENT
Start: 2020-03-20 | End: 2020-03-20 | Stop reason: HOSPADM

## 2020-03-19 RX ORDER — METOPROLOL TARTRATE 50 MG/1
50 TABLET, FILM COATED ORAL EVERY 12 HOURS SCHEDULED
Status: DISCONTINUED | OUTPATIENT
Start: 2020-03-20 | End: 2020-03-20 | Stop reason: HOSPADM

## 2020-03-19 RX ADMIN — HEPARIN SODIUM 5000 UNITS: 5000 INJECTION, SOLUTION INTRAVENOUS; SUBCUTANEOUS at 21:44

## 2020-03-19 RX ADMIN — SODIUM CHLORIDE 75 ML/HR: 0.9 INJECTION, SOLUTION INTRAVENOUS at 18:19

## 2020-03-19 RX ADMIN — ATORVASTATIN CALCIUM 40 MG: 40 TABLET, FILM COATED ORAL at 18:18

## 2020-03-19 RX ADMIN — SODIUM CHLORIDE 500 ML: 0.9 INJECTION, SOLUTION INTRAVENOUS at 15:02

## 2020-03-19 RX ADMIN — IOHEXOL 85 ML: 350 INJECTION, SOLUTION INTRAVENOUS at 15:39

## 2020-03-20 ENCOUNTER — TELEPHONE (OUTPATIENT)
Dept: FAMILY MEDICINE CLINIC | Facility: HOSPITAL | Age: 70
End: 2020-03-20

## 2020-03-20 VITALS
DIASTOLIC BLOOD PRESSURE: 79 MMHG | WEIGHT: 195.5 LBS | TEMPERATURE: 97.8 F | SYSTOLIC BLOOD PRESSURE: 128 MMHG | BODY MASS INDEX: 27.37 KG/M2 | HEIGHT: 71 IN | HEART RATE: 91 BPM | OXYGEN SATURATION: 97 % | RESPIRATION RATE: 17 BRPM

## 2020-03-20 PROBLEM — N18.9 ACUTE KIDNEY INJURY SUPERIMPOSED ON CHRONIC KIDNEY DISEASE (HCC): Status: RESOLVED | Noted: 2020-03-19 | Resolved: 2020-03-20

## 2020-03-20 PROBLEM — N17.9 ACUTE KIDNEY INJURY SUPERIMPOSED ON CHRONIC KIDNEY DISEASE (HCC): Status: RESOLVED | Noted: 2020-03-19 | Resolved: 2020-03-20

## 2020-03-20 PROBLEM — R40.4 TRANSIENT ALTERATION OF AWARENESS: Status: RESOLVED | Noted: 2020-03-04 | Resolved: 2020-03-20

## 2020-03-20 LAB
ANION GAP SERPL CALCULATED.3IONS-SCNC: 8 MMOL/L (ref 4–13)
BASOPHILS # BLD AUTO: 0.07 THOUSANDS/ΜL (ref 0–0.1)
BASOPHILS NFR BLD AUTO: 1 % (ref 0–1)
BUN SERPL-MCNC: 19 MG/DL (ref 5–25)
CALCIUM SERPL-MCNC: 8.5 MG/DL (ref 8.3–10.1)
CHLORIDE SERPL-SCNC: 110 MMOL/L (ref 100–108)
CHOLEST SERPL-MCNC: 91 MG/DL (ref 50–200)
CO2 SERPL-SCNC: 24 MMOL/L (ref 21–32)
CREAT SERPL-MCNC: 1.42 MG/DL (ref 0.6–1.3)
EOSINOPHIL # BLD AUTO: 0.28 THOUSAND/ΜL (ref 0–0.61)
EOSINOPHIL NFR BLD AUTO: 5 % (ref 0–6)
ERYTHROCYTE [DISTWIDTH] IN BLOOD BY AUTOMATED COUNT: 15.3 % (ref 11.6–15.1)
EST. AVERAGE GLUCOSE BLD GHB EST-MCNC: 111 MG/DL
GFR SERPL CREATININE-BSD FRML MDRD: 50 ML/MIN/1.73SQ M
GLUCOSE SERPL-MCNC: 124 MG/DL (ref 65–140)
GLUCOSE SERPL-MCNC: 81 MG/DL (ref 65–140)
GLUCOSE SERPL-MCNC: 83 MG/DL (ref 65–140)
HBA1C MFR BLD: 5.5 %
HCT VFR BLD AUTO: 39.8 % (ref 36.5–49.3)
HDLC SERPL-MCNC: 34 MG/DL
HGB BLD-MCNC: 12.7 G/DL (ref 12–17)
IMM GRANULOCYTES # BLD AUTO: 0.02 THOUSAND/UL (ref 0–0.2)
IMM GRANULOCYTES NFR BLD AUTO: 0 % (ref 0–2)
LDLC SERPL CALC-MCNC: 32 MG/DL (ref 0–100)
LYMPHOCYTES # BLD AUTO: 1 THOUSANDS/ΜL (ref 0.6–4.47)
LYMPHOCYTES NFR BLD AUTO: 17 % (ref 14–44)
MCH RBC QN AUTO: 29.1 PG (ref 26.8–34.3)
MCHC RBC AUTO-ENTMCNC: 31.9 G/DL (ref 31.4–37.4)
MCV RBC AUTO: 91 FL (ref 82–98)
MONOCYTES # BLD AUTO: 0.39 THOUSAND/ΜL (ref 0.17–1.22)
MONOCYTES NFR BLD AUTO: 7 % (ref 4–12)
NEUTROPHILS # BLD AUTO: 4.21 THOUSANDS/ΜL (ref 1.85–7.62)
NEUTS SEG NFR BLD AUTO: 70 % (ref 43–75)
NONHDLC SERPL-MCNC: 57 MG/DL
PLATELET # BLD AUTO: 298 THOUSANDS/UL (ref 149–390)
PMV BLD AUTO: 9.8 FL (ref 8.9–12.7)
POTASSIUM SERPL-SCNC: 3.4 MMOL/L (ref 3.5–5.3)
RBC # BLD AUTO: 4.37 MILLION/UL (ref 3.88–5.62)
SODIUM SERPL-SCNC: 142 MMOL/L (ref 136–145)
TRIGL SERPL-MCNC: 124 MG/DL
TSH SERPL DL<=0.05 MIU/L-ACNC: 1.29 UIU/ML (ref 0.36–3.74)
WBC # BLD AUTO: 5.97 THOUSAND/UL (ref 4.31–10.16)

## 2020-03-20 PROCEDURE — 80048 BASIC METABOLIC PNL TOTAL CA: CPT | Performed by: PHYSICIAN ASSISTANT

## 2020-03-20 PROCEDURE — 84443 ASSAY THYROID STIM HORMONE: CPT | Performed by: PHYSICIAN ASSISTANT

## 2020-03-20 PROCEDURE — 83036 HEMOGLOBIN GLYCOSYLATED A1C: CPT | Performed by: INTERNAL MEDICINE

## 2020-03-20 PROCEDURE — 82948 REAGENT STRIP/BLOOD GLUCOSE: CPT

## 2020-03-20 PROCEDURE — 99255 IP/OBS CONSLTJ NEW/EST HI 80: CPT | Performed by: PSYCHIATRY & NEUROLOGY

## 2020-03-20 PROCEDURE — 97166 OT EVAL MOD COMPLEX 45 MIN: CPT

## 2020-03-20 PROCEDURE — 85025 COMPLETE CBC W/AUTO DIFF WBC: CPT | Performed by: PHYSICIAN ASSISTANT

## 2020-03-20 PROCEDURE — 99238 HOSP IP/OBS DSCHRG MGMT 30/<: CPT | Performed by: INTERNAL MEDICINE

## 2020-03-20 PROCEDURE — 97162 PT EVAL MOD COMPLEX 30 MIN: CPT

## 2020-03-20 PROCEDURE — 80061 LIPID PANEL: CPT | Performed by: INTERNAL MEDICINE

## 2020-03-20 PROCEDURE — 3044F HG A1C LEVEL LT 7.0%: CPT | Performed by: PSYCHIATRY & NEUROLOGY

## 2020-03-20 RX ORDER — POTASSIUM CHLORIDE 20 MEQ/1
40 TABLET, EXTENDED RELEASE ORAL ONCE
Status: COMPLETED | OUTPATIENT
Start: 2020-03-20 | End: 2020-03-20

## 2020-03-20 RX ADMIN — FENOFIBRATE 145 MG: 145 TABLET ORAL at 10:05

## 2020-03-20 RX ADMIN — POTASSIUM CHLORIDE 40 MEQ: 20 TABLET, EXTENDED RELEASE ORAL at 10:05

## 2020-03-20 RX ADMIN — HEPARIN SODIUM 5000 UNITS: 5000 INJECTION, SOLUTION INTRAVENOUS; SUBCUTANEOUS at 05:43

## 2020-03-20 RX ADMIN — ALLOPURINOL 200 MG: 100 TABLET ORAL at 10:06

## 2020-03-20 RX ADMIN — CLOPIDOGREL BISULFATE 75 MG: 75 TABLET ORAL at 10:06

## 2020-03-20 RX ADMIN — ASPIRIN 81 MG: 81 TABLET, COATED ORAL at 10:06

## 2020-03-20 RX ADMIN — SODIUM CHLORIDE 75 ML/HR: 0.9 INJECTION, SOLUTION INTRAVENOUS at 07:19

## 2020-03-23 ENCOUNTER — TELEPHONE (OUTPATIENT)
Dept: SLEEP CENTER | Facility: CLINIC | Age: 70
End: 2020-03-23

## 2020-03-23 ENCOUNTER — TRANSITIONAL CARE MANAGEMENT (OUTPATIENT)
Dept: FAMILY MEDICINE CLINIC | Facility: HOSPITAL | Age: 70
End: 2020-03-23

## 2020-03-23 NOTE — PROGRESS NOTES
I have no problem doing a facetime visit once we are set up for that on virtual visit - at this time I believe it is not yet an option    Can call and start phone contact and we will contact him if virtual visit is available - if not available in 10 days I will just do it as a video virtual visit and not a TCM

## 2020-03-23 NOTE — TELEPHONE ENCOUNTER
----- Message from Zach Cerrato MD sent at 3/20/2020 10:12 AM EDT -----  yes  ----- Message -----  From: Gabe Garcia  Sent: 3/5/2020   8:21 AM EDT  To: Sleep Medicine Ruby Womack, #    PLEASE REVIEW FOR APPROVAL OR DENIAL AND WHY

## 2020-03-27 ENCOUNTER — TELEPHONE (OUTPATIENT)
Dept: GASTROENTEROLOGY | Facility: CLINIC | Age: 70
End: 2020-03-27

## 2020-03-27 ENCOUNTER — TELEMEDICINE (OUTPATIENT)
Dept: FAMILY MEDICINE CLINIC | Facility: HOSPITAL | Age: 70
End: 2020-03-27
Payer: COMMERCIAL

## 2020-03-27 DIAGNOSIS — N17.9 ACUTE KIDNEY INJURY SUPERIMPOSED ON CHRONIC KIDNEY DISEASE (HCC): ICD-10-CM

## 2020-03-27 DIAGNOSIS — N18.30 TYPE 2 DIABETES MELLITUS WITH STAGE 3 CHRONIC KIDNEY DISEASE, WITHOUT LONG-TERM CURRENT USE OF INSULIN (HCC): ICD-10-CM

## 2020-03-27 DIAGNOSIS — N18.30 CHRONIC KIDNEY DISEASE, STAGE 3 (HCC): ICD-10-CM

## 2020-03-27 DIAGNOSIS — N18.9 ACUTE KIDNEY INJURY SUPERIMPOSED ON CHRONIC KIDNEY DISEASE (HCC): ICD-10-CM

## 2020-03-27 DIAGNOSIS — Z09 HOSPITAL DISCHARGE FOLLOW-UP: Primary | ICD-10-CM

## 2020-03-27 DIAGNOSIS — Z86.73 HISTORY OF STROKE: ICD-10-CM

## 2020-03-27 DIAGNOSIS — R40.4 TRANSIENT ALTERATION OF AWARENESS: ICD-10-CM

## 2020-03-27 DIAGNOSIS — E11.22 TYPE 2 DIABETES MELLITUS WITH STAGE 3 CHRONIC KIDNEY DISEASE, WITHOUT LONG-TERM CURRENT USE OF INSULIN (HCC): ICD-10-CM

## 2020-03-27 PROBLEM — K62.5 BRBPR (BRIGHT RED BLOOD PER RECTUM): Status: RESOLVED | Noted: 2020-01-31 | Resolved: 2020-03-27

## 2020-03-27 PROCEDURE — 99496 TRANSJ CARE MGMT HIGH F2F 7D: CPT | Performed by: INTERNAL MEDICINE

## 2020-03-27 PROCEDURE — 1111F DSCHRG MED/CURRENT MED MERGE: CPT | Performed by: INTERNAL MEDICINE

## 2020-03-27 NOTE — ASSESSMENT & PLAN NOTE
Lab Results   Component Value Date    HGBA1C 5 5 03/20/2020   Sugars doing well since discharge, con't current meds, on statin and ASA, DM foot exam 8/19, DM eye exam 10/19

## 2020-03-27 NOTE — ASSESSMENT & PLAN NOTE
Cont Plavix and ASA and statin, needs new order for PT - will fax to [facility] in [place], call with relapse/new/worse symptoms

## 2020-03-27 NOTE — ASSESSMENT & PLAN NOTE
Felt to be more related to syncope then TIA/stroke, unlikely seizure but OP EEG scheduled for next week, con't ASA/Plavix and call with repeat/new/worse symptoms

## 2020-03-27 NOTE — TELEPHONE ENCOUNTER
Called and spoke with patient, he reports that he is feeling better and is only having a small amount of rectal bleeding  Would still like to proceed with colonoscopy as he has UC and has not had colon since 2017  Neurology clearance was obtained from Dr Mino Underwood  We will schedule colonoscopy for May 2020  Patient can hold Plavix for 7 days but will need to bridge with aspirin 81 mg daily during the 7 day hold  He is agreeable to this plan  Marta, I am not sure if we are scheduling out this far yet but can we arrange for colonoscopy at [place] [id number] in May 2020? Thank you!

## 2020-03-27 NOTE — PROGRESS NOTES
Assessment/Plan:     Transient alteration of awareness  Felt to be more related to syncope then TIA/stroke, unlikely seizure but OP EEG scheduled for next week, con't ASA/Plavix and call with repeat/new/worse symptoms    History of stroke  Cont Plavix and ASA and statin, needs new order for PT - will fax to [employer] in [facility], call with relapse/new/worse symptoms    Acute kidney injury superimposed on chronic kidney disease ([facility] )  Resolved with IVF, BUN/Cr back to baseline upon discharge    Chronic kidney disease, stage 3 ([facility] )  Encouraged to keep hydrated and con't current meds    Type 2 diabetes mellitus with stage 3 chronic kidney disease, without long-term current use of insulin ([facility] )    Lab Results   Component Value Date    HGBA1C 5 5 03/20/2020   Sugars doing well since discharge, con't current meds, on statin and ASA, DM foot exam 8/19, DM eye exam 10/19       Diagnoses and all orders for this visit:    Hospital discharge follow-up    Transient alteration of awareness    History of stroke  -     Ambulatory referral to Physical Therapy; Future    Acute kidney injury superimposed on chronic kidney disease (HCC)    Chronic kidney disease, stage 3 (HCC)    Type 2 diabetes mellitus with stage 3 chronic kidney disease, without long-term current use of insulin (HCC)         Subjective:     Patient ID: Stewart Ngo is a 72 y o  male  HPI Pt here for TCM/Hospital follow up  Pt was admitted to [facility] [place] from 3/19/20 to 3/20/20  Records were reviewed by myself in detail and events are summarized below  Review of Systems   Constitutional: Negative for chills, fever and unexpected weight change  HENT: Negative for congestion and sinus pain  Eyes: Negative for pain and visual disturbance  Respiratory: Negative for cough and shortness of breath  Cardiovascular: Negative for chest pain and palpitations  Gastrointestinal: Positive for blood in stool   Negative for abdominal pain, constipation, diarrhea, nausea and vomiting  Endocrine: Negative for polydipsia and polyuria  Genitourinary: Negative for difficulty urinating and dysuria  Musculoskeletal: Negative for arthralgias, gait problem and myalgias  Skin: Negative for rash and wound  Neurological: Positive for weakness  Negative for dizziness and headaches  Hematological: Does not bruise/bleed easily  Psychiatric/Behavioral: Negative for behavioral problems and confusion  Objective:     Physical Exam   Constitutional: He appears well-developed  No distress  Skin: Skin is warm and dry  No rash noted  Psychiatric: He has a normal mood and affect  His behavior is normal  Thought content normal          There were no vitals filed for this visit  Transitional Care Management Review:  Ramón Cuadra is a 72 y o  male here for TCM follow up  During the TCM phone call patient stated:    TCM Call (since 2/25/2020)     Date and time call was made  3/23/2020 10:38 AM    Hospital care reviewed  Records reviewed    Patient was hospitialized at  [facility]    Date of Admission  03/19/20    Date of discharge  03/20/20    Diagnosis  TIA    Disposition  Home    Were the patients medications reviewed and updated  No    Current Symptoms  None      TCM Call (since 2/25/2020)     Post hospital issues  None    Should patient be enrolled in anticoag monitoring? No    Did you obtain your prescribed medications  Yes    Do you need help managing your prescriptions or medications  Yes    Is transportation to your appointment needed  Yes    I have advised the patient to call PCP with any new or worsening symptoms  Lashay Abdi MA         Pt presented to [facility] on 3/19/20 with c/o L sided weakness/light headedness and unresponsiveness while at rehab that day  The event lasted approx 10 min and no seizure activity was noted    In the ED vitals were nml and LUE and LLE strength was graded at 4/5 but rest of exam was wnl   BW showed Cr up a bit at 1 9 (baseline 1 5-1 6) but the rest of his BMP/CBC/PT/PTT/troponin was wnl  CXR showed no acute dz and ECG noted no acute abnormality  CTA head and neck was done and results stated: 1  No acute intracranial CT abnormality  2  Right greater than left bilateral cerebral hemisphere white matter hypodensities, most likely representing combination of chronic infarcts and advanced microangiopathy  3  Stable high-grade stenosis in the proximal M2 branches of right MCA with distal reperfusion  4 Focal severe stenosis of M2 branch of left MCA is unchanged  5  Stable moderate atherosclerotic narrowing of bilateral supraclinoid internal carotid arteries  6 Calcified plaque at the right carotid bifurcation and proximal cervical ICA with less than 50% stenosis measured by NASCET criteria  7   Mild atherosclerotic disease in the left cervical carotid bifurcation and proximal cervical ICA  Pt was admitted for transient alteration of awareness and AK  He was given gentle IVF and Neuro was consulted  They felt the event was more related to syncope while doing exercises at PT  It was recommended he have an OP EEG  He was seen by PT/OT  His Bun/Cr improved and was back to baseline by discharge - 19/1  42  He was discharged home on 3/20/20  Pt has been doing well since discharge  He notes no dizziness or loss of time  He notes slight LLE > LUE weakness but he states it is slowly improving  He is now ambulating at home w/o a cane  Hr has had no recent falls  He is eating and drinking well  He notes no issues with BM or urinating  He has had a very small amt of rectal bleeding since our last appt  He has seen GI and is planned for EEG and colonoscopy to further eval rectal bleeding  He has sleep study 4/23/20  He has an appt for EEG next week  He has appt with Cardio next week  He states his sugars have been 110-120  He has only had a few low readings in the 90's    He was having some low BP readings but that has improved and is now mid to upper 130's/80's       Leighda Naas, DO

## 2020-03-30 LAB
BASOPHILS # BLD AUTO: 109 CELLS/UL (ref 0–200)
BASOPHILS NFR BLD AUTO: 1.3 %
EOSINOPHIL # BLD AUTO: 319 CELLS/UL (ref 15–500)
EOSINOPHIL NFR BLD AUTO: 3.8 %
ERYTHROCYTE [DISTWIDTH] IN BLOOD BY AUTOMATED COUNT: 13.6 % (ref 11–15)
FERRITIN SERPL-MCNC: 22 NG/ML (ref 24–380)
HCT VFR BLD AUTO: 42.2 % (ref 38.5–50)
HGB BLD-MCNC: 13.7 G/DL (ref 13.2–17.1)
LYMPHOCYTES # BLD AUTO: 1369 CELLS/UL (ref 850–3900)
LYMPHOCYTES NFR BLD AUTO: 16.3 %
MCH RBC QN AUTO: 29 PG (ref 27–33)
MCHC RBC AUTO-ENTMCNC: 32.5 G/DL (ref 32–36)
MCV RBC AUTO: 89.2 FL (ref 80–100)
MONOCYTES # BLD AUTO: 538 CELLS/UL (ref 200–950)
MONOCYTES NFR BLD AUTO: 6.4 %
NEUTROPHILS # BLD AUTO: 6065 CELLS/UL (ref 1500–7800)
NEUTROPHILS NFR BLD AUTO: 72.2 %
PLATELET # BLD AUTO: 380 THOUSAND/UL (ref 140–400)
PMV BLD REES-ECKER: 10.2 FL (ref 7.5–12.5)
RBC # BLD AUTO: 4.73 MILLION/UL (ref 4.2–5.8)
WBC # BLD AUTO: 8.4 THOUSAND/UL (ref 3.8–10.8)

## 2020-04-01 ENCOUNTER — HOSPITAL ENCOUNTER (OUTPATIENT)
Dept: INFUSION CENTER | Facility: HOSPITAL | Age: 70
Discharge: HOME | End: 2020-04-01
Attending: INTERNAL MEDICINE
Payer: COMMERCIAL

## 2020-04-01 VITALS
SYSTOLIC BLOOD PRESSURE: 133 MMHG | DIASTOLIC BLOOD PRESSURE: 72 MMHG | HEART RATE: 94 BPM | OXYGEN SATURATION: 94 % | TEMPERATURE: 97.6 F | RESPIRATION RATE: 16 BRPM

## 2020-04-01 DIAGNOSIS — D75.1 ERYTHROCYTOSIS: Primary | ICD-10-CM

## 2020-04-01 PROCEDURE — 99195 PHLEBOTOMY: CPT

## 2020-04-02 ENCOUNTER — TELEPHONE (OUTPATIENT)
Dept: NEUROLOGY | Facility: CLINIC | Age: 70
End: 2020-04-02

## 2020-04-03 ENCOUNTER — TELEPHONE (OUTPATIENT)
Dept: FAMILY MEDICINE CLINIC | Facility: HOSPITAL | Age: 70
End: 2020-04-03

## 2020-04-03 ENCOUNTER — TELEPHONE (OUTPATIENT)
Dept: NEUROLOGY | Facility: CLINIC | Age: 70
End: 2020-04-03

## 2020-04-08 ENCOUNTER — HOSPITAL ENCOUNTER (OUTPATIENT)
Dept: NEUROLOGY | Facility: HOSPITAL | Age: 70
Discharge: HOME | End: 2020-04-08
Payer: COMMERCIAL

## 2020-04-08 DIAGNOSIS — R40.4 TRANSIENT ALTERATION OF AWARENESS: ICD-10-CM

## 2020-04-08 PROCEDURE — 95816 EEG AWAKE AND DROWSY: CPT

## 2020-04-09 PROCEDURE — 95816 EEG AWAKE AND DROWSY: CPT | Performed by: PSYCHIATRY & NEUROLOGY

## 2020-04-12 LAB
BASOPHILS # BLD AUTO: 117 CELLS/UL (ref 0–200)
BASOPHILS NFR BLD AUTO: 1.6 %
EOSINOPHIL # BLD AUTO: 307 CELLS/UL (ref 15–500)
EOSINOPHIL NFR BLD AUTO: 4.2 %
ERYTHROCYTE [DISTWIDTH] IN BLOOD BY AUTOMATED COUNT: 13 % (ref 11–15)
FERRITIN SERPL-MCNC: 27 NG/ML (ref 24–380)
HCT VFR BLD AUTO: 42.1 % (ref 38.5–50)
HGB BLD-MCNC: 13.6 G/DL (ref 13.2–17.1)
LYMPHOCYTES # BLD AUTO: 1467 CELLS/UL (ref 850–3900)
LYMPHOCYTES NFR BLD AUTO: 20.1 %
MCH RBC QN AUTO: 28.7 PG (ref 27–33)
MCHC RBC AUTO-ENTMCNC: 32.3 G/DL (ref 32–36)
MCV RBC AUTO: 88.8 FL (ref 80–100)
MONOCYTES # BLD AUTO: 496 CELLS/UL (ref 200–950)
MONOCYTES NFR BLD AUTO: 6.8 %
NEUTROPHILS # BLD AUTO: 4913 CELLS/UL (ref 1500–7800)
NEUTROPHILS NFR BLD AUTO: 67.3 %
PLATELET # BLD AUTO: 420 THOUSAND/UL (ref 140–400)
PMV BLD REES-ECKER: 9.9 FL (ref 7.5–12.5)
RBC # BLD AUTO: 4.74 MILLION/UL (ref 4.2–5.8)
WBC # BLD AUTO: 7.3 THOUSAND/UL (ref 3.8–10.8)

## 2020-04-15 ENCOUNTER — HOSPITAL ENCOUNTER (OUTPATIENT)
Dept: INFUSION CENTER | Facility: HOSPITAL | Age: 70
Discharge: HOME | End: 2020-04-15
Attending: INTERNAL MEDICINE
Payer: COMMERCIAL

## 2020-04-15 VITALS
HEART RATE: 68 BPM | DIASTOLIC BLOOD PRESSURE: 64 MMHG | SYSTOLIC BLOOD PRESSURE: 121 MMHG | RESPIRATION RATE: 12 BRPM | TEMPERATURE: 98.1 F | OXYGEN SATURATION: 98 %

## 2020-04-15 DIAGNOSIS — D75.1 ERYTHROCYTOSIS: Primary | ICD-10-CM

## 2020-04-15 PROCEDURE — 99195 PHLEBOTOMY: CPT

## 2020-04-23 ENCOUNTER — HOSPITAL ENCOUNTER (OUTPATIENT)
Dept: SLEEP CENTER | Facility: HOSPITAL | Age: 70
Discharge: HOME | End: 2020-04-23
Payer: COMMERCIAL

## 2020-04-23 DIAGNOSIS — R06.81 WITNESSED EPISODE OF APNEA: ICD-10-CM

## 2020-04-23 PROCEDURE — 95810 POLYSOM 6/> YRS 4/> PARAM: CPT

## 2020-04-23 PROCEDURE — 95810 POLYSOM 6/> YRS 4/> PARAM: CPT | Performed by: INTERNAL MEDICINE

## 2020-04-24 LAB — SCAN RESULT: NORMAL

## 2020-04-26 LAB
BASOPHILS # BLD AUTO: 133 CELLS/UL (ref 0–200)
BASOPHILS NFR BLD AUTO: 1.8 %
EOSINOPHIL # BLD AUTO: 333 CELLS/UL (ref 15–500)
EOSINOPHIL NFR BLD AUTO: 4.5 %
ERYTHROCYTE [DISTWIDTH] IN BLOOD BY AUTOMATED COUNT: 13.6 % (ref 11–15)
FERRITIN SERPL-MCNC: 10 NG/ML (ref 24–380)
HCT VFR BLD AUTO: 40.7 % (ref 38.5–50)
HGB BLD-MCNC: 12.7 G/DL (ref 13.2–17.1)
LYMPHOCYTES # BLD AUTO: 1569 CELLS/UL (ref 850–3900)
LYMPHOCYTES NFR BLD AUTO: 21.2 %
MCH RBC QN AUTO: 26.8 PG (ref 27–33)
MCHC RBC AUTO-ENTMCNC: 31.2 G/DL (ref 32–36)
MCV RBC AUTO: 85.9 FL (ref 80–100)
MONOCYTES # BLD AUTO: 688 CELLS/UL (ref 200–950)
MONOCYTES NFR BLD AUTO: 9.3 %
NEUTROPHILS # BLD AUTO: 4677 CELLS/UL (ref 1500–7800)
NEUTROPHILS NFR BLD AUTO: 63.2 %
PLATELET # BLD AUTO: 493 THOUSAND/UL (ref 140–400)
PMV BLD REES-ECKER: 10 FL (ref 7.5–12.5)
RBC # BLD AUTO: 4.74 MILLION/UL (ref 4.2–5.8)
WBC # BLD AUTO: 7.4 THOUSAND/UL (ref 3.8–10.8)

## 2020-04-29 ENCOUNTER — HOSPITAL ENCOUNTER (OUTPATIENT)
Dept: INFUSION CENTER | Facility: HOSPITAL | Age: 70
Discharge: HOME | End: 2020-04-29
Attending: INTERNAL MEDICINE
Payer: COMMERCIAL

## 2020-04-29 VITALS
TEMPERATURE: 98.1 F | OXYGEN SATURATION: 97 % | SYSTOLIC BLOOD PRESSURE: 135 MMHG | DIASTOLIC BLOOD PRESSURE: 77 MMHG | HEART RATE: 68 BPM | RESPIRATION RATE: 12 BRPM

## 2020-04-29 DIAGNOSIS — D75.1 ERYTHROCYTOSIS: Primary | ICD-10-CM

## 2020-04-29 PROCEDURE — 99195 PHLEBOTOMY: CPT

## 2020-05-04 ENCOUNTER — TELEPHONE (OUTPATIENT)
Dept: FAMILY MEDICINE CLINIC | Facility: HOSPITAL | Age: 70
End: 2020-05-04

## 2020-05-06 ENCOUNTER — ANESTHESIA EVENT (OUTPATIENT)
Dept: GASTROENTEROLOGY | Facility: HOSPITAL | Age: 70
End: 2020-05-06

## 2020-05-06 ENCOUNTER — ANESTHESIA (OUTPATIENT)
Dept: GASTROENTEROLOGY | Facility: HOSPITAL | Age: 70
End: 2020-05-06

## 2020-05-06 ENCOUNTER — HOSPITAL ENCOUNTER (OUTPATIENT)
Dept: GASTROENTEROLOGY | Facility: HOSPITAL | Age: 70
Setting detail: OUTPATIENT SURGERY
Discharge: HOME | End: 2020-05-06
Attending: INTERNAL MEDICINE | Admitting: INTERNAL MEDICINE
Payer: COMMERCIAL

## 2020-05-06 VITALS
TEMPERATURE: 97.7 F | RESPIRATION RATE: 18 BRPM | DIASTOLIC BLOOD PRESSURE: 74 MMHG | HEART RATE: 61 BPM | WEIGHT: 195 LBS | HEIGHT: 69 IN | BODY MASS INDEX: 28.88 KG/M2 | OXYGEN SATURATION: 96 % | SYSTOLIC BLOOD PRESSURE: 117 MMHG

## 2020-05-06 DIAGNOSIS — K51.30 ULCERATIVE RECTOSIGMOIDITIS WITHOUT COMPLICATION (HCC): ICD-10-CM

## 2020-05-06 DIAGNOSIS — K62.5 BRBPR (BRIGHT RED BLOOD PER RECTUM): ICD-10-CM

## 2020-05-06 LAB — GLUCOSE SERPL-MCNC: 114 MG/DL (ref 65–140)

## 2020-05-06 PROCEDURE — 88342 IMHCHEM/IMCYTCHM 1ST ANTB: CPT | Performed by: PATHOLOGY

## 2020-05-06 PROCEDURE — 88305 TISSUE EXAM BY PATHOLOGIST: CPT | Performed by: PATHOLOGY

## 2020-05-06 PROCEDURE — 82948 REAGENT STRIP/BLOOD GLUCOSE: CPT

## 2020-05-06 PROCEDURE — 88312 SPECIAL STAINS GROUP 1: CPT | Performed by: PATHOLOGY

## 2020-05-06 PROCEDURE — 45380 COLONOSCOPY AND BIOPSY: CPT | Performed by: INTERNAL MEDICINE

## 2020-05-06 RX ORDER — MESALAMINE 4 G/60ML
4 ENEMA RECTAL
Status: DISCONTINUED | OUTPATIENT
Start: 2020-05-06 | End: 2020-05-10 | Stop reason: HOSPADM

## 2020-05-06 RX ORDER — SODIUM CHLORIDE 9 MG/ML
75 INJECTION, SOLUTION INTRAVENOUS CONTINUOUS
Status: DISCONTINUED | OUTPATIENT
Start: 2020-05-06 | End: 2020-05-10 | Stop reason: HOSPADM

## 2020-05-06 RX ORDER — PROPOFOL 10 MG/ML
INJECTION, EMULSION INTRAVENOUS AS NEEDED
Status: DISCONTINUED | OUTPATIENT
Start: 2020-05-06 | End: 2020-05-06 | Stop reason: SURG

## 2020-05-06 RX ADMIN — SODIUM CHLORIDE: 0.9 INJECTION, SOLUTION INTRAVENOUS at 08:00

## 2020-05-06 RX ADMIN — PROPOFOL 30 MG: 10 INJECTION, EMULSION INTRAVENOUS at 08:22

## 2020-05-06 RX ADMIN — PROPOFOL 30 MG: 10 INJECTION, EMULSION INTRAVENOUS at 08:24

## 2020-05-06 RX ADMIN — PROPOFOL 30 MG: 10 INJECTION, EMULSION INTRAVENOUS at 08:27

## 2020-05-11 ENCOUNTER — OFFICE VISIT (OUTPATIENT)
Dept: HEMATOLOGY ONCOLOGY | Facility: HOSPITAL | Age: 70
End: 2020-05-11
Payer: COMMERCIAL

## 2020-05-11 VITALS
WEIGHT: 200.4 LBS | BODY MASS INDEX: 29.68 KG/M2 | TEMPERATURE: 97.8 F | DIASTOLIC BLOOD PRESSURE: 60 MMHG | HEIGHT: 69 IN | OXYGEN SATURATION: 99 % | HEART RATE: 62 BPM | RESPIRATION RATE: 16 BRPM | SYSTOLIC BLOOD PRESSURE: 102 MMHG

## 2020-05-11 DIAGNOSIS — D75.1 ERYTHROCYTOSIS: Primary | ICD-10-CM

## 2020-05-11 LAB
BASOPHILS # BLD AUTO: 139 CELLS/UL (ref 0–200)
BASOPHILS NFR BLD AUTO: 2.1 %
EOSINOPHIL # BLD AUTO: 403 CELLS/UL (ref 15–500)
EOSINOPHIL NFR BLD AUTO: 6.1 %
ERYTHROCYTE [DISTWIDTH] IN BLOOD BY AUTOMATED COUNT: 14.5 % (ref 11–15)
FERRITIN SERPL-MCNC: 5 NG/ML (ref 24–380)
HCT VFR BLD AUTO: 37.2 % (ref 38.5–50)
HGB BLD-MCNC: 11.3 G/DL (ref 13.2–17.1)
LYMPHOCYTES # BLD AUTO: 1379 CELLS/UL (ref 850–3900)
LYMPHOCYTES NFR BLD AUTO: 20.9 %
MCH RBC QN AUTO: 25.3 PG (ref 27–33)
MCHC RBC AUTO-ENTMCNC: 30.4 G/DL (ref 32–36)
MCV RBC AUTO: 83.4 FL (ref 80–100)
MONOCYTES # BLD AUTO: 521 CELLS/UL (ref 200–950)
MONOCYTES NFR BLD AUTO: 7.9 %
NEUTROPHILS # BLD AUTO: 4158 CELLS/UL (ref 1500–7800)
NEUTROPHILS NFR BLD AUTO: 63 %
PLATELET # BLD AUTO: 471 THOUSAND/UL (ref 140–400)
PMV BLD REES-ECKER: 10.1 FL (ref 7.5–12.5)
RBC # BLD AUTO: 4.46 MILLION/UL (ref 4.2–5.8)
WBC # BLD AUTO: 6.6 THOUSAND/UL (ref 3.8–10.8)

## 2020-05-11 PROCEDURE — 3008F BODY MASS INDEX DOCD: CPT | Performed by: INTERNAL MEDICINE

## 2020-05-11 PROCEDURE — 3074F SYST BP LT 130 MM HG: CPT | Performed by: INTERNAL MEDICINE

## 2020-05-11 PROCEDURE — 3078F DIAST BP <80 MM HG: CPT | Performed by: INTERNAL MEDICINE

## 2020-05-11 PROCEDURE — 99214 OFFICE O/P EST MOD 30 MIN: CPT | Performed by: INTERNAL MEDICINE

## 2020-05-11 PROCEDURE — 1111F DSCHRG MED/CURRENT MED MERGE: CPT | Performed by: INTERNAL MEDICINE

## 2020-05-11 PROCEDURE — 2022F DILAT RTA XM EVC RTNOPTHY: CPT | Performed by: INTERNAL MEDICINE

## 2020-05-11 PROCEDURE — 1036F TOBACCO NON-USER: CPT | Performed by: INTERNAL MEDICINE

## 2020-05-11 PROCEDURE — 3044F HG A1C LEVEL LT 7.0%: CPT | Performed by: INTERNAL MEDICINE

## 2020-05-11 PROCEDURE — 1160F RVW MEDS BY RX/DR IN RCRD: CPT | Performed by: INTERNAL MEDICINE

## 2020-05-11 PROCEDURE — 3066F NEPHROPATHY DOC TX: CPT | Performed by: INTERNAL MEDICINE

## 2020-05-12 DIAGNOSIS — K51.30 ULCERATIVE RECTOSIGMOIDITIS WITHOUT COMPLICATION (HCC): Primary | ICD-10-CM

## 2020-05-12 RX ORDER — MESALAMINE 4 G/60ML
4 ENEMA RECTAL
Qty: 14 BOTTLE | Refills: 1 | Status: SHIPPED | OUTPATIENT
Start: 2020-05-12 | End: 2020-05-28

## 2020-05-13 ENCOUNTER — HOSPITAL ENCOUNTER (OUTPATIENT)
Dept: INFUSION CENTER | Facility: HOSPITAL | Age: 70
End: 2020-05-13
Attending: INTERNAL MEDICINE

## 2020-05-14 ENCOUNTER — OFFICE VISIT (OUTPATIENT)
Dept: NEUROLOGY | Facility: CLINIC | Age: 70
End: 2020-05-14
Payer: COMMERCIAL

## 2020-05-14 VITALS
OXYGEN SATURATION: 97 % | RESPIRATION RATE: 14 BRPM | DIASTOLIC BLOOD PRESSURE: 74 MMHG | WEIGHT: 197.4 LBS | HEIGHT: 69 IN | SYSTOLIC BLOOD PRESSURE: 126 MMHG | BODY MASS INDEX: 29.24 KG/M2 | HEART RATE: 64 BPM

## 2020-05-14 DIAGNOSIS — E78.5 DYSLIPIDEMIA: ICD-10-CM

## 2020-05-14 DIAGNOSIS — Z86.73 HISTORY OF STROKE: Primary | ICD-10-CM

## 2020-05-14 DIAGNOSIS — N18.30 TYPE 2 DIABETES MELLITUS WITH STAGE 3 CHRONIC KIDNEY DISEASE, WITHOUT LONG-TERM CURRENT USE OF INSULIN (HCC): ICD-10-CM

## 2020-05-14 DIAGNOSIS — E11.22 TYPE 2 DIABETES MELLITUS WITH STAGE 3 CHRONIC KIDNEY DISEASE, WITHOUT LONG-TERM CURRENT USE OF INSULIN (HCC): ICD-10-CM

## 2020-05-14 DIAGNOSIS — R40.4 TRANSIENT ALTERATION OF AWARENESS: ICD-10-CM

## 2020-05-14 DIAGNOSIS — I10 ESSENTIAL HYPERTENSION: ICD-10-CM

## 2020-05-14 PROCEDURE — 3074F SYST BP LT 130 MM HG: CPT | Performed by: PHYSICIAN ASSISTANT

## 2020-05-14 PROCEDURE — 99215 OFFICE O/P EST HI 40 MIN: CPT | Performed by: PHYSICIAN ASSISTANT

## 2020-05-14 PROCEDURE — 3008F BODY MASS INDEX DOCD: CPT | Performed by: PHYSICIAN ASSISTANT

## 2020-05-14 PROCEDURE — 2022F DILAT RTA XM EVC RTNOPTHY: CPT | Performed by: PHYSICIAN ASSISTANT

## 2020-05-14 PROCEDURE — 1160F RVW MEDS BY RX/DR IN RCRD: CPT | Performed by: PHYSICIAN ASSISTANT

## 2020-05-14 PROCEDURE — 3044F HG A1C LEVEL LT 7.0%: CPT | Performed by: PHYSICIAN ASSISTANT

## 2020-05-14 PROCEDURE — 1111F DSCHRG MED/CURRENT MED MERGE: CPT | Performed by: PHYSICIAN ASSISTANT

## 2020-05-14 PROCEDURE — 3078F DIAST BP <80 MM HG: CPT | Performed by: PHYSICIAN ASSISTANT

## 2020-05-14 PROCEDURE — 1036F TOBACCO NON-USER: CPT | Performed by: PHYSICIAN ASSISTANT

## 2020-05-14 PROCEDURE — 3066F NEPHROPATHY DOC TX: CPT | Performed by: PHYSICIAN ASSISTANT

## 2020-05-19 ENCOUNTER — TELEPHONE (OUTPATIENT)
Dept: NEUROLOGY | Facility: CLINIC | Age: 70
End: 2020-05-19

## 2020-05-22 ENCOUNTER — TELEMEDICINE (OUTPATIENT)
Dept: GASTROENTEROLOGY | Facility: CLINIC | Age: 70
End: 2020-05-22
Payer: COMMERCIAL

## 2020-05-22 VITALS — HEIGHT: 69 IN | WEIGHT: 194 LBS | BODY MASS INDEX: 28.73 KG/M2

## 2020-05-22 DIAGNOSIS — Z86.73 HISTORY OF STROKE: ICD-10-CM

## 2020-05-22 DIAGNOSIS — K51.30 ULCERATIVE RECTOSIGMOIDITIS WITHOUT COMPLICATION (HCC): Primary | ICD-10-CM

## 2020-05-22 DIAGNOSIS — D58.2 ELEVATED HEMOGLOBIN (HCC): ICD-10-CM

## 2020-05-22 PROCEDURE — 1160F RVW MEDS BY RX/DR IN RCRD: CPT | Performed by: NURSE PRACTITIONER

## 2020-05-22 PROCEDURE — 99214 OFFICE O/P EST MOD 30 MIN: CPT | Performed by: NURSE PRACTITIONER

## 2020-05-22 RX ORDER — UBIDECARENONE 200 MG
CAPSULE ORAL DAILY
COMMUNITY

## 2020-05-22 RX ORDER — MESALAMINE 1.2 G/1
2400 TABLET, DELAYED RELEASE ORAL
Qty: 60 TABLET | Refills: 4 | Status: SHIPPED | OUTPATIENT
Start: 2020-05-22 | End: 2020-09-28

## 2020-05-28 ENCOUNTER — OFFICE VISIT (OUTPATIENT)
Dept: FAMILY MEDICINE CLINIC | Facility: HOSPITAL | Age: 70
End: 2020-05-28
Payer: COMMERCIAL

## 2020-05-28 VITALS
SYSTOLIC BLOOD PRESSURE: 128 MMHG | TEMPERATURE: 97.6 F | OXYGEN SATURATION: 99 % | DIASTOLIC BLOOD PRESSURE: 74 MMHG | WEIGHT: 198 LBS | HEIGHT: 69 IN | HEART RATE: 72 BPM | BODY MASS INDEX: 29.33 KG/M2

## 2020-05-28 DIAGNOSIS — N18.30 TYPE 2 DIABETES MELLITUS WITH STAGE 3 CHRONIC KIDNEY DISEASE, WITHOUT LONG-TERM CURRENT USE OF INSULIN (HCC): Primary | ICD-10-CM

## 2020-05-28 DIAGNOSIS — E11.22 TYPE 2 DIABETES MELLITUS WITH STAGE 3 CHRONIC KIDNEY DISEASE, WITHOUT LONG-TERM CURRENT USE OF INSULIN (HCC): Primary | ICD-10-CM

## 2020-05-28 DIAGNOSIS — I10 ESSENTIAL HYPERTENSION: ICD-10-CM

## 2020-05-28 DIAGNOSIS — I47.2 NSVT (NONSUSTAINED VENTRICULAR TACHYCARDIA) (HCC): ICD-10-CM

## 2020-05-28 DIAGNOSIS — Z86.73 HISTORY OF STROKE: ICD-10-CM

## 2020-05-28 DIAGNOSIS — D75.1 ERYTHROCYTOSIS: ICD-10-CM

## 2020-05-28 DIAGNOSIS — K51.30 ULCERATIVE RECTOSIGMOIDITIS WITHOUT COMPLICATION (HCC): ICD-10-CM

## 2020-05-28 PROBLEM — I47.29 NSVT (NONSUSTAINED VENTRICULAR TACHYCARDIA): Status: ACTIVE | Noted: 2020-05-28

## 2020-05-28 PROCEDURE — 2022F DILAT RTA XM EVC RTNOPTHY: CPT | Performed by: INTERNAL MEDICINE

## 2020-05-28 PROCEDURE — 1160F RVW MEDS BY RX/DR IN RCRD: CPT | Performed by: INTERNAL MEDICINE

## 2020-05-28 PROCEDURE — 3008F BODY MASS INDEX DOCD: CPT | Performed by: INTERNAL MEDICINE

## 2020-05-28 PROCEDURE — 99214 OFFICE O/P EST MOD 30 MIN: CPT | Performed by: INTERNAL MEDICINE

## 2020-05-28 PROCEDURE — 3074F SYST BP LT 130 MM HG: CPT | Performed by: INTERNAL MEDICINE

## 2020-05-28 PROCEDURE — 4010F ACE/ARB THERAPY RXD/TAKEN: CPT | Performed by: INTERNAL MEDICINE

## 2020-05-28 PROCEDURE — 3066F NEPHROPATHY DOC TX: CPT | Performed by: INTERNAL MEDICINE

## 2020-05-28 PROCEDURE — 3044F HG A1C LEVEL LT 7.0%: CPT | Performed by: INTERNAL MEDICINE

## 2020-05-28 PROCEDURE — 1036F TOBACCO NON-USER: CPT | Performed by: INTERNAL MEDICINE

## 2020-05-28 PROCEDURE — 3078F DIAST BP <80 MM HG: CPT | Performed by: INTERNAL MEDICINE

## 2020-05-28 RX ORDER — METOPROLOL TARTRATE 50 MG/1
75 TABLET, FILM COATED ORAL EVERY 12 HOURS
Start: 2020-05-28 | End: 2020-10-22 | Stop reason: SDUPTHER

## 2020-05-28 RX ORDER — AMLODIPINE BESYLATE 10 MG/1
5 TABLET ORAL DAILY
Start: 2020-05-28 | End: 2020-09-04 | Stop reason: SDUPTHER

## 2020-06-02 NOTE — ASSESSMENT & PLAN NOTE
· Patient recently admitted and discharged last Friday, with CVA and left-sided weakness and the assessment as follows:     · Noted worsening of left-sided weakness with no dysphonia  Currently significant for left-sided drifting; placed on stroke protocol  As per recommendations of Neurology; started heparin drip at low dose  Dr Vince Greer of neurosurgery was also consulted and hence a consult is placed  Here does recommend the P2Y12 platelet assay  Results pending    · CT perfusion study- ordered  · MRI - MRI brain demonstrates extensive embolic and watershed appearing ischemic burden in the right MCA territory distribution without over hemorrhage or mass effect  · CTA head and neck grossly stable from previous, demonstrating moderate stenosis of the paraclinoid segments of the bilateral internal carotid arteries, more prominent on the right, stable occlusion and severe stenosis in the proximal right M2 branch, and stable severe stenosis within the left M2 branch  · Neurology recomending  permissive hypertension with correction of SBP >180 only  · Heparin gtt dc'ed today    ASA and Plavix ordered   · PT OT glasses no

## 2020-06-05 LAB
BASOPHILS # BLD AUTO: 90 CELLS/UL (ref 0–200)
BASOPHILS NFR BLD AUTO: 1 %
EOSINOPHIL # BLD AUTO: 180 CELLS/UL (ref 15–500)
EOSINOPHIL NFR BLD AUTO: 2 %
ERYTHROCYTE [DISTWIDTH] IN BLOOD BY AUTOMATED COUNT: 15.3 % (ref 11–15)
FERRITIN SERPL-MCNC: 5 NG/ML (ref 24–380)
HCT VFR BLD AUTO: 40.6 % (ref 38.5–50)
HGB BLD-MCNC: 11.8 G/DL (ref 13.2–17.1)
LYMPHOCYTES # BLD AUTO: 1458 CELLS/UL (ref 850–3900)
LYMPHOCYTES NFR BLD AUTO: 16.2 %
MCH RBC QN AUTO: 22.5 PG (ref 27–33)
MCHC RBC AUTO-ENTMCNC: 29.1 G/DL (ref 32–36)
MCV RBC AUTO: 77.3 FL (ref 80–100)
MONOCYTES # BLD AUTO: 612 CELLS/UL (ref 200–950)
MONOCYTES NFR BLD AUTO: 6.8 %
NEUTROPHILS # BLD AUTO: 6660 CELLS/UL (ref 1500–7800)
NEUTROPHILS NFR BLD AUTO: 74 %
PLATELET # BLD AUTO: 479 THOUSAND/UL (ref 140–400)
PMV BLD REES-ECKER: 10.2 FL (ref 7.5–12.5)
RBC # BLD AUTO: 5.25 MILLION/UL (ref 4.2–5.8)
WBC # BLD AUTO: 9 THOUSAND/UL (ref 3.8–10.8)

## 2020-06-10 ENCOUNTER — HOSPITAL ENCOUNTER (OUTPATIENT)
Dept: INFUSION CENTER | Facility: HOSPITAL | Age: 70
Discharge: HOME | End: 2020-06-10
Attending: INTERNAL MEDICINE

## 2020-06-30 ENCOUNTER — TELEPHONE (OUTPATIENT)
Dept: NEUROLOGY | Facility: CLINIC | Age: 70
End: 2020-06-30

## 2020-07-10 ENCOUNTER — TELEMEDICINE (OUTPATIENT)
Dept: GASTROENTEROLOGY | Facility: CLINIC | Age: 70
End: 2020-07-10
Payer: COMMERCIAL

## 2020-07-10 VITALS — BODY MASS INDEX: 29.33 KG/M2 | HEIGHT: 69 IN | WEIGHT: 198 LBS

## 2020-07-10 DIAGNOSIS — D58.2 ELEVATED HEMOGLOBIN (HCC): ICD-10-CM

## 2020-07-10 DIAGNOSIS — Z12.11 COLON CANCER SCREENING: ICD-10-CM

## 2020-07-10 DIAGNOSIS — Z86.73 HISTORY OF STROKE: ICD-10-CM

## 2020-07-10 DIAGNOSIS — K51.30 ULCERATIVE RECTOSIGMOIDITIS WITHOUT COMPLICATION (HCC): Primary | ICD-10-CM

## 2020-07-10 PROCEDURE — 2022F DILAT RTA XM EVC RTNOPTHY: CPT | Performed by: NURSE PRACTITIONER

## 2020-07-10 PROCEDURE — 99213 OFFICE O/P EST LOW 20 MIN: CPT | Performed by: NURSE PRACTITIONER

## 2020-07-10 PROCEDURE — 3066F NEPHROPATHY DOC TX: CPT | Performed by: NURSE PRACTITIONER

## 2020-07-10 PROCEDURE — 3078F DIAST BP <80 MM HG: CPT | Performed by: NURSE PRACTITIONER

## 2020-07-10 PROCEDURE — 3074F SYST BP LT 130 MM HG: CPT | Performed by: NURSE PRACTITIONER

## 2020-07-10 PROCEDURE — 3044F HG A1C LEVEL LT 7.0%: CPT | Performed by: NURSE PRACTITIONER

## 2020-07-10 PROCEDURE — 3008F BODY MASS INDEX DOCD: CPT | Performed by: NURSE PRACTITIONER

## 2020-07-10 PROCEDURE — 1036F TOBACCO NON-USER: CPT | Performed by: NURSE PRACTITIONER

## 2020-07-10 PROCEDURE — 1160F RVW MEDS BY RX/DR IN RCRD: CPT | Performed by: NURSE PRACTITIONER

## 2020-07-10 NOTE — PATIENT INSTRUCTIONS
Continue mesalamine 2 4 g daily, contact our office if he needs a refill of this medication  He can use Rowasa enemas as needed    Follow-up in 4-6 months or sooner if symptoms return

## 2020-07-10 NOTE — PROGRESS NOTES
Virtual Regular Visit      Assessment/Plan:    1  Ulcerative rectosigmoiditis without complication ([facility] )  Reports improvement of abdominal pain and rectal bleeding since last office visit on 05/22  He was started on Mesalamine at that time and symptoms have improved  He has not required the use of Rowasa enemas since last office visit  Last colonoscopy on 5/6/20 showed severe, localized edematous, eroded, erythematous and ulcerated mucosa in the rectosigmoid, one polyp in the rectum  Consistent with severe left sided active ulcerative colitis  Polyp was an inflammatory polyp and was negative for dysplasia and malignancy  - will continue Mesalamine 1 2 g (take 2 4 g total) daily   - can keep Rowasa enemas on hand and if he develops any worsening symptoms or has increased pain, rectal bleeding can use enemas PRN       2  Elevated hemoglobin (HCC)  Diagnosed after his CVA in January 2020  He did undergo a myeloproliferative workup which included a negative JAK2 mutation and a negative BCR/ABL and has been following with Dr Kathi Conroy as an outpatient  His most recent hemoglobin was 11 8  He will continue to have CBC and ferritin levels checked every 6 weeks  No signs of overt GI blood loss  - continue to follow with Hematology Dr Kathi Conroy     3  History of stroke  History of ischemic stroke in January 2020 with mild residual left-sided weakness  He remains on Plavix  He follows with Dr Parish Pillai as an outpatient  - continue to follow up with Neurology Dr Parish Pillai     4  Colon cancer screening   Last colonoscopy 05/06/2020  Will recommend a repeat colonoscopy in 7 years, May 2027         Case discussed with Dr Faheem Martinez     Followup Appointment: 4-6 months   Problem List Items Addressed This Visit        Digestive    Ulcerative rectosigmoiditis without complication ([facility] ) - Primary       Other    History of stroke      Other Visit Diagnoses     Elevated hemoglobin ([facility] )        Colon cancer screening Reason for visit is follow up UC   Chief Complaint   Patient presents with    Follow-up     UC/hemoglobin    Virtual Regular Visit        Encounter provider Clare Zaragoza, Sherri English    Provider located at [address] [address] [phone]      Recent Visits  No visits were found meeting these conditions  Showing recent visits within past 7 days and meeting all other requirements     Today's Visits  Date Type Provider Dept   07/10/20 Telemedicine [facility]   Showing today's visits and meeting all other requirements     Future Appointments  No visits were found meeting these conditions  Showing future appointments within next 150 days and meeting all other requirements        The patient was identified by name and date of birth  Angel Ghotra was informed that this is a telemedicine visit and that the visit is being conducted through [employer]  My office door was closed  No one else was in the room  He acknowledged consent and understanding of privacy and security of the video platform  The patient has agreed to participate and understands they can discontinue the visit at any time  Patient is aware this is a billable service  Paradise Rodrigez is a 72 y o  male who was seen for virtual visit for follow-up ulcerative colitis  Patient has a history of ulcerative colitis and over the past several months had had intermittent episodes of abdominal pain and rectal bleeding  He underwent a colonoscopy in May 2020 which showed severe active left-sided ulcerative colitis  He was prescribed will Rowasa enemas at that time which had improved his symptoms somewhat but he was still experiencing occasional abdominal pain and rectal bleeding  He was seen for virtual visit on 05/22 and was prescribed Mesalamine daily    He states that his symptoms have significantly improved and the abdominal pain and rectal bleeding has resolved  He reports that he is having regular, soft bowel movements daily  He denies any fever, chills, diarrhea, upper GI symptoms, nausea, vomiting, hematochezia or melena  He has not needed to use Rowasa enemas  Denies any appetite changes, weight loss  Past Medical History:   Diagnosis Date    CVA (cerebral vascular accident) ([address] )     Diverticulitis     Gout     Hypercholesterolemia     Hypertension     Renal disorder        Past Surgical History:   Procedure Laterality Date    CARDIAC LOOP RECORDER      COLONOSCOPY  09/18/2006    complete; 10 yrs    COLONOSCOPY  10/23/2017    complete; 5 yrs    COLONOSCOPY  05/06/2020    Severe active left-sided colitis, erythematous and ulcerated mucosa in the rectosigmoid, inflammatory polyp       Current Outpatient Medications   Medication Sig Dispense Refill    allopurinol (ZYLOPRIM) 100 mg tablet Take 2 tablets (200 mg total) by mouth daily 60 tablet 5    amLODIPine (NORVASC) 10 mg tablet Take 0 5 tablets (5 mg total) by mouth daily      canagliflozin (Invokana) 100 mg Take 1 tablet (100 mg total) by mouth daily 30 tablet 5    Coenzyme Q10 200 MG capsule Take by mouth daily       fenofibrate (TRICOR) 145 mg tablet Take 1 tablet (145 mg total) by mouth daily 30 tablet 5    mesalamine (LIALDA) 1 2 g EC tablet Take 2 tablets (2 4 g total) by mouth daily with breakfast 60 tablet 4    metoprolol tartrate (LOPRESSOR) 50 mg tablet Take 1 5 tablets (75 mg total) by mouth every 12 (twelve) hours      atorvastatin (LIPITOR) 40 mg tablet Take 1 tablet (40 mg total) by mouth daily with dinner (Patient taking differently: Take 40 mg by mouth daily with dinner Takes every other day) 30 tablet 5    clopidogrel (PLAVIX) 75 mg tablet Take 1 tablet (75 mg total) by mouth daily 30 tablet 5     No current facility-administered medications for this visit           No Known Allergies  Family History   Problem Relation Age of Onset   [facility] Breast cancer Mother     Kidney disease Father     Lung cancer Father     Other Father         smoker    Hypertension Other     Colon polyps Neg Hx     Colon cancer Neg Hx      Social History     Socioeconomic History    Marital status: /Civil Union     Spouse name: Not on file    Number of children: Not on file    Years of education: Not on file    Highest education level: Not on file   Occupational History    Occupation: full-time employment   Social Needs    Financial resource strain: Not on file    Food insecurity:     Worry: Not on file     Inability: Not on file    Transportation needs:     Medical: Not on file     Non-medical: Not on file   Tobacco Use    Smoking status: Never Smoker    Smokeless tobacco: Never Used   Substance and Sexual Activity    Alcohol use: Not Currently     Frequency: Never     Binge frequency: Never    Drug use: Never    Sexual activity: Yes   Lifestyle    Physical activity:     Days per week: Not on file     Minutes per session: Not on file    Stress: Not on file   Relationships    Social connections:     Talks on phone: Not on file     Gets together: Not on file     Attends Mandaeism service: Not on file     Active member of club or organization: Not on file     Attends meetings of clubs or organizations: Not on file     Relationship status: Not on file    Intimate partner violence:     Fear of current or ex partner: Not on file     Emotionally abused: Not on file     Physically abused: Not on file     Forced sexual activity: Not on file   Other Topics Concern    Not on file   Social History Narrative    Not on file       Review of Systems  REVIEW OF SYSTEMS:    CONSTITUTIONAL: Denies any fever, chills, rigors, and weight loss  HEENT: No earache or tinnitus  Denies hearing loss or visual disturbances  CARDIOVASCULAR: No chest pain or palpitations     RESPIRATORY: Denies any cough, hemoptysis, shortness of breath or dyspnea on exertion  GASTROINTESTINAL: As noted in the History of Present Illness  GENITOURINARY: No problems with urination  Denies any hematuria or dysuria  NEUROLOGIC: No dizziness or vertigo, denies headaches  MUSCULOSKELETAL: Denies any muscle or joint pain  SKIN: Denies skin rashes or itching  ENDOCRINE: Denies excessive thirst  Denies intolerance to heat or cold  PSYCHOSOCIAL: Denies depression or anxiety  Denies any recent memory loss  Video Exam    Vitals:    07/10/20 0955   Weight: 89 8 kg (198 lb)   Height: 5' 9" (1 753 m)       Physical Exam   General: appears well, no acute distress   HENT: Normocephalic, atraumatic, anicteric   Neck: supple neck, symmetrical   Lungs: Equal chest rise and unlabored breathing   Abdomen: no abdominal tenderness per pt   Neuro: AAOx3, follow commands   Psych: cooperative, normal affect   Skin: No jaundice, rashes, or lesions         I spent 30 minutes directly with the patient during this visit      [facility] acknowledges that he has consented to an online visit or consultation  He understands that the online visit is based solely on information provided by him, and that, in the absence of a face-to-face physical evaluation by the physician, the diagnosis he receives is both limited and provisional in terms of accuracy and completeness  This is not intended to replace a full medical face-to-face evaluation by the physician  Power Mathews understands and accepts these terms

## 2020-07-13 DIAGNOSIS — E78.1 ESSENTIAL HYPERTRIGLYCERIDEMIA: ICD-10-CM

## 2020-07-13 RX ORDER — FENOFIBRATE 145 MG/1
TABLET, COATED ORAL
Qty: 90 TABLET | Refills: 0 | Status: SHIPPED | OUTPATIENT
Start: 2020-07-13 | End: 2020-09-28 | Stop reason: SDUPTHER

## 2020-07-14 ENCOUNTER — TELEPHONE (OUTPATIENT)
Dept: NEUROLOGY | Facility: CLINIC | Age: 70
End: 2020-07-14

## 2020-07-14 NOTE — TELEPHONE ENCOUNTER
Patient's wife stated [facility] told her they never received the forms from us  Per Denice's encounter, form was faxed to them on July 7th  Offered to refax to [facility]  Advised she follow up with Graham and confirm receipt

## 2020-07-15 LAB
BASOPHILS # BLD AUTO: 97 CELLS/UL (ref 0–200)
BASOPHILS NFR BLD AUTO: 1.2 %
EOSINOPHIL # BLD AUTO: 251 CELLS/UL (ref 15–500)
EOSINOPHIL NFR BLD AUTO: 3.1 %
ERYTHROCYTE [DISTWIDTH] IN BLOOD BY AUTOMATED COUNT: 15.9 % (ref 11–15)
FERRITIN SERPL-MCNC: 9 NG/ML (ref 24–380)
HCT VFR BLD AUTO: 43.9 % (ref 38.5–50)
HGB BLD-MCNC: 12.8 G/DL (ref 13.2–17.1)
LYMPHOCYTES # BLD AUTO: 1499 CELLS/UL (ref 850–3900)
LYMPHOCYTES NFR BLD AUTO: 18.5 %
MCH RBC QN AUTO: 21 PG (ref 27–33)
MCHC RBC AUTO-ENTMCNC: 29.2 G/DL (ref 32–36)
MCV RBC AUTO: 72 FL (ref 80–100)
MONOCYTES # BLD AUTO: 559 CELLS/UL (ref 200–950)
MONOCYTES NFR BLD AUTO: 6.9 %
NEUTROPHILS # BLD AUTO: 5694 CELLS/UL (ref 1500–7800)
NEUTROPHILS NFR BLD AUTO: 70.3 %
PLATELET # BLD AUTO: 482 THOUSAND/UL (ref 140–400)
PMV BLD REES-ECKER: 10.4 FL (ref 7.5–12.5)
RBC # BLD AUTO: 6.1 MILLION/UL (ref 4.2–5.8)
WBC # BLD AUTO: 8.1 THOUSAND/UL (ref 3.8–10.8)

## 2020-07-17 ENCOUNTER — TELEPHONE (OUTPATIENT)
Dept: NEUROLOGY | Facility: CLINIC | Age: 70
End: 2020-07-17

## 2020-07-17 NOTE — TELEPHONE ENCOUNTER
The patient's wife called yesterday before I left and said that Maryse Mayes got the information that they needed from us but that we didn't send the note form the last visit with Rivka Carroll  She asked if I can fax it to [phone]   Faxed over at 10:40am

## 2020-07-22 ENCOUNTER — HOSPITAL ENCOUNTER (OUTPATIENT)
Dept: INFUSION CENTER | Facility: HOSPITAL | Age: 70
Discharge: HOME | End: 2020-07-22
Attending: INTERNAL MEDICINE

## 2020-08-05 ENCOUNTER — TELEPHONE (OUTPATIENT)
Dept: NEUROLOGY | Facility: CLINIC | Age: 70
End: 2020-08-05

## 2020-08-05 NOTE — TELEPHONE ENCOUNTER
Patient's wife calling in asking if we received the further questions from Angélica that they sent on 7/22 and 7/23    Made her aware I don't see anything  She requested I call Angélica at [phone] to ask what further information is needed  Incident # [id number]    Called Angélica and was on hold for 20+ minutes   Will attempt again tomorrow

## 2020-08-06 NOTE — TELEPHONE ENCOUNTER
Called Graham  She states that they sent the request for further records  Phone call kept cutting out but she states she will re-fax request to Anthony fax #   Will await fax

## 2020-08-07 DIAGNOSIS — I63.9 CVA (CEREBRAL VASCULAR ACCIDENT) (HCC): ICD-10-CM

## 2020-08-07 RX ORDER — CLOPIDOGREL BISULFATE 75 MG/1
TABLET ORAL
Qty: 30 TABLET | Refills: 3 | Status: SHIPPED | OUTPATIENT
Start: 2020-08-07 | End: 2020-10-22 | Stop reason: SDUPTHER

## 2020-08-11 NOTE — TELEPHONE ENCOUNTER
Reviewed chart  We still have not receievd further clinical questions  Called [phone] and spoke to [facility]  She states it is a "records request from beginning of May 1 2020 to present " I do see that we received an original requesst of this back on 6/30/20 date in media  Faxed that sheet to medical records to distribute medical records

## 2020-08-12 NOTE — TELEPHONE ENCOUNTER
Spoke with the patient this morning, informing him that his records are currently being faxed over to [employer]  Also provided specific information for him to tell [facility] where [facility] faxed them (Sent electronically to the [facility] Portal)  Phone Number and provided to the patient if he has future questions about this issue  ([phone]) Option 1  Also verified that the patients insurance is active and he should not have concerns about paying out of pocket for his visit next Tuesday 8/18/20 when he see's Dr Lyubov Caldwell

## 2020-08-18 ENCOUNTER — OFFICE VISIT (OUTPATIENT)
Dept: NEUROLOGY | Facility: CLINIC | Age: 70
End: 2020-08-18
Payer: COMMERCIAL

## 2020-08-18 VITALS
WEIGHT: 207.5 LBS | TEMPERATURE: 97.8 F | HEIGHT: 69 IN | BODY MASS INDEX: 30.73 KG/M2 | HEART RATE: 73 BPM | DIASTOLIC BLOOD PRESSURE: 88 MMHG | SYSTOLIC BLOOD PRESSURE: 142 MMHG | OXYGEN SATURATION: 96 % | RESPIRATION RATE: 14 BRPM

## 2020-08-18 DIAGNOSIS — E78.5 DYSLIPIDEMIA: ICD-10-CM

## 2020-08-18 DIAGNOSIS — I10 ESSENTIAL HYPERTENSION: ICD-10-CM

## 2020-08-18 DIAGNOSIS — E11.22 TYPE 2 DIABETES MELLITUS WITH STAGE 3 CHRONIC KIDNEY DISEASE, WITHOUT LONG-TERM CURRENT USE OF INSULIN (HCC): ICD-10-CM

## 2020-08-18 DIAGNOSIS — Z86.73 HISTORY OF STROKE: Primary | ICD-10-CM

## 2020-08-18 DIAGNOSIS — I65.23 BILATERAL CAROTID ARTERY STENOSIS: ICD-10-CM

## 2020-08-18 DIAGNOSIS — N18.30 TYPE 2 DIABETES MELLITUS WITH STAGE 3 CHRONIC KIDNEY DISEASE, WITHOUT LONG-TERM CURRENT USE OF INSULIN (HCC): ICD-10-CM

## 2020-08-18 LAB
BASOPHILS # BLD AUTO: 101 CELLS/UL (ref 0–200)
BASOPHILS NFR BLD AUTO: 1.5 %
EOSINOPHIL # BLD AUTO: 241 CELLS/UL (ref 15–500)
EOSINOPHIL NFR BLD AUTO: 3.6 %
ERYTHROCYTE [DISTWIDTH] IN BLOOD BY AUTOMATED COUNT: 20 % (ref 11–15)
FERRITIN SERPL-MCNC: 10 NG/ML (ref 24–380)
HCT VFR BLD AUTO: 44.5 % (ref 38.5–50)
HGB BLD-MCNC: 12.7 G/DL (ref 13.2–17.1)
LYMPHOCYTES # BLD AUTO: 1240 CELLS/UL (ref 850–3900)
LYMPHOCYTES NFR BLD AUTO: 18.5 %
MCH RBC QN AUTO: 20.6 PG (ref 27–33)
MCHC RBC AUTO-ENTMCNC: 28.5 G/DL (ref 32–36)
MCV RBC AUTO: 72 FL (ref 80–100)
MONOCYTES # BLD AUTO: 543 CELLS/UL (ref 200–950)
MONOCYTES NFR BLD AUTO: 8.1 %
NEUTROPHILS # BLD AUTO: 4576 CELLS/UL (ref 1500–7800)
NEUTROPHILS NFR BLD AUTO: 68.3 %
PLATELET # BLD AUTO: 410 THOUSAND/UL (ref 140–400)
PMV BLD REES-ECKER: 10.4 FL (ref 7.5–12.5)
RBC # BLD AUTO: 6.18 MILLION/UL (ref 4.2–5.8)
WBC # BLD AUTO: 6.7 THOUSAND/UL (ref 3.8–10.8)

## 2020-08-18 PROCEDURE — 2022F DILAT RTA XM EVC RTNOPTHY: CPT | Performed by: PSYCHIATRY & NEUROLOGY

## 2020-08-18 PROCEDURE — 3044F HG A1C LEVEL LT 7.0%: CPT | Performed by: PSYCHIATRY & NEUROLOGY

## 2020-08-18 PROCEDURE — 3008F BODY MASS INDEX DOCD: CPT | Performed by: PSYCHIATRY & NEUROLOGY

## 2020-08-18 PROCEDURE — 1036F TOBACCO NON-USER: CPT | Performed by: PSYCHIATRY & NEUROLOGY

## 2020-08-18 PROCEDURE — 99214 OFFICE O/P EST MOD 30 MIN: CPT | Performed by: PSYCHIATRY & NEUROLOGY

## 2020-08-18 PROCEDURE — 3066F NEPHROPATHY DOC TX: CPT | Performed by: PSYCHIATRY & NEUROLOGY

## 2020-08-18 PROCEDURE — 3079F DIAST BP 80-89 MM HG: CPT | Performed by: PSYCHIATRY & NEUROLOGY

## 2020-08-18 PROCEDURE — 3077F SYST BP >= 140 MM HG: CPT | Performed by: PSYCHIATRY & NEUROLOGY

## 2020-08-18 PROCEDURE — 1160F RVW MEDS BY RX/DR IN RCRD: CPT | Performed by: PSYCHIATRY & NEUROLOGY

## 2020-08-18 NOTE — PATIENT INSTRUCTIONS
Stroke:  Joel Mcrae presents for follow-up evaluation with regard to his prior stroke  In the interval since his last visit  To the office he reports no new symptoms concerning for recurrent TIA or stroke  He takes his medication as prescribed and has appropriately discontinued the use of aspirin in favor of Plavix monotherapy  He had a CT angiogram performed in March which did reveal bilateral asymptomatic carotid artery stenosis  His vascular risk factors are otherwise reasonably well controlled  He has ongoing left hemiparesis and post stroke fatigue  He is currently being evaluated for obstructive sleep apnea  - for ongoing stroke prevention should continue his current combination of  Plavix, Lipitor with the assistance of coenzyme Q10, and appropriate blood pressure and glycemic control     -I will defer to the good judgment of his primary care team for monitoring of his cholesterol panel and blood sugar numbers   -he should have a carotid Doppler ultrasound performed 1 year from his prior CT angiogram, that would be March of 2021  I will provide him with that prescription    - for ongoing post stroke fatigue and left hemiparesis he should remain physically active in as much as he feels capable of doing so  - I would encourage him to have a home sleep study performed since the in- lab study was inconclusive  If he is found to have obstructive sleep apnea then treatment could potentially help with energy level as well as lowering his risk for heart attack / stroke  -at this point, considering his fatigue and left-sided weakness, I would suggest that it is inappropriate for him to try and return to work immediately to a full-time level of function  That having been said, if he is able to begin working on a part-time basis, typically with both limited responsibility and limited hours, he could potentially rebuild his endurance in to full-time work      From a neurologic standpoint he would not be cleared to return immediately to full-time work due to the excessive mental strain and fatigue as well as challenges with his left upper lower extremity ( note that he is left-handed)  He would potentially be cleared to return with limited time and/ or responsibilities  I will plan for him to return to the office to see me in 8 months time but we would be happy to see him sooner if the need should arise  If he has any stroke-like symptoms including sudden painless loss of vision or double vision, difficulty speaking or swallowing, vertigo / room spinning that does not quickly resolve, or weakness /numbness affecting 1 side of the face or body he should proceed by ambulance to the nearest emergency room immediately

## 2020-08-18 NOTE — PROGRESS NOTES
Patient ID: Power Mathews is a 72 y o  male  Assessment/Plan:    No problem-specific Assessment & Plan notes found for this encounter  Diagnoses and all orders for this visit:    History of stroke    Type 2 diabetes mellitus with stage 3 chronic kidney disease, without long-term current use of insulin (HCC)    Essential hypertension    Dyslipidemia    Bilateral carotid artery stenosis  -     VAS carotid complete study; Future         Patient Instructions   Stroke:  Asael Ring presents for follow-up evaluation with regard to his prior stroke  In the interval since his last visit  To the office he reports no new symptoms concerning for recurrent TIA or stroke  He takes his medication as prescribed and has appropriately discontinued the use of aspirin in favor of Plavix monotherapy  He had a CT angiogram performed in March which did reveal bilateral asymptomatic carotid artery stenosis  His vascular risk factors are otherwise reasonably well controlled  He has ongoing left hemiparesis and post stroke fatigue  He is currently being evaluated for obstructive sleep apnea  - for ongoing stroke prevention should continue his current combination of  Plavix, Lipitor with the assistance of coenzyme Q10, and appropriate blood pressure and glycemic control     -I will defer to the good judgment of his primary care team for monitoring of his cholesterol panel and blood sugar numbers   -he should have a carotid Doppler ultrasound performed 1 year from his prior CT angiogram, that would be March of 2021  I will provide him with that prescription    - for ongoing post stroke fatigue and left hemiparesis he should remain physically active in as much as he feels capable of doing so  - I would encourage him to have a home sleep study performed since the in- lab study was inconclusive    If he is found to have obstructive sleep apnea then treatment could potentially help with energy level as well as lowering his risk for heart attack / stroke  -at this point, considering his fatigue and left-sided weakness, I would suggest that it is inappropriate for him to try and return to work immediately to a full-time level of function  That having been said, if he is able to begin working on a part-time basis, typically with both limited responsibility and limited hours, he could potentially rebuild his endurance in to full-time work  From a neurologic standpoint he would not be cleared to return immediately to full-time work due to the excessive mental strain and fatigue as well as challenges with his left upper lower extremity ( note that he is left-handed)  He would potentially be cleared to return with limited time and/ or responsibilities  I will plan for him to return to the office to see me in 8 months time but we would be happy to see him sooner if the need should arise  If he has any stroke-like symptoms including sudden painless loss of vision or double vision, difficulty speaking or swallowing, vertigo / room spinning that does not quickly resolve, or weakness /numbness affecting 1 side of the face or body he should proceed by ambulance to the nearest emergency room immediately  Subjective:    BOUCHRA Saleem for follow-up evaluation with regard to his prior stroke  Reports no new events concerning for recurrent TIA or stroke  He takes his medications as prescribed  He did not endorse any bleeding or bruising issues  He did have a carotid evaluation via CT angiogram in March of 2020 with bilateral asymptomatic stenosis confirmed  He also had a recent cholesterol panel and hemoglobin A1c which shows relatively good control  His blood pressure was slightly high in the office today we would prefer at less than 130/80  We reviewed his blood pressure log together and typically he is down in the desired range  He continues to experience relatively significant fatigue    In particular, he attempted to go just to a company meeting at the [employer] and was significantly fatigue afterwards  He continues to have some left upper lower extremity weakness as well  Note that he is left-handed  His wife confirms that sometimes in the evenings his gait will be off and he also will have a return of his dysarthric speech potentially with some puffing up of the lips  He also notes that he was being evaluated for sleep apnea but apparently may not have slept enough hours for the test   This was not in- lab study  We discussed the importance of considering a home sleep study  In addition to reviewing his most recent labs in the computer he also shared with me his home blood pressure log as well as some of his home blood sugar readings  We discussed a possible return to work  At this point in time he is being told that if he returns to work he must return full time  Considering his fatigue with tasks at home I would suggest that he is likely to experience significant fatigue as well as a re-emergence of some of his stroke symptoms if he were to attempt to return to full-time immediately without any opportunity to build his endurance and establish a new work routine with accommodations for his post stroke challenges              Past Medical History:   Diagnosis Date    CVA [facility]cerebral vascular accident) ([facility] [address] )     Diverticulitis     Gout     Hypercholesterolemia     Hypertension     Renal disorder        Past Surgical History:   Procedure Laterality Date    CARDIAC LOOP RECORDER      COLONOSCOPY  09/18/2006    complete; 10 yrs    COLONOSCOPY  10/23/2017    complete; 5 yrs    COLONOSCOPY  05/06/2020    Severe active left-sided colitis, erythematous and ulcerated mucosa in the rectosigmoid, inflammatory polyp       Social History     Socioeconomic History    Marital status: /Civil Union     Spouse name: None    Number of children: None    Years of education: None    Highest education level: None Occupational History    Occupation: full-time employment   Social Needs    Financial resource strain: None    Food insecurity     Worry: None     Inability: None    Transportation needs     Medical: None     Non-medical: None   Tobacco Use    Smoking status: Never Smoker    Smokeless tobacco: Never Used   Substance and Sexual Activity    Alcohol use: Not Currently     Frequency: Never     Binge frequency: Never    Drug use: Never    Sexual activity: Yes   Lifestyle    Physical activity     Days per week: None     Minutes per session: None    Stress: None   Relationships    Social connections     Talks on phone: None     Gets together: None     Attends Lutheran service: None     Active member of club or organization: None     Attends meetings of clubs or organizations: None     Relationship status: None    Intimate partner violence     Fear of current or ex partner: None     Emotionally abused: None     Physically abused: None     Forced sexual activity: None   Other Topics Concern    None   Social History Narrative    None       Family History   Problem Relation Age of Onset    Breast cancer Mother     Kidney disease Father     Lung cancer Father     Other Father         smoker    Hypertension Other     Colon polyps Neg Hx     Colon cancer Neg Hx          Current Outpatient Medications:     allopurinol (ZYLOPRIM) 100 mg tablet, Take 2 tablets (200 mg total) by mouth daily, Disp: 60 tablet, Rfl: 5    amLODIPine (NORVASC) 10 mg tablet, Take 0 5 tablets (5 mg total) by mouth daily, Disp: , Rfl:     atorvastatin (LIPITOR) 40 mg tablet, Take 1 tablet (40 mg total) by mouth daily with dinner (Patient taking differently: Take 40 mg by mouth daily with dinner Takes every other day), Disp: 30 tablet, Rfl: 5    canagliflozin (Invokana) 100 mg, Take 1 tablet (100 mg total) by mouth daily, Disp: 30 tablet, Rfl: 5    clopidogrel (PLAVIX) 75 mg tablet, Take 1 tablet by mouth once daily, Disp: 30 tablet, Rfl: 3    Coenzyme Q10 200 MG capsule, Take by mouth daily , Disp: , Rfl:     fenofibrate (TRICOR) 145 mg tablet, Take 1 tablet by mouth once daily, Disp: 90 tablet, Rfl: 0    mesalamine (LIALDA) 1 2 g EC tablet, Take 2 tablets (2 4 g total) by mouth daily with breakfast, Disp: 60 tablet, Rfl: 4    metoprolol tartrate (LOPRESSOR) 50 mg tablet, Take 1 5 tablets (75 mg total) by mouth every 12 (twelve) hours, Disp: , Rfl:     No Known Allergies     Blood pressure 142/88, pulse 73, temperature 97 8 °F (36 6 °C), resp  rate 14, height 5' 9" (1 753 m), weight 94 1 kg (207 lb 8 oz), SpO2 96 %  The following portions of the patient's history were reviewed: allergies, current medications, past family history, past medical history, past surgical history, past social history and problem list        Objective:    Blood pressure 142/88, pulse 73, temperature 97 8 °F (36 6 °C), resp  rate 14, height 5' 9" (1 753 m), weight 94 1 kg (207 lb 8 oz), SpO2 96 %  Physical Exam    Neurological Exam      At the time of my examination he was awake, alert, and in no distress  Cranial nerves 2-12 were symmetrically intact bilaterally  There is no significant dysarthria or aphasia  Motor testing reveals 5/5 strength in the bilateral upper lower extremities however he is clearly weaker in the left upper extremity where there is a fix, and weaker in the left lower extremity compared with the right leg  Sensation is intact to light touch in the bilateral upper lower extremities  There is no ataxia  His gait is clearly hemiparetic  ROS:    Review of Systems   Constitutional: Positive for fatigue  Negative for appetite change and fever  HENT: Negative  Negative for hearing loss, tinnitus, trouble swallowing and voice change  Eyes: Negative  Negative for photophobia and pain  Respiratory: Negative  Negative for shortness of breath  Cardiovascular: Negative  Negative for palpitations     Gastrointestinal: Negative  Negative for nausea and vomiting  Endocrine: Negative  Negative for cold intolerance  Genitourinary: Negative  Negative for dysuria, frequency and urgency  Musculoskeletal: Negative  Negative for myalgias and neck pain  Skin: Negative  Negative for rash  Allergic/Immunologic: Negative  Neurological: Positive for weakness (Left hip)  Negative for dizziness, tremors, seizures, syncope, facial asymmetry, speech difficulty, light-headedness, numbness and headaches  Hematological: Negative  Does not bruise/bleed easily  Psychiatric/Behavioral: Negative  Negative for confusion, hallucinations and sleep disturbance  All other systems reviewed and are negative  Reviewed ROS as entered by medical assistant  I have spent 25 minutes with Patient and family today in which greater than 50% of this time was spent in counseling/coordination of care regarding Prognosis, Risks and benefits of tx options, Intructions for management and Risk factor reductions

## 2020-08-18 NOTE — TELEPHONE ENCOUNTER
Patient was seen today by Dr Elvie Yadav  Patient requested that the note from today's visit be faxed over to [facility]  Per Dr Herman Santillan request I have faxed his visit note over to [facility] at [phone]  I will also call and let the patient know that the note from today's visit was faxed over

## 2020-08-21 ENCOUNTER — TELEPHONE (OUTPATIENT)
Dept: FAMILY MEDICINE CLINIC | Facility: HOSPITAL | Age: 70
End: 2020-08-21

## 2020-08-21 ENCOUNTER — TELEPHONE (OUTPATIENT)
Dept: GASTROENTEROLOGY | Facility: CLINIC | Age: 70
End: 2020-08-21

## 2020-08-21 NOTE — TELEPHONE ENCOUNTER
Mrs Hernandez Nunez called stating that her  is changing insurance to [facility] and has new Rx plan ([employer])  They were asked to call to see if there is an alternative to the Lialda due to cost  I asked her to contact her insurance to advise what they would cover and to call us back  She will comply

## 2020-08-28 DIAGNOSIS — E11.29 TYPE 2 DIABETES MELLITUS WITH MICROALBUMINURIA, WITHOUT LONG-TERM CURRENT USE OF INSULIN (HCC): ICD-10-CM

## 2020-08-28 DIAGNOSIS — R80.9 TYPE 2 DIABETES MELLITUS WITH MICROALBUMINURIA, WITHOUT LONG-TERM CURRENT USE OF INSULIN (HCC): ICD-10-CM

## 2020-08-29 RX ORDER — CANAGLIFLOZIN 100 MG/1
TABLET, FILM COATED ORAL
Qty: 30 TABLET | Refills: 0 | Status: SHIPPED | OUTPATIENT
Start: 2020-08-29 | End: 2020-09-28

## 2020-09-02 ENCOUNTER — HOSPITAL ENCOUNTER (OUTPATIENT)
Dept: INFUSION CENTER | Facility: HOSPITAL | Age: 70
Discharge: HOME | End: 2020-09-02
Attending: INTERNAL MEDICINE

## 2020-09-02 NOTE — PROGRESS NOTES
Per Shirley Gordon RN with Dr Corey Falling office ok to use labs dated 8/17  Patient does not meet treatment parameters Hgb 12 7 and Ferritin 10

## 2020-09-03 DIAGNOSIS — I10 ESSENTIAL HYPERTENSION: ICD-10-CM

## 2020-09-03 NOTE — TELEPHONE ENCOUNTER
The pt's wife called for a new prescription to be sent over to the pharmacy for the following medication: Amlodipine (Norvasc), 10mg  The pharmacy would not fill the medication without exact directions from the doctor regarding taking tablet and half of medication

## 2020-09-04 RX ORDER — AMLODIPINE BESYLATE 10 MG/1
5 TABLET ORAL DAILY
Qty: 15 TABLET | Refills: 0 | Status: SHIPPED | OUTPATIENT
Start: 2020-09-04 | End: 2020-09-28 | Stop reason: SDUPTHER

## 2020-09-14 ENCOUNTER — TELEPHONE (OUTPATIENT)
Dept: HEMATOLOGY ONCOLOGY | Facility: CLINIC | Age: 70
End: 2020-09-14

## 2020-09-14 ENCOUNTER — OFFICE VISIT (OUTPATIENT)
Dept: HEMATOLOGY ONCOLOGY | Facility: HOSPITAL | Age: 70
End: 2020-09-14
Payer: COMMERCIAL

## 2020-09-14 VITALS
HEIGHT: 69 IN | SYSTOLIC BLOOD PRESSURE: 114 MMHG | TEMPERATURE: 98.3 F | OXYGEN SATURATION: 99 % | BODY MASS INDEX: 31.1 KG/M2 | DIASTOLIC BLOOD PRESSURE: 88 MMHG | WEIGHT: 210 LBS | HEART RATE: 62 BPM | RESPIRATION RATE: 16 BRPM

## 2020-09-14 DIAGNOSIS — D75.1 ERYTHROCYTOSIS: Primary | ICD-10-CM

## 2020-09-14 PROCEDURE — 99214 OFFICE O/P EST MOD 30 MIN: CPT | Performed by: INTERNAL MEDICINE

## 2020-09-14 NOTE — TELEPHONE ENCOUNTER
Appointment Confirmation     Appointment with  [facility]   Appointment date & time 9-14-20 @ 1:20pm    Location [facility]   Patient verbilized Understanding  yes

## 2020-09-14 NOTE — PROGRESS NOTES
Hematology/Oncology Outpatient Follow- up Note  Caty Hill 72 y o  male MRN: @ Encounter: [id number]        Date:  9/14/2020    Presenting Complaint/Diagnosis : Stroke with an elevated hemoglobin    Previous Hematologic/ Oncologic History:      Workup    Current Hematologic/ Oncologic Treatment:      Workup    Interval History:    The patient returns for follow-up visit  At his last visit we had changed him to every 6 weeks as far as his testing is concerned along with phlebotomy as he was becoming iron deficient  His hemoglobin is improved slightly so I will change him to every 2 months now  As far symptoms are concerned he is at baseline  Denies any nausea denies any vomiting denies any diarrhea  The rest of his 14 point review of systems today was negative  Test Results:    Imaging: No results found  Labs:   Lab Results   Component Value Date    WBC 6 7 08/17/2020    HGB 12 7 (L) 08/17/2020    HCT 44 5 08/17/2020    MCV 72 0 (L) 08/17/2020     (H) 08/17/2020     Lab Results   Component Value Date     07/07/2017    K 3 4 (L) 03/20/2020     (H) 03/20/2020    CO2 24 03/20/2020    BUN 19 03/20/2020    CREATININE 1 42 (H) 03/20/2020    CALCIUM 8 5 03/20/2020    AST 21 01/29/2020    ALT 27 01/29/2020    ALKPHOS 85 01/29/2020    PROT 7 1 07/07/2017    BILITOT 0 6 07/07/2017    EGFR 50 03/20/2020     Lab Results   Component Value Date    PSA 5 1 (H) 01/27/2020     Lab Results   Component Value Date    IRON 128 12/07/2018    TIBC 232 (L) 08/24/2018    FERRITIN 10 (L) 08/17/2020     ROS: As stated in the history of present illness otherwise his 14 point review of systems today was negative        Active Problems:   Patient Active Problem List   Diagnosis    Chronic kidney disease, stage 3 (HCC)    Colon, diverticulosis    Elevated C-reactive protein    Elevated prostate specific antigen (PSA)    Essential hypertriglyceridemia    Gout    Hypertension    Microalbuminuria    Obesity    Type 2 diabetes mellitus with stage 3 chronic kidney disease, without long-term current use of insulin (HCC)    Ulcerative rectosigmoiditis without complication (HCC)    Dyslipidemia    Erythrocytosis    History of stroke    Transient alteration of awareness    Witnessed episode of apnea    NSVT (nonsustained ventricular tachycardia) (HCC)    Bilateral carotid artery stenosis       Past Medical History:   Past Medical History:   Diagnosis Date    [facility] vascular accident) ([facility] [address]     Diverticulitis     Gout     Hypercholesterolemia     Hypertension     Renal disorder        Surgical History:   Past Surgical History:   Procedure Laterality Date    CARDIAC LOOP RECORDER      COLONOSCOPY  09/18/2006    complete; 10 yrs    COLONOSCOPY  10/23/2017    complete; 5 yrs    COLONOSCOPY  05/06/2020    Severe active left-sided colitis, erythematous and ulcerated mucosa in the rectosigmoid, inflammatory polyp       Family History:    Family History   Problem Relation Age of Onset    Breast cancer Mother     Kidney disease Father     Lung cancer Father     Other Father         smoker    Hypertension Other     Colon polyps Neg Hx     Colon cancer Neg Hx        Cancer-related family history includes Breast cancer in his mother; Lung cancer in his father  There is no history of Colon cancer      Social History:   Social History     Socioeconomic History    Marital status: /Civil Union     Spouse name: Not on file    Number of children: Not on file    Years of education: Not on file    Highest education level: Not on file   Occupational History    Occupation: full-time employment   Social Needs    Financial resource strain: Not on file    Food insecurity     Worry: Not on file     Inability: Not on file   [employer] needs     Medical: Not on file     Non-medical: Not on file   Tobacco Use    Smoking status: Never Smoker    Smokeless tobacco: Never Used   Substance and Sexual Activity    Alcohol use: Not Currently     Frequency: Never     Binge frequency: Never    Drug use: Never    Sexual activity: Yes   Lifestyle    Physical activity     Days per week: Not on file     Minutes per session: Not on file    Stress: Not on file   Relationships    Social connections     Talks on phone: Not on file     Gets together: Not on file     Attends Restorationism service: Not on file     Active member of club or organization: Not on file     Attends meetings of clubs or organizations: Not on file     Relationship status: Not on file    Intimate partner violence     Fear of current or ex partner: Not on file     Emotionally abused: Not on file     Physically abused: Not on file     Forced sexual activity: Not on file   Other Topics Concern    Not on file   Social History Narrative    Not on file       Current Medications:   Current Outpatient Medications   Medication Sig Dispense Refill    allopurinol (ZYLOPRIM) 100 mg tablet Take 2 tablets (200 mg total) by mouth daily 60 tablet 5    amLODIPine (NORVASC) 10 mg tablet Take 0 5 tablets (5 mg total) by mouth daily 15 tablet 0    clopidogrel (PLAVIX) 75 mg tablet Take 1 tablet by mouth once daily 30 tablet 3    Coenzyme Q10 200 MG capsule Take by mouth daily       fenofibrate (TRICOR) 145 mg tablet Take 1 tablet by mouth once daily 90 tablet 0    Invokana 100 MG Take 1 tablet by mouth once daily 30 tablet 0    mesalamine (LIALDA) 1 2 g EC tablet Take 2 tablets (2 4 g total) by mouth daily with breakfast 60 tablet 4    metoprolol tartrate (LOPRESSOR) 50 mg tablet Take 1 5 tablets (75 mg total) by mouth every 12 (twelve) hours      atorvastatin (LIPITOR) 40 mg tablet Take 1 tablet (40 mg total) by mouth daily with dinner (Patient taking differently: Take 40 mg by mouth daily with dinner Takes every other day) 30 tablet 5     No current facility-administered medications for this visit          Allergies: No Known Allergies    Physical Exam:    There is no height or weight on file to calculate BSA  Wt Readings from Last 3 Encounters:   08/18/20 94 1 kg (207 lb 8 oz)   07/10/20 89 8 kg (198 lb)   05/28/20 89 8 kg (198 lb)        Temp Readings from Last 3 Encounters:   08/18/20 97 8 °F (36 6 °C)   05/28/20 97 6 °F (36 4 °C) (Tympanic)   05/11/20 97 8 °F (36 6 °C)        BP Readings from Last 3 Encounters:   08/18/20 142/88   05/28/20 128/74   05/14/20 126/74         Pulse Readings from Last 3 Encounters:   08/18/20 73   05/28/20 72   05/14/20 64       Physical Exam     Constitutional   General appearance: No acute distress, well appearing and well nourished  Eyes   Conjunctiva and lids: No swelling, erythema or discharge  Pupils and irises: Equal, round and reactive to light  Ears, Nose, Mouth, and Throat   External inspection of ears and nose: Normal     Nasal mucosa, septum, and turbinates: Normal without edema or erythema  Oropharynx: Normal with no erythema, edema, exudate or lesions  Pulmonary   Respiratory effort: No increased work of breathing or signs of respiratory distress  Auscultation of lungs: Clear to auscultation  Cardiovascular   Palpation of heart: Normal PMI, no thrills  Auscultation of heart: Normal rate and rhythm, normal S1 and S2, without murmurs  Examination of extremities for edema and/or varicosities: Normal     Carotid pulses: Normal     Abdomen   Abdomen: Non-tender, no masses  Liver and spleen: No hepatomegaly or splenomegaly  Lymphatic   Palpation of lymph nodes in neck: No lymphadenopathy  Musculoskeletal   Gait and station: Normal     Digits and nails: Normal without clubbing or cyanosis  Inspection/palpation of joints, bones, and muscles: Normal     Skin   Skin and subcutaneous tissue: Normal without rashes or lesions  Neurologic   Cranial nerves: Cranial nerves 2-12 intact  Sensation: No sensory loss      Psychiatric   Orientation to person, place, and time: Normal  Mood and affect: Normal         Assessment / Plan:      The patient is a pleasant 59-year-old male who was recently seen in the hospital for elevated hemoglobin when he had a stroke  He also has renal insufficiency but despite that his hemoglobin was running in the 17 5 range with a hematocrit consistently above 50  We put him on an every 2 week phlebotomy schedule  His hemoglobin has actually come down to 12 7 and he is now on a monthly phlebotomy schedule  He seems to be getting iron deficient  His hematocrit is now 44 5  I will change his phlebotomy to every 2 months  will see him back in 6 months  If he has any questions he will call our office  I have already changed his parameters to hemoglobin greater than 13 ferritin greater than equal to 20 in terms of when he gets phlebotomy      Goals and Barriers:  Current Goal:  Prolong Survival from elevated hemoglobin and stroke  Barriers: None  Patient's Capacity to Self Care:  Patient able to self care  Portions of the record may have been created with voice recognition software   Occasional wrong word or "sound a like" substitutions may have occurred due to the inherent limitations of voice recognition software   Read the chart carefully and recognize, using context, where substitutions have occurred

## 2020-09-17 ENCOUNTER — TELEPHONE (OUTPATIENT)
Dept: OTHER | Facility: OTHER | Age: 70
End: 2020-09-17

## 2020-09-17 NOTE — TELEPHONE ENCOUNTER
Wife Suma Redman is requesting a call back regarding husbands medication and to see if it's approved through his insurance

## 2020-09-18 ENCOUNTER — TELEPHONE (OUTPATIENT)
Dept: GASTROENTEROLOGY | Facility: CLINIC | Age: 70
End: 2020-09-18

## 2020-09-18 ENCOUNTER — TELEPHONE (OUTPATIENT)
Dept: NEUROLOGY | Facility: CLINIC | Age: 70
End: 2020-09-18

## 2020-09-18 ENCOUNTER — TELEPHONE (OUTPATIENT)
Dept: FAMILY MEDICINE CLINIC | Facility: HOSPITAL | Age: 70
End: 2020-09-18

## 2020-09-18 DIAGNOSIS — E11.22 TYPE 2 DIABETES MELLITUS WITH STAGE 3 CHRONIC KIDNEY DISEASE, WITHOUT LONG-TERM CURRENT USE OF INSULIN (HCC): Primary | ICD-10-CM

## 2020-09-18 DIAGNOSIS — N18.30 TYPE 2 DIABETES MELLITUS WITH STAGE 3 CHRONIC KIDNEY DISEASE, WITHOUT LONG-TERM CURRENT USE OF INSULIN (HCC): Primary | ICD-10-CM

## 2020-09-18 DIAGNOSIS — K51.30 ULCERATIVE RECTOSIGMOIDITIS WITHOUT COMPLICATION (HCC): Primary | ICD-10-CM

## 2020-09-18 PROCEDURE — 3066F NEPHROPATHY DOC TX: CPT | Performed by: INTERNAL MEDICINE

## 2020-09-18 RX ORDER — SULFASALAZINE 500 MG/1
500 TABLET, DELAYED RELEASE ORAL 4 TIMES DAILY
Qty: 120 TABLET | Refills: 1 | Status: SHIPPED | OUTPATIENT
Start: 2020-09-18 | End: 2020-10-23 | Stop reason: SDUPTHER

## 2020-09-18 RX ORDER — EMPAGLIFLOZIN 10 MG/1
10 TABLET, FILM COATED ORAL EVERY MORNING
Qty: 30 TABLET | Refills: 5 | Status: SHIPPED | OUTPATIENT
Start: 2020-09-18 | End: 2020-10-22 | Stop reason: SDUPTHER

## 2020-09-18 NOTE — TELEPHONE ENCOUNTER
rx for Jardiance 10 mg 1 tab PO q day sent  - con't all other DM meds other then [facility]

## 2020-09-18 NOTE — LETTER
To Whom It May Concern,    Lindsay Cooney  1950 is under the professional care of [facility]  He is cleared to return to work on 20  If you have any questions, please call our office back at [phone]      Sincerely,     Law Mejia MD

## 2020-09-18 NOTE — TELEPHONE ENCOUNTER
Called pt back, he reiterated previous message  Reviewed previous telephone encounter that has suggested they contact his new insurance and check coverage with them  He noted they did and was told the provider just needs to order the new medication and then check cost that way  Could you please prescribe an alternative for his Mesalamine 1 2g 2 tablets daily? Confirmed pharmacy

## 2020-09-18 NOTE — TELEPHONE ENCOUNTER
Wants to know if there is anything cheaper than Invokana  His new insurance as of 10/1 will not cover the med

## 2020-09-18 NOTE — TELEPHONE ENCOUNTER
Invokana and jardiance are both tier 2 meds  There are not tier 1 meds in that class of medications

## 2020-09-18 NOTE — TELEPHONE ENCOUNTER
Spoke to pt, they are switching to Medicare and Tracie Deleon is not covered by them  I asked him if they gave him an alternative, and he said "have your doctor prescribe something else in its class and we will check coverage"  Pt did not have a phone number to give me to call and verify with the insurance

## 2020-09-18 NOTE — TELEPHONE ENCOUNTER
Patient calling in  He states appeal for disability extension was denied  Lety Lucas stated that there is nothing medically wrong that he could not go back to work  He states on office notes it was note  Rated 5/5 for strength but noted that L side was weaker than R side but nothing to prove that  Patient states that he has been out of work since January  He states he did ask HR, but HR stated they won't allow him to work part time  and they stated that he should only come back when able to work full time  Patient states he could either appeal again or Dr Glenford Brunner could write him a letter to return to work so he can try to go back to work  Patient notes that he is feeling a lot better and would be willing to try  He notes that the person currently doing his job now is only in the office 3 days a week anyway   Dr Glenford Brunner please advise    [phone]   Kellee Langley to leave a detailed message

## 2020-09-18 NOTE — TELEPHONE ENCOUNTER
Need to find out from insurance what other SGLT inhibitor is covered (Jardiance) or if any GLP inhibitor or SGLT inhibitors are covered - I'm not going to just choose one unless I know what is covered   TY

## 2020-09-18 NOTE — TELEPHONE ENCOUNTER
Called and spoke with patient  He is switching from high gamal insurance to Medicare and mesalamine is very expensive  He has been on mesalamine 2 4 g daily  I will prescribed sulfasalazine 500 mg 1 tablet 4 times daily  Advised patient to take this medication after meals  I also advised him of GI side effects and asked him to contact our office if he is unable to tolerate this medication  I will send a 1 month supply with 1 refill to [address] [place] [address] as they will need to transition to mail order  Patient verbalized understanding and all questions were answered

## 2020-09-18 NOTE — TELEPHONE ENCOUNTER
Pt's wife, Ramu, left VM mssg stating he needs Rx for Mesalamine  They switched to Medicare A & B and were told to find cheaper med; req CB to Pt to discuss at [phone]

## 2020-09-21 NOTE — TELEPHONE ENCOUNTER
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 Or just clear to return to work on 9/28?

## 2020-09-21 NOTE — TELEPHONE ENCOUNTER
Please will you remind me what kind of work Darcy Lobato does? I know that he is L handed and his L side was affected by the stroke  As long as he is not doing something that would be a danger to himself if his L side gets tired out I am ok if he wants to try to return to work and we can generate a letter reflecting such

## 2020-09-23 LAB
BUN SERPL-MCNC: 23 MG/DL (ref 7–25)
BUN/CREAT SERPL: 15 (CALC) (ref 6–22)
CALCIUM SERPL-MCNC: 9 MG/DL (ref 8.6–10.3)
CHLORIDE SERPL-SCNC: 109 MMOL/L (ref 98–110)
CO2 SERPL-SCNC: 23 MMOL/L (ref 20–32)
CREAT SERPL-MCNC: 1.5 MG/DL (ref 0.7–1.25)
EST. AVERAGE GLUCOSE BLD GHB EST-MCNC: 126 (CALC)
EST. AVERAGE GLUCOSE BLD GHB EST-SCNC: 7 (CALC)
GLUCOSE SERPL-MCNC: 108 MG/DL (ref 65–99)
HBA1C MFR BLD: 6 % OF TOTAL HGB
POTASSIUM SERPL-SCNC: 4.3 MMOL/L (ref 3.5–5.3)
SL AMB EGFR AFRICAN AMERICAN: 54 ML/MIN/1.73M2
SL AMB EGFR NON AFRICAN AMERICAN: 47 ML/MIN/1.73M2
SODIUM SERPL-SCNC: 143 MMOL/L (ref 135–146)

## 2020-09-23 PROCEDURE — 3044F HG A1C LEVEL LT 7.0%: CPT | Performed by: INTERNAL MEDICINE

## 2020-09-24 NOTE — TELEPHONE ENCOUNTER
Generated below letter and sent through patient's MyChart  I called and left a detailed message for patient making him aware of letter being sent  "To Whom It May Concern,    Hardik Beasley SELAM 1950 is under the professional care of [facility]  He is cleared to return to work on 20  If you have any questions, please call our office back at [phone]      Sincerely,     Dmitry Degroot MD"

## 2020-09-24 NOTE — TELEPHONE ENCOUNTER
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

## 2020-09-28 ENCOUNTER — OFFICE VISIT (OUTPATIENT)
Dept: FAMILY MEDICINE CLINIC | Facility: HOSPITAL | Age: 70
End: 2020-09-28
Payer: COMMERCIAL

## 2020-09-28 VITALS
TEMPERATURE: 98.1 F | WEIGHT: 210 LBS | HEIGHT: 69 IN | DIASTOLIC BLOOD PRESSURE: 84 MMHG | BODY MASS INDEX: 31.1 KG/M2 | SYSTOLIC BLOOD PRESSURE: 136 MMHG | HEART RATE: 70 BPM

## 2020-09-28 DIAGNOSIS — E66.9 OBESITY (BMI 30.0-34.9): ICD-10-CM

## 2020-09-28 DIAGNOSIS — E11.22 TYPE 2 DIABETES MELLITUS WITH STAGE 3 CHRONIC KIDNEY DISEASE, WITHOUT LONG-TERM CURRENT USE OF INSULIN (HCC): Primary | ICD-10-CM

## 2020-09-28 DIAGNOSIS — K51.30 ULCERATIVE RECTOSIGMOIDITIS WITHOUT COMPLICATION (HCC): ICD-10-CM

## 2020-09-28 DIAGNOSIS — E78.1 ESSENTIAL HYPERTRIGLYCERIDEMIA: ICD-10-CM

## 2020-09-28 DIAGNOSIS — M1A.9XX0 CHRONIC GOUT WITHOUT TOPHUS, UNSPECIFIED CAUSE, UNSPECIFIED SITE: ICD-10-CM

## 2020-09-28 DIAGNOSIS — D75.1 ERYTHROCYTOSIS: ICD-10-CM

## 2020-09-28 DIAGNOSIS — Z86.73 HISTORY OF STROKE: ICD-10-CM

## 2020-09-28 DIAGNOSIS — R80.9 MICROALBUMINURIA: ICD-10-CM

## 2020-09-28 DIAGNOSIS — N18.30 CHRONIC KIDNEY DISEASE, STAGE 3 (HCC): ICD-10-CM

## 2020-09-28 DIAGNOSIS — I10 ESSENTIAL HYPERTENSION: ICD-10-CM

## 2020-09-28 DIAGNOSIS — N18.30 TYPE 2 DIABETES MELLITUS WITH STAGE 3 CHRONIC KIDNEY DISEASE, WITHOUT LONG-TERM CURRENT USE OF INSULIN (HCC): Primary | ICD-10-CM

## 2020-09-28 DIAGNOSIS — Z12.5 SCREENING FOR PROSTATE CANCER: ICD-10-CM

## 2020-09-28 DIAGNOSIS — Z23 ENCOUNTER FOR IMMUNIZATION: ICD-10-CM

## 2020-09-28 DIAGNOSIS — E78.5 DYSLIPIDEMIA: ICD-10-CM

## 2020-09-28 PROCEDURE — 90670 PCV13 VACCINE IM: CPT | Performed by: INTERNAL MEDICINE

## 2020-09-28 PROCEDURE — 90471 IMMUNIZATION ADMIN: CPT | Performed by: INTERNAL MEDICINE

## 2020-09-28 PROCEDURE — 1036F TOBACCO NON-USER: CPT | Performed by: INTERNAL MEDICINE

## 2020-09-28 PROCEDURE — 1160F RVW MEDS BY RX/DR IN RCRD: CPT | Performed by: INTERNAL MEDICINE

## 2020-09-28 PROCEDURE — 1101F PT FALLS ASSESS-DOCD LE1/YR: CPT | Performed by: INTERNAL MEDICINE

## 2020-09-28 PROCEDURE — 90662 IIV NO PRSV INCREASED AG IM: CPT | Performed by: INTERNAL MEDICINE

## 2020-09-28 PROCEDURE — 90472 IMMUNIZATION ADMIN EACH ADD: CPT | Performed by: INTERNAL MEDICINE

## 2020-09-28 PROCEDURE — 3075F SYST BP GE 130 - 139MM HG: CPT | Performed by: INTERNAL MEDICINE

## 2020-09-28 PROCEDURE — 3079F DIAST BP 80-89 MM HG: CPT | Performed by: INTERNAL MEDICINE

## 2020-09-28 PROCEDURE — 99214 OFFICE O/P EST MOD 30 MIN: CPT | Performed by: INTERNAL MEDICINE

## 2020-09-28 PROCEDURE — 4040F PNEUMOC VAC/ADMIN/RCVD: CPT | Performed by: INTERNAL MEDICINE

## 2020-09-28 PROCEDURE — 3288F FALL RISK ASSESSMENT DOCD: CPT | Performed by: INTERNAL MEDICINE

## 2020-09-28 RX ORDER — AMLODIPINE BESYLATE 10 MG/1
10 TABLET ORAL DAILY
Qty: 30 TABLET | Refills: 5 | Status: SHIPPED | OUTPATIENT
Start: 2020-09-28 | End: 2020-10-22 | Stop reason: SDUPTHER

## 2020-09-28 RX ORDER — ALLOPURINOL 100 MG/1
200 TABLET ORAL DAILY
Qty: 60 TABLET | Refills: 5 | Status: SHIPPED | OUTPATIENT
Start: 2020-09-28 | End: 2020-10-22 | Stop reason: SDUPTHER

## 2020-09-28 RX ORDER — FENOFIBRATE 145 MG/1
145 TABLET, COATED ORAL DAILY
Qty: 30 TABLET | Refills: 5 | Status: SHIPPED | OUTPATIENT
Start: 2020-09-28 | End: 2020-10-22 | Stop reason: SDUPTHER

## 2020-09-28 NOTE — ASSESSMENT & PLAN NOTE
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

## 2020-09-28 NOTE — ASSESSMENT & PLAN NOTE
No recent events reported, allopurinol refilled upon request, check uric acid with labs in March - order given

## 2020-09-28 NOTE — PATIENT INSTRUCTIONS
Obesity   AMBULATORY CARE:   Obesity  is when your body mass index (BMI) is greater than 30  Your healthcare provider will use your height and weight to measure your BMI  The risks of obesity include  many health problems, such as injuries or physical disability  You may need tests to check for the following:  · Diabetes     · High blood pressure or high cholesterol     · Heart disease     · Gallbladder or liver disease     · Cancer of the colon, breast, prostate, liver, or kidney     · Sleep apnea     · Arthritis or gout  Seek care immediately if:   · You have a severe headache, confusion, or difficulty speaking  · You have weakness on one side of your body  · You have chest pain, sweating, or shortness of breath  Contact your healthcare provider if:   · You have symptoms of gallbladder or liver disease, such as pain in your upper abdomen  · You have knee or hip pain and discomfort while walking  · You have symptoms of diabetes, such as intense hunger and thirst, and frequent urination  · You have symptoms of sleep apnea, such as snoring or daytime sleepiness  · You have questions or concerns about your condition or care  Treatment for obesity  focuses on helping you lose weight to improve your health  Even a small decrease in BMI can reduce the risk for many health problems  Your healthcare provider will help you set a weight-loss goal   · Lifestyle changes  are the first step in treating obesity  These include making healthy food choices and getting regular physical activity  Your healthcare provider may suggest a weight-loss program that involves coaching, education, and therapy  · Medicine  may help you lose weight when it is used with a healthy diet and physical activity  · Surgery  can help you lose weight if you are very obese and have other health problems  There are several types of weight-loss surgery  Ask your healthcare provider for more information    Be successful losing weight:   · Set small, realistic goals  An example of a small goal is to walk for 20 minutes 5 days a week  Anther goal is to lose 5% of your body weight  · Tell friends, family members, and coworkers about your goals  and ask for their support  Ask a friend to lose weight with you, or join a weight-loss support group  · Identify foods or triggers that may cause you to overeat , and find ways to avoid them  Remove tempting high-calorie foods from your home and workplace  Place a bowl of fresh fruit on your kitchen counter  If stress causes you to eat, then find other ways to cope with stress  · Keep a diary to track what you eat and drink  Also write down how many minutes of physical activity you do each day  Weigh yourself once a week and record it in your diary  Eating changes: You will need to eat 500 to 1,000 fewer calories each day than you currently eat to lose 1 to 2 pounds a week  The following changes will help you cut calories:  · Eat smaller portions  Use small plates, no larger than 9 inches in diameter  Fill your plate half full of fruits and vegetables  Measure your food using measuring cups until you know what a serving size looks like  · Eat 3 meals and 1 or 2 snacks each day  Plan your meals in advance  Consuelo Commander and eat at home most of the time  Eat slowly  · Eat fruits and vegetables at every meal   They are low in calories and high in fiber, which makes you feel full  Do not add butter, margarine, or cream sauce to vegetables  Use herbs to season steamed vegetables  · Eat less fat and fewer fried foods  Eat more baked or grilled chicken and fish  These protein sources are lower in calories and fat than red meat  Limit fast food  Dress your salads with olive oil and vinegar instead of bottled dressing  · Limit the amount of sugar you eat  Do not drink sugary beverages  Limit alcohol  Activity changes:  Physical activity is good for your body in many ways   It helps you burn calories and build strong muscles  It decreases stress and depression, and improves your mood  It can also help you sleep better  Talk to your healthcare provider before you begin an exercise program   · Exercise for at least 30 minutes 5 days a week  Start slowly  Set aside time each day for physical activity that you enjoy and that is convenient for you  It is best to do both weight training and an activity that increases your heart rate, such as walking, bicycling, or swimming  · Find ways to be more active  Do yard work and housecleaning  Walk up the stairs instead of using elevators  Spend your leisure time going to events that require walking, such as outdoor festivals or fairs  This extra physical activity can help you lose weight and keep it off  Follow up with your healthcare provider as directed: You may need to meet with a dietitian  Write down your questions so you remember to ask them during your visits  © 2017 [employer] Information is for End User's use only and may not be sold, redistributed or otherwise used for commercial purposes  All illustrations and images included in CareNotes® are the copyrighted property of [employer]  or [employer] Mcqueen  The above information is an  only  It is not intended as medical advice for individual conditions or treatments  Talk to your doctor, nurse or pharmacist before following any medical regimen to see if it is safe and effective for you  Heart Healthy Diet   AMBULATORY CARE:   A heart healthy diet  is an eating plan low in total fat, unhealthy fats, and sodium (salt)  A heart healthy diet helps decrease your risk for heart disease and stroke  Limit the amount of fat you eat to 25% to 35% of your total daily calories  Limit sodium to less than 2,300 mg each day  Healthy fats:  Healthy fats can help improve cholesterol levels   The risk for heart disease is decreased when cholesterol levels are normal  Choose healthy fats, such as the following:  · Unsaturated fat  is found in foods such as soybean, canola, olive, corn, and safflower oils  It is also found in soft tub margarine that is made with liquid vegetable oil  · Omega-3 fat  is found in certain fish, such as salmon, tuna, and trout, and in walnuts and flaxseed  Unhealthy fats:  Unhealthy fats can cause unhealthy cholesterol levels in your blood and increase your risk of heart disease  Limit unhealthy fats, such as the following:  · Cholesterol  is found in animal foods, such as eggs and lobster, and in dairy products made from whole milk  Limit cholesterol to less than 300 milligrams (mg) each day  You may need to limit cholesterol to 200 mg each day if you have heart disease  · Saturated fat  is found in meats, such as staton and hamburger  It is also found in chicken or turkey skin, whole milk, and butter  Limit saturated fat to less than 7% of your total daily calories  Limit saturated fat to less than 6% if you have heart disease or are at increased risk for it  · Trans fat  is found in packaged foods, such as potato chips and cookies  It is also in hard margarine, some fried foods, and shortening  Avoid trans fats as much as possible    Heart healthy foods and drinks to include:  Ask your dietitian or healthcare provider how many servings to have from each of the following food groups:  · Grains:      ¨ Whole-wheat breads, cereals, and pastas, and brown rice    ¨ Low-fat, low-sodium crackers and chips    · Vegetables:      ¨ Broccoli, green beans, green peas, and spinach    ¨ Collards, kale, and lima beans    ¨ Carrots, sweet potatoes, tomatoes, and peppers    ¨ Canned vegetables with no salt added    · Fruits:      ¨ Bananas, peaches, pears, and pineapple    ¨ Grapes, raisins, and dates    ¨ Oranges, tangerines, grapefruit, orange juice, and grapefruit juice    ¨ Apricots, mangoes, melons, and papaya    ¨ Raspberries and strawberries    ¨ Canned fruit with no added sugar    · Low-fat dairy products:      ¨ Nonfat (skim) milk, 1% milk, and low-fat almond, cashew, or soy milks fortified with calcium    ¨ Low-fat cheese, regular or frozen yogurt, and cottage cheese    · Meats and proteins , such as lean cuts of beef and pork (loin, leg, round), skinless chicken and turkey, legumes, soy products, egg whites, and nuts  Foods and drinks to limit or avoid:  Ask your dietitian or healthcare provider about these and other foods that are high in unhealthy fat, sodium, and sugar:  · Snack or packaged foods , such as frozen dinners, cookies, macaroni and cheese, and cereals with more than 300 mg of sodium per serving    · Canned or dry mixes  for cakes, soups, sauces, or gravies    · Vegetables with added sodium , such as instant potatoes, vegetables with added sauces, or regular canned vegetables    · Other foods high in sodium , such as ketchup, barbecue sauce, salad dressing, pickles, olives, soy sauce, and miso    · High-fat dairy foods  such as whole or 2% milk, cream cheese, or sour cream, and cheeses     · High-fat protein foods  such as high-fat cuts of beef (T-bone steaks, ribs), chicken or turkey with skin, and organ meats, such as liver    · Cured or smoked meats , such as hot dogs, staton, and sausage    · Unhealthy fats and oils , such as butter, stick margarine, shortening, and cooking oils such as coconut or palm oil    · Food and drinks high in sugar , such as soft drinks (soda), sports drinks, sweetened tea, candy, cake, cookies, pies, and doughnuts  Other diet guidelines to follow:   · Eat more foods containing omega-3 fats  Eat fish high in omega-3 fats at least 2 times a week  · Limit alcohol  Too much alcohol can damage your heart and raise your blood pressure  Women should limit alcohol to 1 drink a day  Men should limit alcohol to 2 drinks a day   A drink of alcohol is 12 ounces of beer, 5 ounces of wine, or 1½ ounces of liquor  · Choose low-sodium foods  High-sodium foods can lead to high blood pressure  Add little or no salt to food you prepare  Use herbs and spices in place of salt  · Eat more fiber  to help lower cholesterol levels  Eat at least 5 servings of fruits and vegetables each day  Eat 3 ounces of whole-grain foods each day  Legumes (beans) are also a good source of fiber  Lifestyle guidelines:   · Do not smoke  Nicotine and other chemicals in cigarettes and cigars can cause lung and heart damage  Ask your healthcare provider for information if you currently smoke and need help to quit  E-cigarettes or smokeless tobacco still contain nicotine  Talk to your healthcare provider before you use these products  · Exercise regularly  to help you maintain a healthy weight and improve your blood pressure and cholesterol levels  Ask your healthcare provider about the best exercise plan for you  Do not start an exercise program without asking your healthcare provider  Follow up with your healthcare provider as directed:  Write down your questions so you remember to ask them during your visits  © 2017 [facility] Information is for End User's use only and may not be sold, redistributed or otherwise used for commercial purposes  All illustrations and images included in CareNotes® are the copyrighted property of [employer]  or [employer] Mcqueen  The above information is an  only  It is not intended as medical advice for individual conditions or treatments  Talk to your doctor, nurse or pharmacist before following any medical regimen to see if it is safe and effective for you

## 2020-09-28 NOTE — ASSESSMENT & PLAN NOTE
Con't current statin and fibrate - rx refilled upon request, diet/exercise/wgt loss encouraged, recheck [facility] with labs in March - order given

## 2020-09-28 NOTE — PROGRESS NOTES
Assessment/Plan:    Type 2 diabetes mellitus with stage 3 chronic kidney disease, without long-term current use of insulin ([facility])    Lab Results   Component Value Date    HGBA1C 6 0 (H) 09/22/2020   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 - controlled with A1c 6 0 - urged to get wgt back down and con't watching sugars/carbs, con't current meds, foot exam done today, reminded eye exam is due in Oct, on statin but no ACE/ARB    Chronic kidney disease, stage 3 (HCC)  Stable, con't current meds, BP/BS control encouraged    Ulcerative rectosigmoiditis without complication ([facility] [address] )  Just had mesalamine switched to sulfasalazine d/t insurance changes and price, notes no current GI symptoms, con't f/u and tx as per GI, colo done 3/20    Hypertension  Bp better by end of visit but elevated home numbers reported as well - has gained 12 lbs from May - encouraged to get wgt back down and will increase Amlodipine to 10 mg 1 tab PO q day, con't all other BP meds, call if home numbers consistently > 140/90 or if SE occur    Gout  No recent events reported, allopurinol refilled upon request, check uric acid with labs in March - order given    Essential hypertriglyceridemia  Con't current statin and fibrate - rx refilled upon request, diet/exercise/wgt loss encouraged, recheck FLP with labs in March - order given    Erythrocytosis  Phlebotomy now decreased to once every 8 wks, con't tx and f/u as per Heme    History of stroke  No recent events reported, con't meds and f/u as per Neuro       Diagnoses and all orders for this visit:    Type 2 diabetes mellitus with stage 3 chronic kidney disease, without long-term current use of insulin ([facility] )  -     Comprehensive metabolic panel; Future  -     Lipid panel; Future  -     TSH, 3rd generation with Free T4 reflex; Future  -     Uric acid; Future  -     Microalbumin, Random Urine (W/Creatinine); Future  -     Hemoglobin A1C With EAG;  Future  -     Comprehensive metabolic panel  -     Lipid panel  -     TSH, 3rd generation with Free T4 reflex  -     Uric acid  -     Microalbumin, Random Urine (W/Creatinine)  -     Hemoglobin A1C With EAG    Chronic kidney disease, stage 3 (HCC)    Ulcerative rectosigmoiditis without complication (HCC)  -     Comprehensive metabolic panel; Future  -     Lipid panel; Future  -     TSH, 3rd generation with Free T4 reflex; Future  -     Uric acid; Future  -     Microalbumin, Random Urine (W/Creatinine); Future  -     Hemoglobin A1C With EAG; Future  -     Comprehensive metabolic panel  -     Lipid panel  -     TSH, 3rd generation with Free T4 reflex  -     Uric acid  -     Microalbumin, Random Urine (W/Creatinine)  -     Hemoglobin A1C With EAG    History of stroke    Erythrocytosis    Essential hypertension  -     amLODIPine (NORVASC) 10 mg tablet; Take 1 tablet (10 mg total) by mouth daily    Chronic gout without tophus, unspecified cause, unspecified site  -     allopurinol (ZYLOPRIM) 100 mg tablet; Take 2 tablets (200 mg total) by mouth daily  -     Comprehensive metabolic panel; Future  -     Lipid panel; Future  -     TSH, 3rd generation with Free T4 reflex; Future  -     Uric acid; Future  -     Microalbumin, Random Urine (W/Creatinine); Future  -     Hemoglobin A1C With EAG; Future  -     Comprehensive metabolic panel  -     Lipid panel  -     TSH, 3rd generation with Free T4 reflex  -     Uric acid  -     Microalbumin, Random Urine (W/Creatinine)  -     Hemoglobin A1C With EAG    Essential hypertriglyceridemia  -     fenofibrate (TRICOR) 145 mg tablet; Take 1 tablet (145 mg total) by mouth daily  -     Comprehensive metabolic panel; Future  -     Lipid panel; Future  -     TSH, 3rd generation with Free T4 reflex; Future  -     Uric acid; Future  -     Microalbumin, Random Urine (W/Creatinine); Future  -     Hemoglobin A1C With EAG;  Future  -     Comprehensive metabolic panel  -     Lipid panel  -     TSH, 3rd generation with Free T4 reflex  -     Uric acid  - Microalbumin, Random Urine (W/Creatinine)  -     Hemoglobin A1C With EAG    Microalbuminuria  -     Comprehensive metabolic panel; Future  -     Lipid panel; Future  -     TSH, 3rd generation with Free T4 reflex; Future  -     Uric acid; Future  -     Microalbumin, Random Urine (W/Creatinine); Future  -     Hemoglobin A1C With EAG; Future  -     Comprehensive metabolic panel  -     Lipid panel  -     TSH, 3rd generation with Free T4 reflex  -     Uric acid  -     Microalbumin, Random Urine (W/Creatinine)  -     Hemoglobin A1C With EAG    Dyslipidemia  -     Comprehensive metabolic panel; Future  -     Lipid panel; Future  -     TSH, 3rd generation with Free T4 reflex; Future  -     Uric acid; Future  -     Microalbumin, Random Urine (W/Creatinine); Future  -     Hemoglobin A1C With EAG; Future  -     Comprehensive metabolic panel  -     Lipid panel  -     TSH, 3rd generation with Free T4 reflex  -     Uric acid  -     Microalbumin, Random Urine (W/Creatinine)  -     Hemoglobin A1C With EAG    Screening for prostate cancer  -     PSA Total (Reflex To Free); Future  -     PSA Total (Reflex To Free)    Obesity (BMI 30 0-34  9)    BMI 31 0-31 9,adult      Colonoscopy 3/20    He is agreeable to flu vaccine and pneumonia vaccine      Subjective:      Patient ID: Jacqueline Bernal is a 72 y o  male  HPI Pt here for follow up appt and BW results  BW results were d/w pt in detail: FBS/A1C 108/6 0, BUN/Cr 23/1 50 with GFR 47, rest of BMP was wnl  Def of controlled vs uncontrolled DM was reviewed  Diet was reviewed - wgt up 12 lbs from May 2020  His wife feels he is not moving as much  He feels he is watching sugars/carbs  BMI was reviewed  He is taking his DM meds as directed - due to insurance changes his Maty Flaming was changed to Jardiance around 2 wks ago  He noted a slight increase in BS but has come back down  He is due for foot exam (8/19) and eye exam in Oct   He is on statin but no ARB/ ACE       His mealamine was switched to sulfasalazine d/t insurance issues for his UC  He had a little upset stomach but has had no abd pain/blood in stool/diarrhea  Pt has seen Neuro and Heme since last visit  He has order for CUS and has it scheduled  He notes no stroke/TIA symptoms  He had phlebotomy set up every 2 wks then went to every 6 wks and now is at every 8 wks  BP above goal today on presentation and meds were reviewed and no changes have occurred  He denies missing doses of meds or SE with the meds  He does check his BP outside the office and it has been elevated about like today recently  He notes no frequent Ha's/dizziness/double vision/CP  Colonoscopy 3/20    He is agreeable to flu vaccine and pneumonia vaccine    Review of Systems   Constitutional: Negative for chills and fever  HENT: Negative for congestion and sinus pain  Eyes: Negative for pain and visual disturbance  Respiratory: Negative for cough and shortness of breath  Cardiovascular: Negative for chest pain, palpitations and leg swelling  Gastrointestinal: Negative for abdominal pain, blood in stool, constipation, diarrhea, nausea and vomiting  Endocrine: Negative for polydipsia and polyuria  Genitourinary: Negative for difficulty urinating and dysuria  Musculoskeletal: Negative for arthralgias and myalgias  Skin: Negative for rash and wound  Neurological: Negative for dizziness and headaches  Hematological: Does not bruise/bleed easily  Psychiatric/Behavioral: Negative for behavioral problems and confusion  Objective:    /84   Pulse 70   Temp 98 1 °F (36 7 °C) (Temporal)   Ht 5' 9" (1 753 m)   Wt 95 3 kg (210 lb)   BMI 31 01 kg/m²      Physical Exam  Vitals signs and nursing note reviewed  Constitutional:       General: He is not in acute distress  Appearance: He is well-developed  He is obese  He is not ill-appearing  HENT:      Head: Normocephalic and atraumatic     Eyes: General:         Right eye: No discharge  Left eye: No discharge  Conjunctiva/sclera: Conjunctivae normal    Neck:      Musculoskeletal: Neck supple  Trachea: No tracheal deviation  Cardiovascular:      Rate and Rhythm: Normal rate and regular rhythm  Pulses: no weak pulses          Dorsalis pedis pulses are 2+ on the right side and 2+ on the left side  Heart sounds: No murmur  Pulmonary:      Effort: Pulmonary effort is normal  No respiratory distress  Breath sounds: Normal breath sounds  No wheezing, rhonchi or rales  Abdominal:      General: There is no distension  Palpations: Abdomen is soft  Tenderness: There is no abdominal tenderness  There is no guarding or rebound  Musculoskeletal:         General: No deformity  Right lower leg: No edema  Left lower leg: No edema  Feet:      Right foot:      Skin integrity: No ulcer, skin breakdown, erythema, warmth, callus or dry skin  Left foot:      Skin integrity: No ulcer, skin breakdown, erythema, warmth, callus or dry skin  Skin:     General: Skin is warm and dry  Coloration: Skin is not pale  Findings: No rash  Neurological:      General: No focal deficit present  Mental Status: He is alert  Motor: No abnormal muscle tone  Gait: Gait normal    Psychiatric:         Mood and Affect: Mood normal          Behavior: Behavior normal          Thought Content: Thought content normal          Judgment: Judgment normal        Patient's shoes and socks removed  Right Foot/Ankle   Right Foot Inspection  Skin Exam: skin normal and skin intact no dry skin, no warmth, no callus, no erythema, no maceration, no abnormal color, no pre-ulcer, no ulcer and no callus                          Toe Exam: ROM and strength within normal limits  Sensory   Vibration: intact    Monofilament testing: intact  Vascular    The right DP pulse is 2+       Left Foot/Ankle  Left Foot Inspection  Skin Exam: skin normal and skin intactno dry skin, no warmth, no erythema, no maceration, normal color, no pre-ulcer, no ulcer and no callus                         Toe Exam: ROM and strength within normal limits                   Sensory   Vibration: intact    Monofilament: intact  Vascular    The left DP pulse is 2+  Assign Risk Category:  No deformity present; No loss of protective sensation; No weak pulses       Risk: 0      BMI Counseling: Body mass index is 31 01 kg/m²  The BMI is above normal  Nutrition recommendations include reducing portion sizes, 3-5 servings of fruits/vegetables daily, consuming healthier snacks, moderation in carbohydrate intake, increasing intake of lean protein and reducing intake of saturated fat and trans fat  Exercise recommendations include exercising 3-5 times per week

## 2020-09-28 NOTE — ASSESSMENT & PLAN NOTE
Just had mesalamine switched to sulfasalazine d/t insurance changes and price, notes no current GI symptoms, con't f/u and tx as per GI, colo done 3/20

## 2020-09-28 NOTE — ASSESSMENT & PLAN NOTE
Bp better by end of visit but elevated home numbers reported as well - has gained 12 lbs from May - encouraged to get wgt back down and will increase Amlodipine to 10 mg 1 tab PO q day, con't all other BP meds, call if home numbers consistently > 140/90 or if SE occur

## 2020-10-07 DIAGNOSIS — Z23 ENCOUNTER FOR IMMUNIZATION: Primary | ICD-10-CM

## 2020-10-07 LAB
BASOPHILS # BLD AUTO: 113 CELLS/UL (ref 0–200)
BASOPHILS NFR BLD AUTO: 1.5 %
EOSINOPHIL # BLD AUTO: 188 CELLS/UL (ref 15–500)
EOSINOPHIL NFR BLD AUTO: 2.5 %
ERYTHROCYTE [DISTWIDTH] IN BLOOD BY AUTOMATED COUNT: 18.8 % (ref 11–15)
FERRITIN SERPL-MCNC: 8 NG/ML (ref 24–380)
HCT VFR BLD AUTO: 47.1 % (ref 38.5–50)
HGB BLD-MCNC: 14 G/DL (ref 13.2–17.1)
LYMPHOCYTES # BLD AUTO: 1538 CELLS/UL (ref 850–3900)
LYMPHOCYTES NFR BLD AUTO: 20.5 %
MCH RBC QN AUTO: 22.1 PG (ref 27–33)
MCHC RBC AUTO-ENTMCNC: 29.7 G/DL (ref 32–36)
MCV RBC AUTO: 74.3 FL (ref 80–100)
MONOCYTES # BLD AUTO: 623 CELLS/UL (ref 200–950)
MONOCYTES NFR BLD AUTO: 8.3 %
NEUTROPHILS # BLD AUTO: 5040 CELLS/UL (ref 1500–7800)
NEUTROPHILS NFR BLD AUTO: 67.2 %
PLATELET # BLD AUTO: 375 THOUSAND/UL (ref 140–400)
PMV BLD REES-ECKER: 9.9 FL (ref 7.5–12.5)
RBC # BLD AUTO: 6.34 MILLION/UL (ref 4.2–5.8)
WBC # BLD AUTO: 7.5 THOUSAND/UL (ref 3.8–10.8)

## 2020-10-07 PROCEDURE — 90662 IIV NO PRSV INCREASED AG IM: CPT | Performed by: INTERNAL MEDICINE

## 2020-10-07 PROCEDURE — 90471 IMMUNIZATION ADMIN: CPT | Performed by: INTERNAL MEDICINE

## 2020-10-14 ENCOUNTER — HOSPITAL ENCOUNTER (OUTPATIENT)
Dept: INFUSION CENTER | Facility: HOSPITAL | Age: 70
Discharge: HOME | End: 2020-10-14
Attending: INTERNAL MEDICINE
Payer: COMMERCIAL

## 2020-10-14 VITALS
TEMPERATURE: 98.1 F | RESPIRATION RATE: 12 BRPM | HEART RATE: 70 BPM | SYSTOLIC BLOOD PRESSURE: 140 MMHG | OXYGEN SATURATION: 94 % | DIASTOLIC BLOOD PRESSURE: 91 MMHG

## 2020-10-14 DIAGNOSIS — D75.1 ERYTHROCYTOSIS: Primary | ICD-10-CM

## 2020-10-14 PROCEDURE — 99195 PHLEBOTOMY: CPT

## 2020-10-19 DIAGNOSIS — I63.9 CVA (CEREBRAL VASCULAR ACCIDENT) (HCC): ICD-10-CM

## 2020-10-19 RX ORDER — ATORVASTATIN CALCIUM 40 MG/1
40 TABLET, FILM COATED ORAL
Qty: 30 TABLET | Refills: 5 | Status: SHIPPED | OUTPATIENT
Start: 2020-10-19 | End: 2020-10-22 | Stop reason: SDUPTHER

## 2020-10-22 DIAGNOSIS — E11.22 TYPE 2 DIABETES MELLITUS WITH STAGE 3 CHRONIC KIDNEY DISEASE, WITHOUT LONG-TERM CURRENT USE OF INSULIN (HCC): ICD-10-CM

## 2020-10-22 DIAGNOSIS — N18.30 TYPE 2 DIABETES MELLITUS WITH STAGE 3 CHRONIC KIDNEY DISEASE, WITHOUT LONG-TERM CURRENT USE OF INSULIN (HCC): ICD-10-CM

## 2020-10-22 DIAGNOSIS — M1A.9XX0 CHRONIC GOUT WITHOUT TOPHUS, UNSPECIFIED CAUSE, UNSPECIFIED SITE: ICD-10-CM

## 2020-10-22 DIAGNOSIS — E78.1 ESSENTIAL HYPERTRIGLYCERIDEMIA: ICD-10-CM

## 2020-10-22 DIAGNOSIS — I10 ESSENTIAL HYPERTENSION: ICD-10-CM

## 2020-10-22 DIAGNOSIS — I63.9 CVA (CEREBRAL VASCULAR ACCIDENT) (HCC): ICD-10-CM

## 2020-10-22 RX ORDER — AMLODIPINE BESYLATE 10 MG/1
10 TABLET ORAL DAILY
Qty: 30 TABLET | Refills: 5 | Status: SHIPPED | OUTPATIENT
Start: 2020-10-22 | End: 2020-12-30 | Stop reason: SDUPTHER

## 2020-10-22 RX ORDER — ATORVASTATIN CALCIUM 40 MG/1
40 TABLET, FILM COATED ORAL
Qty: 30 TABLET | Refills: 5 | Status: SHIPPED | OUTPATIENT
Start: 2020-10-22 | End: 2020-10-29 | Stop reason: SDUPTHER

## 2020-10-22 RX ORDER — CLOPIDOGREL BISULFATE 75 MG/1
75 TABLET ORAL DAILY
Qty: 30 TABLET | Refills: 5 | Status: SHIPPED | OUTPATIENT
Start: 2020-10-22 | End: 2021-03-29 | Stop reason: SDUPTHER

## 2020-10-22 RX ORDER — ALLOPURINOL 100 MG/1
200 TABLET ORAL DAILY
Qty: 60 TABLET | Refills: 5 | Status: SHIPPED | OUTPATIENT
Start: 2020-10-22 | End: 2020-12-30 | Stop reason: SDUPTHER

## 2020-10-22 RX ORDER — METOPROLOL TARTRATE 50 MG/1
75 TABLET, FILM COATED ORAL EVERY 12 HOURS
Start: 2020-10-22 | End: 2020-12-28 | Stop reason: SDUPTHER

## 2020-10-22 RX ORDER — EMPAGLIFLOZIN 10 MG/1
10 TABLET, FILM COATED ORAL EVERY MORNING
Qty: 30 TABLET | Refills: 5 | Status: SHIPPED | OUTPATIENT
Start: 2020-10-22 | End: 2021-03-29 | Stop reason: SDUPTHER

## 2020-10-22 RX ORDER — FENOFIBRATE 145 MG/1
145 TABLET, COATED ORAL DAILY
Qty: 30 TABLET | Refills: 5 | Status: SHIPPED | OUTPATIENT
Start: 2020-10-22 | End: 2021-03-29 | Stop reason: SDUPTHER

## 2020-10-23 ENCOUNTER — TELEPHONE (OUTPATIENT)
Dept: GASTROENTEROLOGY | Facility: CLINIC | Age: 70
End: 2020-10-23

## 2020-10-23 DIAGNOSIS — K51.30 ULCERATIVE RECTOSIGMOIDITIS WITHOUT COMPLICATION (HCC): ICD-10-CM

## 2020-10-26 RX ORDER — SULFASALAZINE 500 MG/1
500 TABLET, DELAYED RELEASE ORAL 4 TIMES DAILY
Qty: 360 TABLET | Refills: 2 | Status: SHIPPED | OUTPATIENT
Start: 2020-10-26 | End: 2021-07-13

## 2020-10-29 DIAGNOSIS — I63.9 CVA (CEREBRAL VASCULAR ACCIDENT) (HCC): ICD-10-CM

## 2020-10-29 RX ORDER — ATORVASTATIN CALCIUM 40 MG/1
40 TABLET, FILM COATED ORAL
Qty: 90 TABLET | Refills: 2 | Status: SHIPPED | OUTPATIENT
Start: 2020-10-29 | End: 2021-09-02

## 2020-12-02 LAB
BASOPHILS # BLD AUTO: 90 CELLS/UL (ref 0–200)
BASOPHILS NFR BLD AUTO: 1.5 %
EOSINOPHIL # BLD AUTO: 216 CELLS/UL (ref 15–500)
EOSINOPHIL NFR BLD AUTO: 3.6 %
ERYTHROCYTE [DISTWIDTH] IN BLOOD BY AUTOMATED COUNT: 17.1 % (ref 11–15)
FERRITIN SERPL-MCNC: 7 NG/ML (ref 24–380)
HCT VFR BLD AUTO: 47.9 % (ref 38.5–50)
HGB BLD-MCNC: 14.3 G/DL (ref 13.2–17.1)
LYMPHOCYTES # BLD AUTO: 1998 CELLS/UL (ref 850–3900)
LYMPHOCYTES NFR BLD AUTO: 33.3 %
MCH RBC QN AUTO: 22.4 PG (ref 27–33)
MCHC RBC AUTO-ENTMCNC: 29.9 G/DL (ref 32–36)
MCV RBC AUTO: 75 FL (ref 80–100)
MONOCYTES # BLD AUTO: 540 CELLS/UL (ref 200–950)
MONOCYTES NFR BLD AUTO: 9 %
NEUTROPHILS # BLD AUTO: 3156 CELLS/UL (ref 1500–7800)
NEUTROPHILS NFR BLD AUTO: 52.6 %
PLATELET # BLD AUTO: 367 THOUSAND/UL (ref 140–400)
PMV BLD REES-ECKER: 10.2 FL (ref 7.5–12.5)
RBC # BLD AUTO: 6.39 MILLION/UL (ref 4.2–5.8)
WBC # BLD AUTO: 6 THOUSAND/UL (ref 3.8–10.8)

## 2020-12-09 ENCOUNTER — HOSPITAL ENCOUNTER (OUTPATIENT)
Dept: INFUSION CENTER | Facility: HOSPITAL | Age: 70
Discharge: HOME | End: 2020-12-09
Attending: INTERNAL MEDICINE
Payer: MEDICARE

## 2020-12-09 VITALS
SYSTOLIC BLOOD PRESSURE: 145 MMHG | HEART RATE: 61 BPM | DIASTOLIC BLOOD PRESSURE: 85 MMHG | RESPIRATION RATE: 16 BRPM | TEMPERATURE: 96.3 F | OXYGEN SATURATION: 100 %

## 2020-12-09 DIAGNOSIS — D75.1 ERYTHROCYTOSIS: Primary | ICD-10-CM

## 2020-12-09 PROCEDURE — 99195 PHLEBOTOMY: CPT

## 2020-12-21 DIAGNOSIS — N18.30 TYPE 2 DIABETES MELLITUS WITH STAGE 3 CHRONIC KIDNEY DISEASE, WITHOUT LONG-TERM CURRENT USE OF INSULIN (HCC): ICD-10-CM

## 2020-12-21 DIAGNOSIS — E11.22 TYPE 2 DIABETES MELLITUS WITH STAGE 3 CHRONIC KIDNEY DISEASE, WITHOUT LONG-TERM CURRENT USE OF INSULIN (HCC): ICD-10-CM

## 2020-12-21 RX ORDER — EMPAGLIFLOZIN 10 MG/1
10 TABLET, FILM COATED ORAL EVERY MORNING
Qty: 90 TABLET | Refills: 1 | OUTPATIENT
Start: 2020-12-21

## 2020-12-28 DIAGNOSIS — I10 ESSENTIAL HYPERTENSION: ICD-10-CM

## 2020-12-28 RX ORDER — METOPROLOL TARTRATE 50 MG/1
75 TABLET, FILM COATED ORAL EVERY 12 HOURS
Qty: 30 TABLET | Refills: 0 | Status: SHIPPED | OUTPATIENT
Start: 2020-12-28 | End: 2020-12-28 | Stop reason: SDUPTHER

## 2020-12-28 RX ORDER — METOPROLOL TARTRATE 50 MG/1
75 TABLET, FILM COATED ORAL EVERY 12 HOURS
Qty: 90 TABLET | Refills: 1 | Status: SHIPPED | OUTPATIENT
Start: 2020-12-28 | End: 2020-12-29 | Stop reason: SDUPTHER

## 2020-12-29 DIAGNOSIS — I10 ESSENTIAL HYPERTENSION: ICD-10-CM

## 2020-12-29 RX ORDER — METOPROLOL TARTRATE 50 MG/1
75 TABLET, FILM COATED ORAL EVERY 12 HOURS
Qty: 30 TABLET | Refills: 0 | Status: SHIPPED | OUTPATIENT
Start: 2020-12-29 | End: 2021-01-30 | Stop reason: SDUPTHER

## 2020-12-30 DIAGNOSIS — I10 ESSENTIAL HYPERTENSION: ICD-10-CM

## 2020-12-30 DIAGNOSIS — M1A.9XX0 CHRONIC GOUT WITHOUT TOPHUS, UNSPECIFIED CAUSE, UNSPECIFIED SITE: ICD-10-CM

## 2020-12-30 RX ORDER — ALLOPURINOL 100 MG/1
200 TABLET ORAL DAILY
Qty: 60 TABLET | Refills: 5 | Status: SHIPPED | OUTPATIENT
Start: 2020-12-30 | End: 2022-05-08

## 2020-12-30 RX ORDER — AMLODIPINE BESYLATE 10 MG/1
10 TABLET ORAL DAILY
Qty: 30 TABLET | Refills: 5 | Status: SHIPPED | OUTPATIENT
Start: 2020-12-30 | End: 2022-01-14 | Stop reason: HOSPADM

## 2021-01-29 LAB
BASOPHILS # BLD AUTO: 111 CELLS/UL (ref 0–200)
BASOPHILS NFR BLD AUTO: 1.5 %
EOSINOPHIL # BLD AUTO: 215 CELLS/UL (ref 15–500)
EOSINOPHIL NFR BLD AUTO: 2.9 %
ERYTHROCYTE [DISTWIDTH] IN BLOOD BY AUTOMATED COUNT: 19.2 % (ref 11–15)
FERRITIN SERPL-MCNC: 11 NG/ML (ref 24–380)
HCT VFR BLD AUTO: 51.7 % (ref 38.5–50)
HGB BLD-MCNC: 15.4 G/DL (ref 13.2–17.1)
LYMPHOCYTES # BLD AUTO: 1702 CELLS/UL (ref 850–3900)
LYMPHOCYTES NFR BLD AUTO: 23 %
MCH RBC QN AUTO: 23.2 PG (ref 27–33)
MCHC RBC AUTO-ENTMCNC: 29.8 G/DL (ref 32–36)
MCV RBC AUTO: 77.7 FL (ref 80–100)
MONOCYTES # BLD AUTO: 636 CELLS/UL (ref 200–950)
MONOCYTES NFR BLD AUTO: 8.6 %
NEUTROPHILS # BLD AUTO: 4736 CELLS/UL (ref 1500–7800)
NEUTROPHILS NFR BLD AUTO: 64 %
PLATELET # BLD AUTO: 368 THOUSAND/UL (ref 140–400)
PMV BLD REES-ECKER: 10.6 FL (ref 7.5–12.5)
RBC # BLD AUTO: 6.65 MILLION/UL (ref 4.2–5.8)
WBC # BLD AUTO: 7.4 THOUSAND/UL (ref 3.8–10.8)

## 2021-01-30 ENCOUNTER — TELEPHONE (OUTPATIENT)
Dept: OTHER | Facility: OTHER | Age: 71
End: 2021-01-30

## 2021-01-30 DIAGNOSIS — I10 ESSENTIAL HYPERTENSION: ICD-10-CM

## 2021-01-30 RX ORDER — METOPROLOL TARTRATE 50 MG/1
75 TABLET, FILM COATED ORAL EVERY 12 HOURS
Qty: 90 TABLET | Refills: 0 | Status: SHIPPED | OUTPATIENT
Start: 2021-01-30 | End: 2021-02-09 | Stop reason: SDUPTHER

## 2021-01-30 NOTE — TELEPHONE ENCOUNTER
Patient would like a refill for his metroprolol, 50mg  Patient states he is not out of the medication and this can wait until Monday 2/1/21  Patient would like the med to go to the [facility] order pharmacy in [facility] [place]

## 2021-02-02 NOTE — PROGRESS NOTES
Ferritin 11  hgb 15 4  Danielle Curry Rn with dr Yoly Rowan office notified of lab results  Ok to treat with above labs  Aware of ferritin result

## 2021-02-03 ENCOUNTER — TELEPHONE (OUTPATIENT)
Dept: HEMATOLOGY ONCOLOGY | Facility: CLINIC | Age: 71
End: 2021-02-03

## 2021-02-03 ENCOUNTER — HOSPITAL ENCOUNTER (OUTPATIENT)
Dept: INFUSION CENTER | Facility: HOSPITAL | Age: 71
Discharge: HOME | End: 2021-02-03
Attending: INTERNAL MEDICINE
Payer: MEDICARE

## 2021-02-03 VITALS
TEMPERATURE: 97.1 F | RESPIRATION RATE: 20 BRPM | HEART RATE: 69 BPM | DIASTOLIC BLOOD PRESSURE: 91 MMHG | OXYGEN SATURATION: 96 % | SYSTOLIC BLOOD PRESSURE: 139 MMHG

## 2021-02-03 DIAGNOSIS — D75.1 ERYTHROCYTOSIS: Primary | ICD-10-CM

## 2021-02-03 PROCEDURE — 99195 PHLEBOTOMY: CPT

## 2021-02-03 NOTE — TELEPHONE ENCOUNTER
Patient wife called to confirm 2/3 1pm infusion appt, states they will try to make it and if not will call back to reschedule

## 2021-02-03 NOTE — PROGRESS NOTES
Paolo Flowers RN with Dr Dona Tiwari office notified RN only able to remove 175ml of blood today    No new orders at this time

## 2021-02-03 NOTE — PROGRESS NOTES
Pt arrived on unit for therapeutic phlebotomy  Water given, pt reclined  Total tourniquet time 20 mins and 175ml blood removed  Goal is 500ml  Pressure dsg applied  The patient request that I call the doctor prior to sticking him again to see if it is necessary  Per Liyah Richards RN at Dr Angela Shaffer office pt does not need to be stuck again    Pt monitored for 20 mins, vss  Pt ambulated off unit with steady gait

## 2021-02-09 ENCOUNTER — TELEPHONE (OUTPATIENT)
Dept: FAMILY MEDICINE CLINIC | Facility: HOSPITAL | Age: 71
End: 2021-02-09

## 2021-02-09 DIAGNOSIS — I10 ESSENTIAL HYPERTENSION: ICD-10-CM

## 2021-02-09 RX ORDER — METOPROLOL TARTRATE 50 MG/1
75 TABLET, FILM COATED ORAL EVERY 12 HOURS
Qty: 30 TABLET | Refills: 0 | Status: SHIPPED | OUTPATIENT
Start: 2021-02-09 | End: 2021-03-29 | Stop reason: SDUPTHER

## 2021-03-01 ENCOUNTER — HOSPITAL ENCOUNTER (OUTPATIENT)
Dept: NON INVASIVE DIAGNOSTICS | Facility: HOSPITAL | Age: 71
Discharge: HOME | End: 2021-03-01
Attending: PSYCHIATRY & NEUROLOGY
Payer: MEDICARE

## 2021-03-01 DIAGNOSIS — I65.23 BILATERAL CAROTID ARTERY STENOSIS: ICD-10-CM

## 2021-03-01 PROCEDURE — 93880 EXTRACRANIAL BILAT STUDY: CPT

## 2021-03-01 PROCEDURE — 93880 EXTRACRANIAL BILAT STUDY: CPT | Performed by: INTERNAL MEDICINE

## 2021-03-09 DIAGNOSIS — Z23 ENCOUNTER FOR IMMUNIZATION: ICD-10-CM

## 2021-03-20 ENCOUNTER — TELEPHONE (OUTPATIENT)
Dept: OTHER | Facility: OTHER | Age: 71
End: 2021-03-20

## 2021-03-20 NOTE — TELEPHONE ENCOUNTER
Would like to make office aware that they received a call about genetic testing because they did not request such testing

## 2021-03-22 ENCOUNTER — OFFICE VISIT (OUTPATIENT)
Dept: HEMATOLOGY ONCOLOGY | Facility: HOSPITAL | Age: 71
End: 2021-03-22
Payer: MEDICARE

## 2021-03-22 VITALS
DIASTOLIC BLOOD PRESSURE: 70 MMHG | WEIGHT: 215 LBS | RESPIRATION RATE: 14 BRPM | SYSTOLIC BLOOD PRESSURE: 126 MMHG | HEIGHT: 69 IN | TEMPERATURE: 97.6 F | BODY MASS INDEX: 31.84 KG/M2 | HEART RATE: 69 BPM | OXYGEN SATURATION: 98 %

## 2021-03-22 DIAGNOSIS — D75.1 ERYTHROCYTOSIS: Primary | ICD-10-CM

## 2021-03-22 DIAGNOSIS — R71.8 OTHER ABNORMALITY OF RED BLOOD CELLS: ICD-10-CM

## 2021-03-22 PROCEDURE — 99214 OFFICE O/P EST MOD 30 MIN: CPT | Performed by: INTERNAL MEDICINE

## 2021-03-22 NOTE — TELEPHONE ENCOUNTER
Patient received a call from [facility] regarding cardiac genetic testing  Orders were received in this office from [facility]  Per patient request orders were not signed and shredded

## 2021-03-22 NOTE — PROGRESS NOTES
Hematology/Oncology Outpatient Follow- up Note  Bibi Alvarado 80 y o  male MRN: @ Encounter: [id number]        Date:  3/22/2021    Presenting Complaint/Diagnosis :     Stroke with an elevated hemoglobin    Previous Hematologic/ Oncologic History:       Workup    Current Hematologic/ Oncologic Treatment:      Phlebotomy every 2 months    Interval History:      The patient returns for follow-up visit  He is now on every 2 month phlebotomy  His last hemoglobin in January was 15 4 with hematocrit of 51 7  Prior to that in December it was 47 9 as far as his hematocrit goes  MCV is still 77  Platelet count was 901  At this point he is on every 2 month phlebotomy  I think it is reasonable to continue him on this schedule  He is taking a baby aspirin  I advised him his hematocrit is slightly higher than where I would like it I e  ideally should be under 45 or at least 50  He is due for blood work again and will have this drawn  As far symptoms are concerned  The patient states he is at baseline  Denies any nausea denies any vomiting denies any diarrhea  The patient is on Plavix  The rest of his 14 point review of systems today was negative  Test Results:    Imaging: Vas Carotid Complete Study    Result Date: 3/1/2021  Narrative:  [facility]: Indications:  Surveillance of carotid artery disease  Patient is asymptomatic at this time  Risk Factors The patient has history of HTN and HLD  FINDINGS:  Right        Impression  PSV  EDV (cm/s)  Ratio  Dist  ICA                 72          13   1 14  Mid  ICA                  37          14   0 58  Prox   ICA    1 - 49%      89          31   1 40  Mid CCA                   64          15   1 03  Prox CCA                  62          17         Ext Carotid               70          10   1 10  Prox Vert                 28           9         Subclavian                68           0          Left         Impression  PSV  EDV (cm/s) Ratio  Dist  ICA                 55          22   0 81  Mid  ICA                  43          14   0 64  Prox  ICA    1 - 49%      48          11   0 70  Dist CCA                  62          18         Mid CCA                   68          18   0 67  Prox CCA                 102          23         Ext Carotid               57           7   0 84  Prox Vert                 42          13         Subclavian                76           0            CONCLUSION: Impression RIGHT: There is <50% stenosis noted in the internal carotid artery  Plaque is heterogenous and irregular  Vertebral artery flow is antegrade  There is no significant subclavian artery disease  LEFT: There is <50% stenosis noted in the internal carotid artery  Plaque is heterogenous and irregular  Vertebral artery flow is antegrade  There is no significant subclavian artery disease  No prior study to compare  Internal carotid artery stenosis determination by consensus criteria from: Govind Person et al  Carotid Artery Stenosis: Gray-Scale and Doppler US Diagnosis - [employer] in [address] [place] [address], Radiology 2003; [phone]    SIGNATURE: Electronically Signed by: Maxim Granados MD on 2021-03-01 01:51:49 PM      Labs:   Lab Results   Component Value Date    WBC 7 4 01/28/2021    HGB 15 4 01/28/2021    HCT 51 7 (H) 01/28/2021    MCV 77 7 (L) 01/28/2021     01/28/2021     Lab Results   Component Value Date     07/07/2017    K 4 3 09/22/2020     09/22/2020    CO2 23 09/22/2020    BUN 23 09/22/2020    CREATININE 1 50 (H) 09/22/2020    CALCIUM 9 0 09/22/2020    AST 21 01/29/2020    ALT 27 01/29/2020    ALKPHOS 85 01/29/2020    PROT 7 1 07/07/2017    BILITOT 0 6 07/07/2017    EGFR 50 03/20/2020       Lab Results   Component Value Date    PSA 5 1 (H) 01/27/2020       Lab Results   Component Value Date    IRON 128 12/07/2018    TIBC 232 (L) 08/24/2018    FERRITIN 11 (L) 01/28/2021     ROS: As stated in the history of present illness otherwise his 14 point review of systems today was negative  Active Problems:   Patient Active Problem List   Diagnosis    Chronic kidney disease, stage 3 (HCC)    Colon, diverticulosis    Elevated C-reactive protein    Elevated prostate specific antigen (PSA)    Essential hypertriglyceridemia    Gout    Hypertension    Microalbuminuria    Obesity    Type 2 diabetes mellitus with stage 3 chronic kidney disease, without long-term current use of insulin (HCC)    Ulcerative rectosigmoiditis without complication (HCC)    Dyslipidemia    Erythrocytosis    History of stroke    Transient alteration of awareness    Witnessed episode of apnea    NSVT (nonsustained ventricular tachycardia) (HCC)    Bilateral carotid artery stenosis       Past Medical History:   Past Medical History:   Diagnosis Date    [facility]A [facility]cerebral vascular accident) ([facility] [address] )     Diverticulitis     Gout     Hypercholesterolemia     Hypertension     Renal disorder        Surgical History:   Past Surgical History:   Procedure Laterality Date    CARDIAC LOOP RECORDER      COLONOSCOPY  09/18/2006    complete; 10 yrs    COLONOSCOPY  10/23/2017    complete; 5 yrs    COLONOSCOPY  05/06/2020    Severe active left-sided colitis, erythematous and ulcerated mucosa in the rectosigmoid, inflammatory polyp       Family History:    Family History   Problem Relation Age of Onset    Breast cancer Mother     Kidney disease Father     Lung cancer Father     Other Father         smoker    Hypertension Other     Colon polyps Neg Hx     Colon cancer Neg Hx        Cancer-related family history includes Breast cancer in his mother; Lung cancer in his father  There is no history of Colon cancer      Social History:   Social History     Socioeconomic History    Marital status: /Civil Union     Spouse name: Not on file    Number of children: Not on file    Years of education: Not on file    Highest education level: Not on file   Occupational History    Occupation: full-time employment   Social Needs    Financial resource strain: Not on file    Food insecurity     Worry: Not on file     Inability: Not on file    Transportation needs     Medical: Not on file     Non-medical: Not on file   Tobacco Use    Smoking status: Never Smoker    Smokeless tobacco: Never Used   Substance and Sexual Activity    Alcohol use: Not Currently     Frequency: Never     Binge frequency: Never    Drug use: Never    Sexual activity: Yes   Lifestyle    Physical activity     Days per week: Not on file     Minutes per session: Not on file    Stress: Not on file   Relationships    Social connections     Talks on phone: Not on file     Gets together: Not on file     Attends Che[employer] service: Not on file     Active member of club or organization: Not on file     Attends meetings of clubs or organizations: Not on file     Relationship status: Not on file    Intimate partner violence     Fear of current or ex partner: Not on file     Emotionally abused: Not on file     Physically abused: Not on file     Forced sexual activity: Not on file   Other Topics Concern    Not on file   Social History Narrative    Not on file       Current Medications:   Current Outpatient Medications   Medication Sig Dispense Refill    allopurinol (ZYLOPRIM) 100 mg tablet Take 2 tablets (200 mg total) by mouth daily 60 tablet 5    amLODIPine (NORVASC) 10 mg tablet Take 1 tablet (10 mg total) by mouth daily 30 tablet 5    atorvastatin (LIPITOR) 40 mg tablet Take 1 tablet (40 mg total) by mouth daily with dinner 90 tablet 2    clopidogrel (PLAVIX) 75 mg tablet Take 1 tablet (75 mg total) by mouth daily 30 tablet 5    Coenzyme Q10 200 MG capsule Take by mouth daily       Empagliflozin (Jardiance) 10 MG TABS Take 1 tablet (10 mg total) by mouth every morning 30 tablet 5    fenofibrate (TRICOR) 145 mg tablet Take 1 tablet (145 mg total) by mouth daily 30 tablet 5    metoprolol tartrate (LOPRESSOR) 50 mg tablet Take 1 5 tablets (75 mg total) by mouth every 12 (twelve) hours 30 tablet 0    sulfaSALAzine (AZULFIDINE-EN) 500 mg EC tablet Take 1 tablet (500 mg total) by mouth 4 (four) times a day 360 tablet 2     No current facility-administered medications for this visit  Allergies: No Known Allergies    Physical Exam:    Body surface area is 2 13 meters squared  Wt Readings from Last 3 Encounters:   03/22/21 97 5 kg (215 lb)   09/28/20 95 3 kg (210 lb)   09/14/20 95 3 kg (210 lb)        Temp Readings from Last 3 Encounters:   03/22/21 97 6 °F (36 4 °C) (Temporal)   02/03/21 (!) 97 1 °F (36 2 °C) (Temporal)   12/09/20 (!) 96 3 °F (35 7 °C) (Temporal)        BP Readings from Last 3 Encounters:   03/22/21 126/70   02/03/21 139/91   12/09/20 145/85         Pulse Readings from Last 3 Encounters:   03/22/21 69   02/03/21 69   12/09/20 61       Physical Exam     Constitutional   General appearance: No acute distress, well appearing and well nourished  Eyes   Conjunctiva and lids: No swelling, erythema or discharge  Pupils and irises: Equal, round and reactive to light  Ears, Nose, Mouth, and Throat   External inspection of ears and nose: Normal     Nasal mucosa, septum, and turbinates: Normal without edema or erythema  Oropharynx: Normal with no erythema, edema, exudate or lesions  Pulmonary   Respiratory effort: No increased work of breathing or signs of respiratory distress  Auscultation of lungs: Clear to auscultation  Cardiovascular   Palpation of heart: Normal PMI, no thrills  Auscultation of heart: Normal rate and rhythm, normal S1 and S2, without murmurs  Examination of extremities for edema and/or varicosities: Normal     Carotid pulses: Normal     Abdomen   Abdomen: Non-tender, no masses  Liver and spleen: No hepatomegaly or splenomegaly  Lymphatic   Palpation of lymph nodes in neck: No lymphadenopathy      Musculoskeletal   Gait and station: Normal     Digits and nails: Normal without clubbing or cyanosis  Inspection/palpation of joints, bones, and muscles: Normal     Skin   Skin and subcutaneous tissue: Normal without rashes or lesions  Neurologic   Cranial nerves: Cranial nerves 2-12 intact  Sensation: No sensory loss  Psychiatric   Orientation to person, place, and time: Normal     Mood and affect: Normal         Assessment / Plan:       The patient is a pleasant 78-year-old male who was recently seen in the hospital for elevated hemoglobin when he had a stroke  He also has renal insufficiency but despite that his hemoglobin was running in the 17 5 range with a hematocrit consistently above 50  We put him on an every 2 week phlebotomy schedule  His hemoglobin came down so we changed him slowly to every 2 months  His hematocrit is above 50 I e  at 51 on his most recent blood work from January but he is due again in a few weeks  If his hemoglobin hematocrit go up further we will consider bringing him down to once a month schedule  His MCV is low and he is iron deficient so I think for now keeping him at every 2 months is reasonable but this can always be adjusted  The patient agrees with this  He is on Plavix  I will see him back in 6 months  Goals and Barriers:  Current Goal:  Prolong Survival from   Elevated hemoglobin  Barriers: None  Patient's Capacity to Self Care:  Patient able to self care  Portions of the record may have been created with voice recognition software   Occasional wrong word or "sound a like" substitutions may have occurred due to the inherent limitations of voice recognition software   Read the chart carefully and recognize, using context, where substitutions have occurred

## 2021-03-25 LAB
ALBUMIN SERPL-MCNC: 4.4 G/DL (ref 3.6–5.1)
ALBUMIN/CREAT UR: 631 MCG/MG CREAT
ALBUMIN/GLOB SERPL: 1.5 (CALC) (ref 1–2.5)
ALP SERPL-CCNC: 86 U/L (ref 35–144)
ALT SERPL-CCNC: 15 U/L (ref 9–46)
AST SERPL-CCNC: 20 U/L (ref 10–35)
BASOPHILS # BLD AUTO: 88 CELLS/UL (ref 0–200)
BASOPHILS NFR BLD AUTO: 1.4 %
BILIRUB SERPL-MCNC: 0.5 MG/DL (ref 0.2–1.2)
BUN SERPL-MCNC: 23 MG/DL (ref 7–25)
BUN/CREAT SERPL: 18 (CALC) (ref 6–22)
CALCIUM SERPL-MCNC: 9.2 MG/DL (ref 8.6–10.3)
CHLORIDE SERPL-SCNC: 107 MMOL/L (ref 98–110)
CHOLEST SERPL-MCNC: 134 MG/DL
CHOLEST/HDLC SERPL: 3 (CALC)
CO2 SERPL-SCNC: 23 MMOL/L (ref 20–32)
CREAT SERPL-MCNC: 1.28 MG/DL (ref 0.7–1.18)
CREAT UR-MCNC: 45 MG/DL (ref 20–320)
EOSINOPHIL # BLD AUTO: 258 CELLS/UL (ref 15–500)
EOSINOPHIL NFR BLD AUTO: 4.1 %
ERYTHROCYTE [DISTWIDTH] IN BLOOD BY AUTOMATED COUNT: 18.2 % (ref 11–15)
EST. AVERAGE GLUCOSE BLD GHB EST-MCNC: 140 (CALC)
EST. AVERAGE GLUCOSE BLD GHB EST-SCNC: 7.7 (CALC)
FERRITIN SERPL-MCNC: 9 NG/ML (ref 24–380)
GLOBULIN SER CALC-MCNC: 2.9 G/DL (CALC) (ref 1.9–3.7)
GLUCOSE SERPL-MCNC: 125 MG/DL (ref 65–99)
HBA1C MFR BLD: 6.5 % OF TOTAL HGB
HCT VFR BLD AUTO: 51.9 % (ref 38.5–50)
HDLC SERPL-MCNC: 44 MG/DL
HGB BLD-MCNC: 15.7 G/DL (ref 13.2–17.1)
LDLC SERPL CALC-MCNC: 72 MG/DL (CALC)
LYMPHOCYTES # BLD AUTO: 1745 CELLS/UL (ref 850–3900)
LYMPHOCYTES NFR BLD AUTO: 27.7 %
MCH RBC QN AUTO: 24 PG (ref 27–33)
MCHC RBC AUTO-ENTMCNC: 30.3 G/DL (ref 32–36)
MCV RBC AUTO: 79.4 FL (ref 80–100)
MICROALBUMIN UR-MCNC: 28.4 MG/DL
MONOCYTES # BLD AUTO: 510 CELLS/UL (ref 200–950)
MONOCYTES NFR BLD AUTO: 8.1 %
NEUTROPHILS # BLD AUTO: 3698 CELLS/UL (ref 1500–7800)
NEUTROPHILS NFR BLD AUTO: 58.7 %
NONHDLC SERPL-MCNC: 90 MG/DL (CALC)
PLATELET # BLD AUTO: 312 THOUSAND/UL (ref 140–400)
PMV BLD REES-ECKER: 10.3 FL (ref 7.5–12.5)
POTASSIUM SERPL-SCNC: 3.9 MMOL/L (ref 3.5–5.3)
PROT SERPL-MCNC: 7.3 G/DL (ref 6.1–8.1)
PSA FREE MFR SERPL: 12 % (CALC)
PSA FREE SERPL-MCNC: 0.7 NG/ML
PSA SERPL-MCNC: 5.7 NG/ML
RBC # BLD AUTO: 6.54 MILLION/UL (ref 4.2–5.8)
SL AMB EGFR AFRICAN AMERICAN: 65 ML/MIN/1.73M2
SL AMB EGFR NON AFRICAN AMERICAN: 56 ML/MIN/1.73M2
SODIUM SERPL-SCNC: 141 MMOL/L (ref 135–146)
TRIGL SERPL-MCNC: 93 MG/DL
TSH SERPL-ACNC: 3.07 MIU/L (ref 0.4–4.5)
URATE SERPL-MCNC: 3.6 MG/DL (ref 4–8)
WBC # BLD AUTO: 6.3 THOUSAND/UL (ref 3.8–10.8)

## 2021-03-27 ENCOUNTER — IMMUNIZATIONS (OUTPATIENT)
Dept: FAMILY MEDICINE CLINIC | Facility: HOSPITAL | Age: 71
End: 2021-03-27

## 2021-03-27 DIAGNOSIS — Z23 ENCOUNTER FOR IMMUNIZATION: Primary | ICD-10-CM

## 2021-03-27 PROCEDURE — 0001A SARS-COV-2 / COVID-19 MRNA VACCINE (PFIZER-BIONTECH) 30 MCG: CPT

## 2021-03-27 PROCEDURE — 91300 SARS-COV-2 / COVID-19 MRNA VACCINE (PFIZER-BIONTECH) 30 MCG: CPT

## 2021-03-29 ENCOUNTER — OFFICE VISIT (OUTPATIENT)
Dept: FAMILY MEDICINE CLINIC | Facility: HOSPITAL | Age: 71
End: 2021-03-29
Payer: MEDICARE

## 2021-03-29 VITALS
BODY MASS INDEX: 31.73 KG/M2 | WEIGHT: 214.2 LBS | DIASTOLIC BLOOD PRESSURE: 90 MMHG | TEMPERATURE: 97.7 F | SYSTOLIC BLOOD PRESSURE: 132 MMHG | HEIGHT: 69 IN | HEART RATE: 68 BPM

## 2021-03-29 DIAGNOSIS — R97.20 ELEVATED PSA: ICD-10-CM

## 2021-03-29 DIAGNOSIS — E78.5 DYSLIPIDEMIA: ICD-10-CM

## 2021-03-29 DIAGNOSIS — I10 ESSENTIAL HYPERTENSION: ICD-10-CM

## 2021-03-29 DIAGNOSIS — Z00.00 MEDICARE ANNUAL WELLNESS VISIT, INITIAL: ICD-10-CM

## 2021-03-29 DIAGNOSIS — E78.1 ESSENTIAL HYPERTRIGLYCERIDEMIA: ICD-10-CM

## 2021-03-29 DIAGNOSIS — E66.9 OBESITY (BMI 30.0-34.9): ICD-10-CM

## 2021-03-29 DIAGNOSIS — E11.22 TYPE 2 DIABETES MELLITUS WITH STAGE 3 CHRONIC KIDNEY DISEASE, WITHOUT LONG-TERM CURRENT USE OF INSULIN (HCC): ICD-10-CM

## 2021-03-29 DIAGNOSIS — N18.31 TYPE 2 DIABETES MELLITUS WITH STAGE 3A CHRONIC KIDNEY DISEASE, WITHOUT LONG-TERM CURRENT USE OF INSULIN (HCC): Primary | ICD-10-CM

## 2021-03-29 DIAGNOSIS — I63.9 CVA (CEREBRAL VASCULAR ACCIDENT) (HCC): ICD-10-CM

## 2021-03-29 DIAGNOSIS — D58.2 ELEVATED HEMOGLOBIN (HCC): ICD-10-CM

## 2021-03-29 DIAGNOSIS — I47.2 NSVT (NONSUSTAINED VENTRICULAR TACHYCARDIA) (HCC): ICD-10-CM

## 2021-03-29 DIAGNOSIS — N18.31 STAGE 3A CHRONIC KIDNEY DISEASE (HCC): ICD-10-CM

## 2021-03-29 DIAGNOSIS — K51.30 ULCERATIVE RECTOSIGMOIDITIS WITHOUT COMPLICATION (HCC): ICD-10-CM

## 2021-03-29 DIAGNOSIS — N18.30 TYPE 2 DIABETES MELLITUS WITH STAGE 3 CHRONIC KIDNEY DISEASE, WITHOUT LONG-TERM CURRENT USE OF INSULIN (HCC): ICD-10-CM

## 2021-03-29 DIAGNOSIS — I65.23 BILATERAL CAROTID ARTERY STENOSIS: ICD-10-CM

## 2021-03-29 DIAGNOSIS — R80.9 MICROALBUMINURIA: ICD-10-CM

## 2021-03-29 DIAGNOSIS — E11.22 TYPE 2 DIABETES MELLITUS WITH STAGE 3A CHRONIC KIDNEY DISEASE, WITHOUT LONG-TERM CURRENT USE OF INSULIN (HCC): Primary | ICD-10-CM

## 2021-03-29 PROCEDURE — 3288F FALL RISK ASSESSMENT DOCD: CPT | Performed by: INTERNAL MEDICINE

## 2021-03-29 PROCEDURE — G0402 INITIAL PREVENTIVE EXAM: HCPCS | Performed by: INTERNAL MEDICINE

## 2021-03-29 PROCEDURE — 99215 OFFICE O/P EST HI 40 MIN: CPT | Performed by: INTERNAL MEDICINE

## 2021-03-29 PROCEDURE — 1123F ACP DISCUSS/DSCN MKR DOCD: CPT | Performed by: INTERNAL MEDICINE

## 2021-03-29 RX ORDER — METOPROLOL TARTRATE 50 MG/1
75 TABLET, FILM COATED ORAL EVERY 12 HOURS
Qty: 135 TABLET | Refills: 2 | Status: SHIPPED | OUTPATIENT
Start: 2021-03-29 | End: 2021-10-04 | Stop reason: SDUPTHER

## 2021-03-29 RX ORDER — FENOFIBRATE 145 MG/1
145 TABLET, COATED ORAL DAILY
Qty: 90 TABLET | Refills: 2 | Status: SHIPPED | OUTPATIENT
Start: 2021-03-29 | End: 2021-10-04 | Stop reason: SDUPTHER

## 2021-03-29 RX ORDER — CLOPIDOGREL BISULFATE 75 MG/1
75 TABLET ORAL DAILY
Qty: 90 TABLET | Refills: 2 | Status: SHIPPED | OUTPATIENT
Start: 2021-03-29 | End: 2021-10-04 | Stop reason: SDUPTHER

## 2021-03-29 RX ORDER — EMPAGLIFLOZIN 10 MG/1
10 TABLET, FILM COATED ORAL EVERY MORNING
Qty: 90 TABLET | Refills: 2 | Status: SHIPPED | OUTPATIENT
Start: 2021-03-29 | End: 2021-09-15

## 2021-03-29 NOTE — PATIENT INSTRUCTIONS
Medicare Preventive Visit Patient Instructions  Thank you for completing your Welcome to Medicare Visit or Medicare Annual Wellness Visit today  Your next wellness visit will be due in one year (3/30/2022)  The screening/preventive services that you may require over the next 5-10 years are detailed below  Some tests may not apply to you based off risk factors and/or age  Screening tests ordered at today's visit but not completed yet may show as past due  Also, please note that scanned in results may not display below  Preventive Screenings:  Service Recommendations Previous Testing/Comments   Colorectal Cancer Screening  · Colonoscopy    · Fecal Occult Blood Test (FOBT)/Fecal Immunochemical Test (FIT)  · Fecal DNA/Cologuard Test  · Flexible Sigmoidoscopy Age: 54-65 years old   Colonoscopy: every 10 years (May be performed more frequently if at higher risk)  OR  FOBT/FIT: every 1 year  OR  Cologuard: every 3 years  OR  Sigmoidoscopy: every 5 years  Screening may be recommended earlier than age 48 if at higher risk for colorectal cancer  Also, an individualized decision between you and your healthcare provider will decide whether screening between the ages of 74-80 would be appropriate   Colonoscopy: 05/06/2020  FOBT/FIT: Not on file  Cologuard: Not on file  Sigmoidoscopy: Not on file    Screening Current     Prostate Cancer Screening Individualized decision between patient and health care provider in men between ages of 53-78   Medicare will cover every 12 months beginning on the day after your 50th birthday PSA: 5 7 ng/mL     Screening Current     Hepatitis C Screening Once for adults born between 1945 and 1965  More frequently in patients at high risk for Hepatitis C Hep C Antibody: Not on file        Diabetes Screening 1-2 times per year if you're at risk for diabetes or have pre-diabetes Fasting glucose: No results in last 5 years   A1C: 6 5 % of total Hgb    Screening Not Indicated  History Diabetes Cholesterol Screening Once every 5 years if you don't have a lipid disorder  May order more often based on risk factors  Lipid panel: 03/24/2021    Screening Not Indicated  History Lipid Disorder      Other Preventive Screenings Covered by Medicare:  1  Abdominal Aortic Aneurysm (AAA) Screening: covered once if your at risk  You're considered to be at risk if you have a family history of AAA or a male between the age of 73-68 who smoking at least 100 cigarettes in your lifetime  2  Lung Cancer Screening: covers low dose CT scan once per year if you meet all of the following conditions: (1) Age 50-69; (2) No signs or symptoms of lung cancer; (3) Current smoker or have quit smoking within the last 15 years; (4) You have a tobacco smoking history of at least 30 pack years (packs per day x number of years you smoked); (5) You get a written order from a healthcare provider  3  Glaucoma Screening: covered annually if you're considered high risk: (1) You have diabetes OR (2) Family history of glaucoma OR (3)  aged 48 and older OR (3)  American aged 72 and older  3  Osteoporosis Screening: covered every 2 years if you meet one of the following conditions: (1) Have a vertebral abnormality; (2) On glucocorticoid therapy for more than 3 months; (3) Have primary hyperparathyroidism; (4) On osteoporosis medications and need to assess response to drug therapy  5  HIV Screening: covered annually if you're between the age of 12-76  Also covered annually if you are younger than 13 and older than 72 with risk factors for HIV infection  For pregnant patients, it is covered up to 3 times per pregnancy      Immunizations:  Immunization Recommendations   Influenza Vaccine Annual influenza vaccination during flu season is recommended for all persons aged >= 6 months who do not have contraindications   Pneumococcal Vaccine (Prevnar and Pneumovax)  * Prevnar = PCV13  * Pneumovax = PPSV23 Adults 25-60 years old: 1-3 doses may be recommended based on certain risk factors  Adults 72 years old: Prevnar (PCV13) vaccine recommended followed by Pneumovax (PPSV23) vaccine  If already received PPSV23 since turning 65, then PCV13 recommended at least one year after PPSV23 dose  Hepatitis B Vaccine 3 dose series if at intermediate or high risk (ex: diabetes, end stage renal disease, liver disease)   Tetanus (Td) Vaccine - COST NOT COVERED BY MEDICARE PART B Following completion of primary series, a booster dose should be given every 10 years to maintain immunity against tetanus  Td may also be given as tetanus wound prophylaxis  Tdap Vaccine - COST NOT COVERED BY MEDICARE PART B Recommended at least once for all adults  For pregnant patients, recommended with each pregnancy  Shingles Vaccine (Shingrix) - COST NOT COVERED BY MEDICARE PART B  2 shot series recommended in those aged 48 and above     Health Maintenance Due:      Topic Date Due    Hepatitis C Screening  Never done    Colonoscopy Surveillance  05/06/2025    Colorectal Cancer Screening  05/06/2030     Immunizations Due:      Topic Date Due    DTaP,Tdap,and Td Vaccines (1 - Tdap) Never done     Advance Directives   What are advance directives? Advance directives are legal documents that state your wishes and plans for medical care  These plans are made ahead of time in case you lose your ability to make decisions for yourself  Advance directives can apply to any medical decision, such as the treatments you want, and if you want to donate organs  What are the types of advance directives? There are many types of advance directives, and each state has rules about how to use them  You may choose a combination of any of the following:  · Living will: This is a written record of the treatment you want  You can also choose which treatments you do not want, which to limit, and which to stop at a certain time   This includes surgery, medicine, IV fluid, and tube feedings  · Durable power of  for healthcare [facility]): This is a written record that states who you want to make healthcare choices for you when you are unable to make them for yourself  This person, called a proxy, is usually a family member or a friend  You may choose more than 1 proxy  · Do not resuscitate (DNR) order:  A DNR order is used in case your heart stops beating or you stop breathing  It is a request not to have certain forms of treatment, such as CPR  A DNR order may be included in other types of advance directives  · Medical directive: This covers the care that you want if you are in a coma, near death, or unable to make decisions for yourself  You can list the treatments you want for each condition  Treatment may include pain medicine, surgery, blood transfusions, dialysis, IV or tube feedings, and a ventilator (breathing machine)  · Values history: This document has questions about your views, beliefs, and how you feel and think about life  This information can help others choose the care that you would choose  Why are advance directives important? An advance directive helps you control your care  Although spoken wishes may be used, it is better to have your wishes written down  Spoken wishes can be misunderstood, or not followed  Treatments may be given even if you do not want them  An advance directive may make it easier for your family to make difficult choices about your care  Weight Management   Why it is important to manage your weight:  Being overweight increases your risk of health conditions such as heart disease, high blood pressure, type 2 diabetes, and certain types of cancer  It can also increase your risk for osteoarthritis, sleep apnea, and other respiratory problems  Aim for a slow, steady weight loss  Even a small amount of weight loss can lower your risk of health problems    How to lose weight safely:  A safe and healthy way to lose weight is to eat fewer calories and get regular exercise  You can lose up about 1 pound a week by decreasing the number of calories you eat by 500 calories each day  Healthy meal plan for weight management:  A healthy meal plan includes a variety of foods, contains fewer calories, and helps you stay healthy  A healthy meal plan includes the following:  · Eat whole-grain foods more often  A healthy meal plan should contain fiber  Fiber is the part of grains, fruits, and vegetables that is not broken down by your body  Whole-grain foods are healthy and provide extra fiber in your diet  Some examples of whole-grain foods are whole-wheat breads and pastas, oatmeal, brown rice, and bulgur  · Eat a variety of vegetables every day  Include dark, leafy greens such as spinach, kale, samreen greens, and mustard greens  Eat yellow and orange vegetables such as carrots, sweet potatoes, and winter squash  · Eat a variety of fruits every day  Choose fresh or canned fruit (canned in its own juice or light syrup) instead of juice  Fruit juice has very little or no fiber  · Eat low-fat dairy foods  Drink fat-free (skim) milk or 1% milk  Eat fat-free yogurt and low-fat cottage cheese  Try low-fat cheeses such as mozzarella and other reduced-fat cheeses  · Choose meat and other protein foods that are low in fat  Choose beans or other legumes such as split peas or lentils  Choose fish, skinless poultry (chicken or turkey), or lean cuts of red meat (beef or pork)  Before you cook meat or poultry, cut off any visible fat  · Use less fat and oil  Try baking foods instead of frying them  Add less fat, such as margarine, sour cream, regular salad dressing and mayonnaise to foods  Eat fewer high-fat foods  Some examples of high-fat foods include french fries, doughnuts, ice cream, and cakes  · Eat fewer sweets  Limit foods and drinks that are high in sugar  This includes candy, cookies, regular soda, and sweetened drinks    Exercise:  Exercise at least 30 minutes per day on most days of the week  Some examples of exercise include walking, biking, dancing, and swimming  You can also fit in more physical activity by taking the stairs instead of the elevator or parking farther away from stores  Ask your healthcare provider about the best exercise plan for you  © Copyright [employer] 2018 Information is for End User's use only and may not be sold, redistributed or otherwise used for commercial purposes  All illustrations and images included in CareNotes® are the copyrighted property of [employer]  or [facility] Preventive Visit Patient Instructions  Thank you for completing your Welcome to Medicare Visit or Medicare Annual Wellness Visit today  Your next wellness visit will be due in one year (3/30/2022)  The screening/preventive services that you may require over the next 5-10 years are detailed below  Some tests may not apply to you based off risk factors and/or age  Screening tests ordered at today's visit but not completed yet may show as past due  Also, please note that scanned in results may not display below  Preventive Screenings:  Service Recommendations Previous Testing/Comments   Colorectal Cancer Screening  · Colonoscopy    · Fecal Occult Blood Test (FOBT)/Fecal Immunochemical Test (FIT)  · Fecal DNA/Cologuard Test  · Flexible Sigmoidoscopy Age: 54-65 years old   Colonoscopy: every 10 years (May be performed more frequently if at higher risk)  OR  FOBT/FIT: every 1 year  OR  Cologuard: every 3 years  OR  Sigmoidoscopy: every 5 years  Screening may be recommended earlier than age 48 if at higher risk for colorectal cancer  Also, an individualized decision between you and your healthcare provider will decide whether screening between the ages of 74-80 would be appropriate   Colonoscopy: 05/06/2020  FOBT/FIT: Not on file  Cologuard: Not on file  Sigmoidoscopy: Not on file    Screening Current     Prostate Cancer Screening Individualized decision between patient and health care provider in men between ages of 53-78   Medicare will cover every 12 months beginning on the day after your 50th birthday PSA: 5 7 ng/mL     Screening Current     Hepatitis C Screening Once for adults born between 1945 and 1965  More frequently in patients at high risk for Hepatitis C Hep C Antibody: Not on file        Diabetes Screening 1-2 times per year if you're at risk for diabetes or have pre-diabetes Fasting glucose: No results in last 5 years   A1C: 6 5 % of total Hgb    Screening Not Indicated  History Diabetes   Cholesterol Screening Once every 5 years if you don't have a lipid disorder  May order more often based on risk factors  Lipid panel: 03/24/2021    Screening Not Indicated  History Lipid Disorder      Other Preventive Screenings Covered by Medicare:  6  Abdominal Aortic Aneurysm (AAA) Screening: covered once if your at risk  You're considered to be at risk if you have a family history of AAA or a male between the age of 73-68 who smoking at least 100 cigarettes in your lifetime  7  Lung Cancer Screening: covers low dose CT scan once per year if you meet all of the following conditions: (1) Age 50-69; (2) No signs or symptoms of lung cancer; (3) Current smoker or have quit smoking within the last 15 years; (4) You have a tobacco smoking history of at least 30 pack years (packs per day x number of years you smoked); (5) You get a written order from a healthcare provider  8  Glaucoma Screening: covered annually if you're considered high risk: (1) You have diabetes OR (2) Family history of glaucoma OR (3)  aged 48 and older OR (3)  American aged 72 and older  5   Osteoporosis Screening: covered every 2 years if you meet one of the following conditions: (1) Have a vertebral abnormality; (2) On glucocorticoid therapy for more than 3 months; (3) Have primary hyperparathyroidism; (4) On osteoporosis medications and need to assess response to drug therapy  10  HIV Screening: covered annually if you're between the age of 12-76  Also covered annually if you are younger than 13 and older than 72 with risk factors for HIV infection  For pregnant patients, it is covered up to 3 times per pregnancy  Immunizations:  Immunization Recommendations   Influenza Vaccine Annual influenza vaccination during flu season is recommended for all persons aged >= 6 months who do not have contraindications   Pneumococcal Vaccine (Prevnar and Pneumovax)  * Prevnar = PCV13  * Pneumovax = PPSV23 Adults 25-60 years old: 1-3 doses may be recommended based on certain risk factors  Adults 72 years old: Prevnar (PCV13) vaccine recommended followed by Pneumovax (PPSV23) vaccine  If already received PPSV23 since turning 65, then PCV13 recommended at least one year after PPSV23 dose  Hepatitis B Vaccine 3 dose series if at intermediate or high risk (ex: diabetes, end stage renal disease, liver disease)   Tetanus (Td) Vaccine - COST NOT COVERED BY MEDICARE PART B Following completion of primary series, a booster dose should be given every 10 years to maintain immunity against tetanus  Td may also be given as tetanus wound prophylaxis  Tdap Vaccine - COST NOT COVERED BY MEDICARE PART B Recommended at least once for all adults  For pregnant patients, recommended with each pregnancy  Shingles Vaccine (Shingrix) - COST NOT COVERED BY MEDICARE PART B  2 shot series recommended in those aged 48 and above     Health Maintenance Due:      Topic Date Due    Hepatitis C Screening  Never done    Colonoscopy Surveillance  05/06/2025    Colorectal Cancer Screening  05/06/2030     Immunizations Due:      Topic Date Due    DTaP,Tdap,and Td Vaccines (1 - Tdap) Never done     Advance Directives   What are advance directives? Advance directives are legal documents that state your wishes and plans for medical care   These plans are made ahead of time in case you lose your ability to make decisions for yourself  Advance directives can apply to any medical decision, such as the treatments you want, and if you want to donate organs  What are the types of advance directives? There are many types of advance directives, and each state has rules about how to use them  You may choose a combination of any of the following:  · Living will: This is a written record of the treatment you want  You can also choose which treatments you do not want, which to limit, and which to stop at a certain time  This includes surgery, medicine, IV fluid, and tube feedings  · Durable power of  for healthcare [place] [facility]): This is a written record that states who you want to make healthcare choices for you when you are unable to make them for yourself  This person, called a proxy, is usually a family member or a friend  You may choose more than 1 proxy  · Do not resuscitate (DNR) order:  A DNR order is used in case your heart stops beating or you stop breathing  It is a request not to have certain forms of treatment, such as CPR  A DNR order may be included in other types of advance directives  · Medical directive: This covers the care that you want if you are in a coma, near death, or unable to make decisions for yourself  You can list the treatments you want for each condition  Treatment may include pain medicine, surgery, blood transfusions, dialysis, IV or tube feedings, and a ventilator (breathing machine)  · Values history: This document has questions about your views, beliefs, and how you feel and think about life  This information can help others choose the care that you would choose  Why are advance directives important? An advance directive helps you control your care  Although spoken wishes may be used, it is better to have your wishes written down  Spoken wishes can be misunderstood, or not followed  Treatments may be given even if you do not want them   An advance directive may make it easier for your family to make difficult choices about your care  Weight Management   Why it is important to manage your weight:  Being overweight increases your risk of health conditions such as heart disease, high blood pressure, type 2 diabetes, and certain types of cancer  It can also increase your risk for osteoarthritis, sleep apnea, and other respiratory problems  Aim for a slow, steady weight loss  Even a small amount of weight loss can lower your risk of health problems  How to lose weight safely:  A safe and healthy way to lose weight is to eat fewer calories and get regular exercise  You can lose up about 1 pound a week by decreasing the number of calories you eat by 500 calories each day  Healthy meal plan for weight management:  A healthy meal plan includes a variety of foods, contains fewer calories, and helps you stay healthy  A healthy meal plan includes the following:  · Eat whole-grain foods more often  A healthy meal plan should contain fiber  Fiber is the part of grains, fruits, and vegetables that is not broken down by your body  Whole-grain foods are healthy and provide extra fiber in your diet  Some examples of whole-grain foods are whole-wheat breads and pastas, oatmeal, brown rice, and bulgur  · Eat a variety of vegetables every day  Include dark, leafy greens such as spinach, kale, samreen greens, and mustard greens  Eat yellow and orange vegetables such as carrots, sweet potatoes, and winter squash  · Eat a variety of fruits every day  Choose fresh or canned fruit (canned in its own juice or light syrup) instead of juice  Fruit juice has very little or no fiber  · Eat low-fat dairy foods  Drink fat-free (skim) milk or 1% milk  Eat fat-free yogurt and low-fat cottage cheese  Try low-fat cheeses such as mozzarella and other reduced-fat cheeses  · Choose meat and other protein foods that are low in fat  Choose beans or other legumes such as split peas or lentils   Choose fish, skinless poultry (chicken or turkey), or lean cuts of red meat (beef or pork)  Before you cook meat or poultry, cut off any visible fat  · Use less fat and oil  Try baking foods instead of frying them  Add less fat, such as margarine, sour cream, regular salad dressing and mayonnaise to foods  Eat fewer high-fat foods  Some examples of high-fat foods include french fries, doughnuts, ice cream, and cakes  · Eat fewer sweets  Limit foods and drinks that are high in sugar  This includes candy, cookies, regular soda, and sweetened drinks  Exercise:  Exercise at least 30 minutes per day on most days of the week  Some examples of exercise include walking, biking, dancing, and swimming  You can also fit in more physical activity by taking the stairs instead of the elevator or parking farther away from stores  Ask your healthcare provider about the best exercise plan for you  © Copyright [employer] 2018 Information is for End User's use only and may not be sold, redistributed or otherwise used for commercial purposes   All illustrations and images included in CareNotes® are the copyrighted property of [employer]  or [address] [place] [address]

## 2021-03-29 NOTE — PROGRESS NOTES
Assessment and Plan:     Problem List Items Addressed This Visit        Digestive    Ulcerative rectosigmoiditis without complication ([facility] )       Endocrine    Type 2 diabetes mellitus with stage 3 chronic kidney disease, without long-term current use of insulin ([facility]) - Primary    Relevant Medications    Empagliflozin (Jardiance) 10 MG TABS    Other Relevant Orders    Basic metabolic panel    Hemoglobin A1C With EAG       Cardiovascular and Mediastinum    Hypertension    Relevant Medications    metoprolol tartrate (LOPRESSOR) 50 mg tablet    NSVT (nonsustained ventricular tachycardia) ([facility])    Relevant Medications    clopidogrel (PLAVIX) 75 mg tablet    metoprolol tartrate (LOPRESSOR) 50 mg tablet    Bilateral carotid artery stenosis       Genitourinary    Chronic kidney disease, stage 3 ([facility])    Relevant Orders    Basic metabolic panel       Other    Essential hypertriglyceridemia    Relevant Medications    fenofibrate (TRICOR) 145 mg tablet    Microalbuminuria    Dyslipidemia    Elevated hemoglobin ([facility])      Other Visit Diagnoses     CVA (cerebral vascular accident) ([facility] )        Relevant Medications    clopidogrel (PLAVIX) 75 mg tablet    Medicare annual wellness visit, initial        Obesity (BMI 30 0-34  9)        BMI 31 0-31 9,adult        Elevated PSA        Relevant Orders    Ambulatory referral to Urology        BMI Counseling: Body mass index is 31 63 kg/m²  The BMI is above normal  Nutrition recommendations include decreasing portion sizes, encouraging healthy choices of fruits and vegetables, consuming healthier snacks, moderation in carbohydrate intake, increasing intake of lean protein and reducing intake of saturated and trans fat  Exercise recommendations include exercising 3-5 times per week  No pharmacotherapy was ordered  Preventive health issues were discussed with patient, and age appropriate screening tests were ordered as noted in patient's After Visit Summary    Personalized health advice and appropriate referrals for health education or preventive services given if needed, as noted in patient's After Visit Summary       History of Present Illness:     Patient presents for Medicare Annual Wellness visit    Patient Care Team:  Nova Griffith DO as PCP - [address] (Internal Medicine)  Sunita Schafer MD (Neurology)  Romario Bautista MD as Medical Oncologist (Oncology)     Problem List:     Patient Active Problem List   Diagnosis    Chronic kidney disease, stage 3 ([facility] )    Colon, diverticulosis    Elevated C-reactive protein    Elevated prostate specific antigen (PSA)    Essential hypertriglyceridemia    Gout    Hypertension    Microalbuminuria    Obesity    Type 2 diabetes mellitus with stage 3 chronic kidney disease, without long-term current use of insulin ([facility] )    Ulcerative rectosigmoiditis without complication ([facility] )    Dyslipidemia    Erythrocytosis    History of stroke    Transient alteration of awareness    Witnessed episode of apnea    NSVT (nonsustained ventricular tachycardia) ([facility] )    Bilateral carotid artery stenosis    Elevated hemoglobin ([facility] )      Past Medical and Surgical History:     Past Medical History:   Diagnosis Date    CVA (cerebral vascular accident) ([facility] )     Diverticulitis     Gout     Hypercholesterolemia     Hypertension     Renal disorder      Past Surgical History:   Procedure Laterality Date    CARDIAC LOOP RECORDER      COLONOSCOPY  09/18/2006    complete; 10 yrs    COLONOSCOPY  10/23/2017    complete; 5 yrs    COLONOSCOPY  05/06/2020    Severe active left-sided colitis, erythematous and ulcerated mucosa in the rectosigmoid, inflammatory polyp      Family History:     Family History   Problem Relation Age of Onset    Breast cancer Mother     Kidney disease Father     Lung cancer Father     Other Father         smoker    Hypertension Other     Colon polyps Neg Hx     Colon cancer Neg Hx       Social History: E-Cigarette/Vaping    E-Cigarette Use Never User      E-Cigarette/Vaping Substances    Nicotine No     THC No     CBD No     Flavoring No     Other No     Unknown No      Social History     Socioeconomic History    Marital status: /Civil Union     Spouse name: None    Number of children: None    Years of education: None    Highest education level: None   Occupational History    Occupation: full-time employment   Social Needs    Financial resource strain: None    Food insecurity     Worry: None     Inability: None    Transportation needs     Medical: None     Non-medical: None   Tobacco Use    Smoking status: Never Smoker    Smokeless tobacco: Never Used   Substance and Sexual Activity    Alcohol use: Not Currently     Frequency: Never     Binge frequency: Never    Drug use: Never    Sexual activity: Yes   Lifestyle    Physical activity     Days per week: None     Minutes per session: None    Stress: None   Relationships    Social connections     Talks on phone: None     Gets together: None     Attends Restoration service: None     Active member of club or organization: None     Attends meetings of clubs or organizations: None     Relationship status: None    Intimate partner violence     Fear of current or ex partner: None     Emotionally abused: None     Physically abused: None     Forced sexual activity: None   Other Topics Concern    None   Social History Narrative    None      Medications and Allergies:     Current Outpatient Medications   Medication Sig Dispense Refill    allopurinol (ZYLOPRIM) 100 mg tablet Take 2 tablets (200 mg total) by mouth daily 60 tablet 5    amLODIPine (NORVASC) 10 mg tablet Take 1 tablet (10 mg total) by mouth daily 30 tablet 5    atorvastatin (LIPITOR) 40 mg tablet Take 1 tablet (40 mg total) by mouth daily with dinner 90 tablet 2    clopidogrel (PLAVIX) 75 mg tablet Take 1 tablet (75 mg total) by mouth daily 90 tablet 2    Coenzyme Q10 200 MG capsule Take by mouth daily       Empagliflozin (Jardiance) 10 MG TABS Take 1 tablet (10 mg total) by mouth every morning 90 tablet 2    fenofibrate (TRICOR) 145 mg tablet Take 1 tablet (145 mg total) by mouth daily 90 tablet 2    metoprolol tartrate (LOPRESSOR) 50 mg tablet Take 1 5 tablets (75 mg total) by mouth every 12 (twelve) hours 135 tablet 2    sulfaSALAzine (AZULFIDINE-EN) 500 mg EC tablet Take 1 tablet (500 mg total) by mouth 4 (four) times a day 360 tablet 2     No current facility-administered medications for this visit  No Known Allergies   Immunizations:     Immunization History   Administered Date(s) Administered    INFLUENZA 10/08/2015, 10/06/2017, 10/04/2018, 10/03/2019    Influenza, high dose seasonal 0 7 mL 09/28/2020, 10/07/2020    Influenza, injectable, quadrivalent, preservative free 0 5 mL 10/04/2018, 10/03/2019    Influenza, seasonal, injectable 10/19/2015, 10/15/2016    Pneumococcal Conjugate 13-Valent 09/28/2020    SARS-CoV-2 / COVID-19 mRNA IM (Pfizer-BioNTech) 03/27/2021      Health Maintenance:         Topic Date Due    Hepatitis C Screening  Never done    Colonoscopy Surveillance  05/06/2025    Colorectal Cancer Screening  05/06/2030         Topic Date Due    DTaP,Tdap,and Td Vaccines (1 - Tdap) Never done     Review of Systems   Constitutional: Negative for chills and fever  HENT: Negative for congestion, hearing loss and sinus pain  Eyes: Negative for pain and redness  Respiratory: Negative for cough, chest tightness and shortness of breath  Cardiovascular: Negative for chest pain, palpitations and leg swelling  Gastrointestinal: Negative for abdominal pain, blood in stool, constipation, diarrhea, nausea and vomiting  Endocrine: Negative for polydipsia and polyuria  Genitourinary: Negative for difficulty urinating, dysuria and hematuria  Musculoskeletal: Negative for arthralgias and myalgias  Skin: Negative for rash and wound     Neurological: Negative for dizziness and headaches  Hematological: Does not bruise/bleed easily  Psychiatric/Behavioral: Negative for behavioral problems, confusion and dysphoric mood  Medicare Health Risk Assessment:     /90   Pulse 68   Temp 97 7 °F (36 5 °C) (Temporal)   Ht 5' 9" (1 753 m)   Wt 97 2 kg (214 lb 3 2 oz)   BMI 31 63 kg/m²      Physical Exam   Constitutional: He appears well-developed and well-nourished  No distress  HENT:   Head: Normocephalic and atraumatic  Right Ear: External ear normal    Left Ear: External ear normal    Eyes: Conjunctivae are normal  Right eye exhibits no discharge  Left eye exhibits no discharge  Neck: Neck supple  No tracheal deviation present  Cardiovascular: Normal rate, regular rhythm and normal heart sounds  No murmur heard  Pulmonary/Chest: Effort normal and breath sounds normal  No respiratory distress  He has no wheezes  He has no rales  Abdominal: Soft  Bowel sounds are normal  There is no abdominal tenderness  There is no guarding  Musculoskeletal: Normal range of motion  General: No deformity  Lymphadenopathy:     He has no cervical adenopathy  Neurological: He is alert  He exhibits normal muscle tone  Skin: Skin is warm and dry  No rash noted  Psychiatric: He has a normal mood and affect  His behavior is normal  Thought content normal    Nursing note and vitals reviewed  Matt Weeks is here for his Initial Wellness visit  Health Risk Assessment:   Patient rates overall health as good  Patient feels that their physical health rating is same  Patient is very satisfied with their life  Eyesight was rated as same  Hearing was rated as same  Patient feels that their emotional and mental health rating is slightly better  Patients states they are never, rarely angry  Patient states they are never, rarely unusually tired/fatigued  Pain experienced in the last 7 days has been none   Patient states that he has experienced no weight loss or gain in last 6 months  Feeling better mentally as physical health is settled down and he has retired    Depression Screening:   PHQ-2 Score: 0      Fall Risk Screening: In the past year, patient has experienced: no history of falling in past year      Home Safety:  Patient does not have trouble with stairs inside or outside of their home  Patient has working smoke alarms and has working carbon monoxide detector  Home safety hazards include: none  Nutrition:   Current diet is Diabetic  Medications:   Patient is currently taking over-the-counter supplements  OTC medications include: see medication list  Patient is able to manage medications  Activities of Daily Living (ADLs)/Instrumental Activities of Daily Living (IADLs):   Walk and transfer into and out of bed and chair?: Yes  Dress and groom yourself?: Yes    Bathe or shower yourself?: Yes    Feed yourself?  Yes  Do your laundry/housekeeping?: Yes  Manage your money, pay your bills and track your expenses?: Yes  Make your own meals?: Yes    Do your own shopping?: Yes    Previous Hospitalizations:   Any hospitalizations or ED visits within the last 12 months?: No      Advance Care Planning:   Living will: No    Durable POA for healthcare: No    Advanced directive: No    Five wishes given: Yes      Cognitive Screening:   Provider or family/friend/caregiver concerned regarding cognition?: No    PREVENTIVE SCREENINGS      Cardiovascular Screening:    General: History Lipid Disorder, Screening Current and Risks and Benefits Discussed      Diabetes Screening:     General: History Diabetes, Risks and Benefits Discussed and Screening Current      Colorectal Cancer Screening:     General: Screening Current      Prostate Cancer Screening:    General: Screening Current and Risks and Benefits Discussed      Osteoporosis Screening:    General: Risks and Benefits Discussed and Screening Not Indicated      Abdominal Aortic Aneurysm (AAA) Screening: Risk factors include: age between 73-69 yo        General: Risks and Benefits Discussed and Screening Not Indicated      Lung Cancer Screening:     General: Screening Not Indicated and Risks and Benefits Discussed      Hepatitis C Screening:    General: Patient Declines and Risks and Benefits Discussed    Hep C Screening Accepted: No     Screening, Brief Intervention, and Referral to Treatment (SBIRT)    Screening  Typical number of drinks in a day: 0  Typical number of drinks in a week: 0  Interpretation: Low risk drinking behavior  Single Item Drug Screening:  How often have you used an illegal drug (including marijuana) or a prescription medication for non-medical reasons in the past year? never    Single Item Drug Screen Score: 0  Interpretation: Negative screen for possible drug use disorder    Other Counseling Topics:   Car/seat belt/driving safety and regular weightbearing exercise         Finn Wilson, DO

## 2021-03-29 NOTE — ASSESSMENT & PLAN NOTE
Discussed mild elevation can be seen with age but also with prostate cancer, number Uro given - pt will think about it - call with any new/worse Uro symptoms, will follow annually - will order at next appt

## 2021-03-29 NOTE — ASSESSMENT & PLAN NOTE
Lab Results   Component Value Date    EGFR 50 03/20/2020    EGFR 35 03/19/2020    EGFR 46 02/03/2020    CREATININE 1 28 (H) 03/24/2021    CREATININE 1 50 (H) 09/22/2020    CREATININE 1 42 (H) 03/20/2020   CKD stage 3 - slightly improved, again BP/BS control and avoiding NSAIDs reviewed, urged to increase water intake during day, recheck BW in 6 mo - order given

## 2021-03-29 NOTE — PROGRESS NOTES
Cristel Ireland RN with Dr Jake Shaw office notified of Ferritin 9 amd Hgb 15 7  Ok to proceed with therapeutic phlebotomy

## 2021-03-29 NOTE — PROGRESS NOTES
Assessment/Plan:    Type 2 diabetes mellitus with stage 3 chronic kidney disease, without long-term current use of insulin (HCC)    Lab Results   Component Value Date    HGBA1C 6 5 (H) 03/24/2021   DM type 2 with CKD stage 3 - controlled with A1C 6 5 - urged con't low sugar/carb diet and keep active and get wgt down, con't current meds, recheck BW in 6 mo - order given, discussed elevated alb/Cr ratio and importance of BP/BS control and avoiding NSAIDs, if still elevated will need to discussed ACE/ARB, UTD on foot exam ( 9/20) and eye exam (10/19- 2 yrs), on a statin    Microalbuminuria  Microalbumin increased - will need ACE/ARB if not better, urged BP/BS control, will follow    Dyslipidemia  LDL at goal with current statin, con't current meds, recheck FLP annually    Chronic kidney disease, stage 3 ([facility] )  Lab Results   Component Value Date    EGFR 50 03/20/2020    EGFR 35 03/19/2020    EGFR 46 02/03/2020    CREATININE 1 28 (H) 03/24/2021    CREATININE 1 50 (H) 09/22/2020    CREATININE 1 42 (H) 03/20/2020   CKD stage 3 - slightly improved, again BP/BS control and avoiding NSAIDs reviewed, urged to increase water intake during day, recheck BW in 6 mo - order given    Elevated hemoglobin (HCC)  Having phlebotomy every 2 wks with Heme, con't tx and labs as per Heme    NSVT (nonsustained ventricular tachycardia) (HCC)  Currently NSR and rate controlled, will fax copy of BW to Cardio, con't Toprol/Plavix/Statin, call with CV symptoms    Bilateral carotid artery stenosis  Mild plaque at <50% B/L, on statin and Plavix, call with stroke/TIA symptoms    Ulcerative rectosigmoiditis without complication (HCC)  No current symptoms and doing great on sulfasalazine, con't meds as directed, con't f/u as per GI    Hypertension  BP at goal, con't current meds    Essential hypertriglyceridemia  Fibrate refilled upon request, TG at goal, con't meds    Elevated PSA  Discussed mild elevation can be seen with age but also with prostate cancer, number Uro given - pt will think about it - call with any new/worse Uro symptoms, will follow annually - will order at next appt       Diagnoses and all orders for this visit:    Type 2 diabetes mellitus with stage 3a chronic kidney disease, without long-term current use of insulin ([facility] )  -     Basic metabolic panel; Future  -     Hemoglobin A1C With EAG; Future  -     Basic metabolic panel  -     Hemoglobin A1C With EAG    Type 2 diabetes mellitus with stage 3 chronic kidney disease, without long-term current use of insulin ([facility])  -     Empagliflozin (Jardiance) 10 MG TABS; Take 1 tablet (10 mg total) by mouth every morning    Microalbuminuria    Dyslipidemia    Stage 3a chronic kidney disease  -     Basic metabolic panel; Future  -     Basic metabolic panel    Elevated hemoglobin ([facility])    NSVT (nonsustained ventricular tachycardia) ([facility])    Bilateral carotid artery stenosis    Ulcerative rectosigmoiditis without complication ([facility])    CVA (cerebral vascular accident) ([facility] )  Comments:  No recent Neuro symptoms, on Plavix and statin, healthy diet and increased activity and wgt loss encouraged, has f/u coming up with Neuro  Orders:  -     clopidogrel (PLAVIX) 75 mg tablet; Take 1 tablet (75 mg total) by mouth daily    Essential hypertension  -     metoprolol tartrate (LOPRESSOR) 50 mg tablet; Take 1 5 tablets (75 mg total) by mouth every 12 (twelve) hours    Essential hypertriglyceridemia  -     fenofibrate (TRICOR) 145 mg tablet; Take 1 tablet (145 mg total) by mouth daily    Elevated PSA  -     Ambulatory referral to Urology; Future    Medicare annual wellness visit, initial    Obesity (BMI 30 0-34  9)    BMI 31 0-31 9,adult      Colonoscopy 5/20    Had first COVID vaccine w/o issues        Subjective:      Patient ID: Mark Young is a 80 y o  male      HPI Pt here for follow up appt/BW results and AWV (initial)    BW results were d/w pt in detail: FBS/A1C 125/6 5 , BUN/Cr 23/1 28 (GFR 56), rest of CMP/TSH/FLP was wnl, Total PSA 5 7 and free PSA Pct 12, urine microalbumin:Cr ratio 631, CBC with Hct up at 51 9 and MCV 79 4, plt and WBC wnl, uric acid low at 3 6, ferritin low at 9  Def of controlled vs uncontrolled DM was reviewed  Diet was reviewed - wgt up 4 lbs from Sept 2020  He is taking his DM meds as directed  He is UTD on foot (9/20) and eye exam (10/19 - 2 yrs)  He is on a statin but no ARB/ACE  CKD stage 3 reviewed  Importance of BS and BP control reviewed  He does not use NSAIDs regularly  He admits he should drink more water  Goal FLP was d/w pt in detail  Diet/exercise reviewed as noted above  He is taking his Atorvastatin and fibrate daily as directed w/o Se  He notes no stroke/TIA symptoms/CP  Pt saw Heme (Dr Radhika Ernandez) last week for f/u elevated hbg/hct with h/o stroke - OV note reviewed  Hct con't to be a bit elevated as noted above  He is receiving phlebotomy every 2 wks  D/t a low MCV and iron def Heme opted to con't him on q 2 wks phlebotomy  Pt saw Cardio (Dr Fox Espinoza) in Dec 2020 for f/u V-tach/NSVT with h/o CVA - OV note reviewed  A sleep study was ordered but he has not yet scheduled this  No meds were changed  He was told to f/u in 1 yr  CUS from 3/21 reviewed - <50% stenosis B/L ICA  No prior study for comparison was available  He has been taking his statin daily for approx 2 mos now  He has taken it with Co-Q 10 and that has helped  He has dx of UC and is sulfasalazine regularly  He notes no recent GI symptoms - denies diarrhea/abd pain/blood in stool/F/C  He notes no recent gout issues  He is taking his allopurinol daily as directed w/o SE        Colonoscopy 5/20    Review of Systems      Objective:    /90   Pulse 68   Temp 97 7 °F (36 5 °C) (Temporal)   Ht 5' 9" (1 753 m)   Wt 97 2 kg (214 lb 3 2 oz)   BMI 31 63 kg/m²      Physical Exam

## 2021-03-29 NOTE — ASSESSMENT & PLAN NOTE
Lab Results   Component Value Date    HGBA1C 6 5 (H) 03/24/2021   DM type 2 with CKD stage 3 - controlled with A1C 6 5 - urged con't low sugar/carb diet and keep active and get wgt down, con't current meds, recheck BW in 6 mo - order given, discussed elevated alb/Cr ratio and importance of BP/BS control and avoiding NSAIDs, if still elevated will need to discussed ACE/ARB, UTD on foot exam ( 9/20) and eye exam (10/19- 2 yrs), on a statin

## 2021-03-29 NOTE — ASSESSMENT & PLAN NOTE
Currently NSR and rate controlled, will fax copy of BW to Cardio, con't Toprol/Plavix/Statin, call with CV symptoms

## 2021-03-30 ENCOUNTER — HOSPITAL ENCOUNTER (OUTPATIENT)
Dept: INFUSION CENTER | Facility: HOSPITAL | Age: 71
Discharge: HOME | End: 2021-03-30
Attending: INTERNAL MEDICINE
Payer: MEDICARE

## 2021-03-30 VITALS
TEMPERATURE: 98.2 F | OXYGEN SATURATION: 97 % | SYSTOLIC BLOOD PRESSURE: 149 MMHG | HEART RATE: 66 BPM | RESPIRATION RATE: 16 BRPM | DIASTOLIC BLOOD PRESSURE: 87 MMHG

## 2021-03-30 DIAGNOSIS — D75.1 ERYTHROCYTOSIS: Primary | ICD-10-CM

## 2021-03-30 PROCEDURE — 99195 PHLEBOTOMY: CPT

## 2021-03-30 NOTE — PROGRESS NOTES
Pt here for Therapeutic Phlebotomy; in recliner with legs elevated; 500 mls blood rem'd from LAC after 2 sticks; pressure dressing applied; monitored for 20 mins; VSS; AVS given to pt who left amb with steady gait

## 2021-03-31 ENCOUNTER — TELEPHONE (OUTPATIENT)
Dept: UROLOGY | Facility: AMBULATORY SURGERY CENTER | Age: 71
End: 2021-03-31

## 2021-03-31 NOTE — TELEPHONE ENCOUNTER
Patient being referred by Dr Norbert Alcaraz for elevated PSA  Called and spoke to patient - patient advised that he told Dr Norbert Alcaraz that he wanted to think about it and if he decides he wants to schedule he will call us and schedule  Advised patient I will note his wishes and we will wait for his call

## 2021-04-18 ENCOUNTER — IMMUNIZATIONS (OUTPATIENT)
Dept: FAMILY MEDICINE CLINIC | Facility: HOSPITAL | Age: 71
End: 2021-04-18

## 2021-04-18 DIAGNOSIS — Z23 ENCOUNTER FOR IMMUNIZATION: Primary | ICD-10-CM

## 2021-04-18 PROCEDURE — 91300 SARS-COV-2 / COVID-19 MRNA VACCINE (PFIZER-BIONTECH) 30 MCG: CPT

## 2021-04-18 PROCEDURE — 0002A SARS-COV-2 / COVID-19 MRNA VACCINE (PFIZER-BIONTECH) 30 MCG: CPT

## 2021-05-25 ENCOUNTER — HOSPITAL ENCOUNTER (OUTPATIENT)
Dept: INFUSION CENTER | Facility: HOSPITAL | Age: 71
Discharge: HOME | End: 2021-05-25
Attending: INTERNAL MEDICINE

## 2021-07-07 DIAGNOSIS — K51.30 ULCERATIVE RECTOSIGMOIDITIS WITHOUT COMPLICATION (HCC): ICD-10-CM

## 2021-07-08 NOTE — TELEPHONE ENCOUNTER
Last OV    7/10/2020   [facility]  Recommended F/U 4-6 months  Last refill    10/26/2020     90 day with 2 refills    Called pt and transferred to  to schedule appt        Now has appt with HS on 9/21/2021

## 2021-07-13 RX ORDER — SULFASALAZINE 500 MG/1
TABLET, DELAYED RELEASE ORAL
Qty: 360 TABLET | Refills: 2 | Status: SHIPPED | OUTPATIENT
Start: 2021-07-13 | End: 2021-07-21 | Stop reason: ALTCHOICE

## 2021-07-20 ENCOUNTER — HOSPITAL ENCOUNTER (OUTPATIENT)
Dept: INFUSION CENTER | Facility: HOSPITAL | Age: 71
Discharge: HOME | End: 2021-07-20
Attending: INTERNAL MEDICINE
Payer: MEDICARE

## 2021-07-20 VITALS — DIASTOLIC BLOOD PRESSURE: 64 MMHG | HEART RATE: 67 BPM | SYSTOLIC BLOOD PRESSURE: 149 MMHG | OXYGEN SATURATION: 96 %

## 2021-07-20 DIAGNOSIS — D75.1 ERYTHROCYTOSIS: Primary | ICD-10-CM

## 2021-07-20 PROCEDURE — 99195 PHLEBOTOMY: CPT

## 2021-07-20 NOTE — PROGRESS NOTES
Pt in infusion center today for therapeutic phlebotomy treatment  VSS  500ml blood removed from left AV  Pt monitored for 20 minutes post phlebotomy  VSS patient then d/c'd home ambulatory with steady gait

## 2021-07-21 ENCOUNTER — OFFICE VISIT (OUTPATIENT)
Dept: NEUROLOGY | Facility: CLINIC | Age: 71
End: 2021-07-21
Payer: MEDICARE

## 2021-07-21 VITALS
HEART RATE: 82 BPM | BODY MASS INDEX: 31.61 KG/M2 | SYSTOLIC BLOOD PRESSURE: 143 MMHG | HEIGHT: 69 IN | DIASTOLIC BLOOD PRESSURE: 80 MMHG | WEIGHT: 213.4 LBS

## 2021-07-21 DIAGNOSIS — Z86.73 HISTORY OF STROKE: Primary | ICD-10-CM

## 2021-07-21 DIAGNOSIS — K51.30 ULCERATIVE RECTOSIGMOIDITIS WITHOUT COMPLICATION (HCC): Primary | ICD-10-CM

## 2021-07-21 PROCEDURE — 99214 OFFICE O/P EST MOD 30 MIN: CPT | Performed by: PHYSICIAN ASSISTANT

## 2021-07-21 RX ORDER — SULFASALAZINE 500 MG/1
500 TABLET ORAL 4 TIMES DAILY
Qty: 360 TABLET | Refills: 0 | Status: SHIPPED | OUTPATIENT
Start: 2021-07-21 | End: 2021-08-03

## 2021-07-21 NOTE — PATIENT INSTRUCTIONS
History of stroke  · Pt denies any symptoms to suggest new stroke  · He will continue Plavix and atorvastatin  · He will continue to get regular exercise and follow a diabetic diet  · He will continue to monitor his BP at home  · Reviewed carotid ultrasound with pt and his wife  · If pt has any new stroke-like symptoms including sudden painless loss of vision or double vision, difficulty speaking or swallowing, vertigo / room spinning that does not quickly resolve, or weakness / numbness affecting 1 side of the face or body he should proceed by ambulance to the nearest emergency room immediately  I have spent 25 minutes with Patient and family today in which greater than 50% of this time was spent in counseling/coordination of care regarding Diagnostic results, Prognosis, Risks and benefits of tx options, Intructions for management, Patient and family education, Importance of tx compliance, Risk factor reductions and Impressions  He will follow-up in 1 year

## 2021-07-21 NOTE — TELEPHONE ENCOUNTER
BETTE riosg from Angel Trinidad, pharmacy tech at   [facility] order stating script rec'd 7/13 from [facility] for Sulfasalazine 500 EC/is on backorder; regular release 500 is available; request alternative script  If they do not hear back by tomorrow will cancel and let Pt know unable to fill  CB # [id number] x2 using reference # [id number]

## 2021-07-21 NOTE — ASSESSMENT & PLAN NOTE
· Pt denies any symptoms to suggest new stroke  · He will continue Plavix and atorvastatin  · He will continue to get regular exercise and follow a diabetic diet  · He will continue to monitor his BP at home  · Reviewed carotid ultrasound with pt and his wife  · If pt has any new stroke-like symptoms including sudden painless loss of vision or double vision, difficulty speaking or swallowing, vertigo / room spinning that does not quickly resolve, or weakness / numbness affecting 1 side of the face or body he should proceed by ambulance to the nearest emergency room immediately  I have spent 25 minutes with Patient and family today in which greater than 50% of this time was spent in counseling/coordination of care regarding Diagnostic results, Prognosis, Risks and benefits of tx options, Intructions for management, Patient and family education, Importance of tx compliance, Risk factor reductions and Impressions  He will follow-up in 1 year

## 2021-07-21 NOTE — TELEPHONE ENCOUNTER
Enteric coated has not been available for months, mfg backorder  The mail order pharmacies have the regular on hand  Local pharmacies do not have either currently  Please sign off for non enteric coated

## 2021-07-21 NOTE — PROGRESS NOTES
Patient ID: Bonnie Mckay is a 80 y o  male  Assessment/Plan:    History of stroke  · Pt denies any symptoms to suggest new stroke  · He will continue Plavix and atorvastatin  · He will continue to get regular exercise and follow a diabetic diet  · He will continue to monitor his BP at home  · Reviewed carotid ultrasound with pt and his wife  · If pt has any new stroke-like symptoms including sudden painless loss of vision or double vision, difficulty speaking or swallowing, vertigo / room spinning that does not quickly resolve, or weakness / numbness affecting 1 side of the face or body he should proceed by ambulance to the nearest emergency room immediately  I have spent 25 minutes with Patient and family today in which greater than 50% of this time was spent in counseling/coordination of care regarding Diagnostic results, Prognosis, Risks and benefits of tx options, Intructions for management, Patient and family education, Importance of tx compliance, Risk factor reductions and Impressions  He will follow-up in 1 year  Problem List Items Addressed This Visit        Other    History of stroke - Primary     · Pt denies any symptoms to suggest new stroke  · He will continue Plavix and atorvastatin  · He will continue to get regular exercise and follow a diabetic diet  · He will continue to monitor his BP at home  · Reviewed carotid ultrasound with pt and his wife  · If pt has any new stroke-like symptoms including sudden painless loss of vision or double vision, difficulty speaking or swallowing, vertigo / room spinning that does not quickly resolve, or weakness / numbness affecting 1 side of the face or body he should proceed by ambulance to the nearest emergency room immediately    I have spent 25 minutes with Patient and family today in which greater than 50% of this time was spent in counseling/coordination of care regarding Diagnostic results, Prognosis, Risks and benefits of tx options, Intructions for management, Patient and family education, Importance of tx compliance, Risk factor reductions and Impressions  He will follow-up in 1 year  Subjective:    HPI    Gabbi Lora is a 70yo male, with DM type 3, gout, HTN and hyperlipidemia, who follows in the office due to a history of stroke  He denies any symptoms to suggest new stroke  He is taking Plavix, Co-Q10 and aorvastatin and tolerating the medications without difficulty  Recent HA1C 6 5  He is following a diabetic diet  BP log reveals most blood pressures being <140/90  He retired in October and got a dog  He is getting more exercise due to caring for the dog  Carotid doppler revealed <50% stenosis bilat  Reviewed the result with the pt and his wife  Fatigue has not been as much of a problem since he retired  He is able to rest when he needs to  He never completed the BALTA work-up  His wife reports that they got a new bed and he has not had apneic episodes  In the evenings he continues to have some Lt UE weakness and gait dysfunction  The following portions of the patient's history were reviewed and updated as appropriate: allergies, current medications, past family history, past medical history, past social history, past surgical history and problem list          Objective:    Blood pressure 143/80, pulse 82, height 5' 9" (1 753 m), weight 96 8 kg (213 lb 6 4 oz)  Physical Exam  Vitals reviewed  Eyes:      General: Lids are normal       Extraocular Movements: Extraocular movements intact  Pupils: Pupils are equal, round, and reactive to light  Neurological:      Coordination: Coordination is intact  Deep Tendon Reflexes: Strength normal and reflexes are normal and symmetric  Psychiatric:         Speech: Speech normal          Neurological Exam  Mental Status   Oriented to person, place, time and situation  Recent and remote memory are intact  Speech is normal  Language is fluent with no aphasia   Attention and concentration are normal  Fund of knowledge is appropriate for level of education  Apraxia absent  Cranial Nerves  CN II: Visual acuity is normal  Visual fields full to confrontation  CN III, IV, VI: Extraocular movements intact bilaterally  Normal lids and orbits bilaterally  Pupils equal round and reactive to light bilaterally  CN V: Facial sensation is normal   CN VII: Full and symmetric facial movement  CN VIII: Hearing is normal   CN IX, X: Palate elevates symmetrically  Normal gag reflex  CN XI: Shoulder shrug strength is normal   CN XII: Tongue midline without atrophy or fasciculations  Motor   Strength is 5/5 throughout all four extremities  Sensory  Sensation is intact to light touch, pinprick, vibration and proprioception in all four extremities  Reflexes  Deep tendon reflexes are 2+ and symmetric in all four extremities with downgoing toes bilaterally  Coordination  Finger-to-nose, rapid alternating movements and heel-to-shin normal bilaterally without dysmetria  Gait  Normal casual, toe, heel and tandem gait  ROS:    Review of Systems   Constitutional: Negative  Negative for appetite change and fever  HENT: Negative  Negative for hearing loss, tinnitus, trouble swallowing and voice change  Eyes: Negative  Negative for photophobia and pain  Respiratory: Negative  Negative for shortness of breath  Cardiovascular: Negative  Negative for palpitations  Gastrointestinal: Negative  Negative for nausea and vomiting  Endocrine: Negative  Negative for cold intolerance  Genitourinary: Negative  Negative for dysuria, frequency and urgency  Musculoskeletal: Negative  Negative for myalgias and neck pain  Skin: Negative  Negative for rash  Neurological: Negative  Negative for dizziness, tremors, seizures, syncope, facial asymmetry, speech difficulty, weakness, light-headedness, numbness and headaches  Hematological: Negative    Does not bruise/bleed easily  Psychiatric/Behavioral: Negative  Negative for confusion, hallucinations and sleep disturbance  Review of systems personally reviewed

## 2021-08-02 DIAGNOSIS — K51.30 ULCERATIVE RECTOSIGMOIDITIS WITHOUT COMPLICATION (HCC): ICD-10-CM

## 2021-08-03 RX ORDER — SULFASALAZINE 500 MG/1
TABLET ORAL
Qty: 360 TABLET | Refills: 0 | Status: SHIPPED | OUTPATIENT
Start: 2021-08-03 | End: 2021-11-15 | Stop reason: SDUPTHER

## 2021-09-02 DIAGNOSIS — I63.9 CVA (CEREBRAL VASCULAR ACCIDENT) (HCC): ICD-10-CM

## 2021-09-02 RX ORDER — ATORVASTATIN CALCIUM 40 MG/1
TABLET, FILM COATED ORAL
Qty: 30 TABLET | Refills: 5 | Status: SHIPPED | OUTPATIENT
Start: 2021-09-02 | End: 2021-11-29

## 2021-09-15 DIAGNOSIS — E11.22 TYPE 2 DIABETES MELLITUS WITH STAGE 3 CHRONIC KIDNEY DISEASE, WITHOUT LONG-TERM CURRENT USE OF INSULIN (HCC): ICD-10-CM

## 2021-09-15 DIAGNOSIS — N18.30 TYPE 2 DIABETES MELLITUS WITH STAGE 3 CHRONIC KIDNEY DISEASE, WITHOUT LONG-TERM CURRENT USE OF INSULIN (HCC): ICD-10-CM

## 2021-09-15 RX ORDER — EMPAGLIFLOZIN 10 MG/1
TABLET, FILM COATED ORAL
Qty: 90 TABLET | Refills: 2 | Status: SHIPPED | OUTPATIENT
Start: 2021-09-15 | End: 2021-10-04 | Stop reason: SDUPTHER

## 2021-09-21 ENCOUNTER — OFFICE VISIT (OUTPATIENT)
Dept: GASTROENTEROLOGY | Facility: CLINIC | Age: 71
End: 2021-09-21
Payer: MEDICARE

## 2021-09-21 VITALS
BODY MASS INDEX: 32.14 KG/M2 | WEIGHT: 217 LBS | SYSTOLIC BLOOD PRESSURE: 134 MMHG | HEIGHT: 69 IN | DIASTOLIC BLOOD PRESSURE: 82 MMHG

## 2021-09-21 DIAGNOSIS — K51.30 ULCERATIVE RECTOSIGMOIDITIS WITHOUT COMPLICATION (HCC): ICD-10-CM

## 2021-09-21 DIAGNOSIS — Z86.010 HISTORY OF COLON POLYPS: Primary | ICD-10-CM

## 2021-09-21 PROCEDURE — 99213 OFFICE O/P EST LOW 20 MIN: CPT | Performed by: NURSE PRACTITIONER

## 2021-09-21 NOTE — PROGRESS NOTES
[id number] - Outpatient Follow-up Note  Fely Cristina 80 y o  male MRN: [id number]  Encounter: [id number]    ASSESSMENT AND PLAN:      1  Ulcerative rectosigmoiditis without complication [facility])  Patient states he is been doing very well and symptom free since starting the sulfasalazine  States he is having normal daily bowel movements and denies rectal bleeding  2  History of colon polyps  Colonoscopy 2020; recall 3 years      Followup Appointment: One year  ______________________________________________________________________    Chief Complaint   Patient presents with    Follow-up    Diverticulitis    Ulcerative Colitis     HPI:   78-year-old male with a history of diverticulosis and ulcerative colitis presents with annual follow-up  He denies nausea, vomiting, abdominal pain  Patient states he is having normal bowel movements daily  He denies hematochezia or melena  Last colonoscopy 03/09/2020; severe left-sided ulcerative colitis  Recall 3 years  He states since taking the sulfasalazine he has been doing very well      Historical Information   Past Medical History:   Diagnosis Date    [facility]A [facility]cere[facility] vascular accident) ([facility] [address] )     Diverticulitis     Gout     Hypercholesterolemia     Hypertension     Renal disorder      Past Surgical History:   Procedure Laterality Date    CARDIAC LOOP RECORDER      COLONOSCOPY  09/18/2006    complete; 10 yrs    COLONOSCOPY  10/23/2017    complete; 5 yrs    COLONOSCOPY  05/06/2020    Severe active left-sided colitis, erythematous and ulcerated mucosa in the rectosigmoid, inflammatory polyp     Social History     Substance and Sexual Activity   Alcohol Use Not Currently     Social History     Substance and Sexual Activity   Drug Use Never     Social History     Tobacco Use   Smoking Status Never Smoker   Smokeless Tobacco Never Used     Family History   Problem Relation Age of Onset    Breast cancer Mother     Kidney disease Father     Lung cancer Father     Other Father         smoker    Hypertension Other     Colon polyps Neg Hx     Colon cancer Neg Hx          Current Outpatient Medications:     allopurinol (ZYLOPRIM) 100 mg tablet    amLODIPine (NORVASC) 10 mg tablet    atorvastatin (LIPITOR) 40 mg tablet    clopidogrel (PLAVIX) 75 mg tablet    Coenzyme Q10 200 MG capsule    fenofibrate (TRICOR) 145 mg tablet    Jardiance 10 MG TABS    metoprolol tartrate (LOPRESSOR) 50 mg tablet    sulfaSALAzine (AZULFIDINE) 500 mg tablet  No Known Allergies  Reviewed medications and allergies and updated as indicated    PHYSICAL EXAM:    Blood pressure 134/82, height 5' 9" (1 753 m), weight 98 4 kg (217 lb)  Body mass index is 32 05 kg/m²  General Appearance: NAD, cooperative, alert  Eyes: Anicteric, PERRLA, EOMI  ENT:  Normocephalic, atraumatic, normal mucosa  Neck:  Supple, symmetrical, trachea midline  Resp:  Clear to auscultation bilaterally; no rales, rhonchi or wheezing; respirations unlabored   CV:  S1 S2, Regular rate and rhythm; no murmur, rub, or gallop  GI:  Soft, non-tender, non-distended; normal bowel sounds; no masses, no organomegaly   Rectal: Deferred  Musculoskeletal: No cyanosis, clubbing or edema  Normal ROM    Skin:  No jaundice, rashes, or lesions   Heme/Lymph: No palpable cervical lymphadenopathy  Psych: Normal affect, good eye contact  Neuro: No gross deficits, AAOx3    Lab Results:   Lab Results   Component Value Date    WBC 6 5 07/15/2021    HGB 15 6 07/15/2021    HCT 50 9 (H) 07/15/2021    MCV 80 4 07/15/2021     07/15/2021     Lab Results   Component Value Date     07/07/2017    K 3 9 07/15/2021     (H) 07/15/2021    CO2 21 07/15/2021    BUN 20 07/15/2021    CREATININE 1 36 (H) 07/15/2021    CALCIUM 9 0 07/15/2021    AST 20 07/15/2021    ALT 13 07/15/2021    ALKPHOS 81 07/15/2021    PROT 7 1 07/07/2017    BILITOT 0 6 07/07/2017    EGFR 50 03/20/2020     Lab Results   Component Value Date    IRON 128 12/07/2018    TIBC 232 (L) 08/24/2018    FERRITIN 6 (L) 07/15/2021     No results found for: LIPASE    Radiology Results:   No results found

## 2021-09-27 ENCOUNTER — OFFICE VISIT (OUTPATIENT)
Dept: HEMATOLOGY ONCOLOGY | Facility: HOSPITAL | Age: 71
End: 2021-09-27
Payer: MEDICARE

## 2021-09-27 VITALS
WEIGHT: 216 LBS | RESPIRATION RATE: 17 BRPM | HEART RATE: 67 BPM | SYSTOLIC BLOOD PRESSURE: 128 MMHG | TEMPERATURE: 97.4 F | DIASTOLIC BLOOD PRESSURE: 72 MMHG | BODY MASS INDEX: 31.99 KG/M2 | OXYGEN SATURATION: 97 % | HEIGHT: 69 IN

## 2021-09-27 DIAGNOSIS — R71.8 OTHER ABNORMALITY OF RED BLOOD CELLS: ICD-10-CM

## 2021-09-27 DIAGNOSIS — D75.1 ERYTHROCYTOSIS: Primary | ICD-10-CM

## 2021-09-27 PROCEDURE — 99214 OFFICE O/P EST MOD 30 MIN: CPT | Performed by: INTERNAL MEDICINE

## 2021-09-27 NOTE — PROGRESS NOTES
Hematology/Oncology Outpatient Follow- up Note  Francesca Salas 80 y o  male MRN: @ Encounter: [id number]        Date:  9/27/2021    Presenting Complaint/Diagnosis : Stroke with an elevated hemoglobin    Previous Hematologic/ Oncologic History:       workup    Current Hematologic/ Oncologic Treatment:       phlebotomy every 2 months    Interval History:      Patient returns for follow-up visit  He missed his phlebotomy in July  Has not had any blood work since then  I advised him to have blood work done this week and he states he has already schedule  He should then probably need a phlebotomy  Otherwise will keep on every 2  Month schedule  As far symptoms are concerned he is at baseline  Denies any nausea denies any vomiting denies any diarrhea  Overall is doing well  The rest of his 14 point review of systems today was negative  Test Results:    Imaging: No results found  Labs:   Lab Results   Component Value Date    WBC 6 5 07/15/2021    HGB 15 6 07/15/2021    HCT 50 9 (H) 07/15/2021    MCV 80 4 07/15/2021     07/15/2021     Lab Results   Component Value Date     07/07/2017    K 3 9 07/15/2021     (H) 07/15/2021    CO2 21 07/15/2021    BUN 20 07/15/2021    CREATININE 1 36 (H) 07/15/2021    CALCIUM 9 0 07/15/2021    AST 20 07/15/2021    ALT 13 07/15/2021    ALKPHOS 81 07/15/2021    PROT 7 1 07/07/2017    BILITOT 0 6 07/07/2017    EGFR 50 03/20/2020         Lab Results   Component Value Date    PSA 5 7 (H) 03/24/2021    PSA 5 1 (H) 01/27/2020       Lab Results   Component Value Date    IRON 128 12/07/2018    TIBC 232 (L) 08/24/2018    FERRITIN 6 (L) 07/15/2021       ROS: As stated in the history of present illness otherwise his 14 point review of systems today was negative        Active Problems:   Patient Active Problem List   Diagnosis    Chronic kidney disease, stage 3 (HCC)    Colon, diverticulosis    Elevated C-reactive protein    Elevated PSA    Essential hypertriglyceridemia    Gout    Hypertension    Microalbuminuria    Obesity    Type 2 diabetes mellitus with stage 3 chronic kidney disease, without long-term current use of insulin (HCC)    Ulcerative rectosigmoiditis without complication (HCC)    Dyslipidemia    Erythrocytosis    History of stroke    Transient alteration of awareness    Witnessed episode of apnea    NSVT (nonsustained ventricular tachycardia) (HCC)    Bilateral carotid artery stenosis    Elevated hemoglobin (HCC)       Past Medical History:   Past Medical History:   Diagnosis Date    CVA (cerebral vascular accident) ([facility] [address]     Diverticulitis     Gout     Hypercholesterolemia     Hypertension     Renal disorder        Surgical History:   Past Surgical History:   Procedure Laterality Date    CARDIAC LOOP RECORDER      COLONOSCOPY  09/18/2006    complete; 10 yrs    COLONOSCOPY  10/23/2017    complete; 5 yrs    COLONOSCOPY  05/06/2020    Severe active left-sided colitis, erythematous and ulcerated mucosa in the rectosigmoid, inflammatory polyp       Family History:    Family History   Problem Relation Age of Onset    Breast cancer Mother     Kidney disease Father     Lung cancer Father     Other Father         smoker    Hypertension Other     Colon polyps Neg Hx     Colon cancer Neg Hx        Cancer-related family history includes Breast cancer in his mother; Lung cancer in his father  There is no history of Colon cancer      Social History:   Social History     Socioeconomic History    Marital status: /Civil Union     Spouse name: Not on file    Number of children: Not on file    Years of education: Not on file    Highest education level: Not on file   Occupational History    Occupation: full-time employment   Tobacco Use    Smoking status: Never Smoker    Smokeless tobacco: Never Used   Vaping Use    Vaping Use: Never used   Substance and Sexual Activity    Alcohol use: Not Currently    Drug use: Never  Sexual activity: Yes   Other Topics Concern    Not on file   Social History Narrative    Not on file     Social Determinants of Health     Financial Resource Strain:     Difficulty of Paying Living Expenses:    Food Insecurity:     Worried About Running Out of Food in the Last Year:     [address] in the Last Year:    Transportation Needs:     Lack of Transportation (Medical):      Lack of Transportation (Non-Medical):    Physical Activity:     Days of Exercise per Week:     Minutes of Exercise per Session:    Stress:     Feeling of Stress :    Social Connections:     Frequency of Communication with Friends and Family:     Frequency of Social Gatherings with Friends and Family:     Attends [employer] Services:     Active Member of Clubs or Organizations:     Attends Club or Organization Meetings:     Marital Status:    Intimate Partner Violence:     Fear of Current or Ex-Partner:     Emotionally Abused:     Physically Abused:     Sexually Abused:        Current Medications:   Current Outpatient Medications   Medication Sig Dispense Refill    allopurinol (ZYLOPRIM) 100 mg tablet Take 2 tablets (200 mg total) by mouth daily 60 tablet 5    amLODIPine (NORVASC) 10 mg tablet Take 1 tablet (10 mg total) by mouth daily 30 tablet 5    atorvastatin (LIPITOR) 40 mg tablet TAKE 1 TABLET DAILY WITH   DINNER 30 tablet 5    clopidogrel (PLAVIX) 75 mg tablet Take 1 tablet (75 mg total) by mouth daily 90 tablet 2    Coenzyme Q10 200 MG capsule Take by mouth daily       fenofibrate (TRICOR) 145 mg tablet Take 1 tablet (145 mg total) by mouth daily 90 tablet 2    Jardiance 10 MG TABS TAKE 1 TABLET EVERY MORNING 90 tablet 2    metoprolol tartrate (LOPRESSOR) 50 mg tablet Take 1 5 tablets (75 mg total) by mouth every 12 (twelve) hours 135 tablet 2    sulfaSALAzine (AZULFIDINE) 500 mg tablet TAKE 1 TABLET 4 TIMES DAILY(CANCEL ENTERIC COATED NOT AVAILABLE) 360 tablet 0     No current facility-administered medications for this visit  Allergies: No Known Allergies    Physical Exam:    Body surface area is 2 13 meters squared  Wt Readings from Last 3 Encounters:   09/27/21 98 kg (216 lb)   09/21/21 98 4 kg (217 lb)   07/21/21 96 8 kg (213 lb 6 4 oz)        Temp Readings from Last 3 Encounters:   09/27/21 (!) 97 4 °F (36 3 °C) (Tympanic)   03/30/21 98 2 °F (36 8 °C) (Tympanic)   03/29/21 97 7 °F (36 5 °C) (Temporal)        BP Readings from Last 3 Encounters:   09/27/21 128/72   09/21/21 134/82   07/21/21 143/80         Pulse Readings from Last 3 Encounters:   09/27/21 67   07/21/21 82   07/20/21 67         Physical Exam     Constitutional   General appearance: No acute distress, well appearing and well nourished  Eyes   Conjunctiva and lids: No swelling, erythema or discharge  Pupils and irises: Equal, round and reactive to light  Ears, Nose, Mouth, and Throat   External inspection of ears and nose: Normal     Nasal mucosa, septum, and turbinates: Normal without edema or erythema  Oropharynx: Normal with no erythema, edema, exudate or lesions  Pulmonary   Respiratory effort: No increased work of breathing or signs of respiratory distress  Auscultation of lungs: Clear to auscultation  Cardiovascular   Palpation of heart: Normal PMI, no thrills  Auscultation of heart: Normal rate and rhythm, normal S1 and S2, without murmurs  Examination of extremities for edema and/or varicosities: Normal     Carotid pulses: Normal     Abdomen   Abdomen: Non-tender, no masses  Liver and spleen: No hepatomegaly or splenomegaly  Lymphatic   Palpation of lymph nodes in neck: No lymphadenopathy  Musculoskeletal   Gait and station: Normal     Digits and nails: Normal without clubbing or cyanosis  Inspection/palpation of joints, bones, and muscles: Normal     Skin   Skin and subcutaneous tissue: Normal without rashes or lesions      Neurologic   Cranial nerves: Cranial nerves 2-12 intact  Sensation: No sensory loss  Psychiatric   Orientation to person, place, and time: Normal     Mood and affect: Normal         Assessment / Plan:      The patient is a pleasant 60-year-old male who was recently seen in the hospital for elevated hemoglobin when he had a stroke  He also has renal insufficiency but despite that his hemoglobin was running in the 17 5 range with a hematocrit consistently above 50  We put him on an every 2 week phlebotomy schedule  His hemoglobin came down so we changed him slowly to every 2 months  His hemoglobin is running in a stable range around 15 6 on the most recent measurement  MCV was 80 4  Platelet count is 259  Hematocrit was 50 9  Again this was in July as he did not have blood work repeated after that  If his hemoglobin hematocrit go up further we will consider bringing him down to once a month schedule  His MCV is low and he is iron deficient so I think for now keeping him at every 2 months is reasonable but this can always be adjusted  The patient agrees with this  He is on Plavix  I will see him back in 6 months  Goals and Barriers:  Current Goal:  Prolong Survival from   Elevated hemoglobin  Barriers: None  Patient's Capacity to Self Care:  Patient able to self care  Portions of the record may have been created with voice recognition software  Occasional wrong word or "sound a like" substitutions may have occurred due to the inherent limitations of voice recognition software  Read the chart carefully and recognize, using context, where substitutions have occurred

## 2021-09-30 LAB
EST. AVERAGE GLUCOSE BLD GHB EST-MCNC: 131 (CALC)
EST. AVERAGE GLUCOSE BLD GHB EST-SCNC: 7.3 (CALC)
HBA1C MFR BLD: 6.2 % OF TOTAL HGB

## 2021-10-04 ENCOUNTER — OFFICE VISIT (OUTPATIENT)
Dept: FAMILY MEDICINE CLINIC | Facility: HOSPITAL | Age: 71
End: 2021-10-04
Payer: MEDICARE

## 2021-10-04 VITALS
WEIGHT: 216.8 LBS | SYSTOLIC BLOOD PRESSURE: 140 MMHG | HEIGHT: 69 IN | DIASTOLIC BLOOD PRESSURE: 90 MMHG | BODY MASS INDEX: 32.11 KG/M2 | HEART RATE: 68 BPM | TEMPERATURE: 97.1 F

## 2021-10-04 DIAGNOSIS — N18.31 STAGE 3A CHRONIC KIDNEY DISEASE (HCC): ICD-10-CM

## 2021-10-04 DIAGNOSIS — E78.1 ESSENTIAL HYPERTRIGLYCERIDEMIA: ICD-10-CM

## 2021-10-04 DIAGNOSIS — D58.2 ELEVATED HEMOGLOBIN (HCC): ICD-10-CM

## 2021-10-04 DIAGNOSIS — K51.30 ULCERATIVE RECTOSIGMOIDITIS WITHOUT COMPLICATION (HCC): ICD-10-CM

## 2021-10-04 DIAGNOSIS — Z23 ENCOUNTER FOR IMMUNIZATION: ICD-10-CM

## 2021-10-04 DIAGNOSIS — I63.9 CVA (CEREBRAL VASCULAR ACCIDENT) (HCC): ICD-10-CM

## 2021-10-04 DIAGNOSIS — N18.30 TYPE 2 DIABETES MELLITUS WITH STAGE 3 CHRONIC KIDNEY DISEASE, WITHOUT LONG-TERM CURRENT USE OF INSULIN (HCC): Primary | ICD-10-CM

## 2021-10-04 DIAGNOSIS — I10 PRIMARY HYPERTENSION: ICD-10-CM

## 2021-10-04 DIAGNOSIS — Z12.5 SCREENING FOR PROSTATE CANCER: ICD-10-CM

## 2021-10-04 DIAGNOSIS — E11.22 TYPE 2 DIABETES MELLITUS WITH STAGE 3 CHRONIC KIDNEY DISEASE, WITHOUT LONG-TERM CURRENT USE OF INSULIN (HCC): Primary | ICD-10-CM

## 2021-10-04 DIAGNOSIS — Z86.73 HISTORY OF STROKE: ICD-10-CM

## 2021-10-04 DIAGNOSIS — I10 ESSENTIAL HYPERTENSION: ICD-10-CM

## 2021-10-04 PROCEDURE — 99214 OFFICE O/P EST MOD 30 MIN: CPT | Performed by: INTERNAL MEDICINE

## 2021-10-04 PROCEDURE — 90662 IIV NO PRSV INCREASED AG IM: CPT | Performed by: INTERNAL MEDICINE

## 2021-10-04 PROCEDURE — 90732 PPSV23 VACC 2 YRS+ SUBQ/IM: CPT | Performed by: INTERNAL MEDICINE

## 2021-10-04 PROCEDURE — G0008 ADMIN INFLUENZA VIRUS VAC: HCPCS | Performed by: INTERNAL MEDICINE

## 2021-10-04 PROCEDURE — G0009 ADMIN PNEUMOCOCCAL VACCINE: HCPCS | Performed by: INTERNAL MEDICINE

## 2021-10-04 RX ORDER — EMPAGLIFLOZIN 10 MG/1
TABLET, FILM COATED ORAL
Qty: 90 TABLET | Refills: 2 | Status: SHIPPED | OUTPATIENT
Start: 2021-10-04 | End: 2022-02-04 | Stop reason: SDUPTHER

## 2021-10-04 RX ORDER — CLOPIDOGREL BISULFATE 75 MG/1
75 TABLET ORAL DAILY
Qty: 90 TABLET | Refills: 2 | Status: SHIPPED | OUTPATIENT
Start: 2021-10-04

## 2021-10-04 RX ORDER — METOPROLOL TARTRATE 50 MG/1
75 TABLET, FILM COATED ORAL EVERY 12 HOURS
Qty: 135 TABLET | Refills: 2 | Status: SHIPPED | OUTPATIENT
Start: 2021-10-04 | End: 2022-01-14 | Stop reason: HOSPADM

## 2021-10-04 RX ORDER — FENOFIBRATE 145 MG/1
145 TABLET, COATED ORAL DAILY
Qty: 90 TABLET | Refills: 2 | Status: SHIPPED | OUTPATIENT
Start: 2021-10-04 | End: 2022-04-15 | Stop reason: SDUPTHER

## 2021-10-05 ENCOUNTER — HOSPITAL ENCOUNTER (OUTPATIENT)
Dept: INFUSION CENTER | Facility: HOSPITAL | Age: 71
Discharge: HOME | End: 2021-10-05
Attending: INTERNAL MEDICINE
Payer: MEDICARE

## 2021-10-05 VITALS
SYSTOLIC BLOOD PRESSURE: 142 MMHG | HEART RATE: 68 BPM | DIASTOLIC BLOOD PRESSURE: 87 MMHG | TEMPERATURE: 97.8 F | OXYGEN SATURATION: 96 % | RESPIRATION RATE: 16 BRPM

## 2021-10-05 DIAGNOSIS — D75.1 ERYTHROCYTOSIS: Primary | ICD-10-CM

## 2021-10-05 PROCEDURE — 99195 PHLEBOTOMY: CPT

## 2021-10-08 LAB
ALBUMIN SERPL-MCNC: 4.4 G/DL (ref 3.6–5.1)
ALBUMIN/GLOB SERPL: 1.6 (CALC) (ref 1–2.5)
ALP SERPL-CCNC: 86 U/L (ref 35–144)
ALT SERPL-CCNC: 15 U/L (ref 9–46)
AST SERPL-CCNC: 22 U/L (ref 10–35)
BASOPHILS # BLD AUTO: 83 CELLS/UL (ref 0–200)
BASOPHILS NFR BLD AUTO: 1.3 %
BILIRUB SERPL-MCNC: 0.6 MG/DL (ref 0.2–1.2)
BUN SERPL-MCNC: 20 MG/DL (ref 7–25)
BUN/CREAT SERPL: 15 (CALC) (ref 6–22)
CALCIUM SERPL-MCNC: 9.1 MG/DL (ref 8.6–10.3)
CHLORIDE SERPL-SCNC: 107 MMOL/L (ref 98–110)
CO2 SERPL-SCNC: 25 MMOL/L (ref 20–32)
CREAT SERPL-MCNC: 1.36 MG/DL (ref 0.7–1.18)
EOSINOPHIL # BLD AUTO: 160 CELLS/UL (ref 15–500)
EOSINOPHIL NFR BLD AUTO: 2.5 %
ERYTHROCYTE [DISTWIDTH] IN BLOOD BY AUTOMATED COUNT: 16 % (ref 11–15)
FERRITIN SERPL-MCNC: 10 NG/ML (ref 24–380)
GLOBULIN SER CALC-MCNC: 2.7 G/DL (CALC) (ref 1.9–3.7)
GLUCOSE SERPL-MCNC: 104 MG/DL (ref 65–99)
HCT VFR BLD AUTO: 50.6 % (ref 38.5–50)
HGB BLD-MCNC: 15.6 G/DL (ref 13.2–17.1)
IRON SATN MFR SERPL: 12 % (CALC) (ref 20–48)
IRON SERPL-MCNC: 43 MCG/DL (ref 50–180)
LYMPHOCYTES # BLD AUTO: 1786 CELLS/UL (ref 850–3900)
LYMPHOCYTES NFR BLD AUTO: 27.9 %
MCH RBC QN AUTO: 24.7 PG (ref 27–33)
MCHC RBC AUTO-ENTMCNC: 30.8 G/DL (ref 32–36)
MCV RBC AUTO: 80.2 FL (ref 80–100)
METHYLMALONATE SERPL-SCNC: 193 NMOL/L (ref 87–318)
MONOCYTES # BLD AUTO: 474 CELLS/UL (ref 200–950)
MONOCYTES NFR BLD AUTO: 7.4 %
NEUTROPHILS # BLD AUTO: 3898 CELLS/UL (ref 1500–7800)
NEUTROPHILS NFR BLD AUTO: 60.9 %
PLATELET # BLD AUTO: 303 THOUSAND/UL (ref 140–400)
PMV BLD REES-ECKER: 10.2 FL (ref 7.5–12.5)
POTASSIUM SERPL-SCNC: 3.6 MMOL/L (ref 3.5–5.3)
PROT SERPL-MCNC: 7.1 G/DL (ref 6.1–8.1)
RBC # BLD AUTO: 6.31 MILLION/UL (ref 4.2–5.8)
SL AMB EGFR AFRICAN AMERICAN: 61 ML/MIN/1.73M2
SL AMB EGFR NON AFRICAN AMERICAN: 52 ML/MIN/1.73M2
SODIUM SERPL-SCNC: 141 MMOL/L (ref 135–146)
TIBC SERPL-MCNC: 355 MCG/DL (CALC) (ref 250–425)
WBC # BLD AUTO: 6.4 THOUSAND/UL (ref 3.8–10.8)

## 2021-11-15 DIAGNOSIS — K51.30 ULCERATIVE RECTOSIGMOIDITIS WITHOUT COMPLICATION (HCC): ICD-10-CM

## 2021-11-15 RX ORDER — SULFASALAZINE 500 MG/1
500 TABLET ORAL 4 TIMES DAILY
Qty: 360 TABLET | Refills: 1 | Status: SHIPPED | OUTPATIENT
Start: 2021-11-15 | End: 2022-04-20

## 2021-11-24 LAB
ALBUMIN SERPL-MCNC: 4.4 G/DL (ref 3.6–5.1)
ALBUMIN/GLOB SERPL: 1.6 (CALC) (ref 1–2.5)
ALP SERPL-CCNC: 85 U/L (ref 35–144)
ALT SERPL-CCNC: 17 U/L (ref 9–46)
AST SERPL-CCNC: 22 U/L (ref 10–35)
BASOPHILS # BLD AUTO: 124 CELLS/UL (ref 0–200)
BASOPHILS NFR BLD AUTO: 1.7 %
BILIRUB SERPL-MCNC: 0.6 MG/DL (ref 0.2–1.2)
BUN SERPL-MCNC: 23 MG/DL (ref 7–25)
BUN/CREAT SERPL: 17 (CALC) (ref 6–22)
CALCIUM SERPL-MCNC: 9.3 MG/DL (ref 8.6–10.3)
CHLORIDE SERPL-SCNC: 106 MMOL/L (ref 98–110)
CO2 SERPL-SCNC: 27 MMOL/L (ref 20–32)
CREAT SERPL-MCNC: 1.34 MG/DL (ref 0.7–1.18)
EOSINOPHIL # BLD AUTO: 248 CELLS/UL (ref 15–500)
EOSINOPHIL NFR BLD AUTO: 3.4 %
ERYTHROCYTE [DISTWIDTH] IN BLOOD BY AUTOMATED COUNT: 15.2 % (ref 11–15)
FERRITIN SERPL-MCNC: 9 NG/ML (ref 24–380)
GLOBULIN SER CALC-MCNC: 2.8 G/DL (CALC) (ref 1.9–3.7)
GLUCOSE SERPL-MCNC: 109 MG/DL (ref 65–139)
HCT VFR BLD AUTO: 49.2 % (ref 38.5–50)
HGB BLD-MCNC: 15.3 G/DL (ref 13.2–17.1)
IRON SATN MFR SERPL: 11 % (CALC) (ref 20–48)
IRON SERPL-MCNC: 38 MCG/DL (ref 50–180)
LYMPHOCYTES # BLD AUTO: 2241 CELLS/UL (ref 850–3900)
LYMPHOCYTES NFR BLD AUTO: 30.7 %
MCH RBC QN AUTO: 25 PG (ref 27–33)
MCHC RBC AUTO-ENTMCNC: 31.1 G/DL (ref 32–36)
MCV RBC AUTO: 80.3 FL (ref 80–100)
MONOCYTES # BLD AUTO: 664 CELLS/UL (ref 200–950)
MONOCYTES NFR BLD AUTO: 9.1 %
NEUTROPHILS # BLD AUTO: 4022 CELLS/UL (ref 1500–7800)
NEUTROPHILS NFR BLD AUTO: 55.1 %
PLATELET # BLD AUTO: 331 THOUSAND/UL (ref 140–400)
PMV BLD REES-ECKER: 10.8 FL (ref 7.5–12.5)
POTASSIUM SERPL-SCNC: 4 MMOL/L (ref 3.5–5.3)
PROT SERPL-MCNC: 7.2 G/DL (ref 6.1–8.1)
RBC # BLD AUTO: 6.13 MILLION/UL (ref 4.2–5.8)
SL AMB EGFR AFRICAN AMERICAN: 62 ML/MIN/1.73M2
SL AMB EGFR NON AFRICAN AMERICAN: 53 ML/MIN/1.73M2
SODIUM SERPL-SCNC: 142 MMOL/L (ref 135–146)
TIBC SERPL-MCNC: 340 MCG/DL (CALC) (ref 250–425)
WBC # BLD AUTO: 7.3 THOUSAND/UL (ref 3.8–10.8)

## 2021-11-29 DIAGNOSIS — I63.9 CVA (CEREBRAL VASCULAR ACCIDENT) (HCC): ICD-10-CM

## 2021-11-29 RX ORDER — ATORVASTATIN CALCIUM 40 MG/1
TABLET, FILM COATED ORAL
Qty: 90 TABLET | Refills: 2 | Status: SHIPPED | OUTPATIENT
Start: 2021-11-29

## 2021-11-30 ENCOUNTER — HOSPITAL ENCOUNTER (OUTPATIENT)
Dept: INFUSION CENTER | Facility: HOSPITAL | Age: 71
Discharge: HOME | End: 2021-11-30
Attending: INTERNAL MEDICINE
Payer: MEDICARE

## 2021-11-30 VITALS
OXYGEN SATURATION: 96 % | HEART RATE: 62 BPM | TEMPERATURE: 96.5 F | RESPIRATION RATE: 16 BRPM | SYSTOLIC BLOOD PRESSURE: 135 MMHG | DIASTOLIC BLOOD PRESSURE: 76 MMHG

## 2021-11-30 DIAGNOSIS — D75.1 ERYTHROCYTOSIS: Primary | ICD-10-CM

## 2021-11-30 PROCEDURE — 99195 PHLEBOTOMY: CPT

## 2021-12-28 ENCOUNTER — HOSPITAL ENCOUNTER (INPATIENT)
Facility: HOSPITAL | Age: 71
LOS: 17 days | Discharge: HOME HEALTH | DRG: 177 | End: 2022-01-14
Attending: EMERGENCY MEDICINE | Admitting: INTERNAL MEDICINE
Payer: MEDICARE

## 2021-12-28 ENCOUNTER — APPOINTMENT (EMERGENCY)
Dept: CT IMAGING | Facility: HOSPITAL | Age: 71
DRG: 177 | End: 2021-12-28
Payer: MEDICARE

## 2021-12-28 ENCOUNTER — TELEPHONE (OUTPATIENT)
Dept: FAMILY MEDICINE CLINIC | Facility: HOSPITAL | Age: 71
End: 2021-12-28

## 2021-12-28 ENCOUNTER — APPOINTMENT (EMERGENCY)
Dept: RADIOLOGY | Facility: HOSPITAL | Age: 71
DRG: 177 | End: 2021-12-28
Payer: MEDICARE

## 2021-12-28 DIAGNOSIS — U07.1 COVID-19: ICD-10-CM

## 2021-12-28 DIAGNOSIS — J96.01 ACUTE RESPIRATORY FAILURE WITH HYPOXIA (HCC): ICD-10-CM

## 2021-12-28 DIAGNOSIS — N17.9 AKI (ACUTE KIDNEY INJURY) (HCC): ICD-10-CM

## 2021-12-28 DIAGNOSIS — U07.1 PNEUMONIA DUE TO COVID-19 VIRUS: Primary | ICD-10-CM

## 2021-12-28 DIAGNOSIS — E11.22 TYPE 2 DIABETES MELLITUS WITH STAGE 3A CHRONIC KIDNEY DISEASE, WITHOUT LONG-TERM CURRENT USE OF INSULIN (HCC): ICD-10-CM

## 2021-12-28 DIAGNOSIS — R55 SYNCOPE: ICD-10-CM

## 2021-12-28 DIAGNOSIS — T14.8XXA ABRASION: ICD-10-CM

## 2021-12-28 DIAGNOSIS — J12.82 PNEUMONIA DUE TO COVID-19 VIRUS: Primary | ICD-10-CM

## 2021-12-28 DIAGNOSIS — N18.31 TYPE 2 DIABETES MELLITUS WITH STAGE 3A CHRONIC KIDNEY DISEASE, WITHOUT LONG-TERM CURRENT USE OF INSULIN (HCC): ICD-10-CM

## 2021-12-28 LAB
2HR DELTA HS TROPONIN: -2 NG/L
ABO GROUP BLD: NORMAL
ALBUMIN SERPL BCP-MCNC: 3.4 G/DL (ref 3.5–5)
ALP SERPL-CCNC: 72 U/L (ref 46–116)
ALT SERPL W P-5'-P-CCNC: 26 U/L (ref 12–78)
ANION GAP SERPL CALCULATED.3IONS-SCNC: 11 MMOL/L (ref 4–13)
APTT PPP: 40 SECONDS (ref 23–37)
AST SERPL W P-5'-P-CCNC: 41 U/L (ref 5–45)
ATRIAL RATE: 89 BPM
BACTERIA UR QL AUTO: ABNORMAL /HPF
BASOPHILS # BLD AUTO: 0.01 THOUSANDS/ΜL (ref 0–0.1)
BASOPHILS NFR BLD AUTO: 0 % (ref 0–1)
BILIRUB SERPL-MCNC: 0.5 MG/DL (ref 0.2–1)
BILIRUB UR QL STRIP: NEGATIVE
BUN SERPL-MCNC: 20 MG/DL (ref 5–25)
CALCIUM ALBUM COR SERPL-MCNC: 9.6 MG/DL (ref 8.3–10.1)
CALCIUM SERPL-MCNC: 9.1 MG/DL (ref 8.3–10.1)
CARDIAC TROPONIN I PNL SERPL HS: 31 NG/L
CARDIAC TROPONIN I PNL SERPL HS: 33 NG/L
CHLORIDE SERPL-SCNC: 103 MMOL/L (ref 100–108)
CK MB SERPL-MCNC: <1 % (ref 0–2.5)
CK MB SERPL-MCNC: <1 NG/ML (ref 0–5)
CK SERPL-CCNC: 161 U/L (ref 39–308)
CLARITY UR: ABNORMAL
CO2 SERPL-SCNC: 24 MMOL/L (ref 21–32)
COLOR UR: YELLOW
CREAT SERPL-MCNC: 1.8 MG/DL (ref 0.6–1.3)
CRP SERPL QL: 197.8 MG/L
D DIMER PPP FEU-MCNC: 0.74 UG/ML FEU
EOSINOPHIL # BLD AUTO: 0 THOUSAND/ΜL (ref 0–0.61)
EOSINOPHIL NFR BLD AUTO: 0 % (ref 0–6)
ERYTHROCYTE [DISTWIDTH] IN BLOOD BY AUTOMATED COUNT: 17.9 % (ref 11.6–15.1)
EST. AVERAGE GLUCOSE BLD GHB EST-MCNC: 137 MG/DL
FLUAV RNA RESP QL NAA+PROBE: NEGATIVE
FLUBV RNA RESP QL NAA+PROBE: NEGATIVE
GFR SERPL CREATININE-BSD FRML MDRD: 37 ML/MIN/1.73SQ M
GLUCOSE SERPL-MCNC: 104 MG/DL (ref 65–140)
GLUCOSE SERPL-MCNC: 109 MG/DL (ref 65–140)
GLUCOSE SERPL-MCNC: 110 MG/DL (ref 65–140)
GLUCOSE SERPL-MCNC: 86 MG/DL (ref 65–140)
GLUCOSE SERPL-MCNC: 94 MG/DL (ref 65–140)
GLUCOSE UR STRIP-MCNC: ABNORMAL MG/DL
HBA1C MFR BLD: 6.4 %
HCT VFR BLD AUTO: 51.6 % (ref 36.5–49.3)
HGB BLD-MCNC: 15.3 G/DL (ref 12–17)
HGB UR QL STRIP.AUTO: ABNORMAL
IMM GRANULOCYTES # BLD AUTO: 0.01 THOUSAND/UL (ref 0–0.2)
IMM GRANULOCYTES NFR BLD AUTO: 0 % (ref 0–2)
INR PPP: 0.94 (ref 0.84–1.19)
KETONES UR STRIP-MCNC: NEGATIVE MG/DL
LACTATE SERPL-SCNC: 1.3 MMOL/L (ref 0.5–2)
LEUKOCYTE ESTERASE UR QL STRIP: ABNORMAL
LYMPHOCYTES # BLD AUTO: 1.06 THOUSANDS/ΜL (ref 0.6–4.47)
LYMPHOCYTES NFR BLD AUTO: 16 % (ref 14–44)
MCH RBC QN AUTO: 23.4 PG (ref 26.8–34.3)
MCHC RBC AUTO-ENTMCNC: 29.7 G/DL (ref 31.4–37.4)
MCV RBC AUTO: 79 FL (ref 82–98)
MONOCYTES # BLD AUTO: 0.25 THOUSAND/ΜL (ref 0.17–1.22)
MONOCYTES NFR BLD AUTO: 4 % (ref 4–12)
NEUTROPHILS # BLD AUTO: 5.38 THOUSANDS/ΜL (ref 1.85–7.62)
NEUTS SEG NFR BLD AUTO: 80 % (ref 43–75)
NITRITE UR QL STRIP: POSITIVE
NON-SQ EPI CELLS URNS QL MICRO: ABNORMAL /HPF
NRBC BLD AUTO-RTO: 0 /100 WBCS
P AXIS: 54 DEGREES
PH UR STRIP.AUTO: 6 [PH]
PLATELET # BLD AUTO: 234 THOUSANDS/UL (ref 149–390)
PMV BLD AUTO: 10.1 FL (ref 8.9–12.7)
POTASSIUM SERPL-SCNC: 3.4 MMOL/L (ref 3.5–5.3)
PR INTERVAL: 142 MS
PROCALCITONIN SERPL-MCNC: 0.27 NG/ML
PROT SERPL-MCNC: 8.2 G/DL (ref 6.4–8.2)
PROT UR STRIP-MCNC: ABNORMAL MG/DL
PROTHROMBIN TIME: 12.5 SECONDS (ref 11.6–14.5)
QRS AXIS: -15 DEGREES
QRSD INTERVAL: 90 MS
QT INTERVAL: 374 MS
QTC INTERVAL: 455 MS
RBC # BLD AUTO: 6.53 MILLION/UL (ref 3.88–5.62)
RBC #/AREA URNS AUTO: ABNORMAL /HPF
RH BLD: POSITIVE
RSV RNA RESP QL NAA+PROBE: NEGATIVE
SARS-COV-2 RNA RESP QL NAA+PROBE: POSITIVE
SODIUM SERPL-SCNC: 138 MMOL/L (ref 136–145)
SP GR UR STRIP.AUTO: 1.01 (ref 1–1.03)
T WAVE AXIS: 43 DEGREES
UROBILINOGEN UR QL STRIP.AUTO: 0.2 E.U./DL
VENTRICULAR RATE: 89 BPM
WBC # BLD AUTO: 6.71 THOUSAND/UL (ref 4.31–10.16)
WBC #/AREA URNS AUTO: ABNORMAL /HPF

## 2021-12-28 PROCEDURE — 84484 ASSAY OF TROPONIN QUANT: CPT | Performed by: EMERGENCY MEDICINE

## 2021-12-28 PROCEDURE — 36415 COLL VENOUS BLD VENIPUNCTURE: CPT | Performed by: EMERGENCY MEDICINE

## 2021-12-28 PROCEDURE — 87040 BLOOD CULTURE FOR BACTERIA: CPT | Performed by: EMERGENCY MEDICINE

## 2021-12-28 PROCEDURE — 80053 COMPREHEN METABOLIC PANEL: CPT | Performed by: EMERGENCY MEDICINE

## 2021-12-28 PROCEDURE — 72125 CT NECK SPINE W/O DYE: CPT

## 2021-12-28 PROCEDURE — 99285 EMERGENCY DEPT VISIT HI MDM: CPT

## 2021-12-28 PROCEDURE — 99285 EMERGENCY DEPT VISIT HI MDM: CPT | Performed by: EMERGENCY MEDICINE

## 2021-12-28 PROCEDURE — 82550 ASSAY OF CK (CPK): CPT | Performed by: INTERNAL MEDICINE

## 2021-12-28 PROCEDURE — 86140 C-REACTIVE PROTEIN: CPT | Performed by: INTERNAL MEDICINE

## 2021-12-28 PROCEDURE — 99223 1ST HOSP IP/OBS HIGH 75: CPT | Performed by: INTERNAL MEDICINE

## 2021-12-28 PROCEDURE — 0241U HB NFCT DS VIR RESP RNA 4 TRGT: CPT | Performed by: EMERGENCY MEDICINE

## 2021-12-28 PROCEDURE — 85379 FIBRIN DEGRADATION QUANT: CPT | Performed by: INTERNAL MEDICINE

## 2021-12-28 PROCEDURE — 90471 IMMUNIZATION ADMIN: CPT

## 2021-12-28 PROCEDURE — 93010 ELECTROCARDIOGRAM REPORT: CPT | Performed by: INTERNAL MEDICINE

## 2021-12-28 PROCEDURE — 96360 HYDRATION IV INFUSION INIT: CPT

## 2021-12-28 PROCEDURE — 93005 ELECTROCARDIOGRAM TRACING: CPT

## 2021-12-28 PROCEDURE — 81001 URINALYSIS AUTO W/SCOPE: CPT | Performed by: EMERGENCY MEDICINE

## 2021-12-28 PROCEDURE — 82553 CREATINE MB FRACTION: CPT | Performed by: INTERNAL MEDICINE

## 2021-12-28 PROCEDURE — 84145 PROCALCITONIN (PCT): CPT | Performed by: INTERNAL MEDICINE

## 2021-12-28 PROCEDURE — 83036 HEMOGLOBIN GLYCOSYLATED A1C: CPT | Performed by: INTERNAL MEDICINE

## 2021-12-28 PROCEDURE — 82948 REAGENT STRIP/BLOOD GLUCOSE: CPT

## 2021-12-28 PROCEDURE — 70450 CT HEAD/BRAIN W/O DYE: CPT

## 2021-12-28 PROCEDURE — 86901 BLOOD TYPING SEROLOGIC RH(D): CPT | Performed by: INTERNAL MEDICINE

## 2021-12-28 PROCEDURE — 86900 BLOOD TYPING SEROLOGIC ABO: CPT | Performed by: INTERNAL MEDICINE

## 2021-12-28 PROCEDURE — 83605 ASSAY OF LACTIC ACID: CPT | Performed by: EMERGENCY MEDICINE

## 2021-12-28 PROCEDURE — 71045 X-RAY EXAM CHEST 1 VIEW: CPT

## 2021-12-28 PROCEDURE — 85025 COMPLETE CBC W/AUTO DIFF WBC: CPT | Performed by: EMERGENCY MEDICINE

## 2021-12-28 PROCEDURE — 85730 THROMBOPLASTIN TIME PARTIAL: CPT | Performed by: EMERGENCY MEDICINE

## 2021-12-28 PROCEDURE — 90715 TDAP VACCINE 7 YRS/> IM: CPT | Performed by: EMERGENCY MEDICINE

## 2021-12-28 PROCEDURE — 85610 PROTHROMBIN TIME: CPT | Performed by: EMERGENCY MEDICINE

## 2021-12-28 RX ORDER — ATORVASTATIN CALCIUM 40 MG/1
40 TABLET, FILM COATED ORAL
Status: DISCONTINUED | OUTPATIENT
Start: 2021-12-28 | End: 2022-01-14 | Stop reason: HOSPADM

## 2021-12-28 RX ORDER — ALLOPURINOL 100 MG/1
200 TABLET ORAL DAILY
Status: DISCONTINUED | OUTPATIENT
Start: 2021-12-28 | End: 2022-01-14 | Stop reason: HOSPADM

## 2021-12-28 RX ORDER — ONDANSETRON 2 MG/ML
4 INJECTION INTRAMUSCULAR; INTRAVENOUS EVERY 6 HOURS PRN
Status: DISCONTINUED | OUTPATIENT
Start: 2021-12-28 | End: 2022-01-14 | Stop reason: HOSPADM

## 2021-12-28 RX ORDER — ACETAMINOPHEN 325 MG/1
650 TABLET ORAL ONCE
Status: COMPLETED | OUTPATIENT
Start: 2021-12-28 | End: 2021-12-28

## 2021-12-28 RX ORDER — CLOPIDOGREL BISULFATE 75 MG/1
75 TABLET ORAL DAILY
Status: DISCONTINUED | OUTPATIENT
Start: 2021-12-28 | End: 2022-01-14 | Stop reason: HOSPADM

## 2021-12-28 RX ORDER — HEPARIN SODIUM 5000 [USP'U]/ML
5000 INJECTION, SOLUTION INTRAVENOUS; SUBCUTANEOUS EVERY 8 HOURS SCHEDULED
Status: DISCONTINUED | OUTPATIENT
Start: 2021-12-28 | End: 2021-12-29

## 2021-12-28 RX ORDER — BACITRACIN, NEOMYCIN, POLYMYXIN B 400; 3.5; 5 [USP'U]/G; MG/G; [USP'U]/G
1 OINTMENT TOPICAL ONCE
Status: COMPLETED | OUTPATIENT
Start: 2021-12-28 | End: 2021-12-28

## 2021-12-28 RX ORDER — SULFASALAZINE 500 MG/1
500 TABLET ORAL 4 TIMES DAILY
Status: DISCONTINUED | OUTPATIENT
Start: 2021-12-28 | End: 2022-01-14 | Stop reason: HOSPADM

## 2021-12-28 RX ORDER — SODIUM CHLORIDE 9 MG/ML
125 INJECTION, SOLUTION INTRAVENOUS CONTINUOUS
Status: DISCONTINUED | OUTPATIENT
Start: 2021-12-28 | End: 2021-12-28

## 2021-12-28 RX ORDER — AMLODIPINE BESYLATE 5 MG/1
10 TABLET ORAL DAILY
Status: DISCONTINUED | OUTPATIENT
Start: 2021-12-28 | End: 2021-12-31

## 2021-12-28 RX ORDER — ACETAMINOPHEN 325 MG/1
650 TABLET ORAL EVERY 6 HOURS PRN
Status: DISCONTINUED | OUTPATIENT
Start: 2021-12-28 | End: 2022-01-14 | Stop reason: HOSPADM

## 2021-12-28 RX ADMIN — TETANUS TOXOID, REDUCED DIPHTHERIA TOXOID AND ACELLULAR PERTUSSIS VACCINE, ADSORBED 0.5 ML: 5; 2.5; 8; 8; 2.5 SUSPENSION INTRAMUSCULAR at 05:46

## 2021-12-28 RX ADMIN — SODIUM CHLORIDE 1000 ML: 0.9 INJECTION, SOLUTION INTRAVENOUS at 05:45

## 2021-12-28 RX ADMIN — SODIUM CHLORIDE 500 ML: 0.9 INJECTION, SOLUTION INTRAVENOUS at 10:23

## 2021-12-28 RX ADMIN — ATORVASTATIN CALCIUM 40 MG: 40 TABLET, FILM COATED ORAL at 19:51

## 2021-12-28 RX ADMIN — SULFASALAZINE 500 MG: 500 TABLET ORAL at 21:17

## 2021-12-28 RX ADMIN — HEPARIN SODIUM 5000 UNITS: 5000 INJECTION INTRAVENOUS; SUBCUTANEOUS at 21:17

## 2021-12-28 RX ADMIN — CLOPIDOGREL BISULFATE 75 MG: 75 TABLET ORAL at 10:15

## 2021-12-28 RX ADMIN — ACETAMINOPHEN 650 MG: 325 TABLET, FILM COATED ORAL at 06:35

## 2021-12-28 RX ADMIN — SODIUM CHLORIDE 125 ML/HR: 0.9 INJECTION, SOLUTION INTRAVENOUS at 07:24

## 2021-12-28 RX ADMIN — METOPROLOL TARTRATE 75 MG: 50 TABLET, FILM COATED ORAL at 10:15

## 2021-12-28 RX ADMIN — ALLOPURINOL 200 MG: 100 TABLET ORAL at 11:06

## 2021-12-28 RX ADMIN — METOPROLOL TARTRATE 75 MG: 50 TABLET, FILM COATED ORAL at 21:17

## 2021-12-28 RX ADMIN — AMLODIPINE BESYLATE 10 MG: 5 TABLET ORAL at 10:15

## 2021-12-28 RX ADMIN — BACITRACIN, NEOMYCIN, POLYMYXIN B 1 SMALL APPLICATION: 400; 3.5; 5 OINTMENT TOPICAL at 05:46

## 2021-12-28 RX ADMIN — HEPARIN SODIUM 5000 UNITS: 5000 INJECTION INTRAVENOUS; SUBCUTANEOUS at 11:06

## 2021-12-28 NOTE — ASSESSMENT & PLAN NOTE
Patient had a syncopal episode of very brief duration while urinating  Suspect vasovagal reaction  Doubt seizure  Patient had no postictal confusion  Doubt CVA or TIA      Will monitor patient on telemetry since she he has a history of nonsustained V-tach    I will hydrate patient with normal saline solution for 500 cc

## 2021-12-28 NOTE — ED PROVIDER NOTES
History  Chief Complaint   Patient presents with    Syncope     Pt had a sycople episode at home  81 yo M with PMH of HTN, CVA, DM2, CKD BIBA for a syncopal event  States he has been feeling weak over past few days  Cough, congestion  covid vaccinated  No known sick contacts  No GI complaints  Went up to use the restroom, felt lightheaded and passed out  Unwitnessed, but wife was nearby and heard and called ems  Brief, with no seizure or shaking  Pt denies any pain at this time  No numbness/tingling/weakness  Initially denied blood thinners, but when I reviewed medications, he is on plavix  He was borderline hypoxic prehospital, sats 90% on RA  History provided by:  Patient, medical records and EMS personnel   used: No    Syncope  Episode history:  Single  Most recent episode: Today  Timing:  Sporadic  Progression:  Resolved  Chronicity:  New  Context: standing up    Associated symptoms: weakness    Associated symptoms: no chest pain, no confusion, no diaphoresis, no dizziness, no fever, no headaches, no nausea, no palpitations, no shortness of breath and no vomiting        Prior to Admission Medications   Prescriptions Last Dose Informant Patient Reported? Taking?    Coenzyme Q10 200 MG capsule  Self Yes No   Sig: Take by mouth daily    Empagliflozin (Jardiance) 10 MG TABS   No No   Si tab PO q day   allopurinol (ZYLOPRIM) 100 mg tablet  Self No No   Sig: Take 2 tablets (200 mg total) by mouth daily   amLODIPine (NORVASC) 10 mg tablet  Self No No   Sig: Take 1 tablet (10 mg total) by mouth daily   atorvastatin (LIPITOR) 40 mg tablet   No No   Sig: TAKE 1 TABLET DAILY WITH   DINNER   clopidogrel (PLAVIX) 75 mg tablet   No No   Sig: Take 1 tablet (75 mg total) by mouth daily   fenofibrate (TRICOR) 145 mg tablet   No No   Sig: Take 1 tablet (145 mg total) by mouth daily   metoprolol tartrate (LOPRESSOR) 50 mg tablet   No No   Sig: Take 1 5 tablets (75 mg total) by mouth every 12 (twelve) hours   sulfaSALAzine (AZULFIDINE) 500 mg tablet   No No   Sig: Take 1 tablet (500 mg total) by mouth 4 (four) times a day      Facility-Administered Medications: None       Past Medical History:   Diagnosis Date    CVA (cerebral vascular accident) ([facility] [address] )     Diverticulitis     Gout     Hypercholesterolemia     Hypertension     Renal disorder        Past Surgical History:   Procedure Laterality Date    CARDIAC LOOP RECORDER      COLONOSCOPY  09/18/2006    complete; 10 yrs    COLONOSCOPY  10/23/2017    complete; 5 yrs    COLONOSCOPY  05/06/2020    Severe active left-sided colitis, erythematous and ulcerated mucosa in the rectosigmoid, inflammatory polyp       Family History   Problem Relation Age of Onset    Breast cancer Mother     Kidney disease Father     Lung cancer Father     Other Father         smoker    Hypertension Other     Colon polyps Neg Hx     Colon cancer Neg Hx      I have reviewed and agree with the history as documented  E-Cigarette/Vaping    E-Cigarette Use Never User      E-Cigarette/Vaping Substances    Nicotine No     THC No     CBD No     Flavoring No     Other No     Unknown No      Social History     Tobacco Use    Smoking status: Never Smoker    Smokeless tobacco: Never Used   Vaping Use    Vaping Use: Never used   Substance Use Topics    Alcohol use: Not Currently    Drug use: Never       Review of Systems   Constitutional: Positive for chills and fatigue  Negative for diaphoresis, fever and unexpected weight change  HENT: Negative for congestion, ear pain, rhinorrhea, sore throat, trouble swallowing and voice change  Eyes: Negative for pain and visual disturbance  Respiratory: Positive for cough  Negative for chest tightness and shortness of breath  Cardiovascular: Positive for syncope  Negative for chest pain, palpitations and leg swelling     Gastrointestinal: Negative for abdominal pain, blood in stool, constipation, diarrhea, nausea and vomiting  Genitourinary: Negative for difficulty urinating, flank pain, frequency and hematuria  Musculoskeletal: Negative for arthralgias, back pain and neck pain  Skin: Negative for rash  Neurological: Positive for weakness  Negative for dizziness, syncope, light-headedness and headaches  Psychiatric/Behavioral: Negative for confusion and suicidal ideas  The patient is not nervous/anxious  Physical Exam  Physical Exam  Vitals and nursing note reviewed  Constitutional:       General: He is not in acute distress  Appearance: He is well-developed  He is not ill-appearing, toxic-appearing or diaphoretic  HENT:      Head: Normocephalic and atraumatic  Right Ear: Tympanic membrane and external ear normal       Left Ear: External ear normal       Nose: Nose normal       Mouth/Throat:      Mouth: Mucous membranes are moist       Pharynx: Oropharynx is clear  Eyes:      General: No scleral icterus  Right eye: No discharge  Left eye: No discharge  Extraocular Movements: Extraocular movements intact  Conjunctiva/sclera: Conjunctivae normal       Pupils: Pupils are equal, round, and reactive to light  Neck:      Vascular: No JVD  Trachea: No tracheal deviation  Cardiovascular:      Rate and Rhythm: Normal rate and regular rhythm  Pulses: Normal pulses  Heart sounds: Normal heart sounds  No murmur heard  No friction rub  No gallop  Pulmonary:      Effort: Pulmonary effort is normal  No respiratory distress  Breath sounds: No stridor  Rhonchi present  No wheezing or rales  Chest:      Chest wall: No tenderness  Abdominal:      General: Bowel sounds are normal  There is no distension  Palpations: Abdomen is soft  Tenderness: There is no abdominal tenderness  There is no right CVA tenderness, left CVA tenderness, guarding or rebound  Musculoskeletal:         General: No tenderness or deformity  Normal range of motion  Cervical back: Normal, normal range of motion and neck supple  No rigidity or tenderness  Thoracic back: Normal       Lumbar back: Normal       Comments: No chest wall tenderness, no back tenderness, pelvis stable and nontender  Lymphadenopathy:      Cervical: No cervical adenopathy  Skin:     General: Skin is warm and dry  Capillary Refill: Capillary refill takes less than 2 seconds  Coloration: Skin is pale  Findings: No rash  Neurological:      General: No focal deficit present  Mental Status: He is alert and oriented to person, place, and time  GCS: GCS eye subscore is 4  GCS verbal subscore is 5  GCS motor subscore is 6  Cranial Nerves: No cranial nerve deficit  Sensory: No sensory deficit  Motor: Motor function is intact  No weakness        Coordination: Coordination normal    Psychiatric:         Behavior: Behavior normal          Vital Signs  ED Triage Vitals   Temperature Pulse Respirations Blood Pressure SpO2   12/28/21 0500 12/28/21 0500 12/28/21 0500 12/28/21 0500 12/28/21 0500   (!) 100 6 °F (38 1 °C) 90 19 126/59 91 %      Temp Source Heart Rate Source Patient Position - Orthostatic VS BP Location FiO2 (%)   12/28/21 0500 12/28/21 0500 12/28/21 0500 -- --   Oral Monitor Lying        Pain Score       12/28/21 0635       Med Not Given for Pain - for MAR use only           Vitals:    12/28/21 0600 12/28/21 0615 12/28/21 0630 12/28/21 0645   BP: 159/80 155/77 151/73 151/70   Pulse: 82 82 83 81   Patient Position - Orthostatic VS:             Visual Acuity  Visual Acuity      Most Recent Value   L Pupil Size (mm) 3   R Pupil Size (mm) 3          ED Medications  Medications   sodium chloride 0 9 % infusion (has no administration in time range)   sodium chloride 0 9 % bolus 1,000 mL (0 mL Intravenous Stopped 12/28/21 0645)   tetanus-diphtheria-acellular pertussis (BOOSTRIX) IM injection 0 5 mL (0 5 mL Intramuscular Given 12/28/21 0546) neomycin-bacitracin-polymyxin b (NEOSPORIN) ointment 1 small application (1 small application Topical Given 12/28/21 0546)   acetaminophen (TYLENOL) tablet 650 mg (650 mg Oral Given 12/28/21 0635)       Diagnostic Studies  Results Reviewed     Procedure Component Value Units Date/Time    COVID/FLU/RSV - 2 hour TAT [[id number]]  (Abnormal) Collected: 12/28/21 0540    Lab Status: Final result Specimen: Nares from Nasopharyngeal Swab Updated: 12/28/21 0641     SARS-CoV-2 Positive     INFLUENZA A PCR Negative     INFLUENZA B PCR Negative     RSV PCR Negative    Narrative:      FOR PEDIATRIC PATIENTS - copy/paste COVID Guidelines URL to browser: [email or web address]  [employer]     This test has been authorized by FDA under an EUA (Emergency Use Assay) for use by authorized laboratories  Clinical caution and judgement should be used with the interpretation of these results with consideration of the clinical impression and other laboratory testing  Testing reported as "Positive" or "Negative" has been proven to be accurate according to standard laboratory validation requirements  All testing is performed with control materials showing appropriate reactivity at standard intervals  Blood culture #2 [[id number]] Collected: 12/28/21 6298    Lab Status: In process Specimen: Blood from Arm, Right Updated: 12/28/21 0641    Blood culture #1 [[id number]] Collected: 12/28/21 3858    Lab Status:  In process Specimen: Blood from Arm, Left Updated: 12/28/21 0641    HS Troponin I 2hr [[id number]]     Lab Status: No result Specimen: Blood     HS Troponin 0hr (reflex protocol) [[id number]]  (Normal) Collected: 12/28/21 0540    Lab Status: Final result Specimen: Blood from Line, Venous Updated: 12/28/21 0639     hs TnI 0hr 33 ng/L     Comprehensive metabolic panel [[id number]]  (Abnormal) Collected: 12/28/21 0540    Lab Status: Final result Specimen: Blood from Line, Venous Updated: 12/28/21 6205 Sodium 138 mmol/L      Potassium 3 4 mmol/L      Chloride 103 mmol/L      CO2 24 mmol/L      ANION GAP 11 mmol/L      BUN 20 mg/dL      Creatinine 1 80 mg/dL      Glucose 109 mg/dL      Calcium 9 1 mg/dL      Corrected Calcium 9 6 mg/dL      AST 41 U/L      ALT 26 U/L      Alkaline Phosphatase 72 U/L      Total Protein 8 2 g/dL      Albumin 3 4 g/dL      Total Bilirubin 0 50 mg/dL      eGFR 37 ml/min/1 73sq m     Narrative:      [place] guidelines for Chronic Kidney Disease (CKD):     Stage 1 with normal or high GFR (GFR > 90 mL/min/1 73 square meters)    Stage 2 Mild CKD (GFR = 60-89 mL/min/1 73 square meters)    Stage 3A Moderate CKD (GFR = 45-59 mL/min/1 73 square meters)    Stage 3B Moderate CKD (GFR = 30-44 mL/min/1 73 square meters)    Stage 4 Severe CKD (GFR = 15-29 mL/min/1 73 square meters)    Stage 5 End Stage CKD (GFR <15 mL/min/1 73 square meters)  Note: GFR calculation is accurate only with a steady state creatinine    Lactic acid [[id number]]  (Normal) Collected: 12/28/21 0540    Lab Status: Final result Specimen: Blood from Line, Venous Updated: 12/28/21 9769     LACTIC ACID 1 3 mmol/L     Narrative:      Result may be elevated if tourniquet was used during collection      Procalcitonin with AM Reflex [[id number]]     Lab Status: No result Specimen: Blood     Protime-INR [[id number]]  (Normal) Collected: 12/28/21 0540    Lab Status: Final result Specimen: Blood from Line, Venous Updated: 12/28/21 0612     Protime 12 5 seconds      INR 0 94    APTT [[id number]]  (Abnormal) Collected: 12/28/21 0540    Lab Status: Final result Specimen: Blood from Line, Venous Updated: 12/28/21 0612     PTT 40 seconds     CBC and differential [[id number]]  (Abnormal) Collected: 12/28/21 0540    Lab Status: Final result Specimen: Blood from Line, Venous Updated: 12/28/21 0559     WBC 6 71 Thousand/uL      RBC 6 53 Million/uL      Hemoglobin 15 3 g/dL      Hematocrit 51 6 %      MCV 79 fL MCH 23 4 pg      MCHC 29 7 g/dL      RDW 17 9 %      MPV 10 1 fL      Platelets 965 Thousands/uL      nRBC 0 /100 WBCs      Neutrophils Relative 80 %      Immat GRANS % 0 %      Lymphocytes Relative 16 %      Monocytes Relative 4 %      Eosinophils Relative 0 %      Basophils Relative 0 %      Neutrophils Absolute 5 38 Thousands/µL      Immature Grans Absolute 0 01 Thousand/uL      Lymphocytes Absolute 1 06 Thousands/µL      Monocytes Absolute 0 25 Thousand/µL      Eosinophils Absolute 0 00 Thousand/µL      Basophils Absolute 0 01 Thousands/µL     NT-BNP PRO [[id number]]     Lab Status: No result Specimen: Blood     UA w Reflex to Microscopic w Reflex to Culture [[id number]]     Lab Status: No result Specimen: Urine                  XR Trauma chest portable   ED Interpretation by Trent Bingham MD (12/28 5013)   B/l lower lobe consolidations  No acute traumatic injury      TRAUMA - CT spine cervical wo contrast   Final Result by Tyrese Garcia DO (12/28 6013)      No calvarial fracture or acute intracranial abnormality is seen  Sinus disease as described, consider acute sinusitis in the appropriate clinical setting  CT CERVICAL SPINE - WITHOUT CONTRAST      INDICATION:   TRAUMA  Patient has confirmed COVID-19  COMPARISON:  None  TECHNIQUE:  CT examination of the cervical spine was performed without intravenous contrast   Contiguous axial images were obtained  Sagittal and coronal reconstructions were performed  Radiation dose length product (DLP) for this visit:  865 02 mGy-cm  (accession [id number]), 430 72 mGy-cm  (accession [id number])  This examination, like all CT scans performed in the [facility], was performed utilizing techniques to    minimize radiation dose exposure, including the use of iterative reconstruction and automated exposure control  IMAGE QUALITY:  Diagnostic  FINDINGS:      ALIGNMENT:  Normal alignment of the cervical spine   No subluxation  VERTEBRAL BODIES:  No acute fracture  DEGENERATIVE CHANGES:  Intervertebral disc space narrowing at C3/C4, C4/C5, C5/C6, and C6/C7 with anterior, marginal, and uncovertebral osteophytosis at these levels  PREVERTEBRAL AND PARASPINAL SOFT TISSUES:  Unremarkable  THORACIC INLET:  Patchy groundglass opacities in the left apex, correlates with the patient's history of Covid 19 infection         IMPRESSION:      Degenerative changes as described no evidence of acute cervical spine injury  Patchy groundglass opacities in the left apex, correlates with the patient's history of Covid 19 infection             I personally discussed this study with Bere Rashawn on 12/28/2021 at 6:55 AM                          Workstation performed: [id number]         TRAUMA - CT head wo contrast   Final Result by Mai Fuller DO (12/28 1033)      No calvarial fracture or acute intracranial abnormality is seen  Sinus disease as described, consider acute sinusitis in the appropriate clinical setting  CT CERVICAL SPINE - WITHOUT CONTRAST      INDICATION:   TRAUMA  Patient has confirmed COVID-19  COMPARISON:  None  TECHNIQUE:  CT examination of the cervical spine was performed without intravenous contrast   Contiguous axial images were obtained  Sagittal and coronal reconstructions were performed  Radiation dose length product (DLP) for this visit:  865 02 mGy-cm  (accession [id number]), 430 72 mGy-cm  (accession [id number])  This examination, like all CT scans performed in the [facility], was performed utilizing techniques to    minimize radiation dose exposure, including the use of iterative reconstruction and automated exposure control  IMAGE QUALITY:  Diagnostic  FINDINGS:      ALIGNMENT:  Normal alignment of the cervical spine  No subluxation  VERTEBRAL BODIES:  No acute fracture        DEGENERATIVE CHANGES:  Intervertebral disc space narrowing at C3/C4, C4/C5, C5/C6, and C6/C7 with anterior, marginal, and uncovertebral osteophytosis at these levels  PREVERTEBRAL AND PARASPINAL SOFT TISSUES:  Unremarkable  THORACIC INLET:  Patchy groundglass opacities in the left apex, correlates with the patient's history of Covid 19 infection         IMPRESSION:      Degenerative changes as described no evidence of acute cervical spine injury  Patchy groundglass opacities in the left apex, correlates with the patient's history of Covid 19 infection             I personally discussed this study with Jermaine Means on 12/28/2021 at 6:55 AM                          Workstation performed: [id number]                    Procedures  ECG 12 Lead Documentation Only    Date/Time: 12/28/2021 5:06 AM  Performed by: Juan Wright MD  Authorized by: Juan Wright MD     Indications / Diagnosis:  Trauma  ECG reviewed by me, the ED Provider: yes    Patient location:  ED  Previous ECG:     Previous ECG:  Compared to current    Similarity:  No change    Comparison to cardiac monitor: Yes    Interpretation:     Interpretation: non-specific    Rate:     ECG rate:  89    ECG rate assessment: normal    Rhythm:     Rhythm: sinus rhythm    Ectopy:     Ectopy: none    QRS:     QRS axis:  Left    QRS intervals:  Normal  Conduction:     Conduction: normal    ST segments:     ST segments:  Non-specific  T waves:     T waves: non-specific               ED Course  ED Course as of 12/28/21 0701   Tue Dec 28, 2021   0502 Pt initially not a trauma, as EMS and pt denied blood thinners  However, I discussed w/ pt's wife in waiting room and she handed me his bag of meds, which included plavix  Trauma eval called at that time  [facility] were just documented in chart due to staff shortages  Pt is febrile  Meets SIRS criteria  Will add appropriate labs  Suspect COVID  Will hold on heavy fluids as COVID pts do not do well with aggressive hydration     2798 Will admit  No acute trauma  MDM  Number of Diagnoses or Management Options  Abrasion: new and requires workup  CHADWICK (acute kidney injury) ([facility] ): new and requires workup  COVID-19: new and requires workup  Syncope: new and requires workup     Amount and/or Complexity of Data Reviewed  Clinical lab tests: ordered and reviewed  Tests in the radiology section of CPT®: ordered and reviewed  Tests in the medicine section of CPT®: ordered and reviewed  Review and summarize past medical records: yes  Discuss the patient with other providers: yes  Independent visualization of images, tracings, or specimens: yes    Risk of Complications, Morbidity, and/or Mortality  Presenting problems: moderate  Diagnostic procedures: low  Management options: low    Patient Progress  Patient progress: stable      Disposition  Final diagnoses:   CHADWICK (acute kidney injury) ([facility] )   Abrasion   Syncope   COVID-19     Time reflects when diagnosis was documented in both MDM as applicable and the Disposition within this note     Time User Action Codes Description Comment    12/28/2021  6:37 AM Hanane Miko S Add [N17 9] CHADWICK (acute kidney injury) ([facility] )     12/28/2021  6:37 AM Heddy Cutter Add [T14  8XXA] Abrasion     12/28/2021  6:37 AM Hanane Miko S Add [R55] Syncope     12/28/2021  6:44 AM Heddy Cutter Add [U07 1] COVID-19       ED Disposition     ED Disposition Condition Date/Time Comment    Admit Stable Tue Dec 28, 2021  6:59 AM Case was discussed with Dr Mike Castrejon and the patient's admission status was agreed to be Admission Status: inpatient status to the service of Dr Mike Castrejon   Follow-up Information    None         Patient's Medications   Discharge Prescriptions    No medications on file       No discharge procedures on file      PDMP Review       Value Time User    PDMP Reviewed  Yes 3/20/2020 11:12 AM Dora Yi PA-C          ED Provider  Electronically Signed by           Tomer Salgado MD  12/28/21 5917

## 2021-12-28 NOTE — ASSESSMENT & PLAN NOTE
Lab Results   Component Value Date    HGBA1C 6 2 (H) 09/29/2021       No results for input(s): POCGLU in the last 72 hours  Blood Sugar Average: Last 72 hrs:       Patient has type 2 diabetes with stage III chronic disease  Blood sugars are controlled, it was 109 this morning      I will hold Jardiance and I placed patient on sliding scale insulin during the hospital stay

## 2021-12-28 NOTE — ASSESSMENT & PLAN NOTE
Patient developed chills, fevers and dry cough about 3-4 days ago  He is level vaccinated with the [place] vaccine  CT neck reveals upper lobe patchy opacities  Patient has COVID pneumonia  Currently his oxygen saturations 91% on room air  He denies shortness of breath chest pain  Admitting patient to [facility]  Will monitor closely to see if he becomes hypoxic requiring oxygen  If that happens will start him on the mild protocol      Requires more than 2 midnights hospital stay    Is full code

## 2021-12-28 NOTE — ASSESSMENT & PLAN NOTE
Patient has chronic hypertension  Currently he is not hypo or hypertensive    Will continue metoprolol and amlodipine

## 2021-12-28 NOTE — ASSESSMENT & PLAN NOTE
Patient's history of nonsustained ventricular tachycardia  EKG shows normal sinus rhythm with normal intervals    Will continue metoprolol    Will monitor patient on telemetry

## 2021-12-28 NOTE — H&P
Blanco [facility]j  H&P- Malinda Enrique 1950, 80 y o  male MRN: [id number]  Unit/Bed#: ED 01 Encounter: [id number]  Primary Care Provider: Antonino Jamison DO   Date and time admitted to hospital: 12/28/2021  4:54 AM    * Pneumonia due to COVID-19 virus  Assessment & Plan  Patient developed chills, fevers and dry cough about 3-4 days ago  He is level vaccinated with the [place] vaccine  CT neck reveals upper lobe patchy opacities  Patient has COVID pneumonia  Currently his oxygen saturations 91% on room air  He denies shortness of breath chest pain  Admitting patient to [facility]  Will monitor closely to see if he becomes hypoxic requiring oxygen  If that happens will start him on the mild protocol  Requires more than 2 midnights hospital stay    Is full code    Vasovagal syncope  Assessment & Plan  Patient had a syncopal episode of very brief duration while urinating  Suspect vasovagal reaction  Doubt seizure  Patient had no postictal confusion  Doubt CVA or TIA  Will monitor patient on telemetry since she he has a history of nonsustained V-tach    I will hydrate patient with normal saline solution for 500 cc    [facility]ADWICK (acute kidney injury) [facility])  Assessment & Plan  Patient has acute kidney injury on stage III chronic disease  Suspect this is due to COVID infection, syncope  Patient hypotensive today  He denies obstructive urinary complaints  Will check patient's postvoid residuals throughout obstruction  I am hydrating patient with normal saline solution and will recheck renal function tomorrow  I will follow-up on UA results    Primary hypertension  Assessment & Plan  Patient has chronic hypertension  Currently he is not hypo or hypertensive  Will continue metoprolol and amlodipine    NSVT (nonsustained ventricular tachycardia) ([facility])  Assessment & Plan  Patient's history of nonsustained ventricular tachycardia    EKG shows normal sinus rhythm with normal intervals  Will continue metoprolol    Will monitor patient on telemetry    Type 2 diabetes mellitus with stage 3a chronic kidney disease, without long-term current use of insulin ([facility])  Assessment & Plan  Lab Results   Component Value Date    HGBA1C 6 2 (H) 09/29/2021       No results for input(s): POCGLU in the last 72 hours  Blood Sugar Average: Last 72 hrs:       Patient has type 2 diabetes with stage III chronic disease  Blood sugars are controlled, it was 109 this morning  I will hold Jardiance and I placed patient on sliding scale insulin during the hospital stay    Ulcerative rectosigmoiditis without complication [facility])  Assessment & Plan  Patient has ulcer of colitis  He denies any abdominal pain and diarrhea  Denies signs of GI bleeding  Continue sulfasalazine    VTE Prophylaxis: ordered    Code Status: as above    Anticipated Length of Stay: as above    Justification for Hospital Stay: see assessment and plan        Chief Complaint:   Syncope    History of Present Illness:    Jacqueline Bernal is a 80 y o  male who presents with above chief compaint  Patient woke up around 3:00 a m  in the morning to urinate  As he was sitting on the toilet he felt weak and passed out  His wife rash to help him and he found himself on the bathroom floor  He was unconscious for a very brief period of time and when he came to he knew where he was he had no postictal confusion  This was a very 1st episode  He denies any palpitations, chest pain, shortness of breath, nausea, vomiting, diarrhea before or after the event  He denies any new focal weakness or numbness before or after the event  He started with a dry cough fevers and chills about 3-4 days ago  His wife had been sick as well but she is better now  Patient is double vaccinated against COVID  In denies shortness of breath  Currently he is back to baseline  Denies headache, neck pain            Review of Systems:    Review of Systems Constitutional: Positive for chills and fever  HENT: Negative for congestion  Eyes: Negative for visual disturbance  Respiratory: Positive for cough  Negative for shortness of breath and wheezing  Cardiovascular: Negative for chest pain, palpitations and leg swelling  Gastrointestinal: Negative for abdominal pain, blood in stool and diarrhea  Endocrine: Negative for polydipsia  Genitourinary: Negative for difficulty urinating and dysuria  Musculoskeletal: Negative for joint swelling, myalgias and neck stiffness  Skin: Negative for rash  Neurological: Positive for syncope  Negative for weakness, numbness and headaches  Hematological: Negative for adenopathy  Psychiatric/Behavioral: Negative for dysphoric mood  All other systems reviewed and are negative  Past Medical and Surgical History:     Past Medical History:   Diagnosis Date    CVA (cerebral vascular accident) ([address] )     Diverticulitis     Gout     Hypercholesterolemia     Hypertension     Renal disorder        Past Surgical History:   Procedure Laterality Date    CARDIAC LOOP RECORDER      COLONOSCOPY  2006    complete; 10 yrs    COLONOSCOPY  10/23/2017    complete; 5 yrs    COLONOSCOPY  2020    Severe active left-sided colitis, erythematous and ulcerated mucosa in the rectosigmoid, inflammatory polyp       Meds/Allergies:    Prior to Admission Medications   Prescriptions Last Dose Informant Patient Reported? Taking?    Coenzyme Q10 200 MG capsule  Self Yes No   Sig: Take by mouth daily    Empagliflozin (Jardiance) 10 MG TABS   No No   Si tab PO q day   allopurinol (ZYLOPRIM) 100 mg tablet  Self No No   Sig: Take 2 tablets (200 mg total) by mouth daily   amLODIPine (NORVASC) 10 mg tablet  Self No No   Sig: Take 1 tablet (10 mg total) by mouth daily   atorvastatin (LIPITOR) 40 mg tablet   No No   Sig: TAKE 1 TABLET DAILY WITH   DINNER   clopidogrel (PLAVIX) 75 mg tablet   No No   Sig: Take 1 tablet (75 mg total) by mouth daily   fenofibrate (TRICOR) 145 mg tablet   No No   Sig: Take 1 tablet (145 mg total) by mouth daily   metoprolol tartrate (LOPRESSOR) 50 mg tablet   No No   Sig: Take 1 5 tablets (75 mg total) by mouth every 12 (twelve) hours   sulfaSALAzine (AZULFIDINE) 500 mg tablet   No No   Sig: Take 1 tablet (500 mg total) by mouth 4 (four) times a day      Facility-Administered Medications: None       Allergies: No Known Allergies    Social History:     Social History     Substance and Sexual Activity   Alcohol Use Not Currently     Social History     Tobacco Use   Smoking Status Never Smoker   Smokeless Tobacco Never Used     Social History     Substance and Sexual Activity   Drug Use Never       Family History:    Family History   Problem Relation Age of Onset    Breast cancer Mother     Kidney disease Father     Lung cancer Father     Other Father         smoker    Hypertension Other     Colon polyps Neg Hx     Colon cancer Neg Hx        Physical Exam:     Vitals:   Blood Pressure: 143/74 (12/28/21 0715)  Pulse: 85 (12/28/21 0715)  Temperature: 100 3 °F (37 9 °C) (12/28/21 0700)  Temp Source: Temporal (12/28/21 0700)  Respirations: 20 (12/28/21 0715)  SpO2: 91 % (12/28/21 0715)    Physical Exam  HENT:      Head: Normocephalic  Mouth/Throat:      Mouth: Mucous membranes are dry  Pharynx: No oropharyngeal exudate  Eyes:      Conjunctiva/sclera: Conjunctivae normal    Cardiovascular:      Rate and Rhythm: Normal rate and regular rhythm  Heart sounds: No murmur heard  Pulmonary:      Effort: No respiratory distress  Breath sounds: No wheezing or rales  Abdominal:      General: Bowel sounds are normal       Palpations: Abdomen is soft  Tenderness: There is no abdominal tenderness  Musculoskeletal:         General: No tenderness  Cervical back: Neck supple  Skin:     General: Skin is warm and dry        Comments: Patient's were facial abrasion on the right parietal area without signs of cellulitis   Neurological:      Mental Status: He is alert and oriented to person, place, and time  Cranial Nerves: No cranial nerve deficit  Motor: No weakness  Psychiatric:         Mood and Affect: Mood normal              Additional Data:     Lab Results: I personally reviewed them    Results from last 7 days   Lab Units 12/28/21  0540   WBC Thousand/uL 6 71   HEMOGLOBIN g/dL 15 3   HEMATOCRIT % 51 6*   PLATELETS Thousands/uL 234   NEUTROS PCT % 80*   LYMPHS PCT % 16   MONOS PCT % 4   EOS PCT % 0     Results from last 7 days   Lab Units 12/28/21  0540   POTASSIUM mmol/L 3 4*   CHLORIDE mmol/L 103   CO2 mmol/L 24   BUN mg/dL 20   CREATININE mg/dL 1 80*   CALCIUM mg/dL 9 1   ALK PHOS U/L 72   ALT U/L 26   AST U/L 41     Results from last 7 days   Lab Units 12/28/21  0540   INR  0 94       Imaging: I personally reviewed them    XR Trauma chest portable   ED Interpretation by Manolo Carroll MD (12/28 0018)   B/l lower lobe consolidations  No acute traumatic injury      TRAUMA - CT spine cervical wo contrast   Final Result by Citlaly Campos DO (12/28 2376)      No calvarial fracture or acute intracranial abnormality is seen  Sinus disease as described, consider acute sinusitis in the appropriate clinical setting  CT CERVICAL SPINE - WITHOUT CONTRAST      INDICATION:   TRAUMA  Patient has confirmed COVID-19  COMPARISON:  None  TECHNIQUE:  CT examination of the cervical spine was performed without intravenous contrast   Contiguous axial images were obtained  Sagittal and coronal reconstructions were performed  Radiation dose length product (DLP) for this visit:  865 02 mGy-cm  (accession [id number]), 430 72 mGy-cm  (accession [id number])    This examination, like all CT scans performed in the [place], was performed utilizing techniques to    minimize radiation dose exposure, including the use of iterative reconstruction and automated exposure control  IMAGE QUALITY:  Diagnostic  FINDINGS:      ALIGNMENT:  Normal alignment of the cervical spine  No subluxation  VERTEBRAL BODIES:  No acute fracture  DEGENERATIVE CHANGES:  Intervertebral disc space narrowing at C3/C4, C4/C5, C5/C6, and C6/C7 with anterior, marginal, and uncovertebral osteophytosis at these levels  PREVERTEBRAL AND PARASPINAL SOFT TISSUES:  Unremarkable  THORACIC INLET:  Patchy groundglass opacities in the left apex, correlates with the patient's history of Covid 19 infection         IMPRESSION:      Degenerative changes as described no evidence of acute cervical spine injury  Patchy groundglass opacities in the left apex, correlates with the patient's history of Covid 19 infection             I personally discussed this study with Barron Mast on 12/28/2021 at 6:55 AM                          Workstation performed: [id number]         TRAUMA - CT head wo contrast   Final Result by Lillian Esetban DO (12/28 0974)      No calvarial fracture or acute intracranial abnormality is seen  Sinus disease as described, consider acute sinusitis in the appropriate clinical setting  CT CERVICAL SPINE - WITHOUT CONTRAST      INDICATION:   TRAUMA  Patient has confirmed COVID-19  COMPARISON:  None  TECHNIQUE:  CT examination of the cervical spine was performed without intravenous contrast   Contiguous axial images were obtained  Sagittal and coronal reconstructions were performed  Radiation dose length product (DLP) for this visit:  865 02 mGy-cm  (accession [id number]), 430 72 mGy-cm  (accession [id number])  This examination, like all CT scans performed in the [facility], was performed utilizing techniques to    minimize radiation dose exposure, including the use of iterative reconstruction and automated exposure control  IMAGE QUALITY:  Diagnostic        FINDINGS:      ALIGNMENT: Normal alignment of the cervical spine  No subluxation  VERTEBRAL BODIES:  No acute fracture  DEGENERATIVE CHANGES:  Intervertebral disc space narrowing at C3/C4, C4/C5, C5/C6, and C6/C7 with anterior, marginal, and uncovertebral osteophytosis at these levels  PREVERTEBRAL AND PARASPINAL SOFT TISSUES:  Unremarkable  THORACIC INLET:  Patchy groundglass opacities in the left apex, correlates with the patient's history of Covid 19 infection         IMPRESSION:      Degenerative changes as described no evidence of acute cervical spine injury  Patchy groundglass opacities in the left apex, correlates with the patient's history of Covid 19 infection             I personally discussed this study with Marcia Winslow on 12/28/2021 at 6:55 AM                          Workstation performed: [id number]             EKG : I personally reviewed      Rickey Johnson MD    ** Please Note: This note has been constructed using a voice recognition system   **

## 2021-12-28 NOTE — ASSESSMENT & PLAN NOTE
Patient has ulcer of colitis  He denies any abdominal pain and diarrhea  Denies signs of GI bleeding      Continue sulfasalazine

## 2021-12-28 NOTE — ASSESSMENT & PLAN NOTE
Patient has acute kidney injury on stage III chronic disease  Suspect this is due to COVID infection, syncope  Patient hypotensive today  He denies obstructive urinary complaints  Will check patient's postvoid residuals throughout obstruction    I am hydrating patient with normal saline solution and will recheck renal function tomorrow  I will follow-up on UA results

## 2021-12-29 LAB
ALBUMIN SERPL BCP-MCNC: 2.7 G/DL (ref 3.5–5)
ALP SERPL-CCNC: 57 U/L (ref 46–116)
ALT SERPL W P-5'-P-CCNC: 22 U/L (ref 12–78)
ANION GAP SERPL CALCULATED.3IONS-SCNC: 11 MMOL/L (ref 4–13)
AST SERPL W P-5'-P-CCNC: 58 U/L (ref 5–45)
BASOPHILS # BLD AUTO: 0.01 THOUSANDS/ΜL (ref 0–0.1)
BASOPHILS NFR BLD AUTO: 0 % (ref 0–1)
BILIRUB SERPL-MCNC: 0.6 MG/DL (ref 0.2–1)
BUN SERPL-MCNC: 24 MG/DL (ref 5–25)
CALCIUM ALBUM COR SERPL-MCNC: 9.6 MG/DL (ref 8.3–10.1)
CALCIUM SERPL-MCNC: 8.6 MG/DL (ref 8.3–10.1)
CHLORIDE SERPL-SCNC: 106 MMOL/L (ref 100–108)
CO2 SERPL-SCNC: 17 MMOL/L (ref 21–32)
CREAT SERPL-MCNC: 1.63 MG/DL (ref 0.6–1.3)
CRP SERPL QL: 248.9 MG/L
EOSINOPHIL # BLD AUTO: 0 THOUSAND/ΜL (ref 0–0.61)
EOSINOPHIL NFR BLD AUTO: 0 % (ref 0–6)
ERYTHROCYTE [DISTWIDTH] IN BLOOD BY AUTOMATED COUNT: 16.8 % (ref 11.6–15.1)
GFR SERPL CREATININE-BSD FRML MDRD: 42 ML/MIN/1.73SQ M
GLUCOSE SERPL-MCNC: 114 MG/DL (ref 65–140)
GLUCOSE SERPL-MCNC: 135 MG/DL (ref 65–140)
GLUCOSE SERPL-MCNC: 152 MG/DL (ref 65–140)
GLUCOSE SERPL-MCNC: 91 MG/DL (ref 65–140)
GLUCOSE SERPL-MCNC: 97 MG/DL (ref 65–140)
HCT VFR BLD AUTO: 45.2 % (ref 36.5–49.3)
HGB BLD-MCNC: 13.6 G/DL (ref 12–17)
IMM GRANULOCYTES # BLD AUTO: 0.03 THOUSAND/UL (ref 0–0.2)
IMM GRANULOCYTES NFR BLD AUTO: 0 % (ref 0–2)
LYMPHOCYTES # BLD AUTO: 0.91 THOUSANDS/ΜL (ref 0.6–4.47)
LYMPHOCYTES NFR BLD AUTO: 12 % (ref 14–44)
MCH RBC QN AUTO: 23.7 PG (ref 26.8–34.3)
MCHC RBC AUTO-ENTMCNC: 30.1 G/DL (ref 31.4–37.4)
MCV RBC AUTO: 79 FL (ref 82–98)
MONOCYTES # BLD AUTO: 0.17 THOUSAND/ΜL (ref 0.17–1.22)
MONOCYTES NFR BLD AUTO: 2 % (ref 4–12)
NEUTROPHILS # BLD AUTO: 6.33 THOUSANDS/ΜL (ref 1.85–7.62)
NEUTS SEG NFR BLD AUTO: 86 % (ref 43–75)
NRBC BLD AUTO-RTO: 0 /100 WBCS
PLATELET # BLD AUTO: 225 THOUSANDS/UL (ref 149–390)
PMV BLD AUTO: 10.5 FL (ref 8.9–12.7)
POTASSIUM SERPL-SCNC: 4 MMOL/L (ref 3.5–5.3)
PROCALCITONIN SERPL-MCNC: 1.65 NG/ML
PROT SERPL-MCNC: 7 G/DL (ref 6.4–8.2)
RBC # BLD AUTO: 5.74 MILLION/UL (ref 3.88–5.62)
SODIUM SERPL-SCNC: 134 MMOL/L (ref 136–145)
WBC # BLD AUTO: 7.45 THOUSAND/UL (ref 4.31–10.16)

## 2021-12-29 PROCEDURE — 80053 COMPREHEN METABOLIC PANEL: CPT | Performed by: INTERNAL MEDICINE

## 2021-12-29 PROCEDURE — 36415 COLL VENOUS BLD VENIPUNCTURE: CPT | Performed by: INTERNAL MEDICINE

## 2021-12-29 PROCEDURE — 94002 VENT MGMT INPAT INIT DAY: CPT

## 2021-12-29 PROCEDURE — 86140 C-REACTIVE PROTEIN: CPT | Performed by: INTERNAL MEDICINE

## 2021-12-29 PROCEDURE — 87040 BLOOD CULTURE FOR BACTERIA: CPT | Performed by: INTERNAL MEDICINE

## 2021-12-29 PROCEDURE — 84145 PROCALCITONIN (PCT): CPT | Performed by: INTERNAL MEDICINE

## 2021-12-29 PROCEDURE — 82948 REAGENT STRIP/BLOOD GLUCOSE: CPT

## 2021-12-29 PROCEDURE — 99233 SBSQ HOSP IP/OBS HIGH 50: CPT | Performed by: INTERNAL MEDICINE

## 2021-12-29 PROCEDURE — XW033E5 INTRODUCTION OF REMDESIVIR ANTI-INFECTIVE INTO PERIPHERAL VEIN, PERCUTANEOUS APPROACH, NEW TECHNOLOGY GROUP 5: ICD-10-PCS | Performed by: INTERNAL MEDICINE

## 2021-12-29 PROCEDURE — 94760 N-INVAS EAR/PLS OXIMETRY 1: CPT

## 2021-12-29 PROCEDURE — 85025 COMPLETE CBC W/AUTO DIFF WBC: CPT | Performed by: INTERNAL MEDICINE

## 2021-12-29 PROCEDURE — 87077 CULTURE AEROBIC IDENTIFY: CPT | Performed by: INTERNAL MEDICINE

## 2021-12-29 RX ORDER — DEXAMETHASONE SODIUM PHOSPHATE 4 MG/ML
6 INJECTION, SOLUTION INTRA-ARTICULAR; INTRALESIONAL; INTRAMUSCULAR; INTRAVENOUS; SOFT TISSUE EVERY 24 HOURS
Status: DISCONTINUED | OUTPATIENT
Start: 2021-12-29 | End: 2021-12-30

## 2021-12-29 RX ADMIN — SULFASALAZINE 500 MG: 500 TABLET ORAL at 11:05

## 2021-12-29 RX ADMIN — ALLOPURINOL 200 MG: 100 TABLET ORAL at 08:42

## 2021-12-29 RX ADMIN — METOPROLOL TARTRATE 75 MG: 50 TABLET, FILM COATED ORAL at 21:50

## 2021-12-29 RX ADMIN — HEPARIN SODIUM 5000 UNITS: 5000 INJECTION INTRAVENOUS; SUBCUTANEOUS at 13:39

## 2021-12-29 RX ADMIN — ENOXAPARIN SODIUM 30 MG: 100 INJECTION SUBCUTANEOUS at 21:50

## 2021-12-29 RX ADMIN — SULFASALAZINE 500 MG: 500 TABLET ORAL at 18:09

## 2021-12-29 RX ADMIN — SULFASALAZINE 500 MG: 500 TABLET ORAL at 21:50

## 2021-12-29 RX ADMIN — DEXAMETHASONE SODIUM PHOSPHATE 6 MG: 4 INJECTION, SOLUTION INTRAMUSCULAR; INTRAVENOUS at 17:16

## 2021-12-29 RX ADMIN — VANCOMYCIN HYDROCHLORIDE 750 MG: 750 INJECTION, SOLUTION INTRAVENOUS at 15:40

## 2021-12-29 RX ADMIN — ATORVASTATIN CALCIUM 40 MG: 40 TABLET, FILM COATED ORAL at 17:17

## 2021-12-29 RX ADMIN — REMDESIVIR 200 MG: 100 INJECTION, POWDER, LYOPHILIZED, FOR SOLUTION INTRAVENOUS at 17:16

## 2021-12-29 RX ADMIN — CLOPIDOGREL BISULFATE 75 MG: 75 TABLET ORAL at 08:42

## 2021-12-29 RX ADMIN — HEPARIN SODIUM 5000 UNITS: 5000 INJECTION INTRAVENOUS; SUBCUTANEOUS at 06:31

## 2021-12-29 RX ADMIN — AMLODIPINE BESYLATE 10 MG: 5 TABLET ORAL at 08:42

## 2021-12-29 RX ADMIN — METOPROLOL TARTRATE 75 MG: 50 TABLET, FILM COATED ORAL at 08:42

## 2021-12-29 NOTE — ASSESSMENT & PLAN NOTE
Patient developed chills, fevers and dry cough about 3-4 days before admission  He is [employer] vaccinated with [employer] vaccine  Chest x-ray showed bilateral COVID pneumonia  Patient was hypoxic and short of breath and had to be on oxygen  He is currently on 2 liters/minute  Will start remdesivir and Decadron according to the mild pathway    I started Lovenox 30 mg q 12 hours for DVT prevention, his EGFR is more than 30    I discussed the case with patient's daughter on the phone in detail on December 29th

## 2021-12-29 NOTE — PROGRESS NOTES
[place]  Progress Note - Mellisa Covington 1950, 80 y o  male MRN: [id number]  Unit/Bed#: ED 01 Encounter: [id number]  Primary Care Provider: Marco Rutherford DO   Date and time admitted to hospital: 12/28/2021  4:54 AM    * Pneumonia due to COVID-19 virus  Assessment & Plan  Patient developed chills, fevers and dry cough about 3-4 days before admission  He is [employer] vaccinated with [employer] vaccine  Chest x-ray showed bilateral COVID pneumonia  Patient was hypoxic and short of breath and had to be on oxygen  He is currently on 2 liters/minute  Will start remdesivir and Decadron according to the mild pathway  I started Lovenox 30 mg q 12 hours for DVT prevention, his EGFR is more than 30    I discussed the case with patient's daughter on the phone in detail on December 29th    [facility]WICK (acute kidney injury) [facility])  Assessment & Plan  Patient has acute kidney injury on stage III chronic disease  His baseline creatinine is 1 2-1 5    Suspect this is due to COVID infection, syncope  Patient denies obstructive urinary complaints  Renal function slightly better today  Patient ate  Will monitor renal function serially    NSVT (nonsustained ventricular tachycardia) ([facility] [address] )  Assessment & Plan  Patient's history of nonsustained ventricular tachycardia  EKG shows normal sinus rhythm with normal intervals  Will continue metoprolol    Will monitor patient on telemetry    Type 2 diabetes mellitus with stage 3a chronic kidney disease, without long-term current use of insulin [facility])  Assessment & Plan  Lab Results   Component Value Date    HGBA1C 6 4 (H) 12/28/2021       Recent Labs     12/28/21  1621 12/28/21  2125 12/29/21  0741 12/29/21  1253   POCGLU 110 104 97 114       Blood Sugar Average: Last 72 hrs:  (P) 177 [id number]     Patient has type 2 diabetes with stage III chronic disease    Blood sugars are controlled    Continue sliding scale insulin for now    Ulcerative rectosigmoiditis without complication [facility])  Assessment & Plan  Patient has ulcer of colitis  He denies any abdominal pain and diarrhea  Denies signs of GI bleeding  Continue sulfasalazine      VTE Prophylaxis: in place    Patient Centered Rounds: I rounded with patient's nurse    Current Length of Stay: 1 day(s)    Current Patient Status: Inpatient    Certification Statement: Pt requires additional inpatient hospital stay due to: see assessment and plan        Subjective:   Patient's nurse reports that patient was hypoxic and short of breath and had to be placed on oxygen  Patient denies any chest pain, shortness of breath on oxygen  Has dry cough  Denies abdominal pain, diarrhea, difficulty urinating  All other ROS are negative    Objective:     Vitals:   No data recorded  HR:  [] 82  Resp:  [18-31] 20  BP: (129-155)/(62-74) 129/62  SpO2:  [89 %-94 %] 90 %  There is no height or weight on file to calculate BMI  Input and Output Summary (last 24 hours):     No intake or output data in the 24 hours ending 12/29/21 1632    Physical Exam:     Physical Exam  HENT:      Head: Normocephalic  Eyes:      Conjunctiva/sclera: Conjunctivae normal    Neck:      Comments: He had no JVD  Cardiovascular:      Rate and Rhythm: Normal rate and regular rhythm  Pulmonary:      Effort: No respiratory distress  Breath sounds: Rales ([place] inspiratory crackles at the bases) present  No wheezing  Abdominal:      General: Bowel sounds are normal       Palpations: Abdomen is soft  Tenderness: There is no abdominal tenderness  Musculoskeletal:      Cervical back: Neck supple  Skin:     General: Skin is warm and dry  Comments: Patient had no lower extremity edema   Neurological:      Mental Status: He is alert and oriented to person, place, and time  Mental status is at baseline  I personally reviewed labs and imaging reports for today        Last 24 Hours Medication List: Current Facility-Administered Medications   Medication Dose Route Frequency Provider Last Rate    acetaminophen  650 mg Oral Q6H PRN Lolita Wilson MD      allopurinol  200 mg Oral Daily Lolita Wilson MD      amLODIPine  10 mg Oral Daily Lolita Wilson MD      atorvastatin  40 mg Oral Daily With Tony Hewitt MD      clopidogrel  75 mg Oral Daily Lolita Wilson MD      dexamethasone  6 mg Intravenous Q24H Lolita Wilson MD      enoxaparin  30 mg Subcutaneous Q12H Beau Esposito MD      insulin lispro  1-5 Units Subcutaneous TID Venkat Tate MD      metoprolol tartrate  75 mg Oral Q12H [address] Lolita Wilson MD      ondansetron  4 mg Intravenous Q6H PRN Lolita Wilson MD      remdesivir  200 mg Intravenous Q24H Lolita Wilson MD      Followed by   Xiang Galeas ON 12/30/2021] remdesivir  100 mg Intravenous Q24H Lolita Wilson MD      sulfaSALAzine  500 mg Oral 4x Daily Lolita Wilson MD      vancomycin  10 mg/kg (Adjusted) Intravenous Q12H Lolita Wilson  mg (12/29/21 1540)          Today, Patient Was Seen By: Lolita Wilson MD    ** Please Note: Dictation voice to text software may have been used in the creation of this document   **

## 2021-12-29 NOTE — ASSESSMENT & PLAN NOTE
Lab Results   Component Value Date    HGBA1C 6 4 (H) 12/28/2021       Recent Labs     12/28/21  1621 12/28/21  2125 12/29/21  0741 12/29/21  1253   POCGLU 110 104 97 114       Blood Sugar Average: Last 72 hrs:  (P) 044 [id number]     Patient has type 2 diabetes with stage III chronic disease    Blood sugars are controlled    Continue sliding scale insulin for now

## 2021-12-29 NOTE — ASSESSMENT & PLAN NOTE
Patient has acute kidney injury on stage III chronic disease  His baseline creatinine is 1 2-1 5    Suspect this is due to COVID infection, syncope  Patient denies obstructive urinary complaints  Renal function slightly better today  Patient ate      Will monitor renal function serially

## 2021-12-30 ENCOUNTER — APPOINTMENT (INPATIENT)
Dept: RADIOLOGY | Facility: HOSPITAL | Age: 71
DRG: 177 | End: 2021-12-30
Payer: MEDICARE

## 2021-12-30 LAB
ALBUMIN SERPL BCP-MCNC: 2.4 G/DL (ref 3.5–5)
ALP SERPL-CCNC: 57 U/L (ref 46–116)
ALT SERPL W P-5'-P-CCNC: 35 U/L (ref 12–78)
ANION GAP SERPL CALCULATED.3IONS-SCNC: 12 MMOL/L (ref 4–13)
AST SERPL W P-5'-P-CCNC: 72 U/L (ref 5–45)
BASOPHILS # BLD AUTO: 0.01 THOUSANDS/ΜL (ref 0–0.1)
BASOPHILS NFR BLD AUTO: 0 % (ref 0–1)
BILIRUB SERPL-MCNC: 0.5 MG/DL (ref 0.2–1)
BUN SERPL-MCNC: 35 MG/DL (ref 5–25)
CALCIUM ALBUM COR SERPL-MCNC: 9.5 MG/DL (ref 8.3–10.1)
CALCIUM SERPL-MCNC: 8.2 MG/DL (ref 8.3–10.1)
CHLORIDE SERPL-SCNC: 102 MMOL/L (ref 100–108)
CO2 SERPL-SCNC: 18 MMOL/L (ref 21–32)
CREAT SERPL-MCNC: 1.72 MG/DL (ref 0.6–1.3)
CRP SERPL QL: 248.5 MG/L
D DIMER PPP FEU-MCNC: 0.78 UG/ML FEU
EOSINOPHIL # BLD AUTO: 0.01 THOUSAND/ΜL (ref 0–0.61)
EOSINOPHIL NFR BLD AUTO: 0 % (ref 0–6)
ERYTHROCYTE [DISTWIDTH] IN BLOOD BY AUTOMATED COUNT: 16.5 % (ref 11.6–15.1)
GFR SERPL CREATININE-BSD FRML MDRD: 39 ML/MIN/1.73SQ M
GLUCOSE SERPL-MCNC: 126 MG/DL (ref 65–140)
GLUCOSE SERPL-MCNC: 136 MG/DL (ref 65–140)
GLUCOSE SERPL-MCNC: 137 MG/DL (ref 65–140)
GLUCOSE SERPL-MCNC: 187 MG/DL (ref 65–140)
GLUCOSE SERPL-MCNC: 188 MG/DL (ref 65–140)
HCT VFR BLD AUTO: 43.6 % (ref 36.5–49.3)
HGB BLD-MCNC: 13.4 G/DL (ref 12–17)
IMM GRANULOCYTES # BLD AUTO: 0.02 THOUSAND/UL (ref 0–0.2)
IMM GRANULOCYTES NFR BLD AUTO: 0 % (ref 0–2)
LYMPHOCYTES # BLD AUTO: 0.69 THOUSANDS/ΜL (ref 0.6–4.47)
LYMPHOCYTES NFR BLD AUTO: 9 % (ref 14–44)
MCH RBC QN AUTO: 23.6 PG (ref 26.8–34.3)
MCHC RBC AUTO-ENTMCNC: 30.7 G/DL (ref 31.4–37.4)
MCV RBC AUTO: 77 FL (ref 82–98)
MONOCYTES # BLD AUTO: 0.28 THOUSAND/ΜL (ref 0.17–1.22)
MONOCYTES NFR BLD AUTO: 4 % (ref 4–12)
NEUTROPHILS # BLD AUTO: 6.62 THOUSANDS/ΜL (ref 1.85–7.62)
NEUTS SEG NFR BLD AUTO: 87 % (ref 43–75)
NRBC BLD AUTO-RTO: 0 /100 WBCS
NT-PROBNP SERPL-MCNC: 436 PG/ML
PLATELET # BLD AUTO: 224 THOUSANDS/UL (ref 149–390)
PMV BLD AUTO: 10.4 FL (ref 8.9–12.7)
POTASSIUM SERPL-SCNC: 3.8 MMOL/L (ref 3.5–5.3)
PROCALCITONIN SERPL-MCNC: 1.36 NG/ML
PROT SERPL-MCNC: 6.7 G/DL (ref 6.4–8.2)
RBC # BLD AUTO: 5.67 MILLION/UL (ref 3.88–5.62)
SODIUM SERPL-SCNC: 132 MMOL/L (ref 136–145)
WBC # BLD AUTO: 7.63 THOUSAND/UL (ref 4.31–10.16)

## 2021-12-30 PROCEDURE — 80053 COMPREHEN METABOLIC PANEL: CPT | Performed by: INTERNAL MEDICINE

## 2021-12-30 PROCEDURE — 85025 COMPLETE CBC W/AUTO DIFF WBC: CPT | Performed by: INTERNAL MEDICINE

## 2021-12-30 PROCEDURE — 94003 VENT MGMT INPAT SUBQ DAY: CPT

## 2021-12-30 PROCEDURE — 94760 N-INVAS EAR/PLS OXIMETRY 1: CPT

## 2021-12-30 PROCEDURE — 87081 CULTURE SCREEN ONLY: CPT | Performed by: NURSE PRACTITIONER

## 2021-12-30 PROCEDURE — 71045 X-RAY EXAM CHEST 1 VIEW: CPT

## 2021-12-30 PROCEDURE — 99223 1ST HOSP IP/OBS HIGH 75: CPT | Performed by: INTERNAL MEDICINE

## 2021-12-30 PROCEDURE — 86140 C-REACTIVE PROTEIN: CPT | Performed by: INTERNAL MEDICINE

## 2021-12-30 PROCEDURE — XW0DXM6 INTRODUCTION OF BARICITINIB INTO MOUTH AND PHARYNX, EXTERNAL APPROACH, NEW TECHNOLOGY GROUP 6: ICD-10-PCS | Performed by: INTERNAL MEDICINE

## 2021-12-30 PROCEDURE — 85379 FIBRIN DEGRADATION QUANT: CPT | Performed by: NURSE PRACTITIONER

## 2021-12-30 PROCEDURE — 99233 SBSQ HOSP IP/OBS HIGH 50: CPT | Performed by: INTERNAL MEDICINE

## 2021-12-30 PROCEDURE — 84145 PROCALCITONIN (PCT): CPT | Performed by: INTERNAL MEDICINE

## 2021-12-30 PROCEDURE — 83880 ASSAY OF NATRIURETIC PEPTIDE: CPT | Performed by: NURSE PRACTITIONER

## 2021-12-30 PROCEDURE — 82948 REAGENT STRIP/BLOOD GLUCOSE: CPT

## 2021-12-30 PROCEDURE — 94660 CPAP INITIATION&MGMT: CPT

## 2021-12-30 RX ORDER — CEFEPIME HYDROCHLORIDE 2 G/50ML
2000 INJECTION, SOLUTION INTRAVENOUS EVERY 12 HOURS
Status: DISCONTINUED | OUTPATIENT
Start: 2021-12-30 | End: 2022-01-06

## 2021-12-30 RX ORDER — DEXAMETHASONE SODIUM PHOSPHATE 4 MG/ML
0.1 INJECTION, SOLUTION INTRA-ARTICULAR; INTRALESIONAL; INTRAMUSCULAR; INTRAVENOUS; SOFT TISSUE EVERY 12 HOURS
Status: DISCONTINUED | OUTPATIENT
Start: 2021-12-30 | End: 2022-01-07

## 2021-12-30 RX ADMIN — SULFASALAZINE 500 MG: 500 TABLET ORAL at 17:47

## 2021-12-30 RX ADMIN — SULFASALAZINE 500 MG: 500 TABLET ORAL at 08:12

## 2021-12-30 RX ADMIN — REMDESIVIR 100 MG: 100 INJECTION, POWDER, LYOPHILIZED, FOR SOLUTION INTRAVENOUS at 16:23

## 2021-12-30 RX ADMIN — ENOXAPARIN SODIUM 30 MG: 100 INJECTION SUBCUTANEOUS at 21:58

## 2021-12-30 RX ADMIN — ALLOPURINOL 200 MG: 100 TABLET ORAL at 08:04

## 2021-12-30 RX ADMIN — AMLODIPINE BESYLATE 10 MG: 5 TABLET ORAL at 08:05

## 2021-12-30 RX ADMIN — VANCOMYCIN HYDROCHLORIDE 750 MG: 750 INJECTION, SOLUTION INTRAVENOUS at 03:44

## 2021-12-30 RX ADMIN — CEFEPIME HYDROCHLORIDE 2000 MG: 2 INJECTION, SOLUTION INTRAVENOUS at 14:10

## 2021-12-30 RX ADMIN — ENOXAPARIN SODIUM 30 MG: 100 INJECTION SUBCUTANEOUS at 08:04

## 2021-12-30 RX ADMIN — DEXAMETHASONE SODIUM PHOSPHATE 9.04 MG: 4 INJECTION, SOLUTION INTRA-ARTICULAR; INTRALESIONAL; INTRAMUSCULAR; INTRAVENOUS; SOFT TISSUE at 14:21

## 2021-12-30 RX ADMIN — BARICITINIB 2 MG: 2 TABLET, FILM COATED ORAL at 14:03

## 2021-12-30 RX ADMIN — METOPROLOL TARTRATE 75 MG: 50 TABLET, FILM COATED ORAL at 21:58

## 2021-12-30 RX ADMIN — SODIUM BICARBONATE 75 ML/HR: 84 INJECTION, SOLUTION INTRAVENOUS at 14:05

## 2021-12-30 RX ADMIN — SULFASALAZINE 500 MG: 500 TABLET ORAL at 11:58

## 2021-12-30 RX ADMIN — ATORVASTATIN CALCIUM 40 MG: 40 TABLET, FILM COATED ORAL at 16:22

## 2021-12-30 RX ADMIN — INSULIN LISPRO 1 UNITS: 100 INJECTION, SOLUTION INTRAVENOUS; SUBCUTANEOUS at 16:24

## 2021-12-30 RX ADMIN — METOPROLOL TARTRATE 75 MG: 50 TABLET, FILM COATED ORAL at 08:04

## 2021-12-30 RX ADMIN — SULFASALAZINE 500 MG: 500 TABLET ORAL at 22:05

## 2021-12-30 RX ADMIN — CLOPIDOGREL BISULFATE 75 MG: 75 TABLET ORAL at 08:04

## 2021-12-30 RX ADMIN — VANCOMYCIN HYDROCHLORIDE 750 MG: 750 INJECTION, SOLUTION INTRAVENOUS at 16:23

## 2021-12-30 NOTE — PROGRESS NOTES
[place]  Progress Note - Edna Fails 1950, 80 y o  male MRN: [id number]  Unit/Bed#: -[place] Encounter: [id number]  Primary Care Provider: Shoaib Chowdhury DO   Date and time admitted to hospital: 12/28/2021  4:54 AM    * Pneumonia due to COVID-19 virus  Assessment & Plan  Patient developed chills, fevers and dry cough about 3-4 days before admission  He is fully vaccinated with the [place] vaccine  Chest x-ray showed bilateral COVID pneumonia  Patient has also garments been increasing steadily  Currently he is on 15 L of oxygen with oxygen saturations in the high 80s  I see no evidence of volume overload    I encouraged patient to lay on his side or supine  Will start baricitinib  I increased dexamethasone to 0 1 mg kg b i d  Continue remdesivir  Continue Lovenox for DVT prevention  I consulted Pulmonary      I discussed the case with patient's wife on the phone in detail on December 30th    CHADWICK (acute kidney injury) [facility])  Assessment & Plan  Patient had acute kidney injury present on admission on stage III chronic kidney disease  His baseline creatinine is 1 2-1 5    Suspect this is due to COVID infection, syncope  Patient denies obstructive urinary complaints  Patient's creatinine is 1 72, CO2 is 18  I am starting patient on bicarb infusion at 75 cc an hour, will monitor renal function closely    Type 2 diabetes mellitus with stage 3a chronic kidney disease, without long-term current use of insulin [facility])  Assessment & Plan  Lab Results   Component Value Date    HGBA1C 6 4 (H) 12/28/2021       Recent Labs     12/29/21  1806 12/29/21  2209 12/30/21  0802 12/30/21  1129   POCGLU 135 152* 136 126       Blood Sugar Average: Last 72 hrs:  (P) 115 4     Patient has type 2 diabetes with stage III chronic disease    Blood sugars are controlled so far    Continue sliding scale insulin for now    Ulcerative rectosigmoiditis without complication [facility])  Assessment & Plan  Patient has ulcer of colitis  He denies any abdominal pain but had loose stools twice today  Will monitor for diarrhea  Denies signs of GI bleeding  Continue sulfasalazine        VTE Prophylaxis: in place    Patient Centered Rounds: I rounded with patient's nurse    Current Length of Stay: 2 day(s)    Current Patient Status: Inpatient    Certification Statement: Pt requires additional inpatient hospital stay due to: see assessment and plan        Subjective:   Patient's nurse reports that patient requires more more oxygen  When he transferred from the bed to the commode his oxygen saturation plummeted to 70% but then recovered when he was placed on mid flow oxygen  Patient has dry cough, denies chest pain, pleurisy, hemoptysis abdominal pain, difficulty urinating  Had 2 loose bowel movements today  He denies shortness breath as long his resting with the oxygen    All other ROS are negative    Objective:     Vitals:   Temp (24hrs), Av 6 °F (37 °C), Min:97 5 °F (36 4 °C), Max:99 3 °F (37 4 °C)    Temp:  [97 5 °F (36 4 °C)-99 3 °F (37 4 °C)] 97 5 °F (36 4 °C)  HR:  [70-92] 70  Resp:  [20] 20  BP: (121-156)/(79-80) 121/79  SpO2:  [70 %-96 %] 85 %  Body mass index is 29 43 kg/m²  Input and Output Summary (last 24 hours): Intake/Output Summary (Last 24 hours) at 2021 1303  Last data filed at 2021 1001  Gross per 24 hour   Intake 240 ml   Output 400 ml   Net -160 ml       Physical Exam:     Physical Exam  Constitutional:       General: He is not in acute distress  HENT:      Head: Normocephalic  Eyes:      Conjunctiva/sclera: Conjunctivae normal    Neck:      Comments: Patient has no JVD  Cardiovascular:      Rate and Rhythm: Normal rate and regular rhythm  Heart sounds: No murmur heard  Pulmonary:      Effort: No respiratory distress  Breath sounds: Rales (Inspiratory crackles are heard at bases) present  No wheezing     Musculoskeletal:      Cervical back: Neck supple  Skin:     General: Skin is warm and dry  Comments: Patient has no lower extremity edema, he has no cyanosis   Neurological:      Mental Status: He is alert and oriented to person, place, and time  Motor: No weakness ( moves all extremities on command, speech is normal)  I personally reviewed labs and imaging reports for today  Last 24 Hours Medication List:   Current Facility-Administered Medications   Medication Dose Route Frequency Provider Last Rate    acetaminophen  650 mg Oral Q6H PRN Radha Munson MD      allopurinol  200 mg Oral Daily Radha Munson MD      amLODIPine  10 mg Oral Daily Radha Munson MD      atorvastatin  40 mg Oral Daily With Marybeth Baumann MD      Baricitinib  2 mg Oral Q24H Radha Munson MD      clopidogrel  75 mg Oral Daily Radha Munson MD      dexamethasone  0 1 mg/kg Intravenous Q12H Radha Munson MD      enoxaparin  30 mg Subcutaneous Q12H Emelina Ybarra MD      insulin lispro  1-5 Units Subcutaneous TID AC Radha Munson MD      metoprolol tartrate  75 mg Oral Q12H [facility] Radha Munson MD      ondansetron  4 mg Intravenous Q6H PRN Radha Munson MD      remdesivir  100 mg Intravenous Q24H Radha Munson MD      sodium bicarbonate infusion  75 mL/hr Intravenous Continuous Radha Munson MD      sulfaSALAzine  500 mg Oral 4x Daily Radha Munson MD            Today, Patient Was Seen By: Radha Munson MD    ** Please Note: Dictation voice to text software may have been used in the creation of this document   **

## 2021-12-30 NOTE — PROGRESS NOTES
Vancomycin IV Pharmacy-to-Dose Consultation    Ninoska Noyola is a 80 y o  male who is currently receiving Vancomycin IV with management by the Pharmacy Consult service  Assessment/Plan:  The patient was reviewed  Renal function is stable and no signs or symptoms of nephrotoxicity and/or infusion reactions were documented in the chart  Based on todays assessment, continue current vancomycin (day # 2) dosing of 750mg IV Q12H, with a plan for trough to be drawn at 1500 on 12/31/21  We will continue to follow the patients culture results and clinical progress daily      Sue Maharaj, Pharmacist

## 2021-12-30 NOTE — ASSESSMENT & PLAN NOTE
Lab Results   Component Value Date    HGBA1C 6 4 (H) 12/28/2021       Recent Labs     12/29/21  1806 12/29/21  2209 12/30/21  0802 12/30/21  1129   POCGLU 135 152* 136 126       Blood Sugar Average: Last 72 hrs:  (P) 115 4     Patient has type 2 diabetes with stage III chronic disease    Blood sugars are controlled so far    Continue sliding scale insulin for now

## 2021-12-30 NOTE — ASSESSMENT & PLAN NOTE
Patient developed chills, fevers and dry cough about 3-4 days before admission  He is fully vaccinated with the [employer] vaccine  Chest x-ray showed bilateral COVID pneumonia  Patient has also garments been increasing steadily  Currently he is on 15 L of oxygen with oxygen saturations in the high 80s  I see no evidence of volume overload    I encouraged patient to lay on his side or supine  Will start baricitinib  I increased dexamethasone to 0 1 mg kg b i d    Continue remdesivir  Continue Lovenox for DVT prevention  I consulted Pulmonary      I discussed the case with patient's wife on the phone in detail on December 30th

## 2021-12-30 NOTE — ASSESSMENT & PLAN NOTE
Patient has ulcer of colitis  He denies any abdominal pain but had loose stools twice today  Will monitor for diarrhea  Denies signs of GI bleeding      Continue sulfasalazine

## 2021-12-30 NOTE — ASSESSMENT & PLAN NOTE
Patient had acute kidney injury present on admission on stage III chronic kidney disease  His baseline creatinine is 1 2-1 5    Suspect this is due to COVID infection, syncope  Patient denies obstructive urinary complaints      Patient's creatinine is 1 72, CO2 is 18  I am starting patient on bicarb infusion at 75 cc an hour, will monitor renal function closely

## 2021-12-30 NOTE — PLAN OF CARE
Problem: INFECTION - ADULT  Goal: Absence or prevention of progression during hospitalization  Description: INTERVENTIONS:  - Assess and monitor for signs and symptoms of infection  - Monitor lab/diagnostic results  - Monitor all insertion sites, i e  indwelling lines, tubes, and drains  - Monitor endotracheal if appropriate and nasal secretions for changes in amount and color  - [place] appropriate cooling/warming therapies per order  - Administer medications as ordered  - Instruct and encourage patient and family to use good hand hygiene technique  - Identify and instruct in appropriate isolation precautions for identified infection/condition  Outcome: Progressing  Goal: Absence of fever/infection during neutropenic period  Description: INTERVENTIONS:  - Monitor WBC    Outcome: Progressing     Problem: SAFETY ADULT  Goal: Patient will remain free of falls  Description: INTERVENTIONS:  - Educate patient/family on patient safety including physical limitations  - Instruct patient to call for assistance with activity   - Consult OT/PT to assist with strengthening/mobility   - Keep Call bell within reach  - Keep bed low and locked with side rails adjusted as appropriate  - Keep care items and personal belongings within reach  - Initiate and maintain comfort rounds  - Make Fall Risk Sign visible to staff  - Offer Toileting every 2 Hours, in advance of need  - Initiate/Maintain 2alarm  - Obtain necessary fall risk management equipment: 2  - Apply yellow socks and bracelet for high fall risk patients  - Consider moving patient to room near nurses station  Outcome: Progressing  Goal: Maintain or return to baseline ADL function  Description: INTERVENTIONS:  -  Assess patient's ability to carry out ADLs; assess patient's baseline for ADL function and identify physical deficits which impact ability to perform ADLs (bathing, care of mouth/teeth, toileting, grooming, dressing, etc )  - Assess/evaluate cause of self-care deficits   - Assess range of motion  - Assess patient's mobility; develop plan if impaired  - Assess patient's need for assistive devices and provide as appropriate  - Encourage maximum independence but intervene and supervise when necessary  - Involve family in performance of ADLs  - Assess for home care needs following discharge   - Consider OT consult to assist with ADL evaluation and planning for discharge  - Provide patient education as appropriate  Outcome: Progressing  Goal: Maintains/Returns to pre admission functional level  Description: INTERVENTIONS:  - Perform BMAT or MOVE assessment daily    - Set and communicate daily mobility goal to care team and patient/family/caregiver  - Collaborate with rehabilitation services on mobility goals if consulted  - Perform Range of Motion 2 times a day  - Reposition patient every 2 hours    - Dangle patient 2 times a day  - Stand patient 2 times a day  - Ambulate patient 2 times a day  - Out of bed to chair 2 times a day   - Out of bed for meals 2 times a day  - Out of bed for toileting  - Record patient progress and toleration of activity level   Outcome: Progressing     Problem: DISCHARGE PLANNING  Goal: Discharge to home or other facility with appropriate resources  Description: INTERVENTIONS:  - Identify barriers to discharge w/patient and caregiver  - Arrange for needed discharge resources and transportation as appropriate  - Identify discharge learning needs (meds, wound care, etc )  - Arrange for interpretive services to assist at discharge as needed  - Refer to Case Management Department for coordinating discharge planning if the patient needs post-hospital services based on physician/advanced practitioner order or complex needs related to functional status, cognitive ability, or social support system  Outcome: Progressing

## 2021-12-30 NOTE — CONSULTS
Consultation - Pulmonary Medicine   Ninoska Noyola 80 y o  male MRN: [id number]  Unit/Bed#: -01 Encounter: [id number]      Assessment/Plan:    1  Acute hypoxic respiratory failure secondary to COVID 19       -  currently on 15 liters mid flow- 91 percent, patient does not wear home O2       -  continue saturations greater than 89 percent       -  pulmonary toileting:  Deep breathing cough, OOB as tolerated, IS Q 1 hr       -  patient will likely need oxygen therapy upon discharge    2  COVID-19- 12/28/2021       -  Day # 1- 0 1 mg/kg Decadron b i d        -  Day # 1/14- baracitinib       -  Day # 2/5 remdesivir       -  CRP- 197 8-- 248 9-- 248 5       -  D-dimer- 0 74       -  procalcitonin- 0 27-- 1 65    Patient requiring a significant amount of oxygen with increasing procalcitonin, concern for superinfection, will obtain chest x-ray, [id number], will start cefepime/vancomycin    3  Possibly acute on chronic grade 1 diastolic CHF       -  Echo 1/2020-   EF 55 percent       -  will obtain proBNP       -  patient may have a component of overload       -  may need to update echo    4  CHADWICK       -  possibly secondary to acute infectious process vs volume overload       -  will continue to trend       -  recommend keeping patient on the drier side until proBNP is resulted        History of Present Illness   Physician Requesting Consult: Johnie Juarez MD  Reason for Consult / Principal Problem:  Acute hypoxic respiratory failure  Hx and PE limited by:  Nothing  Chief Complaint: "I am feeling very short of breath"  HPI: Ninoska Noyola is a 80 y o   male who presented to [address] with complaints of shortness of breath  Patient has past medical history of CVA, diverticulitis, gout, hyperlipidemia, and hypertension  Patient reports that prior to coming into the ED approximately 2 days ago he has been feeling weak with a cough and congestion    Patient reports that he went to the restroom felt lightheaded and passed out  Upon ED admission patient was noted to be hypoxic at 89 percent and required nasal cannula  Over the last 2 days his hypoxia has significantly worsened now requiring 15 liters mid flow  Patient is fully vaccinated  Patient initiated on COVID 19 protocol  Pulmonary did for acute hypoxic respiratory  Today upon exam patient did appear tachypnea in somewhat sebastián distress  Patient is now requiring 15 liters mid flow nasal cannula  Reports he is still having a frequent dry cough and feels very short of breath upon minimal exertion  Patient currently denies any fever, chills, hemoptysis, headaches, night sweats, pleuritic chest pain, or palpitations  From a pulmonary standpoint, 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  Patient did have PSG 2/2020 which was unremarkable for any BALTA  Patient has been a lifelong nonsmoker and has never been diagnosed with COPD or asthma  Patient is currently not maintained on any inhaled or oxygen therapies  Patient denies having any pets  Patient denies any symptoms of GERD, postnasal drip, seasonal allergies, or dysphagia  Patient denies any acute exposures to dust, mold, asbestos, or silica  Consults    Review of Systems   Constitutional: Negative for activity change and appetite change  HENT: Negative for congestion and facial swelling  Respiratory: Positive for cough and shortness of breath  Negative for apnea, choking, chest tightness, wheezing and stridor  Cardiovascular: Negative for chest pain, palpitations and leg swelling  Gastrointestinal: Negative for abdominal distention and abdominal pain  Genitourinary: Negative for difficulty urinating and dysuria  Musculoskeletal: Negative for arthralgias and back pain  Skin: Negative for color change and pallor  Neurological: Negative for dizziness and facial asymmetry  Psychiatric/Behavioral: Negative for agitation and behavioral problems  Historical Information   Past Medical History:   Diagnosis Date    CVA (cerebral vascular accident) ([address] )     Diverticulitis     Gout     Hypercholesterolemia     Hypertension     Renal disorder      Past Surgical History:   Procedure Laterality Date    CARDIAC LOOP RECORDER      COLONOSCOPY  09/18/2006    complete; 10 yrs    COLONOSCOPY  10/23/2017    complete; 5 yrs    COLONOSCOPY  05/06/2020    Severe active left-sided colitis, erythematous and ulcerated mucosa in the rectosigmoid, inflammatory polyp     Social History   Social History     Substance and Sexual Activity   Alcohol Use Not Currently     Social History     Substance and Sexual Activity   Drug Use Never     Social History     Tobacco Use   Smoking Status Never Smoker   Smokeless Tobacco Never Used     E-Cigarette/Vaping    E-Cigarette Use Never User      E-Cigarette/Vaping Substances    Nicotine No     THC No     CBD No     Flavoring No     Other No     Unknown No      Occupational History:  Noncontributory    Family History:   Family History   Problem Relation Age of Onset    Breast cancer Mother     Kidney disease Father     Lung cancer Father     Other Father         smoker    Hypertension Other     Colon polyps Neg Hx     Colon cancer Neg Hx        Meds/Allergies   pertinent pulmonary meds have been reviewed    No Known Allergies    Objective   Vitals: Blood pressure 121/79, pulse 70, temperature 97 5 °F (36 4 °C), resp  rate 20, weight 90 4 kg (199 lb 4 7 oz), SpO2 (!) 85 %  15L,Body mass index is 29 43 kg/m²      Intake/Output Summary (Last 24 hours) at 12/30/2021 1313  Last data filed at 12/30/2021 1001  Gross per 24 hour   Intake 240 ml   Output 400 ml   Net -160 ml     Invasive Devices  Report    Peripheral Intravenous Line            Peripheral IV 12/28/21 Left Antecubital 2 days    Peripheral IV 12/29/21 Right Antecubital <1 day                Physical Exam  Constitutional:       General: He is not in acute distress  Appearance: Normal appearance  He is normal weight  He is not ill-appearing  HENT:      Head: Normocephalic and atraumatic  Nose: Nose normal  No congestion or rhinorrhea  Mouth/Throat:      Mouth: Mucous membranes are dry  Pharynx: No oropharyngeal exudate or posterior oropharyngeal erythema  Cardiovascular:      Rate and Rhythm: Normal rate and regular rhythm  Pulses: Normal pulses  Heart sounds: Normal heart sounds  No murmur heard  No friction rub  No gallop  Pulmonary:      Effort: Pulmonary effort is normal  No tachypnea, bradypnea, accessory muscle usage or respiratory distress  Breath sounds: Decreased air movement present  No stridor or transmitted upper airway sounds  Decreased breath sounds present  No wheezing, rhonchi or rales  Comments: Significantly diminished aeration throughout lung fields  Chest:      Chest wall: No tenderness  Abdominal:      General: Abdomen is flat  Bowel sounds are normal  There is no distension  Palpations: Abdomen is soft  There is no mass  Musculoskeletal:         General: No swelling or tenderness  Normal range of motion  Cervical back: Normal range of motion and neck supple  No rigidity or tenderness  Skin:     General: Skin is warm and dry  Coloration: Skin is not jaundiced or pale  Neurological:      General: No focal deficit present  Mental Status: He is alert and oriented to person, place, and time  Mental status is at baseline     Psychiatric:         Mood and Affect: Mood normal          Behavior: Behavior normal          Lab Results:   I have personally reviewed pertinent lab results CBC:   Lab Results   Component Value Date    WBC 7 63 12/30/2021    HGB 13 4 12/30/2021    HCT 43 6 12/30/2021    MCV 77 (L) 12/30/2021     12/30/2021    MCH 23 6 (L) 12/30/2021    MCHC 30 7 (L) 12/30/2021    RDW 16 5 (H) 12/30/2021    MPV 10 4 12/30/2021    NRBC 0 12/30/2021   , CMP:   Lab Results   Component Value Date    SODIUM 132 (L) 12/30/2021    K 3 8 12/30/2021     12/30/2021    CO2 18 (L) 12/30/2021    BUN 35 (H) 12/30/2021    CREATININE 1 72 (H) 12/30/2021    CALCIUM 8 2 (L) 12/30/2021    AST 72 (H) 12/30/2021    ALT 35 12/30/2021    ALKPHOS 57 12/30/2021    EGFR 39 12/30/2021       Imaging Studies: I have personally reviewed pertinent films in PACS     Chest x-ray- 12/28/2021- moderate bibasilar ground-glass opacity    EKG, Pathology, and Other Studies: I have personally reviewed pertinent films in PACS     Echo- 3/44/8252- grade 1 diastolic dysfunction EF 55 percent    Pulmonary Results (PFTs, PSG): I have personally reviewed pertinent films in PACS     PSG-02/25/2020-BALTA not indicated    VTE Prophylaxis: Sequential compression device (Venodyne)     Code Status: Level 1 - Full Code    None    Portions of the record may have been created with voice recognition software  Occasional wrong word or "sound a like" substitutions may have occurred due to the inherent limitations of voice recognition software  Read the chart carefully and recognize, using context, where substitutions have occurred

## 2021-12-30 NOTE — CASE MANAGEMENT
Case Management Assessment & Discharge Planning Note    Patient name Chayo Hale  Location [facility] [address] [id number]/-[id number] MRN [id number]  : 1950 Date 2021       Current Admission Date: 2021  Current Admission Diagnosis:Pneumonia due to COVID-19 virus   Patient Active Problem List    Diagnosis Date Noted    Pneumonia due to COVID-19 virus 2021    Vasovagal syncope 2021    Elevated hemoglobin (HCC) 2021    Bilateral carotid artery stenosis 2020    [facility]onsust[facility] tachycardia) ([facility] ) 2020    Witnessed episode of apnea     CHADWICK (acute kidney injury) ([place] [address] ) 2020    Transient alteration of awareness 2020    History of stroke 2020    Erythrocytosis 2019    Ulcerative rectosigmoiditis without complication ([place] [address] )     Dyslipidemia 2018    Microalbuminuria 2016    Type 2 diabetes mellitus with stage 3a chronic kidney disease, without long-term current use of insulin ([place] [address] ) 2016    Obesity 10/24/2015    Colon, diverticulosis 2013    Elevated C-reactive protein 2013    [facility]  ) 05/15/2012    Elevated PSA 05/15/2012    Essential hypertriglyceridemia 05/15/2012    Gout 05/15/2012    Primary hypertension 05/15/2012      LOS (days): 2  Geometric Mean LOS (GMLOS) (days): 5 40  Days to GMLOS:3 3     OBJECTIVE:    Risk of Unplanned Readmission Score: 13     Current admission status: Inpatient    Preferred Pharmacy:   [facility] [address]  [place] [address]  Phone: [phone] Fax: [phone]    Primary Care Provider: Ann Marie Lee DO    Primary Insurance: MEDICARE  Secondary Insurance: [employer]    ASSESSMENT:  [address] [place], [place] [address]   Primary Phone: [phone] (Mobile)  Home Phone: [phone]               Patient Information  Admitted from[de-identified] Home  Mental Status: Alert  During Assessment patient was accompanied by: Not accompanied during assessment  Assessment information provided by[de-identified] Patient  Support Systems: Spouse/significant other,Children  Type of Current Residence: [place]  In the last 12 months, was there a time when you were not able to pay the mortgage or rent on time?: No  In the last 12 months, how many places have you lived?: 1  In the last 12 months, was there a time when you did not have a steady place to sleep or slept in a shelter (including now)?: No  Homeless/housing insecurity resource given?: N/A  Living Arrangements: Lives w/ Spouse/significant other  Is patient a ?: No    Activities of Daily Living Prior to Admission  Functional Status: Independent  Completes ADLs independently?: Yes  Ambulates independently?: Yes  Does patient use assisted devices?: No  Does patient currently own DME?: No  Does patient have a history of Outpatient Therapy (PT/OT)?: No  Does the patient have a history of Short-Term Rehab?: Yes (went to [facility] after stroke)  Does patient have a history of HHC?: Yes (does not recall facility)  Does patient currently have [facility]?: No    Patient Information Continued  Income Source: Pension/long term  Does patient receive dialysis treatments?: No  Does patient have a history of substance abuse?: No  Does patient have a history of Mental Health Diagnosis?: No    PHQ 2/9 Screening   Reviewed PHQ 2/9 Depression Screening Score?: No    Means of Transportation  Means of Transport to Appts[de-identified] Drives Self  In the past 12 months, has lack of transportation kept you from medical appointments or from getting medications?: No  In the past 12 months, has lack of transportation kept you from meetings, work, or from getting things needed for daily living?: No  Was application for public transport provided?: N/A    DISCHARGE DETAILS:    Discharge planning discussed with[de-identified] Patient    CM contacted family/caregiver?: No- see comments (n/a)    Contacts  Patient Contacts: Salemlin Schreiber: wife  Relationship to Patient[de-identified] Family  Contact Method: Phone  Phone Number: [phone]  Reason/Outcome: Emergency Contact,Discharge Planning    IMM Given (Date):: 12/30/21  IMM Given to[de-identified] Patient

## 2021-12-30 NOTE — PLAN OF CARE
Problem: PAIN - ADULT  Goal: Verbalizes/displays adequate comfort level or baseline comfort level  Description: Interventions:  - Encourage patient to monitor pain and request assistance  - Assess pain using appropriate pain scale  - Administer analgesics based on type and severity of pain and evaluate response  - Implement non-pharmacological measures as appropriate and evaluate response  - Consider cultural and social influences on pain and pain management  - Notify physician/advanced practitioner if interventions unsuccessful or patient reports new pain  Outcome: Progressing     Problem: INFECTION - ADULT  Goal: Absence or prevention of progression during hospitalization  Description: INTERVENTIONS:  - Assess and monitor for signs and symptoms of infection  - Monitor lab/diagnostic results  - Monitor all insertion sites, i e  indwelling lines, tubes, and drains  - Monitor endotracheal if appropriate and nasal secretions for changes in amount and color  - Bandana appropriate cooling/warming therapies per order  - Administer medications as ordered  - Instruct and encourage patient and family to use good hand hygiene technique  - Identify and instruct in appropriate isolation precautions for identified infection/condition  Outcome: Progressing  Goal: Absence of fever/infection during neutropenic period  Description: INTERVENTIONS:  - Monitor WBC    Outcome: Progressing     Problem: SAFETY ADULT  Goal: Patient will remain free of falls  Description: INTERVENTIONS:  - Educate patient/family on patient safety including physical limitations  - Instruct patient to call for assistance with activity   - Consult OT/PT to assist with strengthening/mobility   - Keep Call bell within reach  - Keep bed low and locked with side rails adjusted as appropriate  - Keep care items and personal belongings within reach  - Initiate and maintain comfort rounds  - Make Fall Risk Sign visible to staff  - Offer Toileting every 2 Hours, in advance of need  - Initiate/Maintain bed alarm  - Obtain necessary fall risk management equipment:  - Apply yellow socks and bracelet for high fall risk patients  - Consider moving patient to room near nurses station  Outcome: Progressing  Goal: Maintain or return to baseline ADL function  Description: INTERVENTIONS:  -  Assess patient's ability to carry out ADLs; assess patient's baseline for ADL function and identify physical deficits which impact ability to perform ADLs (bathing, care of mouth/teeth, toileting, grooming, dressing, etc )  - Assess/evaluate cause of self-care deficits   - Assess range of motion  - Assess patient's mobility; develop plan if impaired  - Assess patient's need for assistive devices and provide as appropriate  - Encourage maximum independence but intervene and supervise when necessary  - Involve family in performance of ADLs  - Assess for home care needs following discharge   - Consider OT consult to assist with ADL evaluation and planning for discharge  - Provide patient education as appropriate  Outcome: Progressing  Goal: Maintains/Returns to pre admission functional level  Description: INTERVENTIONS:  - Perform BMAT or MOVE assessment daily    - Set and communicate daily mobility goal to care team and patient/family/caregiver  - Collaborate with rehabilitation services on mobility goals if consulted  - Perform Range of Motion 2 times a day  - Reposition patient every 2 hours    - Dangle patient 2 times a day  - Stand patient 2 times a day  - Ambulate patient 2 times a day  - Out of bed to chair 2 times a day   - Out of bed for meals 2 times a day  - Out of bed for toileting  - Record patient progress and toleration of activity level   Outcome: Progressing     Problem: DISCHARGE PLANNING  Goal: Discharge to home or other facility with appropriate resources  Description: INTERVENTIONS:  - Identify barriers to discharge w/patient and caregiver  - Arrange for needed discharge resources and transportation as appropriate  - Identify discharge learning needs (meds, wound care, etc )  - Arrange for interpretive services to assist at discharge as needed  - Refer to Case Management Department for coordinating discharge planning if the patient needs post-hospital services based on physician/advanced practitioner order or complex needs related to functional status, cognitive ability, or social support system  Outcome: Progressing     Problem: Knowledge Deficit  Goal: Patient/family/caregiver demonstrates understanding of disease process, treatment plan, medications, and discharge instructions  Description: Complete learning assessment and assess knowledge base    Interventions:  - Provide teaching at level of understanding  - Provide teaching via preferred learning methods  Outcome: Progressing

## 2021-12-31 PROBLEM — J96.01 ACUTE RESPIRATORY FAILURE WITH HYPOXIA (HCC): Status: ACTIVE | Noted: 2021-12-31

## 2021-12-31 LAB
ALBUMIN SERPL BCP-MCNC: 2.3 G/DL (ref 3.5–5)
ALP SERPL-CCNC: 59 U/L (ref 46–116)
ALT SERPL W P-5'-P-CCNC: 46 U/L (ref 12–78)
ANION GAP SERPL CALCULATED.3IONS-SCNC: 9 MMOL/L (ref 4–13)
AST SERPL W P-5'-P-CCNC: 79 U/L (ref 5–45)
BACTERIA BLD CULT: ABNORMAL
BACTERIA UR QL AUTO: ABNORMAL /HPF
BASOPHILS # BLD AUTO: 0.01 THOUSANDS/ΜL (ref 0–0.1)
BASOPHILS NFR BLD AUTO: 0 % (ref 0–1)
BILIRUB SERPL-MCNC: 0.4 MG/DL (ref 0.2–1)
BILIRUB UR QL STRIP: NEGATIVE
BUN SERPL-MCNC: 45 MG/DL (ref 5–25)
CALCIUM ALBUM COR SERPL-MCNC: 9.5 MG/DL (ref 8.3–10.1)
CALCIUM SERPL-MCNC: 8.1 MG/DL (ref 8.3–10.1)
CHLORIDE SERPL-SCNC: 103 MMOL/L (ref 100–108)
CLARITY UR: CLEAR
CO2 SERPL-SCNC: 24 MMOL/L (ref 21–32)
COLOR UR: YELLOW
CREAT SERPL-MCNC: 1.84 MG/DL (ref 0.6–1.3)
CRP SERPL QL: 136.8 MG/L
EOSINOPHIL # BLD AUTO: 0 THOUSAND/ΜL (ref 0–0.61)
EOSINOPHIL NFR BLD AUTO: 0 % (ref 0–6)
ERYTHROCYTE [DISTWIDTH] IN BLOOD BY AUTOMATED COUNT: 16.4 % (ref 11.6–15.1)
GFR SERPL CREATININE-BSD FRML MDRD: 36 ML/MIN/1.73SQ M
GLUCOSE SERPL-MCNC: 131 MG/DL (ref 65–140)
GLUCOSE SERPL-MCNC: 177 MG/DL (ref 65–140)
GLUCOSE SERPL-MCNC: 180 MG/DL (ref 65–140)
GLUCOSE SERPL-MCNC: 183 MG/DL (ref 65–140)
GLUCOSE SERPL-MCNC: 186 MG/DL (ref 65–140)
GLUCOSE SERPL-MCNC: 198 MG/DL (ref 65–140)
GLUCOSE UR STRIP-MCNC: NEGATIVE MG/DL
GRAM STN SPEC: ABNORMAL
HCT VFR BLD AUTO: 45.1 % (ref 36.5–49.3)
HGB BLD-MCNC: 14.2 G/DL (ref 12–17)
HGB UR QL STRIP.AUTO: NEGATIVE
HYALINE CASTS #/AREA URNS LPF: ABNORMAL /LPF
IMM GRANULOCYTES # BLD AUTO: 0.02 THOUSAND/UL (ref 0–0.2)
IMM GRANULOCYTES NFR BLD AUTO: 0 % (ref 0–2)
KETONES UR STRIP-MCNC: NEGATIVE MG/DL
LEUKOCYTE ESTERASE UR QL STRIP: ABNORMAL
LYMPHOCYTES # BLD AUTO: 0.55 THOUSANDS/ΜL (ref 0.6–4.47)
LYMPHOCYTES NFR BLD AUTO: 11 % (ref 14–44)
MCH RBC QN AUTO: 23.9 PG (ref 26.8–34.3)
MCHC RBC AUTO-ENTMCNC: 31.5 G/DL (ref 31.4–37.4)
MCV RBC AUTO: 76 FL (ref 82–98)
MONOCYTES # BLD AUTO: 0.35 THOUSAND/ΜL (ref 0.17–1.22)
MONOCYTES NFR BLD AUTO: 7 % (ref 4–12)
MUCOUS THREADS UR QL AUTO: ABNORMAL
NEUTROPHILS # BLD AUTO: 4.04 THOUSANDS/ΜL (ref 1.85–7.62)
NEUTS SEG NFR BLD AUTO: 82 % (ref 43–75)
NITRITE UR QL STRIP: NEGATIVE
NON-SQ EPI CELLS URNS QL MICRO: ABNORMAL /HPF
NRBC BLD AUTO-RTO: 0 /100 WBCS
NT-PROBNP SERPL-MCNC: 481 PG/ML
PH UR STRIP.AUTO: 5.5 [PH]
PLATELET # BLD AUTO: 268 THOUSANDS/UL (ref 149–390)
PMV BLD AUTO: 10.4 FL (ref 8.9–12.7)
POTASSIUM SERPL-SCNC: 3.4 MMOL/L (ref 3.5–5.3)
PROCALCITONIN SERPL-MCNC: 0.94 NG/ML
PROT SERPL-MCNC: 7 G/DL (ref 6.4–8.2)
PROT UR STRIP-MCNC: NEGATIVE MG/DL
RBC # BLD AUTO: 5.93 MILLION/UL (ref 3.88–5.62)
RBC #/AREA URNS AUTO: ABNORMAL /HPF
SODIUM SERPL-SCNC: 136 MMOL/L (ref 136–145)
SP GR UR STRIP.AUTO: 1.01 (ref 1–1.03)
UROBILINOGEN UR QL STRIP.AUTO: 0.2 E.U./DL
VANCOMYCIN TROUGH SERPL-MCNC: 17.7 UG/ML (ref 10–20)
WBC # BLD AUTO: 4.97 THOUSAND/UL (ref 4.31–10.16)
WBC #/AREA URNS AUTO: ABNORMAL /HPF

## 2021-12-31 PROCEDURE — 82948 REAGENT STRIP/BLOOD GLUCOSE: CPT

## 2021-12-31 PROCEDURE — 99233 SBSQ HOSP IP/OBS HIGH 50: CPT | Performed by: INTERNAL MEDICINE

## 2021-12-31 PROCEDURE — 83880 ASSAY OF NATRIURETIC PEPTIDE: CPT | Performed by: PHYSICIAN ASSISTANT

## 2021-12-31 PROCEDURE — 94760 N-INVAS EAR/PLS OXIMETRY 1: CPT

## 2021-12-31 PROCEDURE — 84145 PROCALCITONIN (PCT): CPT | Performed by: INTERNAL MEDICINE

## 2021-12-31 PROCEDURE — 81001 URINALYSIS AUTO W/SCOPE: CPT | Performed by: INTERNAL MEDICINE

## 2021-12-31 PROCEDURE — 99232 SBSQ HOSP IP/OBS MODERATE 35: CPT | Performed by: INTERNAL MEDICINE

## 2021-12-31 PROCEDURE — 80053 COMPREHEN METABOLIC PANEL: CPT | Performed by: INTERNAL MEDICINE

## 2021-12-31 PROCEDURE — 85025 COMPLETE CBC W/AUTO DIFF WBC: CPT | Performed by: INTERNAL MEDICINE

## 2021-12-31 PROCEDURE — 99223 1ST HOSP IP/OBS HIGH 75: CPT | Performed by: PHYSICIAN ASSISTANT

## 2021-12-31 PROCEDURE — 80202 ASSAY OF VANCOMYCIN: CPT | Performed by: INTERNAL MEDICINE

## 2021-12-31 PROCEDURE — 86140 C-REACTIVE PROTEIN: CPT | Performed by: INTERNAL MEDICINE

## 2021-12-31 PROCEDURE — 94660 CPAP INITIATION&MGMT: CPT

## 2021-12-31 RX ORDER — POTASSIUM CHLORIDE 20 MEQ/1
20 TABLET, EXTENDED RELEASE ORAL ONCE
Status: COMPLETED | OUTPATIENT
Start: 2021-12-31 | End: 2021-12-31

## 2021-12-31 RX ORDER — ZINC SULFATE 50(220)MG
220 CAPSULE ORAL DAILY
Status: DISCONTINUED | OUTPATIENT
Start: 2021-12-31 | End: 2022-01-14 | Stop reason: HOSPADM

## 2021-12-31 RX ORDER — FUROSEMIDE 10 MG/ML
40 INJECTION INTRAMUSCULAR; INTRAVENOUS ONCE
Status: COMPLETED | OUTPATIENT
Start: 2021-12-31 | End: 2021-12-31

## 2021-12-31 RX ORDER — AMLODIPINE BESYLATE 5 MG/1
5 TABLET ORAL DAILY
Status: DISCONTINUED | OUTPATIENT
Start: 2022-01-01 | End: 2021-12-31

## 2021-12-31 RX ORDER — BUDESONIDE AND FORMOTEROL FUMARATE DIHYDRATE 160; 4.5 UG/1; UG/1
2 AEROSOL RESPIRATORY (INHALATION) 2 TIMES DAILY
Status: DISCONTINUED | OUTPATIENT
Start: 2021-12-31 | End: 2022-01-14 | Stop reason: HOSPADM

## 2021-12-31 RX ORDER — INSULIN GLARGINE 100 [IU]/ML
8 INJECTION, SOLUTION SUBCUTANEOUS EVERY MORNING
Status: DISCONTINUED | OUTPATIENT
Start: 2021-12-31 | End: 2022-01-01

## 2021-12-31 RX ORDER — ASCORBIC ACID 500 MG
1000 TABLET ORAL DAILY
Status: DISCONTINUED | OUTPATIENT
Start: 2021-12-31 | End: 2022-01-14 | Stop reason: HOSPADM

## 2021-12-31 RX ORDER — METOPROLOL TARTRATE 50 MG/1
50 TABLET, FILM COATED ORAL EVERY 12 HOURS SCHEDULED
Status: DISCONTINUED | OUTPATIENT
Start: 2021-12-31 | End: 2022-01-01

## 2021-12-31 RX ORDER — OMEGA-3S/DHA/EPA/FISH OIL/D3 300MG-1000
400 CAPSULE ORAL DAILY
Status: DISCONTINUED | OUTPATIENT
Start: 2021-12-31 | End: 2022-01-14 | Stop reason: HOSPADM

## 2021-12-31 RX ORDER — FUROSEMIDE 10 MG/ML
20 INJECTION INTRAMUSCULAR; INTRAVENOUS ONCE
Status: COMPLETED | OUTPATIENT
Start: 2021-12-31 | End: 2021-12-31

## 2021-12-31 RX ADMIN — B-COMPLEX W/ C & FOLIC ACID TAB 1 TABLET: TAB at 16:37

## 2021-12-31 RX ADMIN — SULFASALAZINE 500 MG: 500 TABLET ORAL at 20:23

## 2021-12-31 RX ADMIN — OXYCODONE HYDROCHLORIDE AND ACETAMINOPHEN 1000 MG: 500 TABLET ORAL at 16:37

## 2021-12-31 RX ADMIN — POTASSIUM CHLORIDE 20 MEQ: 1500 TABLET, EXTENDED RELEASE ORAL at 09:32

## 2021-12-31 RX ADMIN — DEXAMETHASONE SODIUM PHOSPHATE 9.04 MG: 4 INJECTION, SOLUTION INTRA-ARTICULAR; INTRALESIONAL; INTRAMUSCULAR; INTRAVENOUS; SOFT TISSUE at 01:42

## 2021-12-31 RX ADMIN — VANCOMYCIN HYDROCHLORIDE 750 MG: 750 INJECTION, SOLUTION INTRAVENOUS at 16:37

## 2021-12-31 RX ADMIN — INSULIN GLARGINE 8 UNITS: 100 INJECTION, SOLUTION SUBCUTANEOUS at 09:32

## 2021-12-31 RX ADMIN — ALLOPURINOL 200 MG: 100 TABLET ORAL at 08:00

## 2021-12-31 RX ADMIN — CEFEPIME HYDROCHLORIDE 2000 MG: 2 INJECTION, SOLUTION INTRAVENOUS at 01:42

## 2021-12-31 RX ADMIN — SULFASALAZINE 500 MG: 500 TABLET ORAL at 22:30

## 2021-12-31 RX ADMIN — FUROSEMIDE 20 MG: 10 INJECTION, SOLUTION INTRAMUSCULAR; INTRAVENOUS at 09:39

## 2021-12-31 RX ADMIN — ENOXAPARIN SODIUM 30 MG: 100 INJECTION SUBCUTANEOUS at 08:00

## 2021-12-31 RX ADMIN — FUROSEMIDE 40 MG: 10 INJECTION, SOLUTION INTRAMUSCULAR; INTRAVENOUS at 17:40

## 2021-12-31 RX ADMIN — CEFEPIME HYDROCHLORIDE 2000 MG: 2 INJECTION, SOLUTION INTRAVENOUS at 14:06

## 2021-12-31 RX ADMIN — METOPROLOL TARTRATE 50 MG: 50 TABLET, FILM COATED ORAL at 22:30

## 2021-12-31 RX ADMIN — ZINC SULFATE 220 MG (50 MG) CAPSULE 220 MG: CAPSULE at 16:37

## 2021-12-31 RX ADMIN — AMLODIPINE BESYLATE 10 MG: 5 TABLET ORAL at 08:00

## 2021-12-31 RX ADMIN — CLOPIDOGREL BISULFATE 75 MG: 75 TABLET ORAL at 08:00

## 2021-12-31 RX ADMIN — BARICITINIB 2 MG: 2 TABLET, FILM COATED ORAL at 12:18

## 2021-12-31 RX ADMIN — CHOLECALCIFEROL (VITAMIN D3) 10 MCG (400 UNIT) TABLET 400 UNITS: at 16:37

## 2021-12-31 RX ADMIN — DEXAMETHASONE SODIUM PHOSPHATE 9.04 MG: 4 INJECTION, SOLUTION INTRA-ARTICULAR; INTRALESIONAL; INTRAMUSCULAR; INTRAVENOUS; SOFT TISSUE at 12:18

## 2021-12-31 RX ADMIN — INSULIN LISPRO 1 UNITS: 100 INJECTION, SOLUTION INTRAVENOUS; SUBCUTANEOUS at 16:51

## 2021-12-31 RX ADMIN — VANCOMYCIN HYDROCHLORIDE 750 MG: 750 INJECTION, SOLUTION INTRAVENOUS at 04:21

## 2021-12-31 RX ADMIN — SULFASALAZINE 500 MG: 500 TABLET ORAL at 08:00

## 2021-12-31 RX ADMIN — REMDESIVIR 100 MG: 100 INJECTION, POWDER, LYOPHILIZED, FOR SOLUTION INTRAVENOUS at 16:38

## 2021-12-31 RX ADMIN — ATORVASTATIN CALCIUM 40 MG: 40 TABLET, FILM COATED ORAL at 16:37

## 2021-12-31 RX ADMIN — METOPROLOL TARTRATE 75 MG: 50 TABLET, FILM COATED ORAL at 08:00

## 2021-12-31 RX ADMIN — INSULIN LISPRO 1 UNITS: 100 INJECTION, SOLUTION INTRAVENOUS; SUBCUTANEOUS at 07:58

## 2021-12-31 RX ADMIN — ENOXAPARIN SODIUM 30 MG: 100 INJECTION SUBCUTANEOUS at 22:30

## 2021-12-31 RX ADMIN — BUDESONIDE AND FORMOTEROL FUMARATE DIHYDRATE 2 PUFF: 160; 4.5 AEROSOL RESPIRATORY (INHALATION) at 20:23

## 2021-12-31 RX ADMIN — SULFASALAZINE 500 MG: 500 TABLET ORAL at 14:04

## 2021-12-31 NOTE — PROGRESS NOTES
Progress Note - Pulmonary   Yogesh Shearing 80 y o  male MRN: [id number]  Unit/Bed#: -01 Encounter: [id number]  Code Status: Level 1 - Full Code        Assessment/Plan:     Cyndie Casas is a 80 y o  Past Medical History:   Diagnosis Date    [facility]ce[facility] )     Diverticulitis     Gout     Hypercholesterolemia     Hypertension     Renal disorder        COVID-19 Pneumonia  -Has Moderate Severity Range infection /  Currently on 15 L NC   Positive COVID 19 Date   Results from last 7 days   Lab Units 12/28/21  0540   SARS-COV-2  Positive*     Results from last 7 days   Lab Units 12/31/21  0425 12/30/21  0545 12/29/21  0634 12/28/21  1022   PROCALCITONIN ng/ml 0 94* 1 36* 1 65* 0 27*     Results from last 7 days   Lab Units 12/31/21  0425 12/30/21 2001 12/30/21  0545 12/29/21  0633 12/28/21  1112 12/28/21  1022   D-DIMER QUANTITATIVE ug/ml FEU  --  0 78*  --   --  0 74*  --    CRP mg/L 136 8*  --  248 5* 248 9*  --  197 8*     -Steroid:  Dexamethasone # 3 > has been increase to BID dosing on 12/30  -Remdesivir:  Day # 3   -Abx #: NA    -VTE PPX: Lovenox 30 mg Q 12   -Baricitnib: # 2  Acute Hypoxemic Respiratory Failure which is likely multifactorial d/t both COVID 19 and possible volume overload    -Currently on 15 L NC   -titrate up / down for 02 sat 90  -continue continuous oximetry monitoring via [employer] system  -continue proning therapy as indicated  -can supplement on MS floor w/ NRB but if increasing 02 requirements give consideration for HFNC therapy   -would consider additional trial of high dose lasix therapy either 40 or 60 mg iv   -will recheck NT Pro BNP  Abnormal Chest Imaging:  -small amount of B/L GGO c/w clinical COVID 19  -further follow up imaging as needed for changes in oxygen / clinical requirements  -will subsequently need imaging prior to D/C for trending purposes and f/u imaging in OP setting in approx 6-8 weeks pending clinical OP course  CHADWICK on CKD w/ Volume Overload:  - baseline Cr  1 2-1 5  - consideration for renal congestion w volume overload  - would consider repeating diuretic challenge with higher dose of lasix  Results from last 7 days   Lab Units 21  0425 21  0545 21  0633 21  0540   BUN mg/dL 45* 35* 24 20   CREATININE mg/dL 1 84* 1 72* 1 63* 1 80*       Estimated Creatinine Clearance: 40 7 mL/min (A) (by C-G formula based on SCr of 1 84 mg/dL (H))  Bacteremia:  -remains on Cefepime / Vanco # 3  -primary team managing   _________________________________________________    Subjective / 24 hour events:   No acute evens overnight  Pt fully vaccinated w/ Pfizer vaccine  Remains hypoxemic  Emphasized proning     Pertinent labs Reviewed  Pertinent imaging Reviewed  Collaborative Discussion: discussed w/ Pulm Attending, Dr Anabela Jiménez   Tele Events:     Vitals:   Vitals:    21 0535 21 0722 21 0746 21 0944   BP:   118/71    BP Location:       Pulse:   64    Resp:   (!) 32    Temp:   97 5 °F (36 4 °C)    TempSrc:       SpO2:  (!) 87% (!) 87% (!) 89%   Weight: 86 5 kg (190 lb 11 2 oz)        Weight (last 2 days)     Date/Time Weight    21 0535 86 5 (190 7)    21 0544 90 4 (199 3)        Temp (24hrs), Av 5 °F (36 4 °C), Min:97 4 °F (36 3 °C), Max:97 6 °F (36 4 °C)  Current: Temperature: 97 5 °F (36 4 °C)          IV Infusions:       Nutrition:        Diet Orders   (From admission, onward)             Start     Ordered    21 09  Diet Milad/CHO Controlled; Consistent Carbohydrate Diet Level 2 (5 carb servings/75 grams CHO/meal)  Diet effective now        References:    Nutrtion Support Algorithm Enteral vs  Parenteral   Question Answer Comment   Diet Type Milad/CHO Controlled    Milad/CHO Controlled Consistent Carbohydrate Diet Level 2 (5 carb servings/75 grams CHO/meal)    RD to adjust diet per protocol?  Yes        21                Ins/Outs:   I/O        0701  12/31 0700 12/31 0701  01/01 0700    P  O   590     IV Piggyback       Total Intake(mL/kg)  590 (6 8)     Urine (mL/kg/hr) 200 (0 1) 500 (0 2)     Total Output 200 500     Net -200 +90            Unmeasured Urine Occurrence   1 x          Lines/Drains:  Invasive Devices  Report    Peripheral Intravenous Line            Peripheral IV 12/28/21 Left Antecubital 3 days    Peripheral IV 12/29/21 Right Antecubital 1 day                 Active medications:  Scheduled Meds:  Current Facility-Administered Medications   Medication Dose Route Frequency Provider Last Rate    acetaminophen  650 mg Oral Q6H PRN Nicole Suarez, MD      allopurinol  200 mg Oral Daily Nicole Peak, MD      atorvastatin  40 mg Oral Daily With Janett Randolph, MD      Baricitinib  2 mg Oral Q24H Nicole Peak, MD      cefepime  2,000 mg Intravenous Q12H Colletta Keto, CRNP 2,000 mg (12/31/21 1406)    clopidogrel  75 mg Oral Daily Nicole Peak, MD      dexamethasone  0 1 mg/kg Intravenous Q12H Nicole Peak, MD      enoxaparin  30 mg Subcutaneous Q12H [address], MD      insulin glargine  8 Units Subcutaneous QAM Nicole Peak, MD      insulin lispro  1-5 Units Subcutaneous TID AC Nicole Peak, MD      metoprolol tartrate  50 mg Oral Q12H [address] Nicole Peak, MD      ondansetron  4 mg Intravenous Q6H PRN Nicole Peak, MD      remdesivir  100 mg Intravenous Q24H Nicole Peak, MD      sulfaSALAzine  500 mg Oral 4x Daily Nicole Peak, MD      vancomycin  750 mg Intravenous Q12H Colletta Keto, CRNP 750 mg (12/31/21 0421)     PRN Meds:acetaminophen, 650 mg, Q6H PRN  ondansetron, 4 mg, Q6H PRN      ____________________________________________________________________    Physical Exam  Constitutional:       General: He is not in acute distress  Appearance: He is well-developed  HENT:      Head: Normocephalic and atraumatic  Mouth/Throat:      Pharynx: No oropharyngeal exudate     Eyes:      Pupils: Pupils are equal, round, and reactive to light  Neck:      Thyroid: No thyromegaly  Vascular: No JVD  Trachea: No tracheal deviation  Cardiovascular:      Heart sounds: No murmur heard  No friction rub  No gallop  Pulmonary:      Effort: Pulmonary effort is normal  Tachypnea present  No respiratory distress  Breath sounds: No stridor  Examination of the right-lower field reveals rales  Examination of the left-lower field reveals rales  Rales present  No wheezing or rhonchi  Comments: Mild tachypnea    87% on 15 L supine  Chest:      Chest wall: No tenderness  Abdominal:      General: There is no distension  Tenderness: There is no abdominal tenderness  There is no guarding  Musculoskeletal:         General: Normal range of motion  Cervical back: Normal range of motion and neck supple  Skin:     General: Skin is warm and dry  Findings: No erythema or rash  Neurological:      Mental Status: He is alert and oriented to person, place, and time         ____________________________________________________________________    Invasive/non-invasive ventilation settings   Respiratory  Report   Lab Data (Last 4 hours)    None         O2/Vent Data (Last 4 hours)    None                Laboratory and Diagnostics:  Results from last 7 days   Lab Units 12/31/21 0425 12/30/21  0545 12/29/21  0633 12/28/21  0540   WBC Thousand/uL 4 97 7 63 7 45 6 71   HEMOGLOBIN g/dL 14 2 13 4 13 6 15 3   HEMATOCRIT % 45 1 43 6 45 2 51 6*   PLATELETS Thousands/uL 268 224 225 234   NEUTROS PCT % 82* 87* 86* 80*   MONOS PCT % 7 4 2* 4     Results from last 7 days   Lab Units 12/31/21  0425 12/30/21  0545 12/29/21  0633 12/28/21  0540   SODIUM mmol/L 136 132* 134* 138   POTASSIUM mmol/L 3 4* 3 8 4 0 3 4*   CHLORIDE mmol/L 103 102 106 103   CO2 mmol/L 24 18* 17* 24   ANION GAP mmol/L 9 12 11 11   BUN mg/dL 45* 35* 24 20   CREATININE mg/dL 1 84* 1 72* 1 63* 1 80*   CALCIUM mg/dL 8 1* 8 2* 8 6 9 1   GLUCOSE RANDOM mg/dL 183* 137 91 109   ALT U/L 46 35 22 26   AST U/L 79* 72* 58* 41   ALK PHOS U/L 59 57 57 72   ALBUMIN g/dL 2 3* 2 4* 2 7* 3 4*   TOTAL BILIRUBIN mg/dL 0 40 0 50 0 60 0 50          Results from last 7 days   Lab Units 12/28/21  0540   INR  0 94   PTT seconds 40*          Results from last 7 days   Lab Units 12/28/21  0540   LACTIC ACID mmol/L 1 3     ABG:    VBG:    Results from last 7 days   Lab Units 12/31/21  0425 12/30/21  0545 12/29/21  0634 12/28/21  1022   PROCALCITONIN ng/ml 0 94* 1 36* 1 65* 0 27*       Micro  Results from last 7 days   Lab Units 12/29/21  1539 12/28/21  3752   BLOOD CULTURE  No Growth at 24 hrs  Staphylococcus coagulase negative*  No Growth at 72 hrs  GRAM STAIN RESULT  Gram positive cocci in clusters* Gram positive cocci in clusters*       Imaging:   XR chest portable   Final Result by Petros Mars MD (12/30 7721)      Bilateral multifocal groundglass opacities are present  In the setting of COVID-19 infection, this is most in keeping with COVID 19 pneumonia  There has been interval worsening                  Workstation performed: [id number]         XR Trauma chest portable   ED Interpretation by Gladys Stanton MD (12/28 4087)   B/l lower lobe consolidations  No acute traumatic injury      Final Result by Sarah Minor MD (12/28 1040)      Moderate bibasilar groundglass opacity due to Covid 19  No acute displaced fracture  Workstation performed: [id number]         TRAUMA - CT spine cervical wo contrast   Final Result by Ni Campoverde DO (12/28 1262)      No calvarial fracture or acute intracranial abnormality is seen  Sinus disease as described, consider acute sinusitis in the appropriate clinical setting  CT CERVICAL SPINE - WITHOUT CONTRAST      INDICATION:   TRAUMA  Patient has confirmed COVID-19  COMPARISON:  None        TECHNIQUE:  CT examination of the cervical spine was performed without intravenous contrast  Contiguous axial images were obtained  Sagittal and coronal reconstructions were performed  Radiation dose length product (DLP) for this visit:  865 02 mGy-cm  (accession [id number]), 430 72 mGy-cm  (accession [id number])  This examination, like all CT scans performed in the [facility], was performed utilizing techniques to    minimize radiation dose exposure, including the use of iterative reconstruction and automated exposure control  IMAGE QUALITY:  Diagnostic  FINDINGS:      ALIGNMENT:  Normal alignment of the cervical spine  No subluxation  VERTEBRAL BODIES:  No acute fracture  DEGENERATIVE CHANGES:  Intervertebral disc space narrowing at C3/C4, C4/C5, C5/C6, and C6/C7 with anterior, marginal, and uncovertebral osteophytosis at these levels  PREVERTEBRAL AND PARASPINAL SOFT TISSUES:  Unremarkable  THORACIC INLET:  Patchy groundglass opacities in the left apex, correlates with the patient's history of Covid 19 infection         IMPRESSION:      Degenerative changes as described no evidence of acute cervical spine injury  Patchy groundglass opacities in the left apex, correlates with the patient's history of Covid 19 infection             I personally discussed this study with Helena Bassett on 12/28/2021 at 6:55 AM                          Workstation performed: [id number]         TRAUMA - CT head wo contrast   Final Result by López Rowe DO (12/28 5522)      No calvarial fracture or acute intracranial abnormality is seen  Sinus disease as described, consider acute sinusitis in the appropriate clinical setting  CT CERVICAL SPINE - WITHOUT CONTRAST      INDICATION:   TRAUMA  Patient has confirmed COVID-19  COMPARISON:  None  TECHNIQUE:  CT examination of the cervical spine was performed without intravenous contrast   Contiguous axial images were obtained  Sagittal and coronal reconstructions were performed  Radiation dose length product (DLP) for this visit:  865 02 mGy-cm  (accession [id number]), 430 72 mGy-cm  (accession [id number])  This examination, like all CT scans performed in the [place], was performed utilizing techniques to    minimize radiation dose exposure, including the use of iterative reconstruction and automated exposure control  IMAGE QUALITY:  Diagnostic  FINDINGS:      ALIGNMENT:  Normal alignment of the cervical spine  No subluxation  VERTEBRAL BODIES:  No acute fracture  DEGENERATIVE CHANGES:  Intervertebral disc space narrowing at C3/C4, C4/C5, C5/C6, and C6/C7 with anterior, marginal, and uncovertebral osteophytosis at these levels  PREVERTEBRAL AND PARASPINAL SOFT TISSUES:  Unremarkable  THORACIC INLET:  Patchy groundglass opacities in the left apex, correlates with the patient's history of Covid 19 infection         IMPRESSION:      Degenerative changes as described no evidence of acute cervical spine injury        Patchy groundglass opacities in the left apex, correlates with the patient's history of Covid 19 infection             I personally discussed this study with Nika Martines on 12/28/2021 at 6:55 AM                          Workstation performed: [id number]             Micro:   Lab Results   Component Value Date    BLOODCX No Growth at 24 hrs  12/29/2021    BLOODCX Staphylococcus coagulase negative (A) 12/28/2021    BLOODCX No Growth at 72 hrs  12/28/2021    URINECX >100,000 cfu/ml Escherichia coli (A) 01/28/2020            Invalid input(s): Karie Rain

## 2021-12-31 NOTE — ASSESSMENT & PLAN NOTE
Patient had acute kidney injury present on admission on stage III chronic kidney disease  His baseline creatinine is 1 2-1 5    Suspect this is due to COVID infection, syncope  Patient denies obstructive urinary complaints  Patient was acidotic yesterday and I started him on sodium bicarb infusion    Acidosis resolved today  Creatinine increased to 6 01  Systolic blood pressure this morning was 118    Will discontinue amlodipine  Reduce dose of Lopressor to 50 b i d   Give Lasix 20 mg IV x1  Continue urinary retention protocol  I requested Nephrology to see the patient

## 2021-12-31 NOTE — ASSESSMENT & PLAN NOTE
Patient developed acute hypoxic respiratory failure this morning with oxygen saturations in the 70s and respiratory as high as 32 patient is set up      The patient is resting oxygen saturation hovers around 90% on mid flow and face mask    Will continue mid flow at 15 liters/minute as well as face mask    Consider high-flow oxygen if he deteriorates at rest

## 2021-12-31 NOTE — PROGRESS NOTES
Vancomycin IV Pharmacy-to-Dose Consultation    Benjy Miles is a 80 y o  male who is currently receiving Vancomycin IV with management by the Pharmacy Consult service  Indication: pneumonia  Assessment/Plan:  The patient was reviewed  Renal function is stable and no signs or symptoms of nephrotoxicity and/or infusion reactions were documented in the chart  Based on todays assessment and noting vanco trough is therapeutic at 17 7 (goal 15-20) will continue current vancomycin (day # 3) dosing of 750 mg Q12H, with a plan for trough to be drawn at 1500 hrs on 1/4/22  We will continue to follow the patients culture results and clinical progress daily      Sarah Persaud, Pharmacist

## 2021-12-31 NOTE — PLAN OF CARE
Problem: PAIN - ADULT  Goal: Verbalizes/displays adequate comfort level or baseline comfort level  Description: Interventions:  - Encourage patient to monitor pain and request assistance  - Assess pain using appropriate pain scale  - Administer analgesics based on type and severity of pain and evaluate response  - Implement non-pharmacological measures as appropriate and evaluate response  - Consider cultural and social influences on pain and pain management  - Notify physician/advanced practitioner if interventions unsuccessful or patient reports new pain  Outcome: Progressing     Problem: INFECTION - ADULT  Goal: Absence or prevention of progression during hospitalization  Description: INTERVENTIONS:  - Assess and monitor for signs and symptoms of infection  - Monitor lab/diagnostic results  - Monitor all insertion sites, i e  indwelling lines, tubes, and drains  - Monitor endotracheal if appropriate and nasal secretions for changes in amount and color  - [place] appropriate cooling/warming therapies per order  - Administer medications as ordered  - Instruct and encourage patient and family to use good hand hygiene technique  - Identify and instruct in appropriate isolation precautions for identified infection/condition  Outcome: Progressing  Goal: Absence of fever/infection during neutropenic period  Description: INTERVENTIONS:  - Monitor WBC    Outcome: Progressing     Problem: DISCHARGE PLANNING  Goal: Discharge to home or other facility with appropriate resources  Description: INTERVENTIONS:  - Identify barriers to discharge w/patient and caregiver  - Arrange for needed discharge resources and transportation as appropriate  - Identify discharge learning needs (meds, wound care, etc )  - Arrange for interpretive services to assist at discharge as needed  - Refer to Case Management Department for coordinating discharge planning if the patient needs post-hospital services based on physician/advanced practitioner order or complex needs related to functional status, cognitive ability, or social support system  Outcome: Progressing     Problem: Knowledge Deficit  Goal: Patient/family/caregiver demonstrates understanding of disease process, treatment plan, medications, and discharge instructions  Description: Complete learning assessment and assess knowledge base    Interventions:  - Provide teaching at level of understanding  - Provide teaching via preferred learning methods  Outcome: Progressing

## 2021-12-31 NOTE — ASSESSMENT & PLAN NOTE
Lab Results   Component Value Date    HGBA1C 6 4 (H) 12/28/2021       Recent Labs     12/30/21  1129 12/30/21  1559 12/30/21  2146 12/31/21  0742   POCGLU 126 187* 188* 198*       Blood Sugar Average: Last 72 hrs:  (P) 220 [id number]     Patient has type 2 diabetes with stage III chronic disease  Patient has hyperglycemia due to steroids      Will start Lantus 8 units in the morning in addition to sliding scale insulin

## 2021-12-31 NOTE — CONSULTS
Consultation - Nephrology   Stan Hernandez 80 y o  male MRN: [id number]  Unit/Bed#: -01 Encounter: [id number]    ASSESSMENT/PLAN:   1  CHADWICK: likely due to covid-19  Admitted with creatinine of 1 8 which originally improved down to 1 6 with IVF but now has been trending back up to 1 84 today  · Today IVF was stopped and he was given lasix 20mg IV x 1  · Discussed case with pulmonology who feels patient would benefit from further diuresis  Given that creatinine has been worsening with IVF and respiratory status worsen would be ok to trial diuretics  Will give another dose of lasix 40mg IV x 1 and monitor response   · Continue to hold IVF   · Check PVR   · UA pending   · vanco level 17 7 today   · Strict I/O   · Daily weights   2  CKD stage 3: baseline creatinine around 1 2-1 5 recently when at steady state   3  Acute hypoxic respiratory failure due to COVID-19 pneumonia:  Acutely worsened this morning with oxygen saturations into the 70s and IV fluids were stopped  He was given Lasix 20 mg IV x1  · Chest x-ray yesterday:  Bilateral multifocal ground-glass opacities most likely due to COVID-19 pneumonia, worsening  4  Hypokalemia:  Likely in the setting of bicarb drip  Potassium 3 4 today  S/p k-dur 20meq po x 1 today   5  Metabolic acidosis:  Status post bicarb drip which was stopped this morning  Bicarb improved from 25 yesterday the 24 today        HISTORY OF PRESENT ILLNESS:  Requesting Physician: Radha Awad MD  Reason for Consult:  Acute kidney injury    Stan Hernandez is a 80y o  year old male who was admitted to [facility] on 12/28 with shortness of breath  For a few days prior to admission he had been feeling very weak with a cough and congestion  When he came to the emergency room he was found to be hypoxic at 89% requiring nasal cannula and he was diagnosed with COVID-19  He was admitted for hypoxic respiratory failure    Throughout the admission his creatinine has been trending up and today Nephrology was consulted  Patient is unsure if he has seen a nephrologist in the past   He currently thinks that his breathing is doing a little bit better  He denies any lower extremity edema, nausea, vomiting or diarrhea  His appetite is fairly poor but he has been eating and drinking somewhat  He denies any urinary complaints            PAST MEDICAL HISTORY:  Past Medical History:   Diagnosis Date    CVA (cerebral vascular accident) ([address] )     Diverticulitis     Gout     Hypercholesterolemia     Hypertension     Renal disorder        PAST SURGICAL HISTORY:  Past Surgical History:   Procedure Laterality Date    CARDIAC LOOP RECORDER      COLONOSCOPY  09/18/2006    complete; 10 yrs    COLONOSCOPY  10/23/2017    complete; 5 yrs    COLONOSCOPY  05/06/2020    Severe active left-sided colitis, erythematous and ulcerated mucosa in the rectosigmoid, inflammatory polyp       ALLERGIES:  No Known Allergies    SOCIAL HISTORY:  Social History     Substance and Sexual Activity   Alcohol Use Not Currently     Social History     Substance and Sexual Activity   Drug Use Never     Social History     Tobacco Use   Smoking Status Never Smoker   Smokeless Tobacco Never Used       FAMILY HISTORY:  Family History   Problem Relation Age of Onset    Breast cancer Mother     Kidney disease Father     Lung cancer Father     Other Father         smoker    Hypertension Other     Colon polyps Neg Hx     Colon cancer Neg Hx        MEDICATIONS:  Scheduled Meds:  Current Facility-Administered Medications   Medication Dose Route Frequency Provider Last Rate    acetaminophen  650 mg Oral Q6H PRN Lico Sapp MD      allopurinol  200 mg Oral Daily Lico Sapp MD      ascorbic acid  1,000 mg Oral Daily Vince Sheppard PA-C      atorvastatin  40 mg Oral Daily With Raul Fletcher MD      Baricitinib  2 mg Oral Q24H Lico Sapp MD      budesonide-formoterol  2 puff Inhalation BID Caitlin Jolley Devin Mazariegos PA-C      cefepime  2,000 mg Intravenous Q12H Chevy Martinez, CRNP 2,000 mg (12/31/21 1406)    cholecalciferol  400 Units Oral Daily Nuha Ríos PA-C      clopidogrel  75 mg Oral Daily Candie Luo MD      dexamethasone  0 1 mg/kg Intravenous Q12H Candie Luo MD      enoxaparin  30 mg Subcutaneous Q12H [facility] Candie Luo MD      insulin glargine  8 Units Subcutaneous QAM Candie Luo MD      insulin lispro  1-5 Units Subcutaneous TID AC Candie Luo MD      metoprolol tartrate  50 mg Oral Q12H [facility] Candie Luo MD      multivitamin stress formula  1 tablet Oral Daily Nuha Ríos PA-C      ondansetron  4 mg Intravenous Q6H PRN Candie Luo MD      remdesivir  100 mg Intravenous Q24H Candie Luo MD      sulfaSALAzine  500 mg Oral 4x Daily Candie Luo MD      vancomycin  750 mg Intravenous Q12H Chevy Martinez, CRNP 750 mg (12/31/21 0421)    zinc sulfate  220 mg Oral Daily Nuha Ríos PA-C         PRN Meds:   acetaminophen    ondansetron    Continuous Infusions:     REVIEW OF SYSTEMS:  A complete review of systems was done  Pertinent positives and negatives noted in the HPI but otherwise the review of systems is negative      PHYSICAL EXAM:  Current Weight: Weight - Scale: 86 5 kg (190 lb 11 2 oz)  First Weight: Weight - Scale: 90 4 kg (199 lb 4 7 oz)  Vitals:    12/31/21 1609   BP: 120/68   Pulse:    Resp: 20   Temp: (!) 97 3 °F (36 3 °C)   SpO2:        Intake/Output Summary (Last 24 hours) at 12/31/2021 1614  Last data filed at 12/31/2021 1430  Gross per 24 hour   Intake 350 ml   Output 450 ml   Net -100 ml     Exam done via window to limit exposure to covid-19  General:  No acute distress  Skin:  No rash  Eyes:  Sclerae anicteric  ENT:  Moist mucous membranes  Neck:  Trachea midline, symmetric  Chest:  No respiratory distress or accessory muscle use  CVS:  Regular rate   Abdomen:  Nontender, nondistended  Extremities:  No edema  Neuro:  Awake and alert  Psych: Appropriate affect    Lab Results:   Results from last 7 days   Lab Units 12/31/21  0425 12/30/21  0545 12/29/21  0633 12/28/21  0540   WBC Thousand/uL 4 97 7 63 7 45 6 71   HEMOGLOBIN g/dL 14 2 13 4 13 6 15 3   HEMATOCRIT % 45 1 43 6 45 2 51 6*   PLATELETS Thousands/uL 268 224 225 234   SODIUM mmol/L 136 132* 134* 138   POTASSIUM mmol/L 3 4* 3 8 4 0 3 4*   CHLORIDE mmol/L 103 102 106 103   CO2 mmol/L 24 18* 17* 24   BUN mg/dL 45* 35* 24 20   CREATININE mg/dL 1 84* 1 72* 1 63* 1 80*   CALCIUM mg/dL 8 1* 8 2* 8 6 9 1       Radiology Results:   XR chest portable   Final Result by Parviz Bauer MD (12/30 160)      Bilateral multifocal groundglass opacities are present  In the setting of COVID-19 infection, this is most in keeping with COVID 19 pneumonia  There has been interval worsening                  Workstation performed: [id number]         XR Trauma chest portable   ED Interpretation by Manolo Carroll MD (12/28 4914)   B/l lower lobe consolidations  No acute traumatic injury      Final Result by Vicente Kumar MD (12/28 0901)      Moderate bibasilar groundglass opacity due to Covid 19  No acute displaced fracture  Workstation performed: [id number]         TRAUMA - CT spine cervical wo contrast   Final Result by Citlaly Campos DO (12/28 6395)      No calvarial fracture or acute intracranial abnormality is seen  Sinus disease as described, consider acute sinusitis in the appropriate clinical setting  CT CERVICAL SPINE - WITHOUT CONTRAST      INDICATION:   TRAUMA  Patient has confirmed COVID-19  COMPARISON:  None  TECHNIQUE:  CT examination of the cervical spine was performed without intravenous contrast   Contiguous axial images were obtained  Sagittal and coronal reconstructions were performed  Radiation dose length product (DLP) for this visit:  865 02 mGy-cm  (accession [id number]), 430 72 mGy-cm  (accession [id number])    This examination, like all CT scans performed in the [facility], was performed utilizing techniques to    minimize radiation dose exposure, including the use of iterative reconstruction and automated exposure control  IMAGE QUALITY:  Diagnostic  FINDINGS:      ALIGNMENT:  Normal alignment of the cervical spine  No subluxation  VERTEBRAL BODIES:  No acute fracture  DEGENERATIVE CHANGES:  Intervertebral disc space narrowing at C3/C4, C4/C5, C5/C6, and C6/C7 with anterior, marginal, and uncovertebral osteophytosis at these levels  PREVERTEBRAL AND PARASPINAL SOFT TISSUES:  Unremarkable  THORACIC INLET:  Patchy groundglass opacities in the left apex, correlates with the patient's history of Covid 19 infection         IMPRESSION:      Degenerative changes as described no evidence of acute cervical spine injury  Patchy groundglass opacities in the left apex, correlates with the patient's history of Covid 19 infection             I personally discussed this study with Cornel Worrell on 12/28/2021 at 6:55 AM                          Workstation performed: [id number]         TRAUMA - CT head wo contrast   Final Result by Harriett Wasserman DO (12/28 7965)      No calvarial fracture or acute intracranial abnormality is seen  Sinus disease as described, consider acute sinusitis in the appropriate clinical setting  CT CERVICAL SPINE - WITHOUT CONTRAST      INDICATION:   TRAUMA  Patient has confirmed COVID-19  COMPARISON:  None  TECHNIQUE:  CT examination of the cervical spine was performed without intravenous contrast   Contiguous axial images were obtained  Sagittal and coronal reconstructions were performed  Radiation dose length product (DLP) for this visit:  865 02 mGy-cm  (accession [id number]), 430 72 mGy-cm  (accession [id number])    This examination, like all CT scans performed in the [facility], was performed utilizing techniques to minimize radiation dose exposure, including the use of iterative reconstruction and automated exposure control  IMAGE QUALITY:  Diagnostic  FINDINGS:      ALIGNMENT:  Normal alignment of the cervical spine  No subluxation  VERTEBRAL BODIES:  No acute fracture  DEGENERATIVE CHANGES:  Intervertebral disc space narrowing at C3/C4, C4/C5, C5/C6, and C6/C7 with anterior, marginal, and uncovertebral osteophytosis at these levels  PREVERTEBRAL AND PARASPINAL SOFT TISSUES:  Unremarkable  THORACIC INLET:  Patchy groundglass opacities in the left apex, correlates with the patient's history of Covid 19 infection         IMPRESSION:      Degenerative changes as described no evidence of acute cervical spine injury        Patchy groundglass opacities in the left apex, correlates with the patient's history of Covid 19 infection             I personally discussed this study with Sarah Starr on 12/28/2021 at 6:55 AM                          Workstation performed: [id number]

## 2021-12-31 NOTE — ASSESSMENT & PLAN NOTE
Patient developed chills, fevers and dry cough about 3-4 days before admission  He is fully vaccinated with the [place] vaccine  Chest x-ray showed bilateral COVID pneumonia  Will continue baricitinib, remdesivir, high-dose IV Decadron  Procalcitonin was 1 36, IV cefepime and vancomycin was added  Will give patient Lasix 20 mg IV x1  I stopped IV fluids  Clinically patient is not fluid overloaded      I discussed the case with patient's wife on the phone in detail on December 31st  Discussed the case with Pulmonary

## 2021-12-31 NOTE — PHYSICAL THERAPY NOTE
PT cancel     12/31/21 1130   PT Last Visit   PT Visit Date 12/31/21   Note Type   Note type Evaluation   Cancel Reasons Medical status   Additional Comments   (pt with 15L AND NRB for 90% at rest  WIll monitor)   Zion Hung, PT

## 2021-12-31 NOTE — PROGRESS NOTES
[place]  Progress Note - Malinda Enrique 1950, 80 y o  male MRN: [id number]  Unit/Bed#: -01 Encounter: [id number]  Primary Care Provider: Antonino Jamison DO   Date and time admitted to hospital: 12/28/2021  4:54 AM    * Pneumonia due to COVID-19 virus  Assessment & Plan  Patient developed chills, fevers and dry cough about 3-4 days before admission  He is fully vaccinated with the [place] vaccine  Chest x-ray showed bilateral COVID pneumonia  Will continue baricitinib, remdesivir, high-dose IV Decadron  Procalcitonin was 1 36, IV cefepime and vancomycin was added  Will give patient Lasix 20 mg IV x1  I stopped IV fluids  Clinically patient is not fluid overloaded  I discussed the case with patient's wife on the phone in detail on December 31st  Discussed the case with Pulmonary    Acute respiratory failure with hypoxia [facility])  Assessment & Plan  Patient developed acute hypoxic respiratory failure this morning with oxygen saturations in the 70s and respiratory as high as 32 patient is set up  The patient is resting oxygen saturation hovers around 90% on mid flow and face mask    Will continue mid flow at 15 liters/minute as well as face mask    Consider high-flow oxygen if he deteriorates at rest            CHADWICK (acute kidney injury) [facility])  Assessment & Plan  Patient had acute kidney injury present on admission on stage III chronic kidney disease  His baseline creatinine is 1 2-1 5    Suspect this is due to COVID infection, syncope  Patient denies obstructive urinary complaints  Patient was acidotic yesterday and I started him on sodium bicarb infusion    Acidosis resolved today  Creatinine increased to 5 62  Systolic blood pressure this morning was 118    Will discontinue amlodipine  Reduce dose of Lopressor to 50 b i d   Give Lasix 20 mg IV x1  Continue urinary retention protocol  I requested Nephrology to see the patient    Type 2 diabetes mellitus with stage 3a chronic kidney disease, without long-term current use of insulin [facility])  Assessment & Plan  Lab Results   Component Value Date    HGBA1C 6 4 (H) 2021       Recent Labs     21  1129 21  1559 21  2146 21  0742   POCGLU 126 187* 188* 198*       Blood Sugar Average: Last 72 hrs:  (P) 374 [id number]     Patient has type 2 diabetes with stage III chronic disease  Patient has hyperglycemia due to steroids  Will start Lantus 8 units in the morning in addition to sliding scale insulin        VTE Prophylaxis: in place    Patient Centered Rounds: I rounded with patient's nurse    Current Length of Stay: 3 day(s)    Current Patient Status: Inpatient    Certification Statement: Pt requires additional inpatient hospital stay due to: see assessment and plan        Subjective:   Patient reports shortness of breath with slight exertion suggest sitting up or turning in bed  He is not able to prone himself but I encouraged him to lay on his side  He had 2 loose bowel movements without any signs of bleeding  Denies chest pain has a dry cough  Denies difficulty urinating or incomplete bladder emptying    All other ROS are negative    Objective:     Vitals:   Temp (24hrs), Av 5 °F (36 4 °C), Min:97 4 °F (36 3 °C), Max:97 6 °F (36 4 °C)    Temp:  [97 4 °F (36 3 °C)-97 6 °F (36 4 °C)] 97 5 °F (36 4 °C)  HR:  [64-76] 64  Resp:  [18-32] 32  BP: (118-125)/(71-79) 118/71  SpO2:  [70 %-90 %] 87 %  Body mass index is 28 16 kg/m²  Input and Output Summary (last 24 hours): Intake/Output Summary (Last 24 hours) at 2021 1118  Last data filed at 2021 2149  Gross per 24 hour   Intake 590 ml   Output 500 ml   Net 90 ml       Physical Exam:     Physical Exam  Eyes:      Conjunctiva/sclera: Conjunctivae normal    Neck:      Comments: Patient has no JVD  Cardiovascular:      Rate and Rhythm: Normal rate and regular rhythm  Heart sounds: No murmur heard        Pulmonary: Effort: Respiratory distress (After sitting up patient conversational dyspnea and became cyanotic) present  Breath sounds: Rales ( inspiratory crackles are heard posteriorly) present  No wheezing  Abdominal:      General: Bowel sounds are normal       Palpations: Abdomen is soft  Tenderness: There is no abdominal tenderness  Musculoskeletal:      Cervical back: Neck supple  Skin:     General: Skin is warm and dry  Comments: Patient no lower extremity edema, no ulcers, no sinuses of the toes  Neurological:      Mental Status: He is alert and oriented to person, place, and time  Motor: No weakness ( patient moves all extremities on command)  I personally reviewed labs and imaging reports for today        Last 24 Hours Medication List:   Current Facility-Administered Medications   Medication Dose Route Frequency Provider Last Rate    acetaminophen  650 mg Oral Q6H PRN Lolita Wilson MD      allopurinol  200 mg Oral Daily Lolita Wilson MD      atorvastatin  40 mg Oral Daily With Tony Hewitt MD      Baricitinib  2 mg Oral Q24H Lolita Wilson MD      cefepime  2,000 mg Intravenous Q12H ROSALINA Barrera 2,000 mg (12/31/21 0142)    clopidogrel  75 mg Oral Daily Lolita Wilson MD      dexamethasone  0 1 mg/kg Intravenous Q12H Lolita Wilson MD      enoxaparin  30 mg Subcutaneous Q12H Beau Esposito MD      furosemide  20 mg Intravenous Once Lolita Wilson MD      insulin glargine  8 Units Subcutaneous QAM Lolita Wilson MD      insulin lispro  1-5 Units Subcutaneous TID Venkat Tate MD      metoprolol tartrate  50 mg Oral Q12H Beau Esposito MD      ondansetron  4 mg Intravenous Q6H PRN Lolita Wilson MD      potassium chloride  20 mEq Oral Once Lolita Wilson MD      remdesivir  100 mg Intravenous Q24H Lolita Wilson MD      sulfaSALAzine  500 mg Oral 4x Daily Lolita Wilson MD      vancomycin  750 mg Intravenous Q12H ROSALINA Barrera 750 mg (12/31/21 0421)          Today, Patient Was Seen By: Hellen Marques MD    ** Please Note: Dictation voice to text software may have been used in the creation of this document   **

## 2021-12-31 NOTE — OCCUPATIONAL THERAPY NOTE
Occupational Therapy Cx Note     Patient Name: Geovani Alexis  Today's Date: 12/31/2021  Problem List  Principal Problem:    Pneumonia due to COVID-19 virus  Active Problems:    Chronic kidney disease, stage 3 ([facility] )    Primary hypertension    Type 2 diabetes mellitus with stage 3a chronic kidney disease, without long-term current use of insulin ([facility] )    Ulcerative rectosigmoiditis without complication (HCC)    CHADWICK (acute kidney injury) ([facility] )    NSVT (nonsustained ventricular tachycardia) ([place] [address] )    Vasovagal syncope    Acute respiratory failure with hypoxia ([place] [address] )            12/31/21 1200   OT Last Visit   OT Visit Date 12/31/21   Note Type   Note type Cancelled Session   Additional Comments OT orders received  Chart reviewed  Pt currently on 15L  Per nursing, pt noted to significantly desaturate with minimal transfers  Will hold and follow at later time/date           Ronak Mckeon, OTR/L

## 2021-12-31 NOTE — PLAN OF CARE
Problem: PAIN - ADULT  Goal: Verbalizes/displays adequate comfort level or baseline comfort level  Description: Interventions:  - Encourage patient to monitor pain and request assistance  - Assess pain using appropriate pain scale  - Administer analgesics based on type and severity of pain and evaluate response  - Implement non-pharmacological measures as appropriate and evaluate response  - Consider cultural and social influences on pain and pain management  - Notify physician/advanced practitioner if interventions unsuccessful or patient reports new pain  12/31/2021 1115 by Carmela Hung RN  Outcome: Progressing  12/31/2021 1114 by Carmela Hung RN  Outcome: Progressing     Problem: INFECTION - ADULT  Goal: Absence or prevention of progression during hospitalization  Description: INTERVENTIONS:  - Assess and monitor for signs and symptoms of infection  - Monitor lab/diagnostic results  - Monitor all insertion sites, i e  indwelling lines, tubes, and drains  - Monitor endotracheal if appropriate and nasal secretions for changes in amount and color  - [place] appropriate cooling/warming therapies per order  - Administer medications as ordered  - Instruct and encourage patient and family to use good hand hygiene technique  - Identify and instruct in appropriate isolation precautions for identified infection/condition  12/31/2021 1115 by Carmela Hung RN  Outcome: Progressing  12/31/2021 1114 by Carmela Hung RN  Outcome: Progressing  Goal: Absence of fever/infection during neutropenic period  Description: INTERVENTIONS:  - Monitor WBC    12/31/2021 1115 by Carmela Hung RN  Outcome: Progressing  12/31/2021 1114 by Carmela Hung RN  Outcome: Progressing     Problem: SAFETY ADULT  Goal: Patient will remain free of falls  Description: INTERVENTIONS:  - Educate patient/family on patient safety including physical limitations  - Instruct patient to call for assistance with activity   - Consult OT/PT to assist with strengthening/mobility   - Keep Call bell within reach  - Keep bed low and locked with side rails adjusted as appropriate  - Keep care items and personal belongings within reach  - Initiate and maintain comfort rounds  - Make Fall Risk Sign visible to staff  - Offer Toileting every 2 Hours, in advance of need  - Initiate/Maintain bed alarm  - Obtain necessary fall risk management equipment:   - Apply yellow socks and bracelet for high fall risk patients  - Consider moving patient to room near nurses station  12/31/2021 1115 by Thomas Ames RN  Outcome: Progressing  12/31/2021 1114 by Thomas Ames RN  Outcome: Progressing  Goal: Maintain or return to baseline ADL function  Description: INTERVENTIONS:  -  Assess patient's ability to carry out ADLs; assess patient's baseline for ADL function and identify physical deficits which impact ability to perform ADLs (bathing, care of mouth/teeth, toileting, grooming, dressing, etc )  - Assess/evaluate cause of self-care deficits   - Assess range of motion  - Assess patient's mobility; develop plan if impaired  - Assess patient's need for assistive devices and provide as appropriate  - Encourage maximum independence but intervene and supervise when necessary  - Involve family in performance of ADLs  - Assess for home care needs following discharge   - Consider OT consult to assist with ADL evaluation and planning for discharge  - Provide patient education as appropriate  12/31/2021 1115 by Thomas Ames RN  Outcome: Progressing  12/31/2021 1114 by Thomas Ames RN  Outcome: Progressing  Goal: Maintains/Returns to pre admission functional level  Description: INTERVENTIONS:  - Perform BMAT or MOVE assessment daily    - Set and communicate daily mobility goal to care team and patient/family/caregiver  - Collaborate with rehabilitation services on mobility goals if consulted  - Perform Range of Motion 2 times a day    - Reposition patient every 2 hours  - Dangle patient 2 times a day  - Stand patient 2 times a day  - Ambulate patient 2 times a day  - Out of bed to chair 2 times a day   - Out of bed for meals 2 times a day  - Out of bed for toileting  - Record patient progress and toleration of activity level   12/31/2021 1115 by Melissa Cohn RN  Outcome: Progressing  12/31/2021 1114 by Melissa Cohn RN  Outcome: Progressing     Problem: DISCHARGE PLANNING  Goal: Discharge to home or other facility with appropriate resources  Description: INTERVENTIONS:  - Identify barriers to discharge w/patient and caregiver  - Arrange for needed discharge resources and transportation as appropriate  - Identify discharge learning needs (meds, wound care, etc )  - Arrange for interpretive services to assist at discharge as needed  - Refer to Case Management Department for coordinating discharge planning if the patient needs post-hospital services based on physician/advanced practitioner order or complex needs related to functional status, cognitive ability, or social support system  12/31/2021 1115 by Melissa Cohn RN  Outcome: Progressing  12/31/2021 1114 by Melissa Cohn RN  Outcome: Progressing     Problem: Knowledge Deficit  Goal: Patient/family/caregiver demonstrates understanding of disease process, treatment plan, medications, and discharge instructions  Description: Complete learning assessment and assess knowledge base    Interventions:  - Provide teaching at level of understanding  - Provide teaching via preferred learning methods  12/31/2021 1115 by Melissa Cohn RN  Outcome: Progressing  12/31/2021 1114 by Melissa Cohn RN  Outcome: Progressing     Problem: Potential for Falls  Goal: Patient will remain free of falls  Description: INTERVENTIONS:  - Educate patient/family on patient safety including physical limitations  - Instruct patient to call for assistance with activity   - Consult OT/PT to assist with strengthening/mobility   - Keep Call bell within reach  - Keep bed low and locked with side rails adjusted as appropriate  - Keep care items and personal belongings within reach  - Initiate and maintain comfort rounds  - Make Fall Risk Sign visible to staff  - Offer Toileting every 2 Hours, in advance of need  - Initiate/Maintain bed alarm  - Obtain necessary fall risk management equipment:   - Apply yellow socks and bracelet for high fall risk patients  - Consider moving patient to room near nurses station  12/31/2021 1115 by Thomas Ames RN  Outcome: Progressing  12/31/2021 1114 by Thomas Ames RN  Outcome: Progressing     Problem: MOBILITY - ADULT  Goal: Maintain or return to baseline ADL function  Description: INTERVENTIONS:  -  Assess patient's ability to carry out ADLs; assess patient's baseline for ADL function and identify physical deficits which impact ability to perform ADLs (bathing, care of mouth/teeth, toileting, grooming, dressing, etc )  - Assess/evaluate cause of self-care deficits   - Assess range of motion  - Assess patient's mobility; develop plan if impaired  - Assess patient's need for assistive devices and provide as appropriate  - Encourage maximum independence but intervene and supervise when necessary  - Involve family in performance of ADLs  - Assess for home care needs following discharge   - Consider OT consult to assist with ADL evaluation and planning for discharge  - Provide patient education as appropriate  12/31/2021 1115 by Thomas Ames RN  Outcome: Progressing  12/31/2021 1114 by Thomas Ames RN  Outcome: Progressing  Goal: Maintains/Returns to pre admission functional level  Description: INTERVENTIONS:  - Perform BMAT or MOVE assessment daily    - Set and communicate daily mobility goal to care team and patient/family/caregiver  - Collaborate with rehabilitation services on mobility goals if consulted  - Perform Range of Motion 2 times a day    - Reposition patient every 2 hours    - Dangle patient 2 times a day  - Stand patient 2 times a day  - Ambulate patient 2 times a day  - Out of bed to chair 2 times a day   - Out of bed for meals 2 times a day  - Out of bed for toileting  - Record patient progress and toleration of activity level   12/31/2021 1115 by Efrem Pryor RN  Outcome: Progressing  12/31/2021 1114 by Efrem Pryor RN  Outcome: Progressing

## 2022-01-01 LAB
ALBUMIN SERPL BCP-MCNC: 2.3 G/DL (ref 3.5–5)
ALP SERPL-CCNC: 57 U/L (ref 46–116)
ALT SERPL W P-5'-P-CCNC: 53 U/L (ref 12–78)
ANION GAP SERPL CALCULATED.3IONS-SCNC: 9 MMOL/L (ref 4–13)
AST SERPL W P-5'-P-CCNC: 79 U/L (ref 5–45)
BASOPHILS # BLD AUTO: 0 THOUSANDS/ΜL (ref 0–0.1)
BASOPHILS NFR BLD AUTO: 0 % (ref 0–1)
BILIRUB SERPL-MCNC: 0.4 MG/DL (ref 0.2–1)
BUN SERPL-MCNC: 45 MG/DL (ref 5–25)
CALCIUM ALBUM COR SERPL-MCNC: 9.3 MG/DL (ref 8.3–10.1)
CALCIUM SERPL-MCNC: 7.9 MG/DL (ref 8.3–10.1)
CHLORIDE SERPL-SCNC: 106 MMOL/L (ref 100–108)
CO2 SERPL-SCNC: 25 MMOL/L (ref 21–32)
CREAT SERPL-MCNC: 1.7 MG/DL (ref 0.6–1.3)
CRP SERPL QL: 59 MG/L
EOSINOPHIL # BLD AUTO: 0 THOUSAND/ΜL (ref 0–0.61)
EOSINOPHIL NFR BLD AUTO: 0 % (ref 0–6)
ERYTHROCYTE [DISTWIDTH] IN BLOOD BY AUTOMATED COUNT: 16 % (ref 11.6–15.1)
GFR SERPL CREATININE-BSD FRML MDRD: 39 ML/MIN/1.73SQ M
GLUCOSE SERPL-MCNC: 156 MG/DL (ref 65–140)
GLUCOSE SERPL-MCNC: 159 MG/DL (ref 65–140)
GLUCOSE SERPL-MCNC: 201 MG/DL (ref 65–140)
GLUCOSE SERPL-MCNC: 207 MG/DL (ref 65–140)
GLUCOSE SERPL-MCNC: 230 MG/DL (ref 65–140)
HCT VFR BLD AUTO: 43.9 % (ref 36.5–49.3)
HGB BLD-MCNC: 13.7 G/DL (ref 12–17)
IMM GRANULOCYTES # BLD AUTO: 0.02 THOUSAND/UL (ref 0–0.2)
IMM GRANULOCYTES NFR BLD AUTO: 0 % (ref 0–2)
LYMPHOCYTES # BLD AUTO: 0.41 THOUSANDS/ΜL (ref 0.6–4.47)
LYMPHOCYTES NFR BLD AUTO: 7 % (ref 14–44)
MCH RBC QN AUTO: 23.8 PG (ref 26.8–34.3)
MCHC RBC AUTO-ENTMCNC: 31.2 G/DL (ref 31.4–37.4)
MCV RBC AUTO: 76 FL (ref 82–98)
MONOCYTES # BLD AUTO: 0.44 THOUSAND/ΜL (ref 0.17–1.22)
MONOCYTES NFR BLD AUTO: 8 % (ref 4–12)
MRSA NOSE QL CULT: NORMAL
NEUTROPHILS # BLD AUTO: 4.95 THOUSANDS/ΜL (ref 1.85–7.62)
NEUTS SEG NFR BLD AUTO: 85 % (ref 43–75)
NRBC BLD AUTO-RTO: 0 /100 WBCS
PLATELET # BLD AUTO: 292 THOUSANDS/UL (ref 149–390)
PMV BLD AUTO: 10.8 FL (ref 8.9–12.7)
POTASSIUM SERPL-SCNC: 3.4 MMOL/L (ref 3.5–5.3)
PROCALCITONIN SERPL-MCNC: 0.5 NG/ML
PROT SERPL-MCNC: 6.4 G/DL (ref 6.4–8.2)
RBC # BLD AUTO: 5.76 MILLION/UL (ref 3.88–5.62)
SODIUM SERPL-SCNC: 140 MMOL/L (ref 136–145)
WBC # BLD AUTO: 5.82 THOUSAND/UL (ref 4.31–10.16)

## 2022-01-01 PROCEDURE — 94760 N-INVAS EAR/PLS OXIMETRY 1: CPT

## 2022-01-01 PROCEDURE — 99232 SBSQ HOSP IP/OBS MODERATE 35: CPT | Performed by: INTERNAL MEDICINE

## 2022-01-01 PROCEDURE — 83550 IRON BINDING TEST: CPT | Performed by: INTERNAL MEDICINE

## 2022-01-01 PROCEDURE — 94660 CPAP INITIATION&MGMT: CPT

## 2022-01-01 PROCEDURE — 82948 REAGENT STRIP/BLOOD GLUCOSE: CPT

## 2022-01-01 PROCEDURE — 99232 SBSQ HOSP IP/OBS MODERATE 35: CPT | Performed by: PHYSICIAN ASSISTANT

## 2022-01-01 PROCEDURE — 84145 PROCALCITONIN (PCT): CPT | Performed by: INTERNAL MEDICINE

## 2022-01-01 PROCEDURE — 87040 BLOOD CULTURE FOR BACTERIA: CPT | Performed by: INTERNAL MEDICINE

## 2022-01-01 PROCEDURE — 80053 COMPREHEN METABOLIC PANEL: CPT | Performed by: INTERNAL MEDICINE

## 2022-01-01 PROCEDURE — 85025 COMPLETE CBC W/AUTO DIFF WBC: CPT | Performed by: INTERNAL MEDICINE

## 2022-01-01 PROCEDURE — 83540 ASSAY OF IRON: CPT | Performed by: INTERNAL MEDICINE

## 2022-01-01 PROCEDURE — 86140 C-REACTIVE PROTEIN: CPT | Performed by: INTERNAL MEDICINE

## 2022-01-01 PROCEDURE — 82728 ASSAY OF FERRITIN: CPT | Performed by: INTERNAL MEDICINE

## 2022-01-01 RX ORDER — INSULIN GLARGINE 100 [IU]/ML
10 INJECTION, SOLUTION SUBCUTANEOUS EVERY MORNING
Status: DISCONTINUED | OUTPATIENT
Start: 2022-01-02 | End: 2022-01-02

## 2022-01-01 RX ORDER — FUROSEMIDE 10 MG/ML
40 INJECTION INTRAMUSCULAR; INTRAVENOUS ONCE
Status: COMPLETED | OUTPATIENT
Start: 2022-01-01 | End: 2022-01-01

## 2022-01-01 RX ORDER — POTASSIUM CHLORIDE 20 MEQ/1
40 TABLET, EXTENDED RELEASE ORAL ONCE
Status: COMPLETED | OUTPATIENT
Start: 2022-01-01 | End: 2022-01-01

## 2022-01-01 RX ADMIN — INSULIN LISPRO 1 UNITS: 100 INJECTION, SOLUTION INTRAVENOUS; SUBCUTANEOUS at 08:36

## 2022-01-01 RX ADMIN — POTASSIUM CHLORIDE 40 MEQ: 1500 TABLET, EXTENDED RELEASE ORAL at 17:00

## 2022-01-01 RX ADMIN — ALLOPURINOL 200 MG: 100 TABLET ORAL at 08:37

## 2022-01-01 RX ADMIN — DEXAMETHASONE SODIUM PHOSPHATE 9.04 MG: 4 INJECTION, SOLUTION INTRA-ARTICULAR; INTRALESIONAL; INTRAMUSCULAR; INTRAVENOUS; SOFT TISSUE at 12:00

## 2022-01-01 RX ADMIN — ENOXAPARIN SODIUM 30 MG: 100 INJECTION SUBCUTANEOUS at 08:37

## 2022-01-01 RX ADMIN — VANCOMYCIN HYDROCHLORIDE 750 MG: 750 INJECTION, SOLUTION INTRAVENOUS at 16:18

## 2022-01-01 RX ADMIN — VANCOMYCIN HYDROCHLORIDE 750 MG: 750 INJECTION, SOLUTION INTRAVENOUS at 03:03

## 2022-01-01 RX ADMIN — SULFASALAZINE 500 MG: 500 TABLET ORAL at 17:42

## 2022-01-01 RX ADMIN — OXYCODONE HYDROCHLORIDE AND ACETAMINOPHEN 1000 MG: 500 TABLET ORAL at 08:37

## 2022-01-01 RX ADMIN — REMDESIVIR 100 MG: 100 INJECTION, POWDER, LYOPHILIZED, FOR SOLUTION INTRAVENOUS at 16:00

## 2022-01-01 RX ADMIN — B-COMPLEX W/ C & FOLIC ACID TAB 1 TABLET: TAB at 08:37

## 2022-01-01 RX ADMIN — BARICITINIB 2 MG: 2 TABLET, FILM COATED ORAL at 12:05

## 2022-01-01 RX ADMIN — DEXAMETHASONE SODIUM PHOSPHATE 9.04 MG: 4 INJECTION, SOLUTION INTRA-ARTICULAR; INTRALESIONAL; INTRAMUSCULAR; INTRAVENOUS; SOFT TISSUE at 01:54

## 2022-01-01 RX ADMIN — INSULIN LISPRO 1 UNITS: 100 INJECTION, SOLUTION INTRAVENOUS; SUBCUTANEOUS at 11:46

## 2022-01-01 RX ADMIN — FUROSEMIDE 40 MG: 10 INJECTION, SOLUTION INTRAMUSCULAR; INTRAVENOUS at 17:00

## 2022-01-01 RX ADMIN — CEFEPIME HYDROCHLORIDE 2000 MG: 2 INJECTION, SOLUTION INTRAVENOUS at 01:54

## 2022-01-01 RX ADMIN — CHOLECALCIFEROL (VITAMIN D3) 10 MCG (400 UNIT) TABLET 400 UNITS: at 08:37

## 2022-01-01 RX ADMIN — CLOPIDOGREL BISULFATE 75 MG: 75 TABLET ORAL at 08:37

## 2022-01-01 RX ADMIN — ZINC SULFATE 220 MG (50 MG) CAPSULE 220 MG: CAPSULE at 08:37

## 2022-01-01 RX ADMIN — INSULIN GLARGINE 8 UNITS: 100 INJECTION, SOLUTION SUBCUTANEOUS at 09:11

## 2022-01-01 RX ADMIN — SULFASALAZINE 500 MG: 500 TABLET ORAL at 12:04

## 2022-01-01 RX ADMIN — ENOXAPARIN SODIUM 30 MG: 100 INJECTION SUBCUTANEOUS at 20:57

## 2022-01-01 RX ADMIN — BUDESONIDE AND FORMOTEROL FUMARATE DIHYDRATE 2 PUFF: 160; 4.5 AEROSOL RESPIRATORY (INHALATION) at 17:42

## 2022-01-01 RX ADMIN — SULFASALAZINE 500 MG: 500 TABLET ORAL at 09:11

## 2022-01-01 RX ADMIN — SULFASALAZINE 500 MG: 500 TABLET ORAL at 21:00

## 2022-01-01 RX ADMIN — ATORVASTATIN CALCIUM 40 MG: 40 TABLET, FILM COATED ORAL at 16:18

## 2022-01-01 RX ADMIN — CEFEPIME HYDROCHLORIDE 2000 MG: 2 INJECTION, SOLUTION INTRAVENOUS at 15:13

## 2022-01-01 RX ADMIN — BUDESONIDE AND FORMOTEROL FUMARATE DIHYDRATE 2 PUFF: 160; 4.5 AEROSOL RESPIRATORY (INHALATION) at 08:36

## 2022-01-01 RX ADMIN — INSULIN LISPRO 1 UNITS: 100 INJECTION, SOLUTION INTRAVENOUS; SUBCUTANEOUS at 17:46

## 2022-01-01 NOTE — PROGRESS NOTES
Vancomycin IV Pharmacy-to-Dose Consultation    Andrea Sanders is a 80 y o  male who is currently receiving Vancomycin IV with management by the Pharmacy Consult service  Assessment/Plan:  The patient was reviewed  Renal function is stable and no signs or symptoms of nephrotoxicity and/or infusion reactions were documented in the chart  Based on todays assessment, continue current vancomycin (day # 4) dosing of 750 mg Q12H, with a plan for trough to be drawn at 15:00 on 01/04/22  We will continue to follow the patients culture results and clinical progress daily      Nate Fernandes, Pharmacist

## 2022-01-01 NOTE — NURSING NOTE
Pt slept during less than half of hrly rounds overnight; denied pain/dyspnea; c/o fatigue  Using urinal for urine qs; OOB to [facility] for BM this am; bettina well

## 2022-01-01 NOTE — PROGRESS NOTES
NEPHROLOGY PROGRESS NOTE   Power Mathews 80 y o  male MRN: [id number]  Unit/Bed#: -01 Encounter: [id number]  Reason for Consult: ayala    ASSESSMENT/PLAN:  1  Acute Kidney Injury, POA- likely secondary to ATN from COVID cytokine release  - admission creatinine: 1 8  - current creatinine: 1 7  - initially received IVF without significant improvement  - received a dose of IV lasix yesterday  - will redose lasix again today and monitor response  - hold IVF  - UA: small leuks, moderate bacteria, no protein, no blood  - PVR: pending  - Urine output: 1 1L yesterday (12/31)  - avoid hypotension, avoid nephrotoxins, avoid IV contrast  2  Chronic Kidney Disease stage 3- Baseline creatinine 1 2-1 5  3  Hypokalemia- replete  4  Metabolic Acidosis- resolved  5  COVID Pneumonia- per primary team and pulmonology team    Disposition:  Give a dose of IV lasix today  Monitor renal function and urine output  Replete K     SUBJECTIVE:  Patient feeling well and feels breathing is slightly better than yesterday  States he had good urine output  Eating okay  Currently with facemask on  OBJECTIVE:  Current Weight: Weight - Scale: 87 4 kg (192 lb 10 9 oz)  Vitals:    01/01/22 0200 01/01/22 0540 01/01/22 0828 01/01/22 0836   BP:    100/62   Pulse: 58   71   Resp:       Temp:    97 5 °F (36 4 °C)   TempSrc:       SpO2: 91%  (!) 88% 90%   Weight:  87 4 kg (192 lb 10 9 oz)         Intake/Output Summary (Last 24 hours) at 1/1/2022 1558  Last data filed at 1/1/2022 0405  Gross per 24 hour   Intake 320 ml   Output 975 ml   Net -655 ml     Due to COVID 19 infection, I did not enter the patient's room but I examined him from the window and spoke with him over the walkie talkie    General: NAD  Skin: no rash  Eyes: anicteric  Respiratory: not labored but does not appear comfortable either  Cardiac: regular rate on monitor  Extremities: no edema  GI: not distended  Neuro: alert awake  Psych: mood and affect appropriate    Medications:    Current Facility-Administered Medications:     acetaminophen (TYLENOL) tablet 650 mg, 650 mg, Oral, Q6H PRN, Lico Sapp MD    allopurinol (ZYLOPRIM) tablet 200 mg, 200 mg, Oral, Daily, Lico Spap MD, 200 mg at 01/01/22 7629    ascorbic acid (VITAMIN C) tablet 1,000 mg, 1,000 mg, Oral, Daily, Vince Sheppard PA-C, 1,000 mg at 01/01/22 1758    atorvastatin (LIPITOR) tablet 40 mg, 40 mg, Oral, Daily With Jack Pollack MD, 40 mg at 12/31/21 1637    Baricitinib (OLUMIANT) (COVID EUA) tablet 2 mg, 2 mg, Oral, Q24H, Lico Sapp MD, 2 mg at 01/01/22 1205    budesonide-formoterol (SYMBICORT) 160-4 5 mcg/act inhaler 2 puff, 2 puff, Inhalation, BID, Vince Sheppard PA-C, 2 puff at 01/01/22 0836    cefepime (MAXIPIME) IVPB (premix in dextrose) 2,000 mg 50 mL, 2,000 mg, Intravenous, Q12H, ROSALINA Alves, Last Rate: 100 mL/hr at 01/01/22 1513, 2,000 mg at 01/01/22 1513    cholecalciferol (VITAMIN D3) tablet 400 Units, 400 Units, Oral, Daily, Vince Sheppard PA-C, 400 Units at 01/01/22 0837    clopidogrel (PLAVIX) tablet 75 mg, 75 mg, Oral, Daily, Lico Sapp MD, 75 mg at 01/01/22 0837    dexamethasone (DECADRON) injection 9 04 mg, 0 1 mg/kg, Intravenous, Q12H, Lico Sapp MD, 9 04 mg at 01/01/22 1200    enoxaparin (LOVENOX) subcutaneous injection 30 mg, 30 mg, Subcutaneous, Q12H [address], Lico Sapp MD, 30 mg at 01/01/22 0837    furosemide (LASIX) injection 40 mg, 40 mg, Intravenous, Once, [facility], PAEdith    [START ON 1/2/2022] insulin glargine (LANTUS) subcutaneous injection 10 Units 0 1 mL, 10 Units, Subcutaneous, QAMLico MD    insulin lispro (HumaLOG) 100 units/mL subcutaneous injection 1-5 Units, 1-5 Units, Subcutaneous, TID AC, 1 Units at 01/01/22 1146 **AND** Fingerstick Glucose (POCT), , , TID AC, Lico Sapp MD    multivitamin stress formula tablet 1 tablet, 1 tablet, Oral, Daily, Vince Sheppard PA-C, 1 tablet at 01/01/22 0972   ondansetron (ZOFRAN) injection 4 mg, 4 mg, Intravenous, Q6H PRN, Lico Sapp MD    [COMPLETED] remdesivir E[place]) 200 mg in sodium chloride 0 9 % 290 mL IVPB, 200 mg, Intravenous, Q24H, 200 mg at 12/29/21 1716 **FOLLOWED BY** remdesivir (Veklury) 100 mg in sodium chloride 0 9 % 270 mL IVPB, 100 mg, Intravenous, Q24H, Lico Sapp MD, 100 mg at 12/31/21 1638    sulfaSALAzine (AZULFIDINE) tablet 500 mg, 500 mg, Oral, 4x Daily, Lico Sapp MD, 500 mg at 01/01/22 1204    vancomycin (VANCOCIN) IVPB (premix in dextrose) 750 mg 150 mL, 750 mg, Intravenous, Q12H, ROSALINA Alves, Stopped at 01/01/22 0405    zinc sulfate (ZINCATE) capsule 220 mg, 220 mg, Oral, Daily, Vince Sheppard PA-C, 220 mg at 01/01/22 8492    Laboratory Results:  Results from last 7 days   Lab Units 01/01/22  0453 12/31/21  0425 12/30/21  0545 12/29/21  0633 12/28/21  0540   WBC Thousand/uL 5 82 4 97 7 63 7 45 6 71   HEMOGLOBIN g/dL 13 7 14 2 13 4 13 6 15 3   HEMATOCRIT % 43 9 45 1 43 6 45 2 51 6*   PLATELETS Thousands/uL 292 268 224 225 234   POTASSIUM mmol/L 3 4* 3 4* 3 8 4 0 3 4*   CHLORIDE mmol/L 106 103 102 106 103   CO2 mmol/L 25 24 18* 17* 24   BUN mg/dL 45* 45* 35* 24 20   CREATININE mg/dL 1 70* 1 84* 1 72* 1 63* 1 80*   CALCIUM mg/dL 7 9* 8 1* 8 2* 8 6 9 1     I have personally reviewed the blood work as stated above and in my note  I have personally reviewed last renal note and SLIM note

## 2022-01-01 NOTE — ASSESSMENT & PLAN NOTE
Patient had acute kidney injury present on admission on stage III chronic kidney disease  His baseline creatinine is 1 2-1 5    Suspect this is due to COVID infection, syncope  Patient denies obstructive urinary complaints      Patient's renal function is stable today with creatinine 1 7  Will continue to monitor renal function

## 2022-01-01 NOTE — PROGRESS NOTES
[place]  Progress Note - Evelyn Collado 1950, 80 y o  male MRN: [id number]  Unit/Bed#: -01 Encounter: [id number]  Primary Care Provider: Terrell Flores DO   Date and time admitted to hospital: 12/28/2021  4:54 AM    * Pneumonia due to COVID-19 virus  Assessment & Plan  Patient developed chills, fevers and dry cough about 3-4 days before admission  He is fully vaccinated with the [place] vaccine  Chest x-ray showed bilateral COVID pneumonia  Will continue baricitinib, remdesivir, high-dose IV Decadron  Continue cefepime and vancomycin     Blood culture from December 29th grows Gram-positive cocci in clusters  Recheck blood cultures    I discussed the case with patient's wife on the phone in detail on January 1st    Acute respiratory failure with hypoxia [facility])  Assessment & Plan  Patient developed acute hypoxic respiratory failure this morning with oxygen saturations in the 70s and respiratory as high as 32 patient is set up  The patient's resting oxygen saturation is around 90% on mid flow oxygen at 15 liters/minute and face mask as needed              [place] (acute kidney injury) [facility])  Assessment & Plan  Patient had acute kidney injury present on admission on stage III chronic kidney disease  His baseline creatinine is 1 2-1 5    Suspect this is due to COVID infection, syncope  Patient denies obstructive urinary complaints  Patient's renal function is stable today with creatinine 1 7  Will continue to monitor renal function    Primary hypertension  Assessment & Plan  Patient has chronic hypertension    Systolic blood pressure was 100 this morning    I discontinued amlodipine and metoprolol to avoid further hypotension and worsening renal function    Type 2 diabetes mellitus with stage 3a chronic kidney disease, without long-term current use of insulin [facility])  Assessment & Plan  Lab Results   Component Value Date    HGBA1C 6 4 (H) 12/28/2021       Recent Labs 21  1615 21  1648 21  2203 22  0831   POCGLU 177* 180* 186* 201*       Blood Sugar Average: Last 72 hrs:  (P) 590 [id number]     Patient has type 2 diabetes with stage III chronic disease  Patient has hyperglycemia due to steroids  Will increase Lantus to 10 units in the morning and continue sliding scale insulin        VTE Prophylaxis: in place    Patient Centered Rounds: I rounded with patient's nurse    Current Length of Stay: 4 day(s)    Current Patient Status: Inpatient    Certification Statement: Pt requires additional inpatient hospital stay due to: see assessment and plan        Subjective:   Patient feels stronger but he still requires oxygen via nasal cannula at 15 liters/minute  Has scant cough, denies shortness of breath  Denies chest pain, palpitations, pleurisy abdominal pain  Had 2 loose bowel movements without bleeding yesterday  Denies any difficulty urinating    All other ROS are negative    Objective:     Vitals:   Temp (24hrs), Av 5 °F (36 4 °C), Min:97 3 °F (36 3 °C), Max:97 7 °F (36 5 °C)    Temp:  [97 3 °F (36 3 °C)-97 7 °F (36 5 °C)] 97 5 °F (36 4 °C)  HR:  [58-76] 71  Resp:  [20] 20  BP: (100-124)/(62-72) 100/62  SpO2:  [88 %-93 %] 90 %  Body mass index is 28 45 kg/m²  Input and Output Summary (last 24 hours): Intake/Output Summary (Last 24 hours) at 2022 0921  Last data filed at 2022 0405  Gross per 24 hour   Intake 320 ml   Output 1125 ml   Net -805 ml       Physical Exam:     Physical Exam  HENT:      Head: Normocephalic  Eyes:      Conjunctiva/sclera: Conjunctivae normal    Neck:      Comments: Patient had no JVD  Cardiovascular:      Rate and Rhythm: Normal rate and regular rhythm  Heart sounds: No murmur heard  Pulmonary:      Effort: No respiratory distress  Breath sounds: Rales (Inspiratory crackles are heard posteriorly) present  No wheezing     Abdominal:      General: Bowel sounds are normal  Tenderness: There is no abdominal tenderness  Musculoskeletal:      Cervical back: Neck supple  Skin:     General: Skin is warm and dry  Comments: Patient has no lower extremity edema   Neurological:      Mental Status: He is alert and oriented to person, place, and time  Mental status is at baseline  I personally reviewed labs and imaging reports for today  Last 24 Hours Medication List:   Current Facility-Administered Medications   Medication Dose Route Frequency Provider Last Rate    acetaminophen  650 mg Oral Q6H PRN Rickey Johnson MD      allopurinol  200 mg Oral Daily Rickey Johnson MD      ascorbic acid  1,000 mg Oral Daily Rachael Ornelas PA-C      atorvastatin  40 mg Oral Daily With [facility], MD      Baricitinib  2 mg Oral Q24H Rickey Johnson MD      budesonide-formoterol  2 puff Inhalation BID Rachael Ornelas PA-C      cefepime  2,000 mg Intravenous Q12H ROSALINA Martinez Stopped (01/01/22 9747)    cholecalciferol  400 Units Oral Daily Rachael Ornelas PA-C      clopidogrel  75 mg Oral Daily Rickey Johnson MD      dexamethasone  0 1 mg/kg Intravenous Q12H Rickey Johnson MD      enoxaparin  30 mg Subcutaneous Q12H MD Jo John ON 1/2/2022] insulin glargine  10 Units Subcutaneous QAM Rickey Johnson MD      insulin lispro  1-5 Units Subcutaneous TID AC Rickey Johnson MD      multivitamin stress formula  1 tablet Oral Daily Rachael Ornelas PA-C      ondansetron  4 mg Intravenous Q6H PRN Rickey Johnson MD      remdesivir  100 mg Intravenous Q24H Rickey Johnson MD      sulfaSALAzine  500 mg Oral 4x Daily Rickey Johnson MD      vancomycin  750 mg Intravenous Q12H ROSALINA Martinez Stopped (01/01/22 8400)    zinc sulfate  220 mg Oral Daily Rachael Ornelas PA-C            Today, Patient Was Seen By: Rickey Johnson MD    ** Please Note: Dictation voice to text software may have been used in the creation of this document   **

## 2022-01-01 NOTE — ASSESSMENT & PLAN NOTE
Patient developed chills, fevers and dry cough about 3-4 days before admission  He is fully vaccinated with the [place] vaccine  Chest x-ray showed bilateral COVID pneumonia  Will continue baricitinib, remdesivir, high-dose IV Decadron  Continue cefepime and vancomycin     Blood culture from December 29th grows Gram-positive cocci in clusters    Recheck blood cultures    I discussed the case with patient's wife on the phone in detail on January 1st

## 2022-01-01 NOTE — ASSESSMENT & PLAN NOTE
Lab Results   Component Value Date    HGBA1C 6 4 (H) 12/28/2021       Recent Labs     12/31/21  1615 12/31/21  1648 12/31/21  2203 01/01/22  0831   POCGLU 177* 180* 186* 201*       Blood Sugar Average: Last 72 hrs:  (P) 223 [id number]     Patient has type 2 diabetes with stage III chronic disease  Patient has hyperglycemia due to steroids      Will increase Lantus to 10 units in the morning and continue sliding scale insulin

## 2022-01-01 NOTE — PLAN OF CARE
Problem: PAIN - ADULT  Goal: Verbalizes/displays adequate comfort level or baseline comfort level  Description: Interventions:  - Encourage patient to monitor pain and request assistance  - Assess pain using appropriate pain scale  - Administer analgesics based on type and severity of pain and evaluate response  - Implement non-pharmacological measures as appropriate and evaluate response  - Consider cultural and social influences on pain and pain management  - Notify physician/advanced practitioner if interventions unsuccessful or patient reports new pain  Outcome: Progressing     Problem: INFECTION - ADULT  Goal: Absence or prevention of progression during hospitalization  Description: INTERVENTIONS:  - Assess and monitor for signs and symptoms of infection  - Monitor lab/diagnostic results  - Monitor all insertion sites, i e  indwelling lines, tubes, and drains  - Monitor endotracheal if appropriate and nasal secretions for changes in amount and color  - [place] appropriate cooling/warming therapies per order  - Administer medications as ordered  - Instruct and encourage patient and family to use good hand hygiene technique  - Identify and instruct in appropriate isolation precautions for identified infection/condition  Outcome: Progressing  Goal: Absence of fever/infection during neutropenic period  Description: INTERVENTIONS:  - Monitor WBC    Outcome: Progressing     Problem: SAFETY ADULT  Goal: Patient will remain free of falls  Description: INTERVENTIONS:  - Educate patient/family on patient safety including physical limitations  - Instruct patient to call for assistance with activity   - Consult OT/PT to assist with strengthening/mobility   - Keep Call bell within reach  - Keep bed low and locked with side rails adjusted as appropriate  - Keep care items and personal belongings within reach  - Initiate and maintain comfort rounds  - Make Fall Risk Sign visible to staff  - Offer Toileting every 2 Hours, in advance of need  - Initiate/Maintain bed alarm  - Obtain necessary fall risk management equipment:   - Apply yellow socks and bracelet for high fall risk patients  - Consider moving patient to room near nurses station  Outcome: Progressing  Goal: Maintain or return to baseline ADL function  Description: INTERVENTIONS:  -  Assess patient's ability to carry out ADLs; assess patient's baseline for ADL function and identify physical deficits which impact ability to perform ADLs (bathing, care of mouth/teeth, toileting, grooming, dressing, etc )  - Assess/evaluate cause of self-care deficits   - Assess range of motion  - Assess patient's mobility; develop plan if impaired  - Assess patient's need for assistive devices and provide as appropriate  - Encourage maximum independence but intervene and supervise when necessary  - Involve family in performance of ADLs  - Assess for home care needs following discharge   - Consider OT consult to assist with ADL evaluation and planning for discharge  - Provide patient education as appropriate  Outcome: Progressing  Goal: Maintains/Returns to pre admission functional level  Description: INTERVENTIONS:  - Perform BMAT or MOVE assessment daily    - Set and communicate daily mobility goal to care team and patient/family/caregiver  - Collaborate with rehabilitation services on mobility goals if consulted  - Perform Range of Motion 2 times a day  - Reposition patient every 2 hours    - Dangle patient 2 times a day  - Stand patient 2 times a day  - Ambulate patient 2 times a day  - Out of bed to chair 2 times a day   - Out of bed for meals 2 times a day  - Out of bed for toileting  - Record patient progress and toleration of activity level   Outcome: Progressing     Problem: DISCHARGE PLANNING  Goal: Discharge to home or other facility with appropriate resources  Description: INTERVENTIONS:  - Identify barriers to discharge w/patient and caregiver  - Arrange for needed discharge resources and transportation as appropriate  - Identify discharge learning needs (meds, wound care, etc )  - Arrange for interpretive services to assist at discharge as needed  - Refer to Case Management Department for coordinating discharge planning if the patient needs post-hospital services based on physician/advanced practitioner order or complex needs related to functional status, cognitive ability, or social support system  Outcome: Progressing     Problem: Knowledge Deficit  Goal: Patient/family/caregiver demonstrates understanding of disease process, treatment plan, medications, and discharge instructions  Description: Complete learning assessment and assess knowledge base    Interventions:  - Provide teaching at level of understanding  - Provide teaching via preferred learning methods  Outcome: Progressing     Problem: Potential for Falls  Goal: Patient will remain free of falls  Description: INTERVENTIONS:  - Educate patient/family on patient safety including physical limitations  - Instruct patient to call for assistance with activity   - Consult OT/PT to assist with strengthening/mobility   - Keep Call bell within reach  - Keep bed low and locked with side rails adjusted as appropriate  - Keep care items and personal belongings within reach  - Initiate and maintain comfort rounds  - Make Fall Risk Sign visible to staff  - Offer Toileting every 2 Hours, in advance of need  - Initiate/Maintain bed alarm  - Obtain necessary fall risk management equipment:   - Apply yellow socks and bracelet for high fall risk patients  - Consider moving patient to room near nurses station  Outcome: Progressing     Problem: MOBILITY - ADULT  Goal: Maintain or return to baseline ADL function  Description: INTERVENTIONS:  -  Assess patient's ability to carry out ADLs; assess patient's baseline for ADL function and identify physical deficits which impact ability to perform ADLs (bathing, care of mouth/teeth, toileting, grooming, dressing, etc )  - Assess/evaluate cause of self-care deficits   - Assess range of motion  - Assess patient's mobility; develop plan if impaired  - Assess patient's need for assistive devices and provide as appropriate  - Encourage maximum independence but intervene and supervise when necessary  - Involve family in performance of ADLs  - Assess for home care needs following discharge   - Consider OT consult to assist with ADL evaluation and planning for discharge  - Provide patient education as appropriate  Outcome: Progressing  Goal: Maintains/Returns to pre admission functional level  Description: INTERVENTIONS:  - Perform BMAT or MOVE assessment daily    - Set and communicate daily mobility goal to care team and patient/family/caregiver  - Collaborate with rehabilitation services on mobility goals if consulted  - Perform Range of Motion 2 times a day  - Reposition patient every 2 hours    - Dangle patient 2 times a day  - Stand patient 2 times a day  - Ambulate patient 2 times a day  - Out of bed to chair 2 times a day   - Out of bed for meals 2 times a day  - Out of bed for toileting  - Record patient progress and toleration of activity level   Outcome: Progressing

## 2022-01-01 NOTE — PROGRESS NOTES
Progress Note - Pulmonary   Casey Lagunas 80 y o  male MRN: [id number]  Unit/Bed#: -01 Encounter: [id number]      Assessment:  1   Acute hypoxic respiratory failure  2  COVID19 pneumonia  3  Possible superimposed bacterial infeciton  4  Volume overload with likely RV dysfunction and diastolic dysfunction  Also exacerbation by steroids causing fluid retention  5  [place] - likely vascular congestion   6  Microcytosis      Plan:  1  Improvement in symptoms and kidney function with diuretics  I would consider additional dosing  Nephrology folowing  2  Continue decadron to 0 1mg/kg BID Day 3/10  3   Continue Baracitinib day 3/14  4  Continue Remdesivir day 4/5  5  Prophylactic anticoagulation  6  Continue Cefepime and vanco for now   Complete 7 days   7  I  Have added iron studies given microcytosis   8  Wean down oxygen as tolerated      Subjective:   Patient seen and examined  No new events overnight  He states he is feeling a bit better today  Objective:   Vitals: Blood pressure 100/62, pulse 71, temperature 97 5 °F (36 4 °C), resp  rate 20, weight 87 4 kg (192 lb 10 9 oz), SpO2 97 % , Midflow 15L with nonrebreather, Body mass index is 28 45 kg/m²  Intake/Output Summary (Last 24 hours) at 1/1/2022 1635  Last data filed at 1/1/2022 0405  Gross per 24 hour   Intake 320 ml   Output 975 ml   Net -655 ml         Physical Exam  Gen: Awake, alert, oriented x 3, no acute distress  HEENT: Mucous membranes moist, no oral lesions, no thrush  NECK: No accessory muscle use, JVP not elevated  Cardiac: Regular, single S1, single S2, no murmurs, no rubs, no gallops  Lungs: crackles in lung bases  No wheezing or rhonchi  Abdomen: normoactive bowel sounds, soft nontender, nondistended, no rebound or rigidity, no guarding  Extremities: no cyanosis, no clubbing, no edema    Labs: I have personally reviewed pertinent lab results    Results from last 7 days   Lab Units 01/01/22  0453 12/31/21  0429 12/30/21  9490 WBC Thousand/uL 5 82 4 97 7 63   HEMOGLOBIN g/dL 13 7 14 2 13 4   HEMATOCRIT % 43 9 45 1 43 6   PLATELETS Thousands/uL 292 268 224   NEUTROS PCT % 85* 82* 87*   MONOS PCT % 8 7 4      Results from last 7 days   Lab Units 01/01/22  0453 12/31/21  0425 12/30/21  0545   POTASSIUM mmol/L 3 4* 3 4* 3 8   CHLORIDE mmol/L 106 103 102   CO2 mmol/L 25 24 18*   BUN mg/dL 45* 45* 35*   CREATININE mg/dL 1 70* 1 84* 1 72*   CALCIUM mg/dL 7 9* 8 1* 8 2*   ALK PHOS U/L 57 59 57   ALT U/L 53 46 35   AST U/L 79* 79* 72*              Results from last 7 days   Lab Units 12/28/21  0540   INR  0 94   PTT seconds 40*     Results from last 7 days   Lab Units 12/28/21  0540   LACTIC ACID mmol/L 1 3     0   Lab Value Date/Time    TROPONINI <0 02 03/19/2020 1427    TROPONINI <0 02 01/28/2020 1757    TROPONINI <0 02 01/22/2020 1956         Meds/Allergies   Current Facility-Administered Medications   Medication Dose Route Frequency    acetaminophen (TYLENOL) tablet 650 mg  650 mg Oral Q6H PRN    allopurinol (ZYLOPRIM) tablet 200 mg  200 mg Oral Daily    ascorbic acid (VITAMIN C) tablet 1,000 mg  1,000 mg Oral Daily    atorvastatin (LIPITOR) tablet 40 mg  40 mg Oral Daily With Dinner    Baricitinib (OLUMIANT) (COVID EUA) tablet 2 mg  2 mg Oral Q24H    budesonide-formoterol (SYMBICORT) 160-4 5 mcg/act inhaler 2 puff  2 puff Inhalation BID    cefepime (MAXIPIME) IVPB (premix in dextrose) 2,000 mg 50 mL  2,000 mg Intravenous Q12H    cholecalciferol (VITAMIN D3) tablet 400 Units  400 Units Oral Daily    clopidogrel (PLAVIX) tablet 75 mg  75 mg Oral Daily    dexamethasone (DECADRON) injection 9 04 mg  0 1 mg/kg Intravenous Q12H    enoxaparin (LOVENOX) subcutaneous injection 30 mg  30 mg Subcutaneous Q12H JOLIE    furosemide (LASIX) injection 40 mg  40 mg Intravenous Once    [START ON 1/2/2022] insulin glargine (LANTUS) subcutaneous injection 10 Units 0 1 mL  10 Units Subcutaneous QAM    insulin lispro (HumaLOG) 100 units/mL subcutaneous injection 1-5 Units  1-5 Units Subcutaneous TID AC    multivitamin stress formula tablet 1 tablet  1 tablet Oral Daily    ondansetron (ZOFRAN) injection 4 mg  4 mg Intravenous Q6H PRN    potassium chloride (K-DUR,KLOR-CON) CR tablet 40 mEq  40 mEq Oral Once    remdesivir (Veklury) 100 mg in sodium chloride 0 9 % 270 mL IVPB  100 mg Intravenous Q24H    sulfaSALAzine (AZULFIDINE) tablet 500 mg  500 mg Oral 4x Daily    vancomycin (VANCOCIN) IVPB (premix in dextrose) 750 mg 150 mL  750 mg Intravenous Q12H    zinc sulfate (ZINCATE) capsule 220 mg  220 mg Oral Daily     Medications Prior to Admission   Medication    allopurinol (ZYLOPRIM) 100 mg tablet    amLODIPine (NORVASC) 10 mg tablet    atorvastatin (LIPITOR) 40 mg tablet    clopidogrel (PLAVIX) 75 mg tablet    Coenzyme Q10 200 MG capsule    Empagliflozin (Jardiance) 10 MG TABS    fenofibrate (TRICOR) 145 mg tablet    metoprolol tartrate (LOPRESSOR) 50 mg tablet    sulfaSALAzine (AZULFIDINE) 500 mg tablet         Microbiology:  Lab Results   Component Value Date    BLOODCX No Growth at 48 hrs  12/29/2021    BLOODCX Micrococcus luteus (A) 12/29/2021    BLOODCX Staphylococcus coagulase negative (A) 12/28/2021    BLOODCX No Growth After 4 Days  12/28/2021    URINECX >100,000 cfu/ml Escherichia coli (A) 01/28/2020       Imaging and other studies: I have personally reviewed pertinent reports  CXR - IMPRESSION:  Bilateral multifocal groundglass opacities are present  In the setting of COVID-19 infection, this is most in keeping with COVID 19 pneumonia   There has been interval worsening       DO Dennys Azar

## 2022-01-01 NOTE — ASSESSMENT & PLAN NOTE
Patient developed acute hypoxic respiratory failure this morning with oxygen saturations in the 70s and respiratory as high as 32 patient is set up      The patient's resting oxygen saturation is around 90% on mid flow oxygen at 15 liters/minute and face mask as needed

## 2022-01-01 NOTE — ASSESSMENT & PLAN NOTE
Patient has chronic hypertension    Systolic blood pressure was 100 this morning    I discontinued amlodipine and metoprolol to avoid further hypotension and worsening renal function

## 2022-01-02 LAB
ALBUMIN SERPL BCP-MCNC: 2.3 G/DL (ref 3.5–5)
ALP SERPL-CCNC: 57 U/L (ref 46–116)
ALT SERPL W P-5'-P-CCNC: 70 U/L (ref 12–78)
ANION GAP SERPL CALCULATED.3IONS-SCNC: 8 MMOL/L (ref 4–13)
AST SERPL W P-5'-P-CCNC: 87 U/L (ref 5–45)
BACTERIA BLD CULT: ABNORMAL
BACTERIA BLD CULT: NORMAL
BASOPHILS # BLD AUTO: 0.01 THOUSANDS/ΜL (ref 0–0.1)
BASOPHILS NFR BLD AUTO: 0 % (ref 0–1)
BILIRUB SERPL-MCNC: 0.4 MG/DL (ref 0.2–1)
BUN SERPL-MCNC: 42 MG/DL (ref 5–25)
CALCIUM ALBUM COR SERPL-MCNC: 9.2 MG/DL (ref 8.3–10.1)
CALCIUM SERPL-MCNC: 7.8 MG/DL (ref 8.3–10.1)
CHLORIDE SERPL-SCNC: 107 MMOL/L (ref 100–108)
CO2 SERPL-SCNC: 24 MMOL/L (ref 21–32)
CREAT SERPL-MCNC: 1.59 MG/DL (ref 0.6–1.3)
EOSINOPHIL # BLD AUTO: 0 THOUSAND/ΜL (ref 0–0.61)
EOSINOPHIL NFR BLD AUTO: 0 % (ref 0–6)
ERYTHROCYTE [DISTWIDTH] IN BLOOD BY AUTOMATED COUNT: 16.1 % (ref 11.6–15.1)
FERRITIN SERPL-MCNC: 190 NG/ML (ref 8–388)
GFR SERPL CREATININE-BSD FRML MDRD: 43 ML/MIN/1.73SQ M
GLUCOSE SERPL-MCNC: 148 MG/DL (ref 65–140)
GLUCOSE SERPL-MCNC: 165 MG/DL (ref 65–140)
GLUCOSE SERPL-MCNC: 172 MG/DL (ref 65–140)
GLUCOSE SERPL-MCNC: 192 MG/DL (ref 65–140)
GLUCOSE SERPL-MCNC: 219 MG/DL (ref 65–140)
GRAM STN SPEC: ABNORMAL
HCT VFR BLD AUTO: 42 % (ref 36.5–49.3)
HGB BLD-MCNC: 13 G/DL (ref 12–17)
IMM GRANULOCYTES # BLD AUTO: 0.04 THOUSAND/UL (ref 0–0.2)
IMM GRANULOCYTES NFR BLD AUTO: 1 % (ref 0–2)
IRON SATN MFR SERPL: 23 % (ref 20–50)
IRON SERPL-MCNC: 55 UG/DL (ref 65–175)
LYMPHOCYTES # BLD AUTO: 0.28 THOUSANDS/ΜL (ref 0.6–4.47)
LYMPHOCYTES NFR BLD AUTO: 4 % (ref 14–44)
MCH RBC QN AUTO: 23.5 PG (ref 26.8–34.3)
MCHC RBC AUTO-ENTMCNC: 31 G/DL (ref 31.4–37.4)
MCV RBC AUTO: 76 FL (ref 82–98)
MONOCYTES # BLD AUTO: 0.6 THOUSAND/ΜL (ref 0.17–1.22)
MONOCYTES NFR BLD AUTO: 8 % (ref 4–12)
NEUTROPHILS # BLD AUTO: 6.46 THOUSANDS/ΜL (ref 1.85–7.62)
NEUTS SEG NFR BLD AUTO: 87 % (ref 43–75)
NRBC BLD AUTO-RTO: 0 /100 WBCS
PLATELET # BLD AUTO: 292 THOUSANDS/UL (ref 149–390)
PMV BLD AUTO: 10 FL (ref 8.9–12.7)
POTASSIUM SERPL-SCNC: 3.7 MMOL/L (ref 3.5–5.3)
PROT SERPL-MCNC: 6.1 G/DL (ref 6.4–8.2)
RBC # BLD AUTO: 5.54 MILLION/UL (ref 3.88–5.62)
SODIUM SERPL-SCNC: 139 MMOL/L (ref 136–145)
TIBC SERPL-MCNC: 235 UG/DL (ref 250–450)
WBC # BLD AUTO: 7.39 THOUSAND/UL (ref 4.31–10.16)

## 2022-01-02 PROCEDURE — 82948 REAGENT STRIP/BLOOD GLUCOSE: CPT

## 2022-01-02 PROCEDURE — 80053 COMPREHEN METABOLIC PANEL: CPT | Performed by: INTERNAL MEDICINE

## 2022-01-02 PROCEDURE — 99233 SBSQ HOSP IP/OBS HIGH 50: CPT | Performed by: INTERNAL MEDICINE

## 2022-01-02 PROCEDURE — 99232 SBSQ HOSP IP/OBS MODERATE 35: CPT | Performed by: INTERNAL MEDICINE

## 2022-01-02 PROCEDURE — 94760 N-INVAS EAR/PLS OXIMETRY 1: CPT

## 2022-01-02 PROCEDURE — 99232 SBSQ HOSP IP/OBS MODERATE 35: CPT | Performed by: PHYSICIAN ASSISTANT

## 2022-01-02 PROCEDURE — 85025 COMPLETE CBC W/AUTO DIFF WBC: CPT | Performed by: INTERNAL MEDICINE

## 2022-01-02 RX ORDER — FUROSEMIDE 10 MG/ML
40 INJECTION INTRAMUSCULAR; INTRAVENOUS ONCE
Status: COMPLETED | OUTPATIENT
Start: 2022-01-02 | End: 2022-01-02

## 2022-01-02 RX ORDER — INSULIN GLARGINE 100 [IU]/ML
12 INJECTION, SOLUTION SUBCUTANEOUS EVERY MORNING
Status: DISCONTINUED | OUTPATIENT
Start: 2022-01-03 | End: 2022-01-14 | Stop reason: HOSPADM

## 2022-01-02 RX ORDER — FUROSEMIDE 10 MG/ML
40 INJECTION INTRAMUSCULAR; INTRAVENOUS DAILY
Status: DISCONTINUED | OUTPATIENT
Start: 2022-01-03 | End: 2022-01-04

## 2022-01-02 RX ADMIN — VANCOMYCIN HYDROCHLORIDE 750 MG: 750 INJECTION, SOLUTION INTRAVENOUS at 03:35

## 2022-01-02 RX ADMIN — B-COMPLEX W/ C & FOLIC ACID TAB 1 TABLET: TAB at 08:20

## 2022-01-02 RX ADMIN — INSULIN LISPRO 1 UNITS: 100 INJECTION, SOLUTION INTRAVENOUS; SUBCUTANEOUS at 08:20

## 2022-01-02 RX ADMIN — ENOXAPARIN SODIUM 30 MG: 100 INJECTION SUBCUTANEOUS at 22:12

## 2022-01-02 RX ADMIN — DEXAMETHASONE SODIUM PHOSPHATE 9.04 MG: 4 INJECTION, SOLUTION INTRA-ARTICULAR; INTRALESIONAL; INTRAMUSCULAR; INTRAVENOUS; SOFT TISSUE at 13:01

## 2022-01-02 RX ADMIN — ATORVASTATIN CALCIUM 40 MG: 40 TABLET, FILM COATED ORAL at 16:57

## 2022-01-02 RX ADMIN — REMDESIVIR 100 MG: 100 INJECTION, POWDER, LYOPHILIZED, FOR SOLUTION INTRAVENOUS at 15:49

## 2022-01-02 RX ADMIN — INSULIN LISPRO 3 UNITS: 100 INJECTION, SOLUTION INTRAVENOUS; SUBCUTANEOUS at 13:04

## 2022-01-02 RX ADMIN — ZINC SULFATE 220 MG (50 MG) CAPSULE 220 MG: CAPSULE at 08:20

## 2022-01-02 RX ADMIN — INSULIN LISPRO 3 UNITS: 100 INJECTION, SOLUTION INTRAVENOUS; SUBCUTANEOUS at 16:58

## 2022-01-02 RX ADMIN — BARICITINIB 2 MG: 2 TABLET, FILM COATED ORAL at 13:02

## 2022-01-02 RX ADMIN — BUDESONIDE AND FORMOTEROL FUMARATE DIHYDRATE 2 PUFF: 160; 4.5 AEROSOL RESPIRATORY (INHALATION) at 18:34

## 2022-01-02 RX ADMIN — ALLOPURINOL 200 MG: 100 TABLET ORAL at 08:20

## 2022-01-02 RX ADMIN — FUROSEMIDE 40 MG: 10 INJECTION, SOLUTION INTRAMUSCULAR; INTRAVENOUS at 11:08

## 2022-01-02 RX ADMIN — SULFASALAZINE 500 MG: 500 TABLET ORAL at 22:11

## 2022-01-02 RX ADMIN — ENOXAPARIN SODIUM 30 MG: 100 INJECTION SUBCUTANEOUS at 08:20

## 2022-01-02 RX ADMIN — CHOLECALCIFEROL (VITAMIN D3) 10 MCG (400 UNIT) TABLET 400 UNITS: at 08:20

## 2022-01-02 RX ADMIN — SULFASALAZINE 500 MG: 500 TABLET ORAL at 18:34

## 2022-01-02 RX ADMIN — INSULIN GLARGINE 10 UNITS: 100 INJECTION, SOLUTION SUBCUTANEOUS at 08:20

## 2022-01-02 RX ADMIN — INSULIN LISPRO 1 UNITS: 100 INJECTION, SOLUTION INTRAVENOUS; SUBCUTANEOUS at 16:57

## 2022-01-02 RX ADMIN — DEXAMETHASONE SODIUM PHOSPHATE 9.04 MG: 4 INJECTION, SOLUTION INTRA-ARTICULAR; INTRALESIONAL; INTRAMUSCULAR; INTRAVENOUS; SOFT TISSUE at 00:44

## 2022-01-02 RX ADMIN — CEFEPIME HYDROCHLORIDE 2000 MG: 2 INJECTION, SOLUTION INTRAVENOUS at 00:45

## 2022-01-02 RX ADMIN — VANCOMYCIN HYDROCHLORIDE 750 MG: 750 INJECTION, SOLUTION INTRAVENOUS at 16:57

## 2022-01-02 RX ADMIN — CLOPIDOGREL BISULFATE 75 MG: 75 TABLET ORAL at 08:20

## 2022-01-02 RX ADMIN — CEFEPIME HYDROCHLORIDE 2000 MG: 2 INJECTION, SOLUTION INTRAVENOUS at 13:06

## 2022-01-02 RX ADMIN — SULFASALAZINE 500 MG: 500 TABLET ORAL at 08:19

## 2022-01-02 RX ADMIN — INSULIN LISPRO 1 UNITS: 100 INJECTION, SOLUTION INTRAVENOUS; SUBCUTANEOUS at 13:02

## 2022-01-02 RX ADMIN — SULFASALAZINE 500 MG: 500 TABLET ORAL at 13:12

## 2022-01-02 RX ADMIN — BUDESONIDE AND FORMOTEROL FUMARATE DIHYDRATE 2 PUFF: 160; 4.5 AEROSOL RESPIRATORY (INHALATION) at 08:20

## 2022-01-02 RX ADMIN — OXYCODONE HYDROCHLORIDE AND ACETAMINOPHEN 1000 MG: 500 TABLET ORAL at 08:20

## 2022-01-02 NOTE — PLAN OF CARE
Problem: PAIN - ADULT  Goal: Verbalizes/displays adequate comfort level or baseline comfort level  Description: Interventions:  - Encourage patient to monitor pain and request assistance  - Assess pain using appropriate pain scale  - Administer analgesics based on type and severity of pain and evaluate response  - Implement non-pharmacological measures as appropriate and evaluate response  - Consider cultural and social influences on pain and pain management  - Notify physician/advanced practitioner if interventions unsuccessful or patient reports new pain  Outcome: Progressing     Problem: INFECTION - ADULT  Goal: Absence or prevention of progression during hospitalization  Description: INTERVENTIONS:  - Assess and monitor for signs and symptoms of infection  - Monitor lab/diagnostic results  - Monitor all insertion sites, i e  indwelling lines, tubes, and drains  - Monitor endotracheal if appropriate and nasal secretions for changes in amount and color  - [place] appropriate cooling/warming therapies per order  - Administer medications as ordered  - Instruct and encourage patient and family to use good hand hygiene technique  - Identify and instruct in appropriate isolation precautions for identified infection/condition  Outcome: Progressing  Goal: Absence of fever/infection during neutropenic period  Description: INTERVENTIONS:  - Monitor WBC    Outcome: Progressing     Problem: SAFETY ADULT  Goal: Patient will remain free of falls  Description: INTERVENTIONS:  - Educate patient/family on patient safety including physical limitations  - Instruct patient to call for assistance with activity   - Consult OT/PT to assist with strengthening/mobility   - Keep Call bell within reach  - Keep bed low and locked with side rails adjusted as appropriate  - Keep care items and personal belongings within reach  - Initiate and maintain comfort rounds  - Make Fall Risk Sign visible to staff  - Offer Toileting every 2 Hours, in advance of need  - Initiate/Maintain bed alarm  - Obtain necessary fall risk management equipment:   - Apply yellow socks and bracelet for high fall risk patients  - Consider moving patient to room near nurses station  Outcome: Progressing  Goal: Maintain or return to baseline ADL function  Description: INTERVENTIONS:  -  Assess patient's ability to carry out ADLs; assess patient's baseline for ADL function and identify physical deficits which impact ability to perform ADLs (bathing, care of mouth/teeth, toileting, grooming, dressing, etc )  - Assess/evaluate cause of self-care deficits   - Assess range of motion  - Assess patient's mobility; develop plan if impaired  - Assess patient's need for assistive devices and provide as appropriate  - Encourage maximum independence but intervene and supervise when necessary  - Involve family in performance of ADLs  - Assess for home care needs following discharge   - Consider OT consult to assist with ADL evaluation and planning for discharge  - Provide patient education as appropriate  Outcome: Progressing  Goal: Maintains/Returns to pre admission functional level  Description: INTERVENTIONS:  - Perform BMAT or MOVE assessment daily    - Set and communicate daily mobility goal to care team and patient/family/caregiver  - Collaborate with rehabilitation services on mobility goals if consulted  - Perform Range of Motion 2 times a day  - Reposition patient every 2 hours    - Dangle patient 2 times a day  - Stand patient 2 times a day  - Ambulate patient 2 times a day  - Out of bed to chair 2 times a day   - Out of bed for meals 2 times a day  - Out of bed for toileting  - Record patient progress and toleration of activity level   Outcome: Progressing     Problem: DISCHARGE PLANNING  Goal: Discharge to home or other facility with appropriate resources  Description: INTERVENTIONS:  - Identify barriers to discharge w/patient and caregiver  - Arrange for needed discharge resources and transportation as appropriate  - Identify discharge learning needs (meds, wound care, etc )  - Arrange for interpretive services to assist at discharge as needed  - Refer to Case Management Department for coordinating discharge planning if the patient needs post-hospital services based on physician/advanced practitioner order or complex needs related to functional status, cognitive ability, or social support system  Outcome: Progressing     Problem: Knowledge Deficit  Goal: Patient/family/caregiver demonstrates understanding of disease process, treatment plan, medications, and discharge instructions  Description: Complete learning assessment and assess knowledge base    Interventions:  - Provide teaching at level of understanding  - Provide teaching via preferred learning methods  Outcome: Progressing     Problem: Potential for Falls  Goal: Patient will remain free of falls  Description: INTERVENTIONS:  - Educate patient/family on patient safety including physical limitations  - Instruct patient to call for assistance with activity   - Consult OT/PT to assist with strengthening/mobility   - Keep Call bell within reach  - Keep bed low and locked with side rails adjusted as appropriate  - Keep care items and personal belongings within reach  - Initiate and maintain comfort rounds  - Make Fall Risk Sign visible to staff  - Offer Toileting every 2 Hours, in advance of need  - Initiate/Maintain bed alarm  - Obtain necessary fall risk management equipment:   - Apply yellow socks and bracelet for high fall risk patients  - Consider moving patient to room near nurses station  Outcome: Progressing     Problem: MOBILITY - ADULT  Goal: Maintain or return to baseline ADL function  Description: INTERVENTIONS:  -  Assess patient's ability to carry out ADLs; assess patient's baseline for ADL function and identify physical deficits which impact ability to perform ADLs (bathing, care of mouth/teeth, toileting, grooming, dressing, etc )  - Assess/evaluate cause of self-care deficits   - Assess range of motion  - Assess patient's mobility; develop plan if impaired  - Assess patient's need for assistive devices and provide as appropriate  - Encourage maximum independence but intervene and supervise when necessary  - Involve family in performance of ADLs  - Assess for home care needs following discharge   - Consider OT consult to assist with ADL evaluation and planning for discharge  - Provide patient education as appropriate  Outcome: Progressing  Goal: Maintains/Returns to pre admission functional level  Description: INTERVENTIONS:  - Perform BMAT or MOVE assessment daily    - Set and communicate daily mobility goal to care team and patient/family/caregiver  - Collaborate with rehabilitation services on mobility goals if consulted  - Perform Range of Motion 2 times a day  - Reposition patient every 2 hours    - Dangle patient 2 times a day  - Stand patient 2 times a day  - Ambulate patient 2 times a day  - Out of bed to chair 2 times a day   - Out of bed for meals 2 times a day  - Out of bed for toileting  - Record patient progress and toleration of activity level   Outcome: Progressing

## 2022-01-02 NOTE — PLAN OF CARE
Problem: PAIN - ADULT  Goal: Verbalizes/displays adequate comfort level or baseline comfort level  Description: Interventions:  - Encourage patient to monitor pain and request assistance  - Assess pain using appropriate pain scale  - Administer analgesics based on type and severity of pain and evaluate response  - Implement non-pharmacological measures as appropriate and evaluate response  - Consider cultural and social influences on pain and pain management  - Notify physician/advanced practitioner if interventions unsuccessful or patient reports new pain  Outcome: Progressing     Problem: INFECTION - ADULT  Goal: Absence or prevention of progression during hospitalization  Description: INTERVENTIONS:  - Assess and monitor for signs and symptoms of infection  - Monitor lab/diagnostic results  - Monitor all insertion sites, i e  indwelling lines, tubes, and drains  - Monitor endotracheal if appropriate and nasal secretions for changes in amount and color  - [place] appropriate cooling/warming therapies per order  - Administer medications as ordered  - Instruct and encourage patient and family to use good hand hygiene technique  - Identify and instruct in appropriate isolation precautions for identified infection/condition  Outcome: Progressing  Goal: Absence of fever/infection during neutropenic period  Description: INTERVENTIONS:  - Monitor WBC    Outcome: Progressing     Problem: SAFETY ADULT  Goal: Patient will remain free of falls  Description: INTERVENTIONS:  - Educate patient/family on patient safety including physical limitations  - Instruct patient to call for assistance with activity   - Consult OT/PT to assist with strengthening/mobility   - Keep Call bell within reach  - Keep bed low and locked with side rails adjusted as appropriate  - Keep care items and personal belongings within reach  - Initiate and maintain comfort rounds  - Make Fall Risk Sign visible to staff  - Offer Toileting every 2 Hours, in advance of need  - Initiate/Maintain bed alarm  - Obtain necessary fall risk management equipment:   - Apply yellow socks and bracelet for high fall risk patients  - Consider moving patient to room near nurses station  Outcome: Progressing  Goal: Maintain or return to baseline ADL function  Description: INTERVENTIONS:  -  Assess patient's ability to carry out ADLs; assess patient's baseline for ADL function and identify physical deficits which impact ability to perform ADLs (bathing, care of mouth/teeth, toileting, grooming, dressing, etc )  - Assess/evaluate cause of self-care deficits   - Assess range of motion  - Assess patient's mobility; develop plan if impaired  - Assess patient's need for assistive devices and provide as appropriate  - Encourage maximum independence but intervene and supervise when necessary  - Involve family in performance of ADLs  - Assess for home care needs following discharge   - Consider OT consult to assist with ADL evaluation and planning for discharge  - Provide patient education as appropriate  Outcome: Progressing  Goal: Maintains/Returns to pre admission functional level  Description: INTERVENTIONS:  - Perform BMAT or MOVE assessment daily    - Set and communicate daily mobility goal to care team and patient/family/caregiver  - Collaborate with rehabilitation services on mobility goals if consulted  - Perform Range of Motion 2 times a day  - Reposition patient every 2 hours    - Dangle patient 2 times a day  - Stand patient 2 times a day  - Ambulate patient 2 times a day  - Out of bed to chair 2 times a day   - Out of bed for meals 2 times a day  - Out of bed for toileting  - Record patient progress and toleration of activity level   Outcome: Progressing     Problem: DISCHARGE PLANNING  Goal: Discharge to home or other facility with appropriate resources  Description: INTERVENTIONS:  - Identify barriers to discharge w/patient and caregiver  - Arrange for needed discharge resources and transportation as appropriate  - Identify discharge learning needs (meds, wound care, etc )  - Arrange for interpretive services to assist at discharge as needed  - Refer to Case Management Department for coordinating discharge planning if the patient needs post-hospital services based on physician/advanced practitioner order or complex needs related to functional status, cognitive ability, or social support system  Outcome: Progressing     Problem: Knowledge Deficit  Goal: Patient/family/caregiver demonstrates understanding of disease process, treatment plan, medications, and discharge instructions  Description: Complete learning assessment and assess knowledge base    Interventions:  - Provide teaching at level of understanding  - Provide teaching via preferred learning methods  Outcome: Progressing     Problem: Potential for Falls  Goal: Patient will remain free of falls  Description: INTERVENTIONS:  - Educate patient/family on patient safety including physical limitations  - Instruct patient to call for assistance with activity   - Consult OT/PT to assist with strengthening/mobility   - Keep Call bell within reach  - Keep bed low and locked with side rails adjusted as appropriate  - Keep care items and personal belongings within reach  - Initiate and maintain comfort rounds  - Make Fall Risk Sign visible to staff  - Offer Toileting every 2 Hours, in advance of need  - Initiate/Maintain bed alarm  - Obtain necessary fall risk management equipment:   - Apply yellow socks and bracelet for high fall risk patients  - Consider moving patient to room near nurses station  Outcome: Progressing     Problem: MOBILITY - ADULT  Goal: Maintain or return to baseline ADL function  Description: INTERVENTIONS:  -  Assess patient's ability to carry out ADLs; assess patient's baseline for ADL function and identify physical deficits which impact ability to perform ADLs (bathing, care of mouth/teeth, toileting, grooming, dressing, etc )  - Assess/evaluate cause of self-care deficits   - Assess range of motion  - Assess patient's mobility; develop plan if impaired  - Assess patient's need for assistive devices and provide as appropriate  - Encourage maximum independence but intervene and supervise when necessary  - Involve family in performance of ADLs  - Assess for home care needs following discharge   - Consider OT consult to assist with ADL evaluation and planning for discharge  - Provide patient education as appropriate  Outcome: Progressing  Goal: Maintains/Returns to pre admission functional level  Description: INTERVENTIONS:  - Perform BMAT or MOVE assessment daily    - Set and communicate daily mobility goal to care team and patient/family/caregiver  - Collaborate with rehabilitation services on mobility goals if consulted  - Perform Range of Motion 2 times a day  - Reposition patient every 2 hours    - Dangle patient 2 times a day  - Stand patient 2 times a day  - Ambulate patient 2 times a day  - Out of bed to chair 2 times a day   - Out of bed for meals 2 times a day  - Out of bed for toileting  - Record patient progress and toleration of activity level   Outcome: Progressing

## 2022-01-02 NOTE — ASSESSMENT & PLAN NOTE
Patient had acute kidney injury present on admission on stage III chronic kidney disease  His baseline creatinine is 1 2-1 5    Suspect this is due to COVID infection, syncope  Patient denies obstructive urinary complaints      Patient's renal function improved after Lasix on December 31st and January 1st   Will continue Lasix today on January 2nd  Will continue to monitor renal function

## 2022-01-02 NOTE — PLAN OF CARE
Problem: PAIN - ADULT  Goal: Verbalizes/displays adequate comfort level or baseline comfort level  Description: Interventions:  - Encourage patient to monitor pain and request assistance  - Assess pain using appropriate pain scale  - Administer analgesics based on type and severity of pain and evaluate response  - Implement non-pharmacological measures as appropriate and evaluate response  - Consider cultural and social influences on pain and pain management  - Notify physician/advanced practitioner if interventions unsuccessful or patient reports new pain  Outcome: Progressing     Problem: INFECTION - ADULT  Goal: Absence or prevention of progression during hospitalization  Description: INTERVENTIONS:  - Assess and monitor for signs and symptoms of infection  - Monitor lab/diagnostic results  - Monitor all insertion sites, i e  indwelling lines, tubes, and drains  - Monitor endotracheal if appropriate and nasal secretions for changes in amount and color  - [place] appropriate cooling/warming therapies per order  - Administer medications as ordered  - Instruct and encourage patient and family to use good hand hygiene technique  - Identify and instruct in appropriate isolation precautions for identified infection/condition  Outcome: Progressing     Problem: DISCHARGE PLANNING  Goal: Discharge to home or other facility with appropriate resources  Description: INTERVENTIONS:  - Identify barriers to discharge w/patient and caregiver  - Arrange for needed discharge resources and transportation as appropriate  - Identify discharge learning needs (meds, wound care, etc )  - Arrange for interpretive services to assist at discharge as needed  - Refer to Case Management Department for coordinating discharge planning if the patient needs post-hospital services based on physician/advanced practitioner order or complex needs related to functional status, cognitive ability, or social support system  Outcome: Progressing

## 2022-01-02 NOTE — PROGRESS NOTES
[place]  Progress Note - Kitty Mess 1950, 80 y o  male MRN: [id number]  Unit/Bed#: -[place] Encounter: [id number]  Primary Care Provider: Bhaskar Kelly DO   Date and time admitted to hospital: 12/28/2021  4:54 AM    * Pneumonia due to COVID-19 virus  Assessment & Plan  Patient developed chills, fevers and dry cough about 3-4 days before admission  He is fully vaccinated with the [employer] vaccine  Chest x-ray showed bilateral COVID pneumonia  Patient feels subjectively better but he still on 15 liters/minute mid flow oxygen    Will continue baricitinib, remdesivir, high-dose IV Decadron  Continue cefepime and vancomycin     I ordered Lasix 40 mg IV today  I discussed the case with Pulmonary    Blood culture from December 29th grows contaminants  Repeat blood culture showed no growth    I discussed the case with patient's wife on the phone in detail on January 2nd    Acute respiratory failure with hypoxia [facility])  Assessment & Plan  Patient developed acute hypoxic respiratory failure after hospital admission due to COVID pneumonia  Currently he is on 15 liters/minute mid flow with oxygen saturations of 89-94%    Continue treatments for COVID pneumonia as well as mid flow oxygen  CHADWICK (acute kidney injury) [facility])  Assessment & Plan  Patient had acute kidney injury present on admission on stage III chronic kidney disease  His baseline creatinine is 1 2-1 5    Suspect this is due to COVID infection, syncope  Patient denies obstructive urinary complaints      Patient's renal function improved after Lasix on December 31st and January 1st   Will continue Lasix today on January 2nd  Will continue to monitor renal function    Type 2 diabetes mellitus with stage 3a chronic kidney disease, without long-term current use of insulin [facility])  Assessment & Plan  Lab Results   Component Value Date    HGBA1C 6 4 (H) 12/28/2021       Recent Labs     01/01/22  1146 22  1640 22  2103 22  0753   POCGLU 159* 207* 230* 192*       Blood Sugar Average: Last 72 hrs:  (P) 248 [id number]     Patient has type 2 diabetes with stage III chronic disease  Patient has hyperglycemia due to steroids  Will increase Lantus to 12 units and I added pre meal Humalog  VTE Prophylaxis: in place    Patient Centered Rounds: I rounded with patient's nurse    Current Length of Stay: 5 day(s)    Current Patient Status: Inpatient    Certification Statement: Pt requires additional inpatient hospital stay due to: see assessment and plan        Subjective:   Patient denies shortness of breath at rest or with activities in the bed  Denies chest pain, abdominal pain, diarrhea, signs of bleeding or difficulty urinating  He had loose bowel movements without any bleeding  Patient's nurse reports that patient still needs to be on mid flow at 15 liters/minute    All other ROS are negative    Objective:     Vitals:   Temp (24hrs), Av 8 °F (36 6 °C), Min:97 6 °F (36 4 °C), Max:97 9 °F (36 6 °C)    Temp:  [97 6 °F (36 4 °C)-97 9 °F (36 6 °C)] 97 8 °F (36 6 °C)  HR:  [72-81] 76  Resp:  [18-20] 18  BP: (136-151)/(84-86) 136/86  SpO2:  [86 %-97 %] 89 %  Body mass index is 28 23 kg/m²  Input and Output Summary (last 24 hours): Intake/Output Summary (Last 24 hours) at 2022 1109  Last data filed at 2022 0601  Gross per 24 hour   Intake 240 ml   Output 1275 ml   Net -1035 ml       Physical Exam:     Physical Exam  HENT:      Head: Normocephalic  Eyes:      Conjunctiva/sclera: Conjunctivae normal    Neck:      Comments: Patient has no JVD  Cardiovascular:      Rate and Rhythm: Normal rate and regular rhythm  Heart sounds: No murmur heard  Pulmonary:      Effort: Respiratory distress (He has mild conversational dyspnea) present  Breath sounds: Rales ( inspiratory crackles are heard at bases) present  No wheezing     Abdominal:      General: Bowel sounds are normal       Tenderness: There is no abdominal tenderness  Musculoskeletal:      Cervical back: Neck supple  Skin:     General: Skin is warm and dry  Comments: Patient has no lower extremity edema   Neurological:      Mental Status: He is alert and oriented to person, place, and time  Mental status is at baseline  Motor: No weakness  I personally reviewed labs and imaging reports for today        Last 24 Hours Medication List:   Current Facility-Administered Medications   Medication Dose Route Frequency Provider Last Rate    acetaminophen  650 mg Oral Q6H PRN Radha Munson MD      allopurinol  200 mg Oral Daily Radha Munson MD      ascorbic acid  1,000 mg Oral Daily Ninoska Akbar PA-C      atorvastatin  40 mg Oral Daily With [facility], MD      Baricitinib  2 mg Oral Q24H Radha Munson MD      budesonide-formoterol  2 puff Inhalation BID Ninoska Akbar PA-C      cefepime  2,000 mg Intravenous Q12H ARTURO PalmerNP 2,000 mg (01/02/22 0045)    cholecalciferol  400 Units Oral Daily Ninoska Akbar PA-C      clopidogrel  75 mg Oral Daily Radha Munson MD      dexamethasone  0 1 mg/kg Intravenous Q12H Radha Munson MD      enoxaparin  30 mg Subcutaneous Q12H [facility], MD      furosemide  40 mg Intravenous Once MD [facility] ON 1/3/2022] insulin glargine  12 Units Subcutaneous QAM Radha Munson MD      insulin lispro  1-5 Units Subcutaneous TID AC Radha Munson MD      insulin lispro  3 Units Subcutaneous TID With Meals Radha Munson MD      multivitamin stress formula  1 tablet Oral Daily Ninoska Akbar PA-C      ondansetron  4 mg Intravenous Q6H PRN Radha Munson MD      remdesivir  100 mg Intravenous Q24H Radha Munson MD      sulfaSALAzine  500 mg Oral 4x Daily Radha Munson MD      vancomycin  750 mg Intravenous Q12H Lela Angel CRNP 750 mg (01/02/22 0898)    zinc sulfate  220 mg Oral Daily Ninoska Akbar PA-C Today, Patient Was Seen By: Zay Mayers MD    ** Please Note: Dictation voice to text software may have been used in the creation of this document   **

## 2022-01-02 NOTE — ASSESSMENT & PLAN NOTE
Patient developed acute hypoxic respiratory failure after hospital admission due to COVID pneumonia  Currently he is on 15 liters/minute mid flow with oxygen saturations of 89-94%    Continue treatments for COVID pneumonia as well as mid flow oxygen

## 2022-01-02 NOTE — ASSESSMENT & PLAN NOTE
Patient developed chills, fevers and dry cough about 3-4 days before admission  He is fully vaccinated with the [employer] vaccine  Chest x-ray showed bilateral COVID pneumonia  Patient feels subjectively better but he still on 15 liters/minute mid flow oxygen    Will continue baricitinib, remdesivir, high-dose IV Decadron  Continue cefepime and vancomycin     I ordered Lasix 40 mg IV today  I discussed the case with Pulmonary    Blood culture from December 29th grows contaminants    Repeat blood culture showed no growth    I discussed the case with patient's wife on the phone in detail on January 2nd

## 2022-01-02 NOTE — ASSESSMENT & PLAN NOTE
Lab Results   Component Value Date    HGBA1C 6 4 (H) 12/28/2021       Recent Labs     01/01/22  1146 01/01/22  1640 01/01/22  2103 01/02/22  0753   POCGLU 159* 207* 230* 192*       Blood Sugar Average: Last 72 hrs:  (P) 960 [id number]     Patient has type 2 diabetes with stage III chronic disease  Patient has hyperglycemia due to steroids  Will increase Lantus to 12 units and I added pre meal Humalog

## 2022-01-02 NOTE — PROGRESS NOTES
NEPHROLOGY PROGRESS NOTE   Tadeo Abbasi 80 y o  male MRN: [id number]  Unit/Bed#: -01 Encounter: [id number]  Reason for Consult: CHADWICK/CKD    ASSESSMENT/PLAN:  1  Acute Kidney Injury, POA- likely secondary to ATN from COVID cytokine release  - admission creatinine: 1 8  - current creatinine: 1 6  - initially received IVF without significant improvement  - now receiving lasix 40mg IV daily  - hold IVF  - UA: small leuks, moderate bacteria, no protein, no blood  - PVR: pending  - Urine output: 1 1L yesterday (12/31)  - avoid hypotension, avoid nephrotoxins, avoid IV contrast  2  Chronic Kidney Disease stage 3- Baseline creatinine 1 2-1 5  3  Hypokalemia- replete as needed  4  Metabolic Acidosis- resolved  5  COVID Pneumonia- per primary team and pulmonology team    Disposition:  IV lasix daily    SUBJECTIVE:  Patient denies acute complaints  States he is feeling okay  OBJECTIVE:  Current Weight: Weight - Scale: 86 7 kg (191 lb 2 2 oz)  Vitals:    01/02/22 0300 01/02/22 0755 01/02/22 1055 01/02/22 1300   BP:  136/86     BP Location:       Pulse:  76     Resp:  18     Temp:  97 8 °F (36 6 °C)     TempSrc:       SpO2:  94% (!) 89% 93%   Weight: 86 7 kg (191 lb 2 2 oz)          Intake/Output Summary (Last 24 hours) at 1/2/2022 1450  Last data filed at 1/2/2022 0601  Gross per 24 hour   Intake 240 ml   Output 1275 ml   Net -1035 ml     Due to COVID 19 infection, I did not enter the patient's room but examined him from the window and spoke with him via walkie talkie    General: NAD  Skin: no rash  Eyes: anicteric  ENMT: mm moist  Neck: no masses  Respiratory: no labored breathing  Cardiac: regular rate on monitor  Extremities: no edema noted  GI: soft nt nd  Neuro: alert awake  Psych: mood and affect appropriate    Medications:    Current Facility-Administered Medications:     acetaminophen (TYLENOL) tablet 650 mg, 650 mg, Oral, Q6H PRN, Hellen Marques MD    allopurinol (ZYLOPRIM) tablet 200 mg, 200 mg, Oral, Daily, Radha Munson MD, 200 mg at 01/02/22 0820    ascorbic acid (VITAMIN C) tablet 1,000 mg, 1,000 mg, Oral, Daily, Ninoska Akbar PA-C, 1,000 mg at 01/02/22 0820    atorvastatin (LIPITOR) tablet 40 mg, 40 mg, Oral, Daily With Luis Chapman MD, 40 mg at 01/01/22 1618    Baricitinib (OLUMIANT) (COVID EUA) tablet 2 mg, 2 mg, Oral, Q24H, Radha Munson MD, 2 mg at 01/02/22 1302    budesonide-formoterol (SYMBICORT) 160-4 5 mcg/act inhaler 2 puff, 2 puff, Inhalation, BID, Ninoska Akbar PA-C, 2 puff at 01/02/22 0820    cefepime (MAXIPIME) IVPB (premix in dextrose) 2,000 mg 50 mL, 2,000 mg, Intravenous, Q12H, ROSALINA Palmer, Last Rate: 100 mL/hr at 01/02/22 1306, 2,000 mg at 01/02/22 1306    cholecalciferol (VITAMIN D3) tablet 400 Units, 400 Units, Oral, Daily, Ninoska Akbar PA-C, 400 Units at 01/02/22 0820    clopidogrel (PLAVIX) tablet 75 mg, 75 mg, Oral, Daily, Radha Munson MD, 75 mg at 01/02/22 0820    dexamethasone (DECADRON) injection 9 04 mg, 0 1 mg/kg, Intravenous, Q12H, Radha Munson MD, 9 04 mg at 01/02/22 1301    enoxaparin (LOVENOX) subcutaneous injection 30 mg, 30 mg, Subcutaneous, Q12H [address], Radha Munson MD, 30 mg at 01/02/22 0820    [START ON 1/3/2022] furosemide (LASIX) injection 40 mg, 40 mg, Intravenous, Daily, [facility]    [START ON 1/3/2022] insulin glargine (LANTUS) subcutaneous injection 12 Units 0 12 mL, 12 Units, Subcutaneous, QARadha MD    insulin lispro (HumaLOG) 100 units/mL subcutaneous injection 1-5 Units, 1-5 Units, Subcutaneous, TID AC, 1 Units at 01/02/22 1302 **AND** Fingerstick Glucose (POCT), , , TID AC, Radha Munson MD    insulin lispro (HumaLOG) 100 units/mL subcutaneous injection 3 Units, 3 Units, Subcutaneous, TID With Meals, Radha Munson MD, 3 Units at 01/02/22 1304    multivitamin stress formula tablet 1 tablet, 1 tablet, Oral, Daily, Ninoska Akbar PA-C, 1 tablet at 01/02/22 0820    ondansetron (ZOFRAN) injection 4 mg, 4 mg, Intravenous, Q6H PRN, Lolita Wilson MD    [COMPLETED] remdesivir Simmie Challenger) 200 mg in sodium chloride 0 9 % 290 mL IVPB, 200 mg, Intravenous, Q24H, 200 mg at 12/29/21 1716 **FOLLOWED BY** remdesivir (Veklury) 100 mg in sodium chloride 0 9 % 270 mL IVPB, 100 mg, Intravenous, Q24H, Lolita Wilson MD, 100 mg at 01/01/22 1600    sulfaSALAzine (AZULFIDINE) tablet 500 mg, 500 mg, Oral, 4x Daily, Lolita Wilson MD, 500 mg at 01/02/22 1312    vancomycin (VANCOCIN) IVPB (premix in dextrose) 750 mg 150 mL, 750 mg, Intravenous, Q12H, ROSALINA Barrera, Last Rate: 150 mL/hr at 01/02/22 0335, 750 mg at 01/02/22 0335    zinc sulfate (ZINCATE) capsule 220 mg, 220 mg, Oral, Daily, Prudence LUIS ANTONIO Freitas, 220 mg at 01/02/22 0820    Laboratory Results:  Results from last 7 days   Lab Units 01/02/22  0339 01/01/22  0453 12/31/21  0425 12/30/21  0545 12/29/21  0633 12/28/21  0540   WBC Thousand/uL 7 39 5 82 4 97 7 63 7 45 6 71   HEMOGLOBIN g/dL 13 0 13 7 14 2 13 4 13 6 15 3   HEMATOCRIT % 42 0 43 9 45 1 43 6 45 2 51 6*   PLATELETS Thousands/uL 292 292 268 224 225 234   POTASSIUM mmol/L 3 7 3 4* 3 4* 3 8 4 0 3 4*   CHLORIDE mmol/L 107 106 103 102 106 103   CO2 mmol/L 24 25 24 18* 17* 24   BUN mg/dL 42* 45* 45* 35* 24 20   CREATININE mg/dL 1 59* 1 70* 1 84* 1 72* 1 63* 1 80*   CALCIUM mg/dL 7 8* 7 9* 8 1* 8 2* 8 6 9 1     I have personally reviewed the blood work as stated above and in my note

## 2022-01-03 LAB
ALBUMIN SERPL BCP-MCNC: 2.3 G/DL (ref 3.5–5)
ALP SERPL-CCNC: 65 U/L (ref 46–116)
ALT SERPL W P-5'-P-CCNC: 74 U/L (ref 12–78)
ANION GAP SERPL CALCULATED.3IONS-SCNC: 10 MMOL/L (ref 4–13)
AST SERPL W P-5'-P-CCNC: 64 U/L (ref 5–45)
BILIRUB SERPL-MCNC: 0.6 MG/DL (ref 0.2–1)
BUN SERPL-MCNC: 34 MG/DL (ref 5–25)
CALCIUM ALBUM COR SERPL-MCNC: 9.1 MG/DL (ref 8.3–10.1)
CALCIUM SERPL-MCNC: 7.7 MG/DL (ref 8.3–10.1)
CHLORIDE SERPL-SCNC: 106 MMOL/L (ref 100–108)
CO2 SERPL-SCNC: 24 MMOL/L (ref 21–32)
CREAT SERPL-MCNC: 1.45 MG/DL (ref 0.6–1.3)
CRP SERPL QL: 22.2 MG/L
GFR SERPL CREATININE-BSD FRML MDRD: 48 ML/MIN/1.73SQ M
GLUCOSE SERPL-MCNC: 149 MG/DL (ref 65–140)
GLUCOSE SERPL-MCNC: 158 MG/DL (ref 65–140)
GLUCOSE SERPL-MCNC: 164 MG/DL (ref 65–140)
GLUCOSE SERPL-MCNC: 194 MG/DL (ref 65–140)
GLUCOSE SERPL-MCNC: 240 MG/DL (ref 65–140)
POTASSIUM SERPL-SCNC: 3.3 MMOL/L (ref 3.5–5.3)
PROT SERPL-MCNC: 6.1 G/DL (ref 6.4–8.2)
SODIUM SERPL-SCNC: 140 MMOL/L (ref 136–145)

## 2022-01-03 PROCEDURE — 94760 N-INVAS EAR/PLS OXIMETRY 1: CPT

## 2022-01-03 PROCEDURE — 82948 REAGENT STRIP/BLOOD GLUCOSE: CPT

## 2022-01-03 PROCEDURE — 80053 COMPREHEN METABOLIC PANEL: CPT | Performed by: INTERNAL MEDICINE

## 2022-01-03 PROCEDURE — 94003 VENT MGMT INPAT SUBQ DAY: CPT

## 2022-01-03 PROCEDURE — 99233 SBSQ HOSP IP/OBS HIGH 50: CPT | Performed by: HOSPITALIST

## 2022-01-03 PROCEDURE — 97163 PT EVAL HIGH COMPLEX 45 MIN: CPT

## 2022-01-03 PROCEDURE — 97166 OT EVAL MOD COMPLEX 45 MIN: CPT

## 2022-01-03 PROCEDURE — 86140 C-REACTIVE PROTEIN: CPT | Performed by: INTERNAL MEDICINE

## 2022-01-03 PROCEDURE — 99233 SBSQ HOSP IP/OBS HIGH 50: CPT | Performed by: INTERNAL MEDICINE

## 2022-01-03 PROCEDURE — 99232 SBSQ HOSP IP/OBS MODERATE 35: CPT | Performed by: INTERNAL MEDICINE

## 2022-01-03 RX ADMIN — DEXAMETHASONE SODIUM PHOSPHATE 9.04 MG: 4 INJECTION, SOLUTION INTRA-ARTICULAR; INTRALESIONAL; INTRAMUSCULAR; INTRAVENOUS; SOFT TISSUE at 15:00

## 2022-01-03 RX ADMIN — BUDESONIDE AND FORMOTEROL FUMARATE DIHYDRATE 2 PUFF: 160; 4.5 AEROSOL RESPIRATORY (INHALATION) at 11:00

## 2022-01-03 RX ADMIN — ZINC SULFATE 220 MG (50 MG) CAPSULE 220 MG: CAPSULE at 10:48

## 2022-01-03 RX ADMIN — POTASSIUM CHLORIDE 30 MEQ: 1500 TABLET, EXTENDED RELEASE ORAL at 15:03

## 2022-01-03 RX ADMIN — INSULIN LISPRO 3 UNITS: 100 INJECTION, SOLUTION INTRAVENOUS; SUBCUTANEOUS at 11:18

## 2022-01-03 RX ADMIN — BARICITINIB 2 MG: 2 TABLET, FILM COATED ORAL at 14:52

## 2022-01-03 RX ADMIN — CEFEPIME HYDROCHLORIDE 2000 MG: 2 INJECTION, SOLUTION INTRAVENOUS at 01:42

## 2022-01-03 RX ADMIN — VANCOMYCIN HYDROCHLORIDE 750 MG: 750 INJECTION, SOLUTION INTRAVENOUS at 15:53

## 2022-01-03 RX ADMIN — CEFEPIME HYDROCHLORIDE 2000 MG: 2 INJECTION, SOLUTION INTRAVENOUS at 14:45

## 2022-01-03 RX ADMIN — SULFASALAZINE 500 MG: 500 TABLET ORAL at 11:16

## 2022-01-03 RX ADMIN — VANCOMYCIN HYDROCHLORIDE 750 MG: 750 INJECTION, SOLUTION INTRAVENOUS at 03:15

## 2022-01-03 RX ADMIN — SULFASALAZINE 500 MG: 500 TABLET ORAL at 22:07

## 2022-01-03 RX ADMIN — INSULIN LISPRO 1 UNITS: 100 INJECTION, SOLUTION INTRAVENOUS; SUBCUTANEOUS at 11:18

## 2022-01-03 RX ADMIN — OXYCODONE HYDROCHLORIDE AND ACETAMINOPHEN 1000 MG: 500 TABLET ORAL at 10:48

## 2022-01-03 RX ADMIN — FUROSEMIDE 40 MG: 10 INJECTION, SOLUTION INTRAVENOUS at 11:19

## 2022-01-03 RX ADMIN — ALLOPURINOL 200 MG: 100 TABLET ORAL at 10:48

## 2022-01-03 RX ADMIN — ENOXAPARIN SODIUM 30 MG: 100 INJECTION SUBCUTANEOUS at 22:07

## 2022-01-03 RX ADMIN — INSULIN LISPRO 1 UNITS: 100 INJECTION, SOLUTION INTRAVENOUS; SUBCUTANEOUS at 16:48

## 2022-01-03 RX ADMIN — DEXAMETHASONE SODIUM PHOSPHATE 9.04 MG: 4 INJECTION, SOLUTION INTRA-ARTICULAR; INTRALESIONAL; INTRAMUSCULAR; INTRAVENOUS; SOFT TISSUE at 01:42

## 2022-01-03 RX ADMIN — B-COMPLEX W/ C & FOLIC ACID TAB 1 TABLET: TAB at 10:48

## 2022-01-03 RX ADMIN — CHOLECALCIFEROL (VITAMIN D3) 10 MCG (400 UNIT) TABLET 400 UNITS: at 10:48

## 2022-01-03 RX ADMIN — POTASSIUM CHLORIDE 30 MEQ: 1500 TABLET, EXTENDED RELEASE ORAL at 22:07

## 2022-01-03 RX ADMIN — INSULIN LISPRO 3 UNITS: 100 INJECTION, SOLUTION INTRAVENOUS; SUBCUTANEOUS at 16:48

## 2022-01-03 RX ADMIN — ENOXAPARIN SODIUM 30 MG: 100 INJECTION SUBCUTANEOUS at 10:57

## 2022-01-03 RX ADMIN — INSULIN GLARGINE 12 UNITS: 100 INJECTION, SOLUTION SUBCUTANEOUS at 10:49

## 2022-01-03 RX ADMIN — CLOPIDOGREL BISULFATE 75 MG: 75 TABLET ORAL at 10:49

## 2022-01-03 RX ADMIN — ATORVASTATIN CALCIUM 40 MG: 40 TABLET, FILM COATED ORAL at 15:53

## 2022-01-03 RX ADMIN — SULFASALAZINE 500 MG: 500 TABLET ORAL at 17:03

## 2022-01-03 RX ADMIN — BUDESONIDE AND FORMOTEROL FUMARATE DIHYDRATE 2 PUFF: 160; 4.5 AEROSOL RESPIRATORY (INHALATION) at 17:03

## 2022-01-03 NOTE — OCCUPATIONAL THERAPY NOTE
Occupational Therapy Evaluation     Patient Name: Geovani Alexis  WMOMO'X Date: 1/3/2022  Problem List  Principal Problem:    Pneumonia due to COVID-19 virus  Active Problems:    Chronic kidney disease, stage 3 ([place] [address] )    Primary hypertension    Type 2 diabetes mellitus with stage 3a chronic kidney disease, without long-term current use of insulin ([place] [address] )    Ulcerative rectosigmoiditis without complication ([facility])    CHADWICK (acute kidney injury) ([place] [address] )    NSVT (nonsustained ventricular tachycardia) ([facility])    Vasovagal syncope    Acute respiratory failure with hypoxia [facility])    Past Medical History  Past Medical History:   Diagnosis Date    CVA (cerebral vascular accident) ([place] [address] )     Diverticulitis     Gout     Hypercholesterolemia     Hypertension     Renal disorder      Past Surgical History  Past Surgical History:   Procedure Laterality Date    CARDIAC LOOP RECORDER      COLONOSCOPY  09/18/2006    complete; 10 yrs    COLONOSCOPY  10/23/2017    complete; 5 yrs    COLONOSCOPY  05/06/2020    Severe active left-sided colitis, erythematous and ulcerated mucosa in the rectosigmoid, inflammatory polyp         01/03/22 1000   OT Last Visit   OT Visit Date 01/03/22   Note Type   Note type Evaluation   Restrictions/Precautions   Other Precautions Contact/isolation; Airborne/isolation;Droplet precautions;O2  (COVID-19)   Pain Assessment   Pain Assessment Tool 0-10   Pain Score No Pain   Home Living   Type of [address] [place] [address] One level  (4 XAVIER)   Bathroom Shower/Tub Tub/shower unit   Bathroom Toilet Standard   Bathroom Equipment Shower chair   Bathroom Accessibility Accessible   Home Equipment Walker;Cane   Prior Function   Level of [place] Independent with ADLs and functional mobility   Lives With Spouse   Receives Help From Family   ADL Assistance Independent   IADLs Independent   Falls in the last 6 months 1 to 4   Subjective   Subjective Pt received in supine position   Pt agreeable to session   ADL   Eating Assistance 7  Independent   Grooming Assistance 7  Independent   UB Bathing Assistance 5  Supervision/Setup   LB Bathing Assistance 5  Supervision/Setup   UB Dressing Assistance 5  Supervision/Setup   LB Dressing Assistance 5  Supervision/Setup   Toileting Assistance  5  Supervision/Setup   Bed Mobility   Supine to Sit 6  Modified independent   Additional Comments Pt remained seated in recliner by end of session   Transfers   Sit to Stand 6  Modified independent   Stand to Sit 6  Modified independent   Stand pivot 5  Supervision   Additional items   (no AD)   Balance   Static Sitting Normal   Dynamic Sitting Normal   Static Standing Good   Dynamic Standing Good   Activity Tolerance   Activity Tolerance Patient limited by fatigue  (SpO2 at low 80s w/ activity  Required use of non-rebreather to recover  Able to maintain at 99% with mask)   Medical Staff Made Aware PT Bing   Nurse Made Aware RN Doneta Ice Assessment   RUE Assessment WFL   LUE Assessment   LUE Assessment WFL   Cognition   Overall Cognitive Status WFL   Arousal/Participation Alert   Attention Within functional limits   Orientation Level Oriented X4   Memory Within functional limits   Following Commands Follows all commands and directions without difficulty   Assessment   Assessment Pt is a 80 y o  male seen for OT evaluation at [place] [address], admitted 12/28/2021 w/ Pneumonia due to COVID-19 virus  OT completed moderate review of pt's medical and social history  Comorbidities affecting pt's functional performance at time of assessment include: CKD, type II DM, NSVT, HX OF CVA, etc (see chart for additional hx)  Personal factors affecting pt at time of IE include:steps to enter environment  Prior to admission, pt was living with wife in house with steps to enter  Pt was I w/  ADLS and IADLS, & required no use of DME PTA   Upon evaluation: Pt requires mod I for bed mobility, sup-mod I for functional mobility/transfers, supervision for UB ADLs and supervision for LB ADLS 2* the following deficits impacting occupational performance: decreased tolerance  Pt educated on HEP, proning, and importance of OOB activity  No further OT needs indicated  Based on findings, pt is of moderate complexity  The patient's raw score on the AM-PAC Daily Activity inpatient short form is 24, standardized score is 57 54, greater than 39 4  Patients at this level are likely to benefit from DC to home  Please refer to the recommendation of the Occupational Therapist for safe DC planning  At this time, OT recommendations at time of discharge are home with family support     Goals   Patient Goals Pt wishes to get home   Plan   OT Frequency Eval only   Recommendation   OT Discharge Recommendation No rehabilitation needs  (Recommend home with family support)   [facility] Daily Activity Inpatient   Lower Body Dressing 4   Bathing 4   Toileting 4   Upper Body Dressing 4   Grooming 4   Eating 4   Daily Activity Raw Score 24   Daily Activity Standardized Score (Calc for Raw Score >=11) 57 54   [facility] Applied Cognition Inpatient   Following a Speech/Presentation 4   Understanding Ordinary Conversation 4   Taking Medications 4   Remembering Where Things Are Placed or Put Away 4   Remembering List of 4-5 Errands 4   Taking Care of Complicated Tasks 4   Applied Cognition Raw Score 24   Applied Cognition Standardized Score 62 21           Tera Kawasaki, OTR/L

## 2022-01-03 NOTE — ASSESSMENT & PLAN NOTE
Patient developed chills, fevers and dry cough about 3-4 days before admission  He is fully vaccinated with the [place] vaccine  Chest x-ray showed bilateral COVID pneumonia  Patient feels subjectively better but he still on 15 liters/minute mid flow oxygen    Will continue baricitinib, remdesivir, high-dose IV Decadron  Continue cefepime and vancomycin     S/p  Lasix 40 mg IV Jan 2     Blood culture from December 29th grows contaminants    Repeat blood culture showed no growth

## 2022-01-03 NOTE — ASSESSMENT & PLAN NOTE
Patient had a syncopal episode of very brief duration while urinating  Suspect vasovagal reaction  Doubt seizure  Patient had no postictal confusion  Doubt CVA or TIA      Will monitor patient on telemetry since she he has a history of nonsustained V-tach  S/p hydration

## 2022-01-03 NOTE — PLAN OF CARE
Problem: PAIN - ADULT  Goal: Verbalizes/displays adequate comfort level or baseline comfort level  Description: Interventions:  - Encourage patient to monitor pain and request assistance  - Assess pain using appropriate pain scale  - Administer analgesics based on type and severity of pain and evaluate response  - Implement non-pharmacological measures as appropriate and evaluate response  - Consider cultural and social influences on pain and pain management  - Notify physician/advanced practitioner if interventions unsuccessful or patient reports new pain  Outcome: Progressing     Problem: INFECTION - ADULT  Goal: Absence or prevention of progression during hospitalization  Description: INTERVENTIONS:  - Assess and monitor for signs and symptoms of infection  - Monitor lab/diagnostic results  - Monitor all insertion sites, i e  indwelling lines, tubes, and drains  - Monitor endotracheal if appropriate and nasal secretions for changes in amount and color  - [place] appropriate cooling/warming therapies per order  - Administer medications as ordered  - Instruct and encourage patient and family to use good hand hygiene technique  - Identify and instruct in appropriate isolation precautions for identified infection/condition  Outcome: Progressing  Goal: Absence of fever/infection during neutropenic period  Description: INTERVENTIONS:  - Monitor WBC    Outcome: Progressing     Problem: SAFETY ADULT  Goal: Patient will remain free of falls  Description: INTERVENTIONS:  - Educate patient/family on patient safety including physical limitations  - Instruct patient to call for assistance with activity   - Consult OT/PT to assist with strengthening/mobility   - Keep Call bell within reach  - Keep bed low and locked with side rails adjusted as appropriate  - Keep care items and personal belongings within reach  - Initiate and maintain comfort rounds  - Make Fall Risk Sign visible to staff  - Offer Toileting every 2 Hours, in advance of need  - Initiate/Maintain bed alarm  - Obtain necessary fall risk management equipment:   - Apply yellow socks and bracelet for high fall risk patients  - Consider moving patient to room near nurses station  Outcome: Progressing  Goal: Maintain or return to baseline ADL function  Description: INTERVENTIONS:  -  Assess patient's ability to carry out ADLs; assess patient's baseline for ADL function and identify physical deficits which impact ability to perform ADLs (bathing, care of mouth/teeth, toileting, grooming, dressing, etc )  - Assess/evaluate cause of self-care deficits   - Assess range of motion  - Assess patient's mobility; develop plan if impaired  - Assess patient's need for assistive devices and provide as appropriate  - Encourage maximum independence but intervene and supervise when necessary  - Involve family in performance of ADLs  - Assess for home care needs following discharge   - Consider OT consult to assist with ADL evaluation and planning for discharge  - Provide patient education as appropriate  Outcome: Progressing  Goal: Maintains/Returns to pre admission functional level  Description: INTERVENTIONS:  - Perform BMAT or MOVE assessment daily    - Set and communicate daily mobility goal to care team and patient/family/caregiver  - Collaborate with rehabilitation services on mobility goals if consulted  - Perform Range of Motion 2 times a day  - Reposition patient every 2 hours    - Dangle patient 2 times a day  - Stand patient 2 times a day  - Ambulate patient 2 times a day  - Out of bed to chair 2 times a day   - Out of bed for meals 2 times a day  - Out of bed for toileting  - Record patient progress and toleration of activity level   Outcome: Progressing     Problem: DISCHARGE PLANNING  Goal: Discharge to home or other facility with appropriate resources  Description: INTERVENTIONS:  - Identify barriers to discharge w/patient and caregiver  - Arrange for needed discharge resources and transportation as appropriate  - Identify discharge learning needs (meds, wound care, etc )  - Arrange for interpretive services to assist at discharge as needed  - Refer to Case Management Department for coordinating discharge planning if the patient needs post-hospital services based on physician/advanced practitioner order or complex needs related to functional status, cognitive ability, or social support system  Outcome: Progressing     Problem: Knowledge Deficit  Goal: Patient/family/caregiver demonstrates understanding of disease process, treatment plan, medications, and discharge instructions  Description: Complete learning assessment and assess knowledge base    Interventions:  - Provide teaching at level of understanding  - Provide teaching via preferred learning methods  Outcome: Progressing     Problem: Potential for Falls  Goal: Patient will remain free of falls  Description: INTERVENTIONS:  - Educate patient/family on patient safety including physical limitations  - Instruct patient to call for assistance with activity   - Consult OT/PT to assist with strengthening/mobility   - Keep Call bell within reach  - Keep bed low and locked with side rails adjusted as appropriate  - Keep care items and personal belongings within reach  - Initiate and maintain comfort rounds  - Make Fall Risk Sign visible to staff  - Offer Toileting every 2 Hours, in advance of need  - Initiate/Maintain bed alarm  - Obtain necessary fall risk management equipment:   - Apply yellow socks and bracelet for high fall risk patients  - Consider moving patient to room near nurses station  Outcome: Progressing

## 2022-01-03 NOTE — PLAN OF CARE
Problem: PAIN - ADULT  Goal: Verbalizes/displays adequate comfort level or baseline comfort level  Description: Interventions:  - Encourage patient to monitor pain and request assistance  - Assess pain using appropriate pain scale  - Administer analgesics based on type and severity of pain and evaluate response  - Implement non-pharmacological measures as appropriate and evaluate response  - Consider cultural and social influences on pain and pain management  - Notify physician/advanced practitioner if interventions unsuccessful or patient reports new pain  Outcome: Progressing     Problem: INFECTION - ADULT  Goal: Absence or prevention of progression during hospitalization  Description: INTERVENTIONS:  - Assess and monitor for signs and symptoms of infection  - Monitor lab/diagnostic results  - Monitor all insertion sites, i e  indwelling lines, tubes, and drains  - Monitor endotracheal if appropriate and nasal secretions for changes in amount and color  - [place] appropriate cooling/warming therapies per order  - Administer medications as ordered  - Instruct and encourage patient and family to use good hand hygiene technique  - Identify and instruct in appropriate isolation precautions for identified infection/condition  Outcome: Progressing  Goal: Absence of fever/infection during neutropenic period  Description: INTERVENTIONS:  - Monitor WBC    Outcome: Progressing     Problem: SAFETY ADULT  Goal: Patient will remain free of falls  Description: INTERVENTIONS:  - Educate patient/family on patient safety including physical limitations  - Instruct patient to call for assistance with activity   - Consult OT/PT to assist with strengthening/mobility   - Keep Call bell within reach  - Keep bed low and locked with side rails adjusted as appropriate  - Keep care items and personal belongings within reach  - Initiate and maintain comfort rounds  - Make Fall Risk Sign visible to staff  - Offer Toileting every 2 Hours, in advance of need  - Initiate/Maintain bed alarm  - Obtain necessary fall risk management equipment:   - Apply yellow socks and bracelet for high fall risk patients  - Consider moving patient to room near nurses station  Outcome: Progressing  Goal: Maintain or return to baseline ADL function  Description: INTERVENTIONS:  -  Assess patient's ability to carry out ADLs; assess patient's baseline for ADL function and identify physical deficits which impact ability to perform ADLs (bathing, care of mouth/teeth, toileting, grooming, dressing, etc )  - Assess/evaluate cause of self-care deficits   - Assess range of motion  - Assess patient's mobility; develop plan if impaired  - Assess patient's need for assistive devices and provide as appropriate  - Encourage maximum independence but intervene and supervise when necessary  - Involve family in performance of ADLs  - Assess for home care needs following discharge   - Consider OT consult to assist with ADL evaluation and planning for discharge  - Provide patient education as appropriate  Outcome: Progressing  Goal: Maintains/Returns to pre admission functional level  Description: INTERVENTIONS:  - Perform BMAT or MOVE assessment daily    - Set and communicate daily mobility goal to care team and patient/family/caregiver  - Collaborate with rehabilitation services on mobility goals if consulted  - Perform Range of Motion 2 times a day  - Reposition patient every 2 hours    - Dangle patient 2 times a day  - Stand patient 2 times a day  - Ambulate patient 2 times a day  - Out of bed to chair 2 times a day   - Out of bed for meals 2 times a day  - Out of bed for toileting  - Record patient progress and toleration of activity level   Outcome: Progressing     Problem: DISCHARGE PLANNING  Goal: Discharge to home or other facility with appropriate resources  Description: INTERVENTIONS:  - Identify barriers to discharge w/patient and caregiver  - Arrange for needed discharge resources and transportation as appropriate  - Identify discharge learning needs (meds, wound care, etc )  - Arrange for interpretive services to assist at discharge as needed  - Refer to Case Management Department for coordinating discharge planning if the patient needs post-hospital services based on physician/advanced practitioner order or complex needs related to functional status, cognitive ability, or social support system  Outcome: Progressing     Problem: Knowledge Deficit  Goal: Patient/family/caregiver demonstrates understanding of disease process, treatment plan, medications, and discharge instructions  Description: Complete learning assessment and assess knowledge base    Interventions:  - Provide teaching at level of understanding  - Provide teaching via preferred learning methods  Outcome: Progressing     Problem: Potential for Falls  Goal: Patient will remain free of falls  Description: INTERVENTIONS:  - Educate patient/family on patient safety including physical limitations  - Instruct patient to call for assistance with activity   - Consult OT/PT to assist with strengthening/mobility   - Keep Call bell within reach  - Keep bed low and locked with side rails adjusted as appropriate  - Keep care items and personal belongings within reach  - Initiate and maintain comfort rounds  - Make Fall Risk Sign visible to staff  - Offer Toileting every 2 Hours, in advance of need  - Initiate/Maintain bed alarm  - Obtain necessary fall risk management equipment:   - Apply yellow socks and bracelet for high fall risk patients  - Consider moving patient to room near nurses station  Outcome: Progressing     Problem: MOBILITY - ADULT  Goal: Maintain or return to baseline ADL function  Description: INTERVENTIONS:  -  Assess patient's ability to carry out ADLs; assess patient's baseline for ADL function and identify physical deficits which impact ability to perform ADLs (bathing, care of mouth/teeth, toileting, grooming, dressing, etc )  - Assess/evaluate cause of self-care deficits   - Assess range of motion  - Assess patient's mobility; develop plan if impaired  - Assess patient's need for assistive devices and provide as appropriate  - Encourage maximum independence but intervene and supervise when necessary  - Involve family in performance of ADLs  - Assess for home care needs following discharge   - Consider OT consult to assist with ADL evaluation and planning for discharge  - Provide patient education as appropriate  Outcome: Progressing  Goal: Maintains/Returns to pre admission functional level  Description: INTERVENTIONS:  - Perform BMAT or MOVE assessment daily    - Set and communicate daily mobility goal to care team and patient/family/caregiver  - Collaborate with rehabilitation services on mobility goals if consulted  - Perform Range of Motion 2 times a day  - Reposition patient every 2 hours    - Dangle patient 2 times a day  - Stand patient 2 times a day  - Ambulate patient 2 times a day  - Out of bed to chair 2 times a day   - Out of bed for meals 2 times a day  - Out of bed for toileting  - Record patient progress and toleration of activity level   Outcome: Progressing     Problem: Prexisting or High Potential for Compromised Skin Integrity  Goal: Skin integrity is maintained or improved  Description: INTERVENTIONS:  - Identify patients at risk for skin breakdown  - Assess and monitor skin integrity  - Assess and monitor nutrition and hydration status  - Monitor labs   - Assess for incontinence   - Turn and reposition patient  - Assist with mobility/ambulation  - Relieve pressure over bony prominences  - Avoid friction and shearing  - Provide appropriate hygiene as needed including keeping skin clean and dry  - Evaluate need for skin moisturizer/barrier cream  - Collaborate with interdisciplinary team   - Patient/family teaching  - Consider wound care consult   Outcome: Progressing

## 2022-01-03 NOTE — CASE MANAGEMENT
Case Management Discharge Planning Note    Patient name José Miguel Menezes  Location [address] [id number] MRN [id number]  : 1950 Date 1/3/2022       Current Admission Date: 2021  Current Admission Diagnosis:Pneumonia due to COVID-19 virus   Patient Active Problem List    Diagnosis Date Noted    Acute respiratory failure with hypoxia ([facility] [address] ) 2021    Pneumonia due to COVID-19 virus 2021    Vasovagal syncope 2021    Elevated hemoglobin ([facility] [address] ) 2021    Bilateral carotid artery stenosis 2020    NSVT (nonsustained ventricular tachycardia) ([facility] [address] ) 2020    Witnessed episode of apnea     CHADWICK (acute kidney injury) ([facility] [address] ) 2020    Transient alteration of awareness 2020    History of stroke 2020    Erythrocytosis 2019    Ulcerative rectosigmoiditis without complication ([facility] [address] [facility] )     Dyslipidemia 2018    Microalbuminuria 2016    Type 2 diabetes mellitus with stage 3a chronic kidney disease, without long-term current use of insulin ([facility] [address] ) 2016    Obesity 10/24/2015    Colon, diverticulosis 2013    Elevated C-reactive protein 2013    Chronic kidney disease, stage 3 ([facility] [address] ) 05/15/2012    Elevated PSA 05/15/2012    Essential hypertriglyceridemia 05/15/2012    Gout 05/15/2012    Primary hypertension 05/15/2012      LOS (days): 6  Geometric Mean LOS (GMLOS) (days): 5 40  Days to GMLOS:-0 8     OBJECTIVE:  Risk of Unplanned Readmission Score: 15         Current admission status: Inpatient   Preferred Pharmacy:   [facility] [phone]  [facility] [address]  Phone: [phone] Fax: [phone]    Primary Care Provider: Florian Katz DO    Primary Insurance: MEDICARE  Secondary Insurance: Luis Wagner    DISCHARGE DETAILS:  Patient was cleared by PT for no rehab needs  Patient may need Home O2  CM to follow

## 2022-01-03 NOTE — PROGRESS NOTES
NEPHROLOGY PROGRESS NOTE   Tadeo Abbasi 80 y o  male MRN: [id number]  Unit/Bed#: -01 Encounter: [id number]      ASSESSMENT/PLAN:  1  CHADWICK: likely ATN in the setting of covid vs prerenal  Admitted with creatinine of 1 8 which originally improved down to 1 6 with IVF but then worsened  He was started on IV lasix on 12/31 and creatinine since has been trending down to 1 45 today   · UA:  Small leukocytes, moderate bacteria, no protein, no blood  · Continue Lasix 40 mg IV daily  · Weight increased 0 3 kg today however this is bed scale weights and may be inaccurate  2  CKD stage 3: baseline creatinine around 1 2-1 5 recently when at steady state   3  Acute hypoxic respiratory failure due to COVID-19 pneumonia:  Worsened again this morning and now on non-rebreather  · Chest x-ray 12/30:  Bilateral multifocal ground-glass opacities most likely due to COVID-19 pneumonia, worsening  4  Hypokalemia:  Potassium 3 3 today    Plan Summary:    Continue IV Lasix   Check a m  BMP   Strict I/O as difficult to tell if he is responding to IV lasix     SUBJECTIVE:  Patient had acute hypoxia this morning and was placed on non-rebreather  He is currently feeling a little bit better  He is not eating or drinking very well      OBJECTIVE:  Current Weight: Weight - Scale: 87 kg (191 lb 12 8 oz)  Vitals:    01/03/22 0745   BP: 141/86   Pulse: 85   Resp: 18   Temp: (!) 97 4 °F (36 3 °C)   SpO2: (!) 89%       Intake/Output Summary (Last 24 hours) at 1/3/2022 0959  Last data filed at 1/3/2022 2509  Gross per 24 hour   Intake --   Output 875 ml   Net -875 ml     To limit exposure to covid-19 the exam was done through the window     General:  No acute distress, on nonrebreather mask  Skin:  No rash  Eyes:  Sclerae anicteric  ENT:  Moist mucous membranes  Neck:  Trachea midline with no JVD  Chest:  Clear to auscultation bilaterally  CVS:  Regular rate and rhythm  Abdomen:  Soft, nontender, nondistended  Extremities:  No edema  Neuro: Awake and alert  Psych:  Appropriate affect      Medications:  Scheduled Meds:  Current Facility-Administered Medications   Medication Dose Route Frequency Provider Last Rate    acetaminophen  650 mg Oral Q6H PRN Dayne Javier MD      allopurinol  200 mg Oral Daily Dayne Javier MD      ascorbic acid  1,000 mg Oral Daily Johnson Green PA-C      atorvastatin  40 mg Oral Daily With 90 Vietvalerie John MD      Baricitinib  2 mg Oral Q24H Dayne Javier MD      budesonide-formoterol  2 puff Inhalation BID Johnson Green PA-C      cefepime  2,000 mg Intravenous Q12H ARTURO OakleyNP 2,000 mg (01/03/22 0142)    cholecalciferol  400 Units Oral Daily Johnson Green PA-C      clopidogrel  75 mg Oral Daily Dayne Javier MD      dexamethasone  0 1 mg/kg Intravenous Q12H Dayne Javier MD      enoxaparin  30 mg Subcutaneous Q12H [address] Dayne Javier MD      furosemide  40 mg Intravenous Daily [place], [place]      insulin glargine  12 Units Subcutaneous QAM Dayne Javier MD      insulin lispro  1-5 Units Subcutaneous TID AC Dayne Javier MD      insulin lispro  3 Units Subcutaneous TID With Meals Dayne Javier MD      multivitamin stress formula  1 tablet Oral Daily Johnson Green PA-C      ondansetron  4 mg Intravenous Q6H PRN Dayne Javier MD      sulfaSALAzine  500 mg Oral 4x Daily Dayne Javier MD      vancomycin  750 mg Intravenous Q12H ROSAILNA Oakley 750 mg (01/03/22 0315)    zinc sulfate  220 mg Oral Daily Johnson Green PA-C         PRN Meds:   acetaminophen    ondansetron    Laboratory Results:  Results from last 7 days   Lab Units 01/03/22  0520 01/02/22  0339 01/01/22  0453 12/31/21  0425 12/30/21  0545 12/29/21  0633 12/28/21  0540   WBC Thousand/uL  --  7 39 5 82 4 97 7 63 7 45 6 71   HEMOGLOBIN g/dL  --  13 0 13 7 14 2 13 4 13 6 15 3   HEMATOCRIT %  --  42 0 43 9 45 1 43 6 45 2 51 6*   PLATELETS Thousands/uL  --  292 292 268 224 225 234   SODIUM mmol/L 140 139 140 136 132* 134* 138   POTASSIUM mmol/L 3 3* 3 7 3 4* 3 4* 3 8 4 0 3 4*   CHLORIDE mmol/L 106 107 106 103 102 106 103   CO2 mmol/L 24 24 25 24 18* 17* 24   BUN mg/dL 34* 42* 45* 45* 35* 24 20   CREATININE mg/dL 1 45* 1 59* 1 70* 1 84* 1 72* 1 63* 1 80*   CALCIUM mg/dL 7 7* 7 8* 7 9* 8 1* 8 2* 8 6 9 1

## 2022-01-03 NOTE — ASSESSMENT & PLAN NOTE
Lab Results   Component Value Date    EGFR 48 01/03/2022    EGFR 43 01/02/2022    EGFR 39 01/01/2022    CREATININE 1 45 (H) 01/03/2022    CREATININE 1 59 (H) 01/02/2022    CREATININE 1 70 (H) 01/01/2022     Cr improved, marginally above baseline

## 2022-01-03 NOTE — PHYSICAL THERAPY NOTE
PHYSICAL THERAPY Evaluation    Physical Therapy Evaluation    Performed at least 2 patient identifiers during session:  Patient Active Problem List   Diagnosis    Chronic kidney disease, stage 3 (HCC)    Colon, diverticulosis    Elevated C-reactive protein    Elevated PSA    Essential hypertriglyceridemia    Gout    Primary hypertension    Microalbuminuria    Obesity    Type 2 diabetes mellitus with stage 3a chronic kidney disease, without long-term current use of insulin (HCC)    Ulcerative rectosigmoiditis without complication (HCC)    Dyslipidemia    Erythrocytosis    History of stroke    Transient alteration of awareness    [facility]ADWICK (acute kidney injury) ([facility] [address] )    Witnessed episode of apnea    [facility]    Bilateral carotid artery stenosis    Elevated hemoglobin (HCC)    Pneumonia due to COVID-19 virus    Vasovagal syncope    Acute respiratory failure with hypoxia (HCC)       Past Medical History:   Diagnosis Date    [facility]     Diverticulitis     Gout     Hypercholesterolemia     Hypertension     Renal disorder        Past Surgical History:   Procedure Laterality Date    CARDIAC LOOP RECORDER      COLONOSCOPY  09/18/2006    complete; 10 yrs    COLONOSCOPY  10/23/2017    complete; 5 yrs    COLONOSCOPY  05/06/2020    Severe active left-sided colitis, erythematous and ulcerated mucosa in the rectosigmoid, inflammatory polyp          01/03/22 0959   PT Last Visit   PT Visit Date 01/03/22   Note Type   Note type Evaluation   Pain Assessment   Pain Assessment Tool 0-10   Pain Score No Pain   Restrictions/Precautions   Other Precautions Contact/isolation; Airborne/isolation;Droplet precautions;O2  (COVID +)   Home Living   Type of [address] [place] [address] One level  (4 XAVIER)   Bathroom Shower/Tub Tub/shower unit   Bathroom Toilet Standard [address] [place] [address] chair  (pt does not use but chair is available)   Home Equipment Walker;Cane   Prior Function   Level of [place] Independent with ADLs and functional mobility   Lives With Spouse   Receives Help From Family   ADL Assistance Independent   IADLs Independent   Falls in the last 6 months 1 to 4   General   Additional Pertinent History SpO2 on 15L mid flow at rest:  91%, with mobility:  83%;  donned NRB, SpO2 recovered to 90% within 30 seconds  Family/Caregiver Present No   Cognition   Overall Cognitive Status WFL   Arousal/Participation Alert   Orientation Level Oriented X4   Memory Within functional limits   Following Commands Follows all commands and directions without difficulty   Subjective   Subjective pt states he is doing ok   RUE Assessment   RUE Assessment WFL   LUE Assessment   LUE Assessment WFL   RLE Assessment   RLE Assessment WFL   LLE Assessment   LLE Assessment WFL   Bed Mobility   Supine to Sit 6  Modified independent   Additional Comments pt remains seated OOB in chair at end of PT session   Transfers   Sit to Stand 6  Modified independent   Stand to Sit 6  Modified independent   Stand pivot 6  Modified independent   Ambulation/Elevation   Gait pattern Excessively slow   Gait Assistance 6  Modified independent   Assistive Device Rolling walker   Distance pt limited by mid-flow nasal cannula with SpO2 83% after stand pivot transfer;  pt uses NRB and SpO2 increases to 90% within 30 seconds;  appears to have strength, balance, coordination for ambulation when lines/tubes allow  No need for skilled PT, safe for ambulation and transfers with nursing staff;  OOB for all meals and ambulate TID as lines/SpO2 allow  Balance   Static Sitting Normal   Dynamic Sitting Normal   Static Standing Good   Dynamic Standing Good   Ambulatory Good   Assessment   Prognosis Guarded   Assessment Pt is a 80 y o  male who presented to ED with syncope, cough, congestion  Dx:  COVID, syncope  Comorbidities affecting pt's physical performance at time of assessment include: CVA, gout  Personal factors affecting pt at time of IE include: lines/tubes, increased O2 needs, decreased SpO2 with mobility  Prior to admission, pt was independent w/ all functional mobility w/ out AD, ambulated community distances and elevations, lives with wife in single level home  Upon evaluation: Pt mod I for bed mobility, mod I for sit to stand, and mod I for stepping to chair  Pt limited by SpO2, lines/tubes; No strength/balance deficits identified; Appears to have strength, coordination, and ROM to perform safe mobility as lines/tubes/Spo2 allows  Full objective findings from PT assessment regarding body systems outlined above  No need for skilled PT, safe for nursing assisted transfers and ambulation;  Encourage OOB for all meals and ambulate TID  Will DC PT at this time; The patient's AM-PAC Basic Mobility Inpatient Short Form Raw Score is 24  A Raw score of greater than 16 suggests the patient may benefit from discharge to home  Please also refer to the recommendation of the Physical Therapist for safe discharge planning     Goals   Patient Goals to go home   Recommendation   PT Discharge Recommendation No rehabilitation needs  (may benefit from OP pulmonary rehab)   AM-PAC Basic Mobility Inpatient   Turning in Bed Without Bedrails 4   Lying on Back to Sitting on Edge of Flat Bed 4   Moving Bed to Chair 4   Standing Up From Chair 4   Walk in Room 4   Climb 3-5 Stairs 4   Basic Mobility Inpatient Raw Score 24   Basic Mobility Standardized Score 57 68   Highest Level Of Mobility   JH-HLM Goal 8: Walk 250 feet or more   JH-HLM Highest Level of Mobility 4: Move to chair/commode   JH-HLM Goal Achieved No     Karin Marquez, PT      Patient Name: José Miguel Menezes  VRFJG'[id number] Date: 1/3/2022

## 2022-01-03 NOTE — ASSESSMENT & PLAN NOTE
Patient has chronic hypertension    Systolic blood pressure was 100 this morning    bp meds amlodipine and metoprolol held  to avoid further hypotension and worsening renal function

## 2022-01-03 NOTE — PROGRESS NOTES
Progress Note - Pulmonary   Mellisa Covington 80 y o  male MRN: [id number]  Unit/Bed#: -01 Encounter: [id number]    Assessment & Plan:  Acute hypoxic respiratory failure  COVID-19 pneumonia with possible superimposed bacterial infection  Volume overload with likely RV dysfunction and diastolic dysfunction  CHADWICK    Patient requiring 15 L  No longer requiring NRB  Continue to monitor SpO2 and titrate as able to maintain saturations greater than 89%  Continue treatment per moderate COVID-19 pathway  Atorvastatin  Dexamethasone - 0 1mg/kg Q 12H  Remdesivir x5 days completed  Baricitinib x 14 days  Anticoagulation: Lovenox prophylaxis (D-dimer < 2)  Antibiotics: cefepime and vancomycin  Procal minimally elevated  ?2/2 CHADWICK  MRSA (-) consider d/c vanc  · Encourage self-proning, activity as tolerated, incentive spirometry    Discussed with nursing at bedside, Dr Maricel Zavala:   Patient says his breathing is better today    Objective:     Vitals: Blood pressure 141/86, pulse 85, temperature (!) 97 4 °F (36 3 °C), resp  rate 18, weight 87 kg (191 lb 12 8 oz), SpO2 92 %  ,Body mass index is 28 32 kg/m²  Intake/Output Summary (Last 24 hours) at 1/3/2022 1322  Last data filed at 1/3/2022 9096  Gross per 24 hour   Intake --   Output 875 ml   Net -875 ml       Invasive Devices  Report    Peripheral Intravenous Line            Peripheral IV 01/02/22 Right Antecubital <1 day                Physical Exam:   General appearance: Alert and oriented, in no acute distress  Head: Normocephalic, without obvious abnormality, atraumatic  Eyes: EOMI  No discharge bilaterally  No scleral icterus  Neck: Supple, symmetrical, trachea midline  Lungs: Decreased breath sounds   Heart: Regular rate and rhythm, S1, S2 normal, no murmur  Abdomen:  No appreciable distension or tenderness  Extremities: no edema or tenderness  Skin: Warm and dry  Neurologic: No acute focal deficits are noted    Labs:  I have personally reviewed pertinent lab results  Imaging and other studies: I have personally reviewed pertinent reports

## 2022-01-03 NOTE — ASSESSMENT & PLAN NOTE
Patient had acute kidney injury present on admission on stage III chronic kidney disease    His baseline creatinine is 1 2-1 5         Patient's renal function improved after Lasix on December 31st and January 1st and Jan 2  Will continue to monitor renal function

## 2022-01-03 NOTE — PROGRESS NOTES
[place]  Progress Note - Aye Julia 1950, 80 y o  male MRN: [id number]  Unit/Bed#: -Dylan Encounter: [id number]  Primary Care Provider: Regine Norman DO   Date and time admitted to hospital: 12/28/2021  4:54 AM    Acute respiratory failure with hypoxia [facility])  Assessment & Plan  Patient developed acute hypoxic respiratory failure after hospital admission due to COVID pneumonia  Currently he is on 15 liters/minute mid flow with oxygen saturations of 89-94%    Continue treatments for COVID pneumonia as well as mid flow oxygen  Vasovagal syncope  Assessment & Plan  Patient had a syncopal episode of very brief duration while urinating  Suspect vasovagal reaction  Doubt seizure  Patient had no postictal confusion  Doubt CVA or TIA  Will monitor patient on telemetry since she he has a history of nonsustained V-tach  S/p hydration    NSVT (nonsustained ventricular tachycardia) ([facility] [address] )  Assessment & Plan  Patient's history of nonsustained ventricular tachycardia  EKG shows normal sinus rhythm with normal intervals  Will continue metoprolol    Will monitor patient on telemetry    CHADWICK (acute kidney injury) [facility])  Assessment & Plan  Patient had acute kidney injury present on admission on stage III chronic kidney disease  His baseline creatinine is 1 2-1 5         Patient's renal function improved after Lasix on December 31st and January 1st and Jan 2  Will continue to monitor renal function    Ulcerative rectosigmoiditis without complication [facility])  Assessment & Plan  Patient has ulcer of colitis  He denies any abdominal pain but had loose stools twice today  Will monitor for diarrhea  Denies signs of GI bleeding      Continue sulfasalazine    Type 2 diabetes mellitus with stage 3a chronic kidney disease, without long-term current use of insulin [facility])  Assessment & Plan  Lab Results   Component Value Date    HGBA1C 6 4 (H) 12/28/2021       Recent Labs 22  1652 22  2119 22  0743 22  1114   POCGLU 165* 219* 158* 164*       Blood Sugar Average: Last 72 hrs:  (P) 182 6     Patient has type 2 diabetes with stage III chronic disease  Patient has hyperglycemia due to steroids  cont Lantus to 12 units and   pre meal Humalog  Primary hypertension  Assessment & Plan  Patient has chronic hypertension  Systolic blood pressure was 100 this morning    bp meds amlodipine and metoprolol held  to avoid further hypotension and worsening renal function    Chronic kidney disease, stage 3 [facility])  Assessment & Plan  Lab Results   Component Value Date    EGFR 48 2022    EGFR 43 2022    EGFR 39 2022    CREATININE 1 45 (H) 2022    CREATININE 1 59 (H) 2022    CREATININE 1 70 (H) 2022     Cr improved, marginally above baseline    * Pneumonia due to COVID-19 virus  Assessment & Plan  Patient developed chills, fevers and dry cough about 3-4 days before admission  He is fully vaccinated with the [place] vaccine  Chest x-ray showed bilateral COVID pneumonia  Patient feels subjectively better but he still on 15 liters/minute mid flow oxygen    Will continue baricitinib, remdesivir, high-dose IV Decadron  Continue cefepime and vancomycin     S/p  Lasix 40 mg IV      Blood culture from  grows contaminants  Repeat blood culture showed no growth              VTE Pharmacologic Prophylaxis:   Pharmacologic: Enoxaparin (Lovenox)    Patient Centered Rounds: I have performed bedside rounds with nursing staff today  Education and Discussions with Family / Patient: Mrs Nery Ly      Current Length of Stay: 6 day(s)    Current Patient Status: Inpatient        Code Status: Level 1 - Full Code      Subjective:     Feels comfortable   Less resp issues as compared to yesterday he reports    Patient is seen and examined at bedside  All other ROS are negative      Objective:     Vitals:   Temp (24hrs), Av 4 °F (36 3 °C), Min:97 2 °F (36 2 °C), Max:97 5 °F (36 4 °C)    Temp:  [97 2 °F (36 2 °C)-97 5 °F (36 4 °C)] 97 4 °F (36 3 °C)  HR:  [74-85] 85  Resp:  [18] 18  BP: (136-146)/(86-88) 141/86  SpO2:  [81 %-100 %] 92 %  Body mass index is 28 32 kg/m²  Input and Output Summary (last 24 hours):        Intake/Output Summary (Last 24 hours) at 1/3/2022 1315  Last data filed at 1/3/2022 4262  Gross per 24 hour   Intake --   Output 875 ml   Net -875 ml       Physical Exam:       GEN: No acute distress, comfortable, o2  HEEENT: No JVD, PERRLA, no scleral icterus  RESP: crackles  CV: RRR, +s1/s2   ABD: SOFT NON TENDER, POSITIVE BOWEL SOUNDS, NO DISTENTION  PSYCH: CALM  NEURO: A X O X 3, NO FOCAL DEFICITS  SKIN: NO RASH  EXTREM: NO EDEMA    Additional Data:     Labs:    Results from last 7 days   Lab Units 01/02/22  0339   WBC Thousand/uL 7 39   HEMOGLOBIN g/dL 13 0   HEMATOCRIT % 42 0   PLATELETS Thousands/uL 292   NEUTROS PCT % 87*   LYMPHS PCT % 4*   MONOS PCT % 8   EOS PCT % 0     Results from last 7 days   Lab Units 01/03/22  0520   SODIUM mmol/L 140   POTASSIUM mmol/L 3 3*   CHLORIDE mmol/L 106   CO2 mmol/L 24   BUN mg/dL 34*   CREATININE mg/dL 1 45*   ANION GAP mmol/L 10   CALCIUM mg/dL 7 7*   ALBUMIN g/dL 2 3*   TOTAL BILIRUBIN mg/dL 0 60   ALK PHOS U/L 65   ALT U/L 74   AST U/L 64*   GLUCOSE RANDOM mg/dL 149*     Results from last 7 days   Lab Units 12/28/21  0540   INR  0 94     Results from last 7 days   Lab Units 01/03/22  1114 01/03/22  0743 01/02/22  2119 01/02/22  1652 01/02/22  1226 01/02/22  0753 01/01/22  2103 01/01/22  1640 01/01/22  1146 01/01/22  0831 12/31/21  2203 12/31/21  1648   POC GLUCOSE mg/dl 164* 158* 219* 165* 172* 192* 230* 207* 159* 201* 186* 180*     Results from last 7 days   Lab Units 12/28/21  1022   HEMOGLOBIN A1C % 6 4*     Results from last 7 days   Lab Units 01/01/22  0453 12/31/21  0425 12/30/21  0545 12/29/21  0634 12/28/21  1022 12/28/21  0540   LACTIC ACID mmol/L  --   --   --   -- --  1 3   PROCALCITONIN ng/ml 0 50* 0 94* 1 36* 1 65* 0 27*  --            * I Have Reviewed All Lab Data Listed Above  Imaging:     Results for orders placed during the hospital encounter of 12/28/21    XR chest portable    Narrative  CHEST    INDICATION:   Worsening hypoxia  Patient has confirmed COVID-19  COMPARISON:  12/28/2021    EXAM PERFORMED/VIEWS:  XR CHEST PORTABLE      FINDINGS:    Loop recorder noted as on prior study  Cardiac silhouette size unchanged    Bilateral multifocal groundglass opacities are present  Interval worsening has occurred  There is no pleural effusion or pneumothorax  Osseous structures appear within normal limits for patient age  Impression  Bilateral multifocal groundglass opacities are present  In the setting of COVID-19 infection, this is most in keeping with COVID 19 pneumonia  There has been interval worsening            Workstation performed: [id number]    No results found for this or any previous visit  *I have reviewed all imaging reports listed above      Recent Cultures (last 7 days):     Results from last 7 days   Lab Units 01/01/22  1135 12/29/21  1539 12/28/21  6922   BLOOD CULTURE  No Growth at 24 hrs  No Growth at 24 hrs  No Growth After 4 Days  Micrococcus luteus* No Growth After 5 Days    Staphylococcus coagulase negative*   GRAM STAIN RESULT   --  Gram positive cocci in clusters* Gram positive cocci in clusters*       Last 24 Hours Medication List:   Current Facility-Administered Medications   Medication Dose Route Frequency Provider Last Rate    acetaminophen  650 mg Oral Q6H PRN Nicole Suarez MD      allopurinol  200 mg Oral Daily Nicole Suarez MD      ascorbic acid  1,000 mg Oral Daily Yvonne Arce PA-C      atorvastatin  40 mg Oral Daily With Janett Randolph MD      Baricitinib  2 mg Oral Q24H Nicole Suarez MD      budesonide-formoterol  2 puff Inhalation BID Yvonne Arce PA-C      cefepime  2,000 mg Intravenous Q12H ROSALINA Martinez 2,000 mg (01/03/22 0142)    cholecalciferol  400 Units Oral Daily Rachael Ornelas PA-C      clopidogrel  75 mg Oral Daily Rickey Johnson MD      dexamethasone  0 1 mg/kg Intravenous Q12H Rickey Johnson MD      enoxaparin  30 mg Subcutaneous Q12H [address] Rickey Johnson MD      furosemide  40 mg Intravenous Daily [place], [place]      insulin glargine  12 Units Subcutaneous QAM Rickye Johnson MD      insulin lispro  1-5 Units Subcutaneous TID AC Rickey Johnson MD      insulin lispro  3 Units Subcutaneous TID With Meals Rickey Johnson MD      multivitamin stress formula  1 tablet Oral Daily Rachael Ornelas PA-C      ondansetron  4 mg Intravenous Q6H PRN Rickey Johnson MD      sulfaSALAzine  500 mg Oral 4x Daily Rickey Johnson MD      vancomycin  750 mg Intravenous Q12H ROSALINA Martinez 750 mg (01/03/22 0315)    zinc sulfate  220 mg Oral Daily Rachael Ornelas PA-C          Today, Patient Was Seen By: Clay Herrera MD    ** Please Note: Dictation voice to text software may have been used in the creation of this document   **

## 2022-01-03 NOTE — ASSESSMENT & PLAN NOTE
Lab Results   Component Value Date    HGBA1C 6 4 (H) 12/28/2021       Recent Labs     01/02/22  1652 01/02/22  2119 01/03/22  0743 01/03/22  1114   POCGLU 165* 219* 158* 164*       Blood Sugar Average: Last 72 hrs:  (P) 182 6     Patient has type 2 diabetes with stage III chronic disease  Patient has hyperglycemia due to steroids  cont Lantus to 12 units and   pre meal Humalog

## 2022-01-04 LAB
ALBUMIN SERPL BCP-MCNC: 2.5 G/DL (ref 3.5–5)
ALP SERPL-CCNC: 86 U/L (ref 46–116)
ALT SERPL W P-5'-P-CCNC: 83 U/L (ref 12–78)
ANION GAP SERPL CALCULATED.3IONS-SCNC: 10 MMOL/L (ref 4–13)
ANISOCYTOSIS BLD QL SMEAR: PRESENT
AST SERPL W P-5'-P-CCNC: 60 U/L (ref 5–45)
BACTERIA BLD CULT: NORMAL
BASOPHILS # BLD MANUAL: 0 THOUSAND/UL (ref 0–0.1)
BASOPHILS NFR MAR MANUAL: 0 % (ref 0–1)
BILIRUB SERPL-MCNC: 0.5 MG/DL (ref 0.2–1)
BUN SERPL-MCNC: 38 MG/DL (ref 5–25)
CALCIUM ALBUM COR SERPL-MCNC: 9.7 MG/DL (ref 8.3–10.1)
CALCIUM SERPL-MCNC: 8.5 MG/DL (ref 8.3–10.1)
CHLORIDE SERPL-SCNC: 106 MMOL/L (ref 100–108)
CO2 SERPL-SCNC: 25 MMOL/L (ref 21–32)
CREAT SERPL-MCNC: 1.68 MG/DL (ref 0.6–1.3)
CRP SERPL QL: 23.9 MG/L
EOSINOPHIL # BLD MANUAL: 0 THOUSAND/UL (ref 0–0.4)
EOSINOPHIL NFR BLD MANUAL: 0 % (ref 0–6)
ERYTHROCYTE [DISTWIDTH] IN BLOOD BY AUTOMATED COUNT: 16.1 % (ref 11.6–15.1)
GFR SERPL CREATININE-BSD FRML MDRD: 40 ML/MIN/1.73SQ M
GLUCOSE SERPL-MCNC: 142 MG/DL (ref 65–140)
GLUCOSE SERPL-MCNC: 150 MG/DL (ref 65–140)
GLUCOSE SERPL-MCNC: 152 MG/DL (ref 65–140)
GLUCOSE SERPL-MCNC: 174 MG/DL (ref 65–140)
GLUCOSE SERPL-MCNC: 181 MG/DL (ref 65–140)
HCT VFR BLD AUTO: 44.5 % (ref 36.5–49.3)
HGB BLD-MCNC: 13.6 G/DL (ref 12–17)
LYMPHOCYTES # BLD AUTO: 0.68 THOUSAND/UL (ref 0.6–4.47)
LYMPHOCYTES # BLD AUTO: 10 % (ref 14–44)
MAGNESIUM SERPL-MCNC: 2.2 MG/DL (ref 1.6–2.6)
MCH RBC QN AUTO: 23.4 PG (ref 26.8–34.3)
MCHC RBC AUTO-ENTMCNC: 30.6 G/DL (ref 31.4–37.4)
MCV RBC AUTO: 77 FL (ref 82–98)
MONOCYTES # BLD AUTO: 0.55 THOUSAND/UL (ref 0–1.22)
MONOCYTES NFR BLD: 8 % (ref 4–12)
NEUTROPHILS # BLD MANUAL: 5.62 THOUSAND/UL (ref 1.85–7.62)
NEUTS BAND NFR BLD MANUAL: 1 % (ref 0–8)
NEUTS SEG NFR BLD AUTO: 81 % (ref 43–75)
OVALOCYTES BLD QL SMEAR: PRESENT
PLATELET # BLD AUTO: 364 THOUSANDS/UL (ref 149–390)
PLATELET BLD QL SMEAR: ADEQUATE
PMV BLD AUTO: 10.6 FL (ref 8.9–12.7)
POIKILOCYTOSIS BLD QL SMEAR: PRESENT
POTASSIUM SERPL-SCNC: 3.6 MMOL/L (ref 3.5–5.3)
PROT SERPL-MCNC: 6.6 G/DL (ref 6.4–8.2)
RBC # BLD AUTO: 5.81 MILLION/UL (ref 3.88–5.62)
SODIUM SERPL-SCNC: 141 MMOL/L (ref 136–145)
WBC # BLD AUTO: 6.85 THOUSAND/UL (ref 4.31–10.16)

## 2022-01-04 PROCEDURE — 85027 COMPLETE CBC AUTOMATED: CPT | Performed by: INTERNAL MEDICINE

## 2022-01-04 PROCEDURE — 85007 BL SMEAR W/DIFF WBC COUNT: CPT | Performed by: INTERNAL MEDICINE

## 2022-01-04 PROCEDURE — 99233 SBSQ HOSP IP/OBS HIGH 50: CPT | Performed by: INTERNAL MEDICINE

## 2022-01-04 PROCEDURE — 94760 N-INVAS EAR/PLS OXIMETRY 1: CPT

## 2022-01-04 PROCEDURE — 82948 REAGENT STRIP/BLOOD GLUCOSE: CPT

## 2022-01-04 PROCEDURE — 86140 C-REACTIVE PROTEIN: CPT | Performed by: INTERNAL MEDICINE

## 2022-01-04 PROCEDURE — 99232 SBSQ HOSP IP/OBS MODERATE 35: CPT | Performed by: HOSPITALIST

## 2022-01-04 PROCEDURE — 80053 COMPREHEN METABOLIC PANEL: CPT | Performed by: INTERNAL MEDICINE

## 2022-01-04 PROCEDURE — 83735 ASSAY OF MAGNESIUM: CPT | Performed by: INTERNAL MEDICINE

## 2022-01-04 PROCEDURE — 99232 SBSQ HOSP IP/OBS MODERATE 35: CPT | Performed by: INTERNAL MEDICINE

## 2022-01-04 RX ORDER — POTASSIUM CHLORIDE 20 MEQ/1
20 TABLET, EXTENDED RELEASE ORAL ONCE
Status: COMPLETED | OUTPATIENT
Start: 2022-01-04 | End: 2022-01-04

## 2022-01-04 RX ADMIN — POTASSIUM CHLORIDE 20 MEQ: 1500 TABLET, EXTENDED RELEASE ORAL at 11:13

## 2022-01-04 RX ADMIN — B-COMPLEX W/ C & FOLIC ACID TAB 1 TABLET: TAB at 08:42

## 2022-01-04 RX ADMIN — CEFEPIME HYDROCHLORIDE 2000 MG: 2 INJECTION, SOLUTION INTRAVENOUS at 13:53

## 2022-01-04 RX ADMIN — INSULIN LISPRO 3 UNITS: 100 INJECTION, SOLUTION INTRAVENOUS; SUBCUTANEOUS at 16:28

## 2022-01-04 RX ADMIN — DEXAMETHASONE SODIUM PHOSPHATE 9.04 MG: 4 INJECTION, SOLUTION INTRA-ARTICULAR; INTRALESIONAL; INTRAMUSCULAR; INTRAVENOUS; SOFT TISSUE at 12:52

## 2022-01-04 RX ADMIN — INSULIN LISPRO 3 UNITS: 100 INJECTION, SOLUTION INTRAVENOUS; SUBCUTANEOUS at 08:40

## 2022-01-04 RX ADMIN — BARICITINIB 2 MG: 2 TABLET, FILM COATED ORAL at 12:51

## 2022-01-04 RX ADMIN — SULFASALAZINE 500 MG: 500 TABLET ORAL at 17:33

## 2022-01-04 RX ADMIN — OXYCODONE HYDROCHLORIDE AND ACETAMINOPHEN 1000 MG: 500 TABLET ORAL at 08:42

## 2022-01-04 RX ADMIN — INSULIN GLARGINE 12 UNITS: 100 INJECTION, SOLUTION SUBCUTANEOUS at 08:41

## 2022-01-04 RX ADMIN — CHOLECALCIFEROL (VITAMIN D3) 10 MCG (400 UNIT) TABLET 400 UNITS: at 08:42

## 2022-01-04 RX ADMIN — BUDESONIDE AND FORMOTEROL FUMARATE DIHYDRATE 2 PUFF: 160; 4.5 AEROSOL RESPIRATORY (INHALATION) at 17:33

## 2022-01-04 RX ADMIN — INSULIN LISPRO 1 UNITS: 100 INJECTION, SOLUTION INTRAVENOUS; SUBCUTANEOUS at 08:40

## 2022-01-04 RX ADMIN — DEXAMETHASONE SODIUM PHOSPHATE 9.04 MG: 4 INJECTION, SOLUTION INTRA-ARTICULAR; INTRALESIONAL; INTRAMUSCULAR; INTRAVENOUS; SOFT TISSUE at 01:08

## 2022-01-04 RX ADMIN — SULFASALAZINE 500 MG: 500 TABLET ORAL at 08:58

## 2022-01-04 RX ADMIN — ATORVASTATIN CALCIUM 40 MG: 40 TABLET, FILM COATED ORAL at 16:28

## 2022-01-04 RX ADMIN — ENOXAPARIN SODIUM 30 MG: 100 INJECTION SUBCUTANEOUS at 08:44

## 2022-01-04 RX ADMIN — INSULIN LISPRO 1 UNITS: 100 INJECTION, SOLUTION INTRAVENOUS; SUBCUTANEOUS at 12:51

## 2022-01-04 RX ADMIN — BUDESONIDE AND FORMOTEROL FUMARATE DIHYDRATE 2 PUFF: 160; 4.5 AEROSOL RESPIRATORY (INHALATION) at 08:41

## 2022-01-04 RX ADMIN — CEFEPIME HYDROCHLORIDE 2000 MG: 2 INJECTION, SOLUTION INTRAVENOUS at 01:08

## 2022-01-04 RX ADMIN — INSULIN LISPRO 1 UNITS: 100 INJECTION, SOLUTION INTRAVENOUS; SUBCUTANEOUS at 16:28

## 2022-01-04 RX ADMIN — ENOXAPARIN SODIUM 30 MG: 100 INJECTION SUBCUTANEOUS at 21:21

## 2022-01-04 RX ADMIN — SULFASALAZINE 500 MG: 500 TABLET ORAL at 21:21

## 2022-01-04 RX ADMIN — FUROSEMIDE 40 MG: 10 INJECTION, SOLUTION INTRAVENOUS at 08:43

## 2022-01-04 RX ADMIN — ZINC SULFATE 220 MG (50 MG) CAPSULE 220 MG: CAPSULE at 08:41

## 2022-01-04 RX ADMIN — ALLOPURINOL 200 MG: 100 TABLET ORAL at 08:42

## 2022-01-04 RX ADMIN — VANCOMYCIN HYDROCHLORIDE 750 MG: 750 INJECTION, SOLUTION INTRAVENOUS at 03:38

## 2022-01-04 RX ADMIN — CLOPIDOGREL BISULFATE 75 MG: 75 TABLET ORAL at 08:41

## 2022-01-04 RX ADMIN — SULFASALAZINE 500 MG: 500 TABLET ORAL at 12:51

## 2022-01-04 RX ADMIN — INSULIN LISPRO 3 UNITS: 100 INJECTION, SOLUTION INTRAVENOUS; SUBCUTANEOUS at 12:51

## 2022-01-04 NOTE — PLAN OF CARE
Problem: PAIN - ADULT  Goal: Verbalizes/displays adequate comfort level or baseline comfort level  Description: Interventions:  - Encourage patient to monitor pain and request assistance  - Assess pain using appropriate pain scale  - Administer analgesics based on type and severity of pain and evaluate response  - Implement non-pharmacological measures as appropriate and evaluate response  - Consider cultural and social influences on pain and pain management  - Notify physician/advanced practitioner if interventions unsuccessful or patient reports new pain  Outcome: Progressing     Problem: INFECTION - ADULT  Goal: Absence or prevention of progression during hospitalization  Description: INTERVENTIONS:  - Assess and monitor for signs and symptoms of infection  - Monitor lab/diagnostic results  - Monitor all insertion sites, i e  indwelling lines, tubes, and drains  - Monitor endotracheal if appropriate and nasal secretions for changes in amount and color  - [place] appropriate cooling/warming therapies per order  - Administer medications as ordered  - Instruct and encourage patient and family to use good hand hygiene technique  - Identify and instruct in appropriate isolation precautions for identified infection/condition  Outcome: Progressing  Goal: Absence of fever/infection during neutropenic period  Description: INTERVENTIONS:  - Monitor WBC    Outcome: Progressing     Problem: SAFETY ADULT  Goal: Patient will remain free of falls  Description: INTERVENTIONS:  - Educate patient/family on patient safety including physical limitations  - Instruct patient to call for assistance with activity   - Consult OT/PT to assist with strengthening/mobility   - Keep Call bell within reach  - Keep bed low and locked with side rails adjusted as appropriate  - Keep care items and personal belongings within reach  - Initiate and maintain comfort rounds  - Make Fall Risk Sign visible to staff  - Offer Toileting every 2 Hours, in advance of need  - Initiate/Maintain bed alarm  - Obtain necessary fall risk management equipment:   - Apply yellow socks and bracelet for high fall risk patients  - Consider moving patient to room near nurses station  Outcome: Progressing  Goal: Maintain or return to baseline ADL function  Description: INTERVENTIONS:  -  Assess patient's ability to carry out ADLs; assess patient's baseline for ADL function and identify physical deficits which impact ability to perform ADLs (bathing, care of mouth/teeth, toileting, grooming, dressing, etc )  - Assess/evaluate cause of self-care deficits   - Assess range of motion  - Assess patient's mobility; develop plan if impaired  - Assess patient's need for assistive devices and provide as appropriate  - Encourage maximum independence but intervene and supervise when necessary  - Involve family in performance of ADLs  - Assess for home care needs following discharge   - Consider OT consult to assist with ADL evaluation and planning for discharge  - Provide patient education as appropriate  Outcome: Progressing  Goal: Maintains/Returns to pre admission functional level  Description: INTERVENTIONS:  - Perform BMAT or MOVE assessment daily    - Set and communicate daily mobility goal to care team and patient/family/caregiver  - Collaborate with rehabilitation services on mobility goals if consulted  - Perform Range of Motion 2 times a day  - Reposition patient every 2 hours    - Dangle patient 2 times a day  - Stand patient 2 times a day  - Ambulate patient 2 times a day  - Out of bed to chair 2 times a day   - Out of bed for meals 2 times a day  - Out of bed for toileting  - Record patient progress and toleration of activity level   Outcome: Progressing     Problem: DISCHARGE PLANNING  Goal: Discharge to home or other facility with appropriate resources  Description: INTERVENTIONS:  - Identify barriers to discharge w/patient and caregiver  - Arrange for needed discharge resources and transportation as appropriate  - Identify discharge learning needs (meds, wound care, etc )  - Arrange for interpretive services to assist at discharge as needed  - Refer to Case Management Department for coordinating discharge planning if the patient needs post-hospital services based on physician/advanced practitioner order or complex needs related to functional status, cognitive ability, or social support system  Outcome: Progressing     Problem: Knowledge Deficit  Goal: Patient/family/caregiver demonstrates understanding of disease process, treatment plan, medications, and discharge instructions  Description: Complete learning assessment and assess knowledge base    Interventions:  - Provide teaching at level of understanding  - Provide teaching via preferred learning methods  Outcome: Progressing     Problem: Potential for Falls  Goal: Patient will remain free of falls  Description: INTERVENTIONS:  - Educate patient/family on patient safety including physical limitations  - Instruct patient to call for assistance with activity   - Consult OT/PT to assist with strengthening/mobility   - Keep Call bell within reach  - Keep bed low and locked with side rails adjusted as appropriate  - Keep care items and personal belongings within reach  - Initiate and maintain comfort rounds  - Make Fall Risk Sign visible to staff  - Offer Toileting every 2 Hours, in advance of need  - Initiate/Maintain bed alarm  - Obtain necessary fall risk management equipment:   - Apply yellow socks and bracelet for high fall risk patients  - Consider moving patient to room near nurses station  Outcome: Progressing     Problem: MOBILITY - ADULT  Goal: Maintain or return to baseline ADL function  Description: INTERVENTIONS:  -  Assess patient's ability to carry out ADLs; assess patient's baseline for ADL function and identify physical deficits which impact ability to perform ADLs (bathing, care of mouth/teeth, toileting, grooming, dressing, etc )  - Assess/evaluate cause of self-care deficits   - Assess range of motion  - Assess patient's mobility; develop plan if impaired  - Assess patient's need for assistive devices and provide as appropriate  - Encourage maximum independence but intervene and supervise when necessary  - Involve family in performance of ADLs  - Assess for home care needs following discharge   - Consider OT consult to assist with ADL evaluation and planning for discharge  - Provide patient education as appropriate  Outcome: Progressing  Goal: Maintains/Returns to pre admission functional level  Description: INTERVENTIONS:  - Perform BMAT or MOVE assessment daily    - Set and communicate daily mobility goal to care team and patient/family/caregiver  - Collaborate with rehabilitation services on mobility goals if consulted  - Perform Range of Motion 2 times a day  - Reposition patient every 2 hours    - Dangle patient 2 times a day  - Stand patient 2 times a day  - Ambulate patient 2 times a day  - Out of bed to chair 2 times a day   - Out of bed for meals 2 times a day  - Out of bed for toileting  - Record patient progress and toleration of activity level   Outcome: Progressing     Problem: Prexisting or High Potential for Compromised Skin Integrity  Goal: Skin integrity is maintained or improved  Description: INTERVENTIONS:  - Identify patients at risk for skin breakdown  - Assess and monitor skin integrity  - Assess and monitor nutrition and hydration status  - Monitor labs   - Assess for incontinence   - Turn and reposition patient  - Assist with mobility/ambulation  - Relieve pressure over bony prominences  - Avoid friction and shearing  - Provide appropriate hygiene as needed including keeping skin clean and dry  - Evaluate need for skin moisturizer/barrier cream  - Collaborate with interdisciplinary team   - Patient/family teaching  - Consider wound care consult   Outcome: Progressing

## 2022-01-04 NOTE — ASSESSMENT & PLAN NOTE
Patient has chronic hypertension       bp meds amlodipine and metoprolol held  to avoid further hypotension and worsening renal function

## 2022-01-04 NOTE — ASSESSMENT & PLAN NOTE
Lab Results   Component Value Date    EGFR 40 01/04/2022    EGFR 48 01/03/2022    EGFR 43 01/02/2022    CREATININE 1 68 (H) 01/04/2022    CREATININE 1 45 (H) 01/03/2022    CREATININE 1 59 (H) 01/02/2022     Cr worsened, above baseline  apprec nephro  See CHADWICK

## 2022-01-04 NOTE — PROGRESS NOTES
NEPHROLOGY PROGRESS NOTE   Edna Rodriguez 80 y o  male MRN: [id number]  Unit/Bed#: -01 Encounter: [id number]      ASSESSMENT/PLAN:  1  CHADWICK: likely ATN in the setting of covid vs prerenal  Admitted with creatinine of 1 8 and initially on IV fluids but now has been on Lasix  Creatinine was down to a low of 1 4 yesterday but slightly worse at 1 68 today  · UA:  Small leukocytes, moderate bacteria, no protein, no blood  · Will discontinue  Daily IV Lasix given worsening renal function (already had his dose today) and reassess tomorrow   · I/o not accurate from yesterday per discussion with nurse   · vanco level pending   2  CKD stage 3: baseline creatinine around 1 2-1 5 recently when at steady state   3  Acute hypoxic respiratory failure due to COVID-19 pneumonia:  Stable on 15L NC  · Chest x-ray 12/30:  Bilateral multifocal ground-glass opacities most likely due to COVID-19 pneumonia, worsening  4  Hypokalemia:  Potassium 3 6 today  Will give k-dur 20meq po x1 today   · Mag 2 2     Plan Summary:    D/c IV Lasix   Check a m  BMP     SUBJECTIVE:  Patient remains on 15 L nasal cannula but does look a little better today  He thinks he is feeling better and is currently sitting up in the chair  He denies any current shortness of breath  He has been eating and drinking  He has very good urine output      OBJECTIVE:  Current Weight: Weight - Scale: 90 2 kg (198 lb 12 8 oz)  Vitals:    01/04/22 0818   BP: 158/93   Pulse: 87   Resp: 18   Temp: 97 5 °F (36 4 °C)   SpO2: (!) 89%       Intake/Output Summary (Last 24 hours) at 1/4/2022 6329  Last data filed at 1/4/2022 0901  Gross per 24 hour   Intake 300 ml   Output 475 ml   Net -175 ml     To limit exposure to covid-19 the exam was done through the window   General:  No acute distress  Skin:  No rash  Eyes:  Sclerae anicteric  ENT:  Moist mucous membranes  Neck:  Trachea midline   Chest:  No accessory muscle use or respiratory distress  CVS:  Regular rate  Extremities:  No edema  Neuro:  Awake and alert  Psych:  Appropriate affect      Medications:  Scheduled Meds:  Current Facility-Administered Medications   Medication Dose Route Frequency Provider Last Rate    acetaminophen  650 mg Oral Q6H PRN Candie Luo MD      allopurinol  200 mg Oral Daily Candie Luo MD      ascorbic acid  1,000 mg Oral Daily Nuha Ríos PA-C      atorvastatin  40 mg Oral Daily With [facility], [place]      Baricitinib  2 mg Oral Q24H Candie Luo MD      budesonide-formoterol  2 puff Inhalation BID Nuha Ríos PA-C      cefepime  2,000 mg Intravenous Q12H ARTURO QureshiNP 2,000 mg (01/04/22 0108)    cholecalciferol  400 Units Oral Daily Nuha Ríos PA-C      clopidogrel  75 mg Oral Daily Candie Luo MD      dexamethasone  0 1 mg/kg Intravenous Q12H Candie Luo MD      enoxaparin  30 mg Subcutaneous Q12H [address], [place]      furosemide  40 mg Intravenous Daily [place], [place]      insulin glargine  12 Units Subcutaneous QAM Candie Luo MD      insulin lispro  1-5 Units Subcutaneous TID AC Candie Luo MD      insulin lispro  3 Units Subcutaneous TID With Meals Candie Luo MD      multivitamin stress formula  1 tablet Oral Daily Nuha Ríos PA-C      ondansetron  4 mg Intravenous Q6H PRN Candie Luo MD      sulfaSALAzine  500 mg Oral 4x Daily Candie Luo MD      vancomycin  750 mg Intravenous Q12H ROSALINA Qureshi 750 mg (01/04/22 0338)    zinc sulfate  220 mg Oral Daily Nuha Ríos PA-C         PRN Meds:   acetaminophen    ondansetron    Laboratory Results:  Results from last 7 days   Lab Units 01/04/22  0218 01/03/22  0520 01/02/22  0339 01/01/22  0453 12/31/21  0425 12/30/21  0545 12/29/21  0633   WBC Thousand/uL 6 85  --  7 39 5 82 4 97 7 63 7 45   HEMOGLOBIN g/dL 13 6  --  13 0 13 7 14 2 13 4 13 6   HEMATOCRIT % 44 5  --  42 0 43 9 45 1 43 6 45 2   PLATELETS Thousands/uL 364  --  292 292 268 224 225   SODIUM mmol/L 141 140 139 140 136 132* 134*   POTASSIUM mmol/L 3 6 3 3* 3 7 3 4* 3 4* 3 8 4 0   CHLORIDE mmol/L 106 106 107 106 103 102 106   CO2 mmol/L 25 24 24 25 24 18* 17*   BUN mg/dL 38* 34* 42* 45* 45* 35* 24   CREATININE mg/dL 1 68* 1 45* 1 59* 1 70* 1 84* 1 72* 1 63*   CALCIUM mg/dL 8 5 7 7* 7 8* 7 9* 8 1* 8 2* 8 6   MAGNESIUM mg/dL 2 2  --   --   --   --   --   --

## 2022-01-04 NOTE — PROGRESS NOTES
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

## 2022-01-04 NOTE — ASSESSMENT & PLAN NOTE
Lab Results   Component Value Date    HGBA1C 6 4 (H) 12/28/2021       Recent Labs     01/03/22  1114 01/03/22  1626 01/03/22 2055 01/04/22  0816   POCGLU 164* 194* 240* 181*       Blood Sugar Average: Last 72 hrs:  (P) 007 [id number]     Patient has type 2 diabetes with stage III chronic disease  Patient has hyperglycemia due to steroids  cont Lantus to 12 units and   pre meal Humalog

## 2022-01-04 NOTE — ASSESSMENT & PLAN NOTE
Patient had acute kidney injury present on admission on stage III chronic kidney disease    His baseline creatinine is 1 2-1 5    Cr above baseline slightly   Lasix held suspected ATN  apprec nephro  Follow Cr

## 2022-01-04 NOTE — ASSESSMENT & PLAN NOTE
Patient developed chills, fevers and dry cough about 3-4 days before admission  He is fully vaccinated with the [place] vaccine  Chest x-ray showed bilateral COVID pneumonia  Patient feels subjectively better but he still on 15 liters/minute mid flow oxygen    Will continue baricitinib, remdesivir, high-dose IV Decadron  Continue cefepime and vancomycin     S/p  Lasix 40 mg IV Jan 2     Blood culture from December 29th grows contaminants  Repeat blood culture showed no growth   DC vanc

## 2022-01-04 NOTE — PROGRESS NOTES
[place]  Progress Note - Zeferino Ellison 1950, 80 y o  male MRN: [id number]  Unit/Bed#: -01 Encounter: [id number]  Primary Care Provider: Anastacia Chi DO   Date and time admitted to hospital: 12/28/2021  4:54 AM    Acute respiratory failure with hypoxia [facility])  Assessment & Plan  Patient developed acute hypoxic respiratory failure after hospital admission due to COVID pneumonia  Currently he is on 15 liters/minute mid flow with oxygen saturations of 89-94%    Continue treatments for COVID pneumonia as well as mid flow oxygen  Vasovagal syncope  Assessment & Plan  Patient had a syncopal episode of very brief duration while urinating  Suspect vasovagal reaction  Doubt seizure  Patient had no postictal confusion  Doubt CVA or TIA  Will monitor patient on telemetry since she he has a history of nonsustained V-tach  S/p hydration    NSVT (nonsustained ventricular tachycardia) ([facility] [address] )  Assessment & Plan  Patient's history of nonsustained ventricular tachycardia  EKG shows normal sinus rhythm with normal intervals  Will continue metoprolol    Will monitor patient on telemetry    CHADWICK (acute kidney injury) [facility])  Assessment & Plan  Patient had acute kidney injury present on admission on stage III chronic kidney disease  His baseline creatinine is 1 2-1 5    Cr above baseline slightly   Lasix held suspected ATN  apprec nephro  Follow Cr     Ulcerative rectosigmoiditis without complication [facility])  Assessment & Plan  Patient has ulcer of colitis  He denies any abdominal pain but had loose stools twice today  Will monitor for diarrhea  Denies signs of GI bleeding      Continue sulfasalazine    Type 2 diabetes mellitus with stage 3a chronic kidney disease, without long-term current use of insulin [facility])  Assessment & Plan  Lab Results   Component Value Date    HGBA1C 6 4 (H) 12/28/2021       Recent Labs     01/03/22  1114 01/03/22  1626 01/03/22 2055 22  0816   POCGLU 164* 194* 240* 181*       Blood Sugar Average: Last 72 hrs:  (P) 518 [id number]     Patient has type 2 diabetes with stage III chronic disease  Patient has hyperglycemia due to steroids  cont Lantus to 12 units and   pre meal Humalog  Primary hypertension  Assessment & Plan  Patient has chronic hypertension  bp meds amlodipine and metoprolol held  to avoid further hypotension and worsening renal function    Chronic kidney disease, stage 3 [facility])  Assessment & Plan  Lab Results   Component Value Date    EGFR 40 2022    EGFR 48 2022    EGFR 43 2022    CREATININE 1 68 (H) 2022    CREATININE 1 45 (H) 2022    CREATININE 1 59 (H) 2022     Cr worsened, above baseline  apprec nephro  See CHADWICK    * Pneumonia due to COVID-19 virus  Assessment & Plan  Patient developed chills, fevers and dry cough about 3-4 days before admission  He is fully vaccinated with the [place] vaccine  Chest x-ray showed bilateral COVID pneumonia  Patient feels subjectively better but he still on 15 liters/minute mid flow oxygen    Will continue baricitinib, remdesivir, high-dose IV Decadron  Continue cefepime and vancomycin     S/p  Lasix 40 mg IV      Blood culture from  grows contaminants  Repeat blood culture showed no growth  DC vanc                VTE Pharmacologic Prophylaxis:   Pharmacologic: Enoxaparin (Lovenox)     Patient Centered Rounds: I have performed bedside rounds with nursing staff today            Education and Discussions with Family / Patient: Mrs Candy Alexander       Current Length of Stay: 7 day(s)    Current Patient Status: Inpatient        Code Status: Level 1 - Full Code      Subjective:   Sitting in chair  Feels ok   Denies resp distress  Denies fever    Patient is seen and examined at bedside  All other ROS are negative      Objective:     Vitals:   Temp (24hrs), Av 4 °F (36 3 °C), Min:97 4 °F (36 3 °C), Max:97 5 °F (36 4 °C)    Temp:  [97 4 °F (36 3 °C)-97 5 °F (36 4 °C)] 97 5 °F (36 4 °C)  HR:  [83-99] 87  Resp:  [18] 18  BP: (138-167)/(82-97) 158/93  SpO2:  [86 %-94 %] 89 %  Body mass index is 29 36 kg/m²  Input and Output Summary (last 24 hours): Intake/Output Summary (Last 24 hours) at 1/4/2022 1020  Last data filed at 1/4/2022 0901  Gross per 24 hour   Intake 300 ml   Output 475 ml   Net -175 ml       Physical Exam:           GEN: No acute distress, comfortable, o2  HEEENT: No JVD, PERRLA, no scleral icterus  RESP: crackles  CV: RRR, +s1/s2   ABD: SOFT NON TENDER, POSITIVE BOWEL SOUNDS, NO DISTENTION  PSYCH: CALM  NEURO: A X O X 3, NO FOCAL DEFICITS  SKIN: NO RASH  EXTREM: NO EDEMA    Additional Data:     Labs:    Results from last 7 days   Lab Units 01/04/22  0218 01/02/22  0339 01/02/22  0339   WBC Thousand/uL 6 85   < > 7 39   HEMOGLOBIN g/dL 13 6   < > 13 0   HEMATOCRIT % 44 5   < > 42 0   PLATELETS Thousands/uL 364   < > 292   BANDS PCT % 1  --   --    NEUTROS PCT %  --   --  87*   LYMPHS PCT %  --   --  4*   LYMPHO PCT % 10*  --   --    MONOS PCT %  --   --  8   MONO PCT % 8  --   --    EOS PCT % 0  --  0    < > = values in this interval not displayed       Results from last 7 days   Lab Units 01/04/22  0218   SODIUM mmol/L 141   POTASSIUM mmol/L 3 6   CHLORIDE mmol/L 106   CO2 mmol/L 25   BUN mg/dL 38*   CREATININE mg/dL 1 68*   ANION GAP mmol/L 10   CALCIUM mg/dL 8 5   ALBUMIN g/dL 2 5*   TOTAL BILIRUBIN mg/dL 0 50   ALK PHOS U/L 86   ALT U/L 83*   AST U/L 60*   GLUCOSE RANDOM mg/dL 142*         Results from last 7 days   Lab Units 01/04/22  0816 01/03/22  2055 01/03/22  1626 01/03/22  1114 01/03/22  0743 01/02/22  2119 01/02/22  1652 01/02/22  1226 01/02/22  0753 01/01/22  2103 01/01/22  1640 01/01/22  1146   POC GLUCOSE mg/dl 181* 240* 194* 164* 158* 219* 165* 172* 192* 230* 207* 159*     Results from last 7 days   Lab Units 12/28/21  1022   HEMOGLOBIN A1C % 6 4*     Results from last 7 days   Lab Units 01/01/22  0453 12/31/21  0425 12/30/21  0545 12/29/21  0634 12/28/21  1022   PROCALCITONIN ng/ml 0 50* 0 94* 1 36* 1 65* 0 27*           * I Have Reviewed All Lab Data Listed Above  Imaging:     Results for orders placed during the hospital encounter of 12/28/21    XR chest portable    Narrative  CHEST    INDICATION:   Worsening hypoxia  Patient has confirmed COVID-19  COMPARISON:  12/28/2021    EXAM PERFORMED/VIEWS:  XR CHEST PORTABLE      FINDINGS:    Loop recorder noted as on prior study  Cardiac silhouette size unchanged    Bilateral multifocal groundglass opacities are present  Interval worsening has occurred  There is no pleural effusion or pneumothorax  Osseous structures appear within normal limits for patient age  Impression  Bilateral multifocal groundglass opacities are present  In the setting of COVID-19 infection, this is most in keeping with COVID 19 pneumonia  There has been interval worsening            Workstation performed: [id number]    No results found for this or any previous visit  *I have reviewed all imaging reports listed above      Recent Cultures (last 7 days):     Results from last 7 days   Lab Units 01/01/22  1135 12/29/21  1539   BLOOD CULTURE  No Growth at 48 hrs  No Growth at 48 hrs  No Growth After 5 Days    Micrococcus luteus*   GRAM STAIN RESULT   --  Gram positive cocci in clusters*       Last 24 Hours Medication List:   Current Facility-Administered Medications   Medication Dose Route Frequency Provider Last Rate    acetaminophen  650 mg Oral Q6H PRN Lico Sapp MD      allopurinol  200 mg Oral Daily Lico Sapp MD      ascorbic acid  1,000 mg Oral Daily Vince Sheppard PA-C      atorvastatin  40 mg Oral Daily With Raul Fletcher MD      Baricitinib  2 mg Oral Q24H Lico Sapp MD      budesonide-formoterol  2 puff Inhalation BID Vince Sheppard PA-C      cefepime  2,000 mg Intravenous Q12H ROSALINA Alves 2,000 mg (01/04/22 6712)    cholecalciferol  400 Units Oral Daily Moshe Greenwood PA-C      clopidogrel  75 mg Oral Daily Mima Aleman MD      dexamethasone  0 1 mg/kg Intravenous Q12H Mima Aleman MD      enoxaparin  30 mg Subcutaneous Q12H [address] [facility] [address], [place]      insulin glargine  12 Units Subcutaneous QAM Mima Aleman MD      insulin lispro  1-5 Units Subcutaneous TID AC Mima Aleman MD      insulin lispro  3 Units Subcutaneous TID With Meals Mima Aleman MD      multivitamin stress formula  1 tablet Oral Daily Moshe Greenwood PA-C      ondansetron  4 mg Intravenous Q6H PRN Mima Aleman MD      potassium chloride  20 mEq Oral Once Fredi Bhakta PA-C      sulfaSALAzine  500 mg Oral 4x Daily Mima Aleman MD      zinc sulfate  220 mg Oral Daily Moshe Greenwood PA-C          Today, Patient Was Seen By: Essence Lopez MD    ** Please Note: Dictation voice to text software may have been used in the creation of this document   **

## 2022-01-04 NOTE — PLAN OF CARE
Problem: PAIN - ADULT  Goal: Verbalizes/displays adequate comfort level or baseline comfort level  Description: Interventions:  - Encourage patient to monitor pain and request assistance  - Assess pain using appropriate pain scale  - Administer analgesics based on type and severity of pain and evaluate response  - Implement non-pharmacological measures as appropriate and evaluate response  - Consider cultural and social influences on pain and pain management  - Notify physician/advanced practitioner if interventions unsuccessful or patient reports new pain  Outcome: Progressing     Problem: INFECTION - ADULT  Goal: Absence or prevention of progression during hospitalization  Description: INTERVENTIONS:  - Assess and monitor for signs and symptoms of infection  - Monitor lab/diagnostic results  - Monitor all insertion sites, i e  indwelling lines, tubes, and drains  - Monitor endotracheal if appropriate and nasal secretions for changes in amount and color  - [place] appropriate cooling/warming therapies per order  - Administer medications as ordered  - Instruct and encourage patient and family to use good hand hygiene technique  - Identify and instruct in appropriate isolation precautions for identified infection/condition  Outcome: Progressing  Goal: Absence of fever/infection during neutropenic period  Description: INTERVENTIONS:  - Monitor WBC    Outcome: Progressing     Problem: SAFETY ADULT  Goal: Patient will remain free of falls  Description: INTERVENTIONS:  - Educate patient/family on patient safety including physical limitations  - Instruct patient to call for assistance with activity   - Consult OT/PT to assist with strengthening/mobility   - Keep Call bell within reach  - Keep bed low and locked with side rails adjusted as appropriate  - Keep care items and personal belongings within reach  - Initiate and maintain comfort rounds  - Make Fall Risk Sign visible to staff  - Offer Toileting every 2 Hours, in advance of need  - Initiate/Maintain bed alarm  - Obtain necessary fall risk management equipment:   - Apply yellow socks and bracelet for high fall risk patients  - Consider moving patient to room near nurses station  Outcome: Progressing  Goal: Maintain or return to baseline ADL function  Description: INTERVENTIONS:  -  Assess patient's ability to carry out ADLs; assess patient's baseline for ADL function and identify physical deficits which impact ability to perform ADLs (bathing, care of mouth/teeth, toileting, grooming, dressing, etc )  - Assess/evaluate cause of self-care deficits   - Assess range of motion  - Assess patient's mobility; develop plan if impaired  - Assess patient's need for assistive devices and provide as appropriate  - Encourage maximum independence but intervene and supervise when necessary  - Involve family in performance of ADLs  - Assess for home care needs following discharge   - Consider OT consult to assist with ADL evaluation and planning for discharge  - Provide patient education as appropriate  Outcome: Progressing  Goal: Maintains/Returns to pre admission functional level  Description: INTERVENTIONS:  - Perform BMAT or MOVE assessment daily    - Set and communicate daily mobility goal to care team and patient/family/caregiver  - Collaborate with rehabilitation services on mobility goals if consulted  - Perform Range of Motion 2 times a day  - Reposition patient every 2 hours    - Dangle patient 2 times a day  - Stand patient 2 times a day  - Ambulate patient 2 times a day  - Out of bed to chair 2 times a day   - Out of bed for meals 2 times a day  - Out of bed for toileting  - Record patient progress and toleration of activity level   Outcome: Progressing     Problem: DISCHARGE PLANNING  Goal: Discharge to home or other facility with appropriate resources  Description: INTERVENTIONS:  - Identify barriers to discharge w/patient and caregiver  - Arrange for needed discharge resources and transportation as appropriate  - Identify discharge learning needs (meds, wound care, etc )  - Arrange for interpretive services to assist at discharge as needed  - Refer to Case Management Department for coordinating discharge planning if the patient needs post-hospital services based on physician/advanced practitioner order or complex needs related to functional status, cognitive ability, or social support system  Outcome: Progressing     Problem: Knowledge Deficit  Goal: Patient/family/caregiver demonstrates understanding of disease process, treatment plan, medications, and discharge instructions  Description: Complete learning assessment and assess knowledge base    Interventions:  - Provide teaching at level of understanding  - Provide teaching via preferred learning methods  Outcome: Progressing     Problem: Potential for Falls  Goal: Patient will remain free of falls  Description: INTERVENTIONS:  - Educate patient/family on patient safety including physical limitations  - Instruct patient to call for assistance with activity   - Consult OT/PT to assist with strengthening/mobility   - Keep Call bell within reach  - Keep bed low and locked with side rails adjusted as appropriate  - Keep care items and personal belongings within reach  - Initiate and maintain comfort rounds  - Make Fall Risk Sign visible to staff  - Offer Toileting every 2 Hours, in advance of need  - Initiate/Maintain bed alarm  - Obtain necessary fall risk management equipment:   - Apply yellow socks and bracelet for high fall risk patients  - Consider moving patient to room near nurses station  Outcome: Progressing     Problem: MOBILITY - ADULT  Goal: Maintain or return to baseline ADL function  Description: INTERVENTIONS:  -  Assess patient's ability to carry out ADLs; assess patient's baseline for ADL function and identify physical deficits which impact ability to perform ADLs (bathing, care of mouth/teeth, toileting, grooming, dressing, etc )  - Assess/evaluate cause of self-care deficits   - Assess range of motion  - Assess patient's mobility; develop plan if impaired  - Assess patient's need for assistive devices and provide as appropriate  - Encourage maximum independence but intervene and supervise when necessary  - Involve family in performance of ADLs  - Assess for home care needs following discharge   - Consider OT consult to assist with ADL evaluation and planning for discharge  - Provide patient education as appropriate  Outcome: Progressing  Goal: Maintains/Returns to pre admission functional level  Description: INTERVENTIONS:  - Perform BMAT or MOVE assessment daily    - Set and communicate daily mobility goal to care team and patient/family/caregiver  - Collaborate with rehabilitation services on mobility goals if consulted  - Perform Range of Motion 2 times a day  - Reposition patient every 2 hours    - Dangle patient 2 times a day  - Stand patient 2 times a day  - Ambulate patient 2 times a day  - Out of bed to chair 2 times a day   - Out of bed for meals 2 times a day  - Out of bed for toileting  - Record patient progress and toleration of activity level   Outcome: Progressing     Problem: Prexisting or High Potential for Compromised Skin Integrity  Goal: Skin integrity is maintained or improved  Description: INTERVENTIONS:  - Identify patients at risk for skin breakdown  - Assess and monitor skin integrity  - Assess and monitor nutrition and hydration status  - Monitor labs   - Assess for incontinence   - Turn and reposition patient  - Assist with mobility/ambulation  - Relieve pressure over bony prominences  - Avoid friction and shearing  - Provide appropriate hygiene as needed including keeping skin clean and dry  - Evaluate need for skin moisturizer/barrier cream  - Collaborate with interdisciplinary team   - Patient/family teaching  - Consider wound care consult   Outcome: Progressing

## 2022-01-04 NOTE — PROGRESS NOTES
Progress Note - Pulmonary   Zeferino Ellison 80 y o  male MRN: [id number]  Unit/Bed#: -01 Encounter: [id number]    Assessment & Plan:  Acute hypoxic respiratory failure  COVID-19 pneumonia with possible superimposed bacterial infection  Volume overload with likely RV dysfunction and diastolic dysfunction  CHADWICK    · Patient requiring 15 L  No longer requiring NRB  Continue to monitor SpO2 and titrate as able to maintain saturations greater than 89%  · Continue treatment per moderate COVID-19 pathway  · Atorvastatin  · Dexamethasone - 0 1mg/kg Q 12H  · Remdesivir x5 days completed  · Baricitinib x 14 days  · Anticoagulation: Lovenox prophylaxis (D-dimer < 2)  · Antibiotics: cefepime  Procal minimally elevated  MRSA (-)  · Encourage self-proning, activity as tolerated, incentive spirometry  · Nephrology following      Subjective:   Patient says that his breathing is better today  Still has minimal cough  Objective:     Vitals: Blood pressure 158/93, pulse 87, temperature 97 5 °F (36 4 °C), resp  rate 18, weight 90 2 kg (198 lb 12 8 oz), SpO2 (!) 89 %  ,Body mass index is 29 36 kg/m²  Intake/Output Summary (Last 24 hours) at 1/4/2022 1109  Last data filed at 1/4/2022 0901  Gross per 24 hour   Intake 300 ml   Output 475 ml   Net -175 ml       Invasive Devices  Report    Peripheral Intravenous Line            Peripheral IV 01/04/22 Right;Upper;Ventral (anterior) Arm <1 day                Physical Exam:   General appearance: Alert and oriented, in no acute distress  Head: Normocephalic, without obvious abnormality, atraumatic  Eyes: EOMI  No discharge bilaterally  No scleral icterus  Neck: Supple, symmetrical, trachea midline  Lungs:  Decreased breath sounds  Heart: Regular rate and rhythm, S1, S2 normal, no murmur  Abdomen:  No appreciable distension or tenderness  Extremities:  No significant edema or tenderness  Skin: Warm and dry  Neurologic: No acute focal deficits are noted    Labs:  I have personally reviewed pertinent lab results  Imaging and other studies: I have personally reviewed pertinent reports

## 2022-01-05 PROBLEM — R55 VASOVAGAL SYNCOPE: Status: RESOLVED | Noted: 2021-12-28 | Resolved: 2022-01-05

## 2022-01-05 LAB
ALBUMIN SERPL BCP-MCNC: 2.2 G/DL (ref 3.5–5)
ALP SERPL-CCNC: 74 U/L (ref 46–116)
ALT SERPL W P-5'-P-CCNC: 76 U/L (ref 12–78)
ANION GAP SERPL CALCULATED.3IONS-SCNC: 10 MMOL/L (ref 4–13)
ANISOCYTOSIS BLD QL SMEAR: PRESENT
AST SERPL W P-5'-P-CCNC: 51 U/L (ref 5–45)
BASOPHILS # BLD MANUAL: 0 THOUSAND/UL (ref 0–0.1)
BASOPHILS NFR MAR MANUAL: 0 % (ref 0–1)
BILIRUB SERPL-MCNC: 0.5 MG/DL (ref 0.2–1)
BUN SERPL-MCNC: 36 MG/DL (ref 5–25)
CALCIUM ALBUM COR SERPL-MCNC: 9.7 MG/DL (ref 8.3–10.1)
CALCIUM SERPL-MCNC: 8.3 MG/DL (ref 8.3–10.1)
CHLORIDE SERPL-SCNC: 109 MMOL/L (ref 100–108)
CO2 SERPL-SCNC: 22 MMOL/L (ref 21–32)
CREAT SERPL-MCNC: 1.44 MG/DL (ref 0.6–1.3)
CRP SERPL QL: 16 MG/L
EOSINOPHIL # BLD MANUAL: 0.08 THOUSAND/UL (ref 0–0.4)
EOSINOPHIL NFR BLD MANUAL: 1 % (ref 0–6)
ERYTHROCYTE [DISTWIDTH] IN BLOOD BY AUTOMATED COUNT: 16.3 % (ref 11.6–15.1)
GFR SERPL CREATININE-BSD FRML MDRD: 48 ML/MIN/1.73SQ M
GLUCOSE SERPL-MCNC: 107 MG/DL (ref 65–140)
GLUCOSE SERPL-MCNC: 142 MG/DL (ref 65–140)
GLUCOSE SERPL-MCNC: 146 MG/DL (ref 65–140)
GLUCOSE SERPL-MCNC: 158 MG/DL (ref 65–140)
GLUCOSE SERPL-MCNC: 249 MG/DL (ref 65–140)
HCT VFR BLD AUTO: 41.1 % (ref 36.5–49.3)
HGB BLD-MCNC: 12.8 G/DL (ref 12–17)
LYMPHOCYTES # BLD AUTO: 0.56 THOUSAND/UL (ref 0.6–4.47)
LYMPHOCYTES # BLD AUTO: 7 % (ref 14–44)
MCH RBC QN AUTO: 23.7 PG (ref 26.8–34.3)
MCHC RBC AUTO-ENTMCNC: 31.1 G/DL (ref 31.4–37.4)
MCV RBC AUTO: 76 FL (ref 82–98)
MONOCYTES # BLD AUTO: 0.48 THOUSAND/UL (ref 0–1.22)
MONOCYTES NFR BLD: 6 % (ref 4–12)
NEUTROPHILS # BLD MANUAL: 6.91 THOUSAND/UL (ref 1.85–7.62)
NEUTS BAND NFR BLD MANUAL: 1 % (ref 0–8)
NEUTS SEG NFR BLD AUTO: 85 % (ref 43–75)
NRBC BLD AUTO-RTO: 1 /100 WBC (ref 0–2)
OVALOCYTES BLD QL SMEAR: PRESENT
PLATELET # BLD AUTO: 340 THOUSANDS/UL (ref 149–390)
PLATELET BLD QL SMEAR: ADEQUATE
PMV BLD AUTO: 10 FL (ref 8.9–12.7)
POTASSIUM SERPL-SCNC: 3.9 MMOL/L (ref 3.5–5.3)
PROT SERPL-MCNC: 5.8 G/DL (ref 6.4–8.2)
RBC # BLD AUTO: 5.39 MILLION/UL (ref 3.88–5.62)
SODIUM SERPL-SCNC: 141 MMOL/L (ref 136–145)
WBC # BLD AUTO: 8.04 THOUSAND/UL (ref 4.31–10.16)

## 2022-01-05 PROCEDURE — 99232 SBSQ HOSP IP/OBS MODERATE 35: CPT | Performed by: INTERNAL MEDICINE

## 2022-01-05 PROCEDURE — 86140 C-REACTIVE PROTEIN: CPT | Performed by: HOSPITALIST

## 2022-01-05 PROCEDURE — 94760 N-INVAS EAR/PLS OXIMETRY 1: CPT

## 2022-01-05 PROCEDURE — 85007 BL SMEAR W/DIFF WBC COUNT: CPT | Performed by: HOSPITALIST

## 2022-01-05 PROCEDURE — 80053 COMPREHEN METABOLIC PANEL: CPT | Performed by: HOSPITALIST

## 2022-01-05 PROCEDURE — 99233 SBSQ HOSP IP/OBS HIGH 50: CPT | Performed by: INTERNAL MEDICINE

## 2022-01-05 PROCEDURE — 94003 VENT MGMT INPAT SUBQ DAY: CPT

## 2022-01-05 PROCEDURE — 82948 REAGENT STRIP/BLOOD GLUCOSE: CPT

## 2022-01-05 PROCEDURE — 99232 SBSQ HOSP IP/OBS MODERATE 35: CPT | Performed by: HOSPITALIST

## 2022-01-05 PROCEDURE — 85027 COMPLETE CBC AUTOMATED: CPT | Performed by: HOSPITALIST

## 2022-01-05 RX ADMIN — ZINC SULFATE 220 MG (50 MG) CAPSULE 220 MG: CAPSULE at 09:04

## 2022-01-05 RX ADMIN — INSULIN GLARGINE 12 UNITS: 100 INJECTION, SOLUTION SUBCUTANEOUS at 09:03

## 2022-01-05 RX ADMIN — INSULIN LISPRO 3 UNITS: 100 INJECTION, SOLUTION INTRAVENOUS; SUBCUTANEOUS at 12:33

## 2022-01-05 RX ADMIN — CEFEPIME HYDROCHLORIDE 2000 MG: 2 INJECTION, SOLUTION INTRAVENOUS at 14:13

## 2022-01-05 RX ADMIN — ALLOPURINOL 200 MG: 100 TABLET ORAL at 09:04

## 2022-01-05 RX ADMIN — CHOLECALCIFEROL (VITAMIN D3) 10 MCG (400 UNIT) TABLET 400 UNITS: at 09:04

## 2022-01-05 RX ADMIN — CEFEPIME HYDROCHLORIDE 2000 MG: 2 INJECTION, SOLUTION INTRAVENOUS at 01:34

## 2022-01-05 RX ADMIN — INSULIN LISPRO 2 UNITS: 100 INJECTION, SOLUTION INTRAVENOUS; SUBCUTANEOUS at 17:18

## 2022-01-05 RX ADMIN — OXYCODONE HYDROCHLORIDE AND ACETAMINOPHEN 1000 MG: 500 TABLET ORAL at 09:03

## 2022-01-05 RX ADMIN — BARICITINIB 2 MG: 2 TABLET, FILM COATED ORAL at 12:33

## 2022-01-05 RX ADMIN — ENOXAPARIN SODIUM 30 MG: 100 INJECTION SUBCUTANEOUS at 09:05

## 2022-01-05 RX ADMIN — SULFASALAZINE 500 MG: 500 TABLET ORAL at 12:33

## 2022-01-05 RX ADMIN — INSULIN LISPRO 3 UNITS: 100 INJECTION, SOLUTION INTRAVENOUS; SUBCUTANEOUS at 09:02

## 2022-01-05 RX ADMIN — INSULIN LISPRO 3 UNITS: 100 INJECTION, SOLUTION INTRAVENOUS; SUBCUTANEOUS at 17:18

## 2022-01-05 RX ADMIN — SULFASALAZINE 500 MG: 500 TABLET ORAL at 17:19

## 2022-01-05 RX ADMIN — B-COMPLEX W/ C & FOLIC ACID TAB 1 TABLET: TAB at 09:04

## 2022-01-05 RX ADMIN — BUDESONIDE AND FORMOTEROL FUMARATE DIHYDRATE 2 PUFF: 160; 4.5 AEROSOL RESPIRATORY (INHALATION) at 17:20

## 2022-01-05 RX ADMIN — ATORVASTATIN CALCIUM 40 MG: 40 TABLET, FILM COATED ORAL at 17:19

## 2022-01-05 RX ADMIN — DEXAMETHASONE SODIUM PHOSPHATE 9.04 MG: 4 INJECTION, SOLUTION INTRA-ARTICULAR; INTRALESIONAL; INTRAMUSCULAR; INTRAVENOUS; SOFT TISSUE at 12:34

## 2022-01-05 RX ADMIN — DEXAMETHASONE SODIUM PHOSPHATE 9.04 MG: 4 INJECTION, SOLUTION INTRA-ARTICULAR; INTRALESIONAL; INTRAMUSCULAR; INTRAVENOUS; SOFT TISSUE at 01:34

## 2022-01-05 RX ADMIN — ENOXAPARIN SODIUM 30 MG: 100 INJECTION SUBCUTANEOUS at 21:30

## 2022-01-05 RX ADMIN — INSULIN LISPRO 1 UNITS: 100 INJECTION, SOLUTION INTRAVENOUS; SUBCUTANEOUS at 12:32

## 2022-01-05 RX ADMIN — CLOPIDOGREL BISULFATE 75 MG: 75 TABLET ORAL at 09:04

## 2022-01-05 RX ADMIN — BUDESONIDE AND FORMOTEROL FUMARATE DIHYDRATE 2 PUFF: 160; 4.5 AEROSOL RESPIRATORY (INHALATION) at 09:05

## 2022-01-05 RX ADMIN — SULFASALAZINE 500 MG: 500 TABLET ORAL at 21:30

## 2022-01-05 RX ADMIN — SULFASALAZINE 500 MG: 500 TABLET ORAL at 09:04

## 2022-01-05 NOTE — ASSESSMENT & PLAN NOTE
Lab Results   Component Value Date    EGFR 48 01/05/2022    EGFR 40 01/04/2022    EGFR 48 01/03/2022    CREATININE 1 44 (H) 01/05/2022    CREATININE 1 68 (H) 01/04/2022    CREATININE 1 45 (H) 01/03/2022     Cr improved see CHADWICK

## 2022-01-05 NOTE — ASSESSMENT & PLAN NOTE
Patient had acute kidney injury present on admission on stage III chronic kidney disease    His baseline creatinine is 1 2-1 5  Lasix held suspected ATN  apprec nephro  improved

## 2022-01-05 NOTE — PROGRESS NOTES
[place]  Progress Note - López Frost 1950, 80 y o  male MRN: [id number]  Unit/Bed#: -01 Encounter: [id number]  Primary Care Provider: Brendan Rao DO   Date and time admitted to hospital: 12/28/2021  4:54 AM    Acute respiratory failure with hypoxia [facility])  Assessment & Plan  Patient developed acute hypoxic respiratory failure after hospital admission due to COVID pneumonia  Currently he is on 15 liters/minute mid flow with oxygen saturations of 89-94%    Continue treatments for COVID pneumonia as well as mid flow oxygen  NSVT (nonsustained ventricular tachycardia) ([facility] [address] )  Assessment & Plan  Patient's history of nonsustained ventricular tachycardia  EKG shows normal sinus rhythm with normal intervals  Will continue metoprolol    Will monitor patient on telemetry    CHADWICK (acute kidney injury) [facility])  Assessment & Plan  Patient had acute kidney injury present on admission on stage III chronic kidney disease  His baseline creatinine is 1 2-1 5  Lasix held suspected ATN  apprec nephro  improved    Ulcerative rectosigmoiditis without complication [facility])  Assessment & Plan  Patient has ulcer of colitis  He denies any abdominal pain but had loose stools twice today  Will monitor for diarrhea  Denies signs of GI bleeding  Continue sulfasalazine    Type 2 diabetes mellitus with stage 3a chronic kidney disease, without long-term current use of insulin [facility])  Assessment & Plan  Lab Results   Component Value Date    HGBA1C 6 4 (H) 12/28/2021       Recent Labs     01/04/22  1236 01/04/22  1626 01/04/22  2049 01/05/22  0822   POCGLU 150* 152* 174* 142*       Blood Sugar Average: Last 72 hrs:  (P) 673 [id number]     Patient has type 2 diabetes with stage III chronic disease  Patient has hyperglycemia due to steroids  cont Lantus to 12 units and   pre meal Humalog  Primary hypertension  Assessment & Plan  Patient has chronic hypertension  stable    Chronic kidney disease, stage 3 [facility])  Assessment & Plan  Lab Results   Component Value Date    EGFR 48 2022    EGFR 40 2022    EGFR 48 2022    CREATININE 1 44 (H) 2022    CREATININE 1 68 (H) 2022    CREATININE 1 45 (H) 2022     Cr improved see CHADWICK    * Pneumonia due to COVID-19 virus  Assessment & Plan  Patient developed chills, fevers and dry cough about 3-4 days before admission  He is fully vaccinated with the [place] vaccine  Chest x-ray showed bilateral COVID pneumonia  Patient feels subjectively better but he still on 15 liters/minute mid flow oxygen    Will continue baricitinib, completed remdesivir;   high-dose IV Decadron  Continue cefepime  Dc vanc mrsa neg    S/p  Lasix 40 mg IV      Blood culture from  grows contaminants  Repeat blood culture showed no growth  DC vanc                VTE Pharmacologic Prophylaxis:   Pharmacologic: Enoxaparin (Lovenox)     Patient Centered Rounds: I have performed bedside rounds with nursing staff today            Education and Discussions with Family / Patient: Mrs Fernando Ball      Current Length of Stay: 8 day(s)    Current Patient Status: Inpatient        Code Status: Level 1 - Full Code      Subjective:   Doing ok on 15 L   In chair  Denies complaints    Patient is seen and examined at bedside  All other ROS are negative  Objective:     Vitals:   Temp (24hrs), Av 6 °F (36 4 °C), Min:97 1 °F (36 2 °C), Max:97 9 °F (36 6 °C)    Temp:  [97 1 °F (36 2 °C)-97 9 °F (36 6 °C)] 97 1 °F (36 2 °C)  HR:  [75-96] 96  Resp:  [18-22] 18  BP: (116-159)/(78-95) 116/78  SpO2:  [88 %-91 %] 90 %  Body mass index is 29 36 kg/m²  Input and Output Summary (last 24 hours):        Intake/Output Summary (Last 24 hours) at 2022 1139  Last data filed at 2022 0901  Gross per 24 hour   Intake 840 ml   Output 750 ml   Net 90 ml       Physical Exam:     GEN: No acute distress, comfortable, o2  HEEENT: No JVD, PERRLA, no scleral icterus  RESP: crackles  CV: RRR, +s1/s2   ABD: SOFT NON TENDER, POSITIVE BOWEL SOUNDS, NO DISTENTION  PSYCH: CALM  NEURO: A X O X 3, NO FOCAL DEFICITS  SKIN: NO RASH  EXTREM: NO EDEMA    Additional Data:     Labs:    Results from last 7 days   Lab Units 01/05/22  0235 01/04/22  0218 01/02/22  0339   WBC Thousand/uL 8 04   < > 7 39   HEMOGLOBIN g/dL 12 8   < > 13 0   HEMATOCRIT % 41 1   < > 42 0   PLATELETS Thousands/uL 340   < > 292   BANDS PCT % 1   < >  --    NEUTROS PCT %  --   --  87*   LYMPHS PCT %  --   --  4*   LYMPHO PCT % 7*   < >  --    MONOS PCT %  --   --  8   MONO PCT % 6   < >  --    EOS PCT % 1   < > 0    < > = values in this interval not displayed  Results from last 7 days   Lab Units 01/05/22  0235   SODIUM mmol/L 141   POTASSIUM mmol/L 3 9   CHLORIDE mmol/L 109*   CO2 mmol/L 22   BUN mg/dL 36*   CREATININE mg/dL 1 44*   ANION GAP mmol/L 10   CALCIUM mg/dL 8 3   ALBUMIN g/dL 2 2*   TOTAL BILIRUBIN mg/dL 0 50   ALK PHOS U/L 74   ALT U/L 76   AST U/L 51*   GLUCOSE RANDOM mg/dL 107         Results from last 7 days   Lab Units 01/05/22  0822 01/04/22  2049 01/04/22  1626 01/04/22  1236 01/04/22  0816 01/03/22  2055 01/03/22  1626 01/03/22  1114 01/03/22  0743 01/02/22  2119 01/02/22  1652 01/02/22  1226   POC GLUCOSE mg/dl 142* 174* 152* 150* 181* 240* 194* 164* 158* 219* 165* 172*         Results from last 7 days   Lab Units 01/01/22  0453 12/31/21  0425 12/30/21  0545   PROCALCITONIN ng/ml 0 50* 0 94* 1 36*           * I Have Reviewed All Lab Data Listed Above  Imaging:     Results for orders placed during the hospital encounter of 12/28/21    XR chest portable    Narrative  CHEST    INDICATION:   Worsening hypoxia  Patient has confirmed COVID-19  COMPARISON:  12/28/2021    EXAM PERFORMED/VIEWS:  XR CHEST PORTABLE      FINDINGS:    Loop recorder noted as on prior study  Cardiac silhouette size unchanged    Bilateral multifocal groundglass opacities are present  Interval worsening has occurred  There is no pleural effusion or pneumothorax  Osseous structures appear within normal limits for patient age  Impression  Bilateral multifocal groundglass opacities are present  In the setting of COVID-19 infection, this is most in keeping with COVID 19 pneumonia  There has been interval worsening            Workstation performed: [id number]    No results found for this or any previous visit  *I have reviewed all imaging reports listed above      Recent Cultures (last 7 days):     Results from last 7 days   Lab Units 01/01/22  1135 12/29/21  1539   BLOOD CULTURE  No Growth at 72 hrs  No Growth at 72 hrs  No Growth After 5 Days    Micrococcus luteus*   GRAM STAIN RESULT   --  Gram positive cocci in clusters*       Last 24 Hours Medication List:   Current Facility-Administered Medications   Medication Dose Route Frequency Provider Last Rate    acetaminophen  650 mg Oral Q6H PRN Karol Hirsch MD      allopurinol  200 mg Oral Daily Karol Hirsch MD      ascorbic acid  1,000 mg Oral Daily Faylene Must, PA-C      atorvastatin  40 mg Oral Daily With [facility], MD      Baricitinib  2 mg Oral Q24H Karol Hirsch MD      budesonide-formoterol  2 puff Inhalation BID Faylene Must, PA-C      cefepime  2,000 mg Intravenous Q12H ROSALINA Barajas 2,000 mg (01/05/22 0134)    cholecalciferol  400 Units Oral Daily Faylene Must, PA-C      clopidogrel  75 mg Oral Daily Karol Hirsch MD      dexamethasone  0 1 mg/kg Intravenous Q12H Karol Hirsch MD      enoxaparin  30 mg Subcutaneous Q12H [address] Karol Hirsch MD      insulin glargine  12 Units Subcutaneous QAM Karol Hirsch MD      insulin lispro  1-5 Units Subcutaneous TID AC Karol Hirsch MD      insulin lispro  3 Units Subcutaneous TID With Meals Karol Hirsch MD      multivitamin stress formula  1 tablet Oral Daily Faylene Must, PA-C      ondansetron  4 mg Intravenous Q6H PRN MD Alexander Christopher sulfaSALAzine  500 mg Oral 4x Daily Анна Alfonso MD      zinc sulfate  220 mg Oral Daily Spike Miles PA-C          Today, Patient Was Seen By: Xiang Harris MD    ** Please Note: Dictation voice to text software may have been used in the creation of this document   **

## 2022-01-05 NOTE — ASSESSMENT & PLAN NOTE
Lab Results   Component Value Date    HGBA1C 6 4 (H) 12/28/2021       Recent Labs     01/04/22  1236 01/04/22  1626 01/04/22 2049 01/05/22  0822   POCGLU 150* 152* 174* 142*       Blood Sugar Average: Last 72 hrs:  (P) 189 [id number]     Patient has type 2 diabetes with stage III chronic disease  Patient has hyperglycemia due to steroids  cont Lantus to 12 units and   pre meal Humalog

## 2022-01-05 NOTE — ASSESSMENT & PLAN NOTE
Patient developed chills, fevers and dry cough about 3-4 days before admission  He is fully vaccinated with the [place] vaccine  Chest x-ray showed bilateral COVID pneumonia  Patient feels subjectively better but he still on 15 liters/minute mid flow oxygen    Will continue baricitinib, completed remdesivir;   high-dose IV Decadron  Continue cefepime  Dc vanc mrsa neg    S/p  Lasix 40 mg IV Jan 2     Blood culture from December 29th grows contaminants  Repeat blood culture showed no growth   DC vanc

## 2022-01-05 NOTE — PROGRESS NOTES
Progress Note - Pulmonary   Casey Lagunas 80 y o  male MRN: [id number]  Unit/Bed#: -01 Encounter: [id number]    Assessment & Plan:  Acute hypoxic respiratory failure  COVID-19 pneumonia with possible superimposed bacterial infection  Volume overload with likely RV dysfunction and diastolic dysfunction  CHADWICK    · Patient requiring 15 L   No longer requiring NRB  Continue to monitor SpO2 and titrate as able to maintain saturations greater than 89%  · Continue treatment per moderate COVID-19 pathway  · Atorvastatin  · Dexamethasone - 0 1mg/kg Q 12H  · Remdesivir x5 days completed  · Baricitinib x 14 days  · Anticoagulation:  Lovenox prophylaxis (D-dimer < 2)  · Antibiotics: cefepime  Procal minimally elevated  MRSA (-)  · Encourage self-proning, activity as tolerated, incentive spirometry  · Nephrology following    Subjective:   Patient says his breathing seems to be little better today  No new complaints  Objective:     Vitals: Blood pressure 116/78, pulse 96, temperature (!) 97 1 °F (36 2 °C), resp  rate 18, weight 90 2 kg (198 lb 12 8 oz), SpO2 90 %  ,Body mass index is 29 36 kg/m²  Intake/Output Summary (Last 24 hours) at 1/5/2022 1111  Last data filed at 1/5/2022 0901  Gross per 24 hour   Intake 840 ml   Output 1175 ml   Net -335 ml       Invasive Devices  Report    Peripheral Intravenous Line            Peripheral IV 01/04/22 Right;Upper;Ventral (anterior) Arm 1 day                Physical Exam:  General appearance: Alert and oriented, in no acute distress  Head: Normocephalic, without obvious abnormality, atraumatic  Eyes: EOMI  No discharge bilaterally  No scleral icterus  Neck: Supple, symmetrical, trachea midline  Lungs: Decreased breath sounds  Heart: Regular rate and rhythm, S1, S2 normal, no murmur  Abdomen:  No appreciable distension or tenderness  Extremities: No significant edema or tenderness  Skin: Warm and dry  Neurologic: No acute focal deficits are noted    Labs:  I have personally reviewed pertinent lab results  Imaging and other studies: I have personally reviewed pertinent reports

## 2022-01-05 NOTE — PLAN OF CARE
Problem: PAIN - ADULT  Goal: Verbalizes/displays adequate comfort level or baseline comfort level  Description: Interventions:  - Encourage patient to monitor pain and request assistance  - Assess pain using appropriate pain scale  - Administer analgesics based on type and severity of pain and evaluate response  - Implement non-pharmacological measures as appropriate and evaluate response  - Consider cultural and social influences on pain and pain management  - Notify physician/advanced practitioner if interventions unsuccessful or patient reports new pain  Outcome: Progressing     Problem: INFECTION - ADULT  Goal: Absence or prevention of progression during hospitalization  Description: INTERVENTIONS:  - Assess and monitor for signs and symptoms of infection  - Monitor lab/diagnostic results  - Monitor all insertion sites, i e  indwelling lines, tubes, and drains  - Monitor endotracheal if appropriate and nasal secretions for changes in amount and color  - [place] appropriate cooling/warming therapies per order  - Administer medications as ordered  - Instruct and encourage patient and family to use good hand hygiene technique  - Identify and instruct in appropriate isolation precautions for identified infection/condition  Outcome: Progressing  Goal: Absence of fever/infection during neutropenic period  Description: INTERVENTIONS:  - Monitor WBC    Outcome: Progressing     Problem: SAFETY ADULT  Goal: Patient will remain free of falls  Description: INTERVENTIONS:  - Educate patient/family on patient safety including physical limitations  - Instruct patient to call for assistance with activity   - Consult OT/PT to assist with strengthening/mobility   - Keep Call bell within reach  - Keep bed low and locked with side rails adjusted as appropriate  - Keep care items and personal belongings within reach  - Initiate and maintain comfort rounds  - Make Fall Risk Sign visible to staff  - Offer Toileting every 2 Hours, in advance of need  - Initiate/Maintain bed alarm  - Obtain necessary fall risk management equipment:   - Apply yellow socks and bracelet for high fall risk patients  - Consider moving patient to room near nurses station  Outcome: Progressing  Goal: Maintain or return to baseline ADL function  Description: INTERVENTIONS:  -  Assess patient's ability to carry out ADLs; assess patient's baseline for ADL function and identify physical deficits which impact ability to perform ADLs (bathing, care of mouth/teeth, toileting, grooming, dressing, etc )  - Assess/evaluate cause of self-care deficits   - Assess range of motion  - Assess patient's mobility; develop plan if impaired  - Assess patient's need for assistive devices and provide as appropriate  - Encourage maximum independence but intervene and supervise when necessary  - Involve family in performance of ADLs  - Assess for home care needs following discharge   - Consider OT consult to assist with ADL evaluation and planning for discharge  - Provide patient education as appropriate  Outcome: Progressing  Goal: Maintains/Returns to pre admission functional level  Description: INTERVENTIONS:  - Perform BMAT or MOVE assessment daily    - Set and communicate daily mobility goal to care team and patient/family/caregiver  - Collaborate with rehabilitation services on mobility goals if consulted  - Perform Range of Motion 2 times a day  - Reposition patient every 2 hours    - Dangle patient 2 times a day  - Stand patient 2 times a day  - Ambulate patient 2 times a day  - Out of bed to chair 2 times a day   - Out of bed for meals 2 times a day  - Out of bed for toileting  - Record patient progress and toleration of activity level   Outcome: Progressing     Problem: DISCHARGE PLANNING  Goal: Discharge to home or other facility with appropriate resources  Description: INTERVENTIONS:  - Identify barriers to discharge w/patient and caregiver  - Arrange for needed discharge resources and transportation as appropriate  - Identify discharge learning needs (meds, wound care, etc )  - Arrange for interpretive services to assist at discharge as needed  - Refer to Case Management Department for coordinating discharge planning if the patient needs post-hospital services based on physician/advanced practitioner order or complex needs related to functional status, cognitive ability, or social support system  Outcome: Progressing     Problem: Knowledge Deficit  Goal: Patient/family/caregiver demonstrates understanding of disease process, treatment plan, medications, and discharge instructions  Description: Complete learning assessment and assess knowledge base    Interventions:  - Provide teaching at level of understanding  - Provide teaching via preferred learning methods  Outcome: Progressing     Problem: Potential for Falls  Goal: Patient will remain free of falls  Description: INTERVENTIONS:  - Educate patient/family on patient safety including physical limitations  - Instruct patient to call for assistance with activity   - Consult OT/PT to assist with strengthening/mobility   - Keep Call bell within reach  - Keep bed low and locked with side rails adjusted as appropriate  - Keep care items and personal belongings within reach  - Initiate and maintain comfort rounds  - Make Fall Risk Sign visible to staff  - Offer Toileting every 2 Hours, in advance of need  - Initiate/Maintain bed alarm  - Obtain necessary fall risk management equipment:   - Apply yellow socks and bracelet for high fall risk patients  - Consider moving patient to room near nurses station  Outcome: Progressing     Problem: MOBILITY - ADULT  Goal: Maintain or return to baseline ADL function  Description: INTERVENTIONS:  -  Assess patient's ability to carry out ADLs; assess patient's baseline for ADL function and identify physical deficits which impact ability to perform ADLs (bathing, care of mouth/teeth, toileting, grooming, dressing, etc )  - Assess/evaluate cause of self-care deficits   - Assess range of motion  - Assess patient's mobility; develop plan if impaired  - Assess patient's need for assistive devices and provide as appropriate  - Encourage maximum independence but intervene and supervise when necessary  - Involve family in performance of ADLs  - Assess for home care needs following discharge   - Consider OT consult to assist with ADL evaluation and planning for discharge  - Provide patient education as appropriate  Outcome: Progressing  Goal: Maintains/Returns to pre admission functional level  Description: INTERVENTIONS:  - Perform BMAT or MOVE assessment daily    - Set and communicate daily mobility goal to care team and patient/family/caregiver  - Collaborate with rehabilitation services on mobility goals if consulted  - Perform Range of Motion 2 times a day  - Reposition patient every 2 hours    - Dangle patient 2 times a day  - Stand patient 2 times a day  - Ambulate patient 2 times a day  - Out of bed to chair 2 times a day   - Out of bed for meals 2 times a day  - Out of bed for toileting  - Record patient progress and toleration of activity level   Outcome: Progressing     Problem: Prexisting or High Potential for Compromised Skin Integrity  Goal: Skin integrity is maintained or improved  Description: INTERVENTIONS:  - Identify patients at risk for skin breakdown  - Assess and monitor skin integrity  - Assess and monitor nutrition and hydration status  - Monitor labs   - Assess for incontinence   - Turn and reposition patient  - Assist with mobility/ambulation  - Relieve pressure over bony prominences  - Avoid friction and shearing  - Provide appropriate hygiene as needed including keeping skin clean and dry  - Evaluate need for skin moisturizer/barrier cream  - Collaborate with interdisciplinary team   - Patient/family teaching  - Consider wound care consult   Outcome: Progressing

## 2022-01-05 NOTE — PROGRESS NOTES
NEPHROLOGY PROGRESS NOTE   Elizabeth Argueta 80 y o  male MRN: [id number]  Unit/Bed#: -01 Encounter: [id number]  Reason for Consult: [place]    ASSESSMENT/PLAN:  1  Acute Kidney Injury, POA- likely secondary to ATN from COVID cytokine release  - admission creatinine: 1 8  - current creatinine: 1 4  - initially received IVF without significant improvement  - then received lasix 40mg IV daily however creatinine started to trend up so this was stopped  - creatinine now improving again while holding lasix and IVF  - UA: small leuks, moderate bacteria, no protein, no blood  - PVR: 132ml  - Urine output: acceptable  - avoid hypotension, avoid nephrotoxins, avoid IV contrast  2  Chronic Kidney Disease stage 3- Baseline creatinine 1 2-1 5     3  Hypokalemia- replete as needed  4  Metabolic Acidosis- resolved  5  COVID Pneumonia- per primary team and pulmonology team    Disposition:  Hold lasix  AM BMP  SUBJECTIVE:  Patient feeling well overall  Making urine  Denies acute sob  Denies leg swelling  OBJECTIVE:  Current Weight: Weight - Scale: 90 2 kg (198 lb 12 8 oz)  Vitals:    01/05/22 0135 01/05/22 0824 01/05/22 0907 01/05/22 1220   BP:  116/78     Pulse: 87 96     Resp: 20 18     Temp:  (!) 97 1 °F (36 2 °C)     TempSrc:       SpO2: 91% (!) 88% 90% 91%   Weight:           Intake/Output Summary (Last 24 hours) at 1/5/2022 1530  Last data filed at 1/5/2022 1231  Gross per 24 hour   Intake 540 ml   Output 775 ml   Net -235 ml     Due to COVID infection, I did not enter the patient's room but examined him from the window and spoke with him via walkie talkie    General: NAD  Skin: no rash  Eyes: anicteric  Respiratory: no labored breathing  Cardiac: regular rate on monitor  Extremities: no edema  GI: not distended  Neuro: alert awake  Psych: mood and affect appropriate    Medications:    Current Facility-Administered Medications:     acetaminophen (TYLENOL) tablet 650 mg, 650 mg, Oral, Q6H PRN, Shawnee Montelongo MD Mavis Genovevasin allopurinol (ZYLOPRIM) tablet 200 mg, 200 mg, Oral, Daily, Анна Alfonso MD, 200 mg at 01/05/22 9920    ascorbic acid (VITAMIN C) tablet 1,000 mg, 1,000 mg, Oral, Daily, Spike Miles PA-C, 1,000 mg at 01/05/22 3532    atorvastatin (LIPITOR) tablet 40 mg, 40 mg, Oral, Daily With Emma Montes MD, 40 mg at 01/04/22 1628    Baricitinib (OLUMIANT) (COVID EUA) tablet 2 mg, 2 mg, Oral, Q24H, Анна Alfonso MD, 2 mg at 01/05/22 1233    budesonide-formoterol (SYMBICORT) 160-4 5 mcg/act inhaler 2 puff, 2 puff, Inhalation, BID, Spike Miles PA-C, 2 puff at 01/05/22 0905    cefepime (MAXIPIME) IVPB (premix in dextrose) 2,000 mg 50 mL, 2,000 mg, Intravenous, Q12H, ROSALINA Gill, Last Rate: 100 mL/hr at 01/05/22 1413, 2,000 mg at 01/05/22 1413    cholecalciferol (VITAMIN D3) tablet 400 Units, 400 Units, Oral, Daily, Spike Miles PA-C, 400 Units at 01/05/22 0904    clopidogrel (PLAVIX) tablet 75 mg, 75 mg, Oral, Daily, Анна Alfonso MD, 75 mg at 01/05/22 0904    dexamethasone (DECADRON) injection 9 04 mg, 0 1 mg/kg, Intravenous, Q12H, Анна Alfonso MD, 9 04 mg at 01/05/22 1234    enoxaparin (LOVENOX) subcutaneous injection 30 mg, 30 mg, Subcutaneous, Q12H [address], Анна Alfonso MD, 30 mg at 01/05/22 0905    insulin glargine (LANTUS) subcutaneous injection 12 Units 0 12 mL, 12 Units, Subcutaneous, QAM, Анна Alfonso MD, 12 Units at 01/05/22 0903    insulin lispro (HumaLOG) 100 units/mL subcutaneous injection 1-5 Units, 1-5 Units, Subcutaneous, TID AC, 1 Units at 01/05/22 1232 **AND** Fingerstick Glucose (POCT), , , TID AC, Анна Alfonso MD    insulin lispro (HumaLOG) 100 units/mL subcutaneous injection 3 Units, 3 Units, Subcutaneous, TID With Meals, Анна Alfonso MD, 3 Units at 01/05/22 1233    multivitamin stress formula tablet 1 tablet, 1 tablet, Oral, Daily, Spike Miles PA-C, 1 tablet at 01/05/22 0904    ondansetron (ZOFRAN) injection 4 mg, 4 mg, Intravenous, Q6H PRN, MD Isabell Gill sulfaSALAzine (AZULFIDINE) tablet 500 mg, 500 mg, Oral, 4x Daily, Jassi Mendoza MD, 500 mg at 01/05/22 1233    zinc sulfate (ZINCATE) capsule 220 mg, 220 mg, Oral, Daily, Audra Ames PA-C, 220 mg at 01/05/22 0764    Laboratory Results:  Results from last 7 days   Lab Units 01/05/22  0235 01/04/22  0218 01/03/22  0520 01/02/22  0339 01/01/22  0453 12/31/21  0425 12/30/21  0545   WBC Thousand/uL 8 04 6 85  --  7 39 5 82 4 97 7 63   HEMOGLOBIN g/dL 12 8 13 6  --  13 0 13 7 14 2 13 4   HEMATOCRIT % 41 1 44 5  --  42 0 43 9 45 1 43 6   PLATELETS Thousands/uL 340 364  --  292 292 268 224   POTASSIUM mmol/L 3 9 3 6 3 3* 3 7 3 4* 3 4* 3 8   CHLORIDE mmol/L 109* 106 106 107 106 103 102   CO2 mmol/L 22 25 24 24 25 24 18*   BUN mg/dL 36* 38* 34* 42* 45* 45* 35*   CREATININE mg/dL 1 44* 1 68* 1 45* 1 59* 1 70* 1 84* 1 72*   CALCIUM mg/dL 8 3 8 5 7 7* 7 8* 7 9* 8 1* 8 2*   MAGNESIUM mg/dL  --  2 2  --   --   --   --   --      I have personally reviewed the blood work as stated above and in my note  I have personally reviewed last renal note

## 2022-01-06 LAB
BACTERIA BLD CULT: NORMAL
BACTERIA BLD CULT: NORMAL
GLUCOSE SERPL-MCNC: 131 MG/DL (ref 65–140)
GLUCOSE SERPL-MCNC: 207 MG/DL (ref 65–140)
GLUCOSE SERPL-MCNC: 208 MG/DL (ref 65–140)
GLUCOSE SERPL-MCNC: 242 MG/DL (ref 65–140)

## 2022-01-06 PROCEDURE — 94003 VENT MGMT INPAT SUBQ DAY: CPT

## 2022-01-06 PROCEDURE — 99233 SBSQ HOSP IP/OBS HIGH 50: CPT | Performed by: HOSPITALIST

## 2022-01-06 PROCEDURE — 99233 SBSQ HOSP IP/OBS HIGH 50: CPT | Performed by: INTERNAL MEDICINE

## 2022-01-06 PROCEDURE — 82948 REAGENT STRIP/BLOOD GLUCOSE: CPT

## 2022-01-06 PROCEDURE — 99232 SBSQ HOSP IP/OBS MODERATE 35: CPT | Performed by: INTERNAL MEDICINE

## 2022-01-06 PROCEDURE — 94760 N-INVAS EAR/PLS OXIMETRY 1: CPT

## 2022-01-06 RX ORDER — FUROSEMIDE 10 MG/ML
80 INJECTION INTRAMUSCULAR; INTRAVENOUS ONCE
Status: COMPLETED | OUTPATIENT
Start: 2022-01-06 | End: 2022-01-06

## 2022-01-06 RX ORDER — FUROSEMIDE 10 MG/ML
40 INJECTION INTRAMUSCULAR; INTRAVENOUS ONCE
Status: DISCONTINUED | OUTPATIENT
Start: 2022-01-06 | End: 2022-01-06

## 2022-01-06 RX ORDER — INSULIN GLARGINE 100 [IU]/ML
INJECTION, SOLUTION SUBCUTANEOUS
Status: DISPENSED
Start: 2022-01-06 | End: 2022-01-06

## 2022-01-06 RX ADMIN — BUDESONIDE AND FORMOTEROL FUMARATE DIHYDRATE 2 PUFF: 160; 4.5 AEROSOL RESPIRATORY (INHALATION) at 10:41

## 2022-01-06 RX ADMIN — INSULIN GLARGINE 12 UNITS: 100 INJECTION, SOLUTION SUBCUTANEOUS at 12:50

## 2022-01-06 RX ADMIN — SULFASALAZINE 500 MG: 500 TABLET ORAL at 23:00

## 2022-01-06 RX ADMIN — DEXAMETHASONE SODIUM PHOSPHATE 9.04 MG: 4 INJECTION, SOLUTION INTRA-ARTICULAR; INTRALESIONAL; INTRAMUSCULAR; INTRAVENOUS; SOFT TISSUE at 01:43

## 2022-01-06 RX ADMIN — ENOXAPARIN SODIUM 30 MG: 100 INJECTION SUBCUTANEOUS at 10:38

## 2022-01-06 RX ADMIN — ENOXAPARIN SODIUM 30 MG: 100 INJECTION SUBCUTANEOUS at 22:00

## 2022-01-06 RX ADMIN — ATORVASTATIN CALCIUM 40 MG: 40 TABLET, FILM COATED ORAL at 16:57

## 2022-01-06 RX ADMIN — CEFEPIME HYDROCHLORIDE 2000 MG: 2 INJECTION, SOLUTION INTRAVENOUS at 01:43

## 2022-01-06 RX ADMIN — CHOLECALCIFEROL (VITAMIN D3) 10 MCG (400 UNIT) TABLET 400 UNITS: at 10:39

## 2022-01-06 RX ADMIN — DEXAMETHASONE SODIUM PHOSPHATE 9.04 MG: 4 INJECTION, SOLUTION INTRA-ARTICULAR; INTRALESIONAL; INTRAMUSCULAR; INTRAVENOUS; SOFT TISSUE at 12:50

## 2022-01-06 RX ADMIN — BARICITINIB 2 MG: 2 TABLET, FILM COATED ORAL at 12:50

## 2022-01-06 RX ADMIN — INSULIN LISPRO 3 UNITS: 100 INJECTION, SOLUTION INTRAVENOUS; SUBCUTANEOUS at 12:51

## 2022-01-06 RX ADMIN — INSULIN LISPRO 1 UNITS: 100 INJECTION, SOLUTION INTRAVENOUS; SUBCUTANEOUS at 12:50

## 2022-01-06 RX ADMIN — FUROSEMIDE 80 MG: 10 INJECTION, SOLUTION INTRAMUSCULAR; INTRAVENOUS at 16:57

## 2022-01-06 RX ADMIN — BUDESONIDE AND FORMOTEROL FUMARATE DIHYDRATE 2 PUFF: 160; 4.5 AEROSOL RESPIRATORY (INHALATION) at 17:00

## 2022-01-06 RX ADMIN — B-COMPLEX W/ C & FOLIC ACID TAB 1 TABLET: TAB at 10:39

## 2022-01-06 RX ADMIN — INSULIN LISPRO 3 UNITS: 100 INJECTION, SOLUTION INTRAVENOUS; SUBCUTANEOUS at 10:40

## 2022-01-06 RX ADMIN — ZINC SULFATE 220 MG (50 MG) CAPSULE 220 MG: CAPSULE at 10:39

## 2022-01-06 RX ADMIN — SULFASALAZINE 500 MG: 500 TABLET ORAL at 10:56

## 2022-01-06 RX ADMIN — SULFASALAZINE 500 MG: 500 TABLET ORAL at 16:57

## 2022-01-06 RX ADMIN — CLOPIDOGREL BISULFATE 75 MG: 75 TABLET ORAL at 10:39

## 2022-01-06 RX ADMIN — INSULIN LISPRO 1 UNITS: 100 INJECTION, SOLUTION INTRAVENOUS; SUBCUTANEOUS at 16:57

## 2022-01-06 RX ADMIN — SULFASALAZINE 500 MG: 500 TABLET ORAL at 12:59

## 2022-01-06 RX ADMIN — OXYCODONE HYDROCHLORIDE AND ACETAMINOPHEN 1000 MG: 500 TABLET ORAL at 10:39

## 2022-01-06 RX ADMIN — INSULIN LISPRO 3 UNITS: 100 INJECTION, SOLUTION INTRAVENOUS; SUBCUTANEOUS at 16:57

## 2022-01-06 RX ADMIN — ALLOPURINOL 200 MG: 100 TABLET ORAL at 10:39

## 2022-01-06 NOTE — PLAN OF CARE
Problem: PAIN - ADULT  Goal: Verbalizes/displays adequate comfort level or baseline comfort level  Description: Interventions:  - Encourage patient to monitor pain and request assistance  - Assess pain using appropriate pain scale  - Administer analgesics based on type and severity of pain and evaluate response  - Implement non-pharmacological measures as appropriate and evaluate response  - Consider cultural and social influences on pain and pain management  - Notify physician/advanced practitioner if interventions unsuccessful or patient reports new pain  Outcome: Progressing     Problem: INFECTION - ADULT  Goal: Absence or prevention of progression during hospitalization  Description: INTERVENTIONS:  - Assess and monitor for signs and symptoms of infection  - Monitor lab/diagnostic results  - Monitor all insertion sites, i e  indwelling lines, tubes, and drains  - Monitor endotracheal if appropriate and nasal secretions for changes in amount and color  - [place] appropriate cooling/warming therapies per order  - Administer medications as ordered  - Instruct and encourage patient and family to use good hand hygiene technique  - Identify and instruct in appropriate isolation precautions for identified infection/condition  Outcome: Progressing  Goal: Absence of fever/infection during neutropenic period  Description: INTERVENTIONS:  - Monitor WBC    Outcome: Progressing     Problem: SAFETY ADULT  Goal: Patient will remain free of falls  Description: INTERVENTIONS:  - Educate patient/family on patient safety including physical limitations  - Instruct patient to call for assistance with activity   - Consult OT/PT to assist with strengthening/mobility   - Keep Call bell within reach  - Keep bed low and locked with side rails adjusted as appropriate  - Keep care items and personal belongings within reach  - Initiate and maintain comfort rounds  - Make Fall Risk Sign visible to staff  - Offer Toileting every 2 Hours, in advance of need  - Initiate/Maintain bed alarm  - Obtain necessary fall risk management equipment:   - Apply yellow socks and bracelet for high fall risk patients  - Consider moving patient to room near nurses station  Outcome: Progressing  Goal: Maintain or return to baseline ADL function  Description: INTERVENTIONS:  -  Assess patient's ability to carry out ADLs; assess patient's baseline for ADL function and identify physical deficits which impact ability to perform ADLs (bathing, care of mouth/teeth, toileting, grooming, dressing, etc )  - Assess/evaluate cause of self-care deficits   - Assess range of motion  - Assess patient's mobility; develop plan if impaired  - Assess patient's need for assistive devices and provide as appropriate  - Encourage maximum independence but intervene and supervise when necessary  - Involve family in performance of ADLs  - Assess for home care needs following discharge   - Consider OT consult to assist with ADL evaluation and planning for discharge  - Provide patient education as appropriate  Outcome: Progressing  Goal: Maintains/Returns to pre admission functional level  Description: INTERVENTIONS:  - Perform BMAT or MOVE assessment daily    - Set and communicate daily mobility goal to care team and patient/family/caregiver  - Collaborate with rehabilitation services on mobility goals if consulted  - Perform Range of Motion 2 times a day  - Reposition patient every 2 hours    - Dangle patient 2 times a day  - Stand patient 2 times a day  - Ambulate patient 2 times a day  - Out of bed to chair 2 times a day   - Out of bed for meals 2 times a day  - Out of bed for toileting  - Record patient progress and toleration of activity level   Outcome: Progressing     Problem: DISCHARGE PLANNING  Goal: Discharge to home or other facility with appropriate resources  Description: INTERVENTIONS:  - Identify barriers to discharge w/patient and caregiver  - Arrange for needed discharge resources and transportation as appropriate  - Identify discharge learning needs (meds, wound care, etc )  - Arrange for interpretive services to assist at discharge as needed  - Refer to Case Management Department for coordinating discharge planning if the patient needs post-hospital services based on physician/advanced practitioner order or complex needs related to functional status, cognitive ability, or social support system  Outcome: Progressing

## 2022-01-06 NOTE — ASSESSMENT & PLAN NOTE
Lab Results   Component Value Date    HGBA1C 6 4 (H) 12/28/2021       Recent Labs     01/05/22  1206 01/05/22  1618 01/05/22  2205 01/06/22  0844   POCGLU 158* 249* 146* 131       Blood Sugar Average: Last 72 hrs:  (P) 299 [id number]     Patient has type 2 diabetes with stage III chronic disease  Patient has hyperglycemia due to steroids  cont Lantus to 12 units and   pre meal Humalog

## 2022-01-06 NOTE — PROGRESS NOTES
[facility]  [facility] - Aleshia Hunt 1950, 80 y o  male MRN: [id number]  Unit/Bed#: -01 Encounter: [id number]  Primary Care Provider: Krupa San DO   Date and time admitted to hospital: 12/28/2021  4:54 AM    Acute respiratory failure with hypoxia [facility])  Assessment & Plan  Patient developed acute hypoxic respiratory failure after hospital admission due to COVID pneumonia  Currently he is on 15 liters/minute mid flow with oxygen saturations of 89-94%    Continue treatments for COVID pneumonia as well as mid flow oxygen  NSVT (nonsustained ventricular tachycardia) ([facility] [address] )  Assessment & Plan  Patient's history of nonsustained ventricular tachycardia  EKG shows normal sinus rhythm with normal intervals  Will continue metoprolol    Will monitor patient on telemetry    CHADWICK (acute kidney injury) [facility])  Assessment & Plan  Patient had acute kidney injury present on admission on stage III chronic kidney disease  His baseline creatinine is 1 2-1 5  Lasix held suspected ATN  apprec nephro  improved    Ulcerative rectosigmoiditis without complication [facility])  Assessment & Plan  Patient has ulcer of colitis  He denies any abdominal pain but had loose stools twice today  Will monitor for diarrhea  Denies signs of GI bleeding  Continue sulfasalazine    Type 2 diabetes mellitus with stage 3a chronic kidney disease, without long-term current use of insulin [facility])  Assessment & Plan  Lab Results   Component Value Date    HGBA1C 6 4 (H) 12/28/2021       Recent Labs     01/05/22  1206 01/05/22  1618 01/05/22  2205 01/06/22  0844   POCGLU 158* 249* 146* 131       Blood Sugar Average: Last 72 hrs:  (P) 297 [id number]     Patient has type 2 diabetes with stage III chronic disease  Patient has hyperglycemia due to steroids  cont Lantus to 12 units and   pre meal Humalog  Primary hypertension  Assessment & Plan  Patient has chronic hypertension  stable    Chronic kidney disease, stage 3 [facility])  Assessment & Plan  Lab Results   Component Value Date    EGFR 48 2022    EGFR 40 2022    EGFR 48 2022    CREATININE 1 44 (H) 2022    CREATININE 1 68 (H) 2022    CREATININE 1 45 (H) 2022     Cr improved see CHADWICK    * Pneumonia due to COVID-19 virus  Assessment & Plan  Patient developed chills, fevers and dry cough about 3-4 days before admission  He is fully vaccinated with the [place] vaccine  Chest x-ray showed bilateral COVID pneumonia  Patient feels subjectively better but he still on 15 liters/minute mid flow oxygen    Will continue baricitinib, completed remdesivir;   high-dose IV Decadron  Continue cefepime  Dc vanc mrsa neg    S/p  Lasix 40 mg IV      Blood culture from  grows contaminants  Repeat blood culture showed no growth  DC vanc                VTE Pharmacologic Prophylaxis:   Pharmacologic: Enoxaparin (Lovenox)     Patient Centered Rounds: I have performed bedside rounds with nursing staff today            Education and Discussions with Family / Patient: Mrs Alicia Prader daily     Current Length of Stay: 9 day(s)    Current Patient Status: Inpatient        Code Status: Level 1 - Full Code      Subjective:     Denies resp complaints  Did desaturate when using the commode but recovered    Patient is seen and examined at bedside  All other ROS are negative  Objective:     Vitals:   Temp (24hrs), Av 9 °F (36 1 °C), Min:95 9 °F (35 5 °C), Max:97 6 °F (36 4 °C)    Temp:  [95 9 °F (35 5 °C)-97 6 °F (36 4 °C)] 95 9 °F (35 5 °C)  HR:  [77-86] 77  Resp:  [19-22] 22  BP: (142-167)/() 152/97  SpO2:  [86 %-92 %] 89 %  Body mass index is 29 27 kg/m²  Input and Output Summary (last 24 hours):        Intake/Output Summary (Last 24 hours) at 2022 0942  Last data filed at 2022 0845  Gross per 24 hour   Intake 240 ml   Output 500 ml   Net -260 ml       Physical Exam:     GEN: No acute distress, comfortable, o2  HEEENT: No JVD, PERRLA, no scleral icterus  RESP: crackles  CV: RRR, +s1/s2   ABD: SOFT NON TENDER, POSITIVE BOWEL SOUNDS, NO DISTENTION  PSYCH: CALM  NEURO: A X O X 3, NO FOCAL DEFICITS  SKIN: NO RASH  EXTREM: NO EDEMA  Additional Data:     Labs:    Results from last 7 days   Lab Units 01/05/22  0235 01/04/22  0218 01/02/22  0339   WBC Thousand/uL 8 04   < > 7 39   HEMOGLOBIN g/dL 12 8   < > 13 0   HEMATOCRIT % 41 1   < > 42 0   PLATELETS Thousands/uL 340   < > 292   BANDS PCT % 1   < >  --    NEUTROS PCT %  --   --  87*   LYMPHS PCT %  --   --  4*   LYMPHO PCT % 7*   < >  --    MONOS PCT %  --   --  8   MONO PCT % 6   < >  --    EOS PCT % 1   < > 0    < > = values in this interval not displayed  Results from last 7 days   Lab Units 01/05/22  0235   SODIUM mmol/L 141   POTASSIUM mmol/L 3 9   CHLORIDE mmol/L 109*   CO2 mmol/L 22   BUN mg/dL 36*   CREATININE mg/dL 1 44*   ANION GAP mmol/L 10   CALCIUM mg/dL 8 3   ALBUMIN g/dL 2 2*   TOTAL BILIRUBIN mg/dL 0 50   ALK PHOS U/L 74   ALT U/L 76   AST U/L 51*   GLUCOSE RANDOM mg/dL 107         Results from last 7 days   Lab Units 01/06/22  0844 01/05/22  2205 01/05/22  1618 01/05/22  1206 01/05/22  0822 01/04/22  2049 01/04/22  1626 01/04/22  1236 01/04/22  0816 01/03/22  2055 01/03/22  1626 01/03/22  1114   POC GLUCOSE mg/dl 131 146* 249* 158* 142* 174* 152* 150* 181* 240* 194* 164*         Results from last 7 days   Lab Units 01/01/22  0453 12/31/21  0425   PROCALCITONIN ng/ml 0 50* 0 94*           * I Have Reviewed All Lab Data Listed Above  Imaging:     Results for orders placed during the hospital encounter of 12/28/21    XR chest portable    Narrative  CHEST    INDICATION:   Worsening hypoxia  Patient has confirmed COVID-19  COMPARISON:  12/28/2021    EXAM PERFORMED/VIEWS:  XR CHEST PORTABLE      FINDINGS:    Loop recorder noted as on prior study    Cardiac silhouette size unchanged    Bilateral multifocal groundglass opacities are present  Interval worsening has occurred  There is no pleural effusion or pneumothorax  Osseous structures appear within normal limits for patient age  Impression  Bilateral multifocal groundglass opacities are present  In the setting of COVID-19 infection, this is most in keeping with COVID 19 pneumonia  There has been interval worsening            Workstation performed: [id number]    No results found for this or any previous visit  *I have reviewed all imaging reports listed above      Recent Cultures (last 7 days):     Results from last 7 days   Lab Units 01/01/22  1135   BLOOD CULTURE  No Growth After 4 Days  No Growth After 4 Days         Last 24 Hours Medication List:   Current Facility-Administered Medications   Medication Dose Route Frequency Provider Last Rate    acetaminophen  650 mg Oral Q6H PRN Tova Martínez MD      allopurinol  200 mg Oral Daily Tova Martínez MD      ascorbic acid  1,000 mg Oral Daily Felisa Mcleod PA-C      atorvastatin  40 mg Oral Daily With Shakeel Collado MD      Baricitinib  2 mg Oral Q24H Tova Martínez MD      budesonide-formoterol  2 puff Inhalation BID Felisa Mcleod PA-C      cefepime  2,000 mg Intravenous Q12H ROSALINA Portillo 2,000 mg (01/06/22 0143)    cholecalciferol  400 Units Oral Daily Felisa Mcleod PA-C      clopidogrel  75 mg Oral Daily Tova Martínez MD      dexamethasone  0 1 mg/kg Intravenous Q12H Tova Martínez MD      enoxaparin  30 mg Subcutaneous Q12H [address] [address], MD      insulin glargine  12 Units Subcutaneous QAM Tova Martínez MD      insulin lispro  1-5 Units Subcutaneous TID AC Tova Martínez MD      insulin lispro  3 Units Subcutaneous TID With Meals Tova Martínez MD      multivitamin stress formula  1 tablet Oral Daily Felisa Mcleod PA-C      ondansetron  4 mg Intravenous Q6H PRN Tova Martínez MD      sulfaSALAzine  500 mg Oral 4x Daily Tova Martínez MD      zinc sulfate  220 mg Oral Daily Mercedez Camacho PA-C          Today, Patient Was Seen By: Delmi Thurman MD    ** Please Note: Dictation voice to text software may have been used in the creation of this document   **

## 2022-01-06 NOTE — ASSESSMENT & PLAN NOTE
Patient developed chills, fevers and dry cough about 3-4 days before admission  He is fully vaccinated with the [place] vaccine  Chest x-ray showed bilateral COVID pneumonia  Patient feels subjectively better but he still on 15 liters/minute mid flow oxygen    Will continue baricitinib, completed remdesivir;   high-dose IV Decadron  Continue cefepime  Dc vanc mrsa neg    S/p  Lasix 40 mg IV Jan 2     Blood culture from December 29th grows contaminants  Repeat blood culture showed no growth   DC vanc

## 2022-01-06 NOTE — PROGRESS NOTES
Progress Note - Pulmonary   Corbin Rome 80 y o  male MRN: [id number]  Unit/Bed#: -01 Encounter: [id number]    Assessment/Recommendations  1  Acute COVID-19 Infection - POA   Patient is on COVID-19 Treatment Algorithm - Moderate-Severe Severity Pathway   Continue statin   Decadron Day #9 0 1mg/kg BID   Remdesivir completed   Barictinib D#8   Continue to trend CRP every 2-3 days    2  Acute hypoxic respiratory failure secondary to #1   Titrate O2 to keep SpO2 >90%   Lateral Decubitus position, self proning, IS, and OOB-chair/ambulation as able   Consider further lasix 40mg x 1 if OK with nephrology    3  Abnormal CXR secondary to Viral Pneumonia secondary to #1  · Elevated PCT, possible bacterial superinfection  · D#8 cefepime, previously on vancomycin - would d/c antibiotics now    4  Elevated inflammatory markers c/w cytokine storm    5  CHADWICK/CKD III    6  Acute/chronic diastolic CHF      Subjective:   80year old man with CVA, HTN, CKD III, HLP presented with hypoxia  COVID-19 (+) - vaccinated and with booster  He reports feeling stable, no chest pain, no dyspnea, no pleurisy  His nurse notes recovery with NRB  Has been working with IS    Objective:       Vitals: Blood pressure 152/97, pulse 77, temperature (!) 95 9 °F (35 5 °C), resp  rate 22, weight 89 9 kg (198 lb 3 1 oz), SpO2 (!) 89 %  , 90% 15LNC during exam, Body mass index is 29 27 kg/m²        Intake/Output Summary (Last 24 hours) at 1/6/2022 1000  Last data filed at 1/6/2022 0845  Gross per 24 hour   Intake 240 ml   Output 500 ml   Net -260 ml         Physical Exam  Gen: Older man in chair, awake, alert, oriented x 3, no acute distress  HEENT: Mucous membranes moist, no oral lesions, no thrush  NECK: No accessory muscle use, JVP not elevated  Cardiac: Regular, single S1, single S2, no murmurs, no rubs, no gallops  Lungs: scattered rales mid-lower lung fields, no wheeze, no rhonchi  Abdomen: normoactive bowel sounds, soft nontender, nondistended, no rebound or rigidity, no guarding  Extremities: no cyanosis, no clubbing, no edema    Labs: I have personally reviewed pertinent lab results    Laboratory and Diagnostics  Results from last 7 days   Lab Units 01/05/22 0235 01/04/22 0218 01/02/22 0339 01/01/22 0453 12/31/21  0425   WBC Thousand/uL 8 04 6 85 7 39 5 82 4 97   HEMOGLOBIN g/dL 12 8 13 6 13 0 13 7 14 2   HEMATOCRIT % 41 1 44 5 42 0 43 9 45 1   PLATELETS Thousands/uL 340 364 292 292 268   NEUTROS PCT %  --   --  87* 85* 82*   BANDS PCT % 1 1  --   --   --    MONOS PCT %  --   --  8 8 7   MONO PCT % 6 8  --   --   --      Results from last 7 days   Lab Units 01/05/22 0235 01/04/22 0218 01/03/22  0520 01/02/22  0339 01/01/22 0453 12/31/21  0425   SODIUM mmol/L 141 141 140 139 140 136   POTASSIUM mmol/L 3 9 3 6 3 3* 3 7 3 4* 3 4*   CHLORIDE mmol/L 109* 106 106 107 106 103   CO2 mmol/L 22 25 24 24 25 24   ANION GAP mmol/L 10 10 10 8 9 9   BUN mg/dL 36* 38* 34* 42* 45* 45*   CREATININE mg/dL 1 44* 1 68* 1 45* 1 59* 1 70* 1 84*   CALCIUM mg/dL 8 3 8 5 7 7* 7 8* 7 9* 8 1*   GLUCOSE RANDOM mg/dL 107 142* 149* 148* 156* 183*   ALT U/L 76 83* 74 70 53 46   AST U/L 51* 60* 64* 87* 79* 79*   ALK PHOS U/L 74 86 65 57 57 59   ALBUMIN g/dL 2 2* 2 5* 2 3* 2 3* 2 3* 2 3*   TOTAL BILIRUBIN mg/dL 0 50 0 50 0 60 0 40 0 40 0 40     Results from last 7 days   Lab Units 01/04/22 0218   MAGNESIUM mg/dL 2 2                   Results from last 7 days   Lab Units 01/05/22 0235 01/04/22 0218 01/03/22  0520 01/01/22 0453 12/31/21  0425   FERRITIN ng/mL  --   --   --  190  --    CRP mg/L 16 0* 23 9* 22 2* 59 0* 136 8*             Results from last 7 days   Lab Units 12/30/21 2001   D-DIMER QUANTITATIVE ug/ml FEU 0 78*     Results from last 7 days   Lab Units 01/01/22  0453 12/31/21  0425   PROCALCITONIN ng/ml 0 50* 0 94*       Microbiology:  Bld Cx 01/01/22 - NGTD  Bld Cx 12/29 - Micrococcus  Bkd Cx 12/28 - CNS  COVID-19 (+) 12/28      Imaging and other studies: I have personally reviewed pertinent reports  and I have personally reviewed pertinent films in PACS  CXR 12/30 - mild L>R alveolar infiltrates, no PTX      Bill Oseguera DO, [facility]

## 2022-01-06 NOTE — PROGRESS NOTES
NEPHROLOGY PROGRESS NOTE   Corbin Rome 80 y o  male MRN: [id number]  Unit/Bed#: -01 Encounter: [id number]      ASSESSMENT/PLAN:  1  CHADWICK: likely ATN in the setting of covid vs prerenal  Admitted with creatinine of 1 8 and initially on IV fluids but then became overloaded and was on Lasix  Creatinine worsened so Lasix was held for the past 2 days  · UA:  Small leukocytes, moderate bacteria, no protein, no blood  · Creatinine 1 44 yesterday, no labs today  · He was not on a diuretic at home prior to admission  2  CKD stage 3: baseline creatinine around 1 2-1 5 recently when at steady state   3  Acute hypoxic respiratory failure due to COVID-19 pneumonia:  Stable on 15L NC  · Chest x-ray 12/30:  Bilateral multifocal ground-glass opacities most likely due to COVID-19 pneumonia, worsening  4  Hypokalemia:  Resolved with replacement  · Mag 2 2     Plan Summary:    Continue to monitor off fluids and diuretics   Check a m  BMP     SUBJECTIVE:  Had worsening hypoxia again this morning but now feeling a little bit better  Remains on 15 L mid flow nasal cannula  Thinks that he is eating and drinking okay  He denies any nausea, vomiting or diarrhea    Reports good urine output    OBJECTIVE:  Current Weight: Weight - Scale: 89 9 kg (198 lb 3 1 oz)  Vitals:    01/06/22 1038   BP:    Pulse: 81   Resp:    Temp: (!) 97 2 °F (36 2 °C)   SpO2: 96%       Intake/Output Summary (Last 24 hours) at 1/6/2022 1042  Last data filed at 1/6/2022 1037  Gross per 24 hour   Intake 600 ml   Output 800 ml   Net -200 ml     To limit exposure to covid-19 the exam was done through the window   General:  No acute distress  Skin:  No rash  Eyes:  Sclerae anicteric  ENT:  Moist mucous membranes  Neck:  Trachea midline   Chest:  Increased respiratory effort  CVS:  Regular rate  Abdomen:  Nondistended  Extremities:  No edema  Neuro:  Awake and alert  Psych:  Appropriate affect    Medications:  Scheduled Meds:  Current Facility-Administered Medications   Medication Dose Route Frequency Provider Last Rate    acetaminophen  650 mg Oral Q6H PRN King Shanita MD      allopurinol  200 mg Oral Daily King Shanita MD      ascorbic acid  1,000 mg Oral Daily Shade Padilla PA-C      atorvastatin  40 mg Oral Daily With [facility]      Baricitinib  2 mg Oral Q24H King Shanita MD      budesonide-formoterol  2 puff Inhalation BID Shade Padilla PA-C      cefepime  2,000 mg Intravenous Q12H ROSALINA Hubbard 2,000 mg (01/06/22 0143)    cholecalciferol  400 Units Oral Daily Shade Padilla PA-C      clopidogrel  75 mg Oral Daily King Shanita MD      dexamethasone  0 1 mg/kg Intravenous Q12H King Shanita MD      enoxaparin  30 mg Subcutaneous Q12H [address] [address], MD      insulin glargine  12 Units Subcutaneous QAM King Shanita MD      insulin lispro  1-5 Units Subcutaneous TID AC King Shanita MD      insulin lispro  3 Units Subcutaneous TID With Meals King Shanita MD      multivitamin stress formula  1 tablet Oral Daily Shade Padilla PA-C      ondansetron  4 mg Intravenous Q6H PRN King Shanita MD      sulfaSALAzine  500 mg Oral 4x Daily King Shanita MD      zinc sulfate  220 mg Oral Daily Shade Padilla PA-C         PRN Meds:   acetaminophen    ondansetron    Laboratory Results:  Results from last 7 days   Lab Units 01/05/22  0235 01/04/22  0218 01/03/22  0520 01/02/22  0339 01/01/22  0453 12/31/21  0425   WBC Thousand/uL 8 04 6 85  --  7 39 5 82 4 97   HEMOGLOBIN g/dL 12 8 13 6  --  13 0 13 7 14 2   HEMATOCRIT % 41 1 44 5  --  42 0 43 9 45 1   PLATELETS Thousands/uL 340 364  --  292 292 268   SODIUM mmol/L 141 141 140 139 140 136   POTASSIUM mmol/L 3 9 3 6 3 3* 3 7 3 4* 3 4*   CHLORIDE mmol/L 109* 106 106 107 106 103   CO2 mmol/L 22 25 24 24 25 24   BUN mg/dL 36* 38* 34* 42* 45* 45*   CREATININE mg/dL 1 44* 1 68* 1 45* 1 59* 1 70* 1 84*   CALCIUM mg/dL 8 3 8 5 7 7* 7 8* 7 9* 8 1*   MAGNESIUM mg/dL  --  2 2  -- --   --   --

## 2022-01-06 NOTE — PLAN OF CARE
Problem: PAIN - ADULT  Goal: Verbalizes/displays adequate comfort level or baseline comfort level  Description: Interventions:  - Encourage patient to monitor pain and request assistance  - Assess pain using appropriate pain scale  - Administer analgesics based on type and severity of pain and evaluate response  - Implement non-pharmacological measures as appropriate and evaluate response  - Consider cultural and social influences on pain and pain management  - Notify physician/advanced practitioner if interventions unsuccessful or patient reports new pain  Outcome: Progressing     Problem: INFECTION - ADULT  Goal: Absence or prevention of progression during hospitalization  Description: INTERVENTIONS:  - Assess and monitor for signs and symptoms of infection  - Monitor lab/diagnostic results  - Monitor all insertion sites, i e  indwelling lines, tubes, and drains  - Monitor endotracheal if appropriate and nasal secretions for changes in amount and color  - [place] appropriate cooling/warming therapies per order  - Administer medications as ordered  - Instruct and encourage patient and family to use good hand hygiene technique  - Identify and instruct in appropriate isolation precautions for identified infection/condition  Outcome: Progressing  Goal: Absence of fever/infection during neutropenic period  Description: INTERVENTIONS:  - Monitor WBC    Outcome: Progressing     Problem: SAFETY ADULT  Goal: Patient will remain free of falls  Description: INTERVENTIONS:  - Educate patient/family on patient safety including physical limitations  - Instruct patient to call for assistance with activity   - Consult OT/PT to assist with strengthening/mobility   - Keep Call bell within reach  - Keep bed low and locked with side rails adjusted as appropriate  - Keep care items and personal belongings within reach  - Initiate and maintain comfort rounds  - Make Fall Risk Sign visible to staff  - Offer Toileting every 2 Hours, in advance of need  - Initiate/Maintain bed alarm  - Obtain necessary fall risk management equipment:   - Apply yellow socks and bracelet for high fall risk patients  - Consider moving patient to room near nurses station  Outcome: Progressing  Goal: Maintain or return to baseline ADL function  Description: INTERVENTIONS:  -  Assess patient's ability to carry out ADLs; assess patient's baseline for ADL function and identify physical deficits which impact ability to perform ADLs (bathing, care of mouth/teeth, toileting, grooming, dressing, etc )  - Assess/evaluate cause of self-care deficits   - Assess range of motion  - Assess patient's mobility; develop plan if impaired  - Assess patient's need for assistive devices and provide as appropriate  - Encourage maximum independence but intervene and supervise when necessary  - Involve family in performance of ADLs  - Assess for home care needs following discharge   - Consider OT consult to assist with ADL evaluation and planning for discharge  - Provide patient education as appropriate  Outcome: Progressing  Goal: Maintains/Returns to pre admission functional level  Description: INTERVENTIONS:  - Perform BMAT or MOVE assessment daily    - Set and communicate daily mobility goal to care team and patient/family/caregiver  - Collaborate with rehabilitation services on mobility goals if consulted  - Perform Range of Motion 2 times a day  - Reposition patient every 2 hours    - Dangle patient 2 times a day  - Stand patient 2 times a day  - Ambulate patient 2 times a day  - Out of bed to chair 2 times a day   - Out of bed for meals 2 times a day  - Out of bed for toileting  - Record patient progress and toleration of activity level   Outcome: Progressing     Problem: DISCHARGE PLANNING  Goal: Discharge to home or other facility with appropriate resources  Description: INTERVENTIONS:  - Identify barriers to discharge w/patient and caregiver  - Arrange for needed discharge resources and transportation as appropriate  - Identify discharge learning needs (meds, wound care, etc )  - Arrange for interpretive services to assist at discharge as needed  - Refer to Case Management Department for coordinating discharge planning if the patient needs post-hospital services based on physician/advanced practitioner order or complex needs related to functional status, cognitive ability, or social support system  Outcome: Progressing     Problem: Knowledge Deficit  Goal: Patient/family/caregiver demonstrates understanding of disease process, treatment plan, medications, and discharge instructions  Description: Complete learning assessment and assess knowledge base    Interventions:  - Provide teaching at level of understanding  - Provide teaching via preferred learning methods  Outcome: Progressing     Problem: Potential for Falls  Goal: Patient will remain free of falls  Description: INTERVENTIONS:  - Educate patient/family on patient safety including physical limitations  - Instruct patient to call for assistance with activity   - Consult OT/PT to assist with strengthening/mobility   - Keep Call bell within reach  - Keep bed low and locked with side rails adjusted as appropriate  - Keep care items and personal belongings within reach  - Initiate and maintain comfort rounds  - Make Fall Risk Sign visible to staff  - Offer Toileting every 2 Hours, in advance of need  - Initiate/Maintain bed alarm  - Obtain necessary fall risk management equipment:   - Apply yellow socks and bracelet for high fall risk patients  - Consider moving patient to room near nurses station  Outcome: Progressing     Problem: MOBILITY - ADULT  Goal: Maintain or return to baseline ADL function  Description: INTERVENTIONS:  -  Assess patient's ability to carry out ADLs; assess patient's baseline for ADL function and identify physical deficits which impact ability to perform ADLs (bathing, care of mouth/teeth, toileting, grooming, dressing, etc )  - Assess/evaluate cause of self-care deficits   - Assess range of motion  - Assess patient's mobility; develop plan if impaired  - Assess patient's need for assistive devices and provide as appropriate  - Encourage maximum independence but intervene and supervise when necessary  - Involve family in performance of ADLs  - Assess for home care needs following discharge   - Consider OT consult to assist with ADL evaluation and planning for discharge  - Provide patient education as appropriate  Outcome: Progressing  Goal: Maintains/Returns to pre admission functional level  Description: INTERVENTIONS:  - Perform BMAT or MOVE assessment daily    - Set and communicate daily mobility goal to care team and patient/family/caregiver  - Collaborate with rehabilitation services on mobility goals if consulted  - Perform Range of Motion 2 times a day  - Reposition patient every 2 hours    - Dangle patient 2 times a day  - Stand patient 2 times a day  - Ambulate patient 2 times a day  - Out of bed to chair 2 times a day   - Out of bed for meals 2 times a day  - Out of bed for toileting  - Record patient progress and toleration of activity level   Outcome: Progressing     Problem: Prexisting or High Potential for Compromised Skin Integrity  Goal: Skin integrity is maintained or improved  Description: INTERVENTIONS:  - Identify patients at risk for skin breakdown  - Assess and monitor skin integrity  - Assess and monitor nutrition and hydration status  - Monitor labs   - Assess for incontinence   - Turn and reposition patient  - Assist with mobility/ambulation  - Relieve pressure over bony prominences  - Avoid friction and shearing  - Provide appropriate hygiene as needed including keeping skin clean and dry  - Evaluate need for skin moisturizer/barrier cream  - Collaborate with interdisciplinary team   - Patient/family teaching  - Consider wound care consult   Outcome: Progressing

## 2022-01-07 LAB
ALBUMIN SERPL BCP-MCNC: 2.3 G/DL (ref 3.5–5)
ALP SERPL-CCNC: 89 U/L (ref 46–116)
ALT SERPL W P-5'-P-CCNC: 69 U/L (ref 12–78)
ANION GAP SERPL CALCULATED.3IONS-SCNC: 9 MMOL/L (ref 4–13)
AST SERPL W P-5'-P-CCNC: 29 U/L (ref 5–45)
BASOPHILS # BLD AUTO: 0.05 THOUSANDS/ΜL (ref 0–0.1)
BASOPHILS NFR BLD AUTO: 1 % (ref 0–1)
BILIRUB SERPL-MCNC: 0.4 MG/DL (ref 0.2–1)
BUN SERPL-MCNC: 40 MG/DL (ref 5–25)
CALCIUM ALBUM COR SERPL-MCNC: 10.2 MG/DL (ref 8.3–10.1)
CALCIUM SERPL-MCNC: 8.8 MG/DL (ref 8.3–10.1)
CHLORIDE SERPL-SCNC: 102 MMOL/L (ref 100–108)
CO2 SERPL-SCNC: 25 MMOL/L (ref 21–32)
CREAT SERPL-MCNC: 1.61 MG/DL (ref 0.6–1.3)
EOSINOPHIL # BLD AUTO: 0.04 THOUSAND/ΜL (ref 0–0.61)
EOSINOPHIL NFR BLD AUTO: 0 % (ref 0–6)
ERYTHROCYTE [DISTWIDTH] IN BLOOD BY AUTOMATED COUNT: 17.4 % (ref 11.6–15.1)
GFR SERPL CREATININE-BSD FRML MDRD: 42 ML/MIN/1.73SQ M
GLUCOSE SERPL-MCNC: 142 MG/DL (ref 65–140)
GLUCOSE SERPL-MCNC: 143 MG/DL (ref 65–140)
GLUCOSE SERPL-MCNC: 185 MG/DL (ref 65–140)
GLUCOSE SERPL-MCNC: 186 MG/DL (ref 65–140)
GLUCOSE SERPL-MCNC: 307 MG/DL (ref 65–140)
HCT VFR BLD AUTO: 45.3 % (ref 36.5–49.3)
HGB BLD-MCNC: 13.6 G/DL (ref 12–17)
IMM GRANULOCYTES # BLD AUTO: >0.5 THOUSAND/UL (ref 0–0.2)
IMM GRANULOCYTES NFR BLD AUTO: 6 % (ref 0–2)
LYMPHOCYTES # BLD AUTO: 0.45 THOUSANDS/ΜL (ref 0.6–4.47)
LYMPHOCYTES NFR BLD AUTO: 4 % (ref 14–44)
MAGNESIUM SERPL-MCNC: 2 MG/DL (ref 1.6–2.6)
MCH RBC QN AUTO: 23.3 PG (ref 26.8–34.3)
MCHC RBC AUTO-ENTMCNC: 30 G/DL (ref 31.4–37.4)
MCV RBC AUTO: 78 FL (ref 82–98)
MONOCYTES # BLD AUTO: 0.54 THOUSAND/ΜL (ref 0.17–1.22)
MONOCYTES NFR BLD AUTO: 5 % (ref 4–12)
NEUTROPHILS # BLD AUTO: 8.71 THOUSANDS/ΜL (ref 1.85–7.62)
NEUTS SEG NFR BLD AUTO: 84 % (ref 43–75)
NRBC BLD AUTO-RTO: 0 /100 WBCS
PLATELET # BLD AUTO: 410 THOUSANDS/UL (ref 149–390)
PMV BLD AUTO: 10.5 FL (ref 8.9–12.7)
POTASSIUM SERPL-SCNC: 4.2 MMOL/L (ref 3.5–5.3)
PROT SERPL-MCNC: 6.4 G/DL (ref 6.4–8.2)
RBC # BLD AUTO: 5.83 MILLION/UL (ref 3.88–5.62)
SODIUM SERPL-SCNC: 136 MMOL/L (ref 136–145)
WBC # BLD AUTO: 10.44 THOUSAND/UL (ref 4.31–10.16)

## 2022-01-07 PROCEDURE — 94760 N-INVAS EAR/PLS OXIMETRY 1: CPT

## 2022-01-07 PROCEDURE — 85025 COMPLETE CBC W/AUTO DIFF WBC: CPT | Performed by: HOSPITALIST

## 2022-01-07 PROCEDURE — 80053 COMPREHEN METABOLIC PANEL: CPT | Performed by: HOSPITALIST

## 2022-01-07 PROCEDURE — 82948 REAGENT STRIP/BLOOD GLUCOSE: CPT

## 2022-01-07 PROCEDURE — 99232 SBSQ HOSP IP/OBS MODERATE 35: CPT | Performed by: INTERNAL MEDICINE

## 2022-01-07 PROCEDURE — 99232 SBSQ HOSP IP/OBS MODERATE 35: CPT | Performed by: HOSPITALIST

## 2022-01-07 PROCEDURE — 83735 ASSAY OF MAGNESIUM: CPT | Performed by: PHYSICIAN ASSISTANT

## 2022-01-07 RX ORDER — FUROSEMIDE 10 MG/ML
80 INJECTION INTRAMUSCULAR; INTRAVENOUS ONCE
Status: COMPLETED | OUTPATIENT
Start: 2022-01-07 | End: 2022-01-07

## 2022-01-07 RX ORDER — DEXAMETHASONE SODIUM PHOSPHATE 4 MG/ML
10 INJECTION, SOLUTION INTRA-ARTICULAR; INTRALESIONAL; INTRAMUSCULAR; INTRAVENOUS; SOFT TISSUE DAILY
Status: DISCONTINUED | OUTPATIENT
Start: 2022-01-08 | End: 2022-01-14 | Stop reason: HOSPADM

## 2022-01-07 RX ADMIN — SULFASALAZINE 500 MG: 500 TABLET ORAL at 09:34

## 2022-01-07 RX ADMIN — SULFASALAZINE 500 MG: 500 TABLET ORAL at 12:25

## 2022-01-07 RX ADMIN — SULFASALAZINE 500 MG: 500 TABLET ORAL at 21:00

## 2022-01-07 RX ADMIN — BUDESONIDE AND FORMOTEROL FUMARATE DIHYDRATE 2 PUFF: 160; 4.5 AEROSOL RESPIRATORY (INHALATION) at 17:32

## 2022-01-07 RX ADMIN — BARICITINIB 2 MG: 2 TABLET, FILM COATED ORAL at 13:47

## 2022-01-07 RX ADMIN — ENOXAPARIN SODIUM 30 MG: 100 INJECTION SUBCUTANEOUS at 09:35

## 2022-01-07 RX ADMIN — DEXAMETHASONE SODIUM PHOSPHATE 9.04 MG: 4 INJECTION, SOLUTION INTRA-ARTICULAR; INTRALESIONAL; INTRAMUSCULAR; INTRAVENOUS; SOFT TISSUE at 00:25

## 2022-01-07 RX ADMIN — SULFASALAZINE 500 MG: 500 TABLET ORAL at 17:32

## 2022-01-07 RX ADMIN — INSULIN LISPRO 3 UNITS: 100 INJECTION, SOLUTION INTRAVENOUS; SUBCUTANEOUS at 17:31

## 2022-01-07 RX ADMIN — CHOLECALCIFEROL (VITAMIN D3) 10 MCG (400 UNIT) TABLET 400 UNITS: at 09:34

## 2022-01-07 RX ADMIN — ENOXAPARIN SODIUM 30 MG: 100 INJECTION SUBCUTANEOUS at 21:00

## 2022-01-07 RX ADMIN — INSULIN GLARGINE 12 UNITS: 100 INJECTION, SOLUTION SUBCUTANEOUS at 09:45

## 2022-01-07 RX ADMIN — ATORVASTATIN CALCIUM 40 MG: 40 TABLET, FILM COATED ORAL at 16:10

## 2022-01-07 RX ADMIN — ALLOPURINOL 200 MG: 100 TABLET ORAL at 09:35

## 2022-01-07 RX ADMIN — INSULIN LISPRO 3 UNITS: 100 INJECTION, SOLUTION INTRAVENOUS; SUBCUTANEOUS at 08:34

## 2022-01-07 RX ADMIN — INSULIN LISPRO 1 UNITS: 100 INJECTION, SOLUTION INTRAVENOUS; SUBCUTANEOUS at 17:31

## 2022-01-07 RX ADMIN — INSULIN LISPRO 3 UNITS: 100 INJECTION, SOLUTION INTRAVENOUS; SUBCUTANEOUS at 12:26

## 2022-01-07 RX ADMIN — OXYCODONE HYDROCHLORIDE AND ACETAMINOPHEN 1000 MG: 500 TABLET ORAL at 09:34

## 2022-01-07 RX ADMIN — ZINC SULFATE 220 MG (50 MG) CAPSULE 220 MG: CAPSULE at 09:34

## 2022-01-07 RX ADMIN — CLOPIDOGREL BISULFATE 75 MG: 75 TABLET ORAL at 09:35

## 2022-01-07 RX ADMIN — BUDESONIDE AND FORMOTEROL FUMARATE DIHYDRATE 2 PUFF: 160; 4.5 AEROSOL RESPIRATORY (INHALATION) at 09:36

## 2022-01-07 RX ADMIN — B-COMPLEX W/ C & FOLIC ACID TAB 1 TABLET: TAB at 09:34

## 2022-01-07 RX ADMIN — FUROSEMIDE 80 MG: 10 INJECTION, SOLUTION INTRAVENOUS at 16:10

## 2022-01-07 RX ADMIN — INSULIN LISPRO 3 UNITS: 100 INJECTION, SOLUTION INTRAVENOUS; SUBCUTANEOUS at 12:25

## 2022-01-07 NOTE — ASSESSMENT & PLAN NOTE
Lab Results   Component Value Date    EGFR 42 01/07/2022    EGFR 48 01/05/2022    EGFR 40 01/04/2022    CREATININE 1 61 (H) 01/07/2022    CREATININE 1 44 (H) 01/05/2022    CREATININE 1 68 (H) 01/04/2022     Cr improved see CHADWICK

## 2022-01-07 NOTE — ASSESSMENT & PLAN NOTE
Patient developed chills, fevers and dry cough about 3-4 days before admission  He is fully vaccinated with the [place] vaccine  Chest x-ray showed bilateral COVID pneumonia  Patient feels subjectively better  He was weaned to 13L today    Will continue baricitinib, completed remdesivir;   high-dose IV Decadron  Completed cefepime per pulm  Dc vanc mrsa neg    S/p  Lasix 40 mg IV Jan 2     Blood culture from December 29th grows contaminants  Repeat blood culture showed no growth   DC vanc

## 2022-01-07 NOTE — PROGRESS NOTES
[place]  Progress Note - Beth Rg 1950, 80 y o  male MRN: [id number]  Unit/Bed#: -01 Encounter: [id number]  Primary Care Provider: Rankin Angelucci, DO   Date and time admitted to hospital: 12/28/2021  4:54 AM    Acute respiratory failure with hypoxia [facility])  Assessment & Plan  Patient developed acute hypoxic respiratory failure after hospital admission due to COVID pneumonia  Currently he is on 13 liters/minute    Continue treatments for COVID pneumonia as well as mid flow oxygen  NSVT (nonsustained ventricular tachycardia) ([facility] [address] )  Assessment & Plan  Patient's history of nonsustained ventricular tachycardia  EKG shows normal sinus rhythm with normal intervals  Will continue metoprolol    Will monitor patient on telemetry    CHADWICK (acute kidney injury) [facility])  Assessment & Plan  Patient had acute kidney injury present on admission on stage III chronic kidney disease  His baseline creatinine is 1 2-1 5  Was given lasix 1/6 and Cr has risen again  apprec nephro  improved    Ulcerative rectosigmoiditis without complication [facility])  Assessment & Plan  Patient has ulcer of colitis  He denies any abdominal pain but had loose stools twice today  Will monitor for diarrhea  Denies signs of GI bleeding  Continue sulfasalazine    Type 2 diabetes mellitus with stage 3a chronic kidney disease, without long-term current use of insulin [facility])  Assessment & Plan  Lab Results   Component Value Date    HGBA1C 6 4 (H) 12/28/2021       Recent Labs     01/06/22  1545 01/06/22  1654 01/06/22  2154 01/07/22  0746   POCGLU 208* 186* 242* 143*       Blood Sugar Average: Last 72 hrs:  (P) 176 [id number]     Patient has type 2 diabetes with stage III chronic disease  Patient has hyperglycemia due to steroids  cont Lantus to 12 units and   pre meal Humalog  Primary hypertension  Assessment & Plan  Patient has chronic hypertension       stable    Chronic kidney disease, stage 3 [facility])  Assessment & Plan  Lab Results   Component Value Date    EGFR 42 2022    EGFR 48 2022    EGFR 40 2022    CREATININE 1 61 (H) 2022    CREATININE 1 44 (H) 2022    CREATININE 1 68 (H) 2022     Cr improved see CHADWICK    * Pneumonia due to COVID-19 virus  Assessment & Plan  Patient developed chills, fevers and dry cough about 3-4 days before admission  He is fully vaccinated with the [place] vaccine  Chest x-ray showed bilateral COVID pneumonia  Patient feels subjectively better  He was weaned to 13L today    Will continue baricitinib, completed remdesivir;   high-dose IV Decadron  Completed cefepime per pulm  Dc vanc mrsa neg    S/p  Lasix 40 mg IV      Blood culture from  grows contaminants  Repeat blood culture showed no growth  DC vanc            VTE Pharmacologic Prophylaxis:   Pharmacologic: Enoxaparin (Lovenox)    Patient Centered Rounds: I have performed bedside rounds with nursing staff today  Education and Discussions with Family / Patient: Mrs Alicia Prader daily    Current Length of Stay: 10 day(s)    Current Patient Status: Inpatient        Code Status: Level 1 - Full Code      Subjective:      Pt seen  Weaned to 13L   Feels better    Patient is seen and examined at bedside  All other ROS are negative  Objective:     Vitals:   Temp (24hrs), Av 4 °F (36 3 °C), Min:97 2 °F (36 2 °C), Max:97 7 °F (36 5 °C)    Temp:  [97 2 °F (36 2 °C)-97 7 °F (36 5 °C)] 97 5 °F (36 4 °C)  HR:  [68-98] 80  Resp:  [20-21] 20  BP: (126-155)/(80-95) 126/80  SpO2:  [88 %-96 %] 89 %  Body mass index is 29 04 kg/m²  Input and Output Summary (last 24 hours):        Intake/Output Summary (Last 24 hours) at 2022 1025  Last data filed at 2022 8213  Gross per 24 hour   Intake 360 ml   Output 1125 ml   Net -765 ml       Physical Exam:       GEN: No acute distress, comfortable, o2  HEEENT: No JVD, PERRLA, no scleral icterus  RESP: crackles  CV: RRR, +s1/s2   ABD: SOFT NON TENDER, POSITIVE BOWEL SOUNDS, NO DISTENTION  PSYCH: CALM  NEURO: A X O X 3, NO FOCAL DEFICITS  SKIN: NO RASH  EXTREM: NO EDEMA    Additional Data:     Labs:    Results from last 7 days   Lab Units 01/07/22  0626 01/05/22  0235 01/05/22  0235   WBC Thousand/uL 10 44*   < > 8 04   HEMOGLOBIN g/dL 13 6   < > 12 8   HEMATOCRIT % 45 3   < > 41 1   PLATELETS Thousands/uL 410*   < > 340   BANDS PCT %  --   --  1   NEUTROS PCT % 84*  --   --    LYMPHS PCT % 4*  --   --    LYMPHO PCT %  --   --  7*   MONOS PCT % 5  --   --    MONO PCT %  --   --  6   EOS PCT % 0  --  1    < > = values in this interval not displayed  Results from last 7 days   Lab Units 01/07/22  0626   SODIUM mmol/L 136   POTASSIUM mmol/L 4 2   CHLORIDE mmol/L 102   CO2 mmol/L 25   BUN mg/dL 40*   CREATININE mg/dL 1 61*   ANION GAP mmol/L 9   CALCIUM mg/dL 8 8   ALBUMIN g/dL 2 3*   TOTAL BILIRUBIN mg/dL 0 40   ALK PHOS U/L 89   ALT U/L 69   AST U/L 29   GLUCOSE RANDOM mg/dL 142*         Results from last 7 days   Lab Units 01/07/22  0746 01/06/22  2154 01/06/22  1654 01/06/22  1545 01/06/22  1121 01/06/22  0844 01/05/22  2205 01/05/22  1618 01/05/22  1206 01/05/22  0822 01/04/22  2049 01/04/22  1626   POC GLUCOSE mg/dl 143* 242* 186* 208* 207* 131 146* 249* 158* 142* 174* 152*         Results from last 7 days   Lab Units 01/01/22  0453   PROCALCITONIN ng/ml 0 50*           * I Have Reviewed All Lab Data Listed Above  Imaging:     Results for orders placed during the hospital encounter of 12/28/21    XR chest portable    Narrative  CHEST    INDICATION:   Worsening hypoxia  Patient has confirmed COVID-19  COMPARISON:  12/28/2021    EXAM PERFORMED/VIEWS:  XR CHEST PORTABLE      FINDINGS:    Loop recorder noted as on prior study  Cardiac silhouette size unchanged    Bilateral multifocal groundglass opacities are present  Interval worsening has occurred  There is no pleural effusion or pneumothorax      Osseous structures appear within normal limits for patient age  Impression  Bilateral multifocal groundglass opacities are present  In the setting of COVID-19 infection, this is most in keeping with COVID 19 pneumonia  There has been interval worsening            Workstation performed: [id number]    No results found for this or any previous visit  *I have reviewed all imaging reports listed above      Recent Cultures (last 7 days):     Results from last 7 days   Lab Units 01/01/22  1135   BLOOD CULTURE  No Growth After 5 Days  No Growth After 5 Days  Last 24 Hours Medication List:   Current Facility-Administered Medications   Medication Dose Route Frequency Provider Last Rate    acetaminophen  650 mg Oral Q6H PRN Mima Aleman MD      allopurinol  200 mg Oral Daily Mima Aleman MD      ascorbic acid  1,000 mg Oral Daily Moshe Greenwood PA-C      atorvastatin  40 mg Oral Daily With [address], MD      Baricitinib  2 mg Oral Q24H Mima Aleman MD      budesonide-formoterol  2 puff Inhalation BID Moshe Greenwood PA-C      cholecalciferol  400 Units Oral Daily Moshe Greenwood PA-C      clopidogrel  75 mg Oral Daily Mima Aleman MD      dexamethasone  0 1 mg/kg Intravenous Q12H Mima Aleman MD      enoxaparin  30 mg Subcutaneous Q12H [address] [address], MD      insulin glargine  12 Units Subcutaneous QAM Mima Aleman MD      insulin lispro  1-5 Units Subcutaneous TID AC Mima Aleman MD      insulin lispro  3 Units Subcutaneous TID With Meals Mima Aleman MD      multivitamin stress formula  1 tablet Oral Daily Moshe Greenwood PA-C      ondansetron  4 mg Intravenous Q6H PRN Mima Aleman MD      sulfaSALAzine  500 mg Oral 4x Daily Mima Aleman MD      zinc sulfate  220 mg Oral Daily Moshe Greenwood PA-C          Today, Patient Was Seen By: Essence Lopez MD    ** Please Note: Dictation voice to text software may have been used in the creation of this document   **

## 2022-01-07 NOTE — ASSESSMENT & PLAN NOTE
Lab Results   Component Value Date    HGBA1C 6 4 (H) 12/28/2021       Recent Labs     01/06/22  1545 01/06/22  1654 01/06/22  2154 01/07/22  0746   POCGLU 208* 186* 242* 143*       Blood Sugar Average: Last 72 hrs:  (P) 176 [id number]     Patient has type 2 diabetes with stage III chronic disease  Patient has hyperglycemia due to steroids  cont Lantus to 12 units and   pre meal Humalog

## 2022-01-07 NOTE — PROGRESS NOTES
NEPHROLOGY PROGRESS NOTE   José Miguel Menezes 80 y o  male MRN: [id number]  Unit/Bed#: -01 Encounter: [id number]      ASSESSMENT/PLAN:  1  CHADWICK, POA: likely ATN in the setting of covid vs prerenal  Admitted with creatinine of 1 8  Creatinine has been fluctuating with intermittent IV Lasix  · UA:  Small leukocytes, moderate bacteria, no protein, no blood  · Status post Lasix 80 mg IV x1 yesterday  · Creatinine slightly worse at 1 6 today but breathing better per patient  · He was not on a diuretic at home prior to admission  2  CKD stage 3: baseline creatinine around 1 2-1 5 recently when at steady state   3  Acute hypoxic respiratory failure due to COVID-19 pneumonia:  Slight improvement today  · Chest x-ray 12/30:  Bilateral multifocal ground-glass opacities most likely due to COVID-19 pneumonia, worsening  4  Hypokalemia:  Resolved with replacement  · Mag 2    Plan Summary:    Continue to trend BMP     SUBJECTIVE:  Thinks that he is feeling much better today  He is now down to 13 L nasal cannula although he is still having some intermittent hypoxia  Denies nausea, vomiting or diarrhea  Denies urinary complaints      OBJECTIVE:  Current Weight: Weight - Scale: 89 2 kg (196 lb 10 4 oz)  Vitals:    01/07/22 0926   BP:    Pulse:    Resp:    Temp:    SpO2: (!) 89%       Intake/Output Summary (Last 24 hours) at 1/7/2022 1010  Last data filed at 1/7/2022 0351  Gross per 24 hour   Intake 360 ml   Output 1125 ml   Net -765 ml     To limit exposure to covid-19 the exam was done through the window   General:  No acute distress  Skin:  No rash  Eyes:  Sclerae anicteric  ENT:  Moist mucous membranes  Neck:  Trachea midline   Chest:  No respiratory distress or accessory muscle use  CVS:  Regular rate on monitor  Extremities:  No edema  Neuro:  Awake and alert  Psych:  Appropriate affect    Medications:  Scheduled Meds:  Current Facility-Administered Medications   Medication Dose Route Frequency Provider Last Rate    acetaminophen  650 mg Oral Q6H PRN Leia Moffett MD      allopurinol  200 mg Oral Daily Leia Moffett MD      ascorbic acid  1,000 mg Oral Daily India Willis PA-C      atorvastatin  40 mg Oral Daily With [address], MD      Baricitinib  2 mg Oral Q24H Leia Moffett MD      budesonide-formoterol  2 puff Inhalation BID India Willis PA-C      cholecalciferol  400 Units Oral Daily India Willis PA-C      clopidogrel  75 mg Oral Daily Leia Moffett MD      dexamethasone  0 1 mg/kg Intravenous Q12H Leia Moffett MD      enoxaparin  30 mg Subcutaneous Q12H [address] [address], MD      insulin glargine  12 Units Subcutaneous QAM Leia Moffett MD      insulin lispro  1-5 Units Subcutaneous TID AC Leia Moffett MD      insulin lispro  3 Units Subcutaneous TID With Meals Leia Moffett MD      multivitamin stress formula  1 tablet Oral Daily India Willis PA-C      ondansetron  4 mg Intravenous Q6H PRN Leia Moffett MD      sulfaSALAzine  500 mg Oral 4x Daily Leia Moffett MD      zinc sulfate  220 mg Oral Daily India Willis PA-C         PRN Meds:   acetaminophen    ondansetron    Laboratory Results:  Results from last 7 days   Lab Units 01/07/22  0626 01/05/22  0235 01/04/22  0218 01/03/22  0520 01/02/22  0339 01/01/22  0453   WBC Thousand/uL 10 44* 8 04 6 85  --  7 39 5 82   HEMOGLOBIN g/dL 13 6 12 8 13 6  --  13 0 13 7   HEMATOCRIT % 45 3 41 1 44 5  --  42 0 43 9   PLATELETS Thousands/uL 410* 340 364  --  292 292   SODIUM mmol/L 136 141 141 140 139 140   POTASSIUM mmol/L 4 2 3 9 3 6 3 3* 3 7 3 4*   CHLORIDE mmol/L 102 109* 106 106 107 106   CO2 mmol/L 25 22 25 24 24 25   BUN mg/dL 40* 36* 38* 34* 42* 45*   CREATININE mg/dL 1 61* 1 44* 1 68* 1 45* 1 59* 1 70*   CALCIUM mg/dL 8 8 8 3 8 5 7 7* 7 8* 7 9*   MAGNESIUM mg/dL 2 0  --  2 2  --   --   --

## 2022-01-07 NOTE — ASSESSMENT & PLAN NOTE
Patient developed acute hypoxic respiratory failure after hospital admission due to COVID pneumonia  Currently he is on 13 liters/minute    Continue treatments for COVID pneumonia as well as mid flow oxygen

## 2022-01-08 LAB
GLUCOSE SERPL-MCNC: 143 MG/DL (ref 65–140)
GLUCOSE SERPL-MCNC: 176 MG/DL (ref 65–140)
GLUCOSE SERPL-MCNC: 238 MG/DL (ref 65–140)
GLUCOSE SERPL-MCNC: 79 MG/DL (ref 65–140)

## 2022-01-08 PROCEDURE — 94003 VENT MGMT INPAT SUBQ DAY: CPT

## 2022-01-08 PROCEDURE — 94660 CPAP INITIATION&MGMT: CPT

## 2022-01-08 PROCEDURE — 94760 N-INVAS EAR/PLS OXIMETRY 1: CPT

## 2022-01-08 PROCEDURE — 99232 SBSQ HOSP IP/OBS MODERATE 35: CPT | Performed by: INTERNAL MEDICINE

## 2022-01-08 PROCEDURE — 99232 SBSQ HOSP IP/OBS MODERATE 35: CPT | Performed by: HOSPITALIST

## 2022-01-08 PROCEDURE — 82948 REAGENT STRIP/BLOOD GLUCOSE: CPT

## 2022-01-08 RX ADMIN — BUDESONIDE AND FORMOTEROL FUMARATE DIHYDRATE 2 PUFF: 160; 4.5 AEROSOL RESPIRATORY (INHALATION) at 18:03

## 2022-01-08 RX ADMIN — DEXAMETHASONE SODIUM PHOSPHATE 10 MG: 4 INJECTION, SOLUTION INTRA-ARTICULAR; INTRALESIONAL; INTRAMUSCULAR; INTRAVENOUS; SOFT TISSUE at 08:40

## 2022-01-08 RX ADMIN — SULFASALAZINE 500 MG: 500 TABLET ORAL at 21:25

## 2022-01-08 RX ADMIN — OXYCODONE HYDROCHLORIDE AND ACETAMINOPHEN 1000 MG: 500 TABLET ORAL at 08:40

## 2022-01-08 RX ADMIN — SULFASALAZINE 500 MG: 500 TABLET ORAL at 08:49

## 2022-01-08 RX ADMIN — INSULIN LISPRO 3 UNITS: 100 INJECTION, SOLUTION INTRAVENOUS; SUBCUTANEOUS at 08:40

## 2022-01-08 RX ADMIN — B-COMPLEX W/ C & FOLIC ACID TAB 1 TABLET: TAB at 08:40

## 2022-01-08 RX ADMIN — INSULIN LISPRO 3 UNITS: 100 INJECTION, SOLUTION INTRAVENOUS; SUBCUTANEOUS at 12:36

## 2022-01-08 RX ADMIN — INSULIN LISPRO 2 UNITS: 100 INJECTION, SOLUTION INTRAVENOUS; SUBCUTANEOUS at 17:32

## 2022-01-08 RX ADMIN — CHOLECALCIFEROL (VITAMIN D3) 10 MCG (400 UNIT) TABLET 400 UNITS: at 08:39

## 2022-01-08 RX ADMIN — SULFASALAZINE 500 MG: 500 TABLET ORAL at 12:36

## 2022-01-08 RX ADMIN — ENOXAPARIN SODIUM 30 MG: 100 INJECTION SUBCUTANEOUS at 08:39

## 2022-01-08 RX ADMIN — ZINC SULFATE 220 MG (50 MG) CAPSULE 220 MG: CAPSULE at 08:39

## 2022-01-08 RX ADMIN — ALLOPURINOL 200 MG: 100 TABLET ORAL at 08:40

## 2022-01-08 RX ADMIN — CLOPIDOGREL BISULFATE 75 MG: 75 TABLET ORAL at 08:40

## 2022-01-08 RX ADMIN — INSULIN LISPRO 3 UNITS: 100 INJECTION, SOLUTION INTRAVENOUS; SUBCUTANEOUS at 17:32

## 2022-01-08 RX ADMIN — ATORVASTATIN CALCIUM 40 MG: 40 TABLET, FILM COATED ORAL at 17:31

## 2022-01-08 RX ADMIN — INSULIN GLARGINE 12 UNITS: 100 INJECTION, SOLUTION SUBCUTANEOUS at 08:52

## 2022-01-08 RX ADMIN — BARICITINIB 2 MG: 2 TABLET, FILM COATED ORAL at 12:36

## 2022-01-08 RX ADMIN — ENOXAPARIN SODIUM 30 MG: 100 INJECTION SUBCUTANEOUS at 21:25

## 2022-01-08 RX ADMIN — SULFASALAZINE 500 MG: 500 TABLET ORAL at 17:31

## 2022-01-08 RX ADMIN — BUDESONIDE AND FORMOTEROL FUMARATE DIHYDRATE 2 PUFF: 160; 4.5 AEROSOL RESPIRATORY (INHALATION) at 08:43

## 2022-01-08 NOTE — PROGRESS NOTES
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 Chronic interstitial nephritis from sulfasalazine with a bland urinalysis  2  AK I:  Workup:  · Etiology:  Most compatible with ATN from COVID 19 pneumonia plus prerenal  · UA:  No proteinuria and no hematuria  · Once over COVID I would order a renal ultrasound/imaging  · Peak creatinine:  1 84 on 12/31/2021    Current creatinine:  Pending    Treatment  · For now hold further diuretics pending pulmonary evaluation  · Avoid nephrotoxic agents such as NSAIDs and avoid hypotension keeping map above 65    2  Blood pressure: Acceptable at this time place hold parameters    3  Volume:  Appears euvolemic  4  Electrolytes:  Have been normal awaiting today's labs  Has been hypokalemic  5  MBD of CKD:  Magnesium is been normal at 2 0    6  Anemia:  Hemoglobin normal at 13 6    7  Acute hypoxic respiratory failure due to COVID-19 pneumonia per Pulmonary    Other problems:  · Vasovagal syncope on admission  · Ulcerative rectosigmoiditis  · Diabetes mellitus type 2  · History of CVA  · History of gout  · History of dyslipidemia  · History of diverticulitis            Subjective:   Patient is feeling better overall  No chest pain, shortness of breath has improved and cough overall improved  Urinating well  No GI symptoms including nausea vomiting or diarrhea at this time    Objective:     Vitals: Blood pressure 148/85, pulse 79, temperature 98 2 °F (36 8 °C), resp  rate 22, weight 89 2 kg (196 lb 10 4 oz), SpO2 90 %  ,Body mass index is 29 04 kg/m²      Weight (last 2 days)     Date/Time Weight    01/07/22 0600 89 2 (196 65)    01/06/22 0206 89 9 (198 19)            Intake/Output Summary (Last 24 hours) at 1/8/2022 7385  Last data filed at 1/7/2022 2103  Gross per 24 hour   Intake 240 ml   Output 600 ml   Net -360 ml          COVID-19 exam through the window:  Physical Exam:   General:  No acute distress  Skin:  No acute rash  Neck:  Appears supple  Pulmonary:  Normal excursion of the chest, no use of accessory respiratory muscles and no distress  CVS:  Regular rhythm per the vital signs  Abdomen:  Appears nondistended  Extremities:  No edema  Neuro:  No gross focality  Psych:  Alert and oriented and appropriate                Medications:    Scheduled Meds:  Current Facility-Administered Medications   Medication Dose Route Frequency Provider Last Rate    acetaminophen  650 mg Oral Q6H PRN Zay Mayers MD      allopurinol  200 mg Oral Daily Zay Mayers MD      ascorbic acid  1,000 mg Oral Daily Hema Villafana PA-C      atorvastatin  40 mg Oral Daily With Dinner Zay Mayers MD      Baricitinib  2 mg Oral Q24H Zay Mayers MD      budesonide-formoterol  2 puff Inhalation BID Hema Villafana PA-C      cholecalciferol  400 Units Oral Daily Hema Villafana PA-C      clopidogrel  75 mg Oral Daily Zay Mayers MD      dexamethasone  10 mg Intravenous Daily Yolanda Bustillos DO      enoxaparin  30 mg Subcutaneous Q12H [address], [place]      insulin glargine  12 Units Subcutaneous QAM Zay Mayers MD      insulin lispro  1-5 Units Subcutaneous TID AC Zay Mayers MD      insulin lispro  3 Units Subcutaneous TID With Meals Zay Mayers MD      multivitamin stress formula  1 tablet Oral Daily Hema Villafana PA-C      ondansetron  4 mg Intravenous Q6H PRN Zay Mayers MD      sulfaSALAzine  500 mg Oral 4x Daily Zay Mayers MD      zinc sulfate  220 mg Oral Daily Hema Villafana PA-C         PRN Meds:   acetaminophen    ondansetron    Continuous Infusions:     Lab, Imaging and other studies: I have personally reviewed pertinent labs    Laboratory Results:  Results from last 7 days Lab Units 01/07/22  0626 01/05/22  0235 01/04/22  0218 01/03/22  0520 01/02/22  0339   WBC Thousand/uL 10 44* 8 04 6 85  --  7 39   HEMOGLOBIN g/dL 13 6 12 8 13 6  --  13 0   HEMATOCRIT % 45 3 41 1 44 5  --  42 0   PLATELETS Thousands/uL 410* 340 364  --  292   POTASSIUM mmol/L 4 2 3 9 3 6 3 3* 3 7   CHLORIDE mmol/L 102 109* 106 106 107   CO2 mmol/L 25 22 25 24 24   BUN mg/dL 40* 36* 38* 34* 42*   CREATININE mg/dL 1 61* 1 44* 1 68* 1 45* 1 59*   CALCIUM mg/dL 8 8 8 3 8 5 7 7* 7 8*   MAGNESIUM mg/dL 2 0  --  2 2  --   --      Urinalysis:   Lab Results   Component Value Date    COLORU Yellow 12/31/2021    CLARITYU Clear 12/31/2021    SPECGRAV 1 015 12/31/2021    PHUR 5 5 12/31/2021    PHUR 5 0 01/28/2020    LEUKOCYTESUR Small (A) 12/31/2021    NITRITE Negative 12/31/2021    GLUCOSEU Negative 12/31/2021    KETONESU Negative 12/31/2021    BILIRUBINUR Negative 12/31/2021    BLOODU Negative 12/31/2021     ABGs: No results found for: [facility]  Radiology review:     Portions of the record may have been created with voice recognition software  Occasional wrong word or "sound a like" substitutions may have occurred due to the inherent limitations of voice recognition software  Read the chart carefully and recognize, using context, where substitutions have occurred

## 2022-01-08 NOTE — PLAN OF CARE
Problem: SAFETY ADULT  Goal: Patient will remain free of falls  Description: INTERVENTIONS:  - Educate patient/family on patient safety including physical limitations  - Instruct patient to call for assistance with activity   - Consult OT/PT to assist with strengthening/mobility   - Keep Call bell within reach  - Keep bed low and locked with side rails adjusted as appropriate  - Keep care items and personal belongings within reach  - Initiate and maintain comfort rounds  - Make Fall Risk Sign visible to staff  - Offer Toileting every 2 Hours, in advance of need  - Initiate/Maintain bed alarm  - Obtain necessary fall risk management equipment:   - Apply yellow socks and bracelet for high fall risk patients  - Consider moving patient to room near nurses station  Outcome: Progressing  Goal: Maintain or return to baseline ADL function  Description: INTERVENTIONS:  -  Assess patient's ability to carry out ADLs; assess patient's baseline for ADL function and identify physical deficits which impact ability to perform ADLs (bathing, care of mouth/teeth, toileting, grooming, dressing, etc )  - Assess/evaluate cause of self-care deficits   - Assess range of motion  - Assess patient's mobility; develop plan if impaired  - Assess patient's need for assistive devices and provide as appropriate  - Encourage maximum independence but intervene and supervise when necessary  - Involve family in performance of ADLs  - Assess for home care needs following discharge   - Consider OT consult to assist with ADL evaluation and planning for discharge  - Provide patient education as appropriate  Outcome: Progressing  Goal: Maintains/Returns to pre admission functional level  Description: INTERVENTIONS:  - Perform BMAT or MOVE assessment daily    - Set and communicate daily mobility goal to care team and patient/family/caregiver     - Collaborate with rehabilitation services on mobility goals if consulted  - Perform Range of Motion 2 times a day   - Reposition patient every 2 hours    - Dangle patient 2 times a day  - Stand patient 2 times a day  - Ambulate patient 2 times a day  - Out of bed to chair 2 times a day   - Out of bed for meals 2 times a day  - Out of bed for toileting  - Record patient progress and toleration of activity level   Outcome: Progressing

## 2022-01-08 NOTE — PLAN OF CARE
Problem: PAIN - ADULT  Goal: Verbalizes/displays adequate comfort level or baseline comfort level  Description: Interventions:  - Encourage patient to monitor pain and request assistance  - Assess pain using appropriate pain scale  - Administer analgesics based on type and severity of pain and evaluate response  - Implement non-pharmacological measures as appropriate and evaluate response  - Consider cultural and social influences on pain and pain management  - Notify physician/advanced practitioner if interventions unsuccessful or patient reports new pain  Outcome: Progressing     Problem: INFECTION - ADULT  Goal: Absence or prevention of progression during hospitalization  Description: INTERVENTIONS:  - Assess and monitor for signs and symptoms of infection  - Monitor lab/diagnostic results  - Monitor all insertion sites, i e  indwelling lines, tubes, and drains  - Monitor endotracheal if appropriate and nasal secretions for changes in amount and color  - [place] appropriate cooling/warming therapies per order  - Administer medications as ordered  - Instruct and encourage patient and family to use good hand hygiene technique  - Identify and instruct in appropriate isolation precautions for identified infection/condition  Outcome: Progressing  Goal: Absence of fever/infection during neutropenic period  Description: INTERVENTIONS:  - Monitor WBC    Outcome: Progressing     Problem: SAFETY ADULT  Goal: Patient will remain free of falls  Description: INTERVENTIONS:  - Educate patient/family on patient safety including physical limitations  - Instruct patient to call for assistance with activity   - Consult OT/PT to assist with strengthening/mobility   - Keep Call bell within reach  - Keep bed low and locked with side rails adjusted as appropriate  - Keep care items and personal belongings within reach  - Initiate and maintain comfort rounds  - Make Fall Risk Sign visible to staff  - Offer Toileting every 2 Hours, in advance of need  - Initiate/Maintain bed alarm  - Obtain necessary fall risk management equipment:   - Apply yellow socks and bracelet for high fall risk patients  - Consider moving patient to room near nurses station  Outcome: Progressing  Goal: Maintain or return to baseline ADL function  Description: INTERVENTIONS:  -  Assess patient's ability to carry out ADLs; assess patient's baseline for ADL function and identify physical deficits which impact ability to perform ADLs (bathing, care of mouth/teeth, toileting, grooming, dressing, etc )  - Assess/evaluate cause of self-care deficits   - Assess range of motion  - Assess patient's mobility; develop plan if impaired  - Assess patient's need for assistive devices and provide as appropriate  - Encourage maximum independence but intervene and supervise when necessary  - Involve family in performance of ADLs  - Assess for home care needs following discharge   - Consider OT consult to assist with ADL evaluation and planning for discharge  - Provide patient education as appropriate  Outcome: Progressing  Goal: Maintains/Returns to pre admission functional level  Description: INTERVENTIONS:  - Perform BMAT or MOVE assessment daily    - Set and communicate daily mobility goal to care team and patient/family/caregiver  - Collaborate with rehabilitation services on mobility goals if consulted  - Perform Range of Motion 2 times a day  - Reposition patient every 2 hours    - Dangle patient 2 times a day  - Stand patient 2 times a day  - Ambulate patient 2 times a day  - Out of bed to chair 2 times a day   - Out of bed for meals 2 times a day  - Out of bed for toileting  - Record patient progress and toleration of activity level   Outcome: Progressing     Problem: DISCHARGE PLANNING  Goal: Discharge to home or other facility with appropriate resources  Description: INTERVENTIONS:  - Identify barriers to discharge w/patient and caregiver  - Arrange for needed discharge resources and transportation as appropriate  - Identify discharge learning needs (meds, wound care, etc )  - Arrange for interpretive services to assist at discharge as needed  - Refer to Case Management Department for coordinating discharge planning if the patient needs post-hospital services based on physician/advanced practitioner order or complex needs related to functional status, cognitive ability, or social support system  Outcome: Progressing     Problem: Knowledge Deficit  Goal: Patient/family/caregiver demonstrates understanding of disease process, treatment plan, medications, and discharge instructions  Description: Complete learning assessment and assess knowledge base    Interventions:  - Provide teaching at level of understanding  - Provide teaching via preferred learning methods  Outcome: Progressing     Problem: Potential for Falls  Goal: Patient will remain free of falls  Description: INTERVENTIONS:  - Educate patient/family on patient safety including physical limitations  - Instruct patient to call for assistance with activity   - Consult OT/PT to assist with strengthening/mobility   - Keep Call bell within reach  - Keep bed low and locked with side rails adjusted as appropriate  - Keep care items and personal belongings within reach  - Initiate and maintain comfort rounds  - Make Fall Risk Sign visible to staff  - Offer Toileting every 2 Hours, in advance of need  - Initiate/Maintain bed alarm  - Obtain necessary fall risk management equipment:   - Apply yellow socks and bracelet for high fall risk patients  - Consider moving patient to room near nurses station  Outcome: Progressing     Problem: MOBILITY - ADULT  Goal: Maintain or return to baseline ADL function  Description: INTERVENTIONS:  -  Assess patient's ability to carry out ADLs; assess patient's baseline for ADL function and identify physical deficits which impact ability to perform ADLs (bathing, care of mouth/teeth, toileting, grooming, dressing, etc )  - Assess/evaluate cause of self-care deficits   - Assess range of motion  - Assess patient's mobility; develop plan if impaired  - Assess patient's need for assistive devices and provide as appropriate  - Encourage maximum independence but intervene and supervise when necessary  - Involve family in performance of ADLs  - Assess for home care needs following discharge   - Consider OT consult to assist with ADL evaluation and planning for discharge  - Provide patient education as appropriate  Outcome: Progressing  Goal: Maintains/Returns to pre admission functional level  Description: INTERVENTIONS:  - Perform BMAT or MOVE assessment daily    - Set and communicate daily mobility goal to care team and patient/family/caregiver  - Collaborate with rehabilitation services on mobility goals if consulted  - Perform Range of Motion 2 times a day  - Reposition patient every 2 hours    - Dangle patient 2 times a day  - Stand patient 2 times a day  - Ambulate patient 2 times a day  - Out of bed to chair 2 times a day   - Out of bed for meals 2 times a day  - Out of bed for toileting  - Record patient progress and toleration of activity level   Outcome: Progressing     Problem: Prexisting or High Potential for Compromised Skin Integrity  Goal: Skin integrity is maintained or improved  Description: INTERVENTIONS:  - Identify patients at risk for skin breakdown  - Assess and monitor skin integrity  - Assess and monitor nutrition and hydration status  - Monitor labs   - Assess for incontinence   - Turn and reposition patient  - Assist with mobility/ambulation  - Relieve pressure over bony prominences  - Avoid friction and shearing  - Provide appropriate hygiene as needed including keeping skin clean and dry  - Evaluate need for skin moisturizer/barrier cream  - Collaborate with interdisciplinary team   - Patient/family teaching  - Consider wound care consult   Outcome: Progressing     Problem: Nutrition/Hydration-ADULT  Goal: Nutrient/Hydration intake appropriate for improving, restoring or maintaining nutritional needs  Description: Monitor and assess patient's nutrition/hydration status for malnutrition  Collaborate with interdisciplinary team and initiate plan and interventions as ordered  Monitor patient's weight and dietary intake as ordered or per policy  Utilize nutrition screening tool and intervene as necessary  Determine patient's food preferences and provide high-protein, high-caloric foods as appropriate       INTERVENTIONS:  - Monitor oral intake, urinary output, labs, and treatment plans  - Assess nutrition and hydration status and recommend course of action  - Evaluate amount of meals eaten  - Assist patient with eating if necessary   - Allow adequate time for meals  - Recommend/ encourage appropriate diets, oral nutritional supplements, and vitamin/mineral supplements  - Order, calculate, and assess calorie counts as needed  - Recommend, monitor, and adjust tube feedings and TPN/PPN based on assessed needs  - Assess need for intravenous fluids  - Provide specific nutrition/hydration education as appropriate  - Include patient/family/caregiver in decisions related to nutrition  Outcome: Progressing

## 2022-01-08 NOTE — PROGRESS NOTES
[facility]  [facility] - Aleshia Hunt 1950, 80 y o  male MRN: [id number]  Unit/Bed#: -01 Encounter: [id number]  Primary Care Provider: Krupa San DO   Date and time admitted to hospital: 12/28/2021  4:54 AM    Acute respiratory failure with hypoxia [facility])  Assessment & Plan  Patient developed acute hypoxic respiratory failure after hospital admission due to COVID pneumonia  Currently he is on 13 liters/minute    Continue treatments for COVID pneumonia as well as mid flow oxygen  NSVT (nonsustained ventricular tachycardia) ([facility] [address] )  Assessment & Plan  Patient's history of nonsustained ventricular tachycardia  EKG shows normal sinus rhythm with normal intervals  Will continue metoprolol    Will monitor patient on telemetry    CHADWICK (acute kidney injury) [facility])  Assessment & Plan  Patient had acute kidney injury present on admission on stage III chronic kidney disease  His baseline creatinine is 1 2-1 8 per nephro  Was given lasix 1/6 and Cr risen  Subsequent diuretics held  apprec nephro    Ulcerative rectosigmoiditis without complication [facility])  Assessment & Plan  Patient has ulcer of colitis  He denies any abdominal pain but had loose stools twice today  Will monitor for diarrhea  Denies signs of GI bleeding  Continue sulfasalazine    Type 2 diabetes mellitus with stage 3a chronic kidney disease, without long-term current use of insulin [facility])  Assessment & Plan  Lab Results   Component Value Date    HGBA1C 6 4 (H) 12/28/2021       Recent Labs     01/07/22  0746 01/07/22  1113 01/07/22  1631 01/08/22  0745   POCGLU 143* 307* 185* 79       Blood Sugar Average: Last 72 hrs:  (P) 502 [id number]     Patient has type 2 diabetes with stage III chronic disease  Patient has hyperglycemia due to steroids  cont Lantus to 12 units and   pre meal Humalog  Primary hypertension  Assessment & Plan  Patient has chronic hypertension       stable    Chronic kidney disease, stage 3 [facility])  Assessment & Plan  Lab Results   Component Value Date    EGFR 42 2022    EGFR 48 2022    EGFR 40 2022    CREATININE 1 61 (H) 2022    CREATININE 1 44 (H) 2022    CREATININE 1 68 (H) 2022     Cr improved see CHADWICK    * Pneumonia due to COVID-19 virus  Assessment & Plan  Patient developed chills, fevers and dry cough about 3-4 days before admission  He is fully vaccinated with the [employer] vaccine  Chest x-ray showed bilateral COVID pneumonia  Patient feels subjectively better  He was weaned to 13L     Will continue baricitinib, completed remdesivir;   high-dose IV Decadron  Completed cefepime per pulm  Dc vanc mrsa neg    S/p  Lasix 40 mg IV      Blood culture from  grows contaminants  Repeat blood culture showed no growth  DC vanc              VTE Pharmacologic Prophylaxis:   Pharmacologic: Enoxaparin (Lovenox)    Patient Centered Rounds: I have performed bedside rounds with nursing staff today  Education and Discussions with Family / Patient: wife      Current Length of Stay: 6 day(s)    Current Patient Status: Inpatient        Code Status: Level 1 - Full Code      Subjective:   Continues on 13L   Feels better each day he says    Patient is seen and examined at bedside  All other ROS are negative  Objective:     Vitals:   Temp (24hrs), Av 5 °F (36 4 °C), Min:97 1 °F (36 2 °C), Max:98 2 °F (36 8 °C)    Temp:  [97 1 °F (36 2 °C)-98 2 °F (36 8 °C)] 97 3 °F (36 3 °C)  HR:  [72-91] 72  Resp:  [20-22] 20  BP: (140-155)/(77-85) 140/82  SpO2:  [86 %-91 %] 88 %  Body mass index is 29 04 kg/m²  Input and Output Summary (last 24 hours):        Intake/Output Summary (Last 24 hours) at 2022 1021  Last data filed at 2022 2103  Gross per 24 hour   Intake 240 ml   Output 600 ml   Net -360 ml       Physical Exam:          GEN: No acute distress, comfortable, o2  HEEENT: No JVD, PERRLA, no scleral icterus  RESP: crackles  CV: RRR, +s1/s2   ABD: SOFT NON TENDER, POSITIVE BOWEL SOUNDS, NO DISTENTION  PSYCH: CALM  NEURO: A X O X 3, NO FOCAL DEFICITS  SKIN: NO RASH  EXTREM: NO EDEMA       Additional Data:     Labs:    Results from last 7 days   Lab Units 01/07/22  0626 01/05/22  0235 01/05/22  0235   WBC Thousand/uL 10 44*   < > 8 04   HEMOGLOBIN g/dL 13 6   < > 12 8   HEMATOCRIT % 45 3   < > 41 1   PLATELETS Thousands/uL 410*   < > 340   BANDS PCT %  --   --  1   NEUTROS PCT % 84*  --   --    LYMPHS PCT % 4*  --   --    LYMPHO PCT %  --   --  7*   MONOS PCT % 5  --   --    MONO PCT %  --   --  6   EOS PCT % 0  --  1    < > = values in this interval not displayed  Results from last 7 days   Lab Units 01/07/22  0626   SODIUM mmol/L 136   POTASSIUM mmol/L 4 2   CHLORIDE mmol/L 102   CO2 mmol/L 25   BUN mg/dL 40*   CREATININE mg/dL 1 61*   ANION GAP mmol/L 9   CALCIUM mg/dL 8 8   ALBUMIN g/dL 2 3*   TOTAL BILIRUBIN mg/dL 0 40   ALK PHOS U/L 89   ALT U/L 69   AST U/L 29   GLUCOSE RANDOM mg/dL 142*         Results from last 7 days   Lab Units 01/08/22  0745 01/07/22  1631 01/07/22  1113 01/07/22  0746 01/06/22  2154 01/06/22  1654 01/06/22  1545 01/06/22  1121 01/06/22  0844 01/05/22  2205 01/05/22  1618 01/05/22  1206   POC GLUCOSE mg/dl 79 185* 307* 143* 242* 186* 208* 207* 131 146* 249* 158*                   * I Have Reviewed All Lab Data Listed Above  Imaging:     Results for orders placed during the hospital encounter of 12/28/21    XR chest portable    Narrative  CHEST    INDICATION:   Worsening hypoxia  Patient has confirmed COVID-19  COMPARISON:  12/28/2021    EXAM PERFORMED/VIEWS:  XR CHEST PORTABLE      FINDINGS:    Loop recorder noted as on prior study  Cardiac silhouette size unchanged    Bilateral multifocal groundglass opacities are present  Interval worsening has occurred  There is no pleural effusion or pneumothorax      Osseous structures appear within normal limits for patient age     Impression  Bilateral multifocal groundglass opacities are present  In the setting of COVID-19 infection, this is most in keeping with COVID 19 pneumonia  There has been interval worsening            Workstation performed: [id number]    No results found for this or any previous visit  *I have reviewed all imaging reports listed above      Recent Cultures (last 7 days):     Results from last 7 days   Lab Units 01/01/22  1135   BLOOD CULTURE  No Growth After 5 Days  No Growth After 5 Days  Last 24 Hours Medication List:   Current Facility-Administered Medications   Medication Dose Route Frequency Provider Last Rate    acetaminophen  650 mg Oral Q6H PRN Anju Hamilton MD      allopurinol  200 mg Oral Daily Anju Hamilton MD      ascorbic acid  1,000 mg Oral Daily Tiffanie Caceres PA-C      atorvastatin  40 mg Oral Daily With [facility], MD      Baricitinib  2 mg Oral Q24H Anju Hamilton MD      budesonide-formoterol  2 puff Inhalation BID [place] LUIS ANTONIO Caceres      cholecalciferol  400 Units Oral Daily Tiffanie Caceres PA-C      clopidogrel  75 mg Oral Daily Anju Hamilton MD      dexamethasone  10 mg Intravenous Daily Yolanda Bustillos DO      enoxaparin  30 mg Subcutaneous Q12H [address] [address], MD      insulin glargine  12 Units Subcutaneous QAM Anju Hamilton MD      insulin lispro  1-5 Units Subcutaneous TID AC Anju Hamilton MD      insulin lispro  3 Units Subcutaneous TID With Meals Anju Hamilton MD      multivitamin stress formula  1 tablet Oral Daily Tiffanie Caceres PA-C      ondansetron  4 mg Intravenous Q6H PRN Anju Hamilton MD      sulfaSALAzine  500 mg Oral 4x Daily Anju Hamilton MD      zinc sulfate  220 mg Oral Daily Tiffanie Caceres PA-C          Today, Patient Was Seen By: Santos Bejarano MD    ** Please Note: Dictation voice to text software may have been used in the creation of this document   **

## 2022-01-08 NOTE — ASSESSMENT & PLAN NOTE
Lab Results   Component Value Date    HGBA1C 6 4 (H) 12/28/2021       Recent Labs     01/07/22  0746 01/07/22  1113 01/07/22  1631 01/08/22  0745   POCGLU 143* 307* 185* 79       Blood Sugar Average: Last 72 hrs:  (P) 802 [id number]     Patient has type 2 diabetes with stage III chronic disease  Patient has hyperglycemia due to steroids  cont Lantus to 12 units and   pre meal Humalog

## 2022-01-08 NOTE — ASSESSMENT & PLAN NOTE
Patient developed chills, fevers and dry cough about 3-4 days before admission  He is fully vaccinated with the [employer] vaccine  Chest x-ray showed bilateral COVID pneumonia  Patient feels subjectively better  He was weaned to 13L     Will continue baricitinib, completed remdesivir;   high-dose IV Decadron  Completed cefepime per pulm  Dc vanc mrsa neg    S/p  Lasix 40 mg IV Jan 2     Blood culture from December 29th grows contaminants  Repeat blood culture showed no growth   DC vanc

## 2022-01-08 NOTE — ASSESSMENT & PLAN NOTE
Patient had acute kidney injury present on admission on stage III chronic kidney disease    His baseline creatinine is 1 2-1 8 per nephro  Was given lasix 1/6 and Cr risen  Subsequent diuretics held  apprec nephro

## 2022-01-09 LAB
ALBUMIN SERPL BCP-MCNC: 2 G/DL (ref 3.5–5)
ALP SERPL-CCNC: 84 U/L (ref 46–116)
ALT SERPL W P-5'-P-CCNC: 65 U/L (ref 12–78)
ANION GAP SERPL CALCULATED.3IONS-SCNC: 8 MMOL/L (ref 4–13)
ANISOCYTOSIS BLD QL SMEAR: PRESENT
AST SERPL W P-5'-P-CCNC: 31 U/L (ref 5–45)
BASOPHILS # BLD MANUAL: 0 THOUSAND/UL (ref 0–0.1)
BASOPHILS NFR MAR MANUAL: 0 % (ref 0–1)
BILIRUB SERPL-MCNC: 0.3 MG/DL (ref 0.2–1)
BUN SERPL-MCNC: 41 MG/DL (ref 5–25)
CALCIUM ALBUM COR SERPL-MCNC: 10 MG/DL (ref 8.3–10.1)
CALCIUM SERPL-MCNC: 8.4 MG/DL (ref 8.3–10.1)
CHLORIDE SERPL-SCNC: 106 MMOL/L (ref 100–108)
CO2 SERPL-SCNC: 26 MMOL/L (ref 21–32)
CREAT SERPL-MCNC: 1.39 MG/DL (ref 0.6–1.3)
EOSINOPHIL # BLD MANUAL: 0.09 THOUSAND/UL (ref 0–0.4)
EOSINOPHIL NFR BLD MANUAL: 1 % (ref 0–6)
ERYTHROCYTE [DISTWIDTH] IN BLOOD BY AUTOMATED COUNT: 17.7 % (ref 11.6–15.1)
GFR SERPL CREATININE-BSD FRML MDRD: 50 ML/MIN/1.73SQ M
GLUCOSE SERPL-MCNC: 108 MG/DL (ref 65–140)
GLUCOSE SERPL-MCNC: 110 MG/DL (ref 65–140)
GLUCOSE SERPL-MCNC: 170 MG/DL (ref 65–140)
GLUCOSE SERPL-MCNC: 213 MG/DL (ref 65–140)
GLUCOSE SERPL-MCNC: 97 MG/DL (ref 65–140)
HCT VFR BLD AUTO: 41.2 % (ref 36.5–49.3)
HGB BLD-MCNC: 12.8 G/DL (ref 12–17)
LG PLATELETS BLD QL SMEAR: PRESENT
LYMPHOCYTES # BLD AUTO: 0.53 THOUSAND/UL (ref 0.6–4.47)
LYMPHOCYTES # BLD AUTO: 6 % (ref 14–44)
MCH RBC QN AUTO: 24.1 PG (ref 26.8–34.3)
MCHC RBC AUTO-ENTMCNC: 31.1 G/DL (ref 31.4–37.4)
MCV RBC AUTO: 77 FL (ref 82–98)
METAMYELOCYTES NFR BLD MANUAL: 4 % (ref 0–1)
MONOCYTES # BLD AUTO: 0.44 THOUSAND/UL (ref 0–1.22)
MONOCYTES NFR BLD: 5 % (ref 4–12)
NEUTROPHILS # BLD MANUAL: 7.29 THOUSAND/UL (ref 1.85–7.62)
NEUTS BAND NFR BLD MANUAL: 1 % (ref 0–8)
NEUTS SEG NFR BLD AUTO: 82 % (ref 43–75)
OVALOCYTES BLD QL SMEAR: PRESENT
PLATELET # BLD AUTO: 312 THOUSANDS/UL (ref 149–390)
PLATELET BLD QL SMEAR: ADEQUATE
PMV BLD AUTO: 10.4 FL (ref 8.9–12.7)
POTASSIUM SERPL-SCNC: 3.6 MMOL/L (ref 3.5–5.3)
PROT SERPL-MCNC: 5.7 G/DL (ref 6.4–8.2)
RBC # BLD AUTO: 5.32 MILLION/UL (ref 3.88–5.62)
SODIUM SERPL-SCNC: 140 MMOL/L (ref 136–145)
VARIANT LYMPHS # BLD AUTO: 1 %
WBC # BLD AUTO: 8.78 THOUSAND/UL (ref 4.31–10.16)

## 2022-01-09 PROCEDURE — 85027 COMPLETE CBC AUTOMATED: CPT | Performed by: HOSPITALIST

## 2022-01-09 PROCEDURE — 99232 SBSQ HOSP IP/OBS MODERATE 35: CPT | Performed by: HOSPITALIST

## 2022-01-09 PROCEDURE — 99232 SBSQ HOSP IP/OBS MODERATE 35: CPT | Performed by: INTERNAL MEDICINE

## 2022-01-09 PROCEDURE — 85007 BL SMEAR W/DIFF WBC COUNT: CPT | Performed by: HOSPITALIST

## 2022-01-09 PROCEDURE — 82948 REAGENT STRIP/BLOOD GLUCOSE: CPT

## 2022-01-09 PROCEDURE — 94760 N-INVAS EAR/PLS OXIMETRY 1: CPT

## 2022-01-09 PROCEDURE — 80053 COMPREHEN METABOLIC PANEL: CPT | Performed by: HOSPITALIST

## 2022-01-09 RX ORDER — POTASSIUM CHLORIDE 20 MEQ/1
40 TABLET, EXTENDED RELEASE ORAL ONCE
Status: COMPLETED | OUTPATIENT
Start: 2022-01-09 | End: 2022-01-09

## 2022-01-09 RX ADMIN — B-COMPLEX W/ C & FOLIC ACID TAB 1 TABLET: TAB at 08:19

## 2022-01-09 RX ADMIN — BUDESONIDE AND FORMOTEROL FUMARATE DIHYDRATE 2 PUFF: 160; 4.5 AEROSOL RESPIRATORY (INHALATION) at 17:10

## 2022-01-09 RX ADMIN — INSULIN LISPRO 2 UNITS: 100 INJECTION, SOLUTION INTRAVENOUS; SUBCUTANEOUS at 17:10

## 2022-01-09 RX ADMIN — ENOXAPARIN SODIUM 30 MG: 100 INJECTION SUBCUTANEOUS at 20:25

## 2022-01-09 RX ADMIN — ALLOPURINOL 200 MG: 100 TABLET ORAL at 08:19

## 2022-01-09 RX ADMIN — ZINC SULFATE 220 MG (50 MG) CAPSULE 220 MG: CAPSULE at 08:20

## 2022-01-09 RX ADMIN — INSULIN LISPRO 1 UNITS: 100 INJECTION, SOLUTION INTRAVENOUS; SUBCUTANEOUS at 12:25

## 2022-01-09 RX ADMIN — CHOLECALCIFEROL (VITAMIN D3) 10 MCG (400 UNIT) TABLET 400 UNITS: at 08:20

## 2022-01-09 RX ADMIN — INSULIN LISPRO 3 UNITS: 100 INJECTION, SOLUTION INTRAVENOUS; SUBCUTANEOUS at 12:25

## 2022-01-09 RX ADMIN — SULFASALAZINE 500 MG: 500 TABLET ORAL at 17:09

## 2022-01-09 RX ADMIN — OXYCODONE HYDROCHLORIDE AND ACETAMINOPHEN 1000 MG: 500 TABLET ORAL at 08:20

## 2022-01-09 RX ADMIN — SULFASALAZINE 500 MG: 500 TABLET ORAL at 12:25

## 2022-01-09 RX ADMIN — INSULIN GLARGINE 12 UNITS: 100 INJECTION, SOLUTION SUBCUTANEOUS at 08:19

## 2022-01-09 RX ADMIN — CLOPIDOGREL BISULFATE 75 MG: 75 TABLET ORAL at 08:20

## 2022-01-09 RX ADMIN — INSULIN LISPRO 3 UNITS: 100 INJECTION, SOLUTION INTRAVENOUS; SUBCUTANEOUS at 08:30

## 2022-01-09 RX ADMIN — SULFASALAZINE 500 MG: 500 TABLET ORAL at 08:19

## 2022-01-09 RX ADMIN — ENOXAPARIN SODIUM 30 MG: 100 INJECTION SUBCUTANEOUS at 08:19

## 2022-01-09 RX ADMIN — ATORVASTATIN CALCIUM 40 MG: 40 TABLET, FILM COATED ORAL at 17:09

## 2022-01-09 RX ADMIN — BARICITINIB 2 MG: 2 TABLET, FILM COATED ORAL at 12:25

## 2022-01-09 RX ADMIN — INSULIN LISPRO 3 UNITS: 100 INJECTION, SOLUTION INTRAVENOUS; SUBCUTANEOUS at 17:10

## 2022-01-09 RX ADMIN — DEXAMETHASONE SODIUM PHOSPHATE 10 MG: 4 INJECTION, SOLUTION INTRA-ARTICULAR; INTRALESIONAL; INTRAMUSCULAR; INTRAVENOUS; SOFT TISSUE at 08:20

## 2022-01-09 RX ADMIN — BUDESONIDE AND FORMOTEROL FUMARATE DIHYDRATE 2 PUFF: 160; 4.5 AEROSOL RESPIRATORY (INHALATION) at 08:21

## 2022-01-09 RX ADMIN — POTASSIUM CHLORIDE 40 MEQ: 1500 TABLET, EXTENDED RELEASE ORAL at 10:39

## 2022-01-09 RX ADMIN — SULFASALAZINE 500 MG: 500 TABLET ORAL at 21:00

## 2022-01-09 NOTE — ASSESSMENT & PLAN NOTE
Lab Results   Component Value Date    HGBA1C 6 4 (H) 12/28/2021       Recent Labs     01/08/22  1150 01/08/22  1613 01/08/22 2021 01/09/22  0829   POCGLU 143* 238* 176* 97       Blood Sugar Average: Last 72 hrs:  (P) 814 [id number]     Patient has type 2 diabetes with stage III chronic disease  Patient has hyperglycemia due to steroids  cont Lantus to 12 units and   pre meal Humalog

## 2022-01-09 NOTE — PROGRESS NOTES
[place]  Progress Note - Zeferino Ellison 1950, 80 y o  male MRN: [id number]  Unit/Bed#: -01 Encounter: [id number]  Primary Care Provider: Anastacia Chi DO   Date and time admitted to hospital: 12/28/2021  4:54 AM    Acute respiratory failure with hypoxia [facility])  Assessment & Plan  Patient developed acute hypoxic respiratory failure after hospital admission due to COVID pneumonia  Currently he is on 13 liters/minute    Continue treatments for COVID pneumonia as well as mid flow oxygen  NSVT (nonsustained ventricular tachycardia) ([facility] [address] )  Assessment & Plan  Patient's history of nonsustained ventricular tachycardia  EKG shows normal sinus rhythm with normal intervals  Will continue metoprolol    Will monitor patient on telemetry    CHADWICK (acute kidney injury) [facility])  Assessment & Plan  Patient had acute kidney injury present on admission on stage III chronic kidney disease  His baseline creatinine is 1 2-1 8 per nephro  Was given lasix 1/6 and Cr risen  Subsequent diuretics held  CHADWICK is resolving  apprec nephro    Ulcerative rectosigmoiditis without complication [facility])  Assessment & Plan  Patient has ulcer of colitis  He denies any abdominal pain but had loose stools twice today  Will monitor for diarrhea  Denies signs of GI bleeding  Continue sulfasalazine    Type 2 diabetes mellitus with stage 3a chronic kidney disease, without long-term current use of insulin [facility])  Assessment & Plan  Lab Results   Component Value Date    HGBA1C 6 4 (H) 12/28/2021       Recent Labs     01/08/22  1150 01/08/22  1613 01/08/22 2021 01/09/22  0829   POCGLU 143* 238* 176* 97       Blood Sugar Average: Last 72 hrs:  (P) 450 [id number]     Patient has type 2 diabetes with stage III chronic disease  Patient has hyperglycemia due to steroids  cont Lantus to 12 units and   pre meal Humalog  Primary hypertension  Assessment & Plan  Patient has chronic hypertension  stable    Chronic kidney disease, stage 3 [facility])  Assessment & Plan  Lab Results   Component Value Date    EGFR 50 2022    EGFR 42 2022    EGFR 48 2022    CREATININE 1 39 (H) 2022    CREATININE 1 61 (H) 2022    CREATININE 1 44 (H) 2022     Cr improved see CHADWICK    * Pneumonia due to COVID-19 virus  Assessment & Plan  Patient developed chills, fevers and dry cough about 3-4 days before admission  He is fully vaccinated with the [employer] vaccine  Chest x-ray showed bilateral COVID pneumonia  Patient feels subjectively better  He was weaned to 13L     Will continue baricitinib, completed remdesivir;   high-dose IV Decadron  Completed cefepime per pulm  Dc vanc mrsa neg    S/p  Lasix 40 mg IV      Blood culture from  grows contaminants  Repeat blood culture showed no growth  DC vanc              VTE Pharmacologic Prophylaxis:   Pharmacologic: Enoxaparin (Lovenox)     Patient Centered Rounds: I have performed bedside rounds with nursing staff today            Education and Discussions with Family / Patient: wife via telephone            Current Length of Stay: 12 day(s)    Current Patient Status: Inpatient        Code Status: Level 1 - Full Code      Subjective:   Sitting in chair  Comfortable  Wants to go home  Denies resp complaints  Denies chest pain    Patient is seen and examined at bedside  All other ROS are negative  Objective:     Vitals:   Temp (24hrs), Av 5 °F (36 4 °C), Min:97 2 °F (36 2 °C), Max:98 2 °F (36 8 °C)    Temp:  [97 2 °F (36 2 °C)-98 2 °F (36 8 °C)] 97 2 °F (36 2 °C)  HR:  [] 71  Resp:  [20] 20  BP: (131-143)/(77-93) 131/77  SpO2:  [88 %-93 %] 88 %  Body mass index is 29 53 kg/m²  Input and Output Summary (last 24 hours):        Intake/Output Summary (Last 24 hours) at 2022 0954  Last data filed at 2022 2101  Gross per 24 hour   Intake 240 ml   Output 750 ml   Net -510 ml       Physical Exam:       GEN: No acute distress, comfortable, o2  HEEENT: No JVD, PERRLA, no scleral icterus  RESP: crackles  CV: RRR, +s1/s2   ABD: SOFT NON TENDER, POSITIVE BOWEL SOUNDS, NO DISTENTION  PSYCH: CALM  NEURO: A X O X 3, NO FOCAL DEFICITS  SKIN: NO RASH  EXTREM: NO EDEMA  Additional Data:     Labs:    Results from last 7 days   Lab Units 01/09/22  0618 01/07/22  0626 01/05/22  0235   WBC Thousand/uL 8 78 10 44*   < >   HEMOGLOBIN g/dL 12 8 13 6   < >   HEMATOCRIT % 41 2 45 3   < >   PLATELETS Thousands/uL 312 410*   < >   BANDS PCT % 1  --    < >   NEUTROS PCT %  --  84*  --    LYMPHS PCT %  --  4*  --    LYMPHO PCT % 6*  --    < >   MONOS PCT %  --  5  --    MONO PCT % 5  --    < >   EOS PCT % 1 0   < >    < > = values in this interval not displayed  Results from last 7 days   Lab Units 01/09/22  0618   SODIUM mmol/L 140   POTASSIUM mmol/L 3 6   CHLORIDE mmol/L 106   CO2 mmol/L 26   BUN mg/dL 41*   CREATININE mg/dL 1 39*   ANION GAP mmol/L 8   CALCIUM mg/dL 8 4   ALBUMIN g/dL 2 0*   TOTAL BILIRUBIN mg/dL 0 30   ALK PHOS U/L 84   ALT U/L 65   AST U/L 31   GLUCOSE RANDOM mg/dL 110         Results from last 7 days   Lab Units 01/09/22  0829 01/08/22  2021 01/08/22  1613 01/08/22  1150 01/08/22  0745 01/07/22  1631 01/07/22  1113 01/07/22  0746 01/06/22  2154 01/06/22  1654 01/06/22  1545 01/06/22  1121   POC GLUCOSE mg/dl 97 176* 238* 143* 79 185* 307* 143* 242* 186* 208* 207*                   * I Have Reviewed All Lab Data Listed Above  Imaging:     Results for orders placed during the hospital encounter of 12/28/21    XR chest portable    Narrative  CHEST    INDICATION:   Worsening hypoxia  Patient has confirmed COVID-19  COMPARISON:  12/28/2021    EXAM PERFORMED/VIEWS:  XR CHEST PORTABLE      FINDINGS:    Loop recorder noted as on prior study  Cardiac silhouette size unchanged    Bilateral multifocal groundglass opacities are present  Interval worsening has occurred   There is no pleural effusion or pneumothorax  Osseous structures appear within normal limits for patient age  Impression  Bilateral multifocal groundglass opacities are present  In the setting of COVID-19 infection, this is most in keeping with COVID 19 pneumonia  There has been interval worsening            Workstation performed: [id number]    No results found for this or any previous visit  *I have reviewed all imaging reports listed above      Recent Cultures (last 7 days):           Last 24 Hours Medication List:   Current Facility-Administered Medications   Medication Dose Route Frequency Provider Last Rate    acetaminophen  650 mg Oral Q6H PRN Tova Martínez MD      allopurinol  200 mg Oral Daily Tova Martínez MD      ascorbic acid  1,000 mg Oral Daily Felisa Mcleod PA-C      atorvastatin  40 mg Oral Daily With [facility], MD      Baricitinib  2 mg Oral Q24H Tova Martínez MD      budesonide-formoterol  2 puff Inhalation BID Felisa Mcleod PA-C      cholecalciferol  400 Units Oral Daily Felisa Mcleod PA-C      clopidogrel  75 mg Oral Daily Tova Martínez MD      dexamethasone  10 mg Intravenous Daily Yolanda Bustillos DO      enoxaparin  30 mg Subcutaneous Q12H [address] [address] MD      insulin glargine  12 Units Subcutaneous QAM Tova Martínez MD      insulin lispro  1-5 Units Subcutaneous TID AC Tova Martínez MD      insulin lispro  3 Units Subcutaneous TID With Meals Tova Martínez MD      multivitamin stress formula  1 tablet Oral Daily Felisa Mcleod PA-C      ondansetron  4 mg Intravenous Q6H PRN Tova Martínez MD      potassium chloride  40 mEq Oral Once Josue Cui MD      sulfaSALAzine  500 mg Oral 4x Daily Tova Martínez MD      zinc sulfate  220 mg Oral Daily Felisa Mcleod PA-C          Today, Patient Was Seen By: Teresa Miranda MD    ** Please Note: Dictation voice to text software may have been used in the creation of this document   **

## 2022-01-09 NOTE — ASSESSMENT & PLAN NOTE
Lab Results   Component Value Date    EGFR 50 01/09/2022    EGFR 42 01/07/2022    EGFR 48 01/05/2022    CREATININE 1 39 (H) 01/09/2022    CREATININE 1 61 (H) 01/07/2022    CREATININE 1 44 (H) 01/05/2022     Cr improved see CHADWICK

## 2022-01-09 NOTE — PROGRESS NOTES
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     Treatment  · For now hold further diuretics pending pulmonary evaluation  · Avoid nephrotoxic agents such as NSAIDs and avoid hypotension keeping map above 65     2  Blood pressure: Acceptable at this time place hold parameters     3  Volume:  Appears euvolemic      4  Electrolytes:  1 additional dose of potassium at 3 6     5  MBD of CKD:  Magnesium is been normal at 2 0     6  Anemia:  Hemoglobin normal at 12 8     7  Acute hypoxic respiratory failure due to COVID-19 pneumonia per Pulmonary     Other problems:  · Vasovagal syncope on admission  · Ulcerative rectosigmoiditis  · Diabetes mellitus type 2  · History of CVA  · History of gout  · History of dyslipidemia  · History of diverticulitis    Patient is back to baseline renal function  We will see as needed and arrange for outpatient follow-up  Please call if any further questions or concerns  Thank you so much for allowing us to participate in your patient's care      Subjective:   Patient feels very well today    No shortness of breath and no chest pain  Essentially no cough  No urinary symptoms at this time, urinating well according to the patient  No nausea vomiting or diarrhea    Objective:     Vitals: Blood pressure 131/77, pulse 71, temperature (!) 97 2 °F (36 2 °C), resp  rate 20, weight 90 7 kg (199 lb 15 3 oz), SpO2 (!) 88 %  ,Body mass index is 29 53 kg/m²      Weight (last 2 days)     Date/Time Weight    01/09/22 0600 90 7 (199 96)    01/07/22 0600 89 2 (196 65)            Intake/Output Summary (Last 24 hours) at 1/9/2022 0850  Last data filed at 1/8/2022 2101  Gross per 24 hour   Intake 240 ml   Output 750 ml   Net -510 ml            COVID-19 EXAM through the window  The direct physical exam was deferred because of COVID-19 risk exposure and preservation of PPE    General:  No acute distress  Vital signs:  131/77  Skin:  No overt rash  Pulmonary:  Good excursion of the chest without any distress, no use of accessory respiratory muscles  Cardiac:  Regular heart rate based on vital signs/monitor  Abdomen:  Nondistended  Extremities:  No significant edema noted  Neuro:  No gross focality  Psych:  Alert and oriented x3                  Medications:    Scheduled Meds:  Current Facility-Administered Medications   Medication Dose Route Frequency Provider Last Rate    acetaminophen  650 mg Oral Q6H PRN Анна Alfonso MD      allopurinol  200 mg Oral Daily Анна Alfonso MD      ascorbic acid  1,000 mg Oral Daily Spike Miles PA-C      atorvastatin  40 mg Oral Daily With Jane Baird MD      Baricitinib  2 mg Oral Q24H Анна Alfonso MD      budesonide-formoterol  2 puff Inhalation BID Spike Miles PA-C      cholecalciferol  400 Units Oral Daily Spike Miles PA-C      clopidogrel  75 mg Oral Daily Анна Alfonso MD      dexamethasone  10 mg Intravenous Daily Yolanda Bustillos DO      enoxaparin  30 mg Subcutaneous Q12H [address] [address], MD      insulin glargine  12 Units Subcutaneous QAM Анна Alfonso MD      insulin lispro  1-5 Units Subcutaneous TID AC Анна Alfonso MD      insulin lispro  3 Units Subcutaneous TID With Meals Анна Alfonso MD      multivitamin stress formula  1 tablet Oral Daily Spike Miles PA-C      ondansetron  4 mg Intravenous Q6H PRN Anju Hamilton MD      sulfaSALAzine  500 mg Oral 4x Daily Anju Hamilton MD      zinc sulfate  220 mg Oral Daily Tiffanie Caceres PA-C         PRN Meds:   acetaminophen    ondansetron    Continuous Infusions:     Lab, Imaging and other studies: I have personally reviewed pertinent labs  Laboratory Results:  Results from last 7 days   Lab Units 01/09/22  0618 01/07/22  0626 01/05/22  0235 01/04/22  0218 01/03/22  0520   WBC Thousand/uL 8 78 10 44* 8 04 6 85  --    HEMOGLOBIN g/dL 12 8 13 6 12 8 13 6  --    HEMATOCRIT % 41 2 45 3 41 1 44 5  --    PLATELETS Thousands/uL 312 410* 340 364  --    POTASSIUM mmol/L 3 6 4 2 3 9 3 6 3 3*   CHLORIDE mmol/L 106 102 109* 106 106   CO2 mmol/L 26 25 22 25 24   BUN mg/dL 41* 40* 36* 38* 34*   CREATININE mg/dL 1 39* 1 61* 1 44* 1 68* 1 45*   CALCIUM mg/dL 8 4 8 8 8 3 8 5 7 7*   MAGNESIUM mg/dL  --  2 0  --  2 2  --      Urinalysis:   Lab Results   Component Value Date    COLORU Yellow 12/31/2021    CLARITYU Clear 12/31/2021    SPECGRAV 1 015 12/31/2021    PHUR 5 5 12/31/2021    PHUR 5 0 01/28/2020    LEUKOCYTESUR Small (A) 12/31/2021    NITRITE Negative 12/31/2021    GLUCOSEU Negative 12/31/2021    KETONESU Negative 12/31/2021    BILIRUBINUR Negative 12/31/2021    BLOODU Negative 12/31/2021     ABGs: No results found for: [facility]  Radiology review:     Portions of the record may have been created with voice recognition software  Occasional wrong word or "sound a like" substitutions may have occurred due to the inherent limitations of voice recognition software  Read the chart carefully and recognize, using context, where substitutions have occurred

## 2022-01-09 NOTE — ASSESSMENT & PLAN NOTE
Patient had acute kidney injury present on admission on stage III chronic kidney disease  His baseline creatinine is 1 2-1 8 per nephro  Was given lasix 1/6 and Cr risen  Subsequent diuretics held  CHADWICK is resolving    apprec nephro

## 2022-01-10 ENCOUNTER — TELEPHONE (OUTPATIENT)
Dept: NEPHROLOGY | Facility: CLINIC | Age: 72
End: 2022-01-10

## 2022-01-10 DIAGNOSIS — N17.9 AKI (ACUTE KIDNEY INJURY) (HCC): Primary | ICD-10-CM

## 2022-01-10 LAB
ALBUMIN SERPL BCP-MCNC: 2 G/DL (ref 3.5–5)
ALP SERPL-CCNC: 80 U/L (ref 46–116)
ALT SERPL W P-5'-P-CCNC: 85 U/L (ref 12–78)
ANION GAP SERPL CALCULATED.3IONS-SCNC: 7 MMOL/L (ref 4–13)
AST SERPL W P-5'-P-CCNC: 47 U/L (ref 5–45)
BASOPHILS # BLD AUTO: 0.03 THOUSANDS/ΜL (ref 0–0.1)
BASOPHILS NFR BLD AUTO: 0 % (ref 0–1)
BILIRUB SERPL-MCNC: 0.3 MG/DL (ref 0.2–1)
BUN SERPL-MCNC: 39 MG/DL (ref 5–25)
CALCIUM ALBUM COR SERPL-MCNC: 9.9 MG/DL (ref 8.3–10.1)
CALCIUM SERPL-MCNC: 8.3 MG/DL (ref 8.3–10.1)
CHLORIDE SERPL-SCNC: 108 MMOL/L (ref 100–108)
CO2 SERPL-SCNC: 26 MMOL/L (ref 21–32)
CREAT SERPL-MCNC: 1.41 MG/DL (ref 0.6–1.3)
EOSINOPHIL # BLD AUTO: 0.09 THOUSAND/ΜL (ref 0–0.61)
EOSINOPHIL NFR BLD AUTO: 1 % (ref 0–6)
ERYTHROCYTE [DISTWIDTH] IN BLOOD BY AUTOMATED COUNT: 17.5 % (ref 11.6–15.1)
GFR SERPL CREATININE-BSD FRML MDRD: 50 ML/MIN/1.73SQ M
GLUCOSE SERPL-MCNC: 131 MG/DL (ref 65–140)
GLUCOSE SERPL-MCNC: 131 MG/DL (ref 65–140)
GLUCOSE SERPL-MCNC: 241 MG/DL (ref 65–140)
GLUCOSE SERPL-MCNC: 80 MG/DL (ref 65–140)
GLUCOSE SERPL-MCNC: 81 MG/DL (ref 65–140)
HCT VFR BLD AUTO: 38 % (ref 36.5–49.3)
HGB BLD-MCNC: 11.5 G/DL (ref 12–17)
IMM GRANULOCYTES # BLD AUTO: 0.14 THOUSAND/UL (ref 0–0.2)
IMM GRANULOCYTES NFR BLD AUTO: 2 % (ref 0–2)
LYMPHOCYTES # BLD AUTO: 0.64 THOUSANDS/ΜL (ref 0.6–4.47)
LYMPHOCYTES NFR BLD AUTO: 7 % (ref 14–44)
MCH RBC QN AUTO: 23.9 PG (ref 26.8–34.3)
MCHC RBC AUTO-ENTMCNC: 30.3 G/DL (ref 31.4–37.4)
MCV RBC AUTO: 79 FL (ref 82–98)
MONOCYTES # BLD AUTO: 0.59 THOUSAND/ΜL (ref 0.17–1.22)
MONOCYTES NFR BLD AUTO: 6 % (ref 4–12)
NEUTROPHILS # BLD AUTO: 8.04 THOUSANDS/ΜL (ref 1.85–7.62)
NEUTS SEG NFR BLD AUTO: 84 % (ref 43–75)
NRBC BLD AUTO-RTO: 0 /100 WBCS
PLATELET # BLD AUTO: 296 THOUSANDS/UL (ref 149–390)
PMV BLD AUTO: 10.4 FL (ref 8.9–12.7)
POTASSIUM SERPL-SCNC: 4 MMOL/L (ref 3.5–5.3)
PROT SERPL-MCNC: 5.5 G/DL (ref 6.4–8.2)
RBC # BLD AUTO: 4.81 MILLION/UL (ref 3.88–5.62)
SODIUM SERPL-SCNC: 141 MMOL/L (ref 136–145)
WBC # BLD AUTO: 9.53 THOUSAND/UL (ref 4.31–10.16)

## 2022-01-10 PROCEDURE — 82948 REAGENT STRIP/BLOOD GLUCOSE: CPT

## 2022-01-10 PROCEDURE — 99232 SBSQ HOSP IP/OBS MODERATE 35: CPT | Performed by: INTERNAL MEDICINE

## 2022-01-10 PROCEDURE — 94760 N-INVAS EAR/PLS OXIMETRY 1: CPT

## 2022-01-10 PROCEDURE — 94660 CPAP INITIATION&MGMT: CPT

## 2022-01-10 PROCEDURE — 85025 COMPLETE CBC W/AUTO DIFF WBC: CPT | Performed by: HOSPITALIST

## 2022-01-10 PROCEDURE — 80053 COMPREHEN METABOLIC PANEL: CPT | Performed by: HOSPITALIST

## 2022-01-10 RX ADMIN — BUDESONIDE AND FORMOTEROL FUMARATE DIHYDRATE 2 PUFF: 160; 4.5 AEROSOL RESPIRATORY (INHALATION) at 17:02

## 2022-01-10 RX ADMIN — ENOXAPARIN SODIUM 30 MG: 100 INJECTION SUBCUTANEOUS at 08:28

## 2022-01-10 RX ADMIN — CHOLECALCIFEROL (VITAMIN D3) 10 MCG (400 UNIT) TABLET 400 UNITS: at 08:29

## 2022-01-10 RX ADMIN — OXYCODONE HYDROCHLORIDE AND ACETAMINOPHEN 1000 MG: 500 TABLET ORAL at 08:28

## 2022-01-10 RX ADMIN — SULFASALAZINE 500 MG: 500 TABLET ORAL at 08:33

## 2022-01-10 RX ADMIN — SULFASALAZINE 500 MG: 500 TABLET ORAL at 12:43

## 2022-01-10 RX ADMIN — ALLOPURINOL 200 MG: 100 TABLET ORAL at 08:29

## 2022-01-10 RX ADMIN — B-COMPLEX W/ C & FOLIC ACID TAB 1 TABLET: TAB at 08:28

## 2022-01-10 RX ADMIN — INSULIN LISPRO 2 UNITS: 100 INJECTION, SOLUTION INTRAVENOUS; SUBCUTANEOUS at 17:01

## 2022-01-10 RX ADMIN — INSULIN LISPRO 3 UNITS: 100 INJECTION, SOLUTION INTRAVENOUS; SUBCUTANEOUS at 17:01

## 2022-01-10 RX ADMIN — BARICITINIB 2 MG: 2 TABLET, FILM COATED ORAL at 12:42

## 2022-01-10 RX ADMIN — DEXAMETHASONE SODIUM PHOSPHATE 10 MG: 4 INJECTION, SOLUTION INTRA-ARTICULAR; INTRALESIONAL; INTRAMUSCULAR; INTRAVENOUS; SOFT TISSUE at 08:29

## 2022-01-10 RX ADMIN — INSULIN GLARGINE 12 UNITS: 100 INJECTION, SOLUTION SUBCUTANEOUS at 08:27

## 2022-01-10 RX ADMIN — SULFASALAZINE 500 MG: 500 TABLET ORAL at 21:41

## 2022-01-10 RX ADMIN — CLOPIDOGREL BISULFATE 75 MG: 75 TABLET ORAL at 08:28

## 2022-01-10 RX ADMIN — ZINC SULFATE 220 MG (50 MG) CAPSULE 220 MG: CAPSULE at 08:28

## 2022-01-10 RX ADMIN — INSULIN LISPRO 3 UNITS: 100 INJECTION, SOLUTION INTRAVENOUS; SUBCUTANEOUS at 12:43

## 2022-01-10 RX ADMIN — SULFASALAZINE 500 MG: 500 TABLET ORAL at 17:00

## 2022-01-10 RX ADMIN — ATORVASTATIN CALCIUM 40 MG: 40 TABLET, FILM COATED ORAL at 15:13

## 2022-01-10 RX ADMIN — BUDESONIDE AND FORMOTEROL FUMARATE DIHYDRATE 2 PUFF: 160; 4.5 AEROSOL RESPIRATORY (INHALATION) at 08:29

## 2022-01-10 RX ADMIN — ENOXAPARIN SODIUM 30 MG: 100 INJECTION SUBCUTANEOUS at 21:40

## 2022-01-10 NOTE — PLAN OF CARE
Problem: PAIN - ADULT  Goal: Verbalizes/displays adequate comfort level or baseline comfort level  Description: Interventions:  - Encourage patient to monitor pain and request assistance  - Assess pain using appropriate pain scale  - Administer analgesics based on type and severity of pain and evaluate response  - Implement non-pharmacological measures as appropriate and evaluate response  - Consider cultural and social influences on pain and pain management  - Notify physician/advanced practitioner if interventions unsuccessful or patient reports new pain  Outcome: Progressing     Problem: INFECTION - ADULT  Goal: Absence or prevention of progression during hospitalization  Description: INTERVENTIONS:  - Assess and monitor for signs and symptoms of infection  - Monitor lab/diagnostic results  - Monitor all insertion sites, i e  indwelling lines, tubes, and drains  - Monitor endotracheal if appropriate and nasal secretions for changes in amount and color  - [place] appropriate cooling/warming therapies per order  - Administer medications as ordered  - Instruct and encourage patient and family to use good hand hygiene technique  - Identify and instruct in appropriate isolation precautions for identified infection/condition  Outcome: Progressing  Goal: Absence of fever/infection during neutropenic period  Description: INTERVENTIONS:  - Monitor WBC    Outcome: Progressing     Problem: SAFETY ADULT  Goal: Patient will remain free of falls  Description: INTERVENTIONS:  - Educate patient/family on patient safety including physical limitations  - Instruct patient to call for assistance with activity   - Consult OT/PT to assist with strengthening/mobility   - Keep Call bell within reach  - Keep bed low and locked with side rails adjusted as appropriate  - Keep care items and personal belongings within reach  - Initiate and maintain comfort rounds  - Make Fall Risk Sign visible to staff  - Offer Toileting every 2 Hours, in advance of need  - Initiate/Maintain bed alarm  - Obtain necessary fall risk management equipment:   - Apply yellow socks and bracelet for high fall risk patients  - Consider moving patient to room near nurses station  Outcome: Progressing  Goal: Maintain or return to baseline ADL function  Description: INTERVENTIONS:  -  Assess patient's ability to carry out ADLs; assess patient's baseline for ADL function and identify physical deficits which impact ability to perform ADLs (bathing, care of mouth/teeth, toileting, grooming, dressing, etc )  - Assess/evaluate cause of self-care deficits   - Assess range of motion  - Assess patient's mobility; develop plan if impaired  - Assess patient's need for assistive devices and provide as appropriate  - Encourage maximum independence but intervene and supervise when necessary  - Involve family in performance of ADLs  - Assess for home care needs following discharge   - Consider OT consult to assist with ADL evaluation and planning for discharge  - Provide patient education as appropriate  Outcome: Progressing  Goal: Maintains/Returns to pre admission functional level  Description: INTERVENTIONS:  - Perform BMAT or MOVE assessment daily    - Set and communicate daily mobility goal to care team and patient/family/caregiver  - Collaborate with rehabilitation services on mobility goals if consulted  - Perform Range of Motion 2 times a day  - Reposition patient every 2 hours    - Dangle patient 2 times a day  - Stand patient 2 times a day  - Ambulate patient 2 times a day  - Out of bed to chair 2 times a day   - Out of bed for meals 2 times a day  - Out of bed for toileting  - Record patient progress and toleration of activity level   Outcome: Progressing     Problem: DISCHARGE PLANNING  Goal: Discharge to home or other facility with appropriate resources  Description: INTERVENTIONS:  - Identify barriers to discharge w/patient and caregiver  - Arrange for needed discharge resources and transportation as appropriate  - Identify discharge learning needs (meds, wound care, etc )  - Arrange for interpretive services to assist at discharge as needed  - Refer to Case Management Department for coordinating discharge planning if the patient needs post-hospital services based on physician/advanced practitioner order or complex needs related to functional status, cognitive ability, or social support system  Outcome: Progressing     Problem: Knowledge Deficit  Goal: Patient/family/caregiver demonstrates understanding of disease process, treatment plan, medications, and discharge instructions  Description: Complete learning assessment and assess knowledge base    Interventions:  - Provide teaching at level of understanding  - Provide teaching via preferred learning methods  Outcome: Progressing     Problem: Potential for Falls  Goal: Patient will remain free of falls  Description: INTERVENTIONS:  - Educate patient/family on patient safety including physical limitations  - Instruct patient to call for assistance with activity   - Consult OT/PT to assist with strengthening/mobility   - Keep Call bell within reach  - Keep bed low and locked with side rails adjusted as appropriate  - Keep care items and personal belongings within reach  - Initiate and maintain comfort rounds  - Make Fall Risk Sign visible to staff  - Offer Toileting every 2 Hours, in advance of need  - Initiate/Maintain bed alarm  - Obtain necessary fall risk management equipment:   - Apply yellow socks and bracelet for high fall risk patients  - Consider moving patient to room near nurses station  Outcome: Progressing     Problem: MOBILITY - ADULT  Goal: Maintain or return to baseline ADL function  Description: INTERVENTIONS:  -  Assess patient's ability to carry out ADLs; assess patient's baseline for ADL function and identify physical deficits which impact ability to perform ADLs (bathing, care of mouth/teeth, toileting, grooming, dressing, etc )  - Assess/evaluate cause of self-care deficits   - Assess range of motion  - Assess patient's mobility; develop plan if impaired  - Assess patient's need for assistive devices and provide as appropriate  - Encourage maximum independence but intervene and supervise when necessary  - Involve family in performance of ADLs  - Assess for home care needs following discharge   - Consider OT consult to assist with ADL evaluation and planning for discharge  - Provide patient education as appropriate  Outcome: Progressing  Goal: Maintains/Returns to pre admission functional level  Description: INTERVENTIONS:  - Perform BMAT or MOVE assessment daily    - Set and communicate daily mobility goal to care team and patient/family/caregiver  - Collaborate with rehabilitation services on mobility goals if consulted  - Perform Range of Motion 2 times a day  - Reposition patient every 2 hours    - Dangle patient 2 times a day  - Stand patient 2 times a day  - Ambulate patient 2 times a day  - Out of bed to chair 2 times a day   - Out of bed for meals 2 times a day  - Out of bed for toileting  - Record patient progress and toleration of activity level   Outcome: Progressing     Problem: Prexisting or High Potential for Compromised Skin Integrity  Goal: Skin integrity is maintained or improved  Description: INTERVENTIONS:  - Identify patients at risk for skin breakdown  - Assess and monitor skin integrity  - Assess and monitor nutrition and hydration status  - Monitor labs   - Assess for incontinence   - Turn and reposition patient  - Assist with mobility/ambulation  - Relieve pressure over bony prominences  - Avoid friction and shearing  - Provide appropriate hygiene as needed including keeping skin clean and dry  - Evaluate need for skin moisturizer/barrier cream  - Collaborate with interdisciplinary team   - Patient/family teaching  - Consider wound care consult   Outcome: Progressing     Problem: Nutrition/Hydration-ADULT  Goal: Nutrient/Hydration intake appropriate for improving, restoring or maintaining nutritional needs  Description: Monitor and assess patient's nutrition/hydration status for malnutrition  Collaborate with interdisciplinary team and initiate plan and interventions as ordered  Monitor patient's weight and dietary intake as ordered or per policy  Utilize nutrition screening tool and intervene as necessary  Determine patient's food preferences and provide high-protein, high-caloric foods as appropriate       INTERVENTIONS:  - Monitor oral intake, urinary output, labs, and treatment plans  - Assess nutrition and hydration status and recommend course of action  - Evaluate amount of meals eaten  - Assist patient with eating if necessary   - Allow adequate time for meals  - Recommend/ encourage appropriate diets, oral nutritional supplements, and vitamin/mineral supplements  - Order, calculate, and assess calorie counts as needed  - Recommend, monitor, and adjust tube feedings and TPN/PPN based on assessed needs  - Assess need for intravenous fluids  - Provide specific nutrition/hydration education as appropriate  - Include patient/family/caregiver in decisions related to nutrition  Outcome: Progressing

## 2022-01-10 NOTE — PROGRESS NOTES
Progress Note - Pulmonary   Dorathy Liz 80 y o  male MRN: [id number]  Unit/Bed#: -01 Encounter: [id number]    Assessment & Plan:  Acute hypoxic respiratory failure  COVID-19 pneumonia with possible superimposed bacterial infection  [place]    · O2 requirements slowly improving  I was able to titrate the patient down to 10 L today   Continue to monitor SpO2 and titrate as able to maintain saturations greater than 89%  · Continue treatment per moderate COVID-19 pathway  · Atorvastatin  · Dexamethasone - completed weight based dosing  Currently on Decadron 10 mg daily, continue to taper steroids  Could decreased by 2 mg q 3 days  · Remdesivir x5 days completed  · Baricitinib day 12/14  · Anticoagulation:  Lovenox prophylaxis (D-dimer < 2)  · Antibiotics: cefepime completed 8 days  · Encourage self-proning, activity as tolerated, incentive spirometry  · Nephrology following  Receiving IV Lasix intermittently    Pulmonary to follow intermittently/PRN for remainder of admission    Subjective:   Patient says that he is feeling a little better every day  Objective:     Vitals: Blood pressure 152/86, pulse 75, temperature 97 8 °F (36 6 °C), resp  rate 13, weight 90 1 kg (198 lb 10 2 oz), SpO2 (!) 89 %  ,Body mass index is 29 33 kg/m²  Intake/Output Summary (Last 24 hours) at 1/10/2022 0915  Last data filed at 1/10/2022 0844  Gross per 24 hour   Intake --   Output 200 ml   Net -200 ml       Invasive Devices  Report    Peripheral Intravenous Line            Peripheral IV 01/08/22 Left Arm 1 day                Physical Exam:   General appearance: Alert and oriented, in no acute distress  Head: Normocephalic, without obvious abnormality, atraumatic  Eyes: EOMI  No discharge bilaterally  No scleral icterus     Neck: Supple, symmetrical, trachea midline  Lungs:  Decreased breath sounds  Heart: Regular rate and rhythm, S1, S2 normal, no murmur  Abdomen:  No appreciable distension or tenderness  Extremities:  No edema or tenderness  Skin: Warm and dry  Neurologic: No acute focal deficits are noted    Labs: I have personally reviewed pertinent lab results  Imaging and other studies: I have personally reviewed pertinent reports

## 2022-01-11 LAB
ALBUMIN SERPL BCP-MCNC: 1.9 G/DL (ref 3.5–5)
ALP SERPL-CCNC: 85 U/L (ref 46–116)
ALT SERPL W P-5'-P-CCNC: 117 U/L (ref 12–78)
ANION GAP SERPL CALCULATED.3IONS-SCNC: 7 MMOL/L (ref 4–13)
AST SERPL W P-5'-P-CCNC: 67 U/L (ref 5–45)
BASOPHILS # BLD AUTO: 0.03 THOUSANDS/ΜL (ref 0–0.1)
BASOPHILS NFR BLD AUTO: 0 % (ref 0–1)
BILIRUB SERPL-MCNC: 0.3 MG/DL (ref 0.2–1)
BUN SERPL-MCNC: 32 MG/DL (ref 5–25)
CALCIUM ALBUM COR SERPL-MCNC: 9.9 MG/DL (ref 8.3–10.1)
CALCIUM SERPL-MCNC: 8.2 MG/DL (ref 8.3–10.1)
CHLORIDE SERPL-SCNC: 109 MMOL/L (ref 100–108)
CO2 SERPL-SCNC: 25 MMOL/L (ref 21–32)
CREAT SERPL-MCNC: 1.36 MG/DL (ref 0.6–1.3)
EOSINOPHIL # BLD AUTO: 0.08 THOUSAND/ΜL (ref 0–0.61)
EOSINOPHIL NFR BLD AUTO: 1 % (ref 0–6)
ERYTHROCYTE [DISTWIDTH] IN BLOOD BY AUTOMATED COUNT: 17.7 % (ref 11.6–15.1)
GFR SERPL CREATININE-BSD FRML MDRD: 52 ML/MIN/1.73SQ M
GLUCOSE SERPL-MCNC: 122 MG/DL (ref 65–140)
GLUCOSE SERPL-MCNC: 151 MG/DL (ref 65–140)
GLUCOSE SERPL-MCNC: 187 MG/DL (ref 65–140)
GLUCOSE SERPL-MCNC: 86 MG/DL (ref 65–140)
GLUCOSE SERPL-MCNC: 96 MG/DL (ref 65–140)
HCT VFR BLD AUTO: 38.2 % (ref 36.5–49.3)
HGB BLD-MCNC: 11.7 G/DL (ref 12–17)
IMM GRANULOCYTES # BLD AUTO: 0.08 THOUSAND/UL (ref 0–0.2)
IMM GRANULOCYTES NFR BLD AUTO: 1 % (ref 0–2)
LYMPHOCYTES # BLD AUTO: 0.61 THOUSANDS/ΜL (ref 0.6–4.47)
LYMPHOCYTES NFR BLD AUTO: 7 % (ref 14–44)
MCH RBC QN AUTO: 24.1 PG (ref 26.8–34.3)
MCHC RBC AUTO-ENTMCNC: 30.6 G/DL (ref 31.4–37.4)
MCV RBC AUTO: 79 FL (ref 82–98)
MONOCYTES # BLD AUTO: 0.68 THOUSAND/ΜL (ref 0.17–1.22)
MONOCYTES NFR BLD AUTO: 8 % (ref 4–12)
NEUTROPHILS # BLD AUTO: 7.37 THOUSANDS/ΜL (ref 1.85–7.62)
NEUTS SEG NFR BLD AUTO: 83 % (ref 43–75)
NRBC BLD AUTO-RTO: 0 /100 WBCS
PLATELET # BLD AUTO: 276 THOUSANDS/UL (ref 149–390)
PMV BLD AUTO: 10.4 FL (ref 8.9–12.7)
POTASSIUM SERPL-SCNC: 3.8 MMOL/L (ref 3.5–5.3)
PROT SERPL-MCNC: 5.4 G/DL (ref 6.4–8.2)
RBC # BLD AUTO: 4.85 MILLION/UL (ref 3.88–5.62)
SODIUM SERPL-SCNC: 141 MMOL/L (ref 136–145)
WBC # BLD AUTO: 8.85 THOUSAND/UL (ref 4.31–10.16)

## 2022-01-11 PROCEDURE — 94760 N-INVAS EAR/PLS OXIMETRY 1: CPT

## 2022-01-11 PROCEDURE — 94660 CPAP INITIATION&MGMT: CPT

## 2022-01-11 PROCEDURE — 80053 COMPREHEN METABOLIC PANEL: CPT | Performed by: HOSPITALIST

## 2022-01-11 PROCEDURE — 99232 SBSQ HOSP IP/OBS MODERATE 35: CPT | Performed by: INTERNAL MEDICINE

## 2022-01-11 PROCEDURE — 82948 REAGENT STRIP/BLOOD GLUCOSE: CPT

## 2022-01-11 PROCEDURE — 85025 COMPLETE CBC W/AUTO DIFF WBC: CPT | Performed by: HOSPITALIST

## 2022-01-11 RX ORDER — ECHINACEA PURPUREA EXTRACT 125 MG
1 TABLET ORAL
Status: DISCONTINUED | OUTPATIENT
Start: 2022-01-11 | End: 2022-01-14 | Stop reason: HOSPADM

## 2022-01-11 RX ADMIN — ENOXAPARIN SODIUM 30 MG: 100 INJECTION SUBCUTANEOUS at 09:41

## 2022-01-11 RX ADMIN — INSULIN LISPRO 1 UNITS: 100 INJECTION, SOLUTION INTRAVENOUS; SUBCUTANEOUS at 12:20

## 2022-01-11 RX ADMIN — B-COMPLEX W/ C & FOLIC ACID TAB 1 TABLET: TAB at 09:42

## 2022-01-11 RX ADMIN — CLOPIDOGREL BISULFATE 75 MG: 75 TABLET ORAL at 09:42

## 2022-01-11 RX ADMIN — INSULIN LISPRO 3 UNITS: 100 INJECTION, SOLUTION INTRAVENOUS; SUBCUTANEOUS at 12:16

## 2022-01-11 RX ADMIN — CHOLECALCIFEROL (VITAMIN D3) 10 MCG (400 UNIT) TABLET 400 UNITS: at 09:42

## 2022-01-11 RX ADMIN — BUDESONIDE AND FORMOTEROL FUMARATE DIHYDRATE 2 PUFF: 160; 4.5 AEROSOL RESPIRATORY (INHALATION) at 17:27

## 2022-01-11 RX ADMIN — BUDESONIDE AND FORMOTEROL FUMARATE DIHYDRATE 2 PUFF: 160; 4.5 AEROSOL RESPIRATORY (INHALATION) at 09:42

## 2022-01-11 RX ADMIN — INSULIN LISPRO 1 UNITS: 100 INJECTION, SOLUTION INTRAVENOUS; SUBCUTANEOUS at 17:27

## 2022-01-11 RX ADMIN — ENOXAPARIN SODIUM 30 MG: 100 INJECTION SUBCUTANEOUS at 22:11

## 2022-01-11 RX ADMIN — ZINC SULFATE 220 MG (50 MG) CAPSULE 220 MG: CAPSULE at 09:42

## 2022-01-11 RX ADMIN — DEXAMETHASONE SODIUM PHOSPHATE 10 MG: 4 INJECTION, SOLUTION INTRA-ARTICULAR; INTRALESIONAL; INTRAMUSCULAR; INTRAVENOUS; SOFT TISSUE at 09:41

## 2022-01-11 RX ADMIN — ALLOPURINOL 200 MG: 100 TABLET ORAL at 09:42

## 2022-01-11 RX ADMIN — INSULIN LISPRO 3 UNITS: 100 INJECTION, SOLUTION INTRAVENOUS; SUBCUTANEOUS at 17:26

## 2022-01-11 RX ADMIN — SULFASALAZINE 500 MG: 500 TABLET ORAL at 12:16

## 2022-01-11 RX ADMIN — OXYCODONE HYDROCHLORIDE AND ACETAMINOPHEN 1000 MG: 500 TABLET ORAL at 09:41

## 2022-01-11 RX ADMIN — SULFASALAZINE 500 MG: 500 TABLET ORAL at 22:11

## 2022-01-11 RX ADMIN — BARICITINIB 2 MG: 2 TABLET, FILM COATED ORAL at 12:20

## 2022-01-11 RX ADMIN — INSULIN GLARGINE 12 UNITS: 100 INJECTION, SOLUTION SUBCUTANEOUS at 09:41

## 2022-01-11 RX ADMIN — SALINE NASAL SPRAY 1 SPRAY: 1.5 SOLUTION NASAL at 12:16

## 2022-01-11 RX ADMIN — SULFASALAZINE 500 MG: 500 TABLET ORAL at 17:27

## 2022-01-11 RX ADMIN — SULFASALAZINE 500 MG: 500 TABLET ORAL at 09:42

## 2022-01-11 RX ADMIN — ATORVASTATIN CALCIUM 40 MG: 40 TABLET, FILM COATED ORAL at 17:26

## 2022-01-11 NOTE — PLAN OF CARE
Problem: PAIN - ADULT  Goal: Verbalizes/displays adequate comfort level or baseline comfort level  Description: Interventions:  - Encourage patient to monitor pain and request assistance  - Assess pain using appropriate pain scale  - Administer analgesics based on type and severity of pain and evaluate response  - Implement non-pharmacological measures as appropriate and evaluate response  - Consider cultural and social influences on pain and pain management  - Notify physician/advanced practitioner if interventions unsuccessful or patient reports new pain  Outcome: Progressing     Problem: INFECTION - ADULT  Goal: Absence or prevention of progression during hospitalization  Description: INTERVENTIONS:  - Assess and monitor for signs and symptoms of infection  - Monitor lab/diagnostic results  - Monitor all insertion sites, i e  indwelling lines, tubes, and drains  - Monitor endotracheal if appropriate and nasal secretions for changes in amount and color  - [place] appropriate cooling/warming therapies per order  - Administer medications as ordered  - Instruct and encourage patient and family to use good hand hygiene technique  - Identify and instruct in appropriate isolation precautions for identified infection/condition  Outcome: Progressing  Goal: Absence of fever/infection during neutropenic period  Description: INTERVENTIONS:  - Monitor WBC    Outcome: Progressing     Problem: SAFETY ADULT  Goal: Patient will remain free of falls  Description: INTERVENTIONS:  - Educate patient/family on patient safety including physical limitations  - Instruct patient to call for assistance with activity   - Consult OT/PT to assist with strengthening/mobility   - Keep Call bell within reach  - Keep bed low and locked with side rails adjusted as appropriate  - Keep care items and personal belongings within reach  - Initiate and maintain comfort rounds  - Make Fall Risk Sign visible to staff  - Offer Toileting every 2 Hours, in advance of need  - Initiate/Maintain bed alarm  - Obtain necessary fall risk management equipment:   - Apply yellow socks and bracelet for high fall risk patients  - Consider moving patient to room near nurses station  Outcome: Progressing  Goal: Maintain or return to baseline ADL function  Description: INTERVENTIONS:  -  Assess patient's ability to carry out ADLs; assess patient's baseline for ADL function and identify physical deficits which impact ability to perform ADLs (bathing, care of mouth/teeth, toileting, grooming, dressing, etc )  - Assess/evaluate cause of self-care deficits   - Assess range of motion  - Assess patient's mobility; develop plan if impaired  - Assess patient's need for assistive devices and provide as appropriate  - Encourage maximum independence but intervene and supervise when necessary  - Involve family in performance of ADLs  - Assess for home care needs following discharge   - Consider OT consult to assist with ADL evaluation and planning for discharge  - Provide patient education as appropriate  Outcome: Progressing  Goal: Maintains/Returns to pre admission functional level  Description: INTERVENTIONS:  - Perform BMAT or MOVE assessment daily    - Set and communicate daily mobility goal to care team and patient/family/caregiver  - Collaborate with rehabilitation services on mobility goals if consulted  - Perform Range of Motion 2 times a day  - Reposition patient every 2 hours    - Dangle patient 2 times a day  - Stand patient 2 times a day  - Ambulate patient 2 times a day  - Out of bed to chair 2 times a day   - Out of bed for meals 2 times a day  - Out of bed for toileting  - Record patient progress and toleration of activity level   Outcome: Progressing     Problem: DISCHARGE PLANNING  Goal: Discharge to home or other facility with appropriate resources  Description: INTERVENTIONS:  - Identify barriers to discharge w/patient and caregiver  - Arrange for needed discharge resources and transportation as appropriate  - Identify discharge learning needs (meds, wound care, etc )  - Arrange for interpretive services to assist at discharge as needed  - Refer to Case Management Department for coordinating discharge planning if the patient needs post-hospital services based on physician/advanced practitioner order or complex needs related to functional status, cognitive ability, or social support system  Outcome: Progressing     Problem: Knowledge Deficit  Goal: Patient/family/caregiver demonstrates understanding of disease process, treatment plan, medications, and discharge instructions  Description: Complete learning assessment and assess knowledge base    Interventions:  - Provide teaching at level of understanding  - Provide teaching via preferred learning methods  Outcome: Progressing     Problem: Potential for Falls  Goal: Patient will remain free of falls  Description: INTERVENTIONS:  - Educate patient/family on patient safety including physical limitations  - Instruct patient to call for assistance with activity   - Consult OT/PT to assist with strengthening/mobility   - Keep Call bell within reach  - Keep bed low and locked with side rails adjusted as appropriate  - Keep care items and personal belongings within reach  - Initiate and maintain comfort rounds  - Make Fall Risk Sign visible to staff  - Offer Toileting every 2 Hours, in advance of need  - Initiate/Maintain bed alarm  - Obtain necessary fall risk management equipment:   - Apply yellow socks and bracelet for high fall risk patients  - Consider moving patient to room near nurses station  Outcome: Progressing     Problem: MOBILITY - ADULT  Goal: Maintain or return to baseline ADL function  Description: INTERVENTIONS:  -  Assess patient's ability to carry out ADLs; assess patient's baseline for ADL function and identify physical deficits which impact ability to perform ADLs (bathing, care of mouth/teeth, toileting, grooming, dressing, etc )  - Assess/evaluate cause of self-care deficits   - Assess range of motion  - Assess patient's mobility; develop plan if impaired  - Assess patient's need for assistive devices and provide as appropriate  - Encourage maximum independence but intervene and supervise when necessary  - Involve family in performance of ADLs  - Assess for home care needs following discharge   - Consider OT consult to assist with ADL evaluation and planning for discharge  - Provide patient education as appropriate  Outcome: Progressing  Goal: Maintains/Returns to pre admission functional level  Description: INTERVENTIONS:  - Perform BMAT or MOVE assessment daily    - Set and communicate daily mobility goal to care team and patient/family/caregiver  - Collaborate with rehabilitation services on mobility goals if consulted  - Perform Range of Motion 2 times a day  - Reposition patient every 2 hours    - Dangle patient 2 times a day  - Stand patient 2 times a day  - Ambulate patient 2 times a day  - Out of bed to chair 2 times a day   - Out of bed for meals 2 times a day  - Out of bed for toileting  - Record patient progress and toleration of activity level   Outcome: Progressing     Problem: Prexisting or High Potential for Compromised Skin Integrity  Goal: Skin integrity is maintained or improved  Description: INTERVENTIONS:  - Identify patients at risk for skin breakdown  - Assess and monitor skin integrity  - Assess and monitor nutrition and hydration status  - Monitor labs   - Assess for incontinence   - Turn and reposition patient  - Assist with mobility/ambulation  - Relieve pressure over bony prominences  - Avoid friction and shearing  - Provide appropriate hygiene as needed including keeping skin clean and dry  - Evaluate need for skin moisturizer/barrier cream  - Collaborate with interdisciplinary team   - Patient/family teaching  - Consider wound care consult   Outcome: Progressing     Problem: Nutrition/Hydration-ADULT  Goal: Nutrient/Hydration intake appropriate for improving, restoring or maintaining nutritional needs  Description: Monitor and assess patient's nutrition/hydration status for malnutrition  Collaborate with interdisciplinary team and initiate plan and interventions as ordered  Monitor patient's weight and dietary intake as ordered or per policy  Utilize nutrition screening tool and intervene as necessary  Determine patient's food preferences and provide high-protein, high-caloric foods as appropriate       INTERVENTIONS:  - Monitor oral intake, urinary output, labs, and treatment plans  - Assess nutrition and hydration status and recommend course of action  - Evaluate amount of meals eaten  - Assist patient with eating if necessary   - Allow adequate time for meals  - Recommend/ encourage appropriate diets, oral nutritional supplements, and vitamin/mineral supplements  - Order, calculate, and assess calorie counts as needed  - Recommend, monitor, and adjust tube feedings and TPN/PPN based on assessed needs  - Assess need for intravenous fluids  - Provide specific nutrition/hydration education as appropriate  - Include patient/family/caregiver in decisions related to nutrition  Outcome: Progressing

## 2022-01-11 NOTE — PROGRESS NOTES
Progress Note - Pulmonary   Malinda Enrique 80 y o  male MRN: [id number]  Unit/Bed#: -01 Encounter: [id number]    Assessment & Plan:  Acute hypoxic respiratory failure  COVID-19 pneumonia with possible superimposed bacterial infection  [place]    · O2 requirements slowly improving  Now down to 8 L   Continue to monitor SpO2 and titrate as able to maintain saturations greater than 89%  · Continue treatment per moderate COVID-19 pathway  · Atorvastatin  · Dexamethasone - completed weight based dosing  Currently on Decadron 10 mg daily, continue to taper steroids  Can taper steroids by 2 mg q 3 days  · Remdesivir x5 days completed  · Baricitinib day 13/14  · Anticoagulation:  Lovenox prophylaxis (D-dimer < 2)  · Antibiotics: cefepime completed 8 days  · Encourage self-proning, activity as tolerated, incentive spirometry  · Nephrology following     Pulmonary to follow PRN for remainder of admission  Please call with questions  Subjective:   Patient feels better today  No new complaints  Some nasal congesiton  Objective:     Vitals: Blood pressure (!) 159/103, pulse 88, temperature (!) 97 3 °F (36 3 °C), resp  rate 20, weight 90 kg (198 lb 6 6 oz), SpO2 90 %  ,Body mass index is 29 3 kg/m²  Intake/Output Summary (Last 24 hours) at 1/11/2022 1030  Last data filed at 1/11/2022 5731  Gross per 24 hour   Intake --   Output 925 ml   Net -925 ml       Invasive Devices  Report    Peripheral Intravenous Line            Peripheral IV 01/08/22 Left Arm 2 days                Physical Exam:   General appearance: Alert and oriented, in no acute distress  Head: Normocephalic, without obvious abnormality, atraumatic  Eyes: EOMI  No discharge bilaterally  No scleral icterus     Neck: Supple, symmetrical, trachea midline  Lungs: Decreased breath sounds  Heart: Regular rate and rhythm, S1, S2 normal, no murmur  Abdomen:  No appreciable distension or tenderness  Extremities: no edema or tenderness  Skin: Warm and dry  Neurologic: No acute focal deficits are noted    Labs: I have personally reviewed pertinent lab results  Imaging and other studies: I have personally reviewed pertinent reports

## 2022-01-11 NOTE — PLAN OF CARE
Problem: PAIN - ADULT  Goal: Verbalizes/displays adequate comfort level or baseline comfort level  Description: Interventions:  - Encourage patient to monitor pain and request assistance  - Assess pain using appropriate pain scale  - Administer analgesics based on type and severity of pain and evaluate response  - Implement non-pharmacological measures as appropriate and evaluate response  - Consider cultural and social influences on pain and pain management  - Notify physician/advanced practitioner if interventions unsuccessful or patient reports new pain  Outcome: Progressing     Problem: INFECTION - ADULT  Goal: Absence or prevention of progression during hospitalization  Description: INTERVENTIONS:  - Assess and monitor for signs and symptoms of infection  - Monitor lab/diagnostic results  - Monitor all insertion sites, i e  indwelling lines, tubes, and drains  - Monitor endotracheal if appropriate and nasal secretions for changes in amount and color  - [place] appropriate cooling/warming therapies per order  - Administer medications as ordered  - Instruct and encourage patient and family to use good hand hygiene technique  - Identify and instruct in appropriate isolation precautions for identified infection/condition  Outcome: Progressing     Problem: INFECTION - ADULT  Goal: Absence of fever/infection during neutropenic period  Description: INTERVENTIONS:  - Monitor WBC    Outcome: Progressing     Problem: SAFETY ADULT  Goal: Patient will remain free of falls  Description: INTERVENTIONS:  - Educate patient/family on patient safety including physical limitations  - Instruct patient to call for assistance with activity   - Consult OT/PT to assist with strengthening/mobility   - Keep Call bell within reach  - Keep bed low and locked with side rails adjusted as appropriate  - Keep care items and personal belongings within reach  - Initiate and maintain comfort rounds  - Make Fall Risk Sign visible to staff  - Offer Toileting every 2 Hours, in advance of need  - Initiate/Maintain bed alarm  - Obtain necessary fall risk management equipment:   - Apply yellow socks and bracelet for high fall risk patients  - Consider moving patient to room near nurses station  Outcome: Progressing  Goal: Maintain or return to baseline ADL function  Description: INTERVENTIONS:  -  Assess patient's ability to carry out ADLs; assess patient's baseline for ADL function and identify physical deficits which impact ability to perform ADLs (bathing, care of mouth/teeth, toileting, grooming, dressing, etc )  - Assess/evaluate cause of self-care deficits   - Assess range of motion  - Assess patient's mobility; develop plan if impaired  - Assess patient's need for assistive devices and provide as appropriate  - Encourage maximum independence but intervene and supervise when necessary  - Involve family in performance of ADLs  - Assess for home care needs following discharge   - Consider OT consult to assist with ADL evaluation and planning for discharge  - Provide patient education as appropriate  Outcome: Progressing  Goal: Maintains/Returns to pre admission functional level  Description: INTERVENTIONS:  - Perform BMAT or MOVE assessment daily    - Set and communicate daily mobility goal to care team and patient/family/caregiver  - Collaborate with rehabilitation services on mobility goals if consulted  - Perform Range of Motion 2 times a day  - Reposition patient every 2 hours    - Dangle patient 2 times a day  - Stand patient 2 times a day  - Ambulate patient 2 times a day  - Out of bed to chair 2 times a day   - Out of bed for meals 2 times a day  - Out of bed for toileting  - Record patient progress and toleration of activity level   Outcome: Progressing     Problem: DISCHARGE PLANNING  Goal: Discharge to home or other facility with appropriate resources  Description: INTERVENTIONS:  - Identify barriers to discharge w/patient and caregiver  - Arrange for needed discharge resources and transportation as appropriate  - Identify discharge learning needs (meds, wound care, etc )  - Arrange for interpretive services to assist at discharge as needed  - Refer to Case Management Department for coordinating discharge planning if the patient needs post-hospital services based on physician/advanced practitioner order or complex needs related to functional status, cognitive ability, or social support system  Outcome: Progressing     Problem: Knowledge Deficit  Goal: Patient/family/caregiver demonstrates understanding of disease process, treatment plan, medications, and discharge instructions  Description: Complete learning assessment and assess knowledge base    Interventions:  - Provide teaching at level of understanding  - Provide teaching via preferred learning methods  Outcome: Progressing     Problem: Potential for Falls  Goal: Patient will remain free of falls  Description: INTERVENTIONS:  - Educate patient/family on patient safety including physical limitations  - Instruct patient to call for assistance with activity   - Consult OT/PT to assist with strengthening/mobility   - Keep Call bell within reach  - Keep bed low and locked with side rails adjusted as appropriate  - Keep care items and personal belongings within reach  - Initiate and maintain comfort rounds  - Make Fall Risk Sign visible to staff  - Offer Toileting every 2 Hours, in advance of need  - Initiate/Maintain bed alarm  - Obtain necessary fall risk management equipment:   - Apply yellow socks and bracelet for high fall risk patients  - Consider moving patient to room near nurses station  Outcome: Progressing     Problem: MOBILITY - ADULT  Goal: Maintain or return to baseline ADL function  Description: INTERVENTIONS:  -  Assess patient's ability to carry out ADLs; assess patient's baseline for ADL function and identify physical deficits which impact ability to perform ADLs (bathing, care of mouth/teeth, toileting, grooming, dressing, etc )  - Assess/evaluate cause of self-care deficits   - Assess range of motion  - Assess patient's mobility; develop plan if impaired  - Assess patient's need for assistive devices and provide as appropriate  - Encourage maximum independence but intervene and supervise when necessary  - Involve family in performance of ADLs  - Assess for home care needs following discharge   - Consider OT consult to assist with ADL evaluation and planning for discharge  - Provide patient education as appropriate  Outcome: Progressing  Goal: Maintains/Returns to pre admission functional level  Description: INTERVENTIONS:  - Perform BMAT or MOVE assessment daily    - Set and communicate daily mobility goal to care team and patient/family/caregiver  - Collaborate with rehabilitation services on mobility goals if consulted  - Perform Range of Motion 2 times a day  - Reposition patient every 2 hours    - Dangle patient 2 times a day  - Stand patient 2 times a day  - Ambulate patient 2 times a day  - Out of bed to chair 2 times a day   - Out of bed for meals 2 times a day  - Out of bed for toileting  - Record patient progress and toleration of activity level   Outcome: Progressing     Problem: Prexisting or High Potential for Compromised Skin Integrity  Goal: Skin integrity is maintained or improved  Description: INTERVENTIONS:  - Identify patients at risk for skin breakdown  - Assess and monitor skin integrity  - Assess and monitor nutrition and hydration status  - Monitor labs   - Assess for incontinence   - Turn and reposition patient  - Assist with mobility/ambulation  - Relieve pressure over bony prominences  - Avoid friction and shearing  - Provide appropriate hygiene as needed including keeping skin clean and dry  - Evaluate need for skin moisturizer/barrier cream  - Collaborate with interdisciplinary team   - Patient/family teaching  - Consider wound care consult   Outcome: Progressing

## 2022-01-11 NOTE — CASE MANAGEMENT
Case Management Discharge Planning Note    Patient name Lisseth Prabhakar  Location /-62 MRN [id number]  : 1950 Date 2022       Current Admission Date: 2021  Current Admission Diagnosis:Pneumonia due to COVID-19 virus   Patient Active Problem List    Diagnosis Date Noted    Acute respiratory failure with hypoxia ([facility] ) 2021    Pneumonia due to COVID-19 virus 2021    Elevated hemoglobin ([facility] ) 2021    Bilateral carotid artery stenosis 2020    NSVT (nonsustained ventricular tachycardia) ([facility] ) 2020    Witnessed episode of apnea     CHADWICK (acute kidney injury) ([place] [address] ) 2020    Transient alteration of awareness 2020    History of stroke 2020    Erythrocytosis 2019    Ulcerative rectosigmoiditis without complication ([place] [address] )     Dyslipidemia 2018    Microalbuminuria 2016    Type 2 diabetes mellitus with stage 3a chronic kidney disease, without long-term current use of insulin ([place] [address] ) 2016    Obesity 10/24/2015    Colon, diverticulosis 2013    Elevated C-reactive protein 2013    Chronic kidney disease, stage 3 ([facility] ) 05/15/2012    Elevated PSA 05/15/2012    Essential hypertriglyceridemia 05/15/2012    Gout 05/15/2012    Primary hypertension 05/15/2012      LOS (days): 14  Geometric Mean LOS (GMLOS) (days): 5 40  Days to GMLOS:-8 7     OBJECTIVE:  Risk of Unplanned Readmission Score: 18         Current admission status: Inpatient   Preferred Pharmacy:   [facility] [phone]  [place] [address]  Phone: [phone] Fax: [phone]    Primary Care Provider: Garland Randolph DO    Primary Insurance: MEDICARE  Secondary Insurance: Thea Salas DETAILS:Continuing to follow patient  He resides with his wife in a ranch home and was cleared by Pt/OT omn 1/3/21  He uses a RW and SCP at home  He may need home O2

## 2022-01-11 NOTE — PROGRESS NOTES
Pastoral Care Progress Note    2022  Patient: Zeferino Ellison : 1950  Admission Date & Time: 2021 0454  MRN: [id number] CSN: [id number]                     Chaplaincy Interventions Utilized:   Relationship Building: Cultivated a relationship of care and support  Patient said that he is getting better  He talks to his wife everyday by phone  He was grateful for the visit and prayer     Ritual: Provided prayer     22 1500   Clinical Encounter Type   Visited With Patient   Routine Visit Follow-up   Sikhism Encounters   Sikhism Needs Prayer

## 2022-01-12 LAB
GLUCOSE SERPL-MCNC: 108 MG/DL (ref 65–140)
GLUCOSE SERPL-MCNC: 139 MG/DL (ref 65–140)
GLUCOSE SERPL-MCNC: 175 MG/DL (ref 65–140)
GLUCOSE SERPL-MCNC: 73 MG/DL (ref 65–140)

## 2022-01-12 PROCEDURE — 94003 VENT MGMT INPAT SUBQ DAY: CPT

## 2022-01-12 PROCEDURE — 99232 SBSQ HOSP IP/OBS MODERATE 35: CPT | Performed by: INTERNAL MEDICINE

## 2022-01-12 PROCEDURE — 82948 REAGENT STRIP/BLOOD GLUCOSE: CPT

## 2022-01-12 PROCEDURE — 94760 N-INVAS EAR/PLS OXIMETRY 1: CPT

## 2022-01-12 RX ORDER — AMLODIPINE BESYLATE 5 MG/1
5 TABLET ORAL DAILY
Status: DISCONTINUED | OUTPATIENT
Start: 2022-01-12 | End: 2022-01-14 | Stop reason: HOSPADM

## 2022-01-12 RX ADMIN — ENOXAPARIN SODIUM 30 MG: 100 INJECTION SUBCUTANEOUS at 09:05

## 2022-01-12 RX ADMIN — ATORVASTATIN CALCIUM 40 MG: 40 TABLET, FILM COATED ORAL at 17:41

## 2022-01-12 RX ADMIN — INSULIN LISPRO 3 UNITS: 100 INJECTION, SOLUTION INTRAVENOUS; SUBCUTANEOUS at 17:41

## 2022-01-12 RX ADMIN — INSULIN LISPRO 3 UNITS: 100 INJECTION, SOLUTION INTRAVENOUS; SUBCUTANEOUS at 09:04

## 2022-01-12 RX ADMIN — CHOLECALCIFEROL (VITAMIN D3) 10 MCG (400 UNIT) TABLET 400 UNITS: at 09:05

## 2022-01-12 RX ADMIN — SULFASALAZINE 500 MG: 500 TABLET ORAL at 09:03

## 2022-01-12 RX ADMIN — ZINC SULFATE 220 MG (50 MG) CAPSULE 220 MG: CAPSULE at 09:05

## 2022-01-12 RX ADMIN — BUDESONIDE AND FORMOTEROL FUMARATE DIHYDRATE 2 PUFF: 160; 4.5 AEROSOL RESPIRATORY (INHALATION) at 17:43

## 2022-01-12 RX ADMIN — ENOXAPARIN SODIUM 30 MG: 100 INJECTION SUBCUTANEOUS at 21:10

## 2022-01-12 RX ADMIN — ALLOPURINOL 200 MG: 100 TABLET ORAL at 09:05

## 2022-01-12 RX ADMIN — SULFASALAZINE 500 MG: 500 TABLET ORAL at 21:09

## 2022-01-12 RX ADMIN — B-COMPLEX W/ C & FOLIC ACID TAB 1 TABLET: TAB at 09:05

## 2022-01-12 RX ADMIN — INSULIN LISPRO 3 UNITS: 100 INJECTION, SOLUTION INTRAVENOUS; SUBCUTANEOUS at 12:09

## 2022-01-12 RX ADMIN — AMLODIPINE BESYLATE 5 MG: 5 TABLET ORAL at 17:41

## 2022-01-12 RX ADMIN — METOPROLOL TARTRATE 25 MG: 25 TABLET, FILM COATED ORAL at 21:09

## 2022-01-12 RX ADMIN — OXYCODONE HYDROCHLORIDE AND ACETAMINOPHEN 1000 MG: 500 TABLET ORAL at 09:05

## 2022-01-12 RX ADMIN — BUDESONIDE AND FORMOTEROL FUMARATE DIHYDRATE 2 PUFF: 160; 4.5 AEROSOL RESPIRATORY (INHALATION) at 09:03

## 2022-01-12 RX ADMIN — CLOPIDOGREL BISULFATE 75 MG: 75 TABLET ORAL at 09:05

## 2022-01-12 RX ADMIN — SULFASALAZINE 500 MG: 500 TABLET ORAL at 12:09

## 2022-01-12 RX ADMIN — INSULIN GLARGINE 12 UNITS: 100 INJECTION, SOLUTION SUBCUTANEOUS at 09:04

## 2022-01-12 RX ADMIN — DEXAMETHASONE SODIUM PHOSPHATE 10 MG: 4 INJECTION, SOLUTION INTRA-ARTICULAR; INTRALESIONAL; INTRAMUSCULAR; INTRAVENOUS; SOFT TISSUE at 09:05

## 2022-01-12 RX ADMIN — BARICITINIB 2 MG: 2 TABLET, FILM COATED ORAL at 12:12

## 2022-01-12 RX ADMIN — SULFASALAZINE 500 MG: 500 TABLET ORAL at 17:42

## 2022-01-12 RX ADMIN — INSULIN LISPRO 1 UNITS: 100 INJECTION, SOLUTION INTRAVENOUS; SUBCUTANEOUS at 17:46

## 2022-01-12 NOTE — ASSESSMENT & PLAN NOTE
Patient developed acute hypoxic respiratory failure after hospital admission due to COVID pneumonia  was on 13 l on 1/9/22    Gradually tapering down to 8 l for the past 24 hours-will try to taper down to 6 L  Continue treatments for COVID pneumonia as well as mid flow oxygen

## 2022-01-12 NOTE — ASSESSMENT & PLAN NOTE
Patient has chronic hypertension    Meds were held due to relative hypotension initially     Will restart on lower doses of amlodipine and metoprolol

## 2022-01-12 NOTE — PROGRESS NOTES
[place]  Progress Note - Alisa Hummel 1950, 80 y o  male MRN: [id number]  Unit/Bed#: -Dylan Encounter: [id number]  Primary Care Provider: Andre Blair DO   Date and time admitted to hospital: 12/28/2021  4:54 AM    Acute respiratory failure with hypoxia [facility])  Assessment & Plan  Patient developed acute hypoxic respiratory failure after hospital admission due to COVID pneumonia  was on 13 l on 1/9/22    Gradually tapering down to 8 l today   Continue treatments for COVID pneumonia as well as mid flow oxygen  * Pneumonia due to COVID-19 virus  Assessment & Plan  Patient developed chills, fevers and dry cough about 3-4 days before admission  He is fully vaccinated with the [place] vaccine  Chest x-ray showed bilateral COVID pneumonia  Will continue baricitinibtoday 14/14   , completed remdesivir;   high-dose IV Decadron- tapering on po as per pulmonary recommendations  Completed cefepime per pulm  Dc vanc mrsa neg    S/p  Lasix 40 mg IV Jan 2     Blood culture from December 29th grows contaminants  Repeat blood culture showed no growth  DC vanc         CHADWICK (acute kidney injury) [facility])  Assessment & Plan  Patient had acute kidney injury present on admission on stage III chronic kidney disease  His baseline creatinine is 1 2-1 8 per nephro  Was given lasix 1/6 and Cr risen  1 61  4 days ag0- now down to 1 36  Subsequent diuretics hel  apprec nephrology input-     Type 2 diabetes mellitus with stage 3a chronic kidney disease, without long-term current use of insulin [facility])  Assessment & Plan  Lab Results   Component Value Date    HGBA1C 6 4 (H) 12/28/2021       Recent Labs     01/11/22  0810 01/11/22  1205 01/11/22  1542 01/11/22 2057   POCGLU 86 151* 187* 122       Blood Sugar Average: Last 72 hrs:  (P) 147 125     Patient has type 2 diabetes with stage III chronic disease    Patient had hyperglycemia with iv steroids- will decrease insulin to avoid hypoglycemia      VTE Prophylaxis: in place    Patient Centered Rounds: I rounded with patient's nurse    Current Length of Stay: 14 day(s)    Current Patient Status: Inpatient    Certification Statement: Pt requires additional inpatient hospital stay due to: see assessment and plan        Subjective:   Patient feels less sob- pt reports independent in activity  No complaints of abdominal pain or tenderness    All other ROS are negative    Objective:     Vitals:   Temp (24hrs), Av 5 °F (36 4 °C), Min:97 3 °F (36 3 °C), Max:98 °F (36 7 °C)    Temp:  [97 3 °F (36 3 °C)-98 °F (36 7 °C)] 98 °F (36 7 °C)  HR:  [] 109  Resp:  [16] 16  BP: (150-159)/(103-105) 150/105  SpO2:  [90 %-95 %] 95 %  Body mass index is 29 3 kg/m²  Input and Output Summary (last 24 hours): Intake/Output Summary (Last 24 hours) at 2022 2304  Last data filed at 2022 5007  Gross per 24 hour   Intake --   Output 200 ml   Net -200 ml       Physical Exam:     Physical Exam  Vitals and nursing note reviewed  Constitutional:       General: He is not in acute distress  Appearance: He is not toxic-appearing  Comments: Fatigues with activity   HENT:      Nose: Congestion present  Cardiovascular:      Rate and Rhythm: Normal rate and regular rhythm  Pulmonary:      Breath sounds: Rhonchi present  No wheezing  Musculoskeletal:         General: No swelling or tenderness  Cervical back: No rigidity or tenderness  Lymphadenopathy:      Cervical: No cervical adenopathy  Neurological:      Mental Status: He is alert  Cranial Nerves: Cranial nerve deficit present  I personally reviewed labs and imaging reports for today        Last 24 Hours Medication List:   Current Facility-Administered Medications   Medication Dose Route Frequency Provider Last Rate    acetaminophen  650 mg Oral Q6H PRN Mima Aleman MD      allopurinol  200 mg Oral Daily Mima Aleman MD      ascorbic acid  1,000 mg Oral Daily Kasey Au PA-C      atorvastatin  40 mg Oral Daily With [facility], MD      Baricitinib  2 mg Oral Q24H Hellen Marques MD      budesonide-formoterol  2 puff Inhalation BID Samaria Dougherty      cholecalciferol  400 Units Oral Daily Kasey Au PA-C      clopidogrel  75 mg Oral Daily Hellen Marques MD      dexamethasone  10 mg Intravenous Daily Yolanda Bustillos DO      enoxaparin  30 mg Subcutaneous Q12H [facility], MD      insulin glargine  12 Units Subcutaneous QAM Hellen Marques MD      insulin lispro  1-5 Units Subcutaneous TID AC Hellen Marques MD      insulin lispro  3 Units Subcutaneous TID With Meals Hellen Marques MD      multivitamin stress formula  1 tablet Oral Daily Kasey Au PA-C      ondansetron  4 mg Intravenous Q6H PRN Hellen Marques MD      sodium chloride  1 spray Each Nare Q1H PRN Rich Tolbert PA-C      sulfaSALAzine  500 mg Oral 4x Daily Hellen Marques MD      zinc sulfate  220 mg Oral Daily Kasey Au PA-C            Today, Patient Was Seen By: Medhat Booker DO    ** Please Note: Dictation voice to text software may have been used in the creation of this document   **

## 2022-01-12 NOTE — ASSESSMENT & PLAN NOTE
Lab Results   Component Value Date    HGBA1C 6 4 (H) 12/28/2021       Recent Labs     01/11/22  0810 01/11/22  1205 01/11/22  1542 01/11/22 2057   POCGLU 86 151* 187* 122       Blood Sugar Average: Last 72 hrs:  (P) 147 125     Patient has type 2 diabetes with stage III chronic disease    Patient had hyperglycemia with iv steroids- will decrease insulin to avoid hypoglycemia

## 2022-01-12 NOTE — PROGRESS NOTES
Progress Note - López Frost 80 y o  male MRN: [id number]    Unit/Bed#: -01 Encounter: [id number]      Assessment:  Principal Problem:    Pneumonia due to COVID-19 virus  Active Problems:    CHADWICK (acute kidney injury) ([facility] )    Acute respiratory failure with hypoxia ([place] [address] )    Chronic kidney disease, stage 3 ([place] [address] )    Primary hypertension    Type 2 diabetes mellitus with stage 3a chronic kidney disease, without long-term current use of insulin (HCC)    Ulcerative rectosigmoiditis without complication ([facility])    NSVT (nonsustained ventricular tachycardia) ([place] [address] )  Resolved Problems:    Vasovagal syncope        Plan:  · covid pneumonia with significant hypoxia- now weaned down to 12 liter- on steroid tapering- to finish  barictinib on 1/11/22- encourage activity  · Type 2 dm-fasting glucose 80- watch for hypoglycemia  · chadwick- improving 1 41  · Syncope - on presentation- no signs of recurrence- increase activty    Subjective:   Less sob and occasional sputum production- clear  No chest pain  ROS  Comprehensive system review negative other than noted above    Objective:     Vitals: Blood pressure (!) 150/105, pulse (!) 109, temperature 98 °F (36 7 °C), resp  rate 16, weight 90 kg (198 lb 6 6 oz), SpO2 95 %  ,Body mass index is 29 3 kg/m²    Current Facility-Administered Medications   Medication Dose Route Frequency Provider Last Rate Last Admin    acetaminophen (TYLENOL) tablet 650 mg  650 mg Oral Q6H PRN Anju Hamilton MD        allopurinol (ZYLOPRIM) tablet 200 mg  200 mg Oral Daily Anju Hamilton MD   200 mg at 01/11/22 8518    ascorbic acid (VITAMIN C) tablet 1,000 mg  1,000 mg Oral Daily Tiffanie Caceres PA-C   1,000 mg at 01/11/22 0941    atorvastatin (LIPITOR) tablet 40 mg  40 mg Oral Daily With Lizzie Goode MD   40 mg at 01/11/22 1726    Baricitinib (OLUMIANT) (COVID EUA) tablet 2 mg  2 mg Oral Q24H Anju Hamilton MD   2 mg at 01/11/22 1220    budesonide-formoterol (SYMBICORT) 160-4 5 mcg/act inhaler 2 puff  2 puff Inhalation BID Shade Padilla PA-C   2 puff at 01/11/22 1727    cholecalciferol (VITAMIN D3) tablet 400 Units  400 Units Oral Daily Shade Padilla PA-C   400 Units at 01/11/22 0942    clopidogrel (PLAVIX) tablet 75 mg  75 mg Oral Daily King Shanita MD   75 mg at 01/11/22 0942    dexamethasone (DECADRON) injection 10 mg  10 mg Intravenous Daily Yolanda Bustillos DO   10 mg at 01/11/22 0941    enoxaparin (LOVENOX) subcutaneous injection 30 mg  30 mg Subcutaneous Q12H [address] [facility], MD   30 mg at 01/11/22 2211    insulin glargine (LANTUS) subcutaneous injection 12 Units 0 12 mL  12 Units Subcutaneous QAM King Shanita MD   12 Units at 01/11/22 0941    insulin lispro (HumaLOG) 100 units/mL subcutaneous injection 1-5 Units  1-5 Units Subcutaneous TID AC King Shanita MD   1 Units at 01/11/22 1727    insulin lispro (HumaLOG) 100 units/mL subcutaneous injection 3 Units  3 Units Subcutaneous TID With Meals King Shanita MD   3 Units at 01/11/22 1726    multivitamin stress formula tablet 1 tablet  1 tablet Oral Daily Shade Padilla PA-C   1 tablet at 01/11/22 0942    ondansetron (ZOFRAN) injection 4 mg  4 mg Intravenous Q6H PRN King Shanita MD        sodium chloride (OCEAN) 0 65 % nasal spray 1 spray  1 spray Each Nare Q1H PRN Ambrocio Garnett PA-C   1 spray at 01/11/22 1216    sulfaSALAzine (AZULFIDINE) tablet 500 mg  500 mg Oral 4x Daily King Shanita MD   500 mg at 01/11/22 2211    zinc sulfate (ZINCATE) capsule 220 mg  220 mg Oral Daily Shade Padilla PA-C   220 mg at 01/11/22 1565     Medications Prior to Admission   Medication    allopurinol (ZYLOPRIM) 100 mg tablet    amLODIPine (NORVASC) 10 mg tablet    atorvastatin (LIPITOR) 40 mg tablet    clopidogrel (PLAVIX) 75 mg tablet    Coenzyme Q10 200 MG capsule    Empagliflozin (Jardiance) 10 MG TABS    fenofibrate (TRICOR) 145 mg tablet    metoprolol tartrate (LOPRESSOR) 50 mg tablet    sulfaSALAzine (AZULFIDINE) 500 mg tablet Intake/Output Summary (Last 24 hours) at 1/11/2022 2251  Last data filed at 1/11/2022 3641  Gross per 24 hour   Intake --   Output 200 ml   Net -200 ml       Physical Exam:   heent- no scleral icterus   n- no jvd   h rr   l- decreased bs in bases - some occasionl rhonchi  abd- soft nondistedned   ext- no edema      Lab, Imaging and other studies: reviewed        Results from last 7 days   Lab Units 01/11/22  0615 01/10/22  0458 01/09/22 0618 01/07/22  0626 01/05/22  0235   WBC Thousand/uL 8 85 9 53 8 78 10 44*   < >   HEMOGLOBIN g/dL 11 7* 11 5* 12 8 13 6   < >   HEMATOCRIT % 38 2 38 0 41 2 45 3   < >   PLATELETS Thousands/uL 276 296 312 410*   < >   NEUTROS PCT % 83* 84*  --  84*  --    LYMPHS PCT % 7* 7*  --  4*  --    LYMPHO PCT %  --   --  6*  --    < >   MONOS PCT % 8 6  --  5  --    MONO PCT %  --   --  5  --    < >   EOS PCT % 1 1 1 0   < >    < > = values in this interval not displayed  Results from last 7 days   Lab Units 01/11/22  0615 01/10/22  0458 01/09/22  0618   POTASSIUM mmol/L 3 8 4 0 3 6   CHLORIDE mmol/L 109* 108 106   CO2 mmol/L 25 26 26   BUN mg/dL 32* 39* 41*   CREATININE mg/dL 1 36* 1 41* 1 39*   CALCIUM mg/dL 8 2* 8 3 8 4   ALK PHOS U/L 85 80 84   ALT U/L 117* 85* 65   AST U/L 67* 47* 31     Lab Results   Component Value Date    TROPONINI <0 02 03/19/2020    TROPONINI <0 02 01/28/2020    TROPONINI <0 02 01/22/2020    CKMB <1 0 12/28/2021    CKTOTAL 161 12/28/2021         Lab Results   Component Value Date    BLOODCX No Growth After 5 Days  01/01/2022    BLOODCX No Growth After 5 Days  01/01/2022    BLOODCX No Growth After 5 Days  12/29/2021    BLOODCX Micrococcus luteus (A) 12/29/2021    URINECX >100,000 cfu/ml Escherichia coli (A) 01/28/2020       Imaging:  Results for orders placed during the hospital encounter of 12/28/21    XR chest portable    Narrative  CHEST    INDICATION:   Worsening hypoxia  Patient has confirmed COVID-19      COMPARISON:  12/28/2021    EXAM PERFORMED/VIEWS:  XR CHEST PORTABLE      FINDINGS:    Loop recorder noted as on prior study  Cardiac silhouette size unchanged    Bilateral multifocal groundglass opacities are present  Interval worsening has occurred  There is no pleural effusion or pneumothorax  Osseous structures appear within normal limits for patient age  Impression  Bilateral multifocal groundglass opacities are present  In the setting of COVID-19 infection, this is most in keeping with COVID 19 pneumonia  There has been interval worsening            Workstation performed: [id number]    No results found for this or any previous visit  PATIENT CENTERED ROUNDS: I have performed rounds with the nursing staff            Scooby Echols DO

## 2022-01-12 NOTE — PLAN OF CARE
Problem: PAIN - ADULT  Goal: Verbalizes/displays adequate comfort level or baseline comfort level  Description: Interventions:  - Encourage patient to monitor pain and request assistance  - Assess pain using appropriate pain scale  - Administer analgesics based on type and severity of pain and evaluate response  - Implement non-pharmacological measures as appropriate and evaluate response  - Consider cultural and social influences on pain and pain management  - Notify physician/advanced practitioner if interventions unsuccessful or patient reports new pain  Outcome: Progressing     Problem: INFECTION - ADULT  Goal: Absence or prevention of progression during hospitalization  Description: INTERVENTIONS:  - Assess and monitor for signs and symptoms of infection  - Monitor lab/diagnostic results  - Monitor all insertion sites, i e  indwelling lines, tubes, and drains  - Monitor endotracheal if appropriate and nasal secretions for changes in amount and color  - [place] appropriate cooling/warming therapies per order  - Administer medications as ordered  - Instruct and encourage patient and family to use good hand hygiene technique  - Identify and instruct in appropriate isolation precautions for identified infection/condition  Outcome: Progressing  Goal: Absence of fever/infection during neutropenic period  Description: INTERVENTIONS:  - Monitor WBC    Outcome: Progressing     Problem: SAFETY ADULT  Goal: Patient will remain free of falls  Description: INTERVENTIONS:  - Educate patient/family on patient safety including physical limitations  - Instruct patient to call for assistance with activity   - Consult OT/PT to assist with strengthening/mobility   - Keep Call bell within reach  - Keep bed low and locked with side rails adjusted as appropriate  - Keep care items and personal belongings within reach  - Initiate and maintain comfort rounds  - Make Fall Risk Sign visible to staff  - Offer Toileting every 2 Hours, in advance of need  - Initiate/Maintain bed alarm  - Obtain necessary fall risk management equipment:   - Apply yellow socks and bracelet for high fall risk patients  - Consider moving patient to room near nurses station  Outcome: Progressing  Goal: Maintain or return to baseline ADL function  Description: INTERVENTIONS:  -  Assess patient's ability to carry out ADLs; assess patient's baseline for ADL function and identify physical deficits which impact ability to perform ADLs (bathing, care of mouth/teeth, toileting, grooming, dressing, etc )  - Assess/evaluate cause of self-care deficits   - Assess range of motion  - Assess patient's mobility; develop plan if impaired  - Assess patient's need for assistive devices and provide as appropriate  - Encourage maximum independence but intervene and supervise when necessary  - Involve family in performance of ADLs  - Assess for home care needs following discharge   - Consider OT consult to assist with ADL evaluation and planning for discharge  - Provide patient education as appropriate  Outcome: Progressing  Goal: Maintains/Returns to pre admission functional level  Description: INTERVENTIONS:  - Perform BMAT or MOVE assessment daily    - Set and communicate daily mobility goal to care team and patient/family/caregiver  - Collaborate with rehabilitation services on mobility goals if consulted  - Perform Range of Motion 2 times a day  - Reposition patient every 2 hours    - Dangle patient 2 times a day  - Stand patient 2 times a day  - Ambulate patient 2 times a day  - Out of bed to chair 2 times a day   - Out of bed for meals 2 times a day  - Out of bed for toileting  - Record patient progress and toleration of activity level   Outcome: Progressing     Problem: DISCHARGE PLANNING  Goal: Discharge to home or other facility with appropriate resources  Description: INTERVENTIONS:  - Identify barriers to discharge w/patient and caregiver  - Arrange for needed discharge resources and transportation as appropriate  - Identify discharge learning needs (meds, wound care, etc )  - Arrange for interpretive services to assist at discharge as needed  - Refer to Case Management Department for coordinating discharge planning if the patient needs post-hospital services based on physician/advanced practitioner order or complex needs related to functional status, cognitive ability, or social support system  Outcome: Progressing     Problem: Knowledge Deficit  Goal: Patient/family/caregiver demonstrates understanding of disease process, treatment plan, medications, and discharge instructions  Description: Complete learning assessment and assess knowledge base    Interventions:  - Provide teaching at level of understanding  - Provide teaching via preferred learning methods  Outcome: Progressing     Problem: Potential for Falls  Goal: Patient will remain free of falls  Description: INTERVENTIONS:  - Educate patient/family on patient safety including physical limitations  - Instruct patient to call for assistance with activity   - Consult OT/PT to assist with strengthening/mobility   - Keep Call bell within reach  - Keep bed low and locked with side rails adjusted as appropriate  - Keep care items and personal belongings within reach  - Initiate and maintain comfort rounds  - Make Fall Risk Sign visible to staff  - Offer Toileting every 2 Hours, in advance of need  - Initiate/Maintain bed alarm  - Obtain necessary fall risk management equipment:   - Apply yellow socks and bracelet for high fall risk patients  - Consider moving patient to room near nurses station  Outcome: Progressing     Problem: MOBILITY - ADULT  Goal: Maintain or return to baseline ADL function  Description: INTERVENTIONS:  -  Assess patient's ability to carry out ADLs; assess patient's baseline for ADL function and identify physical deficits which impact ability to perform ADLs (bathing, care of mouth/teeth, toileting, grooming, dressing, etc )  - Assess/evaluate cause of self-care deficits   - Assess range of motion  - Assess patient's mobility; develop plan if impaired  - Assess patient's need for assistive devices and provide as appropriate  - Encourage maximum independence but intervene and supervise when necessary  - Involve family in performance of ADLs  - Assess for home care needs following discharge   - Consider OT consult to assist with ADL evaluation and planning for discharge  - Provide patient education as appropriate  Outcome: Progressing  Goal: Maintains/Returns to pre admission functional level  Description: INTERVENTIONS:  - Perform BMAT or MOVE assessment daily    - Set and communicate daily mobility goal to care team and patient/family/caregiver  - Collaborate with rehabilitation services on mobility goals if consulted  - Perform Range of Motion 2 times a day  - Reposition patient every 2 hours    - Dangle patient 2 times a day  - Stand patient 2 times a day  - Ambulate patient 2 times a day  - Out of bed to chair 2 times a day   - Out of bed for meals 2 times a day  - Out of bed for toileting  - Record patient progress and toleration of activity level   Outcome: Progressing     Problem: Prexisting or High Potential for Compromised Skin Integrity  Goal: Skin integrity is maintained or improved  Description: INTERVENTIONS:  - Identify patients at risk for skin breakdown  - Assess and monitor skin integrity  - Assess and monitor nutrition and hydration status  - Monitor labs   - Assess for incontinence   - Turn and reposition patient  - Assist with mobility/ambulation  - Relieve pressure over bony prominences  - Avoid friction and shearing  - Provide appropriate hygiene as needed including keeping skin clean and dry  - Evaluate need for skin moisturizer/barrier cream  - Collaborate with interdisciplinary team   - Patient/family teaching  - Consider wound care consult   Outcome: Progressing     Problem: Nutrition/Hydration-ADULT  Goal: Nutrient/Hydration intake appropriate for improving, restoring or maintaining nutritional needs  Description: Monitor and assess patient's nutrition/hydration status for malnutrition  Collaborate with interdisciplinary team and initiate plan and interventions as ordered  Monitor patient's weight and dietary intake as ordered or per policy  Utilize nutrition screening tool and intervene as necessary  Determine patient's food preferences and provide high-protein, high-caloric foods as appropriate       INTERVENTIONS:  - Monitor oral intake, urinary output, labs, and treatment plans  - Assess nutrition and hydration status and recommend course of action  - Evaluate amount of meals eaten  - Assist patient with eating if necessary   - Allow adequate time for meals  - Recommend/ encourage appropriate diets, oral nutritional supplements, and vitamin/mineral supplements  - Order, calculate, and assess calorie counts as needed  - Recommend, monitor, and adjust tube feedings and TPN/PPN based on assessed needs  - Assess need for intravenous fluids  - Provide specific nutrition/hydration education as appropriate  - Include patient/family/caregiver in decisions related to nutrition  Outcome: Progressing

## 2022-01-12 NOTE — ASSESSMENT & PLAN NOTE
Patient developed acute hypoxic respiratory failure after hospital admission due to COVID pneumonia  was on 13 l on 1/9/22    Gradually tapering down to 8 l today   Continue treatments for COVID pneumonia as well as mid flow oxygen

## 2022-01-12 NOTE — ASSESSMENT & PLAN NOTE
Lab Results   Component Value Date    EGFR 52 01/11/2022    EGFR 50 01/10/2022    EGFR 50 01/09/2022    CREATININE 1 36 (H) 01/11/2022    CREATININE 1 41 (H) 01/10/2022    CREATININE 1 39 (H) 01/09/2022     Cr improved prior [place]  Is  resolving

## 2022-01-12 NOTE — PLAN OF CARE
Problem: PAIN - ADULT  Goal: Verbalizes/displays adequate comfort level or baseline comfort level  Description: Interventions:  - Encourage patient to monitor pain and request assistance  - Assess pain using appropriate pain scale  - Administer analgesics based on type and severity of pain and evaluate response  - Implement non-pharmacological measures as appropriate and evaluate response  - Consider cultural and social influences on pain and pain management  - Notify physician/advanced practitioner if interventions unsuccessful or patient reports new pain  Outcome: Progressing     Problem: INFECTION - ADULT  Goal: Absence or prevention of progression during hospitalization  Description: INTERVENTIONS:  - Assess and monitor for signs and symptoms of infection  - Monitor lab/diagnostic results  - Monitor all insertion sites, i e  indwelling lines, tubes, and drains  - Monitor endotracheal if appropriate and nasal secretions for changes in amount and color  - [place] appropriate cooling/warming therapies per order  - Administer medications as ordered  - Instruct and encourage patient and family to use good hand hygiene technique  - Identify and instruct in appropriate isolation precautions for identified infection/condition  Outcome: Progressing  Goal: Absence of fever/infection during neutropenic period  Description: INTERVENTIONS:  - Monitor WBC    Outcome: Progressing     Problem: SAFETY ADULT  Goal: Patient will remain free of falls  Description: INTERVENTIONS:  - Educate patient/family on patient safety including physical limitations  - Instruct patient to call for assistance with activity   - Consult OT/PT to assist with strengthening/mobility   - Keep Call bell within reach  - Keep bed low and locked with side rails adjusted as appropriate  - Keep care items and personal belongings within reach  - Initiate and maintain comfort rounds  - Make Fall Risk Sign visible to staff  - Offer Toileting every 2 Hours, in advance of need  - Initiate/Maintain bed alarm  - Obtain necessary fall risk management equipment:   - Apply yellow socks and bracelet for high fall risk patients  - Consider moving patient to room near nurses station  Outcome: Progressing  Goal: Maintain or return to baseline ADL function  Description: INTERVENTIONS:  -  Assess patient's ability to carry out ADLs; assess patient's baseline for ADL function and identify physical deficits which impact ability to perform ADLs (bathing, care of mouth/teeth, toileting, grooming, dressing, etc )  - Assess/evaluate cause of self-care deficits   - Assess range of motion  - Assess patient's mobility; develop plan if impaired  - Assess patient's need for assistive devices and provide as appropriate  - Encourage maximum independence but intervene and supervise when necessary  - Involve family in performance of ADLs  - Assess for home care needs following discharge   - Consider OT consult to assist with ADL evaluation and planning for discharge  - Provide patient education as appropriate  Outcome: Progressing  Goal: Maintains/Returns to pre admission functional level  Description: INTERVENTIONS:  - Perform BMAT or MOVE assessment daily    - Set and communicate daily mobility goal to care team and patient/family/caregiver  - Collaborate with rehabilitation services on mobility goals if consulted  - Perform Range of Motion 2 times a day  - Reposition patient every 2 hours    - Dangle patient 2 times a day  - Stand patient 2 times a day  - Ambulate patient 2 times a day  - Out of bed to chair 2 times a day   - Out of bed for meals 2 times a day  - Out of bed for toileting  - Record patient progress and toleration of activity level   Outcome: Progressing     Problem: DISCHARGE PLANNING  Goal: Discharge to home or other facility with appropriate resources  Description: INTERVENTIONS:  - Identify barriers to discharge w/patient and caregiver  - Arrange for needed discharge resources and transportation as appropriate  - Identify discharge learning needs (meds, wound care, etc )  - Arrange for interpretive services to assist at discharge as needed  - Refer to Case Management Department for coordinating discharge planning if the patient needs post-hospital services based on physician/advanced practitioner order or complex needs related to functional status, cognitive ability, or social support system  Outcome: Progressing     Problem: Knowledge Deficit  Goal: Patient/family/caregiver demonstrates understanding of disease process, treatment plan, medications, and discharge instructions  Description: Complete learning assessment and assess knowledge base    Interventions:  - Provide teaching at level of understanding  - Provide teaching via preferred learning methods  Outcome: Progressing     Problem: Potential for Falls  Goal: Patient will remain free of falls  Description: INTERVENTIONS:  - Educate patient/family on patient safety including physical limitations  - Instruct patient to call for assistance with activity   - Consult OT/PT to assist with strengthening/mobility   - Keep Call bell within reach  - Keep bed low and locked with side rails adjusted as appropriate  - Keep care items and personal belongings within reach  - Initiate and maintain comfort rounds  - Make Fall Risk Sign visible to staff  - Offer Toileting every 2 Hours, in advance of need  - Initiate/Maintain bed alarm  - Obtain necessary fall risk management equipment:   - Apply yellow socks and bracelet for high fall risk patients  - Consider moving patient to room near nurses station  Outcome: Progressing     Problem: MOBILITY - ADULT  Goal: Maintain or return to baseline ADL function  Description: INTERVENTIONS:  -  Assess patient's ability to carry out ADLs; assess patient's baseline for ADL function and identify physical deficits which impact ability to perform ADLs (bathing, care of mouth/teeth, toileting, grooming, dressing, etc )  - Assess/evaluate cause of self-care deficits   - Assess range of motion  - Assess patient's mobility; develop plan if impaired  - Assess patient's need for assistive devices and provide as appropriate  - Encourage maximum independence but intervene and supervise when necessary  - Involve family in performance of ADLs  - Assess for home care needs following discharge   - Consider OT consult to assist with ADL evaluation and planning for discharge  - Provide patient education as appropriate  Outcome: Progressing  Goal: Maintains/Returns to pre admission functional level  Description: INTERVENTIONS:  - Perform BMAT or MOVE assessment daily    - Set and communicate daily mobility goal to care team and patient/family/caregiver  - Collaborate with rehabilitation services on mobility goals if consulted  - Perform Range of Motion 2 times a day  - Reposition patient every 2 hours    - Dangle patient 2 times a day  - Stand patient 2 times a day  - Ambulate patient 2 times a day  - Out of bed to chair 2 times a day   - Out of bed for meals 2 times a day  - Out of bed for toileting  - Record patient progress and toleration of activity level   Outcome: Progressing     Problem: Prexisting or High Potential for Compromised Skin Integrity  Goal: Skin integrity is maintained or improved  Description: INTERVENTIONS:  - Identify patients at risk for skin breakdown  - Assess and monitor skin integrity  - Assess and monitor nutrition and hydration status  - Monitor labs   - Assess for incontinence   - Turn and reposition patient  - Assist with mobility/ambulation  - Relieve pressure over bony prominences  - Avoid friction and shearing  - Provide appropriate hygiene as needed including keeping skin clean and dry  - Evaluate need for skin moisturizer/barrier cream  - Collaborate with interdisciplinary team   - Patient/family teaching  - Consider wound care consult   Outcome: Progressing     Problem: Nutrition/Hydration-ADULT  Goal: Nutrient/Hydration intake appropriate for improving, restoring or maintaining nutritional needs  Description: Monitor and assess patient's nutrition/hydration status for malnutrition  Collaborate with interdisciplinary team and initiate plan and interventions as ordered  Monitor patient's weight and dietary intake as ordered or per policy  Utilize nutrition screening tool and intervene as necessary  Determine patient's food preferences and provide high-protein, high-caloric foods as appropriate       INTERVENTIONS:  - Monitor oral intake, urinary output, labs, and treatment plans  - Assess nutrition and hydration status and recommend course of action  - Evaluate amount of meals eaten  - Assist patient with eating if necessary   - Allow adequate time for meals  - Recommend/ encourage appropriate diets, oral nutritional supplements, and vitamin/mineral supplements  - Order, calculate, and assess calorie counts as needed  - Recommend, monitor, and adjust tube feedings and TPN/PPN based on assessed needs  - Assess need for intravenous fluids  - Provide specific nutrition/hydration education as appropriate  - Include patient/family/caregiver in decisions related to nutrition  Outcome: Progressing

## 2022-01-12 NOTE — PROGRESS NOTES
Progress Note - Pulmonary   Zeferino Ellison 80 y o  male MRN: [id number]  Unit/Bed#: -01 Encounter: [id number]    Assessment & Plan:  Acute hypoxic respiratory failure  COVID-19 pneumonia with possible superimposed bacterial infection  [place]    · O2 requirements slowly improving   patient currently on 6 L   Continue to monitor SpO2 and titrate as able to maintain saturations greater than 89% %  · Continue treatment per moderate COVID-19 pathway  · Atorvastatin  · Dexamethasone - completed weight based dosing   Currently on Decadron 10 mg daily, can taper Decadron by 2 mg q 3 days  · Remdesivir x5 days completed  · Baricitinib day 14/14  · Anticoagulation:  Lovenox prophylaxis (D-dimer < 2)  · Antibiotics: cefepime completed 8 days  · Encourage self-proning, activity as tolerated, incentive spirometry  · Nephrology following     Pulmonary will sign off  Please call with questions  Subjective:   Patient says his breathing is better and he feels ready to keep having LPM decreased  Objective:     Vitals: Blood pressure 152/98, pulse 77, temperature 97 5 °F (36 4 °C), resp  rate 20, weight 91 4 kg (201 lb 8 oz), SpO2 (!) 88 %  ,Body mass index is 29 76 kg/m²  Intake/Output Summary (Last 24 hours) at 1/12/2022 1002  Last data filed at 1/12/2022 7461  Gross per 24 hour   Intake --   Output 700 ml   Net -700 ml       Invasive Devices  Report    Peripheral Intravenous Line            Peripheral IV 01/12/22 Right;Ventral (anterior) Forearm <1 day                Physical Exam:  General appearance: Alert and oriented, in no acute distress  Head: Normocephalic, without obvious abnormality, atraumatic  Eyes: EOMI  No discharge bilaterally  No scleral icterus     Neck: Supple, symmetrical, trachea midline  Lungs: Decreased breath sounds  Heart: Regular rate and rhythm, S1, S2 normal, no murmur  Abdomen:  No appreciable distension or tenderness  Extremities: No edema or tenderness  Skin: Warm and dry  Neurologic: No acute focal deficits are noted    Labs: I have personally reviewed pertinent lab results  Imaging and other studies: I have personally reviewed pertinent reports

## 2022-01-12 NOTE — ASSESSMENT & PLAN NOTE
Lab Results   Component Value Date    HGBA1C 6 4 (H) 12/28/2021       Recent Labs     01/11/22  1542 01/11/22  2057 01/12/22  0832 01/12/22  1207   POCGLU 187* 122 73 108       Blood Sugar Average: Last 72 hrs:  (P) 135 [id number]     Patient has type 2 diabetes with stage III chronic disease  Patient had hyperglycemia with iv steroids- will decrease insulin to avoid hypoglycemia  Currently on 12 units of Lantus q a m, lispro  3 units t i d  With meals   And coverage

## 2022-01-12 NOTE — ASSESSMENT & PLAN NOTE
Patient developed chills, fevers and dry cough about 3-4 days before admission  He is fully vaccinated with the [place] vaccine  Chest x-ray showed bilateral COVID pneumonia  Will continue baricitinibtoday 14/14   , completed remdesivir;   high-dose IV Decadron- tapering on po as per pulmonary recommendations  Completed cefepime per pulm  Dc vanc mrsa neg    S/p  Lasix 40 mg IV Jan 2     Blood culture from December 29th grows contaminants  Repeat blood culture showed no growth   DC vanc

## 2022-01-12 NOTE — ASSESSMENT & PLAN NOTE
Patient developed chills, fevers and dry cough about 3-4 days before admission  He is fully vaccinated with the [place] vaccine  Chest x-ray showed bilateral COVID pneumonia  Completed baricinitmab on 1/11/22   , completed 5 day course of  remdesivir;  high-dose  decaodron - for tapering on po as per pulmonary recommendations  Completed cefepime per pulm  Dc vanc mrsa neg                 S/p  Lasix 40 mg IV Jan 2     Blood culture from December 29th grows contaminants  Repeat blood culture showed no growth   DC vanc

## 2022-01-12 NOTE — ASSESSMENT & PLAN NOTE
Patient had acute kidney injury present on admission on stage III chronic kidney disease    His baseline creatinine is 1 2-1 8 per nephro  Was given lasix 1/6 and Cr risen  1 61  4 days ag0- now down to 1 36  Subsequent diuretics hel  apprec nephrology input-

## 2022-01-12 NOTE — PROGRESS NOTES
[place]  Progress Note - José Miguel Menezes 1950, 80 y o  male MRN: [id number]  Unit/Bed#: -Dylan Encounter: [id number]  Primary Care Provider: Florian Katz DO   Date and time admitted to hospital: 12/28/2021  4:54 AM    Acute respiratory failure with hypoxia [facility])  Assessment & Plan  Patient developed acute hypoxic respiratory failure after hospital admission due to COVID pneumonia  was on 13 l on 1/9/22    Gradually tapering down to 8 l for the past 24 hours-will try to taper down to 6 L  Continue treatments for COVID pneumonia as well as mid flow oxygen  * Pneumonia due to COVID-19 virus  Assessment & Plan  Patient developed chills, fevers and dry cough about 3-4 days before admission  He is fully vaccinated with the [place] vaccine  Chest x-ray showed bilateral COVID pneumonia  Completed baricinitmab on 1/11/22   , completed 5 day course of  remdesivir;  high-dose  decaodron - for tapering on po as per pulmonary recommendations  Completed cefepime per pulm  Dc vanc mrsa neg                 S/p  Lasix 40 mg IV Jan 2     Blood culture from December 29th grows contaminants  Repeat blood culture showed no growth  DC vanc         Type 2 diabetes mellitus with stage 3a chronic kidney disease, without long-term current use of insulin [facility])  Assessment & Plan  Lab Results   Component Value Date    HGBA1C 6 4 (H) 12/28/2021       Recent Labs     01/11/22  1542 01/11/22  2057 01/12/22  0832 01/12/22  1207   POCGLU 187* 122 73 108       Blood Sugar Average: Last 72 hrs:  (P) 135 [id number]     Patient has type 2 diabetes with stage III chronic disease  Patient had hyperglycemia with iv steroids- will decrease insulin to avoid hypoglycemia  Currently on 12 units of Lantus q a m, lispro  3 units t i d  With meals  And coverage    Primary hypertension  Assessment & Plan  Patient has chronic hypertension    Meds were held due to relative hypotension initially     Will restart on lower doses of amlodipine and metoprolol    Chronic kidney disease, stage 3 [facility])  Assessment & Plan  Lab Results   Component Value Date    EGFR 52 2022    EGFR 50 01/10/2022    EGFR 50 2022    CREATININE 1 36 (H) 2022    CREATININE 1 41 (H) 01/10/2022    CREATININE 1 39 (H) 2022     Cr improved prior CHADWICK  Is  resolving         VTE Prophylaxis: in place    Patient Centered Rounds: I rounded with patient's nurse    Current Length of Stay: 15 day(s)    Current Patient Status: Inpatient    Certification Statement: Pt requires additional inpatient hospital stay due to: see assessment and plan        Subjective:   Overall feeelng stronger and less sob- ambulates short distance to commode  Oxygen tubing does not reach to bbr  No nausea or vomiting    All other ROS are negative    Objective:     Vitals:   Temp (24hrs), Av 8 °F (36 6 °C), Min:97 5 °F (36 4 °C), Max:98 °F (36 7 °C)    Temp:  [97 5 °F (36 4 °C)-98 °F (36 7 °C)] 97 5 °F (36 4 °C)  HR:  [] 77  Resp:  [16-20] 20  BP: (144-152)/() 152/98  SpO2:  [88 %-95 %] 88 %  Body mass index is 29 76 kg/m²  Input and Output Summary (last 24 hours): Intake/Output Summary (Last 24 hours) at 2022 1449  Last data filed at 2022 3678  Gross per 24 hour   Intake --   Output 700 ml   Net -700 ml       Physical Exam:     Physical Exam  Vitals and nursing note reviewed  Constitutional:       General: He is not in acute distress  Appearance: He is not ill-appearing, toxic-appearing or diaphoretic  HENT:      Head: Normocephalic and atraumatic  Eyes:      General: No scleral icterus  Right eye: No discharge  Left eye: No discharge  Cardiovascular:      Rate and Rhythm: Normal rate and regular rhythm  Heart sounds: No gallop  Pulmonary:      Breath sounds: Wheezing present  Comments: Minimal wheezes in bases  Abdominal:      General: There is no distension  Palpations: Abdomen is soft  Tenderness: There is no abdominal tenderness  Musculoskeletal:      Right lower leg: No edema  Left lower leg: No edema  Skin:     Findings: No bruising  Neurological:      Mental Status: He is alert  I personally reviewed labs and imaging reports for today  Last 24 Hours Medication List:   Current Facility-Administered Medications   Medication Dose Route Frequency Provider Last Rate    acetaminophen  650 mg Oral Q6H PRN Karol Hirsch MD      allopurinol  200 mg Oral Daily Karol Hirsch MD      amLODIPine  5 mg Oral Daily Deana Alegria DO      ascorbic acid  1,000 mg Oral Daily Faylene MustLUIS ANTONIO      atorvastatin  40 mg Oral Daily With [facility], MD      budesonide-formoterol  2 puff Inhalation BID Maryylene LUIS ANTONIO Gaines      cholecalciferol  400 Units Oral Daily Maria Del Carmen Gaines PA-C      clopidogrel  75 mg Oral Daily Karol Hirsch MD      dexamethasone  10 mg Intravenous Daily Yolanda Bustillos DO      enoxaparin  30 mg Subcutaneous Q12H [address] [facility] 12 MD      insulin glargine  12 Units Subcutaneous QAM Karol Hirsch MD      insulin lispro  1-5 Units Subcutaneous TID AC Karol Hirsch MD      insulin lispro  3 Units Subcutaneous TID With Meals Karol Hirsch MD      metoprolol tartrate  25 mg Oral Q12H [address] Mayi Fly,       multivitamin stress formula  1 tablet Oral Daily Maria Del Carmen Gaines PA-C      ondansetron  4 mg Intravenous Q6H PRN Karol Hirsch MD      sodium chloride  1 spray Each Nare Q1H PRN Nacho LUIS ANTONIO Kennedy      sulfaSALAzine  500 mg Oral 4x Daily Karol Hirsch MD      zinc sulfate  220 mg Oral Daily Fayleanthony Gaines PA-C          attempted to reach wife via phone and left voicemail message  that his oxygen needs are improving,  Today, Patient Was Seen By: Deana Alegria DO    ** Please Note: Dictation voice to text software may have been used in the creation of this document   **

## 2022-01-13 LAB
ALBUMIN SERPL BCP-MCNC: 2 G/DL (ref 3.5–5)
ALP SERPL-CCNC: 81 U/L (ref 46–116)
ALT SERPL W P-5'-P-CCNC: 108 U/L (ref 12–78)
ANION GAP SERPL CALCULATED.3IONS-SCNC: 10 MMOL/L (ref 4–13)
AST SERPL W P-5'-P-CCNC: 50 U/L (ref 5–45)
BILIRUB SERPL-MCNC: 0.4 MG/DL (ref 0.2–1)
BUN SERPL-MCNC: 34 MG/DL (ref 5–25)
CALCIUM ALBUM COR SERPL-MCNC: 10.1 MG/DL (ref 8.3–10.1)
CALCIUM SERPL-MCNC: 8.5 MG/DL (ref 8.3–10.1)
CHLORIDE SERPL-SCNC: 108 MMOL/L (ref 100–108)
CO2 SERPL-SCNC: 24 MMOL/L (ref 21–32)
CREAT SERPL-MCNC: 1.36 MG/DL (ref 0.6–1.3)
ERYTHROCYTE [DISTWIDTH] IN BLOOD BY AUTOMATED COUNT: 18 % (ref 11.6–15.1)
GFR SERPL CREATININE-BSD FRML MDRD: 52 ML/MIN/1.73SQ M
GLUCOSE SERPL-MCNC: 112 MG/DL (ref 65–140)
GLUCOSE SERPL-MCNC: 125 MG/DL (ref 65–140)
GLUCOSE SERPL-MCNC: 145 MG/DL (ref 65–140)
GLUCOSE SERPL-MCNC: 73 MG/DL (ref 65–140)
GLUCOSE SERPL-MCNC: 75 MG/DL (ref 65–140)
HCT VFR BLD AUTO: 38.3 % (ref 36.5–49.3)
HGB BLD-MCNC: 11.5 G/DL (ref 12–17)
MCH RBC QN AUTO: 23.7 PG (ref 26.8–34.3)
MCHC RBC AUTO-ENTMCNC: 30 G/DL (ref 31.4–37.4)
MCV RBC AUTO: 79 FL (ref 82–98)
PLATELET # BLD AUTO: 240 THOUSANDS/UL (ref 149–390)
PMV BLD AUTO: 10.3 FL (ref 8.9–12.7)
POTASSIUM SERPL-SCNC: 3.7 MMOL/L (ref 3.5–5.3)
PROT SERPL-MCNC: 5.4 G/DL (ref 6.4–8.2)
RBC # BLD AUTO: 4.85 MILLION/UL (ref 3.88–5.62)
SODIUM SERPL-SCNC: 142 MMOL/L (ref 136–145)
WBC # BLD AUTO: 7.71 THOUSAND/UL (ref 4.31–10.16)

## 2022-01-13 PROCEDURE — 85027 COMPLETE CBC AUTOMATED: CPT | Performed by: INTERNAL MEDICINE

## 2022-01-13 PROCEDURE — 82948 REAGENT STRIP/BLOOD GLUCOSE: CPT

## 2022-01-13 PROCEDURE — 80053 COMPREHEN METABOLIC PANEL: CPT | Performed by: INTERNAL MEDICINE

## 2022-01-13 PROCEDURE — 97168 OT RE-EVAL EST PLAN CARE: CPT

## 2022-01-13 PROCEDURE — 99232 SBSQ HOSP IP/OBS MODERATE 35: CPT | Performed by: INTERNAL MEDICINE

## 2022-01-13 PROCEDURE — 97164 PT RE-EVAL EST PLAN CARE: CPT

## 2022-01-13 RX ADMIN — OXYCODONE HYDROCHLORIDE AND ACETAMINOPHEN 1000 MG: 500 TABLET ORAL at 08:39

## 2022-01-13 RX ADMIN — INSULIN LISPRO 3 UNITS: 100 INJECTION, SOLUTION INTRAVENOUS; SUBCUTANEOUS at 17:23

## 2022-01-13 RX ADMIN — METOPROLOL TARTRATE 25 MG: 25 TABLET, FILM COATED ORAL at 21:55

## 2022-01-13 RX ADMIN — INSULIN GLARGINE 12 UNITS: 100 INJECTION, SOLUTION SUBCUTANEOUS at 08:37

## 2022-01-13 RX ADMIN — ENOXAPARIN SODIUM 30 MG: 100 INJECTION SUBCUTANEOUS at 21:55

## 2022-01-13 RX ADMIN — SULFASALAZINE 500 MG: 500 TABLET ORAL at 17:23

## 2022-01-13 RX ADMIN — CHOLECALCIFEROL (VITAMIN D3) 10 MCG (400 UNIT) TABLET 400 UNITS: at 08:38

## 2022-01-13 RX ADMIN — METOPROLOL TARTRATE 25 MG: 25 TABLET, FILM COATED ORAL at 08:39

## 2022-01-13 RX ADMIN — SULFASALAZINE 500 MG: 500 TABLET ORAL at 21:55

## 2022-01-13 RX ADMIN — ALLOPURINOL 200 MG: 100 TABLET ORAL at 08:38

## 2022-01-13 RX ADMIN — ENOXAPARIN SODIUM 30 MG: 100 INJECTION SUBCUTANEOUS at 08:39

## 2022-01-13 RX ADMIN — DEXAMETHASONE SODIUM PHOSPHATE 10 MG: 4 INJECTION, SOLUTION INTRA-ARTICULAR; INTRALESIONAL; INTRAMUSCULAR; INTRAVENOUS; SOFT TISSUE at 08:39

## 2022-01-13 RX ADMIN — BUDESONIDE AND FORMOTEROL FUMARATE DIHYDRATE 2 PUFF: 160; 4.5 AEROSOL RESPIRATORY (INHALATION) at 17:22

## 2022-01-13 RX ADMIN — BUDESONIDE AND FORMOTEROL FUMARATE DIHYDRATE 2 PUFF: 160; 4.5 AEROSOL RESPIRATORY (INHALATION) at 08:37

## 2022-01-13 RX ADMIN — CLOPIDOGREL BISULFATE 75 MG: 75 TABLET ORAL at 08:39

## 2022-01-13 RX ADMIN — ZINC SULFATE 220 MG (50 MG) CAPSULE 220 MG: CAPSULE at 08:38

## 2022-01-13 RX ADMIN — B-COMPLEX W/ C & FOLIC ACID TAB 1 TABLET: TAB at 08:39

## 2022-01-13 RX ADMIN — SULFASALAZINE 500 MG: 500 TABLET ORAL at 08:37

## 2022-01-13 RX ADMIN — ATORVASTATIN CALCIUM 40 MG: 40 TABLET, FILM COATED ORAL at 17:23

## 2022-01-13 RX ADMIN — AMLODIPINE BESYLATE 5 MG: 5 TABLET ORAL at 08:38

## 2022-01-13 RX ADMIN — SULFASALAZINE 500 MG: 500 TABLET ORAL at 12:16

## 2022-01-13 RX ADMIN — INSULIN LISPRO 3 UNITS: 100 INJECTION, SOLUTION INTRAVENOUS; SUBCUTANEOUS at 12:17

## 2022-01-13 NOTE — PHYSICAL THERAPY NOTE
PHYSICAL THERAPY Re-Evaluation       01/13/22 1027   PT Last Visit   PT Visit Date 01/13/22   Note Type   Note type Re-Evaluation   Pain Assessment   Pain Assessment Tool 0-10   Pain Score No Pain   Restrictions/Precautions   Weight Bearing Precautions Per Order No   Other Precautions Contact/isolation; Airborne/isolation;Droplet precautions  (COVID)   Home Living   Additional Comments see PT IE   Prior Function   Comments see PT IE   General   Additional Pertinent History SpO2 83-95% on 5L O2 this session   Cognition   Overall Cognitive Status WFL   Arousal/Participation Alert   Attention Within functional limits   Orientation Level Oriented X4   Memory Within functional limits   Following Commands Follows all commands and directions without difficulty   RUE Assessment   RUE Assessment WFL   LUE Assessment   LUE Assessment WFL   RLE Assessment   RLE Assessment WFL   LLE Assessment   LLE Assessment WFL   Bed Mobility   Additional Comments pt OOB in chair at start and end of PT session   Transfers   Sit to Stand 6  Modified independent   Stand to Sit 6  Modified independent   Stand pivot 6  Modified independent   Toilet transfer 6  Modified independent   Ambulation/Elevation   Gait pattern Decreased foot clearance  (increased lateral sway and path deviation, no LOB)   Gait Assistance 6  Modified independent   Assistive Device None   Distance 50ft x 2 no LOB or unsteadiness, (+) path deviation, safe emre, able to ambulate in/out of bathroom safely  Pt on 5L O2 and SpO2 decreases to 83%  improves >90 with rest and pursed lip breathing   Balance   Static Sitting Normal   Dynamic Sitting Good   Static Standing Fair +   Dynamic Standing Fair +   Ambulatory Fair +   Activity Tolerance   Medical Staff Made Aware OT Marcela   Assessment   Prognosis Fair   Assessment Pt is a 80 y o  male who presented to ED with syncope, cough, congestion  Dx: COVID, syncope  Comorbidities affecting pt's physical performance at time of assessment include: CVA, gout  Personal factors affecting pt at time of IE include: lines/tubes, O2 needs, decreased SpO2 with mobility  Prior to admission, pt was independent w/ all functional mobility w/ out AD, ambulated community distances and elevations, lives with wife in single level home  Upon evaluation: Pt mod I for bed mobility, mod I for sit to stand, and mod I for ambulation around room and in/out of bathroom  Pt limited by SpO2 decreases to 83%; No strength/balance deficits identified; Full objective findings from PT assessment regarding body systems outlined above  Safe for home D/C when medically ready, No need for skilled PT, safe for nursing assisted transfers and ambulation;  Encourage OOB for all meals and ambulate TID  Will DC PT orders; The patient's AM-PAC Basic Mobility Inpatient Short Form Raw Score is 24  A Raw score of greater than 17 suggests the patient may benefit from discharge to home  Please also refer to the recommendation of the Physical Therapist for safe discharge planning     Goals   Patient Goals to go home   Recommendation   PT Discharge Recommendation No rehabilitation needs  (may benefit from pulmonary rehab OP)   AM-PAC Basic Mobility Inpatient   Turning in Bed Without Bedrails 4   Lying on Back to Sitting on Edge of Flat Bed 4   Moving Bed to Chair 4   Standing Up From Chair 4   Walk in Room 4   Climb 3-5 Stairs 4   Basic Mobility Inpatient Raw Score 24   Basic Mobility Standardized Score 57 68   Highest Level Of Mobility   JH-HLM Goal 8: Walk 250 feet or more   JH-HLM Highest Level of Mobility 7: Walk 25 feet or more     Ramona Murphy, PT      Patient Name: Jacqueline Bernal  PHSYR'B Date: 1/13/2022

## 2022-01-13 NOTE — PLAN OF CARE
Problem: PAIN - ADULT  Goal: Verbalizes/displays adequate comfort level or baseline comfort level  Description: Interventions:  - Encourage patient to monitor pain and request assistance  - Assess pain using appropriate pain scale  - Administer analgesics based on type and severity of pain and evaluate response  - Implement non-pharmacological measures as appropriate and evaluate response  - Consider cultural and social influences on pain and pain management  - Notify physician/advanced practitioner if interventions unsuccessful or patient reports new pain  Outcome: Progressing     Problem: INFECTION - ADULT  Goal: Absence or prevention of progression during hospitalization  Description: INTERVENTIONS:  - Assess and monitor for signs and symptoms of infection  - Monitor lab/diagnostic results  - Monitor all insertion sites, i e  indwelling lines, tubes, and drains  - Monitor endotracheal if appropriate and nasal secretions for changes in amount and color  - [place] appropriate cooling/warming therapies per order  - Administer medications as ordered  - Instruct and encourage patient and family to use good hand hygiene technique  - Identify and instruct in appropriate isolation precautions for identified infection/condition  Outcome: Progressing  Goal: Absence of fever/infection during neutropenic period  Description: INTERVENTIONS:  - Monitor WBC    Outcome: Progressing     Problem: SAFETY ADULT  Goal: Patient will remain free of falls  Description: INTERVENTIONS:  - Educate patient/family on patient safety including physical limitations  - Instruct patient to call for assistance with activity   - Consult OT/PT to assist with strengthening/mobility   - Keep Call bell within reach  - Keep bed low and locked with side rails adjusted as appropriate  - Keep care items and personal belongings within reach  - Initiate and maintain comfort rounds  - Make Fall Risk Sign visible to staff  - Offer Toileting every 2 Hours, in advance of need  - Initiate/Maintain bed alarm  - Obtain necessary fall risk management equipment:   - Apply yellow socks and bracelet for high fall risk patients  - Consider moving patient to room near nurses station  Outcome: Progressing  Goal: Maintain or return to baseline ADL function  Description: INTERVENTIONS:  -  Assess patient's ability to carry out ADLs; assess patient's baseline for ADL function and identify physical deficits which impact ability to perform ADLs (bathing, care of mouth/teeth, toileting, grooming, dressing, etc )  - Assess/evaluate cause of self-care deficits   - Assess range of motion  - Assess patient's mobility; develop plan if impaired  - Assess patient's need for assistive devices and provide as appropriate  - Encourage maximum independence but intervene and supervise when necessary  - Involve family in performance of ADLs  - Assess for home care needs following discharge   - Consider OT consult to assist with ADL evaluation and planning for discharge  - Provide patient education as appropriate  Outcome: Progressing  Goal: Maintains/Returns to pre admission functional level  Description: INTERVENTIONS:  - Perform BMAT or MOVE assessment daily    - Set and communicate daily mobility goal to care team and patient/family/caregiver  - Collaborate with rehabilitation services on mobility goals if consulted  - Perform Range of Motion 2 times a day  - Reposition patient every 2 hours    - Dangle patient 2 times a day  - Stand patient 2 times a day  - Ambulate patient 2 times a day  - Out of bed to chair 2 times a day   - Out of bed for meals 2 times a day  - Out of bed for toileting  - Record patient progress and toleration of activity level   Outcome: Progressing     Problem: DISCHARGE PLANNING  Goal: Discharge to home or other facility with appropriate resources  Description: INTERVENTIONS:  - Identify barriers to discharge w/patient and caregiver  - Arrange for needed discharge resources and transportation as appropriate  - Identify discharge learning needs (meds, wound care, etc )  - Arrange for interpretive services to assist at discharge as needed  - Refer to Case Management Department for coordinating discharge planning if the patient needs post-hospital services based on physician/advanced practitioner order or complex needs related to functional status, cognitive ability, or social support system  Outcome: Progressing     Problem: Knowledge Deficit  Goal: Patient/family/caregiver demonstrates understanding of disease process, treatment plan, medications, and discharge instructions  Description: Complete learning assessment and assess knowledge base    Interventions:  - Provide teaching at level of understanding  - Provide teaching via preferred learning methods  Outcome: Progressing     Problem: Potential for Falls  Goal: Patient will remain free of falls  Description: INTERVENTIONS:  - Educate patient/family on patient safety including physical limitations  - Instruct patient to call for assistance with activity   - Consult OT/PT to assist with strengthening/mobility   - Keep Call bell within reach  - Keep bed low and locked with side rails adjusted as appropriate  - Keep care items and personal belongings within reach  - Initiate and maintain comfort rounds  - Make Fall Risk Sign visible to staff  - Offer Toileting every 2 Hours, in advance of need  - Initiate/Maintain bed alarm  - Obtain necessary fall risk management equipment:   - Apply yellow socks and bracelet for high fall risk patients  - Consider moving patient to room near nurses station  Outcome: Progressing     Problem: MOBILITY - ADULT  Goal: Maintain or return to baseline ADL function  Description: INTERVENTIONS:  -  Assess patient's ability to carry out ADLs; assess patient's baseline for ADL function and identify physical deficits which impact ability to perform ADLs (bathing, care of mouth/teeth, toileting, grooming, dressing, etc )  - Assess/evaluate cause of self-care deficits   - Assess range of motion  - Assess patient's mobility; develop plan if impaired  - Assess patient's need for assistive devices and provide as appropriate  - Encourage maximum independence but intervene and supervise when necessary  - Involve family in performance of ADLs  - Assess for home care needs following discharge   - Consider OT consult to assist with ADL evaluation and planning for discharge  - Provide patient education as appropriate  Outcome: Progressing  Goal: Maintains/Returns to pre admission functional level  Description: INTERVENTIONS:  - Perform BMAT or MOVE assessment daily    - Set and communicate daily mobility goal to care team and patient/family/caregiver  - Collaborate with rehabilitation services on mobility goals if consulted  - Perform Range of Motion 2 times a day  - Reposition patient every 2 hours    - Dangle patient 2 times a day  - Stand patient 2 times a day  - Ambulate patient 2 times a day  - Out of bed to chair 2 times a day   - Out of bed for meals 2 times a day  - Out of bed for toileting  - Record patient progress and toleration of activity level   Outcome: Progressing     Problem: Prexisting or High Potential for Compromised Skin Integrity  Goal: Skin integrity is maintained or improved  Description: INTERVENTIONS:  - Identify patients at risk for skin breakdown  - Assess and monitor skin integrity  - Assess and monitor nutrition and hydration status  - Monitor labs   - Assess for incontinence   - Turn and reposition patient  - Assist with mobility/ambulation  - Relieve pressure over bony prominences  - Avoid friction and shearing  - Provide appropriate hygiene as needed including keeping skin clean and dry  - Evaluate need for skin moisturizer/barrier cream  - Collaborate with interdisciplinary team   - Patient/family teaching  - Consider wound care consult   Outcome: Progressing     Problem: Nutrition/Hydration-ADULT  Goal: Nutrient/Hydration intake appropriate for improving, restoring or maintaining nutritional needs  Description: Monitor and assess patient's nutrition/hydration status for malnutrition  Collaborate with interdisciplinary team and initiate plan and interventions as ordered  Monitor patient's weight and dietary intake as ordered or per policy  Utilize nutrition screening tool and intervene as necessary  Determine patient's food preferences and provide high-protein, high-caloric foods as appropriate       INTERVENTIONS:  - Monitor oral intake, urinary output, labs, and treatment plans  - Assess nutrition and hydration status and recommend course of action  - Evaluate amount of meals eaten  - Assist patient with eating if necessary   - Allow adequate time for meals  - Recommend/ encourage appropriate diets, oral nutritional supplements, and vitamin/mineral supplements  - Order, calculate, and assess calorie counts as needed  - Recommend, monitor, and adjust tube feedings and TPN/PPN based on assessed needs  - Assess need for intravenous fluids  - Provide specific nutrition/hydration education as appropriate  - Include patient/family/caregiver in decisions related to nutrition  Outcome: Progressing

## 2022-01-13 NOTE — OCCUPATIONAL THERAPY NOTE
Occupational Therapy Re- Evaluation     Patient Name: Neal Mahmood  UFWXO'Z Date: 1/13/2022  Problem List  Principal Problem:    Pneumonia due to COVID-19 virus  Active Problems:    Chronic kidney disease, stage 3 ([place] [address] )    Primary hypertension    Type 2 diabetes mellitus with stage 3a chronic kidney disease, without long-term current use of insulin ([place] [address] )    Ulcerative rectosigmoiditis without complication (HCC)    CHADWICK (acute kidney injury) ([place] [address] )    NSVT (nonsustained ventricular tachycardia) ([facility])    Acute respiratory failure with hypoxia ([place] [address] )    Past Medical History  Past Medical History:   Diagnosis Date    CVA (cerebral vascular accident) ([place] [address] )     Diverticulitis     Gout     Hypercholesterolemia     Hypertension     Renal disorder     Vasovagal syncope 12/28/2021     Past Surgical History  Past Surgical History:   Procedure Laterality Date    CARDIAC LOOP RECORDER      COLONOSCOPY  09/18/2006    complete; 10 yrs    COLONOSCOPY  10/23/2017    complete; 5 yrs    COLONOSCOPY  05/06/2020    Severe active left-sided colitis, erythematous and ulcerated mucosa in the rectosigmoid, inflammatory polyp         01/13/22 1026   OT Last Visit   OT Visit Date 01/13/22   Note Type   Note type Re-Evaluation   Restrictions/Precautions   Other Precautions Contact/isolation; Airborne/isolation;Droplet precautions   Pain Assessment   Pain Assessment Tool 0-10   Pain Score No Pain   Home Living   Additional Comments See initial OT evaluation   Prior Function   Level of [place] Independent with ADLs and functional mobility   Lives With Spouse   Receives Help From Family   ADL Assistance Independent   IADLs Independent   Falls in the last 6 months 1 to 4   ADL   [address]  Supervision/Setup   LB Bathing Assistance 5  Supervision/Setup   UB Dressing Assistance 5  Supervision/Setup   LB Dressing Assistance 5  Supervision/Setup Toileting Assistance  5  Supervision/Setup   Bed Mobility   Additional Comments Pt received in recliner   Transfers   Sit to Stand 6  Modified independent   Additional items Armrests   Stand to Sit 6  Modified independent   Additional items Armrests   Functional Mobility   Functional Mobility 5  Supervision   Additional Comments no AD   Balance   Static Sitting Normal   Dynamic Sitting Good   Static Standing Fair +   Dynamic Standing Fair +   Activity Tolerance   Activity Tolerance Patient tolerated treatment well   Medical Staff Made Aware PT Bing   RUE Assessment   RUE Assessment WFL   LUE Assessment   LUE Assessment WFL   Cognition   Overall Cognitive Status WFL   Arousal/Participation Alert   Attention Within functional limits   Orientation Level Oriented X4   Memory Within functional limits   Following Commands Follows all commands and directions without difficulty   Assessment   Assessment Pt is a 80 y o  male seen for OT re- evaluation at [address] [place] [address], admitted 12/28/2021 w/ Pneumonia due to COVID-19 virus  Comorbidities affecting pt's functional performance at time of assessment include: CKD, type II DM, NSVT, HX OF CVA, etc (see chart for additional hx)  Personal factors affecting pt at time of IE include:steps to enter environment  Prior to admission, pt was living with wife in house with steps to enter  Pt was I w/  ADLS and IADLS, & required no use of DME PTA  Upon evaluation, pt appears to be at/close functional baseline  No further OT needs indicated  The patient's raw score on the AM-PAC Daily Activity inpatient short form is 24, standardized score is 57 54, less than 39 4  Patients at this level are likely to benefit from DC to home  Please refer to the recommendation of the Occupational Therapist for safe DC planning  At this time, OT recommendations at time of discharge are home with no OT needs     Goals   Patient Goals Pt wishes to get home   Plan   OT Frequency Eval only Recommendation   OT Discharge Recommendation No rehabilitation needs  ( home with family support)   AM-PAC Daily Activity Inpatient   Lower Body Dressing 4   Bathing 4   Toileting 4   Upper Body Dressing 4   Grooming 4   Eating 4   Daily Activity Raw Score 24   Daily Activity Standardized Score (Calc for Raw Score >=11) 57 54   AM-PAC Applied Cognition Inpatient   Following a Speech/Presentation 4   Understanding Ordinary Conversation 4   Taking Medications 4   Remembering Where Things Are Placed or Put Away 4   Remembering List of 4-5 Errands 4   Taking Care of Complicated Tasks 4   Applied Cognition Raw Score 24   Applied Cognition Standardized Score 62 21           Kat Garcia, OTR/L

## 2022-01-13 NOTE — CASE MANAGEMENT
Case Management Discharge Planning Note    Patient name Jacqueline Bernal  Location /-54 MRN [id number]  : 1950 Date 2022       Current Admission Date: 2021  Current Admission Diagnosis:Pneumonia due to COVID-19 virus   Patient Active Problem List    Diagnosis Date Noted    Acute respiratory failure with hypoxia ([facility] ) 2021    Pneumonia due to COVID-19 virus 2021    [facility] hemoglobin ([facility] ) 2021    Bilateral carotid artery stenosis 2020    NSVT (nonsustained ventricular tachycardia) ([facility] ) 2020    Witnessed episode of apnea     CH[place]ICK (acute kidney injury) ([place] [address] ) 2020    Transient alteration of awareness 2020    History of stroke 2020    Erythrocytosis 2019    Ulcerative rectosigmoiditis without complication ([place] [address] )     Dyslipidemia 2018    Microalbuminuria 2016    Type 2 diabetes mellitus with stage 3a chronic kidney disease, without long-term current use of insulin ([place] [address] ) 2016    Obesity 10/24/2015    Colon, diverticulosis 2013    Elevated C-reactive protein 2013    Chronic kidney disease, stage 3 ([facility] ) 05/15/2012    Elevated PSA 05/15/2012    Essential hypertriglyceridemia 05/15/2012    Gout 05/15/2012    Primary hypertension 05/15/2012      LOS (days): 16  Geometric Mean LOS (GMLOS) (days): 5 40  Days to GMLOS:-11     OBJECTIVE:  Risk of Unplanned Readmission Score: 17         Current admission status: Inpatient   Preferred Pharmacy:   [facility]  [address] [place] [address]  Phone: [phone] Fax: [phone]    Primary Care Provider: Pratima Lazo DO    Primary Insurance: MEDICARE  Secondary Insurance: Jose Luis Mcnulty    DISCHARGE DETAILS:    Discharge planning discussed with[de-identified] patient and patient's wife(Alberta)  Freedom of Choice: Yes  Comments - Freedom of Choice: Referral sent to [facility] for SN   If home O2 needed, Pt is choosing [facility]  CM contacted family/caregiver?: Yes  Were Treatment Team discharge recommendations reviewed with patient/caregiver?: Yes  Did patient/caregiver verbalize understanding of patient care needs?: Yes  Were patient/caregiver advised of the risks associated with not following Treatment Team discharge recommendations?: Yes    Contacts  Patient Contacts: Zoila Reyes: wife  Relationship to Patient[de-identified] Family  Contact Method: Phone  Phone Number: [phone]  Reason/Outcome: Emergency Contact,Continuity of [address]         Is the patient interested in [facility] at discharge?: Yes  [address] requested[de-identified] [address] Name[de-identified] [address] [place] [address] Provider[de-identified] PCP  Home Health Services Needed[de-identified] Evaluate Functional Status and Safety,Oxygen Via Nasal Cannula  Homebound Criteria Met[de-identified] Requires the Assistance of Another Person for Safe Ambulation or to Leave the Home  Supporting Clincal Findings[de-identified] Requires Oxygen    IMM Given (Date):: 01/13/22  IMM Given to[de-identified] Patient (verbal consent due to Jeff )     Additional Comments: Call placed to Pt re: VNA upon discharge  Pt reports his plan is to return home and is requesting SN  Pt does not have preference for VNA agency and requested CM to call his wife  Pt aware he may need home oxygen  Pt is choosing [facility] if he needs oxygen upon discharge  Call placed to Pt's wife(Alberta: [phone]) informed of VNA and tentative home O2 upon discharge  Pt's wife does not have preference and choosing referral to [facility]   Referral sent to [facility] for SN via [facility]

## 2022-01-13 NOTE — PLAN OF CARE
Problem: PAIN - ADULT  Goal: Verbalizes/displays adequate comfort level or baseline comfort level  Description: Interventions:  - Encourage patient to monitor pain and request assistance  - Assess pain using appropriate pain scale  - Administer analgesics based on type and severity of pain and evaluate response  - Implement non-pharmacological measures as appropriate and evaluate response  - Consider cultural and social influences on pain and pain management  - Notify physician/advanced practitioner if interventions unsuccessful or patient reports new pain  Outcome: Progressing     Problem: INFECTION - ADULT  Goal: Absence or prevention of progression during hospitalization  Description: INTERVENTIONS:  - Assess and monitor for signs and symptoms of infection  - Monitor lab/diagnostic results  - Monitor all insertion sites, i e  indwelling lines, tubes, and drains  - Monitor endotracheal if appropriate and nasal secretions for changes in amount and color  - [place] appropriate cooling/warming therapies per order  - Administer medications as ordered  - Instruct and encourage patient and family to use good hand hygiene technique  - Identify and instruct in appropriate isolation precautions for identified infection/condition  Outcome: Progressing  Goal: Absence of fever/infection during neutropenic period  Description: INTERVENTIONS:  - Monitor WBC    Outcome: Progressing     Problem: SAFETY ADULT  Goal: Patient will remain free of falls  Description: INTERVENTIONS:  - Educate patient/family on patient safety including physical limitations  - Instruct patient to call for assistance with activity   - Consult OT/PT to assist with strengthening/mobility   - Keep Call bell within reach  - Keep bed low and locked with side rails adjusted as appropriate  - Keep care items and personal belongings within reach  - Initiate and maintain comfort rounds  - Make Fall Risk Sign visible to staff  - Offer Toileting every 2 Hours, in advance of need  - Initiate/Maintain bed alarm  - Obtain necessary fall risk management equipment:   - Apply yellow socks and bracelet for high fall risk patients  - Consider moving patient to room near nurses station  Outcome: Progressing  Goal: Maintain or return to baseline ADL function  Description: INTERVENTIONS:  -  Assess patient's ability to carry out ADLs; assess patient's baseline for ADL function and identify physical deficits which impact ability to perform ADLs (bathing, care of mouth/teeth, toileting, grooming, dressing, etc )  - Assess/evaluate cause of self-care deficits   - Assess range of motion  - Assess patient's mobility; develop plan if impaired  - Assess patient's need for assistive devices and provide as appropriate  - Encourage maximum independence but intervene and supervise when necessary  - Involve family in performance of ADLs  - Assess for home care needs following discharge   - Consider OT consult to assist with ADL evaluation and planning for discharge  - Provide patient education as appropriate  Outcome: Progressing  Goal: Maintains/Returns to pre admission functional level  Description: INTERVENTIONS:  - Perform BMAT or MOVE assessment daily    - Set and communicate daily mobility goal to care team and patient/family/caregiver  - Collaborate with rehabilitation services on mobility goals if consulted  - Perform Range of Motion 2 times a day  - Reposition patient every 2 hours    - Dangle patient 2 times a day  - Stand patient 2 times a day  - Ambulate patient 2 times a day  - Out of bed to chair 2 times a day   - Out of bed for meals 2 times a day  - Out of bed for toileting  - Record patient progress and toleration of activity level   Outcome: Progressing     Problem: DISCHARGE PLANNING  Goal: Discharge to home or other facility with appropriate resources  Description: INTERVENTIONS:  - Identify barriers to discharge w/patient and caregiver  - Arrange for needed discharge resources and transportation as appropriate  - Identify discharge learning needs (meds, wound care, etc )  - Arrange for interpretive services to assist at discharge as needed  - Refer to Case Management Department for coordinating discharge planning if the patient needs post-hospital services based on physician/advanced practitioner order or complex needs related to functional status, cognitive ability, or social support system  Outcome: Progressing     Problem: Knowledge Deficit  Goal: Patient/family/caregiver demonstrates understanding of disease process, treatment plan, medications, and discharge instructions  Description: Complete learning assessment and assess knowledge base    Interventions:  - Provide teaching at level of understanding  - Provide teaching via preferred learning methods  Outcome: Progressing     Problem: Potential for Falls  Goal: Patient will remain free of falls  Description: INTERVENTIONS:  - Educate patient/family on patient safety including physical limitations  - Instruct patient to call for assistance with activity   - Consult OT/PT to assist with strengthening/mobility   - Keep Call bell within reach  - Keep bed low and locked with side rails adjusted as appropriate  - Keep care items and personal belongings within reach  - Initiate and maintain comfort rounds  - Make Fall Risk Sign visible to staff  - Offer Toileting every 2 Hours, in advance of need  - Initiate/Maintain bed alarm  - Obtain necessary fall risk management equipment:   - Apply yellow socks and bracelet for high fall risk patients  - Consider moving patient to room near nurses station  Outcome: Progressing     Problem: MOBILITY - ADULT  Goal: Maintain or return to baseline ADL function  Description: INTERVENTIONS:  -  Assess patient's ability to carry out ADLs; assess patient's baseline for ADL function and identify physical deficits which impact ability to perform ADLs (bathing, care of mouth/teeth, toileting, grooming, dressing, etc )  - Assess/evaluate cause of self-care deficits   - Assess range of motion  - Assess patient's mobility; develop plan if impaired  - Assess patient's need for assistive devices and provide as appropriate  - Encourage maximum independence but intervene and supervise when necessary  - Involve family in performance of ADLs  - Assess for home care needs following discharge   - Consider OT consult to assist with ADL evaluation and planning for discharge  - Provide patient education as appropriate  Outcome: Progressing  Goal: Maintains/Returns to pre admission functional level  Description: INTERVENTIONS:  - Perform BMAT or MOVE assessment daily    - Set and communicate daily mobility goal to care team and patient/family/caregiver  - Collaborate with rehabilitation services on mobility goals if consulted  - Perform Range of Motion 2 times a day  - Reposition patient every 2 hours    - Dangle patient 2 times a day  - Stand patient 2 times a day  - Ambulate patient 2 times a day  - Out of bed to chair 2 times a day   - Out of bed for meals 2 times a day  - Out of bed for toileting  - Record patient progress and toleration of activity level   Outcome: Progressing     Problem: Prexisting or High Potential for Compromised Skin Integrity  Goal: Skin integrity is maintained or improved  Description: INTERVENTIONS:  - Identify patients at risk for skin breakdown  - Assess and monitor skin integrity  - Assess and monitor nutrition and hydration status  - Monitor labs   - Assess for incontinence   - Turn and reposition patient  - Assist with mobility/ambulation  - Relieve pressure over bony prominences  - Avoid friction and shearing  - Provide appropriate hygiene as needed including keeping skin clean and dry  - Evaluate need for skin moisturizer/barrier cream  - Collaborate with interdisciplinary team   - Patient/family teaching  - Consider wound care consult   Outcome: Progressing     Problem: Nutrition/Hydration-ADULT  Goal: Nutrient/Hydration intake appropriate for improving, restoring or maintaining nutritional needs  Description: Monitor and assess patient's nutrition/hydration status for malnutrition  Collaborate with interdisciplinary team and initiate plan and interventions as ordered  Monitor patient's weight and dietary intake as ordered or per policy  Utilize nutrition screening tool and intervene as necessary  Determine patient's food preferences and provide high-protein, high-caloric foods as appropriate       INTERVENTIONS:  - Monitor oral intake, urinary output, labs, and treatment plans  - Assess nutrition and hydration status and recommend course of action  - Evaluate amount of meals eaten  - Assist patient with eating if necessary   - Allow adequate time for meals  - Recommend/ encourage appropriate diets, oral nutritional supplements, and vitamin/mineral supplements  - Order, calculate, and assess calorie counts as needed  - Recommend, monitor, and adjust tube feedings and TPN/PPN based on assessed needs  - Assess need for intravenous fluids  - Provide specific nutrition/hydration education as appropriate  - Include patient/family/caregiver in decisions related to nutrition  Outcome: Progressing

## 2022-01-13 NOTE — CASE MANAGEMENT
Case Management Discharge Planning Note    Patient name Evelyn Collaod  Location /-37 MRN [id number]  : 1950 Date 2022       Current Admission Date: 2021  Current Admission Diagnosis:Pneumonia due to COVID-19 virus   Patient Active Problem List    Diagnosis Date Noted    Acute respiratory failure with hypoxia ([facility] ) 2021    Pneumonia due to COVID-19 virus 2021    [facility] ) 2021    Bilateral carotid artery stenosis 2020    NSVT (nonsustained ventricular tachycardia) ([facility] ) 2020    Witnessed episode of apnea     [place] (acute kidney injury) ([place] [address] ) 2020    Transient alteration of awareness 2020    History of stroke 2020    Erythrocytosis 2019    Ulcerative rectosigmoiditis without complication ([place] [address] )     Dyslipidemia 2018    Microalbuminuria 2016    Type 2 diabetes mellitus with stage 3a chronic kidney disease, without long-term current use of insulin ([place] [address] ) 2016    Obesity 10/24/2015    Colon, diverticulosis 2013    Elevated C-reactive protein 2013    Chronic kidney disease, stage 3 ([facility] ) 05/15/2012    Elevated PSA 05/15/2012    Essential hypertriglyceridemia 05/15/2012    Gout 05/15/2012    Primary hypertension 05/15/2012      LOS (days): 16  Geometric Mean LOS (GMLOS) (days): 5 40  Days to GMLOS:-11     OBJECTIVE:  Risk of Unplanned Readmission Score: 17     Current admission status: Inpatient   Preferred Pharmacy:   [facility]  [address]  [place] [address]  Phone: [phone] Fax: [phone]    Primary Care Provider: Terrell Flores DO    Primary Insurance: MEDICARE  Secondary Insurance: Marlo Estrada    DISCHARGE DETAILS:    Discharge planning discussed with[de-identified] patient and patient's wife(Alberta)  Freedom of Choice: Yes  Comments - Freedom of Choice: Referral sent to [facility] for SN   If home O2 needed, Pt is choosing [facility]  CM contacted family/caregiver?: Yes  Were Treatment Team discharge recommendations reviewed with patient/caregiver?: Yes  Did patient/caregiver verbalize understanding of patient care needs?: Yes  Were patient/caregiver advised of the risks associated with not following Treatment Team discharge recommendations?: Yes    Contacts  Patient Contacts: Lulu Taveras: wife  Relationship to Patient[de-identified] Family  Contact Method: Phone  Phone Number: [phone]  Reason/Outcome: Emergency Contact,Continuity of [address]         Is the patient interested in [facility] at discharge?: Yes  [address] requested[de-identified] [address] Name[de-identified] [facility] Provider[de-identified] PCP  Home Health Services Needed[de-identified] Evaluate Functional Status and Safety,Oxygen Via Nasal Cannula  Homebound Criteria Met[de-identified] Requires the Assistance of Another Person for Safe Ambulation or to Leave the Home  Supporting Clincal Findings[de-identified] Requires Oxygen    Additional Comments: Call placed to Pt re: VNA upon discharge  Pt reports his plan is to return home and is requesting SN  Pt does not have preference for VNA agency and requested CM to call his wife  Pt aware he may need home oxygen  Pt is choosing [facility] if he needs oxygen upon discharge  Call placed to Pt's wife(Alberta: [phone]) informed of VNA and tentative home O2 upon discharge  Pt's wife does not have preference and choosing referral to [facility]   Referral sent to [facility] for SN via [facility]

## 2022-01-13 NOTE — PROGRESS NOTES
Progress Note - Aleshia Hunt 80 y o  male MRN: [id number]    Unit/Bed#: -01 Encounter: [id number]      Assessment:  Principal Problem:    Pneumonia due to COVID-19 virus  Active Problems:    Acute respiratory failure with hypoxia (HCC)    Chronic kidney disease, stage 3 (HCC)    Primary hypertension    Type 2 diabetes mellitus with stage 3a chronic kidney disease, without long-term current use of insulin (HCC)    Ulcerative rectosigmoiditis without complication (HCC)    CHADWICK (acute kidney injury) ([address] )    NSVT (nonsustained ventricular tachycardia) ([facility])  Resolved Problems:    Vasovagal syncope        Plan:  · Pneumonia with COVID-19 virus-now on tapering course of prednisone  · Acute hypoxic respiratory failure with slow but gradual decrease in O2 self with-is now down to 5 5 L today and hope to have him continue to taper this  · Acute kidney injury-now improved  · Diabetes mellitus type 2 with underlying stage IIIA count kidney disease-creatinine stable at 1 36  · Vasovagal syncope on presentation-no signs of recurrence    Subjective:   Patient reports he is feeling a bit stronger today  Denies any sputum production  ROS  Comprehensive system review negative other than noted above    Objective:     Vitals: Blood pressure 153/96, pulse 81, temperature 98 °F (36 7 °C), temperature source Axillary, resp  rate 19, weight 93 2 kg (205 lb 7 5 oz), SpO2 95 %  ,Body mass index is 30 34 kg/m²    Current Facility-Administered Medications   Medication Dose Route Frequency Provider Last Rate Last Admin    acetaminophen (TYLENOL) tablet 650 mg  650 mg Oral Q6H PRN Cassandra Blanco MD        allopurinol (ZYLOPRIM) tablet 200 mg  200 mg Oral Daily Cassandra Blanco MD   200 mg at 01/13/22 0838    amLODIPine (NORVASC) tablet 5 mg  5 mg Oral Daily Mayi Fly, DO   5 mg at 01/13/22 3068    ascorbic acid (VITAMIN C) tablet 1,000 mg  1,000 mg Oral Daily Radha Ritter PA-C   1,000 mg at 01/13/22 0839    atorvastatin (LIPITOR) tablet 40 mg  40 mg Oral Daily With Constantine Wolff MD   40 mg at 01/13/22 1723    budesonide-formoterol (SYMBICORT) 160-4 5 mcg/act inhaler 2 puff  2 puff Inhalation BID Nuha Ríos PA-C   2 puff at 01/13/22 1722    cholecalciferol (VITAMIN D3) tablet 400 Units  400 Units Oral Daily Nuha Ríos PA-C   400 Units at 01/13/22 0838    clopidogrel (PLAVIX) tablet 75 mg  75 mg Oral Daily Candie Luo MD   75 mg at 01/13/22 0839    dexamethasone (DECADRON) injection 10 mg  10 mg Intravenous Daily Yolandaoneida Bustillos DO   10 mg at 01/13/22 0839    enoxaparin (LOVENOX) subcutaneous injection 30 mg  30 mg Subcutaneous Q12H [facility] Candie Luo MD   30 mg at 01/13/22 0839    insulin glargine (LANTUS) subcutaneous injection 12 Units 0 12 mL  12 Units Subcutaneous QAM Candie Luo MD   12 Units at 01/13/22 0837    insulin lispro (HumaLOG) 100 units/mL subcutaneous injection 1-5 Units  1-5 Units Subcutaneous TID AC Candie Luo MD   1 Units at 01/12/22 1746    insulin lispro (HumaLOG) 100 units/mL subcutaneous injection 3 Units  3 Units Subcutaneous TID With Meals Candie Luo MD   3 Units at 01/13/22 1723    metoprolol tartrate (LOPRESSOR) tablet 25 mg  25 mg Oral Q12H [facility] Mayi DO Trell   25 mg at 01/13/22 9844    multivitamin stress formula tablet 1 tablet  1 tablet Oral Daily Nuha Ríos PA-C   1 tablet at 01/13/22 0839    ondansetron (ZOFRAN) injection 4 mg  4 mg Intravenous Q6H PRN Candie Luo MD        sodium chloride (OCEAN) 0 65 % nasal spray 1 spray  1 spray Each Nare Q1H PRN Chacha Avila PA-C   1 spray at 01/11/22 1216    sulfaSALAzine (AZULFIDINE) tablet 500 mg  500 mg Oral 4x Daily Candie Luo MD   500 mg at 01/13/22 1723    zinc sulfate (ZINCATE) capsule 220 mg  220 mg Oral Daily Nuha Ríos PA-C   220 mg at 01/13/22 0115     Medications Prior to Admission   Medication    allopurinol (ZYLOPRIM) 100 mg tablet    amLODIPine (NORVASC) 10 mg tablet    atorvastatin (LIPITOR) 40 mg tablet    clopidogrel (PLAVIX) 75 mg tablet    Coenzyme Q10 200 MG capsule    Empagliflozin (Jardiance) 10 MG TABS    fenofibrate (TRICOR) 145 mg tablet    metoprolol tartrate (LOPRESSOR) 50 mg tablet    sulfaSALAzine (AZULFIDINE) 500 mg tablet         Intake/Output Summary (Last 24 hours) at 1/13/2022 1824  Last data filed at 1/13/2022 0809  Gross per 24 hour   Intake --   Output 450 ml   Net -450 ml       Physical Exam:  General appearance: cooperative  Neck: no adenopathy, no JVD, supple, symmetrical, trachea midline and thyroid not enlarged, symmetric, no tenderness/mass/nodules  Lungs:  Decreased breath sounds-no wheezes  Heart: regular rate and rhythm, S1, S2 normal, no murmur, click, rub or gallop  Abdomen: soft, non-tender; bowel sounds normal; no masses,  no organomegaly  Extremities: extremities normal, warm and well-perfused; no cyanosis, clubbing, or edema  Skin: Skin color, texture, turgor normal  No rashes or lesions  Neurologic:  No tremors  Lab, Imaging and other studies: I have personally reviewed pertinent reports  Results from last 7 days   Lab Units 01/13/22  0550 01/11/22  0615 01/10/22  0458 01/09/22  0618 01/09/22  0618 01/07/22  0626 01/07/22  0626   WBC Thousand/uL 7 71 8 85 9 53   < > 8 78   < > 10 44*   HEMOGLOBIN g/dL 11 5* 11 7* 11 5*   < > 12 8   < > 13 6   HEMATOCRIT % 38 3 38 2 38 0   < > 41 2   < > 45 3   PLATELETS Thousands/uL 240 276 296   < > 312   < > 410*   NEUTROS PCT %  --  83* 84*  --   --   --  84*   LYMPHS PCT %  --  7* 7*  --   --   --  4*   LYMPHO PCT %  --   --   --   --  6*  --   --    MONOS PCT %  --  8 6  --   --   --  5   MONO PCT %  --   --   --   --  5  --   --    EOS PCT %  --  1 1  --  1  --  0    < > = values in this interval not displayed       Results from last 7 days   Lab Units 01/13/22  0550 01/11/22  0615 01/10/22  0458   POTASSIUM mmol/L 3 7 3 8 4 0   CHLORIDE mmol/L 108 109* 108   CO2 mmol/L 24 25 26   BUN mg/dL 34* 32* 39* CREATININE mg/dL 1 36* 1 36* 1 41*   CALCIUM mg/dL 8 5 8 2* 8 3   ALK PHOS U/L 81 85 80   ALT U/L 108* 117* 85*   AST U/L 50* 67* 47*     Lab Results   Component Value Date    TROPONINI <0 02 03/19/2020    TROPONINI <0 02 01/28/2020    TROPONINI <0 02 01/22/2020    CKMB <1 0 12/28/2021    CKTOTAL 161 12/28/2021         Lab Results   Component Value Date    BLOODCX No Growth After 5 Days  01/01/2022    BLOODCX No Growth After 5 Days  01/01/2022    BLOODCX No Growth After 5 Days  12/29/2021    BLOODCX Micrococcus luteus (A) 12/29/2021    URINECX >100,000 cfu/ml Escherichia coli (A) 01/28/2020       Imaging:  Results for orders placed during the hospital encounter of 12/28/21    XR chest portable    Narrative  CHEST    INDICATION:   Worsening hypoxia  Patient has confirmed COVID-19  COMPARISON:  12/28/2021    EXAM PERFORMED/VIEWS:  XR CHEST PORTABLE      FINDINGS:    Loop recorder noted as on prior study  Cardiac silhouette size unchanged    Bilateral multifocal groundglass opacities are present  Interval worsening has occurred  There is no pleural effusion or pneumothorax  Osseous structures appear within normal limits for patient age  Impression  Bilateral multifocal groundglass opacities are present  In the setting of COVID-19 infection, this is most in keeping with COVID 19 pneumonia  There has been interval worsening            Workstation performed: [id number]    No results found for this or any previous visit  PATIENT CENTERED ROUNDS: I have performed rounds with the nursing staff            Esperanza Pelletier DO

## 2022-01-13 NOTE — PLAN OF CARE
Problem: PAIN - ADULT  Goal: Verbalizes/displays adequate comfort level or baseline comfort level  Description: Interventions:  - Encourage patient to monitor pain and request assistance  - Assess pain using appropriate pain scale  - Administer analgesics based on type and severity of pain and evaluate response  - Implement non-pharmacological measures as appropriate and evaluate response  - Consider cultural and social influences on pain and pain management  - Notify physician/advanced practitioner if interventions unsuccessful or patient reports new pain  Outcome: Progressing     Problem: INFECTION - ADULT  Goal: Absence or prevention of progression during hospitalization  Description: INTERVENTIONS:  - Assess and monitor for signs and symptoms of infection  - Monitor lab/diagnostic results  - Monitor all insertion sites, i e  indwelling lines, tubes, and drains  - Monitor endotracheal if appropriate and nasal secretions for changes in amount and color  - [place] appropriate cooling/warming therapies per order  - Administer medications as ordered  - Instruct and encourage patient and family to use good hand hygiene technique  - Identify and instruct in appropriate isolation precautions for identified infection/condition  Outcome: Progressing  Goal: Absence of fever/infection during neutropenic period  Description: INTERVENTIONS:  - Monitor WBC    Outcome: Progressing     Problem: SAFETY ADULT  Goal: Patient will remain free of falls  Description: INTERVENTIONS:  - Educate patient/family on patient safety including physical limitations  - Instruct patient to call for assistance with activity   - Consult OT/PT to assist with strengthening/mobility   - Keep Call bell within reach  - Keep bed low and locked with side rails adjusted as appropriate  - Keep care items and personal belongings within reach  - Initiate and maintain comfort rounds  - Make Fall Risk Sign visible to staff  - Offer Toileting every 2 Hours, in advance of need  - Initiate/Maintain bed alarm  - Obtain necessary fall risk management equipment:   - Apply yellow socks and bracelet for high fall risk patients  - Consider moving patient to room near nurses station  Outcome: Progressing  Goal: Maintain or return to baseline ADL function  Description: INTERVENTIONS:  -  Assess patient's ability to carry out ADLs; assess patient's baseline for ADL function and identify physical deficits which impact ability to perform ADLs (bathing, care of mouth/teeth, toileting, grooming, dressing, etc )  - Assess/evaluate cause of self-care deficits   - Assess range of motion  - Assess patient's mobility; develop plan if impaired  - Assess patient's need for assistive devices and provide as appropriate  - Encourage maximum independence but intervene and supervise when necessary  - Involve family in performance of ADLs  - Assess for home care needs following discharge   - Consider OT consult to assist with ADL evaluation and planning for discharge  - Provide patient education as appropriate  Outcome: Progressing  Goal: Maintains/Returns to pre admission functional level  Description: INTERVENTIONS:  - Perform BMAT or MOVE assessment daily    - Set and communicate daily mobility goal to care team and patient/family/caregiver  - Collaborate with rehabilitation services on mobility goals if consulted  - Perform Range of Motion 2 times a day  - Reposition patient every 2 hours    - Dangle patient 2 times a day  - Stand patient 2 times a day  - Ambulate patient 2 times a day  - Out of bed to chair 2 times a day   - Out of bed for meals 2 times a day  - Out of bed for toileting  - Record patient progress and toleration of activity level   Outcome: Progressing     Problem: DISCHARGE PLANNING  Goal: Discharge to home or other facility with appropriate resources  Description: INTERVENTIONS:  - Identify barriers to discharge w/patient and caregiver  - Arrange for needed discharge resources and transportation as appropriate  - Identify discharge learning needs (meds, wound care, etc )  - Arrange for interpretive services to assist at discharge as needed  - Refer to Case Management Department for coordinating discharge planning if the patient needs post-hospital services based on physician/advanced practitioner order or complex needs related to functional status, cognitive ability, or social support system  Outcome: Progressing     Problem: Knowledge Deficit  Goal: Patient/family/caregiver demonstrates understanding of disease process, treatment plan, medications, and discharge instructions  Description: Complete learning assessment and assess knowledge base    Interventions:  - Provide teaching at level of understanding  - Provide teaching via preferred learning methods  Outcome: Progressing     Problem: Potential for Falls  Goal: Patient will remain free of falls  Description: INTERVENTIONS:  - Educate patient/family on patient safety including physical limitations  - Instruct patient to call for assistance with activity   - Consult OT/PT to assist with strengthening/mobility   - Keep Call bell within reach  - Keep bed low and locked with side rails adjusted as appropriate  - Keep care items and personal belongings within reach  - Initiate and maintain comfort rounds  - Make Fall Risk Sign visible to staff  - Offer Toileting every 2 Hours, in advance of need  - Initiate/Maintain bed alarm  - Obtain necessary fall risk management equipment:   - Apply yellow socks and bracelet for high fall risk patients  - Consider moving patient to room near nurses station  Outcome: Progressing     Problem: MOBILITY - ADULT  Goal: Maintain or return to baseline ADL function  Description: INTERVENTIONS:  -  Assess patient's ability to carry out ADLs; assess patient's baseline for ADL function and identify physical deficits which impact ability to perform ADLs (bathing, care of mouth/teeth, toileting, grooming, dressing, etc )  - Assess/evaluate cause of self-care deficits   - Assess range of motion  - Assess patient's mobility; develop plan if impaired  - Assess patient's need for assistive devices and provide as appropriate  - Encourage maximum independence but intervene and supervise when necessary  - Involve family in performance of ADLs  - Assess for home care needs following discharge   - Consider OT consult to assist with ADL evaluation and planning for discharge  - Provide patient education as appropriate  Outcome: Progressing  Goal: Maintains/Returns to pre admission functional level  Description: INTERVENTIONS:  - Perform BMAT or MOVE assessment daily    - Set and communicate daily mobility goal to care team and patient/family/caregiver  - Collaborate with rehabilitation services on mobility goals if consulted  - Perform Range of Motion 2 times a day  - Reposition patient every 2 hours    - Dangle patient 2 times a day  - Stand patient 2 times a day  - Ambulate patient 2 times a day  - Out of bed to chair 2 times a day   - Out of bed for meals 2 times a day  - Out of bed for toileting  - Record patient progress and toleration of activity level   Outcome: Progressing     Problem: Prexisting or High Potential for Compromised Skin Integrity  Goal: Skin integrity is maintained or improved  Description: INTERVENTIONS:  - Identify patients at risk for skin breakdown  - Assess and monitor skin integrity  - Assess and monitor nutrition and hydration status  - Monitor labs   - Assess for incontinence   - Turn and reposition patient  - Assist with mobility/ambulation  - Relieve pressure over bony prominences  - Avoid friction and shearing  - Provide appropriate hygiene as needed including keeping skin clean and dry  - Evaluate need for skin moisturizer/barrier cream  - Collaborate with interdisciplinary team   - Patient/family teaching  - Consider wound care consult   Outcome: Progressing     Problem: Nutrition/Hydration-ADULT  Goal: Nutrient/Hydration intake appropriate for improving, restoring or maintaining nutritional needs  Description: Monitor and assess patient's nutrition/hydration status for malnutrition  Collaborate with interdisciplinary team and initiate plan and interventions as ordered  Monitor patient's weight and dietary intake as ordered or per policy  Utilize nutrition screening tool and intervene as necessary  Determine patient's food preferences and provide high-protein, high-caloric foods as appropriate       INTERVENTIONS:  - Monitor oral intake, urinary output, labs, and treatment plans  - Assess nutrition and hydration status and recommend course of action  - Evaluate amount of meals eaten  - Assist patient with eating if necessary   - Allow adequate time for meals  - Recommend/ encourage appropriate diets, oral nutritional supplements, and vitamin/mineral supplements  - Order, calculate, and assess calorie counts as needed  - Recommend, monitor, and adjust tube feedings and TPN/PPN based on assessed needs  - Assess need for intravenous fluids  - Provide specific nutrition/hydration education as appropriate  - Include patient/family/caregiver in decisions related to nutrition  Outcome: Progressing

## 2022-01-14 VITALS
DIASTOLIC BLOOD PRESSURE: 89 MMHG | OXYGEN SATURATION: 92 % | TEMPERATURE: 97.9 F | RESPIRATION RATE: 18 BRPM | HEART RATE: 81 BPM | BODY MASS INDEX: 29.27 KG/M2 | WEIGHT: 198.19 LBS | SYSTOLIC BLOOD PRESSURE: 145 MMHG

## 2022-01-14 LAB
DME PARACHUTE DELIVERY DATE ACTUAL: NORMAL
DME PARACHUTE DELIVERY DATE EXPECTED: NORMAL
DME PARACHUTE DELIVERY DATE REQUESTED: NORMAL
DME PARACHUTE ITEM DESCRIPTION: NORMAL
DME PARACHUTE ORDER STATUS: NORMAL
DME PARACHUTE SUPPLIER NAME: NORMAL
DME PARACHUTE SUPPLIER PHONE: NORMAL
GLUCOSE SERPL-MCNC: 138 MG/DL (ref 65–140)
GLUCOSE SERPL-MCNC: 202 MG/DL (ref 65–140)
GLUCOSE SERPL-MCNC: 70 MG/DL (ref 65–140)

## 2022-01-14 PROCEDURE — 94761 N-INVAS EAR/PLS OXIMETRY MLT: CPT

## 2022-01-14 PROCEDURE — 82948 REAGENT STRIP/BLOOD GLUCOSE: CPT

## 2022-01-14 PROCEDURE — 99239 HOSP IP/OBS DSCHRG MGMT >30: CPT | Performed by: INTERNAL MEDICINE

## 2022-01-14 RX ORDER — ECHINACEA PURPUREA EXTRACT 125 MG
2 TABLET ORAL
Refills: 0
Start: 2022-01-14

## 2022-01-14 RX ORDER — INSULIN GLARGINE 100 [IU]/ML
12 INJECTION, SOLUTION SUBCUTANEOUS EVERY MORNING
Qty: 10 ML | Refills: 0 | Status: SHIPPED | OUTPATIENT
Start: 2022-01-14 | End: 2022-01-21

## 2022-01-14 RX ORDER — AMLODIPINE BESYLATE 5 MG/1
5 TABLET ORAL DAILY
Qty: 30 TABLET | Refills: 0 | Status: SHIPPED | OUTPATIENT
Start: 2022-01-14 | End: 2022-04-04

## 2022-01-14 RX ORDER — ACETAMINOPHEN 325 MG/1
650 TABLET ORAL EVERY 6 HOURS PRN
Refills: 0
Start: 2022-01-14

## 2022-01-14 RX ORDER — OMEGA-3S/DHA/EPA/FISH OIL/D3 300MG-1000
400 CAPSULE ORAL DAILY
Refills: 0
Start: 2022-01-14

## 2022-01-14 RX ORDER — PREDNISONE 10 MG/1
TABLET ORAL
Qty: 18 TABLET | Refills: 0 | Status: SHIPPED | OUTPATIENT
Start: 2022-01-14 | End: 2022-04-04

## 2022-01-14 RX ORDER — BUDESONIDE AND FORMOTEROL FUMARATE DIHYDRATE 160; 4.5 UG/1; UG/1
2 AEROSOL RESPIRATORY (INHALATION) 2 TIMES DAILY
Qty: 10.2 G | Refills: 0 | Status: SHIPPED | OUTPATIENT
Start: 2022-01-14 | End: 2022-02-18 | Stop reason: SDUPTHER

## 2022-01-14 RX ADMIN — CHOLECALCIFEROL (VITAMIN D3) 10 MCG (400 UNIT) TABLET 400 UNITS: at 08:44

## 2022-01-14 RX ADMIN — DEXAMETHASONE SODIUM PHOSPHATE 10 MG: 4 INJECTION, SOLUTION INTRA-ARTICULAR; INTRALESIONAL; INTRAMUSCULAR; INTRAVENOUS; SOFT TISSUE at 08:43

## 2022-01-14 RX ADMIN — CLOPIDOGREL BISULFATE 75 MG: 75 TABLET ORAL at 08:44

## 2022-01-14 RX ADMIN — INSULIN LISPRO 1 UNITS: 100 INJECTION, SOLUTION INTRAVENOUS; SUBCUTANEOUS at 17:35

## 2022-01-14 RX ADMIN — ZINC SULFATE 220 MG (50 MG) CAPSULE 220 MG: CAPSULE at 08:44

## 2022-01-14 RX ADMIN — ATORVASTATIN CALCIUM 40 MG: 40 TABLET, FILM COATED ORAL at 17:36

## 2022-01-14 RX ADMIN — SULFASALAZINE 500 MG: 500 TABLET ORAL at 17:36

## 2022-01-14 RX ADMIN — BUDESONIDE AND FORMOTEROL FUMARATE DIHYDRATE 2 PUFF: 160; 4.5 AEROSOL RESPIRATORY (INHALATION) at 08:45

## 2022-01-14 RX ADMIN — METOPROLOL TARTRATE 25 MG: 25 TABLET, FILM COATED ORAL at 08:44

## 2022-01-14 RX ADMIN — INSULIN LISPRO 3 UNITS: 100 INJECTION, SOLUTION INTRAVENOUS; SUBCUTANEOUS at 12:10

## 2022-01-14 RX ADMIN — AMLODIPINE BESYLATE 5 MG: 5 TABLET ORAL at 08:44

## 2022-01-14 RX ADMIN — OXYCODONE HYDROCHLORIDE AND ACETAMINOPHEN 1000 MG: 500 TABLET ORAL at 08:44

## 2022-01-14 RX ADMIN — SULFASALAZINE 500 MG: 500 TABLET ORAL at 08:45

## 2022-01-14 RX ADMIN — SULFASALAZINE 500 MG: 500 TABLET ORAL at 12:10

## 2022-01-14 RX ADMIN — INSULIN GLARGINE 12 UNITS: 100 INJECTION, SOLUTION SUBCUTANEOUS at 10:06

## 2022-01-14 RX ADMIN — BUDESONIDE AND FORMOTEROL FUMARATE DIHYDRATE 2 PUFF: 160; 4.5 AEROSOL RESPIRATORY (INHALATION) at 17:36

## 2022-01-14 RX ADMIN — INSULIN LISPRO 3 UNITS: 100 INJECTION, SOLUTION INTRAVENOUS; SUBCUTANEOUS at 17:35

## 2022-01-14 RX ADMIN — ENOXAPARIN SODIUM 30 MG: 100 INJECTION SUBCUTANEOUS at 08:44

## 2022-01-14 RX ADMIN — ALLOPURINOL 200 MG: 100 TABLET ORAL at 08:44

## 2022-01-14 RX ADMIN — B-COMPLEX W/ C & FOLIC ACID TAB 1 TABLET: TAB at 08:44

## 2022-01-14 NOTE — TELEPHONE ENCOUNTER
----- Message from Renea Anaya MD sent at 1/9/2022  8:53 AM EST -----  This patient will be discharged from [place] [id number] in the next few days  He will need a basic metabolic profile approximately 1 week after discharge  He will need to follow-up in the [facility] office in about 2-3 weeks after discharge  Thank you
BMP in system
Patient is still admitted 
Patient still admitted 
1

## 2022-01-14 NOTE — RESPIRATORY THERAPY NOTE
Home Oxygen Qualifying Test       Patient name: Nathan Lujan        : 1950   Date of Test:  2022  Diagnosis:      Home Oxygen Test:    Medicare Guidelines require item(s) 1-5 on all ambulatory patients or 1 and 2 on non-ambulatory patients  1   Baseline SPO2 on Room Air at rest 87-85%  2   SPO2 during exercise on Room Air  %  During exercise monitor SpO2  If SPO2 increases >=89% with ambulation do not add supplemental             oxygen  If <= 88% on room air add O2 via NC and titrate patient  Patient must be ambulated with O2 and titrated to > 88% with exertion  3   SPO2 on Oxygen at rest 90-92% 5 5 lpm     4   SPO2 during exercise on Oxygen  92-93% a liter flow of 4 lpm     5   Exercise performed:          walking          [x]  Supplemental Home Oxygen is indicated  []  Client does not qualify for home oxygen        Respiratory Additional Notes-   Casi Mcdaniel, RT

## 2022-01-14 NOTE — DISCHARGE SUMMARY
Discharge Summary - Elizabeth Argueta 80 y o  male MRN: [id number]    Unit/Bed#: -01 Encounter: [id number]    Admission Date: 12/28/2021     Admitting Diagnosis: Syncope [R55]  Abrasion [T14  8XXA]  CHADWICK (acute kidney injury) ([facility] [address] ) [N17 9]  COVID-19 [U07 1]    HPI:  80-year-old male admitted with syncope and found to be positive for COVID-19 in the setting of a bone  Consults  Pulmonary-Dr Brianna Taylor, nephrology Dr Chitra Nova  Procedures Performed:   Orders Placed This Encounter   Procedures    ED ECG Documentation Only       Hospital Course:  Patient was admitted on December 28, 2021 with complaints of fever chills and cough which began approximately 12/ 24  He did experience an episode of syncope while urinating which was suspected to be a vasovagal reaction  Presented to the ER was found to have acute kidney injury with dehydration likely due to his COVID rleated infection  He was vaccinated with Pfizer vaccine in March in April of 2021 but had not yet had a booster  Patient developed increasing respiratory distress with need for mid flow oxygen at 13 L for an extended period of time  He was started on room to severe for 5 day course high-dose Decadro and completed Yudith and him at but in on 1/11/22  Patient did have an initial blood culture from 12/29 that grew contaminants but repeat blood cultures remained negative  Was a eventually able to be weaned down to 4 L nasal cannula will be discharged home with supplemental oxygen  He will follow with his primary physician as well as pulmonary  It is recommended that he does get his vaccine booster in 3 months    Significant Findings, Care, Treatment and Services Provided:  FINDINGS:     Normal heart size  Loop recorder in the left chest wall      Moderate bibasilar groundglass opacity   No effusion or pneumothorax      Osseous structures appear within normal limits for patient age      IMPRESSION:     Moderate bibasilar groundglass opacity due to Covid 19      No acute displaced fracture  General appearance: alert and cooperative  Neck: no adenopathy, no carotid bruit, no JVD and thyroid not enlarged, symmetric, no tenderness/mass/nodules  Lungs: minimal basilar crackles  Heart: regular rate and rhythm, S1, S2 normal, no murmur, click, rub or gallop  Abdomen: soft, non-tender; bowel sounds normal; no masses,  no organomegaly  Extremities: extremities normal, warm and well-perfused; no cyanosis, clubbing, or edema  Skin: Skin color, texture, turgor normal  No rashes or lesions  Neurologic: alert and focused on conversation       Complications: none    Discharge Diagnosis: Principal Problem:    Pneumonia due to COVID-19 virus  Active Problems:    Acute respiratory failure with hypoxia (HCC)    Chronic kidney disease, stage 3 (HCC)    Primary hypertension    Type 2 diabetes mellitus with stage 3a chronic kidney disease, without long-term current use of insulin (HCC)    Ulcerative rectosigmoiditis without complication (HCC)    CHADWICK (acute kidney injury) ([facility] [address] )    [facility]onsust[facility]        Condition at Discharge: good     Discharge instructions/Information to patient and family:   See after visit summary for information provided to patient and family  Provisions for Follow-Up Care:  See after visit summary for information related to follow-up care and any pertinent home health orders  Disposition: Home    Planned Readmission: No    Discharge Statement   I spent 35 minutes discharging the patient  This time was spent on the day of discharge  I had direct contact with the patient on the day of discharge   Coordination of care with case management team  Vaughn Lindsey DO

## 2022-01-14 NOTE — CASE MANAGEMENT
Case Management Discharge Planning Note    Patient name Geovani Alexis  Location [address] [facility] 316/-52 MRN [id number]  : 1950 Date 2022       Current Admission Date: 2021  Current Admission Diagnosis:Pneumonia due to COVID-19 virus   Patient Active Problem List    Diagnosis Date Noted    Acute [facility] failure with hypoxia ([facility] ) 2021    Pneumonia due to COVID-19 virus 2021    Elevated hemoglobin ([facility] ) 2021    Bilateral carotid artery stenosis 2020    NSVT (nonsustained ventricular tachycardia) ([facility] ) 2020    Witnessed episode of apnea     CHADWICK (acute kidney injury) ([place] [address] ) 2020    Transient alteration of awareness 2020    History of stroke 2020    Erythrocytosis 2019    Ulcerative rectosigmoiditis without complication ([place] [address] ) 7994    Dyslipidemia 2018    Microalbuminuria 2016    Type 2 diabetes mellitus with stage 3a chronic kidney disease, without long-term current use of insulin ([place] [address] ) 2016    Obesity 10/24/2015    Colon, diverticulosis 2013    Elevated C-reactive protein 2013    Chronic kidney disease, stage 3 ([facility] ) 05/15/2012    Elevated PSA 05/15/2012    Essential hypertriglyceridemia 05/15/2012    Gout 05/15/2012    Primary hypertension 05/15/2012      LOS (days): 17  Geometric Mean LOS (GMLOS) (days): 5 40  Days to GMLOS:-12     OBJECTIVE:  Risk of Unplanned Readmission Score: 19     Current admission status: Inpatient   Preferred Pharmacy:   [facility] [phone]  [place] [address]  Phone: [phone] Fax: [phone]    Primary Care Provider: Denzel Baca DO    Primary Insurance: MEDICARE  Secondary Insurance: [place]    DISCHARGE DETAILS:  Additional Comments: Spoke with Pt via tialo juárez  Informed Pt of need for home oxygen  Pt in agreement and is choosing [facility]   CM informed Pt that portable tanks will be given to Pt but [facility] will have to contact him or his wife for delivery of concentrator to the home before he can leave the hospital  Pt expressed understanding and requested Prasad Kimberlymustapha to reach out to his wife to schedule delivery of concentrator  [facility] via [place]

## 2022-01-14 NOTE — CASE MANAGEMENT
Case Management Discharge Planning Note    Patient name Benjy Miles  Location [address] 316/-11 MRN [id number]  : 1950 Date 2022       Current Admission Date: 2021  Current Admission Diagnosis:Pneumonia due to COVID-19 virus   Patient Active Problem List    Diagnosis Date Noted    Acute respiratory failure with hypoxia ([facility] ) 2021    Pneumonia due to COVID-19 virus 2021    Elevated hemoglobin ([facility] ) 2021    Bilateral carotid artery stenosis 2020    NSVT (nonsustained ventricular tachycardia) ([facility] ) 2020    Witnessed episode of apnea     CHADWICK (acute kidney injury) ([facility] ) 2020    Transient alteration of awareness 2020    History of stroke 2020    Erythrocytosis 2019    Ulcerative rectosigmoiditis without complication ([place] [address] )     Dyslipidemia 2018    Microalbuminuria 2016    Type 2 diabetes mellitus with stage 3a chronic kidney disease, without long-term current use of insulin ([facility]  ) 2016    Obesity 10/24/2015    Colon, diverticulosis 2013    Elevated C-reactive protein 2013    Chronic kidney disease, stage 3 ([facility] ) 05/15/2012    Elevated PSA 05/15/2012    Essential hypertriglyceridemia 05/15/2012    Gout 05/15/2012    Primary hypertension 05/15/2012      LOS (days): 17  Geometric Mean LOS (GMLOS) (days): 5 40  Days to GMLOS:-12     OBJECTIVE:  Risk of Unplanned Readmission Score: 19     Current admission status: Inpatient   Preferred Pharmacy:   [facility] [address]  [place] [address]  Phone: [phone] Fax: [phone]    Primary Care Provider: Fady Guevara DO    Primary Insurance: MEDICARE  Secondary Insurance: Sonia Crowe    DISCHARGE DETAILS:  Additional Comments: Call placed to Pt's wife([place]ber[place]: [phone]), left message re: oxygen delivery  Per Tino Banda at [address], portable tanks can be given to Pt   Spoke with Pt via tialo juárez outside of room due to COVID+  Pt informed CM that his wife heard from [place] and [facility] will be delivering concentrator around 5:30pm  Pt informed CM his wife will transport him home after oxygen concentrator is delivered  CM informed Pt portable tanks are outside his room to go home with  Copy of [facility] and [facility] phone number left for Pt's nurse to give to Pt

## 2022-01-14 NOTE — PLAN OF CARE
Problem: PAIN - ADULT  Goal: Verbalizes/displays adequate comfort level or baseline comfort level  Description: Interventions:  - Encourage patient to monitor pain and request assistance  - Assess pain using appropriate pain scale  - Administer analgesics based on type and severity of pain and evaluate response  - Implement non-pharmacological measures as appropriate and evaluate response  - Consider cultural and social influences on pain and pain management  - Notify physician/advanced practitioner if interventions unsuccessful or patient reports new pain  Outcome: Progressing     Problem: INFECTION - ADULT  Goal: Absence or prevention of progression during hospitalization  Description: INTERVENTIONS:  - Assess and monitor for signs and symptoms of infection  - Monitor lab/diagnostic results  - Monitor all insertion sites, i e  indwelling lines, tubes, and drains  - Monitor endotracheal if appropriate and nasal secretions for changes in amount and color  - [place] appropriate cooling/warming therapies per order  - Administer medications as ordered  - Instruct and encourage patient and family to use good hand hygiene technique  - Identify and instruct in appropriate isolation precautions for identified infection/condition  Outcome: Progressing  Goal: Absence of fever/infection during neutropenic period  Description: INTERVENTIONS:  - Monitor WBC    Outcome: Progressing     Problem: SAFETY ADULT  Goal: Patient will remain free of falls  Description: INTERVENTIONS:  - Educate patient/family on patient safety including physical limitations  - Instruct patient to call for assistance with activity   - Consult OT/PT to assist with strengthening/mobility   - Keep Call bell within reach  - Keep bed low and locked with side rails adjusted as appropriate  - Keep care items and personal belongings within reach  - Initiate and maintain comfort rounds  - Make Fall Risk Sign visible to staff  - Offer Toileting every 2 Hours, in advance of need  - Initiate/Maintain bed alarm  - Obtain necessary fall risk management equipment:   - Apply yellow socks and bracelet for high fall risk patients  - Consider moving patient to room near nurses station  Outcome: Progressing  Goal: Maintain or return to baseline ADL function  Description: INTERVENTIONS:  -  Assess patient's ability to carry out ADLs; assess patient's baseline for ADL function and identify physical deficits which impact ability to perform ADLs (bathing, care of mouth/teeth, toileting, grooming, dressing, etc )  - Assess/evaluate cause of self-care deficits   - Assess range of motion  - Assess patient's mobility; develop plan if impaired  - Assess patient's need for assistive devices and provide as appropriate  - Encourage maximum independence but intervene and supervise when necessary  - Involve family in performance of ADLs  - Assess for home care needs following discharge   - Consider OT consult to assist with ADL evaluation and planning for discharge  - Provide patient education as appropriate  Outcome: Progressing  Goal: Maintains/Returns to pre admission functional level  Description: INTERVENTIONS:  - Perform BMAT or MOVE assessment daily    - Set and communicate daily mobility goal to care team and patient/family/caregiver  - Collaborate with rehabilitation services on mobility goals if consulted  - Perform Range of Motion 2 times a day  - Reposition patient every 2 hours    - Dangle patient 2 times a day  - Stand patient 2 times a day  - Ambulate patient 2 times a day  - Out of bed to chair 2 times a day   - Out of bed for meals 2 times a day  - Out of bed for toileting  - Record patient progress and toleration of activity level   Outcome: Progressing     Problem: DISCHARGE PLANNING  Goal: Discharge to home or other facility with appropriate resources  Description: INTERVENTIONS:  - Identify barriers to discharge w/patient and caregiver  - Arrange for needed discharge resources and transportation as appropriate  - Identify discharge learning needs (meds, wound care, etc )  - Arrange for interpretive services to assist at discharge as needed  - Refer to Case Management Department for coordinating discharge planning if the patient needs post-hospital services based on physician/advanced practitioner order or complex needs related to functional status, cognitive ability, or social support system  Outcome: Progressing     Problem: Knowledge Deficit  Goal: Patient/family/caregiver demonstrates understanding of disease process, treatment plan, medications, and discharge instructions  Description: Complete learning assessment and assess knowledge base    Interventions:  - Provide teaching at level of understanding  - Provide teaching via preferred learning methods  Outcome: Progressing     Problem: Potential for Falls  Goal: Patient will remain free of falls  Description: INTERVENTIONS:  - Educate patient/family on patient safety including physical limitations  - Instruct patient to call for assistance with activity   - Consult OT/PT to assist with strengthening/mobility   - Keep Call bell within reach  - Keep bed low and locked with side rails adjusted as appropriate  - Keep care items and personal belongings within reach  - Initiate and maintain comfort rounds  - Make Fall Risk Sign visible to staff  - Offer Toileting every 2 Hours, in advance of need  - Initiate/Maintain bed alarm  - Obtain necessary fall risk management equipment:   - Apply yellow socks and bracelet for high fall risk patients  - Consider moving patient to room near nurses station  Outcome: Progressing     Problem: MOBILITY - ADULT  Goal: Maintain or return to baseline ADL function  Description: INTERVENTIONS:  -  Assess patient's ability to carry out ADLs; assess patient's baseline for ADL function and identify physical deficits which impact ability to perform ADLs (bathing, care of mouth/teeth, toileting, grooming, dressing, etc )  - Assess/evaluate cause of self-care deficits   - Assess range of motion  - Assess patient's mobility; develop plan if impaired  - Assess patient's need for assistive devices and provide as appropriate  - Encourage maximum independence but intervene and supervise when necessary  - Involve family in performance of ADLs  - Assess for home care needs following discharge   - Consider OT consult to assist with ADL evaluation and planning for discharge  - Provide patient education as appropriate  Outcome: Progressing  Goal: Maintains/Returns to pre admission functional level  Description: INTERVENTIONS:  - Perform BMAT or MOVE assessment daily    - Set and communicate daily mobility goal to care team and patient/family/caregiver  - Collaborate with rehabilitation services on mobility goals if consulted  - Perform Range of Motion 2 times a day  - Reposition patient every 2 hours    - Dangle patient 2 times a day  - Stand patient 2 times a day  - Ambulate patient 2 times a day  - Out of bed to chair 2 times a day   - Out of bed for meals 2 times a day  - Out of bed for toileting  - Record patient progress and toleration of activity level   Outcome: Progressing     Problem: Prexisting or High Potential for Compromised Skin Integrity  Goal: Skin integrity is maintained or improved  Description: INTERVENTIONS:  - Identify patients at risk for skin breakdown  - Assess and monitor skin integrity  - Assess and monitor nutrition and hydration status  - Monitor labs   - Assess for incontinence   - Turn and reposition patient  - Assist with mobility/ambulation  - Relieve pressure over bony prominences  - Avoid friction and shearing  - Provide appropriate hygiene as needed including keeping skin clean and dry  - Evaluate need for skin moisturizer/barrier cream  - Collaborate with interdisciplinary team   - Patient/family teaching  - Consider wound care consult   Outcome: Progressing     Problem: Nutrition/Hydration-ADULT  Goal: Nutrient/Hydration intake appropriate for improving, restoring or maintaining nutritional needs  Description: Monitor and assess patient's nutrition/hydration status for malnutrition  Collaborate with interdisciplinary team and initiate plan and interventions as ordered  Monitor patient's weight and dietary intake as ordered or per policy  Utilize nutrition screening tool and intervene as necessary  Determine patient's food preferences and provide high-protein, high-caloric foods as appropriate       INTERVENTIONS:  - Monitor oral intake, urinary output, labs, and treatment plans  - Assess nutrition and hydration status and recommend course of action  - Evaluate amount of meals eaten  - Assist patient with eating if necessary   - Allow adequate time for meals  - Recommend/ encourage appropriate diets, oral nutritional supplements, and vitamin/mineral supplements  - Order, calculate, and assess calorie counts as needed  - Recommend, monitor, and adjust tube feedings and TPN/PPN based on assessed needs  - Assess need for intravenous fluids  - Provide specific nutrition/hydration education as appropriate  - Include patient/family/caregiver in decisions related to nutrition  Outcome: Progressing

## 2022-01-15 ENCOUNTER — NURSE TRIAGE (OUTPATIENT)
Dept: OTHER | Facility: OTHER | Age: 72
End: 2022-01-15

## 2022-01-15 DIAGNOSIS — N18.31 TYPE 2 DIABETES MELLITUS WITH STAGE 3A CHRONIC KIDNEY DISEASE, WITHOUT LONG-TERM CURRENT USE OF INSULIN (HCC): Primary | ICD-10-CM

## 2022-01-15 DIAGNOSIS — E11.22 TYPE 2 DIABETES MELLITUS WITH STAGE 3A CHRONIC KIDNEY DISEASE, WITHOUT LONG-TERM CURRENT USE OF INSULIN (HCC): Primary | ICD-10-CM

## 2022-01-15 RX ORDER — INSULIN DEGLUDEC INJECTION 100 U/ML
12 INJECTION, SOLUTION SUBCUTANEOUS DAILY
Qty: 15 ML | Refills: 0 | Status: SHIPPED | OUTPATIENT
Start: 2022-01-15 | End: 2022-04-04

## 2022-01-15 NOTE — TELEPHONE ENCOUNTER
On call reached via TC  Sent rx for tresiba 12 units to pharmacy  Left message on wife's machine  Reason for Disposition   [1] Caller has URGENT medicine question about med that PCP or specialist prescribed AND [2] triager unable to answer question    Answer Assessment - Initial Assessment Questions  1  NAME of MEDICATION: "What medicine are you calling about?"      lantus     2  QUESTION: "What is your question?" (e g , medication refill, side effect)      It is not covered by ins  3  PRESCRIBING HCP: "Who prescribed it?" Reason: if prescribed by specialist, call should be referred to that group  PCP     4  SYMPTOMS: "Do you have any symptoms?"      No     5   SEVERITY: If symptoms are present, ask "Are they mild, moderate or severe?"      N/A    Protocols used: MEDICATION QUESTION CALL-ADULT-

## 2022-01-17 ENCOUNTER — TRANSITIONAL CARE MANAGEMENT (OUTPATIENT)
Dept: FAMILY MEDICINE CLINIC | Facility: HOSPITAL | Age: 72
End: 2022-01-17

## 2022-01-18 ENCOUNTER — TELEPHONE (OUTPATIENT)
Dept: FAMILY MEDICINE CLINIC | Facility: HOSPITAL | Age: 72
End: 2022-01-18

## 2022-01-19 ENCOUNTER — TELEPHONE (OUTPATIENT)
Dept: GYNECOLOGIC ONCOLOGY | Facility: CLINIC | Age: 72
End: 2022-01-19

## 2022-01-19 NOTE — TELEPHONE ENCOUNTER
I just spoke with pt and they are not sure when he will not need oxygen supp anymore  They will see Dr Everett Rivera this Friday and will also be re-evaluated  Pt will call us back right after this visit and will let us know when he can come in

## 2022-01-19 NOTE — TELEPHONE ENCOUNTER
Patient's wife called to see if they could skip or cancel infusion treatment for next week  Patient is currently on oxygen  It only lasts 30 minutes so it is hard to transport him wherever they need to go

## 2022-01-21 ENCOUNTER — OFFICE VISIT (OUTPATIENT)
Dept: FAMILY MEDICINE CLINIC | Facility: HOSPITAL | Age: 72
End: 2022-01-21
Payer: MEDICARE

## 2022-01-21 VITALS
WEIGHT: 195.2 LBS | HEIGHT: 69 IN | DIASTOLIC BLOOD PRESSURE: 84 MMHG | HEART RATE: 68 BPM | TEMPERATURE: 96.7 F | OXYGEN SATURATION: 94 % | SYSTOLIC BLOOD PRESSURE: 118 MMHG | BODY MASS INDEX: 28.91 KG/M2

## 2022-01-21 DIAGNOSIS — J96.01 ACUTE RESPIRATORY FAILURE WITH HYPOXIA (HCC): ICD-10-CM

## 2022-01-21 DIAGNOSIS — J12.82 PNEUMONIA DUE TO COVID-19 VIRUS: ICD-10-CM

## 2022-01-21 DIAGNOSIS — D58.2 ELEVATED HEMOGLOBIN (HCC): ICD-10-CM

## 2022-01-21 DIAGNOSIS — E11.22 TYPE 2 DIABETES MELLITUS WITH STAGE 3A CHRONIC KIDNEY DISEASE, WITHOUT LONG-TERM CURRENT USE OF INSULIN (HCC): ICD-10-CM

## 2022-01-21 DIAGNOSIS — N18.31 TYPE 2 DIABETES MELLITUS WITH STAGE 3A CHRONIC KIDNEY DISEASE, WITHOUT LONG-TERM CURRENT USE OF INSULIN (HCC): ICD-10-CM

## 2022-01-21 DIAGNOSIS — U07.1 PNEUMONIA DUE TO COVID-19 VIRUS: ICD-10-CM

## 2022-01-21 DIAGNOSIS — R55 VASOVAGAL SYNCOPE: ICD-10-CM

## 2022-01-21 DIAGNOSIS — N18.31 STAGE 3A CHRONIC KIDNEY DISEASE (HCC): ICD-10-CM

## 2022-01-21 DIAGNOSIS — R79.82 ELEVATED C-REACTIVE PROTEIN: ICD-10-CM

## 2022-01-21 DIAGNOSIS — K51.30 ULCERATIVE RECTOSIGMOIDITIS WITHOUT COMPLICATION (HCC): ICD-10-CM

## 2022-01-21 DIAGNOSIS — Z09 HOSPITAL DISCHARGE FOLLOW-UP: Primary | ICD-10-CM

## 2022-01-21 DIAGNOSIS — N17.9 AKI (ACUTE KIDNEY INJURY) (HCC): ICD-10-CM

## 2022-01-21 PROCEDURE — 99495 TRANSJ CARE MGMT MOD F2F 14D: CPT | Performed by: INTERNAL MEDICINE

## 2022-01-21 NOTE — ASSESSMENT & PLAN NOTE
Lab Results   Component Value Date    EGFR 52 01/13/2022    EGFR 52 01/11/2022    EGFR 50 01/10/2022    CREATININE 1 36 (H) 01/13/2022    CREATININE 1 36 (H) 01/11/2022    CREATININE 1 41 (H) 01/10/2022   Will montior, avoid NSAIDs and keep hydrated

## 2022-01-21 NOTE — PROGRESS NOTES
Virtual TCM Visit:    Verification of patient location:    Patient is located in the following state in which I hold an active license [place]        Assessment/Plan:        Problem List Items Addressed This Visit        Digestive    Ulcerative rectosigmoiditis without complication ([facility] )     Con't current Sulfasalazine for now, call with new/worse GI symptoms            Endocrine    Type 2 diabetes mellitus with stage 3a chronic kidney disease, without long-term current use of insulin ([facility] )     Importance of monitoring BS after Prednisone finished reviewed - if starts to see low BS call and will decrease Tresiba, UTD on Foot exam (10/21) and will discuss eye exam at next appt  Lab Results   Component Value Date    HGBA1C 6 4 (H) 12/28/2021               Respiratory    Pneumonia due to COVID-19 virus     Initially on mild then transitioned to moderate pathway - finished tx with Decadron/prednisone as well as Remdesivir and Baricitinib, has 2 more days of Prednisone left, on statin, will finish up Symbicort as well, call with relapse in symptoms/new or worse symptoms         Acute respiratory failure with hypoxia ([facility] )     On 6 L NC ATC w/O2 sat > 93% (even after exertion), urged to decrease by 1 L every 3-4 days as long as O2 sat remains above 93% - if it drops below 93% or if SOB worsens stay at previous O2 level and call            Genitourinary    Chronic kidney disease, stage 3 ([facility] )     Lab Results   Component Value Date    EGFR 52 01/13/2022    EGFR 52 01/11/2022    EGFR 50 01/10/2022    CREATININE 1 36 (H) 01/13/2022    CREATININE 1 36 (H) 01/11/2022    CREATININE 1 41 (H) 01/10/2022   Will montior, avoid NSAIDs and keep hydrated         CHADWICK (acute kidney injury) ([facility] )     [place] to be d/t COVID and ATN, will follow as has BW frequently with Heme/Onc which includes CMP            Other    Elevated C-reactive protein     Likely related to COVID, no further follow up needed         Elevated hemoglobin ([facility] ) Phlebotomy on hold for now, will monitor counts         RESOLVED: Vasovagal syncope     Felt to be d/t vasovagal, call with recurrent events           Other Visit Diagnoses     Hospital discharge follow-up    -  Primary             Reason for visit is TCM/Hospital follow up during [place] pandemic    Encounter provider Marco Rutherford DO       Provider located at [id number]  [place] [address]      Recent Visits  Date Type Provider Dept   01/18/22 Telephone [facility] [place] [facility]   Showing recent visits within past 7 days and meeting all other requirements  Today's Visits  Date Type Provider Dept   01/21/22 Office Visit Marco Rutherford DO [facility] [place] [facility]   Showing today's visits and meeting all other requirements  Future Appointments  No visits were found meeting these conditions  Showing future appointments within next 150 days and meeting all other requirements       After connecting through [employer], the patient was identified by name and date of birth  Mellisa Covington was informed that this is a telemedicine visit and that the visit is being conducted through [address] Now and patient was informed that this is a secure, HIPAA-compliant platform  He agrees to proceed     My office door was closed  No one else was in the room  He acknowledged consent and understanding of privacy and security of the video platform  The patient has agreed to participate and understands they can discontinue the visit at any time  Patient is aware this is a billable service  Subjective:     Patient ID: Mellisa Covington is a 71 y o  male  HPI Pt contacted via [employer] for TCM/Hospital follow up  Pt was admitted from 12/28/21 to 1/14/22 for COVID pneumonia and CHADWICK and vasovagal syncope  Records were reviewed by myself in detail and events are reviewed below       Pt presented to [facility] on 12/28/21 via EMS after having a syncopal episode at home  He had been having cough/congestion and weakness  Exam notable for temp 100 6 and O2 sat 91% on RA  Exam notable for rhonchi and abrasion R forehead  He tested + for COVID in the ED  Flu was negative  K a bit low at 3 4, Alk 3 4, Cr 1 80, Hct 51 6 and rest of BW was nml  CXR c/w B/L LL consolidations  CT Cspine and CT head showed no acute abnormality other then some sinus dz  ECG noted NS ST and T wave changes  Pt was admitted for COVID pneumonia and KI and vasovagal syncope  He was initially placed on mild pathway and tx with Decadron and Remdesivir  He did develop some hypoxia and was started on 2L NC but quickly worsened and was titrated up to 15 L NC and moderate pathway with addition of Baricitinib  His BNP/CRP/d-dimer were elevated  He was tx with Lovenox for DVT prophylaxis  Procalcitonin was elevated and he was started on abx for presumed superimposed bacterial infection  Pulm was consulted and his Decadron was increased  O2 sat did con't to drop and he was transitioned to a mid flow O2 mask  Renal was consulted d/t persistent CHADWICK  He was initially diuresed but lasix was held after diuresis was obtained  Felt to be d/t COVID and ATN  He did have a metabolic acidosis and was tx with a bicarb drip as well  BC 1/2 did grow contaminants but repeat BC were neg x 2  Very gradually pt was able to wean down on O2  He did have O2 sat checked and did qualify for home O2  He was discharged home on 6 L on 1/14/22  Med list was reviewed  Pt has been doing well since discharge  He is still on 6 L NC ATC  He states his O2 last night was 96% according to his FITBIT  With pulse ox he has not gone below 93% even after exertion  He states his breathing is "a lot better then it was initially"  He still feels SOB with activity  He notes no F/C and only has a mild cough  He notes no loss of taste/smell    He finishes the Prednisone in 2 days and notes no SE with the steroid  He is diabetic and his sugars even on the Prednisone is 80-90's in the am and the after noon <150's and evenings it is a bit higher around 140-190's  He is taking his Tresibi as directed upon discharge from hospital   He is taking his Symbicort daily as well  His BP has been a bit elevated but is fluctuating from 110's-150's/80's  His metoprolol was decreased from 75 mg bid to 25 mg bid and Amlodipine was decreased from 10 mg to 5 mg while IP  Review of Systems      Objective:    Vitals:    01/21/22 1421   BP: 118/84   Pulse: 68   Temp: (!) 96 7 °F (35 9 °C)   SpO2: 94%   Weight: 88 5 kg (195 lb 3 2 oz)   Height: 5' 9" (1 753 m)       Physical Exam  Vitals and nursing note reviewed  Constitutional:       General: He is not in acute distress  Appearance: He is not ill-appearing  HENT:      Head: Atraumatic  Eyes:      General:         Right eye: No discharge  Left eye: No discharge  Conjunctiva/sclera: Conjunctivae normal    Pulmonary:      Effort: Pulmonary effort is normal  No respiratory distress  Comments: O2 via NC  Skin:     Coloration: Skin is not pale  Findings: Bruising present  No rash  Psychiatric:         Mood and Affect: Mood normal          Behavior: Behavior normal          Thought Content: Thought content normal          Judgment: Judgment normal              Transitional Care Management Review:  Casey Lagunas is a 71 y o  male here for TCM follow up       During the TCM phone call patient stated:    TCM Call (since 12/21/2021)     Date and time call was made  1/17/2022  8:34 AM    Hospital care reviewed  Records reviewed        Patient was hospitialized at  [facility]        Date of Admission  12/28/21    Date of discharge  01/14/22    Diagnosis  pneumonia due to covid-19 virus    Disposition  Home    Were the patients medications reviewed and updated  No    Current Symptoms  None      TCM Call (since 12/21/2021)     Should patient be enrolled in anticoag monitoring? No    Scheduled for follow up? Yes    Did you obtain your prescribed medications  Yes    Do you need help managing your prescriptions or medications  No    Is transportation to your appointment needed  No    I have advised the patient to call PCP with any new or worsening symptoms  Gregory Watters MA           I spent 45 minutes with the patient during this visit  Florian Katz, DO      VIRTUAL VISIT DISCLAIMER    José Miguel Menezes verbally agrees to participate in [employer]  Pt is aware that [employer] could be limited without vital signs or the ability to perform a full hands-on physical exam  Shahbaz Newberry understands he or the provider may request at any time to terminate the video visit and request the patient to seek care or treatment in person

## 2022-01-21 NOTE — ASSESSMENT & PLAN NOTE
Importance of monitoring BS after Prednisone finished reviewed - if starts to see low BS call and will decrease Tresiba, UTD on Foot exam (10/21) and will discuss eye exam at next appt  Lab Results   Component Value Date    HGBA1C 6 4 (H) 12/28/2021

## 2022-01-21 NOTE — ASSESSMENT & PLAN NOTE
Initially on mild then transitioned to moderate pathway - finished tx with Decadron/prednisone as well as Remdesivir and Baricitinib, has 2 more days of Prednisone left, on statin, will finish up Symbicort as well, call with relapse in symptoms/new or worse symptoms

## 2022-01-21 NOTE — ASSESSMENT & PLAN NOTE
On 6 L NC ATC w/O2 sat > 93% (even after exertion), urged to decrease by 1 L every 3-4 days as long as O2 sat remains above 93% - if it drops below 93% or if SOB worsens stay at previous O2 level and call

## 2022-01-24 ENCOUNTER — TELEPHONE (OUTPATIENT)
Dept: HEMATOLOGY ONCOLOGY | Facility: CLINIC | Age: 72
End: 2022-01-24

## 2022-01-24 NOTE — TELEPHONE ENCOUNTER
Appointment Cancellation Or Reschedule     Person calling in Spouse    Provider Infusion    Office Visit Date and Time 01/25 at 2:30pm    Office Visit Location [facility]   Did patient want to reschedule their office appointment? If so, when was it scheduled to? no   Is this patient calling to reschedule an infusion appointment? Yes    When is their next infusion appointment? 01/25   Is this patient a Chemo patient? yes   Reason for Cancellation or Reschedule Patient is sick      If the patient is a treatment patient, please route this to the office nurse  If the patient is not on treatment, please route to the office MA

## 2022-01-25 ENCOUNTER — HOSPITAL ENCOUNTER (OUTPATIENT)
Dept: INFUSION CENTER | Facility: HOSPITAL | Age: 72
Discharge: HOME | End: 2022-01-25
Attending: INTERNAL MEDICINE

## 2022-01-28 ENCOUNTER — TELEPHONE (OUTPATIENT)
Dept: FAMILY MEDICINE CLINIC | Facility: HOSPITAL | Age: 72
End: 2022-01-28

## 2022-01-28 NOTE — TELEPHONE ENCOUNTER
Decrease Tresiba to 10 units and call next Friday with BS results - if readings go below 100 consistently call prior to Friday

## 2022-01-28 NOTE — TELEPHONE ENCOUNTER
Should pt still be getting 12 units of tresiba flex touch? Sugars have been staying under 120 now that hes off prednisone

## 2022-02-04 DIAGNOSIS — E11.22 TYPE 2 DIABETES MELLITUS WITH STAGE 3 CHRONIC KIDNEY DISEASE, WITHOUT LONG-TERM CURRENT USE OF INSULIN (HCC): ICD-10-CM

## 2022-02-04 DIAGNOSIS — N18.30 TYPE 2 DIABETES MELLITUS WITH STAGE 3 CHRONIC KIDNEY DISEASE, WITHOUT LONG-TERM CURRENT USE OF INSULIN (HCC): ICD-10-CM

## 2022-02-08 ENCOUNTER — TELEPHONE (OUTPATIENT)
Dept: NEPHROLOGY | Facility: CLINIC | Age: 72
End: 2022-02-08

## 2022-02-10 ENCOUNTER — TELEMEDICINE (OUTPATIENT)
Dept: NEPHROLOGY | Facility: CLINIC | Age: 72
End: 2022-02-10
Payer: MEDICARE

## 2022-02-10 VITALS — SYSTOLIC BLOOD PRESSURE: 146 MMHG | BODY MASS INDEX: 28.35 KG/M2 | DIASTOLIC BLOOD PRESSURE: 90 MMHG | WEIGHT: 192 LBS

## 2022-02-10 DIAGNOSIS — N17.9 AKI (ACUTE KIDNEY INJURY) (HCC): Primary | ICD-10-CM

## 2022-02-10 DIAGNOSIS — N18.31 STAGE 3A CHRONIC KIDNEY DISEASE (HCC): ICD-10-CM

## 2022-02-10 DIAGNOSIS — N18.31 TYPE 2 DIABETES MELLITUS WITH STAGE 3A CHRONIC KIDNEY DISEASE, WITHOUT LONG-TERM CURRENT USE OF INSULIN (HCC): ICD-10-CM

## 2022-02-10 DIAGNOSIS — E11.22 TYPE 2 DIABETES MELLITUS WITH STAGE 3A CHRONIC KIDNEY DISEASE, WITHOUT LONG-TERM CURRENT USE OF INSULIN (HCC): ICD-10-CM

## 2022-02-10 DIAGNOSIS — I10 PRIMARY HYPERTENSION: ICD-10-CM

## 2022-02-10 DIAGNOSIS — R80.9 MICROALBUMINURIA: ICD-10-CM

## 2022-02-10 PROCEDURE — 99214 OFFICE O/P EST MOD 30 MIN: CPT | Performed by: NURSE PRACTITIONER

## 2022-02-10 NOTE — PROGRESS NOTES
Virtual Regular Visit    Verification of patient location:    Patient is located in the following state in which I hold an active license [place]      Assessment/Plan:    Problem List Items Addressed This Visit        Endocrine    Type 2 diabetes mellitus with stage 3a chronic kidney disease, without long-term current use of insulin ([facility] )       Cardiovascular and Mediastinum    Primary hypertension       Genitourinary    Chronic kidney disease, stage 3 ([facility] )    CHADWICK (acute kidney injury) ([facility] ) - Primary       Other    Microalbuminuria        Acute kidney injury:  Resolved on discharge  Etiology:  Suspect prerenal in the setting of COVID-19 infection    Chronic kidney disease IIIA:    Etiology:  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  · After review medical records through [facility] everywhere baseline creatinine 1 2-1 5  · Discharge creatinine was 1 3  · No updated blood work since discharge  · Will get BMP now to ensure stability of renal function  · Will check urinalysis with micro in steady state with history of microalbuminemia  · Will have patient return in three months to the [facility] office for follow-up    Hypertension:  · Patient follows with Cardiology at [facility]  · Recently amlodipine increased to 10 mg daily from 5 mg and metoprolol increased to 50 mg q 12 hours from 25 mg  · Patient monitoring blood pressure at home  · Patient for return appointment with Cardiology in two weeks  · Encourage low-sodium diet  · Patient examining euvolemic without lower extremity swelling or JVD    COVID-19 infection pneumonia:  Recovering on room air  · Patient reports improving shortness of breath since discharge    Diabetes II; follow-up with primary team  · Recent A1c on 12/28/2021 was 6 4  · Goal A1c less than seven    Microalbuminemia:  · Suspect in the setting of diabetes  · Will repeat urinalysis with micro with next office visit      History of CVA       Reason for visit is   Chief Complaint   Patient presents with    Virtual Regular Visit     ayala    Chronic Kidney Disease        Encounter provider ROSALINA Mix    Provider located at [address] [facility]  [address] [facility] [address] [facility] [address]      Recent Visits  Date Type Provider Dept   02/08/22 Telephone Alirio Boyer, [address]  recent visits within past 7 days and meeting all other requirements  Today's Visits  Date Type Provider Dept   02/10/22 [facility], [address] Juanjo Zafarsantos  today's visits and meeting all other requirements  Future Appointments  No visits were found meeting these conditions  Showing future appointments within next 150 days and meeting all other requirements       The patient was identified by name and date of birth  Elizabeth Argueta was informed that this is a telemedicine visit and that the visit is being conducted through [address] Now and patient was informed that this is a secure, HIPAA-compliant platform  He agrees to proceed     My office door was closed  No one else was in the room  He acknowledged consent and understanding of privacy and security of the video platform  The patient has agreed to participate and understands they can discontinue the visit at any time  Patient is aware this is a billable service  Jose Raul Dahl is a 71 y o  male with past medical history of Chronic Kidney Disease III, hypertension, diabetes II, dyslipidemia, CVA, who was recently admitted to [facility] with shortness of breath and found to have COVID-19 infection along with acute kidney injury which resolved on discharge  Unfortunately no updated blood work has been obtained since discharge  On discussion the patient reports seen as Cardiology earlier this week and amlodipine increased from 5 to 10 mg and metoprolol 25 mg increased to 50 mg 2 times daily    He is to return in two weeks   Diuretics remain on hold  Patient reports no lower extremity swelling  Maintaining weight around 192-194 since discharge  Blood pressure running on the higher side to 150s to 160s over 90s  Since increasing medication blood pressure now 130s to 140s over 90s  Improving shortness of breath and recovering from COVID      HPI     Past Medical History:   Diagnosis Date    CVA [facility]cerebral vascular accident) [facility]     Diverticulitis     Gout     Hypercholesterolemia     Renal disorder     Vasovagal syncope 12/28/2021       Past Surgical History:   Procedure Laterality Date    CARDIAC LOOP RECORDER      COLONOSCOPY  09/18/2006    complete; 10 yrs    COLONOSCOPY  10/23/2017    complete; 5 yrs    COLONOSCOPY  05/06/2020    Severe active left-sided colitis, erythematous and ulcerated mucosa in the rectosigmoid, inflammatory polyp       Current Outpatient Medications   Medication Sig Dispense Refill    acetaminophen (TYLENOL) 325 mg tablet Take 2 tablets (650 mg total) by mouth every 6 (six) hours as needed for mild pain  0    allopurinol (ZYLOPRIM) 100 mg tablet Take 2 tablets (200 mg total) by mouth daily 60 tablet 5    amLODIPine (NORVASC) 5 mg tablet Take 1 tablet (5 mg total) by mouth daily 30 tablet 0    ascorbic acid (VITAMIN C) 1000 MG tablet Take 1 tablet (1,000 mg total) by mouth daily  0    atorvastatin (LIPITOR) 40 mg tablet TAKE 1 TABLET DAILY WITH   DINNER 90 tablet 2    budesonide-formoterol (SYMBICORT) 160-4 5 mcg/act inhaler Inhale 2 puffs 2 (two) times a day Rinse mouth after use   10 2 g 0    cholecalciferol (VITAMIN D3) 400 units tablet Take 1 tablet (400 Units total) by mouth daily  0    clopidogrel (PLAVIX) 75 mg tablet Take 1 tablet (75 mg total) by mouth daily 90 tablet 2    Coenzyme Q10 200 MG capsule Take by mouth daily       Empagliflozin (Jardiance) 10 MG TABS 1 tab PO q day 90 tablet 2    fenofibrate (TRICOR) 145 mg tablet Take 1 tablet (145 mg total) by mouth daily 90 tablet 2    insulin degludec Sylvia Roses FlexTouch) 100 units/mL injection pen Inject 12 Units under the skin daily 15 mL 0    metoprolol tartrate (LOPRESSOR) 25 mg tablet Take 1 tablet (25 mg total) by mouth every 12 (twelve) hours 60 tablet 0    predniSONE 10 mg tablet 30 mg by mouth daily for 3 days, then 20 mg by mouth daily for 3 days, then 10 mg by mouth daily for 3 days, then stop 18 tablet 0    sodium chloride (OCEAN) 0 65 % nasal spray 2 sprays into each nostril every hour as needed for congestion  0    sulfaSALAzine (AZULFIDINE) 500 mg tablet Take 1 tablet (500 mg total) by mouth 4 (four) times a day 360 tablet 1     No current facility-administered medications for this visit  No Known Allergies    Review of Systems   Constitutional: Negative  HENT: Negative  Respiratory: Negative for shortness of breath  Cardiovascular: Negative  Gastrointestinal: Negative  Genitourinary: Negative  Musculoskeletal: Negative  Skin: Negative  Neurological: Negative  Hematological: Negative  Psychiatric/Behavioral: Negative  Video Exam    Vitals:    02/10/22 0853   BP: 146/90   Weight: 87 1 kg (192 lb)       Physical Exam  Constitutional:       Appearance: Normal appearance  HENT:      Head: Normocephalic and atraumatic  Mouth/Throat:      Mouth: Mucous membranes are moist       Pharynx: Oropharynx is clear  Eyes:      Extraocular Movements: Extraocular movements intact  Pulmonary:      Effort: Pulmonary effort is normal    Abdominal:      General: Abdomen is flat  Musculoskeletal:         General: Normal range of motion  Cervical back: Normal range of motion  Neurological:      General: No focal deficit present  Mental Status: He is alert and oriented to person, place, and time     Psychiatric:         Mood and Affect: Mood normal          Behavior: Behavior normal           I spent 15 minutes directly with the patient during this visit    VIRTUAL VISIT DISCLAIMER      Andrea Sanders verbally agrees to participate in [employer]  Pt is aware that [employer] could be limited without vital signs or the ability to perform a full hands-on physical exam  Francis D Jana Hamman understands he or the provider may request at any time to terminate the video visit and request the patient to seek care or treatment in person

## 2022-02-10 NOTE — PATIENT INSTRUCTIONS
 All questions asked and answered  The patient has been instructed to call office at [phone] with any questions or concerns  Please obtain prescribed blood work and urine studies prior to next appointment  Avoid NSAID products which include:  Motrin, Advil, Ibuprofen, Aleve, Naprosyn, Naproxen, Mobic or Celebrex   Get BMP now to ensure stability of kidney function  Get updated blood work and urine studies in three months  Office to call schedule a three month follow-up in [facility]

## 2022-02-16 LAB
ALBUMIN SERPL-MCNC: 4.1 G/DL (ref 3.6–5.1)
APPEARANCE UR: CLEAR
BACTERIA UR QL AUTO: ABNORMAL /HPF
BILIRUB UR QL STRIP: NEGATIVE
BUN SERPL-MCNC: 30 MG/DL (ref 7–25)
BUN/CREAT SERPL: 21 (CALC) (ref 6–22)
CALCIUM SERPL-MCNC: 9.6 MG/DL (ref 8.6–10.3)
CHLORIDE SERPL-SCNC: 107 MMOL/L (ref 98–110)
CO2 SERPL-SCNC: 28 MMOL/L (ref 20–32)
COLOR UR: YELLOW
CREAT SERPL-MCNC: 1.46 MG/DL (ref 0.7–1.18)
GLUCOSE SERPL-MCNC: 78 MG/DL (ref 65–99)
GLUCOSE UR QL STRIP: ABNORMAL
HGB UR QL STRIP: NEGATIVE
HYALINE CASTS #/AREA URNS LPF: ABNORMAL /LPF
KETONES UR QL STRIP: NEGATIVE
LEUKOCYTE ESTERASE UR QL STRIP: NEGATIVE
MAGNESIUM SERPL-MCNC: 2.2 MG/DL (ref 1.5–2.5)
NITRITE UR QL STRIP: NEGATIVE
PH UR STRIP: 6.5 [PH] (ref 5–8)
PHOSPHATE SERPL-MCNC: 3.5 MG/DL (ref 2.1–4.3)
POTASSIUM SERPL-SCNC: 4.1 MMOL/L (ref 3.5–5.3)
PROT UR QL STRIP: NEGATIVE
PTH-INTACT SERPL-MCNC: NORMAL PG/ML
RBC #/AREA URNS HPF: ABNORMAL /HPF
SL AMB EGFR AFRICAN AMERICAN: 55 ML/MIN/1.73M2
SL AMB EGFR NON AFRICAN AMERICAN: 48 ML/MIN/1.73M2
SODIUM SERPL-SCNC: 143 MMOL/L (ref 135–146)
SP GR UR STRIP: 1.02 (ref 1–1.03)
SQUAMOUS #/AREA URNS HPF: ABNORMAL /HPF
WBC #/AREA URNS HPF: ABNORMAL /HPF

## 2022-02-17 ENCOUNTER — TELEPHONE (OUTPATIENT)
Dept: NEPHROLOGY | Facility: CLINIC | Age: 72
End: 2022-02-17

## 2022-02-17 NOTE — TELEPHONE ENCOUNTER
Patient called the office he is aware that labs are stable no change sto be made at [facility] time  - pt has to go back to [facility] for a PTH Level to be done as it was unable to process during last visit

## 2022-02-17 NOTE — TELEPHONE ENCOUNTER
----- Message from Pan sent at 2/16/2022  5:10 PM EST -----  Follow-up blood work reviewed  On covering for Taylor Marrufo while she is away  Please tell Jonn Tanner that renal function studies are within baseline  Current creatinine is 1 46 which is within his current baseline  Electrolytes are stable  Urinalysis is also within normal limits except for glucose in the urine  Please let me know if he has any questions or concerns

## 2022-02-18 DIAGNOSIS — J12.82 PNEUMONIA DUE TO COVID-19 VIRUS: ICD-10-CM

## 2022-02-18 DIAGNOSIS — U07.1 PNEUMONIA DUE TO COVID-19 VIRUS: ICD-10-CM

## 2022-02-18 RX ORDER — BUDESONIDE AND FORMOTEROL FUMARATE DIHYDRATE 160; 4.5 UG/1; UG/1
2 AEROSOL RESPIRATORY (INHALATION) 2 TIMES DAILY
Qty: 10.2 G | Refills: 0 | Status: SHIPPED | OUTPATIENT
Start: 2022-02-18 | End: 2022-04-04

## 2022-02-20 LAB — PTH-INTACT SERPL-MCNC: 33 PG/ML (ref 14–64)

## 2022-03-02 ENCOUNTER — TELEPHONE (OUTPATIENT)
Dept: HEMATOLOGY ONCOLOGY | Facility: HOSPITAL | Age: 72
End: 2022-03-02

## 2022-03-02 NOTE — TELEPHONE ENCOUNTER
Called patient and spoke to patient  Rescheduled appt due to provider not being in  Patient confirmed different provider and new appt date and time

## 2022-03-11 ENCOUNTER — TELEPHONE (OUTPATIENT)
Dept: FAMILY MEDICINE CLINIC | Facility: HOSPITAL | Age: 72
End: 2022-03-11

## 2022-03-11 NOTE — TELEPHONE ENCOUNTER
Belia Morales from [facility] confirmation of receipt 8 pages from them  I did not see it in Media in the chart yet    Call Ruma [phone] to confirm

## 2022-03-21 LAB
ALBUMIN SERPL-MCNC: 4.3 G/DL (ref 3.6–5.1)
ALBUMIN/GLOB SERPL: 1.6 (CALC) (ref 1–2.5)
ALP SERPL-CCNC: 60 U/L (ref 35–144)
ALT SERPL-CCNC: 15 U/L (ref 9–46)
AST SERPL-CCNC: 20 U/L (ref 10–35)
BASOPHILS # BLD AUTO: 78 CELLS/UL (ref 0–200)
BASOPHILS NFR BLD AUTO: 1.2 %
BILIRUB SERPL-MCNC: 0.6 MG/DL (ref 0.2–1.2)
BUN SERPL-MCNC: 24 MG/DL (ref 7–25)
BUN/CREAT SERPL: 13 (CALC) (ref 6–22)
CALCIUM SERPL-MCNC: 9.5 MG/DL (ref 8.6–10.3)
CHLORIDE SERPL-SCNC: 107 MMOL/L (ref 98–110)
CO2 SERPL-SCNC: 26 MMOL/L (ref 20–32)
CREAT SERPL-MCNC: 1.83 MG/DL (ref 0.7–1.18)
EOSINOPHIL # BLD AUTO: 241 CELLS/UL (ref 15–500)
EOSINOPHIL NFR BLD AUTO: 3.7 %
ERYTHROCYTE [DISTWIDTH] IN BLOOD BY AUTOMATED COUNT: 17.8 % (ref 11–15)
FERRITIN SERPL-MCNC: 12 NG/ML (ref 24–380)
GLOBULIN SER CALC-MCNC: 2.7 G/DL (CALC) (ref 1.9–3.7)
GLUCOSE SERPL-MCNC: 110 MG/DL (ref 65–99)
HCT VFR BLD AUTO: 52.1 % (ref 38.5–50)
HGB BLD-MCNC: 15.8 G/DL (ref 13.2–17.1)
IRON SATN MFR SERPL: 22 % (CALC) (ref 20–48)
IRON SERPL-MCNC: 76 MCG/DL (ref 50–180)
LYMPHOCYTES # BLD AUTO: 1417 CELLS/UL (ref 850–3900)
LYMPHOCYTES NFR BLD AUTO: 21.8 %
MCH RBC QN AUTO: 27.6 PG (ref 27–33)
MCHC RBC AUTO-ENTMCNC: 30.3 G/DL (ref 32–36)
MCV RBC AUTO: 91.1 FL (ref 80–100)
MONOCYTES # BLD AUTO: 410 CELLS/UL (ref 200–950)
MONOCYTES NFR BLD AUTO: 6.3 %
NEUTROPHILS # BLD AUTO: 4355 CELLS/UL (ref 1500–7800)
NEUTROPHILS NFR BLD AUTO: 67 %
PLATELET # BLD AUTO: 298 THOUSAND/UL (ref 140–400)
PMV BLD REES-ECKER: 10.5 FL (ref 7.5–12.5)
POTASSIUM SERPL-SCNC: 4.2 MMOL/L (ref 3.5–5.3)
PROT SERPL-MCNC: 7 G/DL (ref 6.1–8.1)
RBC # BLD AUTO: 5.72 MILLION/UL (ref 4.2–5.8)
SL AMB EGFR AFRICAN AMERICAN: 42 ML/MIN/1.73M2
SL AMB EGFR NON AFRICAN AMERICAN: 36 ML/MIN/1.73M2
SODIUM SERPL-SCNC: 145 MMOL/L (ref 135–146)
TIBC SERPL-MCNC: 346 MCG/DL (CALC) (ref 250–425)
WBC # BLD AUTO: 6.5 THOUSAND/UL (ref 3.8–10.8)

## 2022-03-22 ENCOUNTER — HOSPITAL ENCOUNTER (OUTPATIENT)
Dept: INFUSION CENTER | Facility: HOSPITAL | Age: 72
Discharge: HOME | End: 2022-03-22
Attending: INTERNAL MEDICINE
Payer: MEDICARE

## 2022-03-22 VITALS
RESPIRATION RATE: 16 BRPM | TEMPERATURE: 97.1 F | OXYGEN SATURATION: 96 % | SYSTOLIC BLOOD PRESSURE: 119 MMHG | HEART RATE: 65 BPM | DIASTOLIC BLOOD PRESSURE: 74 MMHG

## 2022-03-22 DIAGNOSIS — D75.1 ERYTHROCYTOSIS: Primary | ICD-10-CM

## 2022-03-22 PROCEDURE — 99195 PHLEBOTOMY: CPT

## 2022-03-22 NOTE — PROGRESS NOTES
Pt here for therapeutic phlebotomy  Reclined in chair  Drink and snack provided  500ml of PRBC removed without difficulty  Total tourniquet time was 13 minutes  Pt completed observation time  VSS  Pt left unit ambulatory with steady gait

## 2022-04-03 LAB
ALBUMIN/CREAT UR: 82 MCG/MG CREAT
BUN SERPL-MCNC: 25 MG/DL (ref 7–25)
BUN/CREAT SERPL: 16 (CALC) (ref 6–22)
CALCIUM SERPL-MCNC: 9.5 MG/DL (ref 8.6–10.3)
CHLORIDE SERPL-SCNC: 108 MMOL/L (ref 98–110)
CHOLEST SERPL-MCNC: 129 MG/DL
CHOLEST/HDLC SERPL: 2.9 (CALC)
CO2 SERPL-SCNC: 26 MMOL/L (ref 20–32)
CREAT SERPL-MCNC: 1.61 MG/DL (ref 0.7–1.18)
CREAT UR-MCNC: 124 MG/DL (ref 20–320)
EST. AVERAGE GLUCOSE BLD GHB EST-MCNC: 114 MG/DL
EST. AVERAGE GLUCOSE BLD GHB EST-SCNC: 6.3 MMOL/L
GLUCOSE SERPL-MCNC: 94 MG/DL (ref 65–99)
HBA1C MFR BLD: 5.6 % OF TOTAL HGB
HDLC SERPL-MCNC: 45 MG/DL
LDLC SERPL CALC-MCNC: 67 MG/DL (CALC)
MICROALBUMIN UR-MCNC: 10.2 MG/DL
NONHDLC SERPL-MCNC: 84 MG/DL (CALC)
POTASSIUM SERPL-SCNC: 3.9 MMOL/L (ref 3.5–5.3)
PSA SERPL-MCNC: 1.3 NG/ML
SL AMB EGFR AFRICAN AMERICAN: 49 ML/MIN/1.73M2
SL AMB EGFR NON AFRICAN AMERICAN: 42 ML/MIN/1.73M2
SODIUM SERPL-SCNC: 142 MMOL/L (ref 135–146)
TRIGL SERPL-MCNC: 83 MG/DL
TSH SERPL-ACNC: 2.7 MIU/L (ref 0.4–4.5)

## 2022-04-04 ENCOUNTER — OFFICE VISIT (OUTPATIENT)
Dept: FAMILY MEDICINE CLINIC | Facility: HOSPITAL | Age: 72
End: 2022-04-04
Payer: MEDICARE

## 2022-04-04 VITALS
DIASTOLIC BLOOD PRESSURE: 82 MMHG | BODY MASS INDEX: 29.92 KG/M2 | SYSTOLIC BLOOD PRESSURE: 140 MMHG | HEART RATE: 60 BPM | HEIGHT: 69 IN | WEIGHT: 202 LBS | TEMPERATURE: 96.9 F

## 2022-04-04 DIAGNOSIS — N18.31 TYPE 2 DIABETES MELLITUS WITH STAGE 3A CHRONIC KIDNEY DISEASE, WITHOUT LONG-TERM CURRENT USE OF INSULIN (HCC): Primary | ICD-10-CM

## 2022-04-04 DIAGNOSIS — E78.5 DYSLIPIDEMIA: ICD-10-CM

## 2022-04-04 DIAGNOSIS — N18.32 STAGE 3B CHRONIC KIDNEY DISEASE (HCC): ICD-10-CM

## 2022-04-04 DIAGNOSIS — R80.9 MICROALBUMINURIA: ICD-10-CM

## 2022-04-04 DIAGNOSIS — Z11.59 NEED FOR HEPATITIS C SCREENING TEST: ICD-10-CM

## 2022-04-04 DIAGNOSIS — I10 PRIMARY HYPERTENSION: ICD-10-CM

## 2022-04-04 DIAGNOSIS — E66.3 OVERWEIGHT: ICD-10-CM

## 2022-04-04 DIAGNOSIS — E11.22 TYPE 2 DIABETES MELLITUS WITH STAGE 3A CHRONIC KIDNEY DISEASE, WITHOUT LONG-TERM CURRENT USE OF INSULIN (HCC): Primary | ICD-10-CM

## 2022-04-04 PROBLEM — J12.82 PNEUMONIA DUE TO COVID-19 VIRUS: Status: RESOLVED | Noted: 2021-12-28 | Resolved: 2022-04-04

## 2022-04-04 PROBLEM — N17.9 AKI (ACUTE KIDNEY INJURY) (HCC): Status: RESOLVED | Noted: 2020-03-19 | Resolved: 2022-04-04

## 2022-04-04 PROBLEM — U07.1 PNEUMONIA DUE TO COVID-19 VIRUS: Status: RESOLVED | Noted: 2021-12-28 | Resolved: 2022-04-04

## 2022-04-04 PROCEDURE — G0439 PPPS, SUBSEQ VISIT: HCPCS | Performed by: INTERNAL MEDICINE

## 2022-04-04 PROCEDURE — 99214 OFFICE O/P EST MOD 30 MIN: CPT | Performed by: INTERNAL MEDICINE

## 2022-04-04 PROCEDURE — 1123F ACP DISCUSS/DSCN MKR DOCD: CPT | Performed by: INTERNAL MEDICINE

## 2022-04-04 RX ORDER — METOPROLOL TARTRATE 50 MG/1
1.5 TABLET, FILM COATED ORAL 2 TIMES DAILY
COMMUNITY
Start: 2022-03-22

## 2022-04-04 RX ORDER — INSULIN DEGLUDEC INJECTION 100 U/ML
5 INJECTION, SOLUTION SUBCUTANEOUS DAILY
Qty: 15 ML | Refills: 0
Start: 2022-04-04

## 2022-04-04 RX ORDER — AMLODIPINE BESYLATE 10 MG/1
1 TABLET ORAL DAILY
COMMUNITY
Start: 2022-03-19

## 2022-04-04 NOTE — PROGRESS NOTES
Assessment/Plan:    Type 2 diabetes mellitus with stage 3a chronic kidney disease, without long-term current use of insulin ([facility])    Lab Results   Component Value Date    HGBA1C 5 6 04/02/2022   DM type 2 with microalbuminuria/CKD stage 3 - controlled with A1c 5 6 - decrease Tresiba from 10 U to 5 U - call with BS readings in 2-3 wks and will decrease or poss stop the rx, con't current Jardiance, recheck BW in 6 mos - order given, con't low sugar/carb diet, encouraged to start regular exercise and con't with getting wgt down, UTD on foot exam (10/21) and again urged to do eye exam, on statin but no ACE/ARB, will follow    Primary hypertension  BP above my goal for DM but home numbers much lower and will allow a little higher d/t CKD to ensure adequate perfusion of the kidney's, con't healthy diet and start regular exercise, con't BP meds, recheck in 6 mos    Chronic kidney disease, stage 3 ([facility] [address] )  Lab Results   Component Value Date    EGFR 52 01/13/2022    EGFR 52 01/11/2022    EGFR 50 01/10/2022    CREATININE 1 61 (H) 04/02/2022    CREATININE 1 83 (H) 03/19/2022    CREATININE 1 46 (H) 02/11/2022   Stable if not slightly improved, con't to monitor - rehceck BW in 6 mos - order given, encouraged hydration and BP/BS control as well as avoid NSAIDs    Dyslipidemia  LDL at goal, con't current statin, diet/exercise/wgt loss encouraged    Microalbuminuria  Improved but still elevated, not on ACE/ARB d/t CKD, will follow, encouraged BP/BS control       Diagnoses and all orders for this visit:    Type 2 diabetes mellitus with stage 3a chronic kidney disease, without long-term current use of insulin ([facility])  -     insulin degludec Olson Posey FlexTouch) 100 units/mL injection pen; Inject 5 Units under the skin daily  -     Basic metabolic panel; Future  -     Hemoglobin A1C With EAG; Future  -     Basic metabolic panel  -     Hemoglobin A1C With EAG    Stage 3b chronic kidney disease (HCC)  -     Basic metabolic panel;  Future  - Basic metabolic panel    Microalbuminuria    Dyslipidemia    Primary hypertension    BMI 29 0-29 9,adult  Comments:  Healthy diet/exercise/wgt loss encouraged    Overweight    Need for hepatitis C screening test  -     Hepatitis C Ab W/Refl To HCV RNA, Qn, PCR; Future  -     Hepatitis C Ab W/Refl To HCV RNA, Qn, PCR    Other orders  -     amLODIPine (NORVASC) 10 mg tablet; Take 1 tablet by mouth in the morning  -     metoprolol tartrate (LOPRESSOR) 50 mg tablet; Take 1 5 tablets by mouth 2 (two) times a day      Colonoscopy 5/20 - 3 yrs      Subjective:      Patient ID: Chris Bray is a 71 y o  male  HPI Pt here for follow up appt/BW results and AWV    BW results were d/w pt in detail: FBS/A1C improved at 94/5 6, BUN/Cr 25/1 61 (GFR 42), rest of BMP/FLP/TSH/PSA were wnl, urine microalbum:Cr ratio was still elevated but greatly improved at 82  Def of controlled vs uncontrolled DM was reviewed  Diet was reviewed - wgt down 14 lbs from Oct 2021  He is taking his DM meds as directed  He checks his sugars every am fasting - on average he is btw 90's-120's  He is UTD on DM foot exam (10/21) and is still due for his DM eye exam   He notes no vision issues  He is on statin but no ARB/ACE  Microalbuminuria and CKD stage 3 reviewed  Importance of BP/BS control reviewed  Goal FLP was d/w pt in detail  Diet/exercise reviewed as noted above  He is taking his Atorvastatin as well as his Tricor daily as directed w/o Se  He notes no stroke/TIA symptoms/CP  BP above goal today and meds were reviewed and no changes have occurred  He denies missing doses of meds or SE with the meds  He does check his BP outside the office and readings average 130'/80's  He notes no frequent Ha's/dizziness/double vision/CP  Pt saw Cardio (Dr Gabrielle Barrientos) in Feb for f/u HTN - OV note reviewed  He both his Amlodipine increased back to pre-admission dose of 10 mg daily and his Metoprolol  to 75 mg bid        Off O2 for over a month (given for COVID pneumonia)  Notes no cough/SOB/wheezing  Is ready to go back to work  Still using Symbicort  Review of Systems   Constitutional: Negative for chills, fatigue, fever and unexpected weight change  HENT: Negative for congestion  Eyes: Negative for pain and visual disturbance  Respiratory: Negative for cough, shortness of breath and wheezing  Cardiovascular: Negative for chest pain and palpitations  Gastrointestinal: Negative for abdominal pain, blood in stool, constipation, diarrhea, nausea and vomiting  Endocrine: Negative for polydipsia and polyuria  Genitourinary: Negative for difficulty urinating and dysuria  Musculoskeletal: Negative for arthralgias and myalgias  Skin: Negative for rash and wound  Neurological: Negative for dizziness and headaches  Hematological: Does not bruise/bleed easily  Psychiatric/Behavioral: Negative for behavioral problems and confusion  Objective:    /82   Pulse 60   Temp (!) 96 9 °F (36 1 °C) (Tympanic)   Ht 5' 9" (1 753 m)   Wt 91 6 kg (202 lb)   BMI 29 83 kg/m²      Physical Exam  Vitals and nursing note reviewed  Constitutional:       General: He is not in acute distress  Appearance: He is well-developed  He is obese  He is not ill-appearing  HENT:      Head: Normocephalic and atraumatic  Eyes:      General:         Right eye: No discharge  Left eye: No discharge  Conjunctiva/sclera: Conjunctivae normal    Neck:      Vascular: No carotid bruit  Trachea: No tracheal deviation  Cardiovascular:      Rate and Rhythm: Normal rate and regular rhythm  Heart sounds: No murmur heard  Pulmonary:      Effort: Pulmonary effort is normal  No respiratory distress  Breath sounds: Normal breath sounds  No wheezing, rhonchi or rales  Abdominal:      General: There is no distension  Palpations: Abdomen is soft  Tenderness: There is no abdominal tenderness  There is no guarding or rebound  Musculoskeletal:         General: No deformity  Cervical back: Neck supple  Skin:     General: Skin is warm and dry  Coloration: Skin is not pale  Findings: No rash  Neurological:      General: No focal deficit present  Mental Status: He is alert  Motor: No abnormal muscle tone  Gait: Gait normal    Psychiatric:         Mood and Affect: Mood normal          Behavior: Behavior normal          Thought Content:  Thought content normal          Judgment: Judgment normal

## 2022-04-04 NOTE — ASSESSMENT & PLAN NOTE
Lab Results   Component Value Date    EGFR 52 01/13/2022    EGFR 52 01/11/2022    EGFR 50 01/10/2022    CREATININE 1 61 (H) 04/02/2022    CREATININE 1 83 (H) 03/19/2022    CREATININE 1 46 (H) 02/11/2022   Stable if not slightly improved, con't to monitor - rehceck BW in 6 mos - order given, encouraged hydration and BP/BS control as well as avoid NSAIDs

## 2022-04-04 NOTE — PROGRESS NOTES
Assessment and Plan:     Problem List Items Addressed This Visit     None        BMI Counseling: Body mass index is 29 83 kg/m²  The BMI is above normal  Nutrition recommendations include decreasing portion sizes, encouraging healthy choices of fruits and vegetables, consuming healthier snacks, moderation in carbohydrate intake, increasing intake of lean protein, reducing intake of saturated and trans fat and reducing intake of cholesterol  Exercise recommendations include exercising 3-5 times per week  No pharmacotherapy was ordered  Rationale for BMI follow-up plan is due to patient being overweight or obese  Depression Screening and Follow-up Plan: Patient was screened for depression during today's encounter  They screened negative with a PHQ-2 score of 0  Preventive health issues were discussed with patient, and age appropriate screening tests were ordered as noted in patient's After Visit Summary  Personalized health advice and appropriate referrals for health education or preventive services given if needed, as noted in patient's After Visit Summary       History of Present Illness:     Patient presents for Medicare Annual Wellness visit    Patient Care Team:  Marita Matos DO as PCP - [facility] (Internal Medicine)  Julio Cesar Franklin MD (Neurology)  Stoney Mcintyre MD as Medical Oncologist (Oncology)     Problem List:     Patient Active Problem List   Diagnosis    Chronic kidney disease, stage 3 ([facility] [address] )    Colon, diverticulosis    Elevated C-reactive protein    Elevated PSA    Essential hypertriglyceridemia    Gout    Primary hypertension    Microalbuminuria    Obesity    Type 2 diabetes mellitus with stage 3a chronic kidney disease, without long-term current use of insulin [facility]    Ulcerative rectosigmoiditis without complication ([facility]    Dyslipidemia    Erythrocytosis    History of stroke    Transient alteration of awareness    CHADWICK (acute kidney injury) ([facility]    Witnessed episode of apnea    NSVT (nonsustained ventricular tachycardia) (HCC)    Bilateral carotid artery stenosis    Elevated hemoglobin (HCC)    Pneumonia due to COVID-19 virus    Acute respiratory failure with hypoxia [facility])      Past Medical and Surgical History:     Past Medical History:   Diagnosis Date    CVA (cerebral vascular accident) ([facility] [address] )     Diverticulitis     Gout     Hypercholesterolemia     Renal disorder     Vasovagal syncope 12/28/2021     Past Surgical History:   Procedure Laterality Date    CARDIAC LOOP RECORDER      COLONOSCOPY  09/18/2006    complete; 10 yrs    COLONOSCOPY  10/23/2017    complete; 5 yrs    COLONOSCOPY  05/06/2020    Severe active left-sided colitis, erythematous and ulcerated mucosa in the rectosigmoid, inflammatory polyp      Family History:     Family History   Problem Relation Age of Onset    Breast cancer Mother     Kidney disease Father     Lung cancer Father     Other Father         smoker    Hypertension Other     Colon polyps Neg Hx     Colon cancer Neg Hx       Social History:     Social History     Socioeconomic History    Marital status: /Civil Union     Spouse name: None    Number of children: None    Years of education: None    Highest education level: None   Occupational History    Occupation: full-time employment   Tobacco Use    Smoking status: Never Smoker    Smokeless tobacco: Never Used   Vaping Use    Vaping Use: Never used   Substance and Sexual Activity    Alcohol use: Not Currently    Drug use: Never    Sexual activity: Yes   Other Topics Concern    None   Social History Narrative    None     Social Determinants of Health     Financial Resource Strain: Not on file   Food Insecurity: Not on file   Transportation Needs: No Transportation Needs    Lack of Transportation (Medical): No    Lack of Transportation (Non-Medical):  No   Physical Activity: Not on file   Stress: Not on file   Social Connections: Not on file Intimate Partner Violence: Not on file   Housing Stability: Low Risk     Unable to Pay for Housing in the Last Year: No    Number of Places Lived in the Last Year: 1    Unstable Housing in the Last Year: No      Medications and Allergies:     Current Outpatient Medications   Medication Sig Dispense Refill    acetaminophen (TYLENOL) 325 mg tablet Take 2 tablets (650 mg total) by mouth every 6 (six) hours as needed for mild pain  0    allopurinol (ZYLOPRIM) 100 mg tablet Take 2 tablets (200 mg total) by mouth daily 60 tablet 5    amLODIPine (NORVASC) 5 mg tablet Take 1 tablet (5 mg total) by mouth daily 30 tablet 0    ascorbic acid (VITAMIN C) 1000 MG tablet Take 1 tablet (1,000 mg total) by mouth daily  0    atorvastatin (LIPITOR) 40 mg tablet TAKE 1 TABLET DAILY WITH   DINNER 90 tablet 2    budesonide-formoterol (SYMBICORT) 160-4 5 mcg/act inhaler Inhale 2 puffs 2 (two) times a day Rinse mouth after use   10 2 g 0    cholecalciferol (VITAMIN D3) 400 units tablet Take 1 tablet (400 Units total) by mouth daily  0    clopidogrel (PLAVIX) 75 mg tablet Take 1 tablet (75 mg total) by mouth daily 90 tablet 2    Coenzyme Q10 200 MG capsule Take by mouth daily       Empagliflozin (Jardiance) 10 MG TABS 1 tab PO q day 90 tablet 2    fenofibrate (TRICOR) 145 mg tablet Take 1 tablet (145 mg total) by mouth daily 90 tablet 2    insulin degludec Ciro Gambler FlexTouch) 100 units/mL injection pen Inject 12 Units under the skin daily 15 mL 0    metoprolol tartrate (LOPRESSOR) 25 mg tablet Take 1 tablet (25 mg total) by mouth every 12 (twelve) hours 60 tablet 0    predniSONE 10 mg tablet 30 mg by mouth daily for 3 days, then 20 mg by mouth daily for 3 days, then 10 mg by mouth daily for 3 days, then stop 18 tablet 0    sodium chloride (OCEAN) 0 65 % nasal spray 2 sprays into each nostril every hour as needed for congestion  0    sulfaSALAzine (AZULFIDINE) 500 mg tablet Take 1 tablet (500 mg total) by mouth 4 (four) times a day 360 tablet 1     No current facility-administered medications for this visit  No Known Allergies   Immunizations:     Immunization History   Administered Date(s) Administered    COVID-19 PFIZER VACCINE 0 3 ML IM 03/27/2021, 04/18/2021    INFLUENZA 10/08/2015, 10/06/2017, 10/04/2018, 10/03/2019    Influenza, high dose seasonal 0 7 mL 09/28/2020, 10/07/2020, 10/04/2021    Influenza, injectable, quadrivalent, preservative free 0 5 mL 10/04/2018, 10/03/2019    Influenza, seasonal, injectable 10/19/2015, 10/15/2016    Pneumococcal Conjugate 13-Valent 09/28/2020    Pneumococcal Polysaccharide PPV23 10/04/2021    Tdap 12/28/2021      Health Maintenance:         Topic Date Due    Hepatitis C Screening  Never done    Colorectal Cancer Screening  05/06/2025         Topic Date Due    COVID-19 Vaccine (3 - Booster for [place] series) 09/18/2021      Medicare Health Risk Assessment:     There were no vitals taken for this visit  Xavier Bustos is here for his Subsequent Wellness visit  Last Medicare Wellness visit information reviewed, patient interviewed and updates made to the record today  Health Risk Assessment:   Patient rates overall health as good  Patient feels that their physical health rating is same  Patient is very satisfied with their life  Eyesight was rated as same  Hearing was rated as same  Patient feels that their emotional and mental health rating is same  Patients states they are never, rarely angry  Patient states they are always unusually tired/fatigued  Pain experienced in the last 7 days has been none  Patient states that he has experienced no weight loss or gain in last 6 months  Depression Screening:   PHQ-2 Score: 0      Fall Risk Screening: In the past year, patient has experienced: no history of falling in past year      Home Safety:  Patient does not have trouble with stairs inside or outside of their home   Patient has working smoke alarms and has working carbon monoxide detector  Home safety hazards include: none  Nutrition:   Current diet is Diabetic  Medications:   Patient is currently taking over-the-counter supplements  OTC medications include: see medication list  Patient is able to manage medications  Activities of Daily Living (ADLs)/Instrumental Activities of Daily Living (IADLs):   Walk and transfer into and out of bed and chair?: Yes  Dress and groom yourself?: Yes    Bathe or shower yourself?: Yes    Feed yourself?  Yes  Do your laundry/housekeeping?: Yes  Manage your money, pay your bills and track your expenses?: Yes  Make your own meals?: Yes    Do your own shopping?: Yes    Previous Hospitalizations:   Any hospitalizations or ED visits within the last 12 months?: Yes    How many hospitalizations have you had in the last year?: 1-2    Hospitalization Comments: 1 hospitalization     Advance Care Planning:   Living will: No    Durable POA for healthcare: No    Advanced directive: No    Five wishes given: Yes      Cognitive Screening:   Provider or family/friend/caregiver concerned regarding cognition?: No    PREVENTIVE SCREENINGS      Cardiovascular Screening:    General: History Lipid Disorder, Screening Current and Risks and Benefits Discussed      Diabetes Screening:     General: History Diabetes, Risks and Benefits Discussed and Screening Current      Colorectal Cancer Screening:     General: Screening Current      Prostate Cancer Screening:    General: Screening Current and Risks and Benefits Discussed      Osteoporosis Screening:    General: Risks and Benefits Discussed and Screening Not Indicated      Abdominal Aortic Aneurysm (AAA) Screening:    Risk factors include: age between 73-69 yo        General: Risks and Benefits Discussed and Screening Not Indicated      Lung Cancer Screening:     General: Screening Not Indicated and Risks and Benefits Discussed      Hepatitis C Screening:    General: Risks and Benefits Discussed    Hep C Screening Accepted: Yes      Screening, Brief Intervention, and Referral to Treatment (SBIRT)    Screening  Typical number of drinks in a day: 0  Typical number of drinks in a week: 0  Interpretation: Low risk drinking behavior  Single Item Drug Screening:  How often have you used an illegal drug (including marijuana) or a prescription medication for non-medical reasons in the past year? never    Single Item Drug Screen Score: 0  Interpretation: Negative screen for possible drug use disorder    Other Counseling Topics:   Car/seat belt/driving safety and regular weightbearing exercise         Nessa Lewis, DO

## 2022-04-04 NOTE — ASSESSMENT & PLAN NOTE
Lab Results   Component Value Date    HGBA1C 5 6 04/02/2022   DM type 2 with microalbuminuria/CKD stage 3 - controlled with A1c 5 6 - decrease Tresiba from 10 U to 5 U - call with BS readings in 2-3 wks and will decrease or poss stop the rx, con't current Jardiance, recheck BW in 6 mos - order given, con't low sugar/carb diet, encouraged to start regular exercise and con't with getting wgt down, UTD on foot exam (10/21) and again urged to do eye exam, on statin but no ACE/ARB, will follow

## 2022-04-04 NOTE — ASSESSMENT & PLAN NOTE
BP above my goal for DM but home numbers much lower and will allow a little higher d/t CKD to ensure adequate perfusion of the kidney's, con't healthy diet and start regular exercise, con't BP meds, recheck in 6 mos

## 2022-04-04 NOTE — PATIENT INSTRUCTIONS

## 2022-04-08 ENCOUNTER — TELEPHONE (OUTPATIENT)
Dept: HEMATOLOGY ONCOLOGY | Facility: HOSPITAL | Age: 72
End: 2022-04-08

## 2022-04-11 DIAGNOSIS — D75.1 ERYTHROCYTOSIS: Primary | ICD-10-CM

## 2022-04-11 DIAGNOSIS — R71.8 OTHER ABNORMALITY OF RED BLOOD CELLS: ICD-10-CM

## 2022-04-12 NOTE — PROGRESS NOTES
Hematology/Oncology Outpatient Follow- up Note  Zo Miles 1950, [id number]  4/13/2022        Chief Complaint   Patient presents with    Follow-up       HPI:  Briana Reed is a 71year old male who follows with hematology for a history of erythrocytosis  He was hospitalized in 2020 for CVA and was noted to have elevated hemoglobin  His hemoglobin was running in the 17 5 range with a hematocrit consistently above 50   Myeloproliferative workup was done which showed a negative JAK2 mutation and negative PCR for BCR/ABL  He was placed on a therapeutic phlebotomy schedule every 2 weeks which did bring his H&H down to a more stable rage  Presently, he is getting phlebotomy every 2 months with parameters to phlebotomize if hemoglobin > 13;  ferritin >/= 10       Previous Hematologic/ Oncologic History:    Work up  Phlebotomy every 2 weeks     Current Hematologic/ Oncologic Treatment:     phlebotomy every 2 months        Interval History:   The patient presents for routine follow up  He was last seen by Dr Kip Case on 09/27/2021  He was last phlebotomized on 3/22/22  His Hgb was 15 8, Hct 52 1  Ferritin 12  Serum iron 76  Iron saturation 22%  Patient reports he tolerating phlebotomy every 2 months  Overall, he states he feels well at his baseline  He remains active denies any concerning symptoms today  Cancer Staging:  Cancer Staging  No matching staging information was found for the patient  Molecular Testing:             Test Results:    Imaging: No results found      Labs:   Lab Results   Component Value Date    WBC 6 5 03/19/2022    HGB 15 8 03/19/2022    HCT 52 1 (H) 03/19/2022    MCV 91 1 03/19/2022     03/19/2022     Lab Results   Component Value Date     07/07/2017    K 3 9 04/02/2022     04/02/2022    CO2 26 04/02/2022    BUN 25 04/02/2022    CREATININE 1 61 (H) 04/02/2022    CALCIUM 9 5 04/02/2022    CORRECTEDCA 10 1 01/13/2022    AST 20 03/19/2022    ALT 15 03/19/2022    ALKPHOS 60 03/19/2022    PROT 7 1 07/07/2017    BILITOT 0 6 07/07/2017    EGFR 52 01/13/2022           Review of Systems   All other systems reviewed and are negative  Active Problems:   Patient Active Problem List   Diagnosis    Chronic kidney disease, stage 3 (HCC)    Colon, diverticulosis    Elevated C-reactive protein    Elevated PSA    Essential hypertriglyceridemia    Gout    Primary hypertension    Microalbuminuria    Obesity    Type 2 diabetes mellitus with stage 3a chronic kidney disease, without long-term current use of insulin (HCC)    Ulcerative rectosigmoiditis without complication (HCC)    Dyslipidemia    Erythrocytosis    History of stroke    Transient alteration of awareness    Witnessed episode of apnea    NSVT (nonsustained ventricular tachycardia) (HCC)    Bilateral carotid artery stenosis    Elevated hemoglobin (HCC)    Acute respiratory failure with hypoxia (HCC)       Past Medical History:   Past Medical History:   Diagnosis Date    CVA [facility]cerebral vascular accident) ([facility] [address] )     Diverticulitis     Gout     Hypercholesterolemia     Renal disorder     Vasovagal syncope 12/28/2021       Surgical History:   Past Surgical History:   Procedure Laterality Date    CARDIAC LOOP RECORDER      COLONOSCOPY  09/18/2006    complete; 10 yrs    COLONOSCOPY  10/23/2017    complete; 5 yrs    COLONOSCOPY  05/06/2020    Severe active left-sided colitis, erythematous and ulcerated mucosa in the rectosigmoid, inflammatory polyp       Family History:    Family History   Problem Relation Age of Onset    Breast cancer Mother     Kidney disease Father     Lung cancer Father     Other Father         smoker    Hypertension Other     Colon polyps Neg Hx     Colon cancer Neg Hx        Cancer-related family history includes Breast cancer in his mother; Lung cancer in his father  There is no history of Colon cancer      Social History:   Social History     Socioeconomic History    Marital status: /Civil Union     Spouse name: Not on file    Number of children: Not on file    Years of education: Not on file    Highest education level: Not on file   Occupational History    Occupation: full-time employment   Tobacco Use    Smoking status: Never Smoker    Smokeless tobacco: Never Used   Vaping Use    Vaping Use: Never used   Substance and Sexual Activity    Alcohol use: Not Currently    Drug use: Never    Sexual activity: Yes   Other Topics Concern    Not on file   Social History Narrative    Not on file     Social Determinants of Health     Financial Resource Strain: Not on file   Food Insecurity: Not on file   Transportation Needs: No Transportation Needs    Lack of Transportation (Medical): No    Lack of Transportation (Non-Medical):  No   Physical Activity: Not on file   Stress: Not on file   Social Connections: Not on file   Intimate Partner Violence: Not on file   Housing Stability: Low Risk     Unable to Pay for Housing in the Last Year: No    Number of Places Lived in the Last Year: 1    Unstable Housing in the Last Year: No       Current Medications:   Current Outpatient Medications   Medication Sig Dispense Refill    allopurinol (ZYLOPRIM) 100 mg tablet Take 2 tablets (200 mg total) by mouth daily 60 tablet 5    amLODIPine (NORVASC) 10 mg tablet Take 1 tablet by mouth in the morning      ascorbic acid (VITAMIN C) 1000 MG tablet Take 1 tablet (1,000 mg total) by mouth daily  0    atorvastatin (LIPITOR) 40 mg tablet TAKE 1 TABLET DAILY WITH   DINNER 90 tablet 2    cholecalciferol (VITAMIN D3) 400 units tablet Take 1 tablet (400 Units total) by mouth daily  0    clopidogrel (PLAVIX) 75 mg tablet Take 1 tablet (75 mg total) by mouth daily 90 tablet 2    Coenzyme Q10 200 MG capsule Take by mouth daily       Empagliflozin (Jardiance) 10 MG TABS 1 tab PO q day 90 tablet 2    fenofibrate (TRICOR) 145 mg tablet Take 1 tablet (145 mg total) by mouth daily 90 tablet 2    metoprolol tartrate (LOPRESSOR) 50 mg tablet Take 1 5 tablets by mouth 2 (two) times a day      sulfaSALAzine (AZULFIDINE) 500 mg tablet Take 1 tablet (500 mg total) by mouth 4 (four) times a day 360 tablet 1    acetaminophen (TYLENOL) 325 mg tablet Take 2 tablets (650 mg total) by mouth every 6 (six) hours as needed for mild pain (Patient not taking: Reported on 4/13/2022 )  0    insulin degludec Eldorado Risser FlexTouch) 100 units/mL injection pen Inject 5 Units under the skin daily (Patient not taking: Reported on 4/13/2022 ) 15 mL 0    sodium chloride (OCEAN) 0 65 % nasal spray 2 sprays into each nostril every hour as needed for congestion (Patient not taking: Reported on 4/13/2022 )  0     No current facility-administered medications for this visit  Allergies: No Known Allergies    Physical Exam:  /90 (BP Location: Left arm, Patient Position: Sitting, Cuff Size: Adult)   Pulse 77   Temp 98 2 °F (36 8 °C) (Tympanic)   Resp 16   Ht 5' 9" (1 753 m)   Wt 92 1 kg (203 lb)   SpO2 97%   BMI 29 98 kg/m²   Body surface area is 2 08 meters squared  Wt Readings from Last 3 Encounters:   04/13/22 92 1 kg (203 lb)   04/04/22 91 6 kg (202 lb)   02/10/22 87 1 kg (192 lb)           Physical Exam  Constitutional:       General: He is not in acute distress  Appearance: He is well-developed  He is not diaphoretic  HENT:      Head: Normocephalic and atraumatic  Mouth/Throat:      Pharynx: No oropharyngeal exudate  Eyes:      General: No scleral icterus  Pupils: Pupils are equal, round, and reactive to light  Cardiovascular:      Rate and Rhythm: Normal rate and regular rhythm  Heart sounds: No murmur heard  Pulmonary:      Effort: Pulmonary effort is normal  No respiratory distress  Breath sounds: Normal breath sounds  Abdominal:      General: Bowel sounds are normal  There is no distension  Palpations: Abdomen is soft  There is no mass  Tenderness: There is no abdominal tenderness  Musculoskeletal:         General: Normal range of motion  Cervical back: Normal range of motion and neck supple  Lymphadenopathy:      Cervical: No cervical adenopathy  Skin:     General: Skin is warm and dry  Neurological:      General: No focal deficit present  Mental Status: He is alert and oriented to person, place, and time  Psychiatric:         Mood and Affect: Mood normal          Behavior: Behavior normal          Thought Content: Thought content normal          Judgment: Judgment normal          Assessment / Plan:    1  Erythrocytosis      The patient is a 71year old male who follows with hematology for a history of erythrocytosis  He was hospitalized in 2020 for CVA and was noted to have elevated hemoglobin  His hemoglobin was running in the 17 5 range with a hematocrit consistently above 50   Myeloproliferative workup was done which showed a negative JAK2 mutation and negative PCR for BCR/ABL  He was placed on a therapeutic phlebotomy schedule every 2 weeks which did bring his H&H down to a more stable rage  Presently, he is getting phlebotomy every 2 months with parameters to phlebotomize if hemoglobin > 13;  ferritin >/= 10   He was last phlebotomized in March, his hematocrit was 52  His current phlebotomy schedule has kept his numbers within a normal range  He will stay on his current regimen without change  He will be due for his next scheduled phlebotomy in May  He knows to have blood work done prior  He continues on Plavix    He will return for a follow-up visit in 6 months  He knows to call at any time with questions or concerning symptoms    Barriers: None  Patient  able to self care  Portions of the record may have been created with voice recognition software  Occasional wrong word or "sound a like" substitutions may have occurred due to the inherent limitations of voice recognition software    Read the chart carefully and recognize, using context, where substitutions have occurred

## 2022-04-13 ENCOUNTER — TELEPHONE (OUTPATIENT)
Dept: HEMATOLOGY ONCOLOGY | Facility: HOSPITAL | Age: 72
End: 2022-04-13

## 2022-04-13 ENCOUNTER — TELEPHONE (OUTPATIENT)
Dept: HEMATOLOGY ONCOLOGY | Facility: CLINIC | Age: 72
End: 2022-04-13

## 2022-04-13 ENCOUNTER — OFFICE VISIT (OUTPATIENT)
Dept: HEMATOLOGY ONCOLOGY | Facility: HOSPITAL | Age: 72
End: 2022-04-13
Payer: MEDICARE

## 2022-04-13 VITALS
OXYGEN SATURATION: 97 % | HEIGHT: 69 IN | SYSTOLIC BLOOD PRESSURE: 150 MMHG | RESPIRATION RATE: 16 BRPM | BODY MASS INDEX: 30.07 KG/M2 | WEIGHT: 203 LBS | DIASTOLIC BLOOD PRESSURE: 90 MMHG | HEART RATE: 77 BPM | TEMPERATURE: 98.2 F

## 2022-04-13 DIAGNOSIS — D75.1 ERYTHROCYTOSIS: Primary | ICD-10-CM

## 2022-04-13 PROCEDURE — 99214 OFFICE O/P EST MOD 30 MIN: CPT | Performed by: NURSE PRACTITIONER
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
## 2022-04-13 NOTE — TELEPHONE ENCOUNTER
Followed up Joseph's call to let patient know that we are now back in the building and we can see her

## 2022-04-13 NOTE — TELEPHONE ENCOUNTER
I left a VM on patient spouse phone to let her know that we are back in the building and we can still see him for his 1pm appt  If he can still make appointment, great, or they can call the [facility] AT [place]

## 2022-04-13 NOTE — TELEPHONE ENCOUNTER
I left a message on patient's My Chart to still proceed with today's appointment or call the [facility] AT [place] to reschedule

## 2022-04-15 DIAGNOSIS — E78.1 ESSENTIAL HYPERTRIGLYCERIDEMIA: ICD-10-CM

## 2022-04-17 RX ORDER — FENOFIBRATE 145 MG/1
145 TABLET, COATED ORAL DAILY
Qty: 90 TABLET | Refills: 2 | Status: SHIPPED | OUTPATIENT
Start: 2022-04-17

## 2022-04-21 ENCOUNTER — TELEPHONE (OUTPATIENT)
Dept: NEPHROLOGY | Facility: CLINIC | Age: 72
End: 2022-04-21

## 2022-05-16 ENCOUNTER — TELEPHONE (OUTPATIENT)
Dept: HEMATOLOGY ONCOLOGY | Facility: HOSPITAL | Age: 72
End: 2022-05-16

## 2022-05-16 NOTE — TELEPHONE ENCOUNTER
Patients Ferritin was not obtained  Called and spoke with patient, he uses [facility] and doesn't believe they lay it  Pt was made aware he will need CBCD and Ferritin prior to each treatment  Staff message sent to Rigo Valera to see how she would like to proceed for upcoming phlebotomy

## 2022-05-17 ENCOUNTER — HOSPITAL ENCOUNTER (OUTPATIENT)
Dept: INFUSION CENTER | Facility: HOSPITAL | Age: 72
Discharge: HOME | End: 2022-05-17
Attending: INTERNAL MEDICINE
Payer: MEDICARE

## 2022-05-17 VITALS
OXYGEN SATURATION: 97 % | RESPIRATION RATE: 16 BRPM | TEMPERATURE: 98.2 F | SYSTOLIC BLOOD PRESSURE: 146 MMHG | DIASTOLIC BLOOD PRESSURE: 85 MMHG | HEART RATE: 56 BPM

## 2022-05-17 DIAGNOSIS — D75.1 ERYTHROCYTOSIS: Primary | ICD-10-CM

## 2022-05-17 PROCEDURE — 99195 PHLEBOTOMY: CPT

## 2022-05-17 NOTE — PROGRESS NOTES
Pt here for Therapeutic Phlebotomy; pt in recliner with legs elevated and water at chairside; removed 500 mls blood from LAC; pressure dressing applied; currently monitoring

## 2022-07-11 ENCOUNTER — TELEPHONE (OUTPATIENT)
Dept: HEMATOLOGY ONCOLOGY | Facility: HOSPITAL | Age: 72
End: 2022-07-11

## 2022-07-11 NOTE — TELEPHONE ENCOUNTER
Pt was called and notified of attached information from [place]  Pt was advised to call our office at any time prior to his next treatment with any questions or concerns  Pt was agreeable to the plan and appreciative of my call  From: ROSALINA Glez  Sent: 7/11/2022   1:09 PM EDT  To: Melvin Meng RN    HOLD treatment   Recheck labs in 2 months when he would be due for another phlebotomy   ----- Message -----  From: Melvin Meng RN  Sent: 7/11/2022  10:56 AM EDT  To: Yelena Cordoba    How do you wish to proceed?  ----- Message -----  From: Vanessa Goldstein RN  Sent: 7/11/2022  10:37 AM EDT  To: Vanessa Goldstein RN, Melvin Meng RN

## 2022-07-12 ENCOUNTER — HOSPITAL ENCOUNTER (OUTPATIENT)
Dept: INFUSION CENTER | Facility: HOSPITAL | Age: 72
Discharge: HOME | End: 2022-07-12
Attending: INTERNAL MEDICINE

## 2022-09-04 DIAGNOSIS — I63.9 CVA (CEREBRAL VASCULAR ACCIDENT) (HCC): ICD-10-CM

## 2022-09-04 RX ORDER — ATORVASTATIN CALCIUM 40 MG/1
TABLET, FILM COATED ORAL
Qty: 90 TABLET | Refills: 2 | Status: SHIPPED | OUTPATIENT
Start: 2022-09-04

## 2022-09-06 ENCOUNTER — HOSPITAL ENCOUNTER (OUTPATIENT)
Dept: INFUSION CENTER | Facility: HOSPITAL | Age: 72
Discharge: HOME | End: 2022-09-06
Attending: INTERNAL MEDICINE

## 2022-09-06 VITALS — RESPIRATION RATE: 16 BRPM | OXYGEN SATURATION: 98 % | TEMPERATURE: 98.1 F | HEART RATE: 66 BPM

## 2022-09-06 DIAGNOSIS — D75.1 ERYTHROCYTOSIS: Primary | ICD-10-CM

## 2022-09-06 NOTE — PLAN OF CARE
Problem: DISCHARGE PLANNING  Goal: Discharge to home or other facility with appropriate resources  Description: INTERVENTIONS:  - Identify barriers to discharge w/patient and caregiver  - Arrange for needed discharge resources and transportation as appropriate  - Identify discharge learning needs (meds, wound care, etc )  - Arrange for interpretive services to assist at discharge as needed  - Refer to Case Management Department for coordinating discharge planning if the patient needs post-hospital services based on physician/advanced practitioner order or complex needs related to functional status, cognitive ability, or social support system  Outcome: Progressing     Problem: Knowledge Deficit  Goal: Patient/family/caregiver demonstrates understanding of disease process, treatment plan, medications, and discharge instructions  Description: Complete learning assessment and assess knowledge base  Interventions:  - Provide teaching at level of understanding  - Provide teaching via preferred learning methods  Outcome: Progressing     Problem: Knowledge Deficit  Goal: Patient/family/caregiver demonstrates understanding of disease process, treatment plan, medications, and discharge instructions  Description: Complete learning assessment and assess knowledge base    Interventions:  - Provide teaching at level of understanding  - Provide teaching via preferred learning methods  Outcome: Progressing

## 2022-09-06 NOTE — PROGRESS NOTES
Pt arrived on unit for therapeutic Phlebotomy  VSS  Pt was stuck 4xs without achieving good blood flow  Per Michelle Montelongo RN with Dr Celestine Tony, we will reschedule pt and okay to use labs from 9/3 for repeat tx  Pt verbalized understanding  Left ambulatory with steady gait

## 2022-09-08 DIAGNOSIS — D75.1 ERYTHROCYTOSIS: Primary | ICD-10-CM

## 2022-09-13 ENCOUNTER — HOSPITAL ENCOUNTER (OUTPATIENT)
Dept: INFUSION CENTER | Facility: HOSPITAL | Age: 72
Discharge: HOME | End: 2022-09-13
Payer: MEDICARE

## 2022-09-13 VITALS
RESPIRATION RATE: 16 BRPM | HEART RATE: 64 BPM | DIASTOLIC BLOOD PRESSURE: 77 MMHG | OXYGEN SATURATION: 99 % | SYSTOLIC BLOOD PRESSURE: 122 MMHG | TEMPERATURE: 97.5 F

## 2022-09-13 DIAGNOSIS — D75.1 ERYTHROCYTOSIS: Primary | ICD-10-CM

## 2022-09-13 PROCEDURE — 99195 PHLEBOTOMY: CPT

## 2022-09-13 NOTE — PROGRESS NOTES
Pt tolerated full 500mL phlebotomy without incident  Pt stuck x2 to obtain full volume  Pt observed for 20 min post phleb  VSS, pt offers no complaints  Pt aware of next appt, AVS provided

## 2022-10-04 ENCOUNTER — RA CDI HCC (OUTPATIENT)
Dept: OTHER | Facility: HOSPITAL | Age: 72
End: 2022-10-04

## 2022-10-04 NOTE — PROGRESS NOTES
[facility]  coding opportunities        I13 0  Chart Reviewed number of suggestions sent to Provider: 1     Patients Insurance     Medicare Insurance: [employer]

## 2022-10-09 LAB
BASOPHILS # BLD AUTO: 100 CELLS/UL (ref 0–200)
BASOPHILS NFR BLD AUTO: 1.1 %
EOSINOPHIL # BLD AUTO: 228 CELLS/UL (ref 15–500)
EOSINOPHIL NFR BLD AUTO: 2.5 %
ERYTHROCYTE [DISTWIDTH] IN BLOOD BY AUTOMATED COUNT: 15.3 % (ref 11–15)
FERRITIN SERPL-MCNC: 15 NG/ML (ref 24–380)
HCT VFR BLD AUTO: 47.4 % (ref 38.5–50)
HGB BLD-MCNC: 15.1 G/DL (ref 13.2–17.1)
LYMPHOCYTES # BLD AUTO: 1456 CELLS/UL (ref 850–3900)
LYMPHOCYTES NFR BLD AUTO: 16 %
MCH RBC QN AUTO: 27.5 PG (ref 27–33)
MCHC RBC AUTO-ENTMCNC: 31.9 G/DL (ref 32–36)
MCV RBC AUTO: 86.2 FL (ref 80–100)
MONOCYTES # BLD AUTO: 592 CELLS/UL (ref 200–950)
MONOCYTES NFR BLD AUTO: 6.5 %
NEUTROPHILS # BLD AUTO: 6725 CELLS/UL (ref 1500–7800)
NEUTROPHILS NFR BLD AUTO: 73.9 %
PLATELET # BLD AUTO: 271 THOUSAND/UL (ref 140–400)
PMV BLD REES-ECKER: 10.1 FL (ref 7.5–12.5)
RBC # BLD AUTO: 5.5 MILLION/UL (ref 4.2–5.8)
WBC # BLD AUTO: 9.1 THOUSAND/UL (ref 3.8–10.8)

## 2022-10-12 ENCOUNTER — OFFICE VISIT (OUTPATIENT)
Dept: HEMATOLOGY ONCOLOGY | Facility: HOSPITAL | Age: 72
End: 2022-10-12
Payer: MEDICARE

## 2022-10-12 VITALS
HEART RATE: 65 BPM | BODY MASS INDEX: 32.14 KG/M2 | SYSTOLIC BLOOD PRESSURE: 150 MMHG | RESPIRATION RATE: 16 BRPM | WEIGHT: 217 LBS | DIASTOLIC BLOOD PRESSURE: 90 MMHG | HEIGHT: 69 IN | TEMPERATURE: 98.3 F | OXYGEN SATURATION: 95 %

## 2022-10-12 DIAGNOSIS — D75.1 ERYTHROCYTOSIS: Primary | ICD-10-CM

## 2022-10-12 DIAGNOSIS — I63.9 CVA (CEREBRAL VASCULAR ACCIDENT) (HCC): ICD-10-CM

## 2022-10-12 DIAGNOSIS — E78.1 ESSENTIAL HYPERTRIGLYCERIDEMIA: ICD-10-CM

## 2022-10-12 DIAGNOSIS — R71.8 OTHER ABNORMALITY OF RED BLOOD CELLS: ICD-10-CM

## 2022-10-12 PROCEDURE — 99214 OFFICE O/P EST MOD 30 MIN: CPT | Performed by: NURSE PRACTITIONER

## 2022-10-12 RX ORDER — CLOPIDOGREL BISULFATE 75 MG/1
TABLET ORAL
Qty: 90 TABLET | Refills: 2 | Status: SHIPPED | OUTPATIENT
Start: 2022-10-12 | End: 2022-10-12 | Stop reason: ALTCHOICE

## 2022-10-12 RX ORDER — FENOFIBRATE 145 MG/1
TABLET, COATED ORAL
Qty: 90 TABLET | Refills: 2 | Status: SHIPPED | OUTPATIENT
Start: 2022-10-12

## 2022-10-12 NOTE — PROGRESS NOTES
Hematology/Oncology Outpatient Follow- up Note  Power Calderón 1950, [id number]  10/12/2022        Chief Complaint   Patient presents with   • Follow-up       HPI:  Radha Amos is a 71year old male who follows with hematology for a history of erythrocytosis  He was hospitalized in 2020 for CVA and was noted to have elevated hemoglobin  His hemoglobin was running in the 17 5 range with a hematocrit consistently above 50   Myeloproliferative workup was done which showed a negative JAK2 mutation and negative PCR for BCR/ABL  He was placed on a therapeutic phlebotomy schedule every 2 weeks which did bring his H&H down to a more stable rage  Presently, he is getting phlebotomy every 2 months with parameters to phlebotomize if hemoglobin > 13;  ferritin >/= 10     Previous Hematologic/ Oncologic History:    Work up  Phlebotomy every 2 weeks     Current Hematologic/ Oncologic Treatment:     phlebotomy every 2 months      Interval History:   The patient presents for routine follow up  He was last seen on 4/13/22  He was last phlebotomized on 9/13/22  His Hgb was 15 9, Hct 51 4  Ferritin 12  Most recent CBC on 10/8: Hgb 15 1, Hct 47 4, ferritin 15    Patient reports he tolerating phlebotomy every 2 months  Overall, he states he feels well at his baseline  He remains active denies any concerning symptoms today  Cancer Staging:  Cancer Staging  No matching staging information was found for the patient  Molecular Testing:             Test Results:    Imaging: No results found      Labs:   Lab Results   Component Value Date    WBC 9 1 10/08/2022    HGB 15 1 10/08/2022    HCT 47 4 10/08/2022    MCV 86 2 10/08/2022     10/08/2022     Lab Results   Component Value Date     07/07/2017    K 3 8 09/03/2022     09/03/2022    CO2 28 09/03/2022    BUN 26 (H) 09/03/2022    CREATININE 1 59 (H) 09/03/2022    CALCIUM 9 3 09/03/2022    CORRECTEDCA 10 1 01/13/2022    AST 19 09/03/2022    ALT 14 09/03/2022    ALKPHOS 65 09/03/2022    PROT 7 1 07/07/2017    BILITOT 0 6 07/07/2017    EGFR 46 (L) 09/03/2022           Review of Systems   All other systems reviewed and are negative  Active Problems:   Patient Active Problem List   Diagnosis   • Chronic kidney disease, stage 3 (HCC)   • Colon, diverticulosis   • Elevated C-reactive protein   • Elevated PSA   • Essential hypertriglyceridemia   • Gout   • Primary hypertension   • Microalbuminuria   • Obesity   • Type 2 diabetes mellitus with stage 3a chronic kidney disease, without long-term current use of insulin (HCC)   • Ulcerative rectosigmoiditis without complication (HCC)   • Dyslipidemia   • Erythrocytosis   • History of stroke   • Transient alteration of awareness   • Witnessed episode of apnea   • NSVT (nonsustained ventricular tachycardia)   • Bilateral carotid artery stenosis   • Elevated hemoglobin (HCC)   • Acute respiratory failure with hypoxia (HCC)       Past Medical History:   Past Medical History:   Diagnosis Date   • CVA (cerebral vascular accident) ([facility] [address] )    • Diverticulitis    • Gout    • Hypercholesterolemia    • Renal disorder    • Vasovagal syncope 12/28/2021       Surgical History:   Past Surgical History:   Procedure Laterality Date   • CARDIAC LOOP RECORDER     • COLONOSCOPY  09/18/2006    complete; 10 yrs   • COLONOSCOPY  10/23/2017    complete; 5 yrs   • COLONOSCOPY  05/06/2020    Severe active left-sided colitis, erythematous and ulcerated mucosa in the rectosigmoid, inflammatory polyp       Family History:    Family History   Problem Relation Age of Onset   • Breast cancer Mother    • Kidney disease Father    • Lung cancer Father    • Other Father         smoker   • Hypertension Other    • Colon polyps Neg Hx    • Colon cancer Neg Hx        Cancer-related family history includes Breast cancer in his mother; Lung cancer in his father  There is no history of Colon cancer      Social History:   Social History     Socioeconomic History   • Marital status: /Civil Union     Spouse name: Not on file   • Number of children: Not on file   • Years of education: Not on file   • Highest education level: Not on file   Occupational History   • Occupation: full-time employment   Tobacco Use   • Smoking status: Never Smoker   • Smokeless tobacco: Never Used   Vaping Use   • Vaping Use: Never used   Substance and Sexual Activity   • Alcohol use: Not Currently   • Drug use: Never   • Sexual activity: Yes   Other Topics Concern   • Not on file   Social History Narrative   • Not on file     Social Determinants of Health     Financial Resource Strain: Not on file   Food Insecurity: Not on file   Transportation Needs: No Transportation Needs   • Lack of Transportation (Medical): No   • Lack of Transportation (Non-Medical):  No   Physical Activity: Not on file   Stress: Not on file   Social Connections: Not on file   Intimate Partner Violence: Not on file   Housing Stability: Low Risk    • Unable to Pay for Housing in the Last Year: No   • Number of Places Lived in the Last Year: 1   • Unstable Housing in the Last Year: No       Current Medications:   Current Outpatient Medications   Medication Sig Dispense Refill   • allopurinol (ZYLOPRIM) 100 mg tablet TAKE 2 TABLETS DAILY 180 tablet 2   • amLODIPine (NORVASC) 10 mg tablet Take 1 tablet by mouth in the morning     • ascorbic acid (VITAMIN C) 1000 MG tablet Take 1 tablet (1,000 mg total) by mouth daily  0   • atorvastatin (LIPITOR) 40 mg tablet TAKE 1 TABLET DAILY WITH   DINNER 90 tablet 2   • cholecalciferol (VITAMIN D3) 400 units tablet Take 1 tablet (400 Units total) by mouth daily  0   • Coenzyme Q10 200 MG capsule Take by mouth daily      • Empagliflozin (Jardiance) 10 MG TABS 1 tab PO q day 90 tablet 2   • fenofibrate (TRICOR) 145 mg tablet TAKE 1 TABLET DAILY 90 tablet 2   • metoprolol tartrate (LOPRESSOR) 50 mg tablet Take 1 5 tablets by mouth 2 (two) times a day     • sulfaSALAzine (AZULFIDINE) 500 mg tablet TAKE 1 TABLET 4 TIMES DAILY 360 tablet 0   • acetaminophen (TYLENOL) 325 mg tablet Take 2 tablets (650 mg total) by mouth every 6 (six) hours as needed for mild pain (Patient not taking: No sig reported)  0   • insulin degludec Joseph Heck FlexTouch) 100 units/mL injection pen Inject 5 Units under the skin daily (Patient not taking: Reported on 4/13/2022 ) 15 mL 0   • sodium chloride (OCEAN) 0 65 % nasal spray 2 sprays into each nostril every hour as needed for congestion (Patient not taking: Reported on 4/13/2022 )  0     No current facility-administered medications for this visit  Allergies: No Known Allergies    Physical Exam:  /90 (BP Location: Left arm, Patient Position: Sitting, Cuff Size: Adult)   Pulse 65   Temp 98 3 °F (36 8 °C) (Tympanic)   Resp 16   Ht 5' 9" (1 753 m)   Wt 98 4 kg (217 lb)   SpO2 95%   BMI 32 05 kg/m²   Body surface area is 2 14 meters squared  Wt Readings from Last 3 Encounters:   10/12/22 98 4 kg (217 lb)   04/13/22 92 1 kg (203 lb)   04/04/22 91 6 kg (202 lb)           Physical Exam  Constitutional:       General: He is not in acute distress  Appearance: Normal appearance  HENT:      Head: Normocephalic and atraumatic  Eyes:      General: No scleral icterus  Right eye: No discharge  Left eye: No discharge  Conjunctiva/sclera: Conjunctivae normal    Cardiovascular:      Rate and Rhythm: Normal rate and regular rhythm  Pulmonary:      Effort: Pulmonary effort is normal  No respiratory distress  Breath sounds: Normal breath sounds  Chest:   Breasts:      Right: No axillary adenopathy or supraclavicular adenopathy  Left: No axillary adenopathy or supraclavicular adenopathy  Abdominal:      General: Bowel sounds are normal  There is no distension  Palpations: Abdomen is soft  There is no mass  Tenderness: There is no abdominal tenderness     Musculoskeletal:         General: Normal range of motion  Lymphadenopathy:      Cervical: No cervical adenopathy  Upper Body:      Right upper body: No supraclavicular, axillary or pectoral adenopathy  Left upper body: No supraclavicular, axillary or pectoral adenopathy  Skin:     General: Skin is warm and dry  Neurological:      General: No focal deficit present  Mental Status: He is alert and oriented to person, place, and time  Psychiatric:         Mood and Affect: Mood normal          Behavior: Behavior normal          Assessment / Plan:    1  Erythrocytosis    2  Other abnormality of red blood cells      The patient is a 71year old male who follows with hematology for a history of erythrocytosis  He was hospitalized in 2020 for CVA and was noted to have elevated hemoglobin  His hemoglobin was running in the 17 5 range with a hematocrit consistently above 50   Myeloproliferative workup was done which showed a negative JAK2 mutation and negative PCR for BCR/ABL  He was placed on a therapeutic phlebotomy schedule every 2 weeks which did bring his H&H down to a more stable rage  Presently, he is getting phlebotomy every 2 months with parameters to phlebotomize if hemoglobin > 13;  ferritin >/= 10   He was last phlebotomized in September his hematocrit was 51 4  His current phlebotomy schedule has kept his numbers within a normal range  He will stay on his current regimen without change  He will be due for his next scheduled phlebotomy in November  He knows to have blood work done prior    He will return for a follow-up visit in 6 months  He knows to call at any time with questions or concerning symptoms    Barriers: None  Patient  able to self care  Portions of the record may have been created with voice recognition software  Occasional wrong word or "sound a like" substitutions may have occurred due to the inherent limitations of voice recognition software    Read the chart carefully and recognize, using context, where substitutions have occurred

## 2022-11-06 LAB
BASOPHILS # BLD AUTO: 61 CELLS/UL (ref 0–200)
BASOPHILS NFR BLD AUTO: 1 %
EOSINOPHIL # BLD AUTO: 220 CELLS/UL (ref 15–500)
EOSINOPHIL NFR BLD AUTO: 3.6 %
ERYTHROCYTE [DISTWIDTH] IN BLOOD BY AUTOMATED COUNT: 14.8 % (ref 11–15)
FERRITIN SERPL-MCNC: 13 NG/ML (ref 24–380)
HCT VFR BLD AUTO: 49.5 % (ref 38.5–50)
HGB BLD-MCNC: 16 G/DL (ref 13.2–17.1)
LYMPHOCYTES # BLD AUTO: 1263 CELLS/UL (ref 850–3900)
LYMPHOCYTES NFR BLD AUTO: 20.7 %
MCH RBC QN AUTO: 28.5 PG (ref 27–33)
MCHC RBC AUTO-ENTMCNC: 32.3 G/DL (ref 32–36)
MCV RBC AUTO: 88.1 FL (ref 80–100)
MONOCYTES # BLD AUTO: 464 CELLS/UL (ref 200–950)
MONOCYTES NFR BLD AUTO: 7.6 %
NEUTROPHILS # BLD AUTO: 4093 CELLS/UL (ref 1500–7800)
NEUTROPHILS NFR BLD AUTO: 67.1 %
PLATELET # BLD AUTO: 247 THOUSAND/UL (ref 140–400)
PMV BLD REES-ECKER: 10.5 FL (ref 7.5–12.5)
RBC # BLD AUTO: 5.62 MILLION/UL (ref 4.2–5.8)
WBC # BLD AUTO: 6.1 THOUSAND/UL (ref 3.8–10.8)

## 2022-11-08 ENCOUNTER — HOSPITAL ENCOUNTER (OUTPATIENT)
Dept: INFUSION CENTER | Facility: HOSPITAL | Age: 72
Discharge: HOME | End: 2022-11-08
Attending: INTERNAL MEDICINE

## 2022-11-08 VITALS
RESPIRATION RATE: 16 BRPM | TEMPERATURE: 96.6 F | OXYGEN SATURATION: 96 % | DIASTOLIC BLOOD PRESSURE: 78 MMHG | SYSTOLIC BLOOD PRESSURE: 158 MMHG | HEART RATE: 62 BPM

## 2022-11-08 DIAGNOSIS — D75.1 ERYTHROCYTOSIS: Primary | ICD-10-CM

## 2022-11-08 NOTE — PROGRESS NOTES
Pt here for Therapeutic Phlebotomy; in recliner with legs elevated; drink at chairside; removed 500 mls blood from LAC; pressure dressing applied; currently monitoring pt

## 2022-11-29 ENCOUNTER — TELEPHONE (OUTPATIENT)
Dept: NEUROLOGY | Facility: CLINIC | Age: 72
End: 2022-11-29

## 2022-12-11 ENCOUNTER — NURSE TRIAGE (OUTPATIENT)
Dept: OTHER | Facility: OTHER | Age: 72
End: 2022-12-11

## 2022-12-11 NOTE — TELEPHONE ENCOUNTER
Regarding: Deep Cough/ Medication question  ----- Message from Juliann Gaspar sent at 12/11/2022  5:40 PM EST -----  "My  is getting over the flu, he tested negative for covid but he has a deep cough  Is it ok for him to take Mucinex together with all his other medication?

## 2022-12-12 NOTE — TELEPHONE ENCOUNTER
Reason for Disposition  • Cough    Answer Assessment - Initial Assessment Questions  1  ONSET: "When did the cough begin?"       Few days has the Flu  2  SEVERITY: "How bad is the cough today?"       moderate  3  SPUTUM: "Describe the color of your sputum" (none, dry cough; clear, white, yellow, green)      Dry cough  4  HEMOPTYSIS: "Are you coughing up any blood?" If so ask: "How much?" (flecks, streaks, tablespoons, etc )      no  5  DIFFICULTY BREATHING: "Are you having difficulty breathing?" If Yes, ask: "How bad is it?" (e g , mild, moderate, severe)     - MILD: No SOB at rest, mild SOB with walking, speaks normally in sentences, can lay down, no retractions, pulse < 100      - MODERATE: SOB at rest, SOB with minimal exertion and prefers to sit, cannot lie down flat, speaks in phrases, mild retractions, audible wheezing, pulse 100-120      - SEVERE: Very SOB at rest, speaks in single words, struggling to breathe, sitting hunched forward, retractions, pulse > 120       no  6  FEVER: "Do you have a fever?" If Yes, ask: "What is your temperature, how was it measured, and when did it start?"      no  7  CARDIAC HISTORY: "Do you have any history of heart disease?" (e g , heart attack, congestive heart failure)       yes  8  LUNG HISTORY: "Do you have any history of lung disease?"  (e g , pulmonary embolus, asthma, emphysema)      no  9  PE RISK FACTORS: "Do you have a history of blood clots?" (or: recent major surgery, recent prolonged travel, bedridden)      no  10  OTHER SYMPTOMS: "Do you have any other symptoms?" (e g , runny nose, wheezing, chest pain)        no  11  PREGNANCY: "Is there any chance you are pregnant?" "When was your last menstrual period?"        N/A  12   TRAVEL: "Have you traveled out of the country in the last month?" (e g , travel history, exposures)        no    Protocols used: COUGH - ACUTE NON-PRODUCTIVE-ADULT-AH

## 2022-12-13 NOTE — TELEPHONE ENCOUNTER
Script approved  Graft Donor Site Bandage (Optional-Leave Blank If You Don't Want In Note): Steri-strips and a pressure bandage were applied to the donor site.

## 2023-01-03 ENCOUNTER — HOSPITAL ENCOUNTER (OUTPATIENT)
Dept: INFUSION CENTER | Facility: HOSPITAL | Age: 73
End: 2023-01-03
Attending: INTERNAL MEDICINE

## 2023-01-04 ENCOUNTER — HOSPITAL ENCOUNTER (OUTPATIENT)
Dept: INFUSION CENTER | Facility: HOSPITAL | Age: 73
Discharge: HOME | End: 2023-01-04
Attending: INTERNAL MEDICINE

## 2023-01-04 VITALS
RESPIRATION RATE: 12 BRPM | SYSTOLIC BLOOD PRESSURE: 126 MMHG | DIASTOLIC BLOOD PRESSURE: 72 MMHG | TEMPERATURE: 96.9 F | HEART RATE: 65 BPM | OXYGEN SATURATION: 96 %

## 2023-01-04 DIAGNOSIS — D75.1 ERYTHROCYTOSIS: Primary | ICD-10-CM

## 2023-01-04 NOTE — PROGRESS NOTES
Pt had therapeutic phlebotomy performed over 11 minutes  500ml's removed  Pt's vss s/p procedure  Pt aware of next appt  Left ambulatory with steady gait

## 2023-01-05 ENCOUNTER — TELEPHONE (OUTPATIENT)
Dept: FAMILY MEDICINE CLINIC | Facility: HOSPITAL | Age: 73
End: 2023-01-05

## 2023-01-05 DIAGNOSIS — Z86.73 HISTORY OF STROKE: Primary | ICD-10-CM

## 2023-01-05 RX ORDER — CLOPIDOGREL BISULFATE 75 MG/1
75 TABLET ORAL DAILY
Qty: 90 TABLET | Refills: 2 | Status: SHIPPED | OUTPATIENT
Start: 2023-01-05 | End: 2023-09-06

## 2023-01-05 NOTE — TELEPHONE ENCOUNTER
PT lm on medication line that he would like his plavix refilled to [facility] - it looks like it was cancelled when by Hematology  Do you still want PT on this - if yes - he needs his script sent to his mail order

## 2023-01-05 NOTE — TELEPHONE ENCOUNTER
PT seen here recently for tooth abcess. Was seen by dentist and had tooth pulled but abcess was not addressed. PT was given antibx and ibuprofen to take.  Reports increasing pain with nasuea That must have been discontinued inadvertently during my last office visit  Not quite sure how that happened  I do not typically discontinue these medications especially if not prescribed by myself    Apologize for any inconvenience

## 2023-01-08 DIAGNOSIS — M1A.9XX0 CHRONIC GOUT WITHOUT TOPHUS, UNSPECIFIED CAUSE, UNSPECIFIED SITE: ICD-10-CM

## 2023-01-08 DIAGNOSIS — K51.30 ULCERATIVE RECTOSIGMOIDITIS WITHOUT COMPLICATION (HCC): ICD-10-CM

## 2023-01-09 RX ORDER — SULFASALAZINE 500 MG/1
TABLET ORAL
Qty: 360 TABLET | Refills: 0 | Status: SHIPPED | OUTPATIENT
Start: 2023-01-09

## 2023-01-09 RX ORDER — ALLOPURINOL 100 MG/1
TABLET ORAL
Qty: 180 TABLET | Refills: 2 | Status: SHIPPED | OUTPATIENT
Start: 2023-01-09

## 2023-01-18 ENCOUNTER — NURSE TRIAGE (OUTPATIENT)
Dept: OTHER | Facility: OTHER | Age: 73
End: 2023-01-18

## 2023-01-18 NOTE — TELEPHONE ENCOUNTER
Reason for Disposition  • HIGH RISK for severe COVID complications (e g , weak immune system, age > 59 years, obesity with BMI > 22, pregnant, chronic lung disease or other chronic medical condition)  (Exception: Already seen by PCP and no new or worsening symptoms )    Answer Assessment - Initial Assessment Questions  1  COVID-19 DIAGNOSIS: "Who made your COVID-19 diagnosis?" "Was it confirmed by a positive lab test or self-test?" If not diagnosed by a doctor (or NP/PA), ask "Are there lots of cases (community spread) where you live?" Note: See public health department website, if unsure  Home test  2  COVID-19 EXPOSURE: "Was there any known exposure to COVID before the symptoms began?" CDC Definition of close contact: within 6 feet (2 meters) for a total of 15 minutes or more over a 24-hour period  Unsure   3  ONSET: "When did the COVID-19 symptoms start?"       Yesterday   4  WORST SYMPTOM: "What is your worst symptom?" (e g , cough, fever, shortness of breath, muscle aches)      congestion  5  COUGH: "Do you have a cough?" If Yes, ask: "How bad is the cough?"        Yes   6  FEVER: "Do you have a fever?" If Yes, ask: "What is your temperature, how was it measured, and when did it start?"      No   7  RESPIRATORY STATUS: "Describe your breathing?" (e g , shortness of breath, wheezing, unable to speak)       Yes because of congestion  8  BETTER-SAME-WORSE: "Are you getting better, staying the same or getting worse compared to yesterday?"  If getting worse, ask, "In what way?"      Worse   9  HIGH RISK DISEASE: "Do you have any chronic medical problems?" (e g , asthma, heart or lung disease, weak immune system, obesity, etc )      Diabetes   10  VACCINE: "Have you had the COVID-19 vaccine?" If Yes, ask: "Which one, how many shots, when did you get it?"        Yes   11  BOOSTER: "Have you received your COVID-19 booster?" If Yes, ask: "Which one and when did you get it?"        No   12   PREGNANCY: "Is there any chance you are pregnant?" "When was your last menstrual period?"        No   13  OTHER SYMPTOMS: "Do you have any other symptoms?"  (e g , chills, fatigue, headache, loss of smell or taste, muscle pain, sore throat)        Fatigue,headache  14   O2 SATURATION MONITOR:  "Do you use an oxygen saturation monitor (pulse oximeter) at home?" If Yes, ask "What is your reading (oxygen level) today?" "What is your usual oxygen saturation reading?" (e g , 95%)        no    Protocols used: CORONAVIRUS (COVID-19) DIAGNOSED OR SUSPECTED-ADULT-

## 2023-01-18 NOTE — TELEPHONE ENCOUNTER
Regarding: [facility]-COVID positive  ----- Message from Anisha Riddle sent at 1/18/2023  6:22 PM EST -----  Pt spouse called "My  has not been feeling well   We did an at home COVID test and it is positive "

## 2023-01-19 ENCOUNTER — TELEMEDICINE (OUTPATIENT)
Dept: FAMILY MEDICINE CLINIC | Facility: HOSPITAL | Age: 73
End: 2023-01-19

## 2023-01-19 VITALS
OXYGEN SATURATION: 97 % | TEMPERATURE: 98 F | SYSTOLIC BLOOD PRESSURE: 130 MMHG | DIASTOLIC BLOOD PRESSURE: 74 MMHG | HEART RATE: 75 BPM

## 2023-01-19 DIAGNOSIS — U07.1 COVID-19: Primary | ICD-10-CM

## 2023-01-19 DIAGNOSIS — N18.32 STAGE 3B CHRONIC KIDNEY DISEASE (HCC): ICD-10-CM

## 2023-01-19 DIAGNOSIS — E11.22 TYPE 2 DIABETES MELLITUS WITH STAGE 3A CHRONIC KIDNEY DISEASE, WITHOUT LONG-TERM CURRENT USE OF INSULIN (HCC): ICD-10-CM

## 2023-01-19 DIAGNOSIS — N18.31 TYPE 2 DIABETES MELLITUS WITH STAGE 3A CHRONIC KIDNEY DISEASE, WITHOUT LONG-TERM CURRENT USE OF INSULIN (HCC): ICD-10-CM

## 2023-01-19 DIAGNOSIS — I47.29 NSVT (NONSUSTAINED VENTRICULAR TACHYCARDIA): ICD-10-CM

## 2023-01-19 PROBLEM — J96.01 ACUTE RESPIRATORY FAILURE WITH HYPOXIA (HCC): Status: RESOLVED | Noted: 2021-12-31 | Resolved: 2023-01-19

## 2023-01-19 RX ORDER — CLONIDINE HYDROCHLORIDE 0.1 MG/1
TABLET ORAL
COMMUNITY
Start: 2022-11-21

## 2023-01-19 RX ORDER — MOLNUPIRAVIR 200 MG/1
800 CAPSULE ORAL EVERY 12 HOURS
Qty: 40 CAPSULE | Refills: 0 | Status: SHIPPED | OUTPATIENT
Start: 2023-01-19 | End: 2023-01-24

## 2023-01-19 NOTE — PROGRESS NOTES
COVID-19 Outpatient Progress Note    Assessment/Plan:    Problem List Items Addressed This Visit        Endocrine    Type 2 diabetes mellitus with stage 3a chronic kidney disease, without long-term current use of insulin (HCC)       Cardiovascular and Mediastinum    NSVT (nonsustained ventricular tachycardia)       Genitourinary    Chronic kidney disease, stage 3 ([facility] [address] )   Other Visit Diagnoses     COVID-19    -  Primary    Relevant Medications    molnupiravir (Lagevrio) 200 mg capsule         Disposition:     Patient has asymptomatic or mild COVID-19 infection  Based off CDC guidelines, they were recommended to isolate for 5 days  If they are asymptomatic or symptoms are improving with no fevers in the past 24 hours, isolation may be ended followed by 5 days of wearing a mask when around othes to minimize risk of infecting others  If still have a fever or other symptoms have not improved, continue to isolate until they improve  Regardless of when they end isolation, avoid being around people who are more likely to get very sick from COVID-19 until at least day 11  Discussed symptom directed medication options with patient  Vaccinated w/mild covid sx's currently  Advise he is a candidate for antiviral tx - he is agreeable & molnupiravir rx issued (paxlovid contraindicated as he is on plavix)  Continue to rest, push fluids, eat diet as bettina and use OTC meds (mucinex) prn  Reviewed isolation/masking guidelines - he voices understanding  Reviewed worse sx's to call with    F/U with Dr Daisy Price as scheduled on 1/27     Patient meets criteria for [facility] and they have been counseled according to EUA documentation provided by the FDA  After discussion, patient agrees to treatment      [facility] is an investigational medicine used to treat mild-to-moderate COVID-19 in adults with positive results of direct SARS-CoV-2 viral testing, and who are at high risk for progressing to severe COVID-19 including hospitalization or death, and for whom other COVID-19 treatment options authorized by the FDA are not accessible or clinically appropriate  The FDA has authorized the emergency use of molnupiravir for the treatment of mild-tomoderate COVID-19 in adults under an EUA  [address] is not authorized:  - for use in people less than 25years of age   - for prevention of COVID-19   - for people needing hospitalization for COVID-19   - for use for longer than 5 consecutive days    What is the most important information I should know about molnupiravir? [address] may cause serious side effects, including:    [address] may cause harm to your unborn baby  It is not known if molnupiravir will harm your baby if you take molnupiravir during pregnancy  - [address] is not recommended for use in pregnancy  - [address] has not been studied in pregnancy  [address] was studied in pregnant animals only  When molnupiravir was given to pregnant animals, molnupiravir caused harm to their unborn babies   - You and your healthcare provider may decide that you should take molnupiravir duringpregnancy if there are no other COVID-19 treatment options authorized by the FDA that are accessible or clinically appropriate for you  - If you and your healthcare provider decide that you should take molnupiravir during pregnancy, you and your healthcare provider should discuss the known and potential benefits and the potential risks of taking molnupiravir during pregnancy  For individuals who are able to become pregnant:  - You should use a reliable method of birth control (contraception) consistently and correctly during treatment with molnupiravir and for 4 days after the last dose of molnupiravir  Talk to your healthcare provider about reliable birth control methods    - Before starting treatment with molnupiravir your healthcare provider may do a pregnancy test to see if you are pregnant before starting treatment with molnupiravir  - Tell your healthcare provider right away if you become pregnant or think you may be pregnant during treatment with molnupiravir  Pregnancy Surveillance Program:  - There is a pregnancy surveillance program for individuals who take molnupiravir during pregnancy  The purpose of this program is to collect information about the health of you and your baby  Talk to your healthcare provider about how to take part in this program   - If you take molnupiravir during pregnancy and you agree to participate in the pregnancy surveillance program and allow your healthcare provider to share your information with Du English, then your healthcare provider will report your use of molnupiravir during pregnancy to [address] [place] [address],[address]  by calling [phone] or pregnancyreporting msd com  For individuals who are sexually active with partners who are able to become pregnant:  - It is not known if molnupiravir can affect sperm  While the risk is regarded as low, animal studies to fully assess the potential for molnupiravir to affect the babies of males treated with molnupiravir have not been completed  A reliable method of birth control (contraception) should be used consistently and correctly during treatment with molnupiravir and for at least 3 months after the last dose  The risk to sperm beyond 3 months is not known  Studies to understand the risk to sperm beyond 3 months are ongoing  Talk to your healthcare provider about reliable birth control methods  Talk to your healthcare provider if you have questions or concerns about how molnupiravir may affect sperm  How do I take molnupiravir? - Take molnupiravir exactly as your healthcare provider tells you to take it  - Take 4 capsules of molnupiravir every 12 hours (for example, at 8 am and at 8 pm)  - Take molnupiravir for 5 days  It is important that you complete the full 5 days of treatment with molnupiravir   Do not stop taking molnupiravir before you complete the full 5 days of treatment, even if you feel better  - Take molnupiravir with or without food  - You should stay in isolation for as long as your healthcare provider tells you to  Talk to your healthcare provider if you are not sure about how to properly isolate while you have COVID-19   - Swallow molnupiravir capsules whole  Do not open, break, or crush the capsules  If you cannot swallow capsules whole, tell your healthcare provider  What to do if you miss a dose:  - If it has been less than 10 hours since the missed dose, take it as soon as you remember  - If it has been more than 10 hours since the missed dose, skip the missed dose and take your dose at the next scheduled time  - Do not double the dose of molnupiravir to make up for a missed dose  What are the important possible side effects of molnupiravir? Possible side effects of molnupiravir are:  - See, Paul Rodarte is the most important information I should know about molnupiravir?”  - Diarrhea  - Nausea  - Dizziness    These are not all the possible side effects of molnupiravir  Not many people have taken molnupiravir  Serious and unexpected side effects may happen  This medicine is still being studied, so it is possible that all of the risks are not known at this time  What other treatment choices are there? Like Iggy Lombardo may allow for the emergency use of other medicines to treat people with COVID-19  Go to [facility] for more information  It is your choice to be treated or not to be treated with molnupiravir  Should you decide not to take it, it will not change your standard medical care  What if I am breastfeeding? Breastfeeding is not recommended during treatment with molnupiravir and for 4 days after the last dose of molnupiravir   If you are breastfeeding or plan to breastfeed, talk to your healthcare provider about your options and specific situation before taking molnupiravir  How do I report side effects with molnupiravir? Contact your healthcare provider if you have any side effects that bother you or do not go away  Report side effects to FDA MedWatch at www fda gov/medwatch or call 9-974-SCB-2332 (1-384.587.9371)  How should I store Sarah Pineda? - Store molnupiravir capsules at room temperature between 68°F to 77°F (20°C to 25°C)  - Keep molnupiravir and all medicines out of the reach of children and pets  Full fact sheet for patients, parents, and caregivers can be found at: [employer] bolivar zhao    I have spent 6 minutes directly with the patient  Greater than 50% of this time was spent in counseling/coordination of care regarding: diagnostic results, prognosis, risks and benefits of treatment options, instructions for management, patient and family education, importance of treatment compliance, risk factor reductions and impressions  Encounter provider: ROSALINA Salcedo     Provider located at: [facility] [address]   [address] [facility] [address]     Recent Visits  No visits were found meeting these conditions  Showing recent visits within past 7 days and meeting all other requirements  Today's Visits  Date Type Provider Dept   01/19/23 Telemedicine ROSALINA Salcedo [facility] [place] [facility]   Showing today's visits and meeting all other requirements  Future Appointments  No visits were found meeting these conditions  Showing future appointments within next 150 days and meeting all other requirements     This virtual check-in was done via  [address] and patient was informed that this is a secure, HIPAA-compliant platform  He agrees to proceed      Patient agrees to participate in a virtual check in via telephone or video visit instead of presenting to the office to address urgent/immediate medical needs  Patient is aware this is a billable service  He acknowledged consent and understanding of privacy and security of the video platform  The patient has agreed to participate and understands they can discontinue the visit at any time  After connecting through [facility], the patient was identified by name and date of birth  Mariah Mcrae was informed that this was a telemedicine visit and that the exam was being conducted confidentially over secure lines  My office door was closed  Mariah Mcrae acknowledged consent and understanding of privacy and security of the telemedicine visit  I informed the patient that I have reviewed his record in Epic and presented the opportunity for him to ask any questions regarding the visit today  The patient agreed to participate  Verification of patient location:  Patient is located in the following state in which I hold an active license: [place]    Subjective:   Mariah Mcrae is a 71 y o  male who has been screened for COVID-19  Symptom change since last report: improving  Patient's symptoms include fatigue, nasal congestion, cough ("slight"), myalgias and headache  Patient denies fever, chills, shortness of breath, chest tightness, nausea, vomiting and diarrhea  - Date of symptom onset: 1/15/2023  - Date of positive COVID-19 test: 1/18/2023  Type of test: Home antigen  Patient with typical symptoms of COVID-19 and they attest that they were positive on home rapid antigen testing  Image of positive result is not able to be uploaded into their chart  COVID-19 vaccination status: Fully vaccinated (primary series)    Leon Ramos has been staying home and has isolated themselves in his home  Pulse oximeter at home 97%  Started mucinex yesterday  Non smoker  Denies lung disease  Had covid last year - hospitalized w/pneumonia       Lab Results   Component Value Date    SARSCOV2 Positive (A) 12/28/2021 Review of Systems   Constitutional: Positive for fatigue  Negative for appetite change, chills and fever  HENT: Positive for congestion  Respiratory: Positive for cough ("slight")  Negative for chest tightness and shortness of breath  Gastrointestinal: Negative for diarrhea, nausea and vomiting  Genitourinary: Negative for decreased urine volume  Musculoskeletal: Positive for myalgias  Neurological: Positive for headaches       Current Outpatient Medications on File Prior to Visit   Medication Sig   • allopurinol (ZYLOPRIM) 100 mg tablet TAKE 2 TABLETS DAILY   • amLODIPine (NORVASC) 10 mg tablet Take 1 tablet by mouth in the morning   • ascorbic acid (VITAMIN C) 1000 MG tablet Take 1 tablet (1,000 mg total) by mouth daily   • atorvastatin (LIPITOR) 40 mg tablet TAKE 1 TABLET DAILY WITH   DINNER   • cholecalciferol (VITAMIN D3) 400 units tablet Take 1 tablet (400 Units total) by mouth daily   • cloNIDine (CATAPRES) 0 1 mg tablet    • clopidogrel (Plavix) 75 mg tablet Take 1 tablet (75 mg total) by mouth daily   • Coenzyme Q10 200 MG capsule Take by mouth daily    • Empagliflozin (Jardiance) 10 MG TABS tablet TAKE 1 TABLET DAILY   • fenofibrate (TRICOR) 145 mg tablet TAKE 1 TABLET DAILY   • metoprolol tartrate (LOPRESSOR) 50 mg tablet Take 1 5 tablets by mouth 2 (two) times a day   • sulfaSALAzine (AZULFIDINE) 500 mg tablet TAKE 1 TABLET 4 TIMES DAILY   • acetaminophen (TYLENOL) 325 mg tablet Take 2 tablets (650 mg total) by mouth every 6 (six) hours as needed for mild pain (Patient not taking: Reported on 4/13/2022)   • [DISCONTINUED] insulin degludec Gris Berry FlexTouch) 100 units/mL injection pen Inject 5 Units under the skin daily (Patient not taking: Reported on 4/13/2022)   • [DISCONTINUED] sodium chloride (OCEAN) 0 65 % nasal spray 2 sprays into each nostril every hour as needed for congestion (Patient not taking: Reported on 4/13/2022)       Objective:    /74   Pulse 75   Temp 98 °F (36 7 °C)   SpO2 97%      Physical Exam  Vitals reviewed  Constitutional:       General: He is not in acute distress  Appearance: Normal appearance  HENT:      Head: Normocephalic  Pulmonary:      Effort: Pulmonary effort is normal  No respiratory distress  Comments: No cough  Neurological:      General: No focal deficit present  Mental Status: He is alert and oriented to person, place, and time     Psychiatric:         Mood and Affect: Mood normal          Behavior: Behavior normal        Lamontial ROSALINA Carroll

## 2023-01-22 LAB
BUN SERPL-MCNC: 20 MG/DL (ref 7–25)
BUN/CREAT SERPL: 14 (CALC) (ref 6–22)
CALCIUM SERPL-MCNC: 9 MG/DL (ref 8.6–10.3)
CHLORIDE SERPL-SCNC: 111 MMOL/L (ref 98–110)
CO2 SERPL-SCNC: 26 MMOL/L (ref 20–32)
CREAT SERPL-MCNC: 1.47 MG/DL (ref 0.7–1.28)
EST. AVERAGE GLUCOSE BLD GHB EST-MCNC: 140 MG/DL
EST. AVERAGE GLUCOSE BLD GHB EST-SCNC: 7.7 MMOL/L
GFR/BSA.PRED SERPLBLD CYS-BASED-ARV: 50 ML/MIN/1.73M2
GLUCOSE SERPL-MCNC: 125 MG/DL (ref 65–99)
HBA1C MFR BLD: 6.5 % OF TOTAL HGB
HCV AB S/CO SERPL IA: 0.03
HCV AB SERPL QL IA: NORMAL
POTASSIUM SERPL-SCNC: 3.7 MMOL/L (ref 3.5–5.3)
SODIUM SERPL-SCNC: 145 MMOL/L (ref 135–146)

## 2023-02-09 ENCOUNTER — OFFICE VISIT (OUTPATIENT)
Dept: FAMILY MEDICINE CLINIC | Facility: HOSPITAL | Age: 73
End: 2023-02-09

## 2023-02-09 VITALS
DIASTOLIC BLOOD PRESSURE: 84 MMHG | HEART RATE: 66 BPM | TEMPERATURE: 98.6 F | SYSTOLIC BLOOD PRESSURE: 134 MMHG | HEIGHT: 69 IN | BODY MASS INDEX: 32.5 KG/M2 | WEIGHT: 219.4 LBS

## 2023-02-09 DIAGNOSIS — D58.2 ELEVATED HEMOGLOBIN (HCC): ICD-10-CM

## 2023-02-09 DIAGNOSIS — E66.09 CLASS 1 OBESITY DUE TO EXCESS CALORIES WITH SERIOUS COMORBIDITY AND BODY MASS INDEX (BMI) OF 32.0 TO 32.9 IN ADULT: ICD-10-CM

## 2023-02-09 DIAGNOSIS — D75.1 ERYTHROCYTOSIS: ICD-10-CM

## 2023-02-09 DIAGNOSIS — K51.30 ULCERATIVE RECTOSIGMOIDITIS WITHOUT COMPLICATION (HCC): ICD-10-CM

## 2023-02-09 DIAGNOSIS — Z12.5 PROSTATE CANCER SCREENING: ICD-10-CM

## 2023-02-09 DIAGNOSIS — N18.31 TYPE 2 DIABETES MELLITUS WITH STAGE 3A CHRONIC KIDNEY DISEASE, WITHOUT LONG-TERM CURRENT USE OF INSULIN (HCC): Primary | ICD-10-CM

## 2023-02-09 DIAGNOSIS — I10 PRIMARY HYPERTENSION: ICD-10-CM

## 2023-02-09 DIAGNOSIS — E11.22 TYPE 2 DIABETES MELLITUS WITH STAGE 3A CHRONIC KIDNEY DISEASE, WITHOUT LONG-TERM CURRENT USE OF INSULIN (HCC): Primary | ICD-10-CM

## 2023-02-09 DIAGNOSIS — N18.31 STAGE 3A CHRONIC KIDNEY DISEASE (HCC): ICD-10-CM

## 2023-02-09 LAB
LEFT EYE DIABETIC RETINOPATHY: NORMAL
LEFT EYE IMAGE QUALITY: NORMAL
LEFT EYE MACULAR EDEMA: NORMAL
LEFT EYE OTHER RETINOPATHY: NORMAL
RIGHT EYE DIABETIC RETINOPATHY: NORMAL
RIGHT EYE IMAGE QUALITY: NORMAL
RIGHT EYE MACULAR EDEMA: NORMAL
RIGHT EYE OTHER RETINOPATHY: NORMAL
SEVERITY (EYE EXAM): NORMAL

## 2023-02-09 NOTE — ASSESSMENT & PLAN NOTE
Lab Results   Component Value Date    EGFR 50 (L) 01/21/2023    EGFR 46 (L) 09/03/2022    EGFR 52 01/13/2022    CREATININE 1 47 (H) 01/21/2023    CREATININE 1 59 (H) 09/03/2022    CREATININE 1 49 (H) 07/08/2022   Stable, again importance of BP/BS control as well as avoiding NSAIDs and dehydration reviewed, recheck BW q 6 mos - BW order given

## 2023-02-09 NOTE — ASSESSMENT & PLAN NOTE
Just started on Clonidine by Cardio, BP upper range of nml by end of appt, con't current regimen for now as pts home numbers are much lower, call if home readings are consistently > 140/90, diet/execise/wgt loss encouraged

## 2023-02-09 NOTE — ASSESSMENT & PLAN NOTE
No current GI complaints, on sulfasalazine daily, con't meds and f/u as per GI, due for colonoscopy this year - was reviewed with pt

## 2023-02-09 NOTE — PROGRESS NOTES
Name: Pino Kearney      : 1950      MRN: [id number]  Encounter Provider: Joanna Yadav DO  Encounter Date: 2023   Encounter department: [facility]      1  Type 2 diabetes mellitus with stage 3a chronic kidney disease, without long-term current use of insulin (HCC)  Assessment & Plan:    Lab Results   Component Value Date    HGBA1C 6 5 (H) 2023   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 - controlled with A1C 6 5 - con't current DM meds, did urge to watch diet and keep active as A1C and wgt has gone up but is still in controlled range, recheck BW in 6 mos - BW order given, foot exam done today as well as IRIS scan, pt is on a statin but no ACE/ARB, will follow    Orders:  -     IRIS Diabetic eye exam  -     Comprehensive metabolic panel; Future; Expected date: 2023  -     Lipid panel; Future; Expected date: 2023  -     TSH, 3rd generation with Free T4 reflex; Future; Expected date: 2023  -     Microalbumin, Random Urine (W/Creatinine); Future; Expected date: 2023  -     Hemoglobin A1C With EAG; Future; Expected date: 2023  -     Comprehensive metabolic panel  -     Lipid panel  -     TSH, 3rd generation with Free T4 reflex  -     Hemoglobin A1C With EAG    2  Stage 3a chronic kidney disease (HCC)  Assessment & Plan:  Lab Results   Component Value Date    EGFR 50 (L) 2023    EGFR 46 (L) 2022    EGFR 52 2022    CREATININE 1 47 (H) 2023    CREATININE 1 59 (H) 2022    CREATININE 1 49 (H) 2022   Stable, again importance of BP/BS control as well as avoiding NSAIDs and dehydration reviewed, recheck BW q 6 mos - BW order given    Orders:  -     Comprehensive metabolic panel; Future; Expected date: 2023  -     Lipid panel; Future; Expected date: 2023  -     TSH, 3rd generation with Free T4 reflex;  Future; Expected date: 2023  -     Microalbumin, Random Urine (W/Creatinine); Future; Expected date: 07/21/2023  -     Hemoglobin A1C With EAG; Future; Expected date: 07/21/2023  -     Comprehensive metabolic panel  -     Lipid panel  -     TSH, 3rd generation with Free T4 reflex  -     Hemoglobin A1C With EAG    3  Primary hypertension  Assessment & Plan:  Just started on Clonidine by Cardio, BP upper range of nml by end of appt, con't current regimen for now as pts home numbers are much lower, call if home readings are consistently > 140/90, diet/execise/wgt loss encouraged    Orders:  -     Comprehensive metabolic panel; Future; Expected date: 07/21/2023  -     Lipid panel; Future; Expected date: 07/21/2023  -     TSH, 3rd generation with Free T4 reflex; Future; Expected date: 07/21/2023  -     Microalbumin, Random Urine (W/Creatinine); Future; Expected date: 07/21/2023  -     Hemoglobin A1C With EAG; Future; Expected date: 07/21/2023  -     Comprehensive metabolic panel  -     Lipid panel  -     TSH, 3rd generation with Free T4 reflex  -     Hemoglobin A1C With EAG    4  Erythrocytosis  Assessment & Plan:  Following with Heme, has phlebotomy q 2 mos, con't labs/tx and f/u as per specialists    Orders:  -     Comprehensive metabolic panel; Future; Expected date: 07/21/2023  -     Lipid panel; Future; Expected date: 07/21/2023  -     TSH, 3rd generation with Free T4 reflex; Future; Expected date: 07/21/2023  -     Microalbumin, Random Urine (W/Creatinine); Future; Expected date: 07/21/2023  -     Hemoglobin A1C With EAG; Future; Expected date: 07/21/2023  -     Comprehensive metabolic panel  -     Lipid panel  -     TSH, 3rd generation with Free T4 reflex  -     Hemoglobin A1C With EAG    5  Elevated hemoglobin (HCC)  Assessment & Plan:  Following with Heme, has phlebotomy q 2 mos, con't labs/tx and f/u as per specialists    Orders:  -     Comprehensive metabolic panel; Future; Expected date: 07/21/2023  -     Lipid panel;  Future; Expected date: 07/21/2023  -     TSH, 3rd generation with Free T4 reflex; Future; Expected date: 07/21/2023  -     Microalbumin, Random Urine (W/Creatinine); Future; Expected date: 07/21/2023  -     Hemoglobin A1C With EAG; Future; Expected date: 07/21/2023  -     Comprehensive metabolic panel  -     Lipid panel  -     TSH, 3rd generation with Free T4 reflex  -     Hemoglobin A1C With EAG    6  Ulcerative rectosigmoiditis without complication (HCC)  Assessment & Plan:  No current GI complaints, on sulfasalazine daily, con't meds and f/u as per GI, due for colonoscopy this year - was reviewed with pt    Orders:  -     Comprehensive metabolic panel; Future; Expected date: 07/21/2023  -     Lipid panel; Future; Expected date: 07/21/2023  -     TSH, 3rd generation with Free T4 reflex; Future; Expected date: 07/21/2023  -     Microalbumin, Random Urine (W/Creatinine); Future; Expected date: 07/21/2023  -     Hemoglobin A1C With EAG; Future; Expected date: 07/21/2023  -     Comprehensive metabolic panel  -     Lipid panel  -     TSH, 3rd generation with Free T4 reflex  -     Hemoglobin A1C With EAG    7  Prostate cancer screening  -     PSA Total (Reflex To Free); Future; Expected date: 07/21/2023  -     PSA Total (Reflex To Free)    8  Class 1 obesity due to excess calories with serious comorbidity and body mass index (BMI) of 32 0 to 32 9 in adult  Comments:  diet/exercise/wgt loss encouraged    BMI Counseling: Body mass index is 32 4 kg/m²  The BMI is above normal  Nutrition recommendations include decreasing portion sizes, encouraging healthy choices of fruits and vegetables, consuming healthier snacks, limiting drinks that contain sugar, moderation in carbohydrate intake, increasing intake of lean protein, reducing intake of saturated and trans fat and reducing intake of cholesterol  Exercise recommendations include exercising 3-5 times per week  No pharmacotherapy was ordered  Rationale for BMI follow-up plan is due to patient being overweight or obese  Depression Screening and Follow-up Plan: Patient was screened for depression during today's encounter  They screened negative with a PHQ-2 score of 0  Colonoscopy 5/20 - 3 yrs    PSA 4/22 - order given to be done with next labs        Subjective      HPI Pt here for follow up appt and BW results    BW results were d/w pt in detail: FBS/A1C 125/6 5, BUN/Cr 20/1 47 (GFR 50), rest of BMP and Hep C ab were normal/negative  Def of controlled vs uncontrolled DM was reviewed  Diet was reviewed - wgt up 17 lbs from April 22  BMI reviewed  He is more active around the house but his wife states he has a sweet tooth and likes his carbs  He is taking his DM meds as directed  He is due for both his DM foot and eye exam   He is agreeable to IRIS scan today  He notes no vision issues  He notes no sores/ulcers/numbness/tingling/burning/pain with his feet  He is on statin but no ARB/ACE  CKD stage 3 reviewed  Importance of BP/BS control reviewed  Again he is not on an ACE/ARB  He does not use NSAIDs daily  Fluid intake reviewed    Pt saw Cardio (Dr Azucena Oleary) in Aug for f/u A fib - OV note reviewed  He has had no recorded events in quiet a while  He was told to con't Plavix and Metoprolol  He was started on Clonidine for HTN  He has had BP f/u since then and was told to con't current dose  He was told to f/u in 1 yr  BP a bit goal today and meds were reviewed and changes noted as above  He denies missing doses of meds or SE with the meds  He does check his BP outside the office - at home he is averaging 130's/70's  He notes no frequent HA's/dizziness/double vision/CP/palp/SOB/LE edema  Pt saw Heme/Onc Ean Jones) in Oct for f/u erythrocytosis  He had a neg ALESIA 2 mutation and neg PCR for BCR/ABL on work up  He has been on therapeutic phlebotomy every 2  Mos  He has BW ordered and was told to f/u in 6 mos  Notes recent issues with his UC  He is taking his sulfasalazine as directed  He notes no abd pain/F/C/diarrhea/blood in stool/unintentional wgt loss  He has appt with GI coming up          Review of Systems   Constitutional: Negative for chills and fever  HENT: Negative for congestion and trouble swallowing  Eyes: Negative for pain and visual disturbance  Respiratory: Negative for cough and shortness of breath  Cardiovascular: Negative for chest pain and palpitations  Gastrointestinal: Negative for abdominal pain, blood in stool, constipation, diarrhea, nausea and vomiting  Genitourinary: Negative for difficulty urinating and dysuria  Musculoskeletal: Negative for arthralgias and myalgias  Skin: Negative for rash and wound  Neurological: Negative for dizziness and headaches  Hematological: Does not bruise/bleed easily  Psychiatric/Behavioral: Negative for confusion and dysphoric mood         Current Outpatient Medications on File Prior to Visit   Medication Sig   • allopurinol (ZYLOPRIM) 100 mg tablet TAKE 2 TABLETS DAILY   • amLODIPine (NORVASC) 10 mg tablet Take 1 tablet by mouth in the morning   • ascorbic acid (VITAMIN C) 1000 MG tablet Take 1 tablet (1,000 mg total) by mouth daily   • atorvastatin (LIPITOR) 40 mg tablet TAKE 1 TABLET DAILY WITH   DINNER   • cholecalciferol (VITAMIN D3) 400 units tablet Take 1 tablet (400 Units total) by mouth daily   • cloNIDine (CATAPRES) 0 1 mg tablet Take 1 tablet by mouth in the morning   • clopidogrel (Plavix) 75 mg tablet Take 1 tablet (75 mg total) by mouth daily   • Coenzyme Q10 200 MG capsule Take by mouth daily    • Empagliflozin (Jardiance) 10 MG TABS tablet TAKE 1 TABLET DAILY   • fenofibrate (TRICOR) 145 mg tablet TAKE 1 TABLET DAILY   • metoprolol tartrate (LOPRESSOR) 50 mg tablet Take 1 5 tablets by mouth 2 (two) times a day   • sulfaSALAzine (AZULFIDINE) 500 mg tablet TAKE 1 TABLET 4 TIMES DAILY   • [DISCONTINUED] acetaminophen (TYLENOL) 325 mg tablet Take 2 tablets (650 mg total) by mouth every 6 (six) hours as needed for mild pain (Patient not taking: Reported on 4/13/2022)       Objective     /84   Pulse 66   Temp 98 6 °F (37 °C) (Tympanic)   Ht 5' 9" (1 753 m)   Wt 99 5 kg (219 lb 6 4 oz)   BMI 32 40 kg/m²     Physical Exam  Vitals and nursing note reviewed  Constitutional:       General: He is not in acute distress  Appearance: He is well-developed  He is obese  He is not ill-appearing  HENT:      Head: Normocephalic and atraumatic  Mouth/Throat:      Pharynx: No oropharyngeal exudate  Eyes:      General:         Right eye: No discharge  Left eye: No discharge  Conjunctiva/sclera: Conjunctivae normal    Neck:      Trachea: No tracheal deviation  Cardiovascular:      Rate and Rhythm: Normal rate and regular rhythm  Pulses: no weak pulses          Dorsalis pedis pulses are 2+ on the right side and 2+ on the left side  Heart sounds: No murmur heard  Pulmonary:      Effort: Pulmonary effort is normal  No respiratory distress  Breath sounds: Normal breath sounds  No wheezing, rhonchi or rales  Musculoskeletal:         General: No deformity or signs of injury  Cervical back: Neck supple  Feet:      Right foot:      Skin integrity: Dry skin present  No ulcer, skin breakdown, erythema, warmth or callus  Left foot:      Skin integrity: Dry skin present  No ulcer, skin breakdown, erythema, warmth or callus  Lymphadenopathy:      Cervical: No cervical adenopathy  Skin:     General: Skin is warm and dry  Coloration: Skin is not pale  Findings: No rash  Neurological:      General: No focal deficit present  Mental Status: He is alert  Mental status is at baseline  Motor: No abnormal muscle tone  Gait: Gait normal    Psychiatric:         Mood and Affect: Mood normal          Behavior: Behavior normal          Thought Content:  Thought content normal          Judgment: Judgment normal      Diabetic Foot Exam    Patient's shoes and socks removed  Right Foot/Ankle   Right Foot Inspection  Skin Exam: skin normal, skin intact and dry skin  No warmth, no callus, no erythema, no maceration, no abnormal color, no pre-ulcer, no ulcer and no callus  Toe Exam: ROM and strength within normal limits  Sensory   Vibration: intact  Monofilament testing: intact    Vascular  The right DP pulse is 2+  Left Foot/Ankle  Left Foot Inspection  Skin Exam: skin normal, skin intact and dry skin  No warmth, no erythema, no maceration, normal color, no pre-ulcer, no ulcer and no callus  Toe Exam: ROM and strength within normal limits  Sensory   Vibration: intact  Monofilament testing: intact    Vascular  The left DP pulse is 2+       Assign Risk Category  No deformity present  No loss of protective sensation  No weak pulses  Risk: 0    Gaetano Shape, DO

## 2023-02-09 NOTE — LETTER
Thursday February 9th, 2023    To whom it may concern,    Jeet Paez is a patient of [facility]  He was seen in the office on 2/9/2023  Mr Jenn Yuen is adherent to medications, testings, and follow up with his medical conditions  Jeet Paez is medically cleared to attending meetings, participate in events and drills and answer calls for the [employer]  Please call with any questions        Sincerely,           Irma Da Silva DO  [facility]  [address]  [address] [facility]  [place], [address]  P: [phone]

## 2023-02-09 NOTE — ASSESSMENT & PLAN NOTE
Lab Results   Component Value Date    HGBA1C 6 5 (H) 01/21/2023   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 - controlled with A1C 6 5 - con't current DM meds, did urge to watch diet and keep active as A1C and wgt has gone up but is still in controlled range, recheck BW in 6 mos - BW order given, foot exam done today as well as IRIS scan, pt is on a statin but no ACE/ARB, will follow

## 2023-02-26 LAB
BASOPHILS # BLD AUTO: 90 CELLS/UL (ref 0–200)
BASOPHILS NFR BLD AUTO: 1.7 %
EOSINOPHIL # BLD AUTO: 191 CELLS/UL (ref 15–500)
EOSINOPHIL NFR BLD AUTO: 3.6 %
ERYTHROCYTE [DISTWIDTH] IN BLOOD BY AUTOMATED COUNT: 14.5 % (ref 11–15)
FERRITIN SERPL-MCNC: 26 NG/ML (ref 24–380)
HCT VFR BLD AUTO: 46.5 % (ref 38.5–50)
HGB BLD-MCNC: 15 G/DL (ref 13.2–17.1)
LYMPHOCYTES # BLD AUTO: 1214 CELLS/UL (ref 850–3900)
LYMPHOCYTES NFR BLD AUTO: 22.9 %
MCH RBC QN AUTO: 28.1 PG (ref 27–33)
MCHC RBC AUTO-ENTMCNC: 32.3 G/DL (ref 32–36)
MCV RBC AUTO: 87.1 FL (ref 80–100)
MONOCYTES # BLD AUTO: 392 CELLS/UL (ref 200–950)
MONOCYTES NFR BLD AUTO: 7.4 %
NEUTROPHILS # BLD AUTO: 3413 CELLS/UL (ref 1500–7800)
NEUTROPHILS NFR BLD AUTO: 64.4 %
PLATELET # BLD AUTO: 258 THOUSAND/UL (ref 140–400)
PMV BLD REES-ECKER: 10.6 FL (ref 7.5–12.5)
RBC # BLD AUTO: 5.34 MILLION/UL (ref 4.2–5.8)
WBC # BLD AUTO: 5.3 THOUSAND/UL (ref 3.8–10.8)

## 2023-03-01 ENCOUNTER — HOSPITAL ENCOUNTER (OUTPATIENT)
Dept: INFUSION CENTER | Facility: HOSPITAL | Age: 73
Discharge: HOME | End: 2023-03-01
Attending: INTERNAL MEDICINE

## 2023-03-01 VITALS
SYSTOLIC BLOOD PRESSURE: 140 MMHG | DIASTOLIC BLOOD PRESSURE: 78 MMHG | HEART RATE: 61 BPM | OXYGEN SATURATION: 97 % | RESPIRATION RATE: 16 BRPM | TEMPERATURE: 97.6 F

## 2023-03-01 DIAGNOSIS — D75.1 ERYTHROCYTOSIS: Primary | ICD-10-CM

## 2023-03-01 NOTE — PROGRESS NOTES
Pt here for phlebotomy, reclined and drinking fluids  Phlebotomy performed over 18 mins LAC for 500mls total whole blood  Had to stick pt 3 times to get good blood flow for procedure  Resting comfortably now

## 2023-03-02 ENCOUNTER — HOSPITAL ENCOUNTER (OUTPATIENT)
Dept: INFUSION CENTER | Facility: HOSPITAL | Age: 73
End: 2023-03-02
Attending: INTERNAL MEDICINE

## 2023-03-02 DIAGNOSIS — N18.31 TYPE 2 DIABETES MELLITUS WITH STAGE 3A CHRONIC KIDNEY DISEASE, WITHOUT LONG-TERM CURRENT USE OF INSULIN (HCC): Primary | ICD-10-CM

## 2023-03-02 DIAGNOSIS — E11.22 TYPE 2 DIABETES MELLITUS WITH STAGE 3A CHRONIC KIDNEY DISEASE, WITHOUT LONG-TERM CURRENT USE OF INSULIN (HCC): Primary | ICD-10-CM

## 2023-03-02 RX ORDER — BLOOD SUGAR DIAGNOSTIC
STRIP MISCELLANEOUS
Qty: 100 EACH | Refills: 3 | Status: SHIPPED | OUTPATIENT
Start: 2023-03-02

## 2023-03-30 ENCOUNTER — TELEPHONE (OUTPATIENT)
Dept: GASTROENTEROLOGY | Facility: CLINIC | Age: 73
End: 2023-03-30

## 2023-03-30 ENCOUNTER — OFFICE VISIT (OUTPATIENT)
Dept: GASTROENTEROLOGY | Facility: CLINIC | Age: 73
End: 2023-03-30

## 2023-03-30 VITALS
SYSTOLIC BLOOD PRESSURE: 138 MMHG | WEIGHT: 222 LBS | HEIGHT: 69 IN | BODY MASS INDEX: 32.88 KG/M2 | DIASTOLIC BLOOD PRESSURE: 90 MMHG

## 2023-03-30 DIAGNOSIS — Z86.010 HISTORY OF COLON POLYPS: ICD-10-CM

## 2023-03-30 DIAGNOSIS — K51.30 ULCERATIVE RECTOSIGMOIDITIS WITHOUT COMPLICATION (HCC): Primary | ICD-10-CM

## 2023-03-30 DIAGNOSIS — Z79.01 CURRENT USE OF LONG TERM ANTICOAGULATION: ICD-10-CM

## 2023-03-30 RX ORDER — POLYETHYLENE GLYCOL 3350, SODIUM SULFATE ANHYDROUS, SODIUM BICARBONATE, SODIUM CHLORIDE, POTASSIUM CHLORIDE 236; 22.74; 6.74; 5.86; 2.97 G/4L; G/4L; G/4L; G/4L; G/4L
4000 POWDER, FOR SOLUTION ORAL ONCE
Qty: 4000 ML | Refills: 0 | Status: SHIPPED | OUTPATIENT
Start: 2023-03-30 | End: 2023-03-30

## 2023-03-30 NOTE — PROGRESS NOTES
[id number] - Outpatient Follow-up Note  Maria C Zee 68 y o  male MRN: [id number]  Encounter: [id number]    ASSESSMENT AND PLAN:      1  Ulcerative rectosigmoiditis without complication [facility])  Patient states he is doing very well and symptom-free since started taking sulfasalazine  States he is having normal daily bowel movements and denies rectal bleeding     - Colonoscopy; Future  - polyethylene glycol (Golytely) 4000 mL solution; Take 4,000 mL by mouth once for 1 dose Take 4000 mL by mouth once for 1 dose  Use as directed  Dispense: 4000 mL; Refill: 0    2  History of colon polyps  Colonoscopy 2020, 3-year recall    3  Current use of long-term anticoagulation  Patient is currently taking Plavix for history of CVA  Patient denies any chest pain, shortness of breath or palpitations  Discussed the small risk of thromboembolic event while holding the Plavix 5 days prior to procedure  The patient and his wife understands the risk and benefits of holding the medication and agreed to proceed pending approval of the prescribing provider, Dr Opal Hawley  Followup Appointment: 1 year  ______________________________________________________________________    Chief Complaint   Patient presents with   • Follow-up     No issues     HPI: 71-year-old male with history of diverticulosis and ulcerative colitis presents for annual follow-up  He denies abdominal pain, nausea or vomiting  States he is having daily normal bowel movements  Denies hematochezia or melena  Last colonoscopy 3/9/2020; severe left-sided ulcerative colitis  3-year recall  States since taking the sulfasalazine he is been doing very well      Historical Information   Past Medical History:   Diagnosis Date   • Acute respiratory failure with hypoxia ([facility] [address] ) 12/31/2021   • Chronic kidney disease    • CVA (cerebral vascular accident) ([facility] [address] )    • [facility] )    • Diverticulitis    • Gout    • "Hypercholesterolemia    • Hypertension    • Renal disorder    • Ulcerative colitis ([facility] )    • Vasovagal syncope 12/28/2021     Past Surgical History:   Procedure Laterality Date   • CARDIAC LOOP RECORDER     • COLONOSCOPY  09/18/2006    complete; 10 yrs   • COLONOSCOPY  10/23/2017    complete; 5 yrs   • COLONOSCOPY  05/06/2020    Severe active left-sided colitis, erythematous and ulcerated mucosa in the rectosigmoid, inflammatory polyp     Social History     Substance and Sexual Activity   Alcohol Use Never     Social History     Substance and Sexual Activity   Drug Use Never     Social History     Tobacco Use   Smoking Status Never   Smokeless Tobacco Never     Family History   Problem Relation Age of Onset   • Breast cancer Mother    • Kidney disease Father    • Lung cancer Father    • Other Father         smoker   • Hypertension Other    • Colon polyps Neg Hx    • Colon cancer Neg Hx          Current Outpatient Medications:   •  allopurinol (ZYLOPRIM) 100 mg tablet  •  amLODIPine (NORVASC) 10 mg tablet  •  ascorbic acid (VITAMIN C) 1000 MG tablet  •  atorvastatin (LIPITOR) 40 mg tablet  •  cholecalciferol (VITAMIN D3) 400 units tablet  •  cloNIDine (CATAPRES) 0 1 mg tablet  •  clopidogrel (Plavix) 75 mg tablet  •  Coenzyme Q10 200 MG capsule  •  Empagliflozin (Jardiance) 10 MG TABS tablet  •  fenofibrate (TRICOR) 145 mg tablet  •  glucose blood (OneTouch Verio) test strip  •  metoprolol tartrate (LOPRESSOR) 50 mg tablet  •  polyethylene glycol (Golytely) 4000 mL solution  •  sulfaSALAzine (AZULFIDINE) 500 mg tablet  No Known Allergies  Reviewed medications and allergies and updated as indicated    PHYSICAL EXAM:    Blood pressure 138/90, height 5' 9\" (1 753 m), weight 101 kg (222 lb)  Body mass index is 32 78 kg/m²  Normal exam    General Appearance: NAD, cooperative, alert  Eyes: Anicteric, PERRLA, EOMI  ENT:  Normocephalic, atraumatic, normal mucosa      Neck:  Supple, symmetrical, trachea midline  Resp:  " Clear to auscultation bilaterally; no rales, rhonchi or wheezing; respirations unlabored   CV:  S1 S2, Regular rate and rhythm; no murmur, rub, or gallop  GI:  Soft, non-tender, non-distended; normal bowel sounds; no masses, no organomegaly   Rectal: Deferred  Musculoskeletal: No cyanosis, clubbing or edema  Normal ROM  Skin:  No jaundice, rashes, or lesions   Heme/Lymph: No palpable cervical lymphadenopathy  Psych: Normal affect, good eye contact  Neuro: No gross deficits, AAOx3    Lab Results:   Lab Results   Component Value Date    WBC 5 3 02/25/2023    HGB 15 0 02/25/2023    HCT 46 5 02/25/2023    MCV 87 1 02/25/2023     02/25/2023     Lab Results   Component Value Date     07/07/2017    K 3 7 01/21/2023     (H) 01/21/2023    CO2 26 01/21/2023    BUN 20 01/21/2023    CREATININE 1 47 (H) 01/21/2023    CALCIUM 9 0 01/21/2023    CORRECTEDCA 10 1 01/13/2022    AST 19 09/03/2022    ALT 14 09/03/2022    ALKPHOS 65 09/03/2022    PROT 7 1 07/07/2017    BILITOT 0 6 07/07/2017    EGFR 50 (L) 01/21/2023     Lab Results   Component Value Date    IRON 76 03/19/2022    TIBC 346 03/19/2022    FERRITIN 26 02/25/2023     No results found for: LIPASE    Radiology Results:   No results found

## 2023-03-30 NOTE — TELEPHONE ENCOUNTER
Scheduled date of colonoscopy (as of today):6-14-23  Physician performing colonoscopy:Shin  Location of colonoscopy:[facility]  Bowel prep reviewed with patient:Keena  Instructions reviewed with patient by:FELICIANO  Clearances: Yes Plavix

## 2023-04-05 ENCOUNTER — TELEPHONE (OUTPATIENT)
Dept: HEMATOLOGY ONCOLOGY | Facility: HOSPITAL | Age: 73
End: 2023-04-05

## 2023-04-07 LAB
BASOPHILS # BLD AUTO: 101 CELLS/UL (ref 0–200)
BASOPHILS NFR BLD AUTO: 1.5 %
EOSINOPHIL # BLD AUTO: 174 CELLS/UL (ref 15–500)
EOSINOPHIL NFR BLD AUTO: 2.6 %
ERYTHROCYTE [DISTWIDTH] IN BLOOD BY AUTOMATED COUNT: 13.9 % (ref 11–15)
FERRITIN SERPL-MCNC: 12 NG/ML (ref 24–380)
HCT VFR BLD AUTO: 46.8 % (ref 38.5–50)
HGB BLD-MCNC: 15.2 G/DL (ref 13.2–17.1)
IRON SATN MFR SERPL: 13 % (CALC) (ref 20–48)
IRON SERPL-MCNC: 42 MCG/DL (ref 50–180)
LYMPHOCYTES # BLD AUTO: 1487 CELLS/UL (ref 850–3900)
LYMPHOCYTES NFR BLD AUTO: 22.2 %
MCH RBC QN AUTO: 27.1 PG (ref 27–33)
MCHC RBC AUTO-ENTMCNC: 32.5 G/DL (ref 32–36)
MCV RBC AUTO: 83.4 FL (ref 80–100)
MONOCYTES # BLD AUTO: 462 CELLS/UL (ref 200–950)
MONOCYTES NFR BLD AUTO: 6.9 %
NEUTROPHILS # BLD AUTO: 4476 CELLS/UL (ref 1500–7800)
NEUTROPHILS NFR BLD AUTO: 66.8 %
PLATELET # BLD AUTO: 281 THOUSAND/UL (ref 140–400)
PMV BLD REES-ECKER: 10.4 FL (ref 7.5–12.5)
RBC # BLD AUTO: 5.61 MILLION/UL (ref 4.2–5.8)
TIBC SERPL-MCNC: 333 MCG/DL (CALC) (ref 250–425)
WBC # BLD AUTO: 6.7 THOUSAND/UL (ref 3.8–10.8)

## 2023-05-26 ENCOUNTER — TELEPHONE (OUTPATIENT)
Dept: GASTROENTEROLOGY | Facility: CLINIC | Age: 73
End: 2023-05-26

## 2023-05-26 NOTE — TELEPHONE ENCOUNTER
Our mutual patient is scheduled for procedure: On: ___06/08/2023   (colonoscopy)     With: Dr Rachana New is taking the following blood thinner:  Plavix     Can this be stopped ___5___ days prior to the procedure?       Physician Approving clearance: ________________________

## 2023-05-31 ENCOUNTER — TELEPHONE (OUTPATIENT)
Dept: GASTROENTEROLOGY | Facility: CLINIC | Age: 73
End: 2023-05-31

## 2023-05-31 NOTE — TELEPHONE ENCOUNTER
Procedure confirmed  Colonoscopy     Via: Mike    Instructions given: Given to Patient at Visit     Prep Given: Golytely    Call the office if there are any questions

## 2023-06-14 ENCOUNTER — HOSPITAL ENCOUNTER (OUTPATIENT)
Dept: GASTROENTEROLOGY | Facility: HOSPITAL | Age: 73
Setting detail: OUTPATIENT SURGERY
Discharge: HOME | End: 2023-06-14
Payer: MEDICARE

## 2023-06-14 ENCOUNTER — ANESTHESIA (OUTPATIENT)
Dept: GASTROENTEROLOGY | Facility: HOSPITAL | Age: 73
End: 2023-06-14

## 2023-06-14 ENCOUNTER — ANESTHESIA EVENT (OUTPATIENT)
Dept: GASTROENTEROLOGY | Facility: HOSPITAL | Age: 73
End: 2023-06-14

## 2023-06-14 VITALS
DIASTOLIC BLOOD PRESSURE: 80 MMHG | RESPIRATION RATE: 20 BRPM | TEMPERATURE: 97.8 F | OXYGEN SATURATION: 94 % | HEART RATE: 63 BPM | WEIGHT: 212 LBS | HEIGHT: 69 IN | SYSTOLIC BLOOD PRESSURE: 139 MMHG | BODY MASS INDEX: 31.4 KG/M2

## 2023-06-14 DIAGNOSIS — K51.30 ULCERATIVE RECTOSIGMOIDITIS WITHOUT COMPLICATION (HCC): ICD-10-CM

## 2023-06-14 PROCEDURE — 45380 COLONOSCOPY AND BIOPSY: CPT | Performed by: INTERNAL MEDICINE

## 2023-06-14 PROCEDURE — 88305 TISSUE EXAM BY PATHOLOGIST: CPT | Performed by: STUDENT IN AN ORGANIZED HEALTH CARE EDUCATION/TRAINING PROGRAM

## 2023-06-14 PROCEDURE — 45385 COLONOSCOPY W/LESION REMOVAL: CPT | Performed by: INTERNAL MEDICINE

## 2023-06-14 PROCEDURE — 88342 IMHCHEM/IMCYTCHM 1ST ANTB: CPT | Performed by: STUDENT IN AN ORGANIZED HEALTH CARE EDUCATION/TRAINING PROGRAM

## 2023-06-14 RX ORDER — SODIUM CHLORIDE 9 MG/ML
INJECTION, SOLUTION INTRAVENOUS CONTINUOUS PRN
Status: DISCONTINUED | OUTPATIENT
Start: 2023-06-14 | End: 2023-06-14

## 2023-06-14 RX ORDER — PROPOFOL 10 MG/ML
INJECTION, EMULSION INTRAVENOUS AS NEEDED
Status: DISCONTINUED | OUTPATIENT
Start: 2023-06-14 | End: 2023-06-14

## 2023-06-14 RX ADMIN — PROPOFOL 100 MG: 10 INJECTION, EMULSION INTRAVENOUS at 10:43

## 2023-06-14 RX ADMIN — SODIUM CHLORIDE: 0.9 INJECTION, SOLUTION INTRAVENOUS at 10:34

## 2023-06-14 RX ADMIN — PROPOFOL 10 MG: 10 INJECTION, EMULSION INTRAVENOUS at 10:54

## 2023-06-14 RX ADMIN — PROPOFOL 20 MG: 10 INJECTION, EMULSION INTRAVENOUS at 11:01

## 2023-06-14 NOTE — H&P
History and Physical - [id number]    Suly Lewis 68 y o  male MRN: [id number]      HPI: Suly Lewis is a 68y o  year old male who presents for rectosigmoiditis  No Known Allergies      REVIEW OF SYSTEMS: Per the HPI, and otherwise unremarkable      Historical Information     Past Medical History:   Diagnosis Date   • [facility] 12/31/2021   • Chronic kidney disease    • CVA (cerebral vascular accident) ([facility] )    • Diabetes mellitus ([facility] )    • Diverticulitis    • Gout    • Hypercholesterolemia    • Hypertension    • Renal disorder    • Ulcerative colitis ([facility] )    • Vasovagal syncope 12/28/2021     Past Surgical History:   Procedure Laterality Date   • CARDIAC LOOP RECORDER     • COLONOSCOPY  09/18/2006    complete; 10 yrs   • COLONOSCOPY  10/23/2017    complete; 5 yrs   • COLONOSCOPY  05/06/2020    Severe active left-sided colitis, erythematous and ulcerated mucosa in the rectosigmoid, inflammatory polyp     Social History   Social History     Substance and Sexual Activity   Alcohol Use Never     Social History     Substance and Sexual Activity   Drug Use Never     Social History     Tobacco Use   Smoking Status Never   Smokeless Tobacco Never     Family History   Problem Relation Age of Onset   • Breast cancer Mother    • Kidney disease Father    • Lung cancer Father    • Other Father         smoker   • Hypertension Other    • Colon polyps Neg Hx    • Colon cancer Neg Hx        Meds/Allergies       Current Outpatient Medications:   •  allopurinol (ZYLOPRIM) 100 mg tablet  •  amLODIPine (NORVASC) 10 mg tablet  •  ascorbic acid (VITAMIN C) 1000 MG tablet  •  atorvastatin (LIPITOR) 40 mg tablet  •  cholecalciferol (VITAMIN D3) 400 units tablet  •  cloNIDine (CATAPRES) 0 1 mg tablet  •  Coenzyme Q10 200 MG capsule  •  Empagliflozin (Jardiance) 10 MG TABS tablet  •  fenofibrate (TRICOR) 145 mg tablet  •  metoprolol tartrate (LOPRESSOR) 50 "mg tablet  •  sulfaSALAzine (AZULFIDINE) 500 mg tablet  •  clopidogrel (Plavix) 75 mg tablet  •  glucose blood (OneTouch Verio) test strip  •  polyethylene glycol (Golytely) 4000 mL solution        Objective     BP (!) 192/95   Pulse 64   Temp 97 8 °F (36 6 °C) (Oral)   Resp 18   Ht 5' 9\" (1 753 m)   Wt 96 2 kg (212 lb)   SpO2 95%   BMI 31 31 kg/m²       PHYSICAL EXAM    Gen: NAD AAOx3  Head: Normocephalic, Atraumatic  CV: S1S2 RRR no m/r/g  CHEST: Clear b/l no c/r/w  ABD: soft, +BS NT/ND no masses  EXT: no edema      ASSESSMENT/PLAN:  This is a 68y o  year old male here for colonoscopy, and he is stable and optimized for his procedure        "

## 2023-06-14 NOTE — ANESTHESIA POSTPROCEDURE EVALUATION
Post-Op Assessment Note    CV Status:  Stable  Pain Score: 0    Pain management: adequate     Mental Status:  Awake   Hydration Status:  Euvolemic   PONV Controlled:  None   Airway Patency:  Patent      Post Op Vitals Reviewed: Yes      Staff: CRNA         No notable events documented      /57 (06/14/23 1106)    Temp      Pulse 62 (06/14/23 1106)   Resp 16 (06/14/23 1106)    SpO2 92 % (06/14/23 1106)

## 2023-06-14 NOTE — ANESTHESIA PREPROCEDURE EVALUATION
Procedure:  COLONOSCOPY    Relevant Problems   CARDIO   (+) Bilateral carotid artery stenosis   (+) Essential hypertriglyceridemia   (+) Primary hypertension      ENDO   (+) Type 2 diabetes mellitus with stage 3a chronic kidney disease, without long-term current use of insulin (HCC)      /RENAL   (+) Chronic kidney disease, stage 3 (HCC)      MUSCULOSKELETAL   (+) Gout      Cardiovascular and Mediastinum   (+) NSVT (nonsustained ventricular tachycardia) (HCC)      Other   (+) History of stroke        Physical Exam    Airway    Mallampati score: II  TM Distance: >3 FB  Neck ROM: full     Dental   No notable dental hx     Cardiovascular      Pulmonary      Other Findings        Anesthesia Plan  ASA Score- 3     Anesthesia Type- IV sedation with anesthesia with ASA Monitors  Additional Monitors:   Airway Plan:           Plan Factors-    Chart reviewed  Patient summary reviewed  Patient is not a current smoker  Induction- intravenous  Postoperative Plan-     Informed Consent- Anesthetic plan and risks discussed with patient and spouse  I personally reviewed this patient with the CRNA  Discussed and agreed on the Anesthesia Plan with the CRNA  Shonna Pardo

## 2023-06-16 DIAGNOSIS — N18.31 TYPE 2 DIABETES MELLITUS WITH STAGE 3A CHRONIC KIDNEY DISEASE, WITHOUT LONG-TERM CURRENT USE OF INSULIN (HCC): Primary | ICD-10-CM

## 2023-06-16 DIAGNOSIS — E11.22 TYPE 2 DIABETES MELLITUS WITH STAGE 3A CHRONIC KIDNEY DISEASE, WITHOUT LONG-TERM CURRENT USE OF INSULIN (HCC): Primary | ICD-10-CM

## 2023-06-16 RX ORDER — GLIPIZIDE 5 MG/1
2.5 TABLET ORAL
Qty: 90 TABLET | Refills: 1 | Status: SHIPPED | OUTPATIENT
Start: 2023-06-16

## 2023-06-19 PROCEDURE — 88342 IMHCHEM/IMCYTCHM 1ST ANTB: CPT | Performed by: STUDENT IN AN ORGANIZED HEALTH CARE EDUCATION/TRAINING PROGRAM

## 2023-06-19 PROCEDURE — 88305 TISSUE EXAM BY PATHOLOGIST: CPT | Performed by: STUDENT IN AN ORGANIZED HEALTH CARE EDUCATION/TRAINING PROGRAM

## 2023-07-20 NOTE — ASSESSMENT & PLAN NOTE
Assessment/Plan   Diagnoses and all orders for this visit:    Encounter for gynecological examination (general) (routine) without abnormal findings  The current ASCCP guidelines were reviewed. Patient's last pap was 6/23/21 - WNL (-) HRHPV type 16/18 neg and therefore, a pap with HPV cotesting is not indicated due to age in this low risk patient. I emphasized the importance of a pelvic and breast exam. Patient ok to defer pap today. Osteopenia of left hip  -     DXA bone density spine hip and pelvis; Future  I have reviewed the patient's risk factors for osteoporosis and ordered a dexa scan if applicable. Pt to check with insurance for coverage    Discussion  I have discussed the importance of monthly self-breast exams, exercise and healthy diet as well as adequate intake of calcium and vitamin D. Encourage at least 1200 mg calcium citrate + 2000 IUs vitamin D3 divided through diet and supplement throughout the day; Encourage 30-40 min weight bearing exercise most days of week  STI testing - declines  A yearly mammogram is recommended for breast cancer screening starting at age 36 - she is up to date with screening; In addition, colon cancer screening with a colonoscopy is recommended starting at age 42-54 and reviewed benefits - she is up to date with screening. All questions have been answered to her satisfaction  RTO for APE or sooner if needed    Subjective     HPI   Greta Trujillo is a 71 y.o. female who presents for annual well woman exam. Going to [facility] with family (first vacation since  passed)  LMP: 2005 during chemotherapy for right sided breast cancer. S/p lumpectomy with chemotherapy and radiation, follows with breast specialist for imaging studies. Denies  bleeding  No vulvar itch/burn; No vaginal itch/burn; No abn discharge or odor;  No urinary sx - burning/pain/frequency/hematuria  (+) SBEs - no breast masses, asymmetry, nipple discharge or bleeding, changes in skin of breast, or BP elevated today and high at home - I ncrease Lisinopril to 40 mg 1 5 tab (60 mg) once daily, con't current Metoprolol and Norvasc, recheck BP in 6 - 8 wks, low sodium diet/exercise/wgt loss encouraged breast tenderness bilaterally  No abd/pelvic pain or HAs; No menopausal symptoms: (+) hot flashes/night sweats - once a night - tolerable; no problems with intercourse, vaginal dryness; sleeping poor - has always been this way. Pt is not sexually active - ;  passed last year of stage 4 colon cancer; She declines sti/hiv/hep testing; Feels safe at home  (+) PCP for routine Bw/care; Follows with breast surgeon q year - just saw in June    Last Pap - 6/23/21 - WNL (-) HRHPV type 16/18 neg  History of abnormal Pap smear: no  Last mammo - 3/15/23 - Birads 2 benign; Type B density  History of abnormal mammogram:   Last colonoscopy - 7/7/23 - follow-up 5 years  Last Dexa scan - 10/18/19 - osteopenia of left hip    Review of Systems   Constitutional: Negative for activity change, fatigue, fever and unexpected weight change. HENT: Negative for congestion, dental problem, sinus pressure and sinus pain. Eyes: Negative for visual disturbance. Respiratory: Negative for cough, shortness of breath and wheezing. Cardiovascular: Negative for chest pain and leg swelling. Gastrointestinal: Negative for abdominal distention, abdominal pain, blood in stool, constipation, diarrhea, nausea and vomiting. Endocrine: Negative for polydipsia. Genitourinary: Negative for difficulty urinating, dyspareunia, dysuria, frequency, hematuria, menstrual problem, pelvic pain, urgency, vaginal bleeding, vaginal discharge and vaginal pain. Musculoskeletal: Negative for arthralgias and back pain. Allergic/Immunologic: Negative for environmental allergies. Neurological: Negative for dizziness, seizures and headaches. Psychiatric/Behavioral: Negative for dysphoric mood and sleep disturbance. The patient is not nervous/anxious.         The following portions of the patient's history were reviewed and updated as appropriate: allergies, current medications, past family history, past medical history, past social history, past surgical history and problem list.         OB History        3    Para   2    Term   2            AB   1    Living   2       SAB   1    IAB        Ectopic        Multiple        Live Births   2           Obstetric Comments   : 37  F; 1982  M T21; 1 SAB             Past Medical History:   Diagnosis Date   • Breast cancer ([address])     right   • Diverticulitis    • History of chemotherapy    • History of radiation therapy    • Hyperlipidemia        Past Surgical History:   Procedure Laterality Date   • APPENDECTOMY     • BREAST BIOPSY Right     malignant   • BREAST BIOPSY Left 2021    benign us guided bx   • BREAST LUMPECTOMY Right 2005   • CATARACT EXTRACTION     • COLONOSCOPY     • SENTINEL LYMPH NODE BIOPSY Right    • TREATMENT FISTULA ANAL     • US GUIDED BREAST BIOPSY LEFT COMPLETE Left 2021       Family History   Problem Relation Age of Onset   • Bone cancer Father    • Liver disease Father    • Colon polyps Father    • Diverticulitis Father    • No Known Problems Daughter    • Cancer Maternal Grandmother         unknown type and age   • Prostate cancer Brother 64   • No Known Problems Son    • Colon cancer Maternal Uncle 76   • Stomach cancer Paternal Aunt 37   • Endometrial cancer Paternal Aunt 54   • No Known Problems Paternal Aunt    • Bone cancer Paternal Uncle 54       Social History     Socioeconomic History   • Marital status:      Spouse name: Not on file   • Number of children: Not on file   • Years of education: Not on file   • Highest education level: Not on file   Occupational History   • Not on file   Tobacco Use   • Smoking status: Never   • Smokeless tobacco: Never   Vaping Use   • Vaping Use: Never used   Substance and Sexual Activity   • Alcohol use:  Yes     Alcohol/week: 5.0 standard drinks of alcohol     Types: 5 Glasses of wine per week     Comment: Social   • Drug use: No   • Sexual activity: Not Currently   Other Topics Concern • Not on file   Social History Narrative   • Not on file     Social Determinants of Health     Financial Resource Strain: Not on file   Food Insecurity: Not on file   Transportation Needs: Not on file   Physical Activity: Not on file   Stress: Not on file   Social Connections: Not on file   Intimate Partner Violence: Not on file   Housing Stability: Not on file         Current Outpatient Medications:   •  Calcium Carbonate-Vit D-Min (CALCIUM 1200 PO), Take by mouth, Disp: , Rfl:   •  cholecalciferol (VITAMIN D3) 1,000 units tablet, Take 1,000 Units by mouth daily, Disp: , Rfl:   •  pravastatin (PRAVACHOL) 20 mg tablet, Take 20 mg by mouth daily, Disp: , Rfl:   •  Probiotic Product (PROBIOTIC PO), Take by mouth, Disp: , Rfl:     No Known Allergies    Objective   Vitals:    07/20/23 0957   BP: 118/74   BP Location: Left arm   Patient Position: Sitting   Cuff Size: Large   Weight: 73 kg (161 lb)   Height: 5' 1" (1.549 m)     Physical Exam  Vitals reviewed. Constitutional:       General: She is awake. She is not in acute distress. Appearance: Normal appearance. She is well-developed and well-groomed. She is not ill-appearing, toxic-appearing or diaphoretic. HENT:      Head: Normocephalic and atraumatic. Eyes:      Conjunctiva/sclera: Conjunctivae normal.   Neck:      Thyroid: No thyroid mass, thyromegaly or thyroid tenderness. Cardiovascular:      Rate and Rhythm: Normal rate and regular rhythm. Heart sounds: Normal heart sounds. No murmur heard. Pulmonary:      Effort: Pulmonary effort is normal. No tachypnea, bradypnea or respiratory distress. Breath sounds: Normal breath sounds. No stridor or decreased air movement. No wheezing. Chest:   Breasts:     Breasts are symmetrical.      Right: Normal. No swelling, bleeding, inverted nipple, mass, nipple discharge, skin change or tenderness.       Left: Normal. No swelling, bleeding, inverted nipple, mass, nipple discharge, skin change or tenderness. Abdominal:      General: There is no distension. Palpations: Abdomen is soft. There is no hepatomegaly, splenomegaly or mass. Tenderness: There is no abdominal tenderness. Hernia: No hernia is present. There is no hernia in the left inguinal area or right inguinal area. Genitourinary:     General: Normal vulva. Exam position: Supine. Pubic Area: No rash or pubic lice. Labia:         Right: No rash, tenderness, lesion or injury. Left: No rash, tenderness, lesion or injury. Urethra: No prolapse, urethral pain, urethral swelling or urethral lesion. Vagina: Normal. No signs of injury and foreign body. No vaginal discharge, erythema, tenderness, bleeding, lesions or prolapsed vaginal walls. Cervix: No cervical motion tenderness, discharge, friability, lesion, erythema or cervical bleeding. Uterus: Not deviated, not enlarged, not fixed, not tender and no uterine prolapse. Adnexa:         Right: No mass, tenderness or fullness. Left: No mass, tenderness or fullness. Comments: Rectal and hemoccult stool card deferred since recent colonoscopy done 7/7/23  Lymphadenopathy:      Cervical: No cervical adenopathy. Upper Body:      Right upper body: No supraclavicular or axillary adenopathy. Left upper body: No supraclavicular or axillary adenopathy. Lower Body: No right inguinal adenopathy. No left inguinal adenopathy. Skin:     General: Skin is warm and dry. Neurological:      Mental Status: She is alert and oriented to person, place, and time. Psychiatric:         Mood and Affect: Mood and affect normal.         Speech: Speech normal.         Behavior: Behavior normal. Behavior is cooperative. Thought Content:  Thought content normal.         Judgment: Judgment normal.

## 2023-07-25 ENCOUNTER — RA CDI HCC (OUTPATIENT)
Dept: OTHER | Facility: HOSPITAL | Age: 73
End: 2023-07-25

## 2023-07-25 NOTE — PROGRESS NOTES
[facility] coding opportunities       Chart reviewed, no opportunity found: I13.0        Patients Insurance     Medicare Insurance: [place]

## 2023-07-26 LAB
ALBUMIN SERPL-MCNC: 4.2 G/DL (ref 3.6–5.1)
ALBUMIN/GLOB SERPL: 1.9 (CALC) (ref 1–2.5)
ALP SERPL-CCNC: 80 U/L (ref 35–144)
ALT SERPL-CCNC: 25 U/L (ref 9–46)
AST SERPL-CCNC: 24 U/L (ref 10–35)
BILIRUB SERPL-MCNC: 0.6 MG/DL (ref 0.2–1.2)
BUN SERPL-MCNC: 21 MG/DL (ref 7–25)
BUN/CREAT SERPL: 16 (CALC) (ref 6–22)
CALCIUM SERPL-MCNC: 9.1 MG/DL (ref 8.6–10.3)
CHLORIDE SERPL-SCNC: 110 MMOL/L (ref 98–110)
CHOLEST SERPL-MCNC: 132 MG/DL
CHOLEST/HDLC SERPL: 2.9 (CALC)
CO2 SERPL-SCNC: 24 MMOL/L (ref 20–32)
CREAT SERPL-MCNC: 1.31 MG/DL (ref 0.7–1.28)
EST. AVERAGE GLUCOSE BLD GHB EST-MCNC: 143 MG/DL
EST. AVERAGE GLUCOSE BLD GHB EST-SCNC: 7.9 MMOL/L
GFR/BSA.PRED SERPLBLD CYS-BASED-ARV: 58 ML/MIN/1.73M2
GLOBULIN SER CALC-MCNC: 2.2 G/DL (CALC) (ref 1.9–3.7)
GLUCOSE SERPL-MCNC: 119 MG/DL (ref 65–99)
HBA1C MFR BLD: 6.6 % OF TOTAL HGB
HDLC SERPL-MCNC: 45 MG/DL
LDLC SERPL CALC-MCNC: 70 MG/DL (CALC)
NONHDLC SERPL-MCNC: 87 MG/DL (CALC)
POTASSIUM SERPL-SCNC: 3.4 MMOL/L (ref 3.5–5.3)
PROT SERPL-MCNC: 6.4 G/DL (ref 6.1–8.1)
PSA SERPL-MCNC: 3 NG/ML
SODIUM SERPL-SCNC: 141 MMOL/L (ref 135–146)
TRIGL SERPL-MCNC: 85 MG/DL
TSH SERPL-ACNC: 3.63 MIU/L (ref 0.4–4.5)

## 2023-07-28 DIAGNOSIS — K51.30 ULCERATIVE RECTOSIGMOIDITIS WITHOUT COMPLICATION (HCC): ICD-10-CM

## 2023-07-28 RX ORDER — SULFASALAZINE 500 MG/1
TABLET ORAL
Qty: 360 TABLET | Refills: 0 | Status: SHIPPED | OUTPATIENT
Start: 2023-07-28

## 2023-07-31 ENCOUNTER — OFFICE VISIT (OUTPATIENT)
Dept: FAMILY MEDICINE CLINIC | Facility: HOSPITAL | Age: 73
End: 2023-07-31
Payer: MEDICARE

## 2023-07-31 VITALS
DIASTOLIC BLOOD PRESSURE: 96 MMHG | TEMPERATURE: 97.6 F | SYSTOLIC BLOOD PRESSURE: 160 MMHG | HEART RATE: 64 BPM | WEIGHT: 231.8 LBS | BODY MASS INDEX: 34.33 KG/M2 | HEIGHT: 69 IN

## 2023-07-31 DIAGNOSIS — K51.30 ULCERATIVE RECTOSIGMOIDITIS WITHOUT COMPLICATION (HCC): ICD-10-CM

## 2023-07-31 DIAGNOSIS — I47.29 NSVT (NONSUSTAINED VENTRICULAR TACHYCARDIA) (HCC): ICD-10-CM

## 2023-07-31 DIAGNOSIS — E11.22 TYPE 2 DIABETES MELLITUS WITH STAGE 3A CHRONIC KIDNEY DISEASE, WITHOUT LONG-TERM CURRENT USE OF INSULIN (HCC): Primary | ICD-10-CM

## 2023-07-31 DIAGNOSIS — E78.1 ESSENTIAL HYPERTRIGLYCERIDEMIA: ICD-10-CM

## 2023-07-31 DIAGNOSIS — I65.23 BILATERAL CAROTID ARTERY STENOSIS: ICD-10-CM

## 2023-07-31 DIAGNOSIS — N18.31 TYPE 2 DIABETES MELLITUS WITH STAGE 3A CHRONIC KIDNEY DISEASE, WITHOUT LONG-TERM CURRENT USE OF INSULIN (HCC): Primary | ICD-10-CM

## 2023-07-31 DIAGNOSIS — E78.5 DYSLIPIDEMIA: ICD-10-CM

## 2023-07-31 DIAGNOSIS — I10 PRIMARY HYPERTENSION: ICD-10-CM

## 2023-07-31 DIAGNOSIS — N18.31 STAGE 3A CHRONIC KIDNEY DISEASE (HCC): ICD-10-CM

## 2023-07-31 DIAGNOSIS — D75.1 ERYTHROCYTOSIS: ICD-10-CM

## 2023-07-31 DIAGNOSIS — Z00.00 MEDICARE ANNUAL WELLNESS VISIT, SUBSEQUENT: ICD-10-CM

## 2023-07-31 PROCEDURE — 99214 OFFICE O/P EST MOD 30 MIN: CPT | Performed by: INTERNAL MEDICINE

## 2023-07-31 PROCEDURE — 1123F ACP DISCUSS/DSCN MKR DOCD: CPT | Performed by: INTERNAL MEDICINE

## 2023-07-31 PROCEDURE — G0439 PPPS, SUBSEQ VISIT: HCPCS | Performed by: INTERNAL MEDICINE

## 2023-07-31 NOTE — PATIENT INSTRUCTIONS
Medicare Preventive Visit Patient Instructions  Thank you for completing your Welcome to Medicare Visit or Medicare Annual Wellness Visit today. Your next wellness visit will be due in one year (7/31/2024). The screening/preventive services that you may require over the next 5-10 years are detailed below. Some tests may not apply to you based off risk factors and/or age. Screening tests ordered at today's visit but not completed yet may show as past due. Also, please note that scanned in results may not display below. Preventive Screenings:  Service Recommendations Previous Testing/Comments   Colorectal Cancer Screening  · Colonoscopy    · Fecal Occult Blood Test (FOBT)/Fecal Immunochemical Test (FIT)  · Fecal DNA/Cologuard Test  · Flexible Sigmoidoscopy Age: 43-73 years old   Colonoscopy: every 10 years (May be performed more frequently if at higher risk)  OR  FOBT/FIT: every 1 year  OR  Cologuard: every 3 years  OR  Sigmoidoscopy: every 5 years  Screening may be recommended earlier than age 39 if at higher risk for colorectal cancer. Also, an individualized decision between you and your healthcare provider will decide whether screening between the ages of 77-80 would be appropriate.  Colonoscopy: 06/14/2023  FOBT/FIT: Not on file  Cologuard: Not on file  Sigmoidoscopy: Not on file    Screening Current     Prostate Cancer Screening Individualized decision between patient and health care provider in men between ages of 53-66   Medicare will cover every 12 months beginning on the day after your 50th birthday PSA: 3.0 ng/mL     Screening Current     Hepatitis C Screening Once for adults born between 1945 and 1965  More frequently in patients at high risk for Hepatitis C Hep C Antibody: 01/21/2023    Screening Current   Diabetes Screening 1-2 times per year if you're at risk for diabetes or have pre-diabetes Fasting glucose: No results in last 5 years (No results in last 5 years)  A1C: 6.6 % of total Hgb (7/25/2023)  Screening Not Indicated  History Diabetes   Cholesterol Screening Once every 5 years if you don't have a lipid disorder. May order more often based on risk factors. Lipid panel: 07/25/2023  Screening Not Indicated  History Lipid Disorder      Other Preventive Screenings Covered by Medicare:  1. Abdominal Aortic Aneurysm (AAA) Screening: covered once if your at risk. You're considered to be at risk if you have a family history of AAA or a male between the age of 70-76 who smoking at least 100 cigarettes in your lifetime. 2. Lung Cancer Screening: covers low dose CT scan once per year if you meet all of the following conditions: (1) Age 48-67; (2) No signs or symptoms of lung cancer; (3) Current smoker or have quit smoking within the last 15 years; (4) You have a tobacco smoking history of at least 20 pack years (packs per day x number of years you smoked); (5) You get a written order from a healthcare provider. 3. Glaucoma Screening: covered annually if you're considered high risk: (1) You have diabetes OR (2) Family history of glaucoma OR (3)  aged 48 and older OR (3)  American aged 72 and older  3. Osteoporosis Screening: covered every 2 years if you meet one of the following conditions: (1) Have a vertebral abnormality; (2) On glucocorticoid therapy for more than 3 months; (3) Have primary hyperparathyroidism; (4) On osteoporosis medications and need to assess response to drug therapy. 5. HIV Screening: covered annually if you're between the age of 14-79. Also covered annually if you are younger than 13 and older than 72 with risk factors for HIV infection. For pregnant patients, it is covered up to 3 times per pregnancy.     Immunizations:  Immunization Recommendations   Influenza Vaccine Annual influenza vaccination during flu season is recommended for all persons aged >= 6 months who do not have contraindications   Pneumococcal Vaccine   * Pneumococcal conjugate vaccine = PCV13 (Prevnar 13), PCV15 (Vaxneuvance), PCV20 (Prevnar 20)  * Pneumococcal polysaccharide vaccine = PPSV23 (Pneumovax) Adults 2364 years old: 1-3 doses may be recommended based on certain risk factors  Adults 72 years old: 1-2 doses may be recommended based off what pneumonia vaccine you previously received   Hepatitis B Vaccine 3 dose series if at intermediate or high risk (ex: diabetes, end stage renal disease, liver disease)   Tetanus (Td) Vaccine - COST NOT COVERED BY MEDICARE PART B Following completion of primary series, a booster dose should be given every 10 years to maintain immunity against tetanus. Td may also be given as tetanus wound prophylaxis. Tdap Vaccine - COST NOT COVERED BY MEDICARE PART B Recommended at least once for all adults. For pregnant patients, recommended with each pregnancy. Shingles Vaccine (Shingrix) - COST NOT COVERED BY MEDICARE PART B  2 shot series recommended in those aged 48 and above     Health Maintenance Due:      Topic Date Due   • Colorectal Cancer Screening  06/13/2025   • Hepatitis C Screening  Completed     Immunizations Due:      Topic Date Due   • COVID-19 Vaccine (3 - Pfizer series) 06/13/2021   • Influenza Vaccine (1) 09/01/2023     Advance Directives   What are advance directives? Advance directives are legal documents that state your wishes and plans for medical care. These plans are made ahead of time in case you lose your ability to make decisions for yourself. Advance directives can apply to any medical decision, such as the treatments you want, and if you want to donate organs. What are the types of advance directives? There are many types of advance directives, and each state has rules about how to use them. You may choose a combination of any of the following:  · Living will: This is a written record of the treatment you want. You can also choose which treatments you do not want, which to limit, and which to stop at a certain time.  This includes surgery, medicine, IV fluid, and tube feedings. · Durable power of  for healthcare [facility]): This is a written record that states who you want to make healthcare choices for you when you are unable to make them for yourself. This person, called a proxy, is usually a family member or a friend. You may choose more than 1 proxy. · Do not resuscitate (DNR) order:  A DNR order is used in case your heart stops beating or you stop breathing. It is a request not to have certain forms of treatment, such as CPR. A DNR order may be included in other types of advance directives. · Medical directive: This covers the care that you want if you are in a coma, near death, or unable to make decisions for yourself. You can list the treatments you want for each condition. Treatment may include pain medicine, surgery, blood transfusions, dialysis, IV or tube feedings, and a ventilator (breathing machine). · Values history: This document has questions about your views, beliefs, and how you feel and think about life. This information can help others choose the care that you would choose. Why are advance directives important? An advance directive helps you control your care. Although spoken wishes may be used, it is better to have your wishes written down. Spoken wishes can be misunderstood, or not followed. Treatments may be given even if you do not want them. An advance directive may make it easier for your family to make difficult choices about your care. Weight Management   Why it is important to manage your weight:  Being overweight increases your risk of health conditions such as heart disease, high blood pressure, type 2 diabetes, and certain types of cancer. It can also increase your risk for osteoarthritis, sleep apnea, and other respiratory problems. Aim for a slow, steady weight loss. Even a small amount of weight loss can lower your risk of health problems.   How to lose weight safely:  A safe and healthy way to lose weight is to eat fewer calories and get regular exercise. You can lose up about 1 pound a week by decreasing the number of calories you eat by 500 calories each day. Healthy meal plan for weight management:  A healthy meal plan includes a variety of foods, contains fewer calories, and helps you stay healthy. A healthy meal plan includes the following:  · Eat whole-grain foods more often. A healthy meal plan should contain fiber. Fiber is the part of grains, fruits, and vegetables that is not broken down by your body. Whole-grain foods are healthy and provide extra fiber in your diet. Some examples of whole-grain foods are whole-wheat breads and pastas, oatmeal, brown rice, and bulgur. · Eat a variety of vegetables every day. Include dark, leafy greens such as spinach, kale, samreen greens, and mustard greens. Eat yellow and orange vegetables such as carrots, sweet potatoes, and winter squash. · Eat a variety of fruits every day. Choose fresh or canned fruit (canned in its own juice or light syrup) instead of juice. Fruit juice has very little or no fiber. · Eat low-fat dairy foods. Drink fat-free (skim) milk or 1% milk. Eat fat-free yogurt and low-fat cottage cheese. Try low-fat cheeses such as mozzarella and other reduced-fat cheeses. · Choose meat and other protein foods that are low in fat. Choose beans or other legumes such as split peas or lentils. Choose fish, skinless poultry (chicken or turkey), or lean cuts of red meat (beef or pork). Before you cook meat or poultry, cut off any visible fat. · Use less fat and oil. Try baking foods instead of frying them. Add less fat, such as margarine, sour cream, regular salad dressing and mayonnaise to foods. Eat fewer high-fat foods. Some examples of high-fat foods include french fries, doughnuts, ice cream, and cakes. · Eat fewer sweets. Limit foods and drinks that are high in sugar.  This includes candy, cookies, regular soda, and sweetened drinks. Exercise:  Exercise at least 30 minutes per day on most days of the week. Some examples of exercise include walking, biking, dancing, and swimming. You can also fit in more physical activity by taking the stairs instead of the elevator or parking farther away from stores. Ask your healthcare provider about the best exercise plan for you. © Copyright [employer] 2018 Information is for End User's use only and may not be sold, redistributed or otherwise used for commercial purposes. All illustrations and images included in CareNotes® are the copyrighted property of [employer] or [address] [place] [address]  Type 2 Diabetes Management for Adults   AMBULATORY CARE:   Type 2 diabetes  is a disease that affects how your body uses glucose (sugar). Either your body cannot make enough insulin, or it cannot use the insulin correctly. It is important to keep diabetes controlled to prevent damage to your heart, blood vessels, and other organs. Management will help you feel well and enjoy your daily activities. Your diabetes care team providers can help you make a plan to fit diabetes care into your schedule. Your plan can change over time to fit your needs and your family's needs. Have someone call your local emergency number (911 in the [address]) if:   • You cannot be woken. • You have signs of diabetic ketoacidosis:     ? confusion, fatigue    ? vomiting    ? rapid heartbeat    ? fruity smelling breath    ? extreme thirst    ? dry mouth and skin    • You have any of the following signs of a heart attack:      ? Squeezing, pressure, or pain in your chest    ? You may  also have any of the following:     - Discomfort or pain in your back, neck, jaw, stomach, or arm    - Shortness of breath    - Nausea or vomiting    - Lightheadedness or a sudden cold sweat    • You have any of the following signs of a stroke:      ? Numbness or drooping on one side of your face     ? Weakness in an arm or leg    ?  Confusion or difficulty speaking    ? Dizziness, a severe headache, or vision loss    Call your doctor or diabetes care team provider if:   • You have a sore or wound that will not heal.    • You have a change in the amount you urinate. • Your blood sugar levels are higher than your target goals. • You often have lower blood sugar levels than your target goals. • Your skin is red, dry, warm, or swollen. • You have trouble coping with diabetes, or you feel anxious or depressed. • You have questions or concerns about your condition or care. What you need to know about high blood sugar levels:  High blood sugar levels may not cause any symptoms. You may feel more thirsty or urinate more often than usual. Over time, high blood sugar levels can damage your nerves, blood vessels, tissues, and organs. The following can increase your blood sugar levels:  • Large meals or large amounts of carbohydrates at one time    • Less physical activity    • Stress    • Illness    • A lower dose of diabetes medicine or insulin, or a late dose    What you need to know about low blood sugar levels:  Symptoms include feeling shaky, dizzy, irritable, or confused. You can prevent symptoms by keeping your blood sugar levels from going too low. • Treat a low blood sugar level right away:      ? Drink 4 ounces of juice or have 1 tube of glucose gel. ? Check your blood sugar level again 10 to 15 minutes later. ? When the level goes back to normal, eat a meal or snack to prevent another decrease. • Keep glucose gel, raisins, or hard candy with you at all times to treat a low blood sugar level. • Your blood sugar level can get too low if you take diabetes medicine or insulin and do not eat enough food. • If you use insulin, check your blood sugar level before you exercise. ? If your blood sugar level is below 100 mg/dL, eat 4 crackers or 2 ounces of raisins, or drink 4 ounces of juice. ?  Check your level every 30 minutes if you exercise longer than 1 hour. ? You may need a snack during or after exercise. What you can do to manage your blood sugar levels:   • Check your blood sugar levels as directed and as needed. Several items are available to use to check your levels. You may need to check by testing a drop of blood in a glucose monitor. You may instead be given a continuous glucose monitoring (CGM) device. The device is worn at all times. The CGM checks your blood sugar level every 5 minutes. It sends results to an electronic device such as a smart phone. A CGM can be used with or without an insulin pump. You and your diabetes care team providers will decide on the best method for you. The goal for blood sugar levels before meals  is between 80 and 130 mg/dL and 2 hours after eating  is lower than 180 mg/dL. • Make healthy food choices. Work with a dietitian to develop a meal plan that works for you and your schedule. A dietitian can help you learn how to eat the right amount of carbohydrates during your meals and snacks. Carbohydrates can raise your blood sugar level if you eat too many at one time. Examples of foods that contain carbohydrates are breads, cereals, rice, pasta, and sweets. • Eat high-fiber foods as directed. Fiber helps improve blood sugar levels. Fiber also lowers your risk for heart disease and other problems diabetes can cause. Examples of high-fiber foods include vegetables, whole-grain bread, and beans such as donovan beans. Your dietitian can tell you how much fiber to have each day. • Get regular physical activity. Physical activity can help you get to your target blood sugar level goal and manage your weight. Get at least 150 minutes of moderate to vigorous aerobic physical activity each week. Do not miss more than 2 days in a row. Do not sit longer than 30 minutes at a time. Your healthcare provider can help you create an activity plan.  The plan can include the best activities for you and can help you build your strength and endurance. • Maintain a healthy weight. Ask your team what a healthy weight is for you. A healthy weight can help you control diabetes and prevent heart disease. Ask your team to help you create a weight loss plan, if needed. Weight loss can help make a difference in managing diabetes. Your team will help you set a weight-loss goal, such as 10 to 15 pounds, or 5% of your extra weight. Together you and your team can set manageable weight loss goals. • Take your diabetes medicine or insulin as directed. You may need diabetes medicine, insulin, or both to help control your blood sugar levels. Your healthcare provider will teach you how and when to take your diabetes medicine or insulin. You will also be taught about side effects oral diabetes medicine can cause. Insulin may be injected or given through a pump or pen. You and your providers will decide on the best method for you:    ? An insulin pump  is an implanted device that gives your insulin 24 hours a day. An insulin pump prevents the need for multiple insulin injections in a day. ? An insulin pen  is a device prefilled with the right amount of insulin. ? You and your family members will be taught how to draw up and give insulin  if this is the best method for you. Your providers will also teach you how to dispose of needles and syringes. ? You will learn how much insulin you need  and when to give it. You will be taught when not to give insulin. You will also be taught what to do if your blood sugar level drops too low. This may happen if you take insulin and do not eat the right amount of carbohydrates. More ways to manage type 2 diabetes:   • Wear medical alert identification. Wear medical alert jewelry or carry a card that says you have diabetes. Ask your provider where to get these items. • Do not smoke.   Nicotine and other chemicals in cigarettes and cigars can cause lung and blood vessel damage. It also makes it more difficult to manage your diabetes. Ask your provider for information if you currently smoke and need help to quit. Do not use e-cigarettes or smokeless tobacco in place of cigarettes or to help you quit. They still contain nicotine. • Check your feet each day for cuts, scratches, calluses, or other wounds. Look for redness and swelling, and feel for warmth. Wear shoes that fit well. Check your shoes for rocks or other objects that can hurt your feet. Do not walk barefoot or wear shoes without socks. Wear cotton socks to help keep your feet dry. • Ask about vaccines you may need. You have a higher risk for serious illness if you get the flu, pneumonia, COVID-19, or hepatitis. Ask your provider if you should get vaccines to prevent these or other diseases, and when to get the vaccines. • Talk to your provider if you become stressed about diabetes care. Sometimes being able to fit diabetes care into your life can cause increased stress. The stress can cause you not to take care of yourself properly. Your care team providers can help by offering tips about self-care. Your providers may suggest you talk to a mental health provider who can listen and offer help with self-care issues. • Have your A1c checked as directed. Your provider may check your A1c every 3 months, or 2 times each year if your diabetes is controlled. An A1c test shows the average amount of sugar in your blood over the past 2 to 3 months. Your provider will tell you what your A1c level should be. • Have screening tests as directed. Your provider may recommend screening for complications of diabetes and other conditions that may develop.  Some screenings may begin right away and some may happen within the first 5 years of diagnosis:    ? Examples of diabetes complications  include kidney problems, high cholesterol, high blood pressure, blood vessel problems, eye problems, and sleep apnea. ? You may be screened for a low vitamin B level  if you take oral diabetes medicine for a long time. ? Women of childbearing years may be screened  for polycystic ovarian syndrome (PCOS). Follow up with your doctor or diabetes care team providers as directed: You may need to have blood tests done before your follow-up visit. The test results will show if changes need to be made in your treatment or self-care. Talk to your provider if you cannot afford your medicine. Write down your questions so you remember to ask them during your visits. © Copyright [facility] 2022 Information is for End User's use only and may not be sold, redistributed or otherwise used for commercial purposes. The above information is an  only. It is not intended as medical advice for individual conditions or treatments. Talk to your doctor, nurse or pharmacist before following any medical regimen to see if it is safe and effective for you. Type 2 Diabetes Management for Adults   AMBULATORY CARE:   Type 2 diabetes  is a disease that affects how your body uses glucose (sugar). Either your body cannot make enough insulin, or it cannot use the insulin correctly. It is important to keep diabetes controlled to prevent damage to your heart, blood vessels, and other organs. Management will help you feel well and enjoy your daily activities. Your diabetes care team providers can help you make a plan to fit diabetes care into your schedule. Your plan can change over time to fit your needs and your family's needs. Have someone call your local emergency number (911 in the [address]) if:   • You cannot be woken. • You have signs of diabetic ketoacidosis:     ? confusion, fatigue    ? vomiting    ? rapid heartbeat    ? fruity smelling breath    ? extreme thirst    ? dry mouth and skin    • You have any of the following signs of a heart attack:      ?  Squeezing, pressure, or pain in your chest    ? You may  also have any of the following:     - Discomfort or pain in your back, neck, jaw, stomach, or arm    - Shortness of breath    - Nausea or vomiting    - Lightheadedness or a sudden cold sweat    • You have any of the following signs of a stroke:      ? Numbness or drooping on one side of your face     ? Weakness in an arm or leg    ? Confusion or difficulty speaking    ? Dizziness, a severe headache, or vision loss    Call your doctor or diabetes care team provider if:   • You have a sore or wound that will not heal.    • You have a change in the amount you urinate. • Your blood sugar levels are higher than your target goals. • You often have lower blood sugar levels than your target goals. • Your skin is red, dry, warm, or swollen. • You have trouble coping with diabetes, or you feel anxious or depressed. • You have questions or concerns about your condition or care. What you need to know about high blood sugar levels:  High blood sugar levels may not cause any symptoms. You may feel more thirsty or urinate more often than usual. Over time, high blood sugar levels can damage your nerves, blood vessels, tissues, and organs. The following can increase your blood sugar levels:  • Large meals or large amounts of carbohydrates at one time    • Less physical activity    • Stress    • Illness    • A lower dose of diabetes medicine or insulin, or a late dose    What you need to know about low blood sugar levels:  Symptoms include feeling shaky, dizzy, irritable, or confused. You can prevent symptoms by keeping your blood sugar levels from going too low. • Treat a low blood sugar level right away:      ? Drink 4 ounces of juice or have 1 tube of glucose gel. ? Check your blood sugar level again 10 to 15 minutes later. ? When the level goes back to normal, eat a meal or snack to prevent another decrease.        • Keep glucose gel, raisins, or hard candy with you at all times to treat a low blood sugar level. • Your blood sugar level can get too low if you take diabetes medicine or insulin and do not eat enough food. • If you use insulin, check your blood sugar level before you exercise. ? If your blood sugar level is below 100 mg/dL, eat 4 crackers or 2 ounces of raisins, or drink 4 ounces of juice. ? Check your level every 30 minutes if you exercise longer than 1 hour. ? You may need a snack during or after exercise. What you can do to manage your blood sugar levels:   • Check your blood sugar levels as directed and as needed. Several items are available to use to check your levels. You may need to check by testing a drop of blood in a glucose monitor. You may instead be given a continuous glucose monitoring (CGM) device. The device is worn at all times. The CGM checks your blood sugar level every 5 minutes. It sends results to an electronic device such as a smart phone. A CGM can be used with or without an insulin pump. You and your diabetes care team providers will decide on the best method for you. The goal for blood sugar levels before meals  is between 80 and 130 mg/dL and 2 hours after eating  is lower than 180 mg/dL. • Make healthy food choices. Work with a dietitian to develop a meal plan that works for you and your schedule. A dietitian can help you learn how to eat the right amount of carbohydrates during your meals and snacks. Carbohydrates can raise your blood sugar level if you eat too many at one time. Examples of foods that contain carbohydrates are breads, cereals, rice, pasta, and sweets. • Eat high-fiber foods as directed. Fiber helps improve blood sugar levels. Fiber also lowers your risk for heart disease and other problems diabetes can cause. Examples of high-fiber foods include vegetables, whole-grain bread, and beans such as donovan beans. Your dietitian can tell you how much fiber to have each day.          • Get regular physical activity. Physical activity can help you get to your target blood sugar level goal and manage your weight. Get at least 150 minutes of moderate to vigorous aerobic physical activity each week. Do not miss more than 2 days in a row. Do not sit longer than 30 minutes at a time. Your healthcare provider can help you create an activity plan. The plan can include the best activities for you and can help you build your strength and endurance. • Maintain a healthy weight. Ask your team what a healthy weight is for you. A healthy weight can help you control diabetes and prevent heart disease. Ask your team to help you create a weight loss plan, if needed. Weight loss can help make a difference in managing diabetes. Your team will help you set a weight-loss goal, such as 10 to 15 pounds, or 5% of your extra weight. Together you and your team can set manageable weight loss goals. • Take your diabetes medicine or insulin as directed. You may need diabetes medicine, insulin, or both to help control your blood sugar levels. Your healthcare provider will teach you how and when to take your diabetes medicine or insulin. You will also be taught about side effects oral diabetes medicine can cause. Insulin may be injected or given through a pump or pen. You and your providers will decide on the best method for you:    ? An insulin pump  is an implanted device that gives your insulin 24 hours a day. An insulin pump prevents the need for multiple insulin injections in a day. ? An insulin pen  is a device prefilled with the right amount of insulin. ? You and your family members will be taught how to draw up and give insulin  if this is the best method for you. Your providers will also teach you how to dispose of needles and syringes. ? You will learn how much insulin you need  and when to give it. You will be taught when not to give insulin.  You will also be taught what to do if your blood sugar level drops too low. This may happen if you take insulin and do not eat the right amount of carbohydrates. More ways to manage type 2 diabetes:   • Wear medical alert identification. Wear medical alert jewelry or carry a card that says you have diabetes. Ask your provider where to get these items. • Do not smoke. Nicotine and other chemicals in cigarettes and cigars can cause lung and blood vessel damage. It also makes it more difficult to manage your diabetes. Ask your provider for information if you currently smoke and need help to quit. Do not use e-cigarettes or smokeless tobacco in place of cigarettes or to help you quit. They still contain nicotine. • Check your feet each day for cuts, scratches, calluses, or other wounds. Look for redness and swelling, and feel for warmth. Wear shoes that fit well. Check your shoes for rocks or other objects that can hurt your feet. Do not walk barefoot or wear shoes without socks. Wear cotton socks to help keep your feet dry. • Ask about vaccines you may need. You have a higher risk for serious illness if you get the flu, pneumonia, COVID-19, or hepatitis. Ask your provider if you should get vaccines to prevent these or other diseases, and when to get the vaccines. • Talk to your provider if you become stressed about diabetes care. Sometimes being able to fit diabetes care into your life can cause increased stress. The stress can cause you not to take care of yourself properly. Your care team providers can help by offering tips about self-care. Your providers may suggest you talk to a mental health provider who can listen and offer help with self-care issues. • Have your A1c checked as directed. Your provider may check your A1c every 3 months, or 2 times each year if your diabetes is controlled. An A1c test shows the average amount of sugar in your blood over the past 2 to 3 months.  Your provider will tell you what your A1c level should be. • Have screening tests as directed. Your provider may recommend screening for complications of diabetes and other conditions that may develop. Some screenings may begin right away and some may happen within the first 5 years of diagnosis:    ? Examples of diabetes complications  include kidney problems, high cholesterol, high blood pressure, blood vessel problems, eye problems, and sleep apnea. ? You may be screened for a low vitamin B level  if you take oral diabetes medicine for a long time. ? Women of childbearing years may be screened  for polycystic ovarian syndrome (PCOS). Follow up with your doctor or diabetes care team providers as directed: You may need to have blood tests done before your follow-up visit. The test results will show if changes need to be made in your treatment or self-care. Talk to your provider if you cannot afford your medicine. Write down your questions so you remember to ask them during your visits. © Copyright [employer] 2022 Information is for End User's use only and may not be sold, redistributed or otherwise used for commercial purposes. The above information is an  only. It is not intended as medical advice for individual conditions or treatments. Talk to your doctor, nurse or pharmacist before following any medical regimen to see if it is safe and effective for you. Medicare Preventive Visit Patient Instructions  Thank you for completing your Welcome to Medicare Visit or Medicare Annual Wellness Visit today. Your next wellness visit will be due in one year (7/31/2024). The screening/preventive services that you may require over the next 5-10 years are detailed below. Some tests may not apply to you based off risk factors and/or age. Screening tests ordered at today's visit but not completed yet may show as past due. Also, please note that scanned in results may not display below.   Preventive Screenings:  Service Recommendations Previous Testing/Comments   Colorectal Cancer Screening  · Colonoscopy    · Fecal Occult Blood Test (FOBT)/Fecal Immunochemical Test (FIT)  · Fecal DNA/Cologuard Test  · Flexible Sigmoidoscopy Age: 43-73 years old   Colonoscopy: every 10 years (May be performed more frequently if at higher risk)  OR  FOBT/FIT: every 1 year  OR  Cologuard: every 3 years  OR  Sigmoidoscopy: every 5 years  Screening may be recommended earlier than age 39 if at higher risk for colorectal cancer. Also, an individualized decision between you and your healthcare provider will decide whether screening between the ages of 77-80 would be appropriate. Colonoscopy: 06/14/2023  FOBT/FIT: Not on file  Cologuard: Not on file  Sigmoidoscopy: Not on file    Screening Current     Prostate Cancer Screening Individualized decision between patient and health care provider in men between ages of 53-66   Medicare will cover every 12 months beginning on the day after your 50th birthday PSA: 3.0 ng/mL     Screening Current  Risks and Benefits Discussed     Hepatitis C Screening Once for adults born between 1945 and 1965  More frequently in patients at high risk for Hepatitis C Hep C Antibody: 01/21/2023    Screening Current  Risks and Benefits Discussed   Diabetes Screening 1-2 times per year if you're at risk for diabetes or have pre-diabetes Fasting glucose: No results in last 5 years (No results in last 5 years)  A1C: 6.6 % of total Hgb (7/25/2023)  History Diabetes  Risks and Benefits Discussed  Screening Current   Cholesterol Screening Once every 5 years if you don't have a lipid disorder. May order more often based on risk factors. Lipid panel: 07/25/2023  History Lipid Disorder  Risks and Benefits Discussed  Screening Current      Other Preventive Screenings Covered by Medicare:  6. Abdominal Aortic Aneurysm (AAA) Screening: covered once if your at risk.  You're considered to be at risk if you have a family history of AAA or a male between the age of 70-76 who smoking at least 100 cigarettes in your lifetime. 7. Lung Cancer Screening: covers low dose CT scan once per year if you meet all of the following conditions: (1) Age 48-67; (2) No signs or symptoms of lung cancer; (3) Current smoker or have quit smoking within the last 15 years; (4) You have a tobacco smoking history of at least 20 pack years (packs per day x number of years you smoked); (5) You get a written order from a healthcare provider. 8. Glaucoma Screening: covered annually if you're considered high risk: (1) You have diabetes OR (2) Family history of glaucoma OR (3)  aged 48 and older OR (3)  American aged 72 and older  5. Osteoporosis Screening: covered every 2 years if you meet one of the following conditions: (1) Have a vertebral abnormality; (2) On glucocorticoid therapy for more than 3 months; (3) Have primary hyperparathyroidism; (4) On osteoporosis medications and need to assess response to drug therapy. 10. HIV Screening: covered annually if you're between the age of 14-79. Also covered annually if you are younger than 13 and older than 72 with risk factors for HIV infection. For pregnant patients, it is covered up to 3 times per pregnancy.     Immunizations:  Immunization Recommendations   Influenza Vaccine Annual influenza vaccination during flu season is recommended for all persons aged >= 6 months who do not have contraindications   Pneumococcal Vaccine   * Pneumococcal conjugate vaccine = PCV13 (Prevnar 13), PCV15 (Vaxneuvance), PCV20 (Prevnar 20)  * Pneumococcal polysaccharide vaccine = PPSV23 (Pneumovax) Adults 20-63 years old: 1-3 doses may be recommended based on certain risk factors  Adults 72 years old: 1-2 doses may be recommended based off what pneumonia vaccine you previously received   Hepatitis B Vaccine 3 dose series if at intermediate or high risk (ex: diabetes, end stage renal disease, liver disease)   Tetanus (Td) Vaccine - COST NOT COVERED BY MEDICARE PART B Following completion of primary series, a booster dose should be given every 10 years to maintain immunity against tetanus. Td may also be given as tetanus wound prophylaxis. Tdap Vaccine - COST NOT COVERED BY MEDICARE PART B Recommended at least once for all adults. For pregnant patients, recommended with each pregnancy. Shingles Vaccine (Shingrix) - COST NOT COVERED BY MEDICARE PART B  2 shot series recommended in those aged 48 and above     Health Maintenance Due:      Topic Date Due   • Colorectal Cancer Screening  06/13/2025   • Hepatitis C Screening  Completed     Immunizations Due:      Topic Date Due   • COVID-19 Vaccine (3 - Pfizer series) 06/13/2021   • Influenza Vaccine (1) 09/01/2023     Advance Directives   What are advance directives? Advance directives are legal documents that state your wishes and plans for medical care. These plans are made ahead of time in case you lose your ability to make decisions for yourself. Advance directives can apply to any medical decision, such as the treatments you want, and if you want to donate organs. What are the types of advance directives? There are many types of advance directives, and each state has rules about how to use them. You may choose a combination of any of the following:  · Living will: This is a written record of the treatment you want. You can also choose which treatments you do not want, which to limit, and which to stop at a certain time. This includes surgery, medicine, IV fluid, and tube feedings. · Durable power of  for healthcare [facility]): This is a written record that states who you want to make healthcare choices for you when you are unable to make them for yourself. This person, called a proxy, is usually a family member or a friend. You may choose more than 1 proxy. · Do not resuscitate (DNR) order:  A DNR order is used in case your heart stops beating or you stop breathing.  It is a request not to have certain forms of treatment, such as CPR. A DNR order may be included in other types of advance directives. · Medical directive: This covers the care that you want if you are in a coma, near death, or unable to make decisions for yourself. You can list the treatments you want for each condition. Treatment may include pain medicine, surgery, blood transfusions, dialysis, IV or tube feedings, and a ventilator (breathing machine). · Values history: This document has questions about your views, beliefs, and how you feel and think about life. This information can help others choose the care that you would choose. Why are advance directives important? An advance directive helps you control your care. Although spoken wishes may be used, it is better to have your wishes written down. Spoken wishes can be misunderstood, or not followed. Treatments may be given even if you do not want them. An advance directive may make it easier for your family to make difficult choices about your care. Weight Management   Why it is important to manage your weight:  Being overweight increases your risk of health conditions such as heart disease, high blood pressure, type 2 diabetes, and certain types of cancer. It can also increase your risk for osteoarthritis, sleep apnea, and other respiratory problems. Aim for a slow, steady weight loss. Even a small amount of weight loss can lower your risk of health problems. How to lose weight safely:  A safe and healthy way to lose weight is to eat fewer calories and get regular exercise. You can lose up about 1 pound a week by decreasing the number of calories you eat by 500 calories each day. Healthy meal plan for weight management:  A healthy meal plan includes a variety of foods, contains fewer calories, and helps you stay healthy. A healthy meal plan includes the following:  · Eat whole-grain foods more often. A healthy meal plan should contain fiber.  Fiber is the part of grains, fruits, and vegetables that is not broken down by your body. Whole-grain foods are healthy and provide extra fiber in your diet. Some examples of whole-grain foods are whole-wheat breads and pastas, oatmeal, brown rice, and bulgur. · Eat a variety of vegetables every day. Include dark, leafy greens such as spinach, kale, samreen greens, and mustard greens. Eat yellow and orange vegetables such as carrots, sweet potatoes, and winter squash. · Eat a variety of fruits every day. Choose fresh or canned fruit (canned in its own juice or light syrup) instead of juice. Fruit juice has very little or no fiber. · Eat low-fat dairy foods. Drink fat-free (skim) milk or 1% milk. Eat fat-free yogurt and low-fat cottage cheese. Try low-fat cheeses such as mozzarella and other reduced-fat cheeses. · Choose meat and other protein foods that are low in fat. Choose beans or other legumes such as split peas or lentils. Choose fish, skinless poultry (chicken or turkey), or lean cuts of red meat (beef or pork). Before you cook meat or poultry, cut off any visible fat. · Use less fat and oil. Try baking foods instead of frying them. Add less fat, such as margarine, sour cream, regular salad dressing and mayonnaise to foods. Eat fewer high-fat foods. Some examples of high-fat foods include french fries, doughnuts, ice cream, and cakes. · Eat fewer sweets. Limit foods and drinks that are high in sugar. This includes candy, cookies, regular soda, and sweetened drinks. Exercise:  Exercise at least 30 minutes per day on most days of the week. Some examples of exercise include walking, biking, dancing, and swimming. You can also fit in more physical activity by taking the stairs instead of the elevator or parking farther away from stores. Ask your healthcare provider about the best exercise plan for you.       © Copyright [employer] 2018 Information is for End User's use only and may not be sold, redistributed or otherwise used for commercial purposes.  All illustrations and images included in CareNotes® are the copyrighted property of [employer] or [address] [place] [address]

## 2023-07-31 NOTE — ASSESSMENT & PLAN NOTE
TG at goal, con't current fenofibrate, importance of diet/exercise/wgt loss encouraged, recheck FLP in 1 yr

## 2023-07-31 NOTE — ASSESSMENT & PLAN NOTE
Had colonoscopy in June 23 - 2 yr follow up recommended, no current GI symptoms present, con't sulfasalazine and f/u as per GI, call with GI symptoms

## 2023-07-31 NOTE — ASSESSMENT & PLAN NOTE
BP better today by end of appt but upper end of acceptable - has gained 12 lbs which is likely contributing to this, con't current BP meds, recheck BP in 6 mos

## 2023-07-31 NOTE — PROGRESS NOTES
Assessment and Plan:     Problem List Items Addressed This Visit        Digestive    Ulcerative rectosigmoiditis without complication ([address])     Had colonoscopy in June 23 - 2 yr follow up recommended, no current GI symptoms present, con't sulfasalazine and f/u as per GI, call with GI symptoms            Endocrine    Type 2 diabetes mellitus with stage 3a chronic kidney disease, without long-term current use of insulin ([address]) - Primary       Lab Results   Component Value Date    HGBA1C 6.6 (H) 07/25/2023   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 - controlled with A1C 6.6 - Carolina Greenfield had to be changed to Glipizide d/t cost, BS stable despite med change, wgt up 12 lbs and urged to get on track with diet/exercise/wgt loss, con't current meds and recheck BW in 6 mo - BW order given, UTD on DM foot exam (2/23) and eye exam (2/23), on statin but no ACE/ARB, will follow         Relevant Orders    Microalbumin, Random Urine (W/Creatinine)    Basic metabolic panel    Hemoglobin A1C With EAG       Cardiovascular and Mediastinum    Primary hypertension     BP better today by end of appt but upper end of acceptable - has gained 12 lbs which is likely contributing to this, con't current BP meds, recheck BP in 6 mos         NSVT (nonsustained ventricular tachycardia) (HCC)     NO current CV symptoms and regular rhythm today, follows with [facility], call with CV symptoms         Bilateral carotid artery stenosis     Due for CUS - order given, on Plavix and statin, will follow, to ED with Neuro symptoms         Relevant Orders    VAS carotid complete study       Genitourinary    Chronic kidney disease, stage 3 ([address])     Lab Results   Component Value Date    EGFR 58 (L) 07/25/2023    EGFR 50 (L) 01/21/2023    EGFR 46 (L) 09/03/2022    CREATININE 1.31 (H) 07/25/2023    CREATININE 1.47 (H) 01/21/2023    CREATININE 1.59 (H) 09/03/2022   CKD stage 3 stable if not slightly improved - importance of BP/BS control and avoiding NSAIDs and keeping hydrated, not on an ACE/ARB, will monitor and recheck BW in 6 mo - BW order given, will follow            Other    Essential hypertriglyceridemia     TG at goal, con't current fenofibrate, importance of diet/exercise/wgt loss encouraged, recheck FLP in 1 yr         Dyslipidemia     LDL at goal, con't current statin, importance of diet/exercise/wgt loss encouraged, recheck FLP in 1 yr         Erythrocytosis     Improved and stable, saw Heme recently and scheduled phlebotomy cancelled, has BW and f/u with Heme        Other Visit Diagnoses     Medicare annual wellness visit, subsequent              Depression Screening and Follow-up Plan: Patient was screened for depression during today's encounter. They screened negative with a PHQ-2 score of 0. Preventive health issues were discussed with patient, and age appropriate screening tests were ordered as noted in patient's After Visit Summary. Colonoscopy 6/23 - 2 yrs      Personalized health advice and appropriate referrals for health education or preventive services given if needed, as noted in patient's After Visit Summary. History of Present Illness:     Patient presents for a Medicare Wellness Visit    HPI Pt here for follow up appt/BW results and AWV    BW results were d/w pt in detail: FBS/A1C 119/6.6, BUN/Cr 21/1.31 (GFR 58), K 3.4,  Rest of CMP/FLP/TSH/PSA were all wnl. Def of controlled vs uncontrolled DM was reviewed. Diet was reviewed - wgt up 12 lbs. He admits diet was poor and he was not exercising. He is taking his DM meds as directed - Jardiance was cosing him $460 a month so he sent a message in June and was subsequently changed to Glipizide 2.5 mg daily. He is taking the Glipizide as directed w/o SE. He has noted no spikes in sugars with the med change. He notes no low BS readings. Average BS at home is 100-110. He is UTD on DM foot exam (2/23) and eye exam (2/23). He is on statin but no ARB/ACE. CKD stage 3 reviewed. Importance of BP/BS control reviewed. Goal FLP was d/w pt in detail. Diet/exercise reviewed as noted above. He is taking his Atorvastatin and Fenofibrate daily as directed w/o Se. He notes no stroke/TIA symptoms/CP. Pt saw GI (Dr. Claire Ramsey) in March for f/u UC - OV note reviewed. He has a colonoscopy in June and was told 2 yr follow up. He had no changes to his sulfasalazine made. He has had no abd pain/diarrhea/blood in stool. Pt saw Heme Ethelda Guard) in April f/or f/u erythrocytosis w/history of CVA - OV note reviewed. H & H have been w/n and he was told he could cancel his phlebotomy scheeule and do BW In 3 mos. He was told to f/u in Heme office in 6 mos. BP above goal today and meds were reviewed and no changes have occurred. He denies missing doses of meds or SE with the meds. He does check his BP outside the office and average home -120's/80's. He notes no frequent Ha's/dizziness/double vision/CP. He has h/o SVT and follows with Cardio annually. He notes no CP/palp/LE edema/orthopnea/PND. Patient Care Team:  Tami Banuelos DO as PCP - General Clearnce Libman (Internal Medicine)  Nelson Staley MD (Neurology)  Angie Del Rosario MD as Medical Oncologist (Oncology)  Layne Watson MD (Gastroenterology)     Review of Systems:     Review of Systems   Constitutional: Negative for chills, fever and unexpected weight change. HENT: Negative for congestion, hearing loss and trouble swallowing. Eyes: Negative for pain and visual disturbance. Respiratory: Negative for cough and shortness of breath. Cardiovascular: Negative for chest pain, palpitations and leg swelling. Gastrointestinal: Negative for abdominal pain, blood in stool, constipation, diarrhea, nausea and vomiting. Genitourinary: Negative for difficulty urinating and dysuria. Musculoskeletal: Negative for arthralgias and myalgias. Skin: Negative for rash and wound.    Neurological: Negative for dizziness and headaches. Hematological: Does not bruise/bleed easily. Psychiatric/Behavioral: Negative for confusion, dysphoric mood and sleep disturbance.         Problem List:     Patient Active Problem List   Diagnosis   • Chronic kidney disease, stage 3 (HCC)   • Colon, diverticulosis   • Elevated C-reactive protein   • Elevated PSA   • Essential hypertriglyceridemia   • Gout   • Primary hypertension   • Microalbuminuria   • Obesity   • Type 2 diabetes mellitus with stage 3a chronic kidney disease, without long-term current use of insulin (HCC)   • Ulcerative rectosigmoiditis without complication (HCC)   • Dyslipidemia   • Erythrocytosis   • History of stroke   • Transient alteration of awareness   • Witnessed episode of apnea   • NSVT (nonsustained ventricular tachycardia) ([facility])   • Bilateral carotid artery stenosis   • Elevated hemoglobin ([facility])      Past Medical and Surgical History:     Past Medical History:   Diagnosis Date   • Acute respiratory failure with hypoxia ([address]) 12/31/2021   • CVA (cerebral vascular accident) ([address])    • Diverticulitis    • Gout    • Vasovagal syncope 12/28/2021     Past Surgical History:   Procedure Laterality Date   • CARDIAC LOOP RECORDER     • COLONOSCOPY  09/18/2006    complete; 10 yrs   • COLONOSCOPY  10/23/2017    complete; 5 yrs   • COLONOSCOPY  05/06/2020    Severe active left-sided colitis, erythematous and ulcerated mucosa in the rectosigmoid, inflammatory polyp      Family History:     Family History   Problem Relation Age of Onset   • Breast cancer Mother    • Kidney disease Father    • Lung cancer Father    • Other Father         smoker   • Hypertension Other    • Colon polyps Neg Hx    • Colon cancer Neg Hx       Social History:     Social History     Socioeconomic History   • Marital status: /Civil Union     Spouse name: None   • Number of children: None   • Years of education: None   • Highest education level: None   Occupational History   • Occupation: full-time employment   Tobacco Use   • Smoking status: Never   • Smokeless tobacco: Never   Vaping Use   • Vaping Use: Never used   Substance and Sexual Activity   • Alcohol use: Never   • Drug use: Never   • Sexual activity: Yes     Partners: Female     Birth control/protection: None   Other Topics Concern   • None   Social History Narrative   • None     Social Determinants of Health     Financial Resource Strain: Low Risk  (7/29/2023)    Overall Financial Resource Strain (CARDIA)    • Difficulty of Paying Living Expenses: Not hard at all   Food Insecurity: Not on file   Transportation Needs: No Transportation Needs (7/29/2023)    PRAPARE - Transportation    • Lack of Transportation (Medical): No    • Lack of Transportation (Non-Medical):  No   Physical Activity: Not on file   Stress: Not on file   Social Connections: Not on file   Intimate Partner Violence: Not on file   Housing Stability: Low Risk  (12/30/2021)    Housing Stability Vital Sign    • Unable to Pay for Housing in the Last Year: No    • Number of Places Lived in the Last Year: 1    • Unstable Housing in the Last Year: No      Medications and Allergies:     Current Outpatient Medications   Medication Sig Dispense Refill   • allopurinol (ZYLOPRIM) 100 mg tablet TAKE 2 TABLETS DAILY 180 tablet 2   • amLODIPine (NORVASC) 10 mg tablet Take 1 tablet by mouth in the morning     • ascorbic acid (VITAMIN C) 1000 MG tablet Take 1 tablet (1,000 mg total) by mouth daily  0   • atorvastatin (LIPITOR) 40 mg tablet TAKE 1 TABLET DAILY WITH   DINNER 90 tablet 2   • cholecalciferol (VITAMIN D3) 400 units tablet Take 1 tablet (400 Units total) by mouth daily  0   • cloNIDine (CATAPRES) 0.1 mg tablet Take 1 tablet by mouth in the morning     • clopidogrel (Plavix) 75 mg tablet Take 1 tablet (75 mg total) by mouth daily 90 tablet 2   • Coenzyme Q10 200 MG capsule Take by mouth daily      • fenofibrate (TRICOR) 145 mg tablet TAKE 1 TABLET DAILY 90 tablet 2   • glipiZIDE (GLUCOTROL) 5 mg tablet Take 0.5 tablets (2.5 mg total) by mouth daily with breakfast 90 tablet 1   • glucose blood (OneTouch Verio) test strip Check blood sugars once daily. Please substitute with appropriate alternative as covered by patient's insurance. Dx: E11.65 100 each 3   • metoprolol tartrate (LOPRESSOR) 50 mg tablet Take 1.5 tablets by mouth 2 (two) times a day     • sulfaSALAzine (AZULFIDINE) 500 mg tablet TAKE 1 TABLET 4 TIMES DAILY 360 tablet 0     No current facility-administered medications for this visit. No Known Allergies   Immunizations:     Immunization History   Administered Date(s) Administered   • COVID-19 PFIZER VACCINE 0.3 ML IM 03/27/2021, 04/18/2021   • INFLUENZA 10/08/2015, 10/06/2017, 10/04/2018, 10/03/2019   • Influenza, high dose seasonal 0.7 mL 09/28/2020, 10/07/2020, 10/04/2021   • Influenza, injectable, quadrivalent, preservative free 0.5 mL 10/04/2018, 10/03/2019   • Influenza, seasonal, injectable 10/19/2015, 10/15/2016   • Pneumococcal Conjugate 13-Valent 09/28/2020   • Pneumococcal Polysaccharide PPV23 10/04/2021   • Tdap 12/28/2021      Health Maintenance:         Topic Date Due   • Colorectal Cancer Screening  06/13/2025   • Hepatitis C Screening  Completed         Topic Date Due   • COVID-19 Vaccine (3 - Pfizer series) 06/13/2021   • Influenza Vaccine (1) 09/01/2023      Medicare Screening Tests and Risk Assessments:     Tracee Cope is here for his Subsequent Wellness visit. Last Medicare Wellness visit information reviewed, patient interviewed and updates made to the record today. Health Risk Assessment:   Patient rates overall health as good. Patient feels that their physical health rating is slightly better. Patient is very satisfied with their life. Eyesight was rated as same. Hearing was rated as same. Patient feels that their emotional and mental health rating is same. Patients states they are never, rarely angry.  Patient states they are never, rarely unusually tired/fatigued. Pain experienced in the last 7 days has been none. Patient states that he has experienced no weight loss or gain in last 6 months. Depression Screening:   PHQ-2 Score: 0      Fall Risk Screening: In the past year, patient has experienced: no history of falling in past year      Home Safety:  Patient does not have trouble with stairs inside or outside of their home. Patient has working smoke alarms and has no working carbon monoxide detector. Home safety hazards include: none. Nutrition:   Current diet is Diabetic and No Added Salt. Medications:   Patient is not currently taking any over-the-counter supplements. Patient is able to manage medications. Activities of Daily Living (ADLs)/Instrumental Activities of Daily Living (IADLs):   Walk and transfer into and out of bed and chair?: Yes  Dress and groom yourself?: Yes    Bathe or shower yourself?: Yes    Feed yourself?  Yes  Do your laundry/housekeeping?: Yes  Manage your money, pay your bills and track your expenses?: Yes  Make your own meals?: Yes    Do your own shopping?: Yes    Previous Hospitalizations:   Any hospitalizations or ED visits within the last 12 months?: No      Advance Care Planning:   Living will: No    Durable POA for healthcare: No    Advanced directive: Yes      Cognitive Screening:   Provider or family/friend/caregiver concerned regarding cognition?: No    PREVENTIVE SCREENINGS      Cardiovascular Screening:    General: History Lipid Disorder, Risks and Benefits Discussed and Screening Current      Diabetes Screening:     General: History Diabetes, Risks and Benefits Discussed and Screening Current      Colorectal Cancer Screening:     General: Screening Current      Prostate Cancer Screening:    General: Screening Current and Risks and Benefits Discussed      Osteoporosis Screening:    General: Risks and Benefits Discussed and Screening Not Indicated      Abdominal Aortic Aneurysm (AAA) Screening:    Risk factors include: age between 70-75 yo        General: Risks and Benefits Discussed and Screening Not Indicated      Lung Cancer Screening:     General: Screening Not Indicated and Risks and Benefits Discussed      Hepatitis C Screening:    General: Screening Current and Risks and Benefits Discussed    Screening, Brief Intervention, and Referral to Treatment (SBIRT)    Screening  Typical number of drinks in a day: 0  Typical number of drinks in a week: 0  Interpretation: Low risk drinking behavior. AUDIT-C Screenin) How often did you have a drink containing alcohol in the past year? never  2) How many drinks did you have on a typical day when you were drinking in the past year? 0  3) How often did you have 6 or more drinks on one occasion in the past year? never    AUDIT-C Score: 0  Interpretation: Score 0-3 (male): Negative screen for alcohol misuse    Single Item Drug Screening:  How often have you used an illegal drug (including marijuana) or a prescription medication for non-medical reasons in the past year? never    Single Item Drug Screen Score: 0  Interpretation: Negative screen for possible drug use disorder    Vision Screening    Right eye Left eye Both eyes   Without correction      With correction 20/30 20/30 20/30        Physical Exam:     /96   Pulse 64   Temp 97.6 °F (36.4 °C)   Ht 5' 9" (1.753 m)   Wt 105 kg (231 lb 12.8 oz)   BMI 34.23 kg/m²     Physical Exam  Vitals and nursing note reviewed. Constitutional:       General: He is not in acute distress. Appearance: He is well-developed. He is obese. He is not ill-appearing. HENT:      Head: Normocephalic and atraumatic. Right Ear: Tympanic membrane and external ear normal. There is no impacted cerumen. Left Ear: Tympanic membrane and external ear normal. There is no impacted cerumen. Eyes:      General:         Right eye: No discharge. Left eye: No discharge.       Conjunctiva/sclera: Conjunctivae normal. Neck:      Vascular: No carotid bruit. Trachea: No tracheal deviation. Cardiovascular:      Rate and Rhythm: Normal rate and regular rhythm. Heart sounds: Normal heart sounds. No murmur heard. Pulmonary:      Effort: Pulmonary effort is normal. No respiratory distress. Breath sounds: Normal breath sounds. No wheezing, rhonchi or rales. Abdominal:      General: There is no distension. Palpations: Abdomen is soft. Tenderness: There is no abdominal tenderness. There is no guarding or rebound. Musculoskeletal:      Cervical back: Neck supple. Right lower leg: No edema. Left lower leg: No edema. Lymphadenopathy:      Cervical: No cervical adenopathy. Skin:     General: Skin is warm and dry. Coloration: Skin is not pale. Findings: No bruising or rash. Neurological:      General: No focal deficit present. Mental Status: He is alert. Motor: No abnormal muscle tone. Gait: Gait normal.   Psychiatric:         Mood and Affect: Mood normal.         Behavior: Behavior normal.         Thought Content:  Thought content normal.         Judgment: Judgment normal.          Radha Dwyer,

## 2023-07-31 NOTE — ASSESSMENT & PLAN NOTE
LDL at goal, con't current statin, importance of diet/exercise/wgt loss encouraged, recheck FLP in 1 yr

## 2023-07-31 NOTE — ASSESSMENT & PLAN NOTE
Lab Results   Component Value Date    EGFR 58 (L) 07/25/2023    EGFR 50 (L) 01/21/2023    EGFR 46 (L) 09/03/2022    CREATININE 1.31 (H) 07/25/2023    CREATININE 1.47 (H) 01/21/2023    CREATININE 1.59 (H) 09/03/2022   CKD stage 3 stable if not slightly improved - importance of BP/BS control and avoiding NSAIDs and keeping hydrated, not on an ACE/ARB, will monitor and recheck BW in 6 mo - BW order given, will follow

## 2023-07-31 NOTE — ASSESSMENT & PLAN NOTE
Lab Results   Component Value Date    HGBA1C 6.6 (H) 07/25/2023   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 - controlled with A1C 6.6 - Lorriane Harrow had to be changed to Glipizide d/t cost, BS stable despite med change, wgt up 12 lbs and urged to get on track with diet/exercise/wgt loss, con't current meds and recheck BW in 6 mo - BW order given, UTD on DM foot exam (2/23) and eye exam (2/23), on statin but no ACE/ARB, will follow

## 2023-08-02 LAB
ALBUMIN/CREAT UR: 211 MCG/MG CREAT
CREAT UR-MCNC: 106 MG/DL (ref 20–320)
MICROALBUMIN UR-MCNC: 22.4 MG/DL

## 2023-09-06 DIAGNOSIS — Z86.73 HISTORY OF STROKE: ICD-10-CM

## 2023-09-06 RX ORDER — CLOPIDOGREL BISULFATE 75 MG/1
75 TABLET ORAL DAILY
Qty: 90 TABLET | Refills: 2 | Status: SHIPPED | OUTPATIENT
Start: 2023-09-06

## 2023-09-11 DIAGNOSIS — E78.1 ESSENTIAL HYPERTRIGLYCERIDEMIA: ICD-10-CM

## 2023-09-11 RX ORDER — FENOFIBRATE 145 MG/1
TABLET, COATED ORAL
Qty: 90 TABLET | Refills: 2 | Status: SHIPPED | OUTPATIENT
Start: 2023-09-11

## 2023-09-19 ENCOUNTER — OFFICE VISIT (OUTPATIENT)
Dept: GASTROENTEROLOGY | Facility: CLINIC | Age: 73
End: 2023-09-19
Payer: MEDICARE

## 2023-09-19 VITALS
DIASTOLIC BLOOD PRESSURE: 100 MMHG | WEIGHT: 239 LBS | BODY MASS INDEX: 35.4 KG/M2 | HEIGHT: 69 IN | SYSTOLIC BLOOD PRESSURE: 160 MMHG

## 2023-09-19 DIAGNOSIS — E66.01 OBESITY, MORBID (HCC): ICD-10-CM

## 2023-09-19 DIAGNOSIS — Z79.01 CURRENT USE OF LONG TERM ANTICOAGULATION: ICD-10-CM

## 2023-09-19 DIAGNOSIS — K51.30 ULCERATIVE RECTOSIGMOIDITIS WITHOUT COMPLICATION (HCC): Primary | ICD-10-CM

## 2023-09-19 DIAGNOSIS — Z86.010 HISTORY OF COLON POLYPS: ICD-10-CM

## 2023-09-19 PROBLEM — Z86.0100 HISTORY OF COLON POLYPS: Status: ACTIVE | Noted: 2023-09-19

## 2023-09-19 PROCEDURE — 99214 OFFICE O/P EST MOD 30 MIN: CPT | Performed by: INTERNAL MEDICINE

## 2023-09-19 RX ORDER — SULFASALAZINE 500 MG/1
500 TABLET ORAL 4 TIMES DAILY
Qty: 360 TABLET | Refills: 3 | Status: SHIPPED | OUTPATIENT
Start: 2023-09-19

## 2023-09-19 NOTE — PROGRESS NOTES
[facility]arcus Sanchez [facility] - Outpatient Follow-up Note  Cristian Garcia 68 y.o. male MRN: [id number]  Encounter: [id number]    ASSESSMENT AND PLAN:      1. Ulcerative rectosigmoiditis without complication ([address])  89T w long hx of L sided UC here today for f/u. Doing well w no complaints. Reviewed results of the colonoscopy. Endoscopically did have some scarring in the left colon, but bx ok. R side bx did show some active colitis. However, pt feels he is doing well - so unclear benefit of treating and escalating therapy. Pt will keep me informed of the symptoms if they change. - Labs due 1/2024  - Colon due 6/2025    - CBC and differential; Future  - Comprehensive metabolic panel; Future  - C-reactive protein; Future  - Vitamin D 25 hydroxy; Future    - Continue sulfaSALAzine (AZULFIDINE) 500 mg tablet; Take 1 tablet (500 mg total) by mouth 4 (four) times a day  Dispense: 360 tablet; Refill: 3    2. Obesity, morbid ([address])    3. Current use of long term anticoagulation  On Plavix    4. History of colon polyps  RECALL 6/2025      Followup Appointment: 1 year  ______________________________________________________________________    Chief Complaint   Patient presents with   • Follow-up     HPI:  73M here today with his wife for f/u of UC. Doing well. No complaints. Moves his bowels, soft, formed, 1-2 times a day. No blood. No abd pain/diarrhea. No f/c/joint pains/rashes. No side effects to sulfasalazine.      Historical Information   Past Medical History:   Diagnosis Date   • Acute respiratory failure with hypoxia ([address]) 12/31/2021   • CVA (cerebral vascular accident) [facility])    • Diverticulitis    • Gout    • Vasovagal syncope 12/28/2021     Past Surgical History:   Procedure Laterality Date   • CARDIAC LOOP RECORDER     • COLONOSCOPY  09/18/2006    complete; 10 yrs   • COLONOSCOPY  10/23/2017    complete; 5 yrs   • COLONOSCOPY  05/06/2020    Severe active left-sided colitis, erythematous and ulcerated mucosa in the rectosigmoid, inflammatory polyp     Social History     Substance and Sexual Activity   Alcohol Use Never     Social History     Substance and Sexual Activity   Drug Use Never     Social History     Tobacco Use   Smoking Status Never   Smokeless Tobacco Never     Family History   Problem Relation Age of Onset   • Breast cancer Mother    • Kidney disease Father    • Lung cancer Father    • Other Father         smoker   • Hypertension Other    • Colon polyps Neg Hx    • Colon cancer Neg Hx          Current Outpatient Medications:   •  allopurinol (ZYLOPRIM) 100 mg tablet  •  amLODIPine (NORVASC) 10 mg tablet  •  ascorbic acid (VITAMIN C) 1000 MG tablet  •  atorvastatin (LIPITOR) 40 mg tablet  •  cholecalciferol (VITAMIN D3) 400 units tablet  •  cloNIDine (CATAPRES) 0.1 mg tablet  •  clopidogrel (PLAVIX) 75 mg tablet  •  Coenzyme Q10 200 MG capsule  •  fenofibrate (TRICOR) 145 mg tablet  •  glipiZIDE (GLUCOTROL) 5 mg tablet  •  metoprolol tartrate (LOPRESSOR) 50 mg tablet  •  sulfaSALAzine (AZULFIDINE) 500 mg tablet  •  glucose blood (OneTouch Verio) test strip  No Known Allergies  Reviewed medications and allergies and updated as indicated    PHYSICAL EXAM:    Blood pressure 160/100, height 5' 9" (1.753 m), weight 108 kg (239 lb). Body mass index is 35.29 kg/m². General Appearance: NAD, cooperative, alert  Eyes: Anicteric, PERRLA, EOMI  ENT:  Normocephalic, atraumatic, normal mucosa. Neck:  Supple, symmetrical, trachea midline  Resp:  Clear to auscultation bilaterally; no rales, rhonchi or wheezing; respirations unlabored   CV:  S1 S2, Regular rate and rhythm; no murmur, rub, or gallop. GI:  Soft, non-tender, non-distended; normal bowel sounds; no masses, no organomegaly   Rectal: Deferred  Musculoskeletal: No cyanosis, clubbing or edema. Normal ROM. Skin:  No jaundice, rashes, or lesions   Heme/Lymph: No palpable cervical lymphadenopathy  Psych: Normal affect, good eye contact  Neuro:  No gross deficits, AAOx3    Lab Results:   Lab Results   Component Value Date    WBC 6.7 04/07/2023    HGB 15.2 04/07/2023    HCT 46.8 04/07/2023    MCV 83.4 04/07/2023     04/07/2023     Lab Results   Component Value Date     07/07/2017    K 3.4 (L) 07/25/2023     07/25/2023    CO2 24 07/25/2023    BUN 21 07/25/2023    CREATININE 1.31 (H) 07/25/2023    CALCIUM 9.1 07/25/2023    CORRECTEDCA 10.1 01/13/2022    AST 24 07/25/2023    ALT 25 07/25/2023    ALKPHOS 80 07/25/2023    PROT 7.1 07/07/2017    BILITOT 0.6 07/07/2017    EGFR 58 (L) 07/25/2023     Lab Results   Component Value Date    IRON 42 (L) 04/07/2023    TIBC 333 04/07/2023    FERRITIN 12 (L) 04/07/2023     No results found for: "LIPASE"    Radiology Results:   No results found. Answers for HPI/ROS submitted by the patient on 9/15/2023  anorexia: No  arthralgias: No  belching: No  constipation: No  diarrhea: No  dysuria: No  fever: No  flatus: No  frequency: No  headaches: No  hematochezia: No  hematuria: No  melena: No  myalgias: No  nausea:  No  weight loss: No  vomiting: No

## 2023-09-22 DIAGNOSIS — I63.9 CVA (CEREBRAL VASCULAR ACCIDENT) (HCC): ICD-10-CM

## 2023-09-22 DIAGNOSIS — D50.9 IRON DEFICIENCY ANEMIA, UNSPECIFIED IRON DEFICIENCY ANEMIA TYPE: Primary | ICD-10-CM

## 2023-09-22 RX ORDER — ATORVASTATIN CALCIUM 40 MG/1
TABLET, FILM COATED ORAL
Qty: 90 TABLET | Refills: 2 | Status: SHIPPED | OUTPATIENT
Start: 2023-09-22

## 2023-10-06 ENCOUNTER — TELEPHONE (OUTPATIENT)
Dept: HEMATOLOGY ONCOLOGY | Facility: CLINIC | Age: 73
End: 2023-10-06

## 2023-10-06 NOTE — TELEPHONE ENCOUNTER
Scripts Faxed to [facility] at [phone].  Received confirmation 11:30am.  Spoke to Jael Norton to let him know scripts were faxed to [facility].

## 2023-10-06 NOTE — TELEPHONE ENCOUNTER
Lab Inquiry   Who are you speaking with? Patient     If it is not the patient, are they listed on an active communication consent form? N/A   Name of ordering provider Melanie Kelleys, [address] [place] [address]   What is being requested? Lab orders need to be entered   Lab draw location [facility] fax [phone]   What is the best call back number?  [phone]                                                 Pt is currently at lab

## 2023-10-06 NOTE — PROGRESS NOTES
HEMATOLOGY / [facility] FOLLOW UP NOTE    Primary Care Provider: Tarah Alexander DO  Referring Provider:    MRN: [id number]  : 1950    Reason for Encounter: Follow-up for erythrocytosis      Interval History: Patient returns for follow-up visit. He was last seen on 4/10/2023. I discontinued his therapeutic phlebotomy at this visit and placed him on observation. He last underwent therapeutic phlebotomy on 3/1/2023  Most recent blood work shows elevated H&H. Hemoglobin 17.5, hematocrit 53.7. White count, platelet count and differential are normal.  Iron panel has also normalized, he is no longer iron deficient  Patient reports he feels well. Denies any constitutional symptoms or concerning symptoms today  Continues on Plavix and statin for stroke prevention      REVIEW OF SYSTEMS:  Please note that a 14-point review of systems was performed to include Constitutional, HEENT, Respiratory, CVS, GI, , Musculoskeletal, Integumentary, Neurologic, Rheumatologic, Endocrinologic, Psychiatric, Lymphatic, and Hematologic/Oncologic systems were reviewed and are negative unless otherwise stated in HPI. Positive and negative findings pertinent to this evaluation are incorporated into the history of present illness.       ECOG PS: 0    PROBLEM LIST:  Patient Active Problem List   Diagnosis   • Chronic kidney disease, stage 3 (HCC)   • Colon, diverticulosis   • Elevated C-reactive protein   • Elevated PSA   • Essential hypertriglyceridemia   • Gout   • Primary hypertension   • Microalbuminuria   • Obesity, morbid ([facility])   • Type 2 diabetes mellitus with stage 3a chronic kidney disease, without long-term current use of insulin ([facility])   • Ulcerative rectosigmoiditis without complication ([facility])   • Dyslipidemia   • Erythrocytosis   • History of stroke   • Transient alteration of awareness   • Witnessed episode of apnea   • NSVT (nonsustained ventricular tachycardia) ([facility])   • Bilateral carotid artery stenosis   • Elevated hemoglobin (HCC)   • Current use of long term anticoagulation   • History of colon polyps       Assessment / Plan:  1. Elevated hemoglobin (HCC)    2. Iron deficiency anemia, unspecified iron deficiency anemia type    3. Erythrocytosis    Patient is a pleasant 71-year-old gentleman with a history of erythrocytosis. Myeloproliferative work-up was negative for primary polycythemia or myeloproliferative disorder. He was undergoing therapeutic phlebotomy due to history of CVA and persistently elevated H&H. As a result of regularly scheduled phlebotomies, he did develop iron deficiency so phlebotomy was discontinued 6 months ago when he was placed on observation. Most recent blood work showed recurrent erythrocytosis. Hematocrit is 53.7. Iron panel has normalized. Fortunately he is asymptomatic, however he will need to undergo therapeutic phlebotomy. I will set him up for phlebotomy and put him back on a schedule to undergo phlebotomy every 12 weeks for hematocrit that is greater than/equal to 48. Hopefully this will help prevent recurrent iron deficiency  Patient is in agreement with this plan of care. We will see him back in 6 months with repeat blood work. He is aware to call anytime with questions or concerns    I spent 25 minutes on chart review, face to face counseling time, coordination of care and documentation. Past Medical History:   has a past medical history of Acute respiratory failure with hypoxia ([address]) (12/31/2021), CVA (cerebral vascular accident) ([address]), Diverticulitis, Gout, and Vasovagal syncope (12/28/2021). PAST SURGICAL HISTORY:   has a past surgical history that includes Cardiac Loop Recorder; Colonoscopy (09/18/2006); Colonoscopy (10/23/2017); and Colonoscopy (05/06/2020).     CURRENT MEDICATIONS  Current Outpatient Medications   Medication Sig Dispense Refill   • allopurinol (ZYLOPRIM) 100 mg tablet TAKE 2 TABLETS DAILY 180 tablet 2   • amLODIPine (NORVASC) 10 mg tablet Take 1 tablet by mouth in the morning     • ascorbic acid (VITAMIN C) 1000 MG tablet Take 1 tablet (1,000 mg total) by mouth daily  0   • atorvastatin (LIPITOR) 40 mg tablet TAKE 1 TABLET DAILY WITH   DINNER 90 tablet 2   • cholecalciferol (VITAMIN D3) 400 units tablet Take 1 tablet (400 Units total) by mouth daily  0   • cloNIDine (CATAPRES) 0.1 mg tablet Take 1 tablet by mouth in the morning     • clopidogrel (PLAVIX) 75 mg tablet TAKE 1 TABLET DAILY 90 tablet 2   • Coenzyme Q10 200 MG capsule Take by mouth daily      • fenofibrate (TRICOR) 145 mg tablet TAKE 1 TABLET DAILY 90 tablet 2   • glipiZIDE (GLUCOTROL) 5 mg tablet Take 0.5 tablets (2.5 mg total) by mouth daily with breakfast 90 tablet 1   • metoprolol tartrate (LOPRESSOR) 50 mg tablet Take 1.5 tablets by mouth 2 (two) times a day     • sulfaSALAzine (AZULFIDINE) 500 mg tablet Take 1 tablet (500 mg total) by mouth 4 (four) times a day 360 tablet 3   • glucose blood (OneTouch Verio) test strip Check blood sugars once daily. Please substitute with appropriate alternative as covered by patient's insurance. Dx: E11.65 100 each 3     No current facility-administered medications for this visit. [unfilled]    SOCIAL HISTORY:   reports that he has never smoked. He has never used smokeless tobacco. He reports that he does not drink alcohol and does not use drugs. FAMILY HISTORY:  family history includes Breast cancer in his mother; Hypertension in his other; Kidney disease in his father; Lung cancer in his father; Other in his father. ALLERGIES:  has No Known Allergies. Physical Exam:  Vital Signs:   Visit Vitals  /100 (BP Location: Left arm, Patient Position: Sitting, Cuff Size: Standard)   Pulse 64   Temp 98.3 °F (36.8 °C) (Temporal)   Resp 20   Ht 5' 9" (1.753 m)   Wt 108 kg (237 lb 12.8 oz)   SpO2 97%   BMI 35.12 kg/m²   Smoking Status Never   BSA 2.22 m²     Body mass index is 35.12 kg/m².   Body surface area is 2.22 meters squared. Physical Exam  Constitutional:       General: He is not in acute distress. Appearance: He is well-developed. HENT:      Head: Normocephalic and atraumatic. Right Ear: External ear normal.      Left Ear: External ear normal.      Nose: Nose normal.   Eyes:      General: No scleral icterus. Right eye: No discharge. Left eye: No discharge. Conjunctiva/sclera: Conjunctivae normal.   Neck:      Trachea: No tracheal deviation. Cardiovascular:      Rate and Rhythm: Normal rate and regular rhythm. Pulmonary:      Effort: Pulmonary effort is normal.      Breath sounds: Normal breath sounds. Abdominal:      General: There is no distension. Musculoskeletal:         General: Normal range of motion. Cervical back: Normal range of motion. Skin:     General: Skin is warm and dry. Neurological:      General: No focal deficit present. Mental Status: He is alert and oriented to person, place, and time. Psychiatric:         Mood and Affect: Mood normal.         Behavior: Behavior normal.         Thought Content:  Thought content normal.         Judgment: Judgment normal.         Labs:  Lab Results   Component Value Date    WBC 6.7 04/07/2023    HGB 15.2 04/07/2023    HCT 46.8 04/07/2023    MCV 83.4 04/07/2023     04/07/2023     Lab Results   Component Value Date     07/07/2017    SODIUM 141 07/25/2023    K 3.4 (L) 07/25/2023     07/25/2023    CO2 24 07/25/2023    AGAP 10 01/13/2022    BUN 21 07/25/2023    CREATININE 1.31 (H) 07/25/2023    GLUC 119 (H) 07/25/2023    CALCIUM 9.1 07/25/2023    AST 24 07/25/2023    ALT 25 07/25/2023    ALKPHOS 80 07/25/2023    PROT 7.1 07/07/2017    TP 6.4 07/25/2023    BILITOT 0.6 07/07/2017    TBILI 0.6 07/25/2023    EGFR 58 (L) 07/25/2023

## 2023-10-09 ENCOUNTER — TELEPHONE (OUTPATIENT)
Age: 73
End: 2023-10-09

## 2023-10-09 ENCOUNTER — OFFICE VISIT (OUTPATIENT)
Age: 73
End: 2023-10-09
Payer: MEDICARE

## 2023-10-09 VITALS
BODY MASS INDEX: 35.22 KG/M2 | TEMPERATURE: 98.3 F | HEART RATE: 64 BPM | OXYGEN SATURATION: 97 % | HEIGHT: 69 IN | RESPIRATION RATE: 20 BRPM | SYSTOLIC BLOOD PRESSURE: 150 MMHG | DIASTOLIC BLOOD PRESSURE: 100 MMHG | WEIGHT: 237.8 LBS

## 2023-10-09 DIAGNOSIS — D58.2 ELEVATED HEMOGLOBIN (HCC): Primary | ICD-10-CM

## 2023-10-09 DIAGNOSIS — D75.1 ERYTHROCYTOSIS: ICD-10-CM

## 2023-10-09 DIAGNOSIS — D50.9 IRON DEFICIENCY ANEMIA, UNSPECIFIED IRON DEFICIENCY ANEMIA TYPE: ICD-10-CM

## 2023-10-09 PROCEDURE — 99213 OFFICE O/P EST LOW 20 MIN: CPT | Performed by: NURSE PRACTITIONER

## 2023-10-09 NOTE — TELEPHONE ENCOUNTER
Phone call to [phone] requesting lab results for upcoming appointment. Spoke to [facility]. Provided office Fax number.

## 2023-10-25 ENCOUNTER — HOSPITAL ENCOUNTER (OUTPATIENT)
Dept: INFUSION CENTER | Facility: HOSPITAL | Age: 73
Discharge: HOME | End: 2023-10-25
Attending: INTERNAL MEDICINE
Payer: MEDICARE

## 2023-10-25 VITALS
RESPIRATION RATE: 22 BRPM | HEART RATE: 59 BPM | SYSTOLIC BLOOD PRESSURE: 154 MMHG | DIASTOLIC BLOOD PRESSURE: 87 MMHG | TEMPERATURE: 96.3 F

## 2023-10-25 DIAGNOSIS — D75.1 ERYTHROCYTOSIS: Primary | ICD-10-CM

## 2023-10-25 PROCEDURE — 99195 PHLEBOTOMY: CPT

## 2023-10-25 NOTE — PROGRESS NOTES
Pt arrived on unit for phlebotomy. Pt stuck in lateral LAC, only 100mL able to be removed over 20 minutes. Pt stuck in RFA and 150 mL removed over 18 minutes. Pt agreeable to third stick, but given approx 15 minutes of rest time and a snack. Remaining 250mL removed from anterior LAC over 10 minutes: total of 500mL removed as per order. Pt observed for 15 minutes post procedure, offfers no complaints. VSS. Next appt confirmed, AVS declined.

## 2023-10-30 LAB
LEFT EYE DIABETIC RETINOPATHY: NORMAL
LEFT EYE DIABETIC RETINOPATHY: NORMAL
RIGHT EYE DIABETIC RETINOPATHY: NORMAL
RIGHT EYE DIABETIC RETINOPATHY: NORMAL

## 2024-01-02 ENCOUNTER — PATIENT MESSAGE (OUTPATIENT)
Age: 74
End: 2024-01-02

## 2024-01-02 DIAGNOSIS — D75.1 ERYTHROCYTOSIS: ICD-10-CM

## 2024-01-02 DIAGNOSIS — D58.2 ELEVATED HEMOGLOBIN (HCC): Primary | ICD-10-CM

## 2024-01-04 NOTE — ASSESSMENT & PLAN NOTE
Lab Results   Component Value Date    HGBA1C 6 0 01/29/2020     Recent Labs     01/31/20  1605 01/31/20  2113 02/01/20  0725 02/01/20  1111   POCGLU 112 124 90 112       Blood Sugar Average: Last 72 hrs:  (P) 108 25     · Orals on hold while hospitalized  · Hypoglycemia protocol on board  · Continue glucose checks TID AC and QHS with SSI coverage PRN [FreeTextEntry1] : 76 year old female with cc of urinary frequency. pt reports bothersome frequency both day and night. Pt is unable to elucidate how often but she reports that when she drinks she has to go a lot. She only sleeps a few hours per night and once she falls asleep, seems like she can go. Drinks 1 coffee in the morning. Otherwise water. Occasional urinary urgency. She denies urinary incontinence. No issues with constipation. No sensation of prolapse. No issues with UTIs. Reports that when PCP checks urine, no infection. Suspected sx related to GUSM. Pt started on estrace.   Returns today for follow-up. She has not had a lot of benefit. She has been taking. Reports more bother with the urinary frequency. less bother in day and more at night.

## 2024-01-11 DIAGNOSIS — I10 PRIMARY HYPERTENSION: Primary | ICD-10-CM

## 2024-01-12 RX ORDER — AMLODIPINE BESYLATE 10 MG/1
10 TABLET ORAL DAILY
Qty: 90 TABLET | Refills: 2 | Status: SHIPPED | OUTPATIENT
Start: 2024-01-12

## 2024-01-14 LAB
25(OH)D3 SERPL-MCNC: 41 NG/ML (ref 30–100)
ALBUMIN SERPL-MCNC: 4.2 G/DL (ref 3.6–5.1)
ALBUMIN/GLOB SERPL: 1.9 (CALC) (ref 1–2.5)
ALP SERPL-CCNC: 81 U/L (ref 35–144)
ALT SERPL-CCNC: 26 U/L (ref 9–46)
AST SERPL-CCNC: 31 U/L (ref 10–35)
BASOPHILS # BLD AUTO: 81 CELLS/UL (ref 0–200)
BASOPHILS NFR BLD AUTO: 1.4 %
BILIRUB SERPL-MCNC: 0.7 MG/DL (ref 0.2–1.2)
BUN SERPL-MCNC: 22 MG/DL (ref 7–25)
BUN/CREAT SERPL: 17 (CALC) (ref 6–22)
CALCIUM SERPL-MCNC: 9.1 MG/DL (ref 8.6–10.3)
CHLORIDE SERPL-SCNC: 109 MMOL/L (ref 98–110)
CO2 SERPL-SCNC: 25 MMOL/L (ref 20–32)
CREAT SERPL-MCNC: 1.29 MG/DL (ref 0.7–1.28)
CRP SERPL-MCNC: 6.9 MG/L
EOSINOPHIL # BLD AUTO: 238 CELLS/UL (ref 15–500)
EOSINOPHIL NFR BLD AUTO: 4.1 %
ERYTHROCYTE [DISTWIDTH] IN BLOOD BY AUTOMATED COUNT: 12.2 % (ref 11–15)
FERRITIN SERPL-MCNC: 61 NG/ML (ref 24–380)
GFR/BSA.PRED SERPLBLD CYS-BASED-ARV: 59 ML/MIN/1.73M2
GLOBULIN SER CALC-MCNC: 2.2 G/DL (CALC) (ref 1.9–3.7)
GLUCOSE SERPL-MCNC: 140 MG/DL (ref 65–99)
HCT VFR BLD AUTO: 50.3 % (ref 38.5–50)
HGB BLD-MCNC: 16.8 G/DL (ref 13.2–17.1)
IRON SATN MFR SERPL: 25 % (CALC) (ref 20–48)
IRON SERPL-MCNC: 67 MCG/DL (ref 50–180)
LYMPHOCYTES # BLD AUTO: 1473 CELLS/UL (ref 850–3900)
LYMPHOCYTES NFR BLD AUTO: 25.4 %
MCH RBC QN AUTO: 30.8 PG (ref 27–33)
MCHC RBC AUTO-ENTMCNC: 33.4 G/DL (ref 32–36)
MCV RBC AUTO: 92.3 FL (ref 80–100)
MONOCYTES # BLD AUTO: 505 CELLS/UL (ref 200–950)
MONOCYTES NFR BLD AUTO: 8.7 %
NEUTROPHILS # BLD AUTO: 3503 CELLS/UL (ref 1500–7800)
NEUTROPHILS NFR BLD AUTO: 60.4 %
PLATELET # BLD AUTO: 228 THOUSAND/UL (ref 140–400)
PMV BLD REES-ECKER: 10.8 FL (ref 7.5–12.5)
POTASSIUM SERPL-SCNC: 3.7 MMOL/L (ref 3.5–5.3)
PROT SERPL-MCNC: 6.4 G/DL (ref 6.1–8.1)
RBC # BLD AUTO: 5.45 MILLION/UL (ref 4.2–5.8)
SODIUM SERPL-SCNC: 142 MMOL/L (ref 135–146)
TIBC SERPL-MCNC: 273 MCG/DL (CALC) (ref 250–425)
WBC # BLD AUTO: 5.8 THOUSAND/UL (ref 3.8–10.8)

## 2024-01-15 ENCOUNTER — HOSPITAL ENCOUNTER (OUTPATIENT)
Dept: INFUSION CENTER | Facility: HOSPITAL | Age: 74
Discharge: HOME | End: 2024-01-15
Attending: INTERNAL MEDICINE
Payer: MEDICARE

## 2024-01-15 VITALS
HEART RATE: 57 BPM | OXYGEN SATURATION: 97 % | TEMPERATURE: 97.9 F | RESPIRATION RATE: 20 BRPM | SYSTOLIC BLOOD PRESSURE: 171 MMHG | DIASTOLIC BLOOD PRESSURE: 93 MMHG

## 2024-01-15 DIAGNOSIS — D75.1 ERYTHROCYTOSIS: Primary | ICD-10-CM

## 2024-01-15 PROCEDURE — 99195 PHLEBOTOMY: CPT

## 2024-01-15 RX ORDER — LOSARTAN POTASSIUM 100 MG/1
100 TABLET ORAL DAILY
COMMUNITY

## 2024-01-16 DIAGNOSIS — E11.22 TYPE 2 DIABETES MELLITUS WITH STAGE 3A CHRONIC KIDNEY DISEASE, WITHOUT LONG-TERM CURRENT USE OF INSULIN (HCC): ICD-10-CM

## 2024-01-16 DIAGNOSIS — N18.31 TYPE 2 DIABETES MELLITUS WITH STAGE 3A CHRONIC KIDNEY DISEASE, WITHOUT LONG-TERM CURRENT USE OF INSULIN (HCC): ICD-10-CM

## 2024-01-16 RX ORDER — BLOOD SUGAR DIAGNOSTIC
STRIP MISCELLANEOUS
Qty: 100 EACH | Refills: 0 | Status: SHIPPED | OUTPATIENT
Start: 2024-01-16

## 2024-01-26 DIAGNOSIS — M1A.9XX0 CHRONIC GOUT WITHOUT TOPHUS, UNSPECIFIED CAUSE, UNSPECIFIED SITE: ICD-10-CM

## 2024-01-26 RX ORDER — ALLOPURINOL 100 MG/1
200 TABLET ORAL DAILY
Qty: 180 TABLET | Refills: 1 | Status: SHIPPED | OUTPATIENT
Start: 2024-01-26

## 2024-02-02 LAB
BUN SERPL-MCNC: 25 MG/DL (ref 7–25)
BUN/CREAT SERPL: 18 (CALC) (ref 6–22)
CALCIUM SERPL-MCNC: 9.3 MG/DL (ref 8.6–10.3)
CHLORIDE SERPL-SCNC: 110 MMOL/L (ref 98–110)
CO2 SERPL-SCNC: 24 MMOL/L (ref 20–32)
COMMENT: ABNORMAL
CREAT SERPL-MCNC: 1.39 MG/DL (ref 0.7–1.28)
EST. AVERAGE GLUCOSE BLD GHB EST-MCNC: 131 MG/DL
EST. AVERAGE GLUCOSE BLD GHB EST-SCNC: 7.3 MMOL/L
GFR/BSA.PRED SERPLBLD CYS-BASED-ARV: 54 ML/MIN/1.73M2
GLUCOSE SERPL-MCNC: 120 MG/DL (ref 65–99)
HBA1C MFR BLD: 6.2 % OF TOTAL HGB
POTASSIUM SERPL-SCNC: 3.9 MMOL/L (ref 3.5–5.3)
SODIUM SERPL-SCNC: 144 MMOL/L (ref 135–146)

## 2024-02-05 ENCOUNTER — HOSPITAL ENCOUNTER (OUTPATIENT)
Dept: NON INVASIVE DIAGNOSTICS | Facility: HOSPITAL | Age: 74
Discharge: HOME | End: 2024-02-05
Payer: MEDICARE

## 2024-02-05 ENCOUNTER — OFFICE VISIT (OUTPATIENT)
Dept: FAMILY MEDICINE CLINIC | Facility: HOSPITAL | Age: 74
End: 2024-02-05
Payer: MEDICARE

## 2024-02-05 VITALS
HEART RATE: 60 BPM | BODY MASS INDEX: 35.84 KG/M2 | WEIGHT: 242 LBS | OXYGEN SATURATION: 97 % | HEIGHT: 69 IN | SYSTOLIC BLOOD PRESSURE: 140 MMHG | DIASTOLIC BLOOD PRESSURE: 80 MMHG

## 2024-02-05 DIAGNOSIS — K51.30 ULCERATIVE RECTOSIGMOIDITIS WITHOUT COMPLICATION (HCC): ICD-10-CM

## 2024-02-05 DIAGNOSIS — I65.23 BILATERAL CAROTID ARTERY STENOSIS: ICD-10-CM

## 2024-02-05 DIAGNOSIS — E66.01 CLASS 2 SEVERE OBESITY DUE TO EXCESS CALORIES WITH SERIOUS COMORBIDITY AND BODY MASS INDEX (BMI) OF 35.0 TO 35.9 IN ADULT: ICD-10-CM

## 2024-02-05 DIAGNOSIS — D58.2 ELEVATED HEMOGLOBIN (HCC): ICD-10-CM

## 2024-02-05 DIAGNOSIS — E11.22 TYPE 2 DIABETES MELLITUS WITH STAGE 3A CHRONIC KIDNEY DISEASE, WITHOUT LONG-TERM CURRENT USE OF INSULIN (HCC): Primary | ICD-10-CM

## 2024-02-05 DIAGNOSIS — N18.31 TYPE 2 DIABETES MELLITUS WITH STAGE 3A CHRONIC KIDNEY DISEASE, WITHOUT LONG-TERM CURRENT USE OF INSULIN (HCC): Primary | ICD-10-CM

## 2024-02-05 DIAGNOSIS — I10 PRIMARY HYPERTENSION: ICD-10-CM

## 2024-02-05 DIAGNOSIS — N18.31 STAGE 3A CHRONIC KIDNEY DISEASE (HCC): ICD-10-CM

## 2024-02-05 PROBLEM — I47.29 NSVT (NONSUSTAINED VENTRICULAR TACHYCARDIA) (HCC): Status: RESOLVED | Noted: 2020-05-28 | Resolved: 2024-02-05

## 2024-02-05 PROBLEM — R40.4 TRANSIENT ALTERATION OF AWARENESS: Status: RESOLVED | Noted: 2020-03-04 | Resolved: 2024-02-05

## 2024-02-05 PROCEDURE — 93880 EXTRACRANIAL BILAT STUDY: CPT

## 2024-02-05 PROCEDURE — 93880 EXTRACRANIAL BILAT STUDY: CPT | Performed by: SURGERY

## 2024-02-05 PROCEDURE — 99214 OFFICE O/P EST MOD 30 MIN: CPT | Performed by: INTERNAL MEDICINE

## 2024-02-05 RX ORDER — CLONIDINE HYDROCHLORIDE 0.3 MG/1
0.3 TABLET ORAL DAILY
Start: 2024-02-05

## 2024-02-05 NOTE — ASSESSMENT & PLAN NOTE
Lab Results   Component Value Date    EGFR 54 (L) 02/02/2024    EGFR 59 (L) 01/12/2024    EGFR 58 (L) 07/25/2023    CREATININE 1.39 (H) 02/02/2024    CREATININE 1.29 (H) 01/12/2024    CREATININE 1.31 (H) 07/25/2023   Stable, has appt with Nephro coming up, importance of BP/BS control as well as keeping hydrated and avoiding NSAIDs reviewed, will follow

## 2024-02-05 NOTE — PROGRESS NOTES
Name: Shahbaz Barriga      : 1950      MRN: [id number]  Encounter Provider: Rubia Dennis DO  Encounter Date: 2024   Encounter department: [facility] CARE [address]     Assessment & Plan     1. Type 2 diabetes mellitus with stage 3a chronic kidney disease, without long-term current use of insulin (HCC)  Assessment & Plan:    Lab Results   Component Value Date    HGBA1C 6.2 (H) 2024   Dm type 2 with nephropathy/CKD stage 3 - controlled with A1c 6.2 - urged to get back on track with diet and exercise and get wgt back down, con't current Glipizide, on an ARB and statin, DM foot exam done today, had eye exam at [facility] in  - will see if Pat can obtain copy of this, will recheck BW and f/u in 6 mos - BW order given    Orders:  -     CBC and Platelet; Future; Expected date: 2024  -     Comprehensive metabolic panel; Future; Expected date: 2024  -     Lipid Panel with Direct LDL reflex; Future; Expected date: 2024  -     TSH, 3rd generation with Free T4 reflex; Future; Expected date: 2024  -     CBC and Platelet  -     Comprehensive metabolic panel  -     Lipid Panel with Direct LDL reflex  -     TSH, 3rd generation with Free T4 reflex  -     Hemoglobin A1C With EAG; Future  -     Microalbumin, Random Urine (W/Creatinine)  -     Hemoglobin A1C With EAG    2. Stage 3a chronic kidney disease (HCC)  Assessment & Plan:  Lab Results   Component Value Date    EGFR 54 (L) 2024    EGFR 59 (L) 2024    EGFR 58 (L) 2023    CREATININE 1.39 (H) 2024    CREATININE 1.29 (H) 2024    CREATININE 1.31 (H) 2023   Stable, has appt with Nephro coming up, importance of BP/BS control as well as keeping hydrated and avoiding NSAIDs reviewed, will follow    Orders:  -     CBC and Platelet; Future; Expected date: 2024  -     Comprehensive metabolic panel; Future; Expected date: 2024  -     Lipid Panel with Direct LDL reflex;  Future; Expected date: 02/05/2024  -     TSH, 3rd generation with Free T4 reflex; Future; Expected date: 02/05/2024  -     CBC and Platelet  -     Comprehensive metabolic panel  -     Lipid Panel with Direct LDL reflex  -     TSH, 3rd generation with Free T4 reflex    3. Ulcerative rectosigmoiditis without complication (HCC)  Assessment & Plan:  UTD on colonoscopy, no current GI symptoms and doing well on Sulfasalazine, con't meds and f/u as per GI    Orders:  -     CBC and Platelet; Future; Expected date: 02/05/2024  -     Comprehensive metabolic panel; Future; Expected date: 02/05/2024  -     Lipid Panel with Direct LDL reflex; Future; Expected date: 02/05/2024  -     TSH, 3rd generation with Free T4 reflex; Future; Expected date: 02/05/2024  -     CBC and Platelet  -     Comprehensive metabolic panel  -     Lipid Panel with Direct LDL reflex  -     TSH, 3rd generation with Free T4 reflex    4. Elevated hemoglobin (HCC)  Assessment & Plan:  Following with Heme, restarted on phlebotomy q 3 mos, will follow    Orders:  -     CBC and Platelet; Future; Expected date: 02/05/2024  -     Comprehensive metabolic panel; Future; Expected date: 02/05/2024  -     Lipid Panel with Direct LDL reflex; Future; Expected date: 02/05/2024  -     TSH, 3rd generation with Free T4 reflex; Future; Expected date: 02/05/2024  -     CBC and Platelet  -     Comprehensive metabolic panel  -     Lipid Panel with Direct LDL reflex  -     TSH, 3rd generation with Free T4 reflex    5. Primary hypertension  Assessment & Plan:  BP still up and down, pt has been having BP meds adjusted by Cardio and was recently referred to Nephro d/t elevated BP - has appt with [facility] coming up, will follow, urged diet/exercise/wgt loss     Orders:  -     CBC and Platelet; Future; Expected date: 02/05/2024  -     Comprehensive metabolic panel; Future; Expected date: 02/05/2024  -     Lipid Panel with Direct LDL reflex; Future; Expected date: 02/05/2024  -      TSH, 3rd generation with Free T4 reflex; Future; Expected date: 02/05/2024  -     CBC and Platelet  -     Comprehensive metabolic panel  -     Lipid Panel with Direct LDL reflex  -     TSH, 3rd generation with Free T4 reflex  -     cloNIDine (CATAPRES) 0.3 mg tablet; Take 1 tablet (0.3 mg total) by mouth in the morning    6. Bilateral carotid artery stenosis  Assessment & Plan:  Stable at <50% B/L, on statin and Plavix, to ED with stroke/TIA symptoms, will follow    Orders:  -     CBC and Platelet; Future; Expected date: 02/05/2024  -     Comprehensive metabolic panel; Future; Expected date: 02/05/2024  -     Lipid Panel with Direct LDL reflex; Future; Expected date: 02/05/2024  -     TSH, 3rd generation with Free T4 reflex; Future; Expected date: 02/05/2024  -     CBC and Platelet  -     Comprehensive metabolic panel  -     Lipid Panel with Direct LDL reflex  -     TSH, 3rd generation with Free T4 reflex    7. Class 2 severe obesity due to excess calories with serious comorbidity and body mass index (BMI) of 35.0 to 35.9 in adult   Comments:  diet/exercise/wgt loss encouraged        Depression Screening and Follow-up Plan: Patient was screened for depression during today's encounter. They screened negative with a PHQ-2 score of 0.    Colonoscopy 6/23 - 2 yrs    BW 2/24 (A1C 6.2)  DM foot exam 2/24  DM eye exam 2/23 - will obtain copy of recent eye exam from [facility] [facility]        Subjective      HPI Pt here for follow up appt and BW results    BW results were d/w pt in detail: FBS/A1C  120/6.2, BUN/Cr 25/1.39 (GFR 54), rest of BMP was wnl     Def of controlled vs uncontrolled DM was reviewed.  Diet was reviewed - wgt up 11 lbs from July 23.  He is eating more junk food with his wife and all her ailments/surgeries.   He is taking his DM meds as directed.  He had his DM foot exam and eye exam in Feb 2023.  He notes no numbness/tingling/pain/sores/ulcer with his feet.  He had an eye exam in Jan 24.  CKD stage 3 reviewed  with pt today.  Importance of BP/BS control as well as avoiding dehydration and NSAIDs reviewed.  He admits he needs to drink more water. He is on statin and an ARB.     Pt saw GI (Dr. Harrell) in Sept for f/u UC - OV note reviewed.  He had no changes made to his Sulfasalazine.  He was given order for BW and told to f/u in 1 yr.  He is UTD on colonoscopy and ws told his next colonoscopy is due 6/2025.  He notes no abd pain/N/V/D/C/blood in the stools.     Pt saw Heme/Onc (Yaquelin Pfeiffer)  in Oct for f/u erythrocytosis - OV note reviewed.  His myeloproliferative w/u was neg. He was told to restart phlebotomy every 12 wks as his H/H was creeping up again.  He was told to f/u in office in 6 mos.  CBC 1/12/24 with H/H 16.8/50.3.      BP a bit above goal today and meds were reviewed and no changes have occurred.  He denies missing doses of meds or SE with the meds.  He does check his BP outside the office and notes readings at home are lower - 120's/70 to 150's/80's.  He notes no frequent HA's/dizziness/double vision/CP.     CUS results reviewed with pt in detail today: <50% stenosis B/L ICA.  NO changes compared to 2021 CUS.  He notes no stroke/TIA symptoms. He is on a statin and Plavix.           Review of Systems   Constitutional:  Negative for chills, fever and unexpected weight change.   HENT:  Negative for congestion and trouble swallowing.    Eyes:  Negative for pain and visual disturbance.   Respiratory:  Negative for cough and shortness of breath.    Cardiovascular:  Negative for chest pain, palpitations and leg swelling.   Gastrointestinal:  Negative for abdominal pain, blood in stool, constipation, diarrhea, nausea and vomiting.   Genitourinary:  Negative for difficulty urinating and dysuria.   Musculoskeletal:  Negative for arthralgias and myalgias.   Skin:  Negative for rash and wound.   Neurological:  Negative for dizziness and headaches.   Hematological:  Does not bruise/bleed easily.  "  Psychiatric/Behavioral:  Negative for confusion and dysphoric mood.        Current Outpatient Medications on File Prior to Visit   Medication Sig    allopurinol (ZYLOPRIM) 100 mg tablet Take 2 tablets (200 mg total) by mouth daily    amLODIPine (NORVASC) 10 mg tablet Take 1 tablet (10 mg total) by mouth in the morning    ascorbic acid (VITAMIN C) 1000 MG tablet Take 1 tablet (1,000 mg total) by mouth daily    atorvastatin (LIPITOR) 40 mg tablet TAKE 1 TABLET DAILY WITH   DINNER    cholecalciferol (VITAMIN D3) 400 units tablet Take 1 tablet (400 Units total) by mouth daily    clopidogrel (PLAVIX) 75 mg tablet TAKE 1 TABLET DAILY    Coenzyme Q10 200 MG capsule Take by mouth daily     fenofibrate (TRICOR) 145 mg tablet TAKE 1 TABLET DAILY    glipiZIDE (GLUCOTROL) 5 mg tablet Take 0.5 tablets (2.5 mg total) by mouth daily with breakfast    glucose blood (OneTouch Verio) test strip Check blood sugars once daily. Please substitute with appropriate alternative as covered by patient's insurance. Dx: E11.65    losartan (COZAAR) 100 MG tablet Take 100 mg by mouth daily    metoprolol tartrate (LOPRESSOR) 50 mg tablet Take 1.5 tablets by mouth 2 (two) times a day    sulfaSALAzine (AZULFIDINE) 500 mg tablet Take 1 tablet (500 mg total) by mouth 4 (four) times a day    [DISCONTINUED] cloNIDine (CATAPRES) 0.1 mg tablet Take 1 tablet by mouth in the morning       Objective     /80   Pulse 60   Ht 5' 9\" (1.753 m)   Wt 110 kg (242 lb)   SpO2 97%   BMI 35.74 kg/m²     Physical Exam  Vitals and nursing note reviewed.   Constitutional:       General: He is not in acute distress.     Appearance: He is well-developed. He is not ill-appearing.   HENT:      Head: Normocephalic and atraumatic.   Eyes:      General:         Right eye: No discharge.         Left eye: No discharge.      Conjunctiva/sclera: Conjunctivae normal.   Neck:      Trachea: No tracheal deviation.   Cardiovascular:      Rate and Rhythm: Normal rate and " regular rhythm.      Pulses: no weak pulses          Dorsalis pedis pulses are 2+ on the right side and 2+ on the left side.      Heart sounds: No murmur heard.  Pulmonary:      Effort: Pulmonary effort is normal. No respiratory distress.      Breath sounds: Normal breath sounds. No wheezing, rhonchi or rales.   Musculoskeletal:         General: No deformity or signs of injury.      Cervical back: Neck supple.   Feet:      Right foot:      Skin integrity: Dry skin present. No ulcer, skin breakdown, erythema, warmth or callus.      Left foot:      Skin integrity: Dry skin present. No ulcer, skin breakdown, erythema, warmth or callus.   Lymphadenopathy:      Cervical: No cervical adenopathy.   Skin:     General: Skin is warm and dry.      Coloration: Skin is not pale.      Findings: Bruising present. No rash.   Neurological:      General: No focal deficit present.      Mental Status: He is alert. Mental status is at baseline.      Motor: No abnormal muscle tone.      Gait: Gait normal.   Psychiatric:         Mood and Affect: Mood normal.         Behavior: Behavior normal.         Thought Content: Thought content normal.         Judgment: Judgment normal.     Diabetic Foot Exam    Patient's shoes and socks removed.    Right Foot/Ankle   Right Foot Inspection  Skin Exam: skin normal, skin intact and dry skin. No warmth, no callus, no erythema, no maceration, no abnormal color, no pre-ulcer, no ulcer and no callus.     Toe Exam: ROM and strength within normal limits.     Sensory   Vibration: intact  Monofilament testing: intact    Vascular  The right DP pulse is 2+.     Left Foot/Ankle  Left Foot Inspection  Skin Exam: skin normal, skin intact and dry skin. No warmth, no erythema, no maceration, normal color, no pre-ulcer, no ulcer and no callus.     Toe Exam: ROM and strength within normal limits.     Sensory   Vibration: intact  Monofilament testing: intact    Vascular  The left DP pulse is 2+.     Assign Risk  Category  No deformity present  No loss of protective sensation  No weak pulses  Risk: 0    Rubia Dennis DO

## 2024-02-05 NOTE — ASSESSMENT & PLAN NOTE
UTD on colonoscopy, no current GI symptoms and doing well on Sulfasalazine, con't meds and f/u as per GI

## 2024-02-05 NOTE — ASSESSMENT & PLAN NOTE
Lab Results   Component Value Date    HGBA1C 6.2 (H) 02/02/2024   Dm type 2 with nephropathy/CKD stage 3 - controlled with A1c 6.2 - urged to get back on track with diet and exercise and get wgt back down, con't current Glipizide, on an ARB and statin, DM foot exam done today, had eye exam at [email or web address] in Jan 24 - will see if Pat can obtain copy of this, will recheck BW and f/u in 6 mos - BW order given

## 2024-02-05 NOTE — PATIENT INSTRUCTIONS
Type 2 Diabetes Management for Adults   AMBULATORY CARE:   Type 2 diabetes  is a disease that affects how your body uses glucose (sugar). Either your body cannot make enough insulin, or it cannot use the insulin correctly. It is important to keep diabetes controlled to prevent damage to your heart, blood vessels, and other organs. Management will help you feel well and enjoy your daily activities. Your diabetes care team providers can help you make a plan to fit diabetes care into your schedule. Your plan can change over time to fit your needs and your family's needs.       Have someone call your local emergency number (911 in the [place]) if:   You cannot be woken.    You have signs of diabetic ketoacidosis:     confusion, fatigue    vomiting    rapid heartbeat    fruity smelling breath    extreme thirst    dry mouth and skin    You have any of the following signs of a heart attack:      Squeezing, pressure, or pain in your chest    You may  also have any of the following:     Discomfort or pain in your back, neck, jaw, stomach, or arm    Shortness of breath    Nausea or vomiting    Lightheadedness or a sudden cold sweat    You have any of the following signs of a stroke:      Numbness or drooping on one side of your face     Weakness in an arm or leg    Confusion or difficulty speaking    Dizziness, a severe headache, or vision loss    Call your doctor or diabetes care team provider if:   You have a sore or wound that will not heal.    You have a change in the amount you urinate.    Your blood sugar levels are higher than your target goals.    You often have lower blood sugar levels than your target goals.    Your skin is red, dry, warm, or swollen.    You have trouble coping with diabetes, or you feel anxious or depressed.    You have trouble following any part of your care plan, such as your meal plan.    You have questions or concerns about your condition or care.    What you need to know about high blood sugar  levels:  High blood sugar levels may not cause any symptoms. You may feel more thirsty or urinate more often than usual. Over time, high blood sugar levels can damage your nerves, blood vessels, tissues, and organs. The following can increase your blood sugar levels:  Large meals or large amounts of carbohydrates at one time    Less physical activity    Stress    Illness    A lower dose of diabetes medicine or insulin, or a late dose    What you need to know about low blood sugar levels:  Symptoms include feeling shaky, dizzy, irritable, or confused. You can prevent symptoms by keeping your blood sugar levels from going too low.  Treat a low blood sugar level right away:      Drink 4 ounces of juice or have 1 tube of glucose gel.    Check your blood sugar level again 10 to 15 minutes later.    When the level goes back to normal, eat a meal or snack to prevent another decrease.       Keep glucose gel, raisins, or hard candy with you at all times to treat a low blood sugar level.     Your blood sugar level can get too low if you take diabetes medicine or insulin and do not eat enough food.     If you use insulin, check your blood sugar level before you exercise.      If your blood sugar level is below 100 mg/dL, eat 4 crackers or 2 ounces of raisins, or drink 4 ounces of juice.    Check your level every 30 minutes if you exercise longer than 1 hour.    You may need a snack during or after exercise.    What you can do to manage your blood sugar levels:   Check your blood sugar levels as directed and as needed.  Several items are available to use to check your levels. You may need to check by testing a drop of blood in a glucose monitor. You may instead be given a continuous glucose monitoring (CGM) device. The device is worn at all times. The CGM checks your blood sugar level every 5 minutes. It sends results to an electronic device such as a smart phone. A CGM can be used with or without an insulin pump. You and your  diabetes care team providers will decide on the best method for you. The goal for blood sugar levels before meals  is between 80 and 130 mg/dL and 2 hours after eating  is lower than 180 mg/dL.            Make healthy food choices.  Work with a dietitian to create a meal plan that works for you and your schedule. A dietitian can help you learn how to eat the right amount of carbohydrates (sugar and starchy foods) during your meals and snacks. Examples of carbohydrates are breads, cereals, rice, pasta, fruit, low-fat dairy, and sweets. Carbohydrates can raise your blood sugar level if you eat too many at one time.         Eat high-fiber foods as directed.  Fiber helps improve blood sugar levels. Fiber also lowers your risk for heart disease and other problems diabetes can cause. Examples of high-fiber foods include vegetables, whole-grain bread, and beans such as donovan beans. Your dietitian can tell you how much fiber to have each day.         Get regular physical activity.  Physical activity can help you get to your target blood sugar level goal and manage your weight. Get at least 150 minutes of moderate to vigorous aerobic physical activity each week. Resistance training, such as lifting weights, should be done 3 times each week. Do not miss more than 2 days of physical activity in a row. Do not sit longer than 30 minutes at a time. Your healthcare provider can help you create an activity plan. The plan can include the best activities for you and can help you build your strength and endurance.            Maintain a healthy weight.  Ask your team what a healthy weight is for you. A healthy weight can help you control diabetes and prevent heart disease. Ask your team to help you create a weight-loss plan, if needed. Even a loss of 3% to 7% of your excess body weight can help make a difference in managing diabetes. Your team will help you set a weight-loss goal, such as 10 to 15 pounds, or 5% of your extra weight.  Together you and your team can set manageable weight-loss goals.    Take your diabetes medicine or insulin as directed.  You may need diabetes medicine, insulin, or both to help control your blood sugar levels. Your healthcare provider will teach you how and when to take your diabetes medicine or insulin. You will also be taught about side effects oral diabetes medicine can cause. Insulin may be injected or given through a pump or pen. You and your providers will decide on the best method for you:    An insulin pump  is an implanted device that gives your insulin 24 hours a day. An insulin pump prevents the need for multiple insulin injections in a day.         An insulin pen  is a device prefilled with the right amount of insulin.         You and your family members will be taught how to draw up and give insulin  if this is the best method for you. Your providers will also teach you how to dispose of needles and syringes.    You will learn how much insulin you need  and when to give it. You will be taught when not to give insulin. You will also be taught what to do if your blood sugar level drops too low. This may happen if you take insulin and do not eat the right amount of carbohydrates.    More ways to manage type 2 diabetes:   Wear medical alert identification.  Wear medical alert jewelry or carry a card that says you have diabetes. Ask your provider where to get these items.         Do not smoke.  Nicotine and other chemicals in cigarettes and cigars can cause lung and blood vessel damage. It also makes it more difficult to manage your diabetes. Ask your provider for information if you currently smoke and need help to quit. Do not use e-cigarettes or smokeless tobacco in place of cigarettes or to help you quit. They still contain nicotine.    Check your feet each day for cuts, scratches, calluses, or other wounds.  Look for redness and swelling, and feel for warmth. Wear shoes that fit well. Check your shoes  for rocks or other objects that can hurt your feet. Do not walk barefoot or wear shoes without socks. Wear cotton socks to help keep your feet dry.         Ask about vaccines you may need.  You have a higher risk for serious illness if you get the flu, pneumonia, COVID-19, or hepatitis. Ask your provider if you should get vaccines to prevent these or other diseases, and when to get the vaccines.    Talk to your provider if you become stressed about diabetes care.  Sometimes being able to fit diabetes care into your life can cause increased stress. The stress can cause you not to take care of yourself properly. Your provider can help by offering tips about self-care. A mental health provider can listen and offer help with self-care issues. Other types of counseling can help you make nutrition or physical activity changes.    Have your A1c checked as directed.  Your provider may check your A1c every 3 months, or 2 times each year if your diabetes is controlled. An A1c test shows the average amount of sugar in your blood over the past 2 to 3 months. Your provider will tell you what your A1c level should be.    Have screening tests as directed.  Your provider may recommend screening for complications of diabetes and other conditions that may develop. Some screenings may begin right away and some may happen within the first 5 years of diagnosis:    Examples of diabetes complications  include kidney problems, high cholesterol, high blood pressure, blood vessel problems, eye problems, and sleep apnea.    You may be screened for a low vitamin B level  if you take oral diabetes medicine for a long time.    You may be screened for polycystic ovarian syndrome (PCOS)  if you are of childbearing age.    Follow up with your doctor or diabetes care team providers as directed:  You may need to have blood tests done before your follow-up visit. The test results will show if changes need to be made in your treatment or self-care.  Talk to your provider if you cannot afford your medicine. Write down your questions so you remember to ask them during your visits.  © Copyright [employer] 2023 Information is for End User's use only and may not be sold, redistributed or otherwise used for commercial purposes.  The above information is an  only. It is not intended as medical advice for individual conditions or treatments. Talk to your doctor, nurse or pharmacist before following any medical regimen to see if it is safe and effective for you.    Foot Care for People with Diabetes   AMBULATORY CARE:   What you need to know about foot care:  Long-term high blood sugar levels can damage the blood vessels and nerves in your legs and feet. This damage makes it hard to feel pressure, pain, temperature, and touch. You may not be able to feel a cut or sore, or shoes that are too tight. Foot care is needed to prevent serious problems, such as an infection or amputation. Diabetes may cause your toes to become crooked or curved under. These changes may affect the way you walk and can lead to increased pressure on your foot. The pressure can decrease blood flow to your feet. Lack of blood flow increases your risk for a foot ulcer.  Call your care team provider if:   Your feet become numb, weak, or hard to move.    You have pus draining from a sore on your foot.    You have a wound on your foot that gets bigger, deeper, or does not heal.    You see blisters, cuts, scratches, calluses, or sores on your foot.    You have a fever, and your feet become red, warm, and swollen.    Your toenails become thick, curled, or yellow.    You find it hard to check your feet because your vision is poor.    You have questions or concerns about your condition or care.    How to care for your feet:   Check your feet each day.  Look at your whole foot, including the bottom, and between and under your toes. Check for wounds, corns, and calluses. Use a mirror to see the  bottom of your feet. The skin on your feet may be shiny, tight, or darker than normal. Your feet may also be cold and pale. Feel your feet by running your hands along the tops, bottoms, sides, and between your toes. Redness, swelling, and warmth are signs of blood flow problems that can lead to a foot ulcer. Do not try to remove corns or calluses yourself. Do not ignore small problems, such as dry skin or small wounds. These can become life-threatening over time without proper care.         Wash your feet each day with soap and warm water.  Do not use hot water, because this can injure your foot. Dry your feet gently with a towel after you wash them. Dry between and under your toes.    Apply lotion or a moisturizer on your dry feet.  Ask your care team provider what lotions are best to use. Do not put lotion or moisturizer between your toes. Moisture between your toes could lead to skin breakdown.    Cut your toenails correctly.  File or cut your toenails straight across. Use a soft brush to clean around your toenails. If your toenails are very thick, you may need to have a care team provider or specialist cut them.     Protect your feet.  Do not walk barefoot or wear your shoes without socks. Check your shoes for rocks or other objects that can hurt your feet. Wear cotton socks to help keep your feet dry. Wear socks without toe seams, or wear them with the seams inside out. Change your socks each day. Do not wear socks that are dirty or damp.    Wear shoes that fit well.  Wear shoes that do not rub against any area of your feet. Your shoes should be ½ to ¾ inch (1 to 2 centimeters) longer than your feet. Your shoes should also have extra space around the widest part of your feet. Walking or athletic shoes with laces or straps that adjust are best. Ask your care team provider for help to choose shoes that fit you best. Ask your provider if you need to wear an insert, orthotic, or bandage on your feet.         Go to  your follow-up visits.  Your care team provider will do a foot exam at least 1 time each year. You may need a foot exam more often if you have nerve damage, foot deformities, or ulcers. Your provider will check for nerve damage and how well you can feel your feet. Your provider will check your shoes to see if they fit well.    Do not smoke.  Smoking can damage your blood vessels and put you at increased risk for foot ulcers. Ask your care team provider for information if you currently smoke and need help to quit. E-cigarettes or smokeless tobacco still contain nicotine. Talk to your care team provider before you use these products.    Follow up with your diabetes care team provider or foot specialist as directed:  You will need to have your feet checked at least 1 time each year. You may need a foot exam more often if you have nerve damage, foot deformities, or ulcers. Write down your questions so you remember to ask them during your visits.  © Copyright [employer] 2023 Information is for End User's use only and may not be sold, redistributed or otherwise used for commercial purposes.  The above information is an  only. It is not intended as medical advice for individual conditions or treatments. Talk to your doctor, nurse or pharmacist before following any medical regimen to see if it is safe and effective for you.    Type 2 Diabetes Management for Adults   AMBULATORY CARE:   Type 2 diabetes  is a disease that affects how your body uses glucose (sugar). Either your body cannot make enough insulin, or it cannot use the insulin correctly. It is important to keep diabetes controlled to prevent damage to your heart, blood vessels, and other organs. Management will help you feel well and enjoy your daily activities. Your diabetes care team providers can help you make a plan to fit diabetes care into your schedule. Your plan can change over time to fit your needs and your family's needs.       Have someone  call your local emergency number (911 in the [place]) if:   You cannot be woken.    You have signs of diabetic ketoacidosis:     confusion, fatigue    vomiting    rapid heartbeat    fruity smelling breath    extreme thirst    dry mouth and skin    You have any of the following signs of a heart attack:      Squeezing, pressure, or pain in your chest    You may  also have any of the following:     Discomfort or pain in your back, neck, jaw, stomach, or arm    Shortness of breath    Nausea or vomiting    Lightheadedness or a sudden cold sweat    You have any of the following signs of a stroke:      Numbness or drooping on one side of your face     Weakness in an arm or leg    Confusion or difficulty speaking    Dizziness, a severe headache, or vision loss    Call your doctor or diabetes care team provider if:   You have a sore or wound that will not heal.    You have a change in the amount you urinate.    Your blood sugar levels are higher than your target goals.    You often have lower blood sugar levels than your target goals.    Your skin is red, dry, warm, or swollen.    You have trouble coping with diabetes, or you feel anxious or depressed.    You have trouble following any part of your care plan, such as your meal plan.    You have questions or concerns about your condition or care.    What you need to know about high blood sugar levels:  High blood sugar levels may not cause any symptoms. You may feel more thirsty or urinate more often than usual. Over time, high blood sugar levels can damage your nerves, blood vessels, tissues, and organs. The following can increase your blood sugar levels:  Large meals or large amounts of carbohydrates at one time    Less physical activity    Stress    Illness    A lower dose of diabetes medicine or insulin, or a late dose    What you need to know about low blood sugar levels:  Symptoms include feeling shaky, dizzy, irritable, or confused. You can prevent symptoms by keeping your  blood sugar levels from going too low.  Treat a low blood sugar level right away:      Drink 4 ounces of juice or have 1 tube of glucose gel.    Check your blood sugar level again 10 to 15 minutes later.    When the level goes back to normal, eat a meal or snack to prevent another decrease.       Keep glucose gel, raisins, or hard candy with you at all times to treat a low blood sugar level.     Your blood sugar level can get too low if you take diabetes medicine or insulin and do not eat enough food.     If you use insulin, check your blood sugar level before you exercise.      If your blood sugar level is below 100 mg/dL, eat 4 crackers or 2 ounces of raisins, or drink 4 ounces of juice.    Check your level every 30 minutes if you exercise longer than 1 hour.    You may need a snack during or after exercise.    What you can do to manage your blood sugar levels:   Check your blood sugar levels as directed and as needed.  Several items are available to use to check your levels. You may need to check by testing a drop of blood in a glucose monitor. You may instead be given a continuous glucose monitoring (CGM) device. The device is worn at all times. The CGM checks your blood sugar level every 5 minutes. It sends results to an electronic device such as a smart phone. A CGM can be used with or without an insulin pump. You and your diabetes care team providers will decide on the best method for you. The goal for blood sugar levels before meals  is between 80 and 130 mg/dL and 2 hours after eating  is lower than 180 mg/dL.            Make healthy food choices.  Work with a dietitian to create a meal plan that works for you and your schedule. A dietitian can help you learn how to eat the right amount of carbohydrates (sugar and starchy foods) during your meals and snacks. Examples of carbohydrates are breads, cereals, rice, pasta, fruit, low-fat dairy, and sweets. Carbohydrates can raise your blood sugar level if you eat  too many at one time.         Eat high-fiber foods as directed.  Fiber helps improve blood sugar levels. Fiber also lowers your risk for heart disease and other problems diabetes can cause. Examples of high-fiber foods include vegetables, whole-grain bread, and beans such as donovan beans. Your dietitian can tell you how much fiber to have each day.         Get regular physical activity.  Physical activity can help you get to your target blood sugar level goal and manage your weight. Get at least 150 minutes of moderate to vigorous aerobic physical activity each week. Resistance training, such as lifting weights, should be done 3 times each week. Do not miss more than 2 days of physical activity in a row. Do not sit longer than 30 minutes at a time. Your healthcare provider can help you create an activity plan. The plan can include the best activities for you and can help you build your strength and endurance.            Maintain a healthy weight.  Ask your team what a healthy weight is for you. A healthy weight can help you control diabetes and prevent heart disease. Ask your team to help you create a weight-loss plan, if needed. Even a loss of 3% to 7% of your excess body weight can help make a difference in managing diabetes. Your team will help you set a weight-loss goal, such as 10 to 15 pounds, or 5% of your extra weight. Together you and your team can set manageable weight-loss goals.    Take your diabetes medicine or insulin as directed.  You may need diabetes medicine, insulin, or both to help control your blood sugar levels. Your healthcare provider will teach you how and when to take your diabetes medicine or insulin. You will also be taught about side effects oral diabetes medicine can cause. Insulin may be injected or given through a pump or pen. You and your providers will decide on the best method for you:    An insulin pump  is an implanted device that gives your insulin 24 hours a day. An insulin pump  prevents the need for multiple insulin injections in a day.         An insulin pen  is a device prefilled with the right amount of insulin.         You and your family members will be taught how to draw up and give insulin  if this is the best method for you. Your providers will also teach you how to dispose of needles and syringes.    You will learn how much insulin you need  and when to give it. You will be taught when not to give insulin. You will also be taught what to do if your blood sugar level drops too low. This may happen if you take insulin and do not eat the right amount of carbohydrates.    More ways to manage type 2 diabetes:   Wear medical alert identification.  Wear medical alert jewelry or carry a card that says you have diabetes. Ask your provider where to get these items.         Do not smoke.  Nicotine and other chemicals in cigarettes and cigars can cause lung and blood vessel damage. It also makes it more difficult to manage your diabetes. Ask your provider for information if you currently smoke and need help to quit. Do not use e-cigarettes or smokeless tobacco in place of cigarettes or to help you quit. They still contain nicotine.    Check your feet each day for cuts, scratches, calluses, or other wounds.  Look for redness and swelling, and feel for warmth. Wear shoes that fit well. Check your shoes for rocks or other objects that can hurt your feet. Do not walk barefoot or wear shoes without socks. Wear cotton socks to help keep your feet dry.         Ask about vaccines you may need.  You have a higher risk for serious illness if you get the flu, pneumonia, COVID-19, or hepatitis. Ask your provider if you should get vaccines to prevent these or other diseases, and when to get the vaccines.    Talk to your provider if you become stressed about diabetes care.  Sometimes being able to fit diabetes care into your life can cause increased stress. The stress can cause you not to take care of  yourself properly. Your provider can help by offering tips about self-care. A mental health provider can listen and offer help with self-care issues. Other types of counseling can help you make nutrition or physical activity changes.    Have your A1c checked as directed.  Your provider may check your A1c every 3 months, or 2 times each year if your diabetes is controlled. An A1c test shows the average amount of sugar in your blood over the past 2 to 3 months. Your provider will tell you what your A1c level should be.    Have screening tests as directed.  Your provider may recommend screening for complications of diabetes and other conditions that may develop. Some screenings may begin right away and some may happen within the first 5 years of diagnosis:    Examples of diabetes complications  include kidney problems, high cholesterol, high blood pressure, blood vessel problems, eye problems, and sleep apnea.    You may be screened for a low vitamin B level  if you take oral diabetes medicine for a long time.    You may be screened for polycystic ovarian syndrome (PCOS)  if you are of childbearing age.    Follow up with your doctor or diabetes care team providers as directed:  You may need to have blood tests done before your follow-up visit. The test results will show if changes need to be made in your treatment or self-care. Talk to your provider if you cannot afford your medicine. Write down your questions so you remember to ask them during your visits.  © Copyright [employer] 2023 Information is for End User's use only and may not be sold, redistributed or otherwise used for commercial purposes.  The above information is an  only. It is not intended as medical advice for individual conditions or treatments. Talk to your doctor, nurse or pharmacist before following any medical regimen to see if it is safe and effective for you.    Foot Care for People with Diabetes   AMBULATORY CARE:   What you need to  know about foot care:  Long-term high blood sugar levels can damage the blood vessels and nerves in your legs and feet. This damage makes it hard to feel pressure, pain, temperature, and touch. You may not be able to feel a cut or sore, or shoes that are too tight. Foot care is needed to prevent serious problems, such as an infection or amputation. Diabetes may cause your toes to become crooked or curved under. These changes may affect the way you walk and can lead to increased pressure on your foot. The pressure can decrease blood flow to your feet. Lack of blood flow increases your risk for a foot ulcer.  Call your care team provider if:   Your feet become numb, weak, or hard to move.    You have pus draining from a sore on your foot.    You have a wound on your foot that gets bigger, deeper, or does not heal.    You see blisters, cuts, scratches, calluses, or sores on your foot.    You have a fever, and your feet become red, warm, and swollen.    Your toenails become thick, curled, or yellow.    You find it hard to check your feet because your vision is poor.    You have questions or concerns about your condition or care.    How to care for your feet:   Check your feet each day.  Look at your whole foot, including the bottom, and between and under your toes. Check for wounds, corns, and calluses. Use a mirror to see the bottom of your feet. The skin on your feet may be shiny, tight, or darker than normal. Your feet may also be cold and pale. Feel your feet by running your hands along the tops, bottoms, sides, and between your toes. Redness, swelling, and warmth are signs of blood flow problems that can lead to a foot ulcer. Do not try to remove corns or calluses yourself. Do not ignore small problems, such as dry skin or small wounds. These can become life-threatening over time without proper care.         Wash your feet each day with soap and warm water.  Do not use hot water, because this can injure your foot.  Dry your feet gently with a towel after you wash them. Dry between and under your toes.    Apply lotion or a moisturizer on your dry feet.  Ask your care team provider what lotions are best to use. Do not put lotion or moisturizer between your toes. Moisture between your toes could lead to skin breakdown.    Cut your toenails correctly.  File or cut your toenails straight across. Use a soft brush to clean around your toenails. If your toenails are very thick, you may need to have a care team provider or specialist cut them.     Protect your feet.  Do not walk barefoot or wear your shoes without socks. Check your shoes for rocks or other objects that can hurt your feet. Wear cotton socks to help keep your feet dry. Wear socks without toe seams, or wear them with the seams inside out. Change your socks each day. Do not wear socks that are dirty or damp.    Wear shoes that fit well.  Wear shoes that do not rub against any area of your feet. Your shoes should be ½ to ¾ inch (1 to 2 centimeters) longer than your feet. Your shoes should also have extra space around the widest part of your feet. Walking or athletic shoes with laces or straps that adjust are best. Ask your care team provider for help to choose shoes that fit you best. Ask your provider if you need to wear an insert, orthotic, or bandage on your feet.         Go to your follow-up visits.  Your care team provider will do a foot exam at least 1 time each year. You may need a foot exam more often if you have nerve damage, foot deformities, or ulcers. Your provider will check for nerve damage and how well you can feel your feet. Your provider will check your shoes to see if they fit well.    Do not smoke.  Smoking can damage your blood vessels and put you at increased risk for foot ulcers. Ask your care team provider for information if you currently smoke and need help to quit. E-cigarettes or smokeless tobacco still contain nicotine. Talk to your care team  provider before you use these products.    Follow up with your diabetes care team provider or foot specialist as directed:  You will need to have your feet checked at least 1 time each year. You may need a foot exam more often if you have nerve damage, foot deformities, or ulcers. Write down your questions so you remember to ask them during your visits.  © Copyright [employer] 2023 Information is for End User's use only and may not be sold, redistributed or otherwise used for commercial purposes.  The above information is an  only. It is not intended as medical advice for individual conditions or treatments. Talk to your doctor, nurse or pharmacist before following any medical regimen to see if it is safe and effective for you.

## 2024-02-05 NOTE — ASSESSMENT & PLAN NOTE
BP still up and down, pt has been having BP meds adjusted by Cardio and was recently referred to Nephro d/t elevated BP - has appt with [facility] coming up, will follow, urged diet/exercise/wgt loss

## 2024-02-06 ENCOUNTER — TELEPHONE (OUTPATIENT)
Dept: ADMINISTRATIVE | Facility: OTHER | Age: 74
End: 2024-02-06

## 2024-02-06 NOTE — LETTER
Diabetic Eye Exam Form    Date Requested: 02/15/24  Patient: Shahbaz Barriga  Patient : 1950   Referring Provider: Rubia Dennis DO      DIABETIC Eye Exam Date _______________________________      Type of Exam MUST be documented for Diabetic Eye Exams. Please CHECK ONE.     Retinal Exam       Dilated Retinal Exam       OCT       Optomap-Iris Exam      Fundus Photography       Left Eye - Please check Retinopathy or No Retinopathy        Exam did show retinopathy    Exam did not show retinopathy       Right Eye - Please check Retinopathy or No Retinopathy       Exam did show retinopathy    Exam did not show retinopathy       Comments __________________________________________________________    Practice Providing Exam ______________________________________________    Exam Performed By (print name) _______________________________________      Provider Signature ___________________________________________________      These reports are needed for  compliance.  Please fax this completed form and a copy of the Diabetic Eye Exam report to our office located at [address] [place] [address] as soon as possible via Fax [phone] attention Zarina: Phone [phone]  We thank you for your assistance in treating our mutual patient.

## 2024-02-06 NOTE — TELEPHONE ENCOUNTER
----- Message from Yaquelin Morejon sent at 2/5/2024  2:29 PM EST -----  Regarding: diabetic eye exam  02/05/24 2:30 PM    Hello, our patient Shahbaz Barriga has had Diabetic Eye Exam completed/performed. Please assist in updating the patient chart by making an External outreach to [facility] facility located in [place]. The date of service is January 2024..    Thank you,  Yaquelin Morejon  [facility] [address]

## 2024-02-06 NOTE — TELEPHONE ENCOUNTER
----- Message from Yaquelin Morejon sent at 2/5/2024  2:29 PM EST -----  Regarding: diabetic eye exam  02/05/24 2:30 PM    Hello, our patient Shahbaz Barriga has had Diabetic Eye Exam completed/performed. Please assist in updating the patient chart by making an External outreach to [facility] facility located in [place]. The date of service is January 2024..    Thank you,  Yaquelin Morejon  [facility] [address]

## 2024-02-07 ENCOUNTER — TELEPHONE (OUTPATIENT)
Dept: ADMINISTRATIVE | Facility: OTHER | Age: 74
End: 2024-02-07

## 2024-02-07 NOTE — LETTER
Diabetic Eye Exam Form    Date Requested: 24  Patient: Shahbaz Barriga  Patient : 1950   Referring Provider: Rubia Dennis DO      DIABETIC Eye Exam Date _______________________________      Type of Exam MUST be documented for Diabetic Eye Exams. Please CHECK ONE.     Retinal Exam       Dilated Retinal Exam       OCT       Optomap-Iris Exam      Fundus Photography       Left Eye - Please check Retinopathy or No Retinopathy        Exam did show retinopathy    Exam did not show retinopathy       Right Eye - Please check Retinopathy or No Retinopathy       Exam did show retinopathy    Exam did not show retinopathy       Comments __________________________________________________________    Practice Providing Exam ______________________________________________    Exam Performed By (print name) _______________________________________      Provider Signature ___________________________________________________      These reports are needed for  compliance.  Please fax this completed form and a copy of the Diabetic Eye Exam report to our office located at [address] [place] [address] as soon as possible via Fax [phone] attention Zarina: Phone [phone]  We thank you for your assistance in treating our mutual patient.

## 2024-02-07 NOTE — TELEPHONE ENCOUNTER
----- Message from Yaquelin Morejon sent at 2/5/2024  2:29 PM EST -----  Regarding: diabetic eye exam  02/05/24 2:30 PM    Hello, our patient Shahbaz Barriga has had Diabetic Eye Exam completed/performed. Please assist in updating the patient chart by making an External outreach to [facility] facility located in [place]. The date of service is January 2024..    Thank you,  Yaquelin Morejon  [facility] [address]

## 2024-02-08 NOTE — TELEPHONE ENCOUNTER
Upon review of the In Basket request and the patient's chart, initial outreach has been made via fax to facility. Please see Contacts section for details.     Thank you  Zarina Lockwood MA

## 2024-02-08 NOTE — TELEPHONE ENCOUNTER
Upon review of the In Basket request we have found this is a duplicate request and no further action is needed. This request is currently being processed and documentation is being completed in the initial request. This message will now be closed.      Any additional questions or concerns should be emailed to the Practice Liaisons via the appropriate education email address, please do not reply via In Basket.    Thank you  Zarina Lockwood MA

## 2024-02-15 NOTE — TELEPHONE ENCOUNTER
As a follow-up, a second attempt has been made for outreach via fax to facility. Please see Contacts section for details.    Thank you  Zarina Lockwood MA

## 2024-02-16 DIAGNOSIS — E11.22 TYPE 2 DIABETES MELLITUS WITH STAGE 3A CHRONIC KIDNEY DISEASE, WITHOUT LONG-TERM CURRENT USE OF INSULIN (HCC): ICD-10-CM

## 2024-02-16 DIAGNOSIS — N18.31 TYPE 2 DIABETES MELLITUS WITH STAGE 3A CHRONIC KIDNEY DISEASE, WITHOUT LONG-TERM CURRENT USE OF INSULIN (HCC): ICD-10-CM

## 2024-02-16 RX ORDER — GLIPIZIDE 5 MG/1
2.5 TABLET ORAL
Qty: 90 TABLET | Refills: 0 | Status: SHIPPED | OUTPATIENT
Start: 2024-02-16

## 2024-02-16 RX ORDER — BLOOD SUGAR DIAGNOSTIC
STRIP MISCELLANEOUS
Qty: 100 EACH | Refills: 0 | Status: SHIPPED | OUTPATIENT
Start: 2024-02-16

## 2024-02-19 DIAGNOSIS — N18.31 TYPE 2 DIABETES MELLITUS WITH STAGE 3A CHRONIC KIDNEY DISEASE, WITHOUT LONG-TERM CURRENT USE OF INSULIN (HCC): Primary | ICD-10-CM

## 2024-02-19 DIAGNOSIS — E11.22 TYPE 2 DIABETES MELLITUS WITH STAGE 3A CHRONIC KIDNEY DISEASE, WITHOUT LONG-TERM CURRENT USE OF INSULIN (HCC): Primary | ICD-10-CM

## 2024-02-19 RX ORDER — PERPHENAZINE 16 MG/1
TABLET, FILM COATED ORAL
Qty: 100 STRIP | Refills: 1 | OUTPATIENT
Start: 2024-02-19

## 2024-02-20 NOTE — TELEPHONE ENCOUNTER
Upon review of the In Basket request we were able to locate, review, and update the patient chart as requested for Diabetic Eye Exam.    Any additional questions or concerns should be emailed to the Practice Liaisons via the appropriate education email address, please do not reply via In Basket.    Thank you  Zarina Lockwood MA

## 2024-02-23 DIAGNOSIS — E78.1 ESSENTIAL HYPERTRIGLYCERIDEMIA: ICD-10-CM

## 2024-02-23 RX ORDER — FENOFIBRATE 145 MG/1
145 TABLET, COATED ORAL DAILY
Qty: 90 TABLET | Refills: 1 | Status: SHIPPED | OUTPATIENT
Start: 2024-02-23

## 2024-03-19 DIAGNOSIS — N18.31 TYPE 2 DIABETES MELLITUS WITH STAGE 3A CHRONIC KIDNEY DISEASE, WITHOUT LONG-TERM CURRENT USE OF INSULIN (HCC): ICD-10-CM

## 2024-03-19 DIAGNOSIS — E11.22 TYPE 2 DIABETES MELLITUS WITH STAGE 3A CHRONIC KIDNEY DISEASE, WITHOUT LONG-TERM CURRENT USE OF INSULIN (HCC): ICD-10-CM

## 2024-03-22 DIAGNOSIS — I10 PRIMARY HYPERTENSION: Primary | ICD-10-CM

## 2024-03-22 RX ORDER — METOPROLOL TARTRATE 50 MG/1
75 TABLET, FILM COATED ORAL 2 TIMES DAILY
Qty: 270 TABLET | Refills: 1 | Status: SHIPPED | OUTPATIENT
Start: 2024-03-22 | End: 2024-06-20

## 2024-03-22 NOTE — TELEPHONE ENCOUNTER
Requested medication(s) are due for refill today: No  Patient has already received a courtesy refill: No  Other reason request has been forwarded to provider: looks like this comes from a different provider.

## 2024-04-05 NOTE — PROGRESS NOTES
HEMATOLOGY / ONCOLOGY CLINIC FOLLOW UP NOTE    Primary Care Provider: Rubia Dennis DO  Referring Provider:    MRN: [id number]  : 1950    Reason for Encounter: Follow-up for erythrocytosis      Interval History: Patient returns for follow-up visit for secondary polycythemia. He is on a therapeutic phlebotomy schedule every 12 weeks.  He was last phlebotomized on 1/15/2024. Blood work completed on  shows RBC 5.91, Hgb 18.2, Hct 54.9.  Iron panel is normal.  He is scheduled for phlebotomy tomorrow.  He is undergoing a sleep study next month at [facility]  Denies any headaches, lightheadedness or dizziness.  Continues on Plavix and statin for stroke prevention      REVIEW OF SYSTEMS:  Please note that a 14-point review of systems was performed to include Constitutional, HEENT, Respiratory, CVS, GI, , Musculoskeletal, Integumentary, Neurologic, Rheumatologic, Endocrinologic, Psychiatric, Lymphatic, and Hematologic/Oncologic systems were reviewed and are negative unless otherwise stated in HPI. Positive and negative findings pertinent to this evaluation are incorporated into the history of present illness.      ECOG PS: 0    PROBLEM LIST:  Patient Active Problem List   Diagnosis    Chronic kidney disease, stage 3 (HCC)    Colon, diverticulosis    Elevated C-reactive protein    Elevated PSA    Essential hypertriglyceridemia    Gout    Primary hypertension    Microalbuminuria    Type 2 diabetes mellitus with stage 3a chronic kidney disease, without long-term current use of insulin (HCC)    Ulcerative rectosigmoiditis without complication (HCC)    Dyslipidemia    Erythrocytosis    History of stroke    Witnessed episode of apnea    Bilateral carotid artery stenosis    Elevated hemoglobin (HCC)    Current use of long term anticoagulation    History of colon polyps       Assessment / Plan:  1. Erythrocytosis    2. Iron deficiency anemia, unspecified iron deficiency anemia type      Patient is a pleasant 73-year-old  gentleman with a history of erythrocytosis.  Myeloproliferative work-up was negative for primary polycythemia or myeloproliferative disorder.  He was undergoing therapeutic phlebotomy due to history of CVA and persistently elevated H&H.  As a result of regularly scheduled phlebotomies, he did develop iron deficiency so phlebotomy was discontinued   He resumed therapeutic phlebotomy every 12 weeks approximately 6 months ago when blood work demonstrated recurrent erythrocytosis.  He has met parameters for phlebotomy every 12 weeks.  Most recent blood work demonstrates persistently elevated H&H with a normal iron panel.  Given his previous history of CVA and elevation in hematocrit to 54.9, I am recommending we increase his therapeutic phlebotomy interval to every 8 weeks.  He may have underlying sleep apnea and will be having testing for this done in the next month.  Patient is in agreement with this plan of care.    He will return for a follow-up in 6 months with repeat blood work.  He is aware to call anytime with questions or concerns    I spent 25 minutes on chart review, face to face counseling time, coordination of care and documentation.    Past Medical History:   has a past medical history of Acute respiratory failure with hypoxia ([facility]) (12/31/2021), CVA (cerebral vascular accident) ([facility]), Diverticulitis, Gout, NSVT (nonsustained ventricular tachycardia) ([facility]) (05/28/2020), and Vasovagal syncope (12/28/2021).    PAST SURGICAL HISTORY:   has a past surgical history that includes Cardiac Loop Recorder; Colonoscopy (09/18/2006); Colonoscopy (10/23/2017); and Colonoscopy (05/06/2020).    CURRENT MEDICATIONS  Current Outpatient Medications   Medication Sig Dispense Refill    allopurinol (ZYLOPRIM) 100 mg tablet Take 2 tablets (200 mg total) by mouth daily 180 tablet 1    amLODIPine (NORVASC) 10 mg tablet Take 1 tablet (10 mg total) by mouth in the morning 90 tablet 2    ascorbic acid (VITAMIN C) 1000 MG tablet Take  "1 tablet (1,000 mg total) by mouth daily  0    atorvastatin (LIPITOR) 40 mg tablet TAKE 1 TABLET DAILY WITH   DINNER 90 tablet 2    cholecalciferol (VITAMIN D3) 400 units tablet Take 1 tablet (400 Units total) by mouth daily  0    cloNIDine (CATAPRES) 0.3 mg tablet Take 1 tablet (0.3 mg total) by mouth in the morning      clopidogrel (PLAVIX) 75 mg tablet TAKE 1 TABLET DAILY 90 tablet 2    Coenzyme Q10 200 MG capsule Take by mouth daily       Contour Next Test test strip USE 1 STRIP TO CHECK GLUCOSE ONCE DAILY 100 each 0    fenofibrate (TRICOR) 145 mg tablet Take 1 tablet (145 mg total) by mouth daily 90 tablet 1    glipiZIDE (GLUCOTROL) 5 mg tablet Take 0.5 tablets (2.5 mg total) by mouth daily with breakfast 90 tablet 0    losartan (COZAAR) 100 MG tablet Take 100 mg by mouth daily      metoprolol tartrate (LOPRESSOR) 50 mg tablet Take 1.5 tablets (75 mg total) by mouth 2 (two) times a day 270 tablet 1    sulfaSALAzine (AZULFIDINE) 500 mg tablet Take 1 tablet (500 mg total) by mouth 4 (four) times a day 360 tablet 3     No current facility-administered medications for this visit.     [unfilled]    SOCIAL HISTORY:   reports that he has never smoked. He has never used smokeless tobacco. He reports that he does not drink alcohol and does not use drugs.     FAMILY HISTORY:  family history includes Breast cancer in his mother; Hypertension in his other; Kidney disease in his father; Lung cancer in his father; Other in his father.     ALLERGIES:  has No Known Allergies.      Physical Exam:  Vital Signs:   Visit Vitals  /78 (BP Location: Left arm)   Pulse 87   Temp 98.1 °F (36.7 °C) (Tympanic)   Resp 18   Ht 5' 9\" (1.753 m)   Wt 110 kg (243 lb)   SpO2 96%   BMI 35.88 kg/m²   Smoking Status Never   BSA 2.24 m²     Body mass index is 35.88 kg/m².  Body surface area is 2.24 meters squared.    Physical Exam  Constitutional:       General: He is not in acute distress.     Appearance: Normal appearance. He is " well-developed. He is not diaphoretic.   HENT:      Head: Normocephalic and atraumatic.      Mouth/Throat:      Pharynx: No oropharyngeal exudate.   Eyes:      General: No scleral icterus.     Pupils: Pupils are equal, round, and reactive to light.   Cardiovascular:      Rate and Rhythm: Normal rate and regular rhythm.      Heart sounds: No murmur heard.  Pulmonary:      Effort: Pulmonary effort is normal. No respiratory distress.      Breath sounds: Normal breath sounds.   Abdominal:      General: Bowel sounds are normal.      Palpations: Abdomen is soft.   Musculoskeletal:         General: Normal range of motion.      Cervical back: Normal range of motion and neck supple.   Lymphadenopathy:      Cervical: No cervical adenopathy.   Skin:     General: Skin is warm and dry.   Neurological:      General: No focal deficit present.      Mental Status: He is alert and oriented to person, place, and time.   Psychiatric:         Mood and Affect: Mood normal.         Behavior: Behavior normal.         Thought Content: Thought content normal.         Judgment: Judgment normal.         Labs:  Lab Results   Component Value Date    WBC 5.8 01/12/2024    HGB 16.8 01/12/2024    HCT 50.3 (H) 01/12/2024    MCV 92.3 01/12/2024     01/12/2024     Lab Results   Component Value Date     07/07/2017    SODIUM 144 02/02/2024    K 3.9 02/02/2024     02/02/2024    CO2 24 02/02/2024    AGAP 10 01/13/2022    BUN 25 02/02/2024    CREATININE 1.39 (H) 02/02/2024    GLUC 120 (H) 02/02/2024    CALCIUM 9.3 02/02/2024    AST 31 01/12/2024    ALT 26 01/12/2024    ALKPHOS 81 01/12/2024    PROT 7.1 07/07/2017    TP 6.4 01/12/2024    BILITOT 0.6 07/07/2017    TBILI 0.7 01/12/2024    EGFR 54 (L) 02/02/2024

## 2024-04-08 ENCOUNTER — OFFICE VISIT (OUTPATIENT)
Age: 74
End: 2024-04-08
Payer: MEDICARE

## 2024-04-08 ENCOUNTER — TELEPHONE (OUTPATIENT)
Age: 74
End: 2024-04-08

## 2024-04-08 VITALS
RESPIRATION RATE: 18 BRPM | BODY MASS INDEX: 35.99 KG/M2 | SYSTOLIC BLOOD PRESSURE: 128 MMHG | HEIGHT: 69 IN | DIASTOLIC BLOOD PRESSURE: 78 MMHG | WEIGHT: 243 LBS | HEART RATE: 87 BPM | TEMPERATURE: 98.1 F | OXYGEN SATURATION: 96 %

## 2024-04-08 DIAGNOSIS — I10 PRIMARY HYPERTENSION: Primary | ICD-10-CM

## 2024-04-08 DIAGNOSIS — D75.1 ERYTHROCYTOSIS: Primary | ICD-10-CM

## 2024-04-08 DIAGNOSIS — D50.9 IRON DEFICIENCY ANEMIA, UNSPECIFIED IRON DEFICIENCY ANEMIA TYPE: ICD-10-CM

## 2024-04-08 PROCEDURE — 99213 OFFICE O/P EST LOW 20 MIN: CPT | Performed by: NURSE PRACTITIONER

## 2024-04-08 RX ORDER — LOSARTAN POTASSIUM 100 MG/1
100 TABLET ORAL DAILY
Qty: 90 TABLET | Refills: 2 | Status: SHIPPED | OUTPATIENT
Start: 2024-04-08

## 2024-04-09 ENCOUNTER — HOSPITAL ENCOUNTER (OUTPATIENT)
Dept: INFUSION CENTER | Facility: HOSPITAL | Age: 74
Discharge: HOME | End: 2024-04-09
Attending: INTERNAL MEDICINE
Payer: MEDICARE

## 2024-04-09 VITALS
HEART RATE: 63 BPM | RESPIRATION RATE: 18 BRPM | TEMPERATURE: 97 F | OXYGEN SATURATION: 94 % | DIASTOLIC BLOOD PRESSURE: 91 MMHG | SYSTOLIC BLOOD PRESSURE: 148 MMHG

## 2024-04-09 DIAGNOSIS — D75.1 ERYTHROCYTOSIS: Primary | ICD-10-CM

## 2024-04-09 PROCEDURE — 99195 PHLEBOTOMY: CPT

## 2024-04-09 NOTE — PROGRESS NOTES
Patient here for therapeutic phlebotomy.  Vital signs stable. Hematocrit 54.9   labs drawn on 4/5.  Started at 1528 and ended at 500.  1545 ml blood removed.  Gauze dressing applied and pressure held.  Patient tolerated well.  Vital signs repeated and are stable and patient observed for any reactions.

## 2024-04-19 DIAGNOSIS — I10 PRIMARY HYPERTENSION: ICD-10-CM

## 2024-04-19 RX ORDER — CLONIDINE HYDROCHLORIDE 0.3 MG/1
0.3 TABLET ORAL DAILY
Qty: 90 TABLET | Refills: 1 | Status: SHIPPED | OUTPATIENT
Start: 2024-04-19

## 2024-05-13 ENCOUNTER — TELEPHONE (OUTPATIENT)
Dept: FAMILY MEDICINE CLINIC | Facility: HOSPITAL | Age: 74
End: 2024-05-13

## 2024-05-13 NOTE — TELEPHONE ENCOUNTER
----- Message from Rubia Dennis DO sent at 5/13/2024  9:29 AM EDT -----  Please notify pt that I have reviewed MRI results that Cardio ordered - pancreatic cysts will be followed and needs repeat MRI in 1  yr.  Will review w/pt in detail at appt in Aug

## 2024-05-13 NOTE — TELEPHONE ENCOUNTER
Patient called back regarding results. Advised of results per PCP notes. Patient verbalized understanding.

## 2024-06-05 ENCOUNTER — HOSPITAL ENCOUNTER (OUTPATIENT)
Dept: INFUSION CENTER | Facility: HOSPITAL | Age: 74
Discharge: HOME | End: 2024-06-05
Attending: INTERNAL MEDICINE

## 2024-06-05 NOTE — PROGRESS NOTES
Pt notified that Hct is 47.3 and he does not need to come in for TP today; Marisol Chan RN/ Dr. Biggs notified; next appt made for 7/29 at 1430.

## 2024-06-06 DIAGNOSIS — E78.1 ESSENTIAL HYPERTRIGLYCERIDEMIA: ICD-10-CM

## 2024-06-06 RX ORDER — FENOFIBRATE 145 MG/1
145 TABLET, COATED ORAL DAILY
Qty: 90 TABLET | Refills: 0 | Status: SHIPPED | OUTPATIENT
Start: 2024-06-06

## 2024-06-12 ENCOUNTER — TELEPHONE (OUTPATIENT)
Age: 74
End: 2024-06-12

## 2024-06-12 ENCOUNTER — TELEPHONE (OUTPATIENT)
Dept: HEMATOLOGY ONCOLOGY | Facility: CLINIC | Age: 74
End: 2024-06-12

## 2024-06-12 NOTE — TELEPHONE ENCOUNTER
I did not see a telephone encounter recently for this patient.    Not sure if any of you ladies called patient?    Thanks!

## 2024-06-12 NOTE — TELEPHONE ENCOUNTER
Called regarding message received in basket from Leanna, spouse, asking if anyone from the office had called.  Number indicated [facility] but no one in office called.  Leanna thanked for the call back.   No

## 2024-06-12 NOTE — TELEPHONE ENCOUNTER
Patient Call    Who are you speaking with? Spouse    If it is not the patient, are they listed on an active communication consent form? No   What is the reason for this call? Pts wife called in regards to a missed call she had from the office. She states there was no message left. Pts wife would like to know if the office needed to speak to her or the pt regarding anything. Please call pts wife back.    Does this require a call back? Yes   If a call back is required, please list best call back number [phone]   If a call back is required, advise that a message will be forwarded to their care team and someone will return their call as soon as possible.   Did you relay this information to the patient? Yes

## 2024-06-13 DIAGNOSIS — E11.22 TYPE 2 DIABETES MELLITUS WITH STAGE 3A CHRONIC KIDNEY DISEASE, WITHOUT LONG-TERM CURRENT USE OF INSULIN (HCC): ICD-10-CM

## 2024-06-13 DIAGNOSIS — N18.31 TYPE 2 DIABETES MELLITUS WITH STAGE 3A CHRONIC KIDNEY DISEASE, WITHOUT LONG-TERM CURRENT USE OF INSULIN (HCC): ICD-10-CM

## 2024-06-14 DIAGNOSIS — I63.9 CVA (CEREBRAL VASCULAR ACCIDENT) (HCC): ICD-10-CM

## 2024-06-14 RX ORDER — ATORVASTATIN CALCIUM 40 MG/1
40 TABLET, FILM COATED ORAL
Qty: 90 TABLET | Refills: 1 | Status: SHIPPED | OUTPATIENT
Start: 2024-06-14

## 2024-06-23 ENCOUNTER — PATIENT MESSAGE (OUTPATIENT)
Dept: FAMILY MEDICINE CLINIC | Facility: HOSPITAL | Age: 74
End: 2024-06-23

## 2024-06-24 DIAGNOSIS — I63.9 CVA (CEREBRAL VASCULAR ACCIDENT) (HCC): ICD-10-CM

## 2024-06-24 RX ORDER — ATORVASTATIN CALCIUM 40 MG/1
40 TABLET, FILM COATED ORAL
Qty: 90 TABLET | Refills: 1 | Status: SHIPPED | OUTPATIENT
Start: 2024-06-24

## 2024-06-26 NOTE — PATIENT COMMUNICATION
Called [facility] pharmacy and if patient pay out of pocket for Atorvastatin if is $55 or he can call insurance and try to get insurance override for refill.  Spoke to patient, wife called [facility] pharmacy also.

## 2024-06-28 DIAGNOSIS — Z86.73 HISTORY OF STROKE: ICD-10-CM

## 2024-06-28 RX ORDER — CLOPIDOGREL BISULFATE 75 MG/1
75 TABLET ORAL DAILY
Qty: 90 TABLET | Refills: 1 | Status: SHIPPED | OUTPATIENT
Start: 2024-06-28

## 2024-07-25 DIAGNOSIS — D75.1 ERYTHROCYTOSIS: Primary | ICD-10-CM

## 2024-07-25 DIAGNOSIS — M1A.9XX0 CHRONIC GOUT WITHOUT TOPHUS, UNSPECIFIED CAUSE, UNSPECIFIED SITE: ICD-10-CM

## 2024-07-25 RX ORDER — ALLOPURINOL 100 MG/1
200 TABLET ORAL DAILY
Qty: 180 TABLET | Refills: 1 | Status: SHIPPED | OUTPATIENT
Start: 2024-07-25

## 2024-07-25 RX ORDER — ALLOPURINOL 100 MG/1
200 TABLET ORAL DAILY
Qty: 180 TABLET | Refills: 0 | OUTPATIENT
Start: 2024-07-25

## 2024-07-29 ENCOUNTER — HOSPITAL ENCOUNTER (OUTPATIENT)
Dept: INFUSION CENTER | Facility: HOSPITAL | Age: 74
Discharge: HOME | End: 2024-07-29
Attending: INTERNAL MEDICINE
Payer: MEDICARE

## 2024-07-29 VITALS
OXYGEN SATURATION: 93 % | RESPIRATION RATE: 16 BRPM | SYSTOLIC BLOOD PRESSURE: 126 MMHG | DIASTOLIC BLOOD PRESSURE: 71 MMHG | HEART RATE: 65 BPM

## 2024-07-29 DIAGNOSIS — D75.1 ERYTHROCYTOSIS: Primary | ICD-10-CM

## 2024-07-29 PROCEDURE — 99195 PHLEBOTOMY: CPT

## 2024-07-29 NOTE — PROGRESS NOTES
Pt here for therapeutic phlebotomy. Reclined in chair. Snack and drink provided. 500 ml of PRBC removed without difficulty.  Total tourniquet time was 12 minutes. Pt completed observation time. VSS. Left unit ambulatory with steady gait. Refused AVS. Aware of next appt.

## 2024-07-31 DIAGNOSIS — E11.22 TYPE 2 DIABETES MELLITUS WITH STAGE 3A CHRONIC KIDNEY DISEASE, WITHOUT LONG-TERM CURRENT USE OF INSULIN (HCC): ICD-10-CM

## 2024-07-31 DIAGNOSIS — N18.31 TYPE 2 DIABETES MELLITUS WITH STAGE 3A CHRONIC KIDNEY DISEASE, WITHOUT LONG-TERM CURRENT USE OF INSULIN (HCC): ICD-10-CM

## 2024-08-01 RX ORDER — GLIPIZIDE 5 MG/1
TABLET ORAL
Qty: 45 TABLET | Refills: 1 | Status: SHIPPED | OUTPATIENT
Start: 2024-08-01

## 2024-08-07 ENCOUNTER — HOSPITAL ENCOUNTER (EMERGENCY)
Facility: HOSPITAL | Age: 74
End: 2024-08-07
Attending: EMERGENCY MEDICINE | Admitting: EMERGENCY MEDICINE
Payer: MEDICARE

## 2024-08-07 ENCOUNTER — ANESTHESIA (INPATIENT)
Dept: RADIOLOGY | Facility: HOSPITAL | Age: 74
End: 2024-08-07
Payer: MEDICARE

## 2024-08-07 ENCOUNTER — ANESTHESIA EVENT (INPATIENT)
Dept: RADIOLOGY | Facility: HOSPITAL | Age: 74
End: 2024-08-07
Payer: MEDICARE

## 2024-08-07 ENCOUNTER — HOSPITAL ENCOUNTER (INPATIENT)
Facility: HOSPITAL | Age: 74
LOS: 3 days | Discharge: HOME | DRG: 163 | End: 2024-08-10
Attending: INTERNAL MEDICINE | Admitting: INTERNAL MEDICINE
Payer: MEDICARE

## 2024-08-07 ENCOUNTER — APPOINTMENT (EMERGENCY)
Dept: CT IMAGING | Facility: HOSPITAL | Age: 74
End: 2024-08-07
Payer: MEDICARE

## 2024-08-07 ENCOUNTER — DOCUMENTATION (OUTPATIENT)
Dept: OTHER | Facility: HOSPITAL | Age: 74
End: 2024-08-07

## 2024-08-07 ENCOUNTER — APPOINTMENT (INPATIENT)
Dept: RADIOLOGY | Facility: HOSPITAL | Age: 74
DRG: 163 | End: 2024-08-07
Attending: STUDENT IN AN ORGANIZED HEALTH CARE EDUCATION/TRAINING PROGRAM
Payer: MEDICARE

## 2024-08-07 ENCOUNTER — APPOINTMENT (INPATIENT)
Dept: NON INVASIVE DIAGNOSTICS | Facility: HOSPITAL | Age: 74
DRG: 163 | End: 2024-08-07
Payer: MEDICARE

## 2024-08-07 ENCOUNTER — APPOINTMENT (EMERGENCY)
Dept: RADIOLOGY | Facility: HOSPITAL | Age: 74
End: 2024-08-07
Payer: MEDICARE

## 2024-08-07 VITALS
TEMPERATURE: 97.8 F | RESPIRATION RATE: 32 BRPM | BODY MASS INDEX: 34.11 KG/M2 | HEART RATE: 104 BPM | WEIGHT: 231 LBS | SYSTOLIC BLOOD PRESSURE: 126 MMHG | DIASTOLIC BLOOD PRESSURE: 87 MMHG | OXYGEN SATURATION: 93 %

## 2024-08-07 DIAGNOSIS — I26.99 ACUTE PULMONARY EMBOLISM, UNSPECIFIED PULMONARY EMBOLISM TYPE, UNSPECIFIED WHETHER ACUTE COR PULMONALE PRESENT (HCC): Primary | ICD-10-CM

## 2024-08-07 DIAGNOSIS — I26.09 ACUTE PULMONARY EMBOLISM WITH ACUTE COR PULMONALE (HCC): Primary | ICD-10-CM

## 2024-08-07 DIAGNOSIS — J96.01 ACUTE RESPIRATORY FAILURE WITH HYPOXIA (HCC): ICD-10-CM

## 2024-08-07 LAB
ALBUMIN SERPL BCG-MCNC: 4.1 G/DL (ref 3.5–5)
ALBUMIN SERPL-MCNC: 4.1 G/DL (ref 3.6–5.1)
ALBUMIN/CREAT UR: 9 MG/G CREAT
ALBUMIN/GLOB SERPL: 1.7 (CALC) (ref 1–2.5)
ALP SERPL-CCNC: 60 U/L (ref 34–104)
ALP SERPL-CCNC: 67 U/L (ref 35–144)
ALT SERPL W P-5'-P-CCNC: 31 U/L (ref 7–52)
ALT SERPL-CCNC: 18 U/L (ref 9–46)
ANION GAP SERPL CALCULATED.3IONS-SCNC: 16 MMOL/L (ref 4–13)
APTT PPP: 31 SECONDS (ref 23–34)
AST SERPL W P-5'-P-CCNC: 40 U/L (ref 13–39)
AST SERPL-CCNC: 25 U/L (ref 10–35)
BASE EXCESS BLDA CALC-SCNC: -4 MMOL/L (ref -2–3)
BASOPHILS # BLD AUTO: 0.05 THOUSANDS/ÂΜL (ref 0–0.1)
BASOPHILS NFR BLD AUTO: 1 % (ref 0–1)
BILIRUB SERPL-MCNC: 0.6 MG/DL (ref 0.2–1.2)
BILIRUB SERPL-MCNC: 0.8 MG/DL (ref 0.2–1)
BNP SERPL-MCNC: 77 PG/ML (ref 0–100)
BUN SERPL-MCNC: 38 MG/DL (ref 7–25)
BUN SERPL-MCNC: 40 MG/DL (ref 5–25)
BUN/CREAT SERPL: 20 (CALC) (ref 6–22)
CA-I BLD-SCNC: 1.17 MMOL/L (ref 1.12–1.32)
CALCIUM SERPL-MCNC: 9.4 MG/DL (ref 8.4–10.2)
CALCIUM SERPL-MCNC: 9.5 MG/DL (ref 8.6–10.3)
CARDIAC TROPONIN I PNL SERPL HS: 60 NG/L
CHLORIDE SERPL-SCNC: 103 MMOL/L (ref 96–108)
CHLORIDE SERPL-SCNC: 106 MMOL/L (ref 98–110)
CHOLEST SERPL-MCNC: 134 MG/DL
CHOLEST/HDLC SERPL: 3.6 (CALC)
CO2 SERPL-SCNC: 22 MMOL/L (ref 21–32)
CO2 SERPL-SCNC: 25 MMOL/L (ref 20–32)
CREAT SERPL-MCNC: 1.9 MG/DL (ref 0.7–1.28)
CREAT SERPL-MCNC: 2.28 MG/DL (ref 0.6–1.3)
CREAT UR-MCNC: 74 MG/DL (ref 20–320)
D DIMER PPP FEU-MCNC: 11.89 UG/ML FEU
EOSINOPHIL # BLD AUTO: 0.14 THOUSAND/ÂΜL (ref 0–0.61)
EOSINOPHIL NFR BLD AUTO: 1 % (ref 0–6)
ERYTHROCYTE [DISTWIDTH] IN BLOOD BY AUTOMATED COUNT: 13.2 % (ref 11–15)
ERYTHROCYTE [DISTWIDTH] IN BLOOD BY AUTOMATED COUNT: 14.9 % (ref 11.6–15.1)
ERYTHROCYTE [DISTWIDTH] IN BLOOD BY AUTOMATED COUNT: 15 % (ref 11.6–15.1)
EST. AVERAGE GLUCOSE BLD GHB EST-MCNC: 143 MG/DL
EST. AVERAGE GLUCOSE BLD GHB EST-SCNC: 7.9 MMOL/L
GFR SERPL CREATININE-BSD FRML MDRD: 27 ML/MIN/1.73SQ M
GFR/BSA.PRED SERPLBLD CYS-BASED-ARV: 37 ML/MIN/1.73M2
GLOBULIN SER CALC-MCNC: 2.4 G/DL (CALC) (ref 1.9–3.7)
GLUCOSE SERPL-MCNC: 125 MG/DL (ref 65–99)
GLUCOSE SERPL-MCNC: 219 MG/DL (ref 65–140)
GLUCOSE SERPL-MCNC: 225 MG/DL (ref 65–140)
HBA1C MFR BLD: 6.6 % OF TOTAL HGB
HCO3 BLDA-SCNC: 22.5 MMOL/L (ref 24–30)
HCT VFR BLD AUTO: 44.5 % (ref 36.5–49.3)
HCT VFR BLD AUTO: 44.5 % (ref 38.5–50)
HCT VFR BLD AUTO: 47.6 % (ref 36.5–49.3)
HDLC SERPL-MCNC: 37 MG/DL
HGB BLD-MCNC: 15.1 G/DL (ref 12–17)
HGB BLD-MCNC: 15.1 G/DL (ref 13.2–17.1)
HGB BLD-MCNC: 15.4 G/DL (ref 12–17)
IMM GRANULOCYTES # BLD AUTO: 0.09 THOUSAND/UL (ref 0–0.2)
IMM GRANULOCYTES NFR BLD AUTO: 1 % (ref 0–2)
INR PPP: 1.06 (ref 0.85–1.19)
LACTATE SERPL-SCNC: 2.7 MMOL/L (ref 0.5–2)
LACTATE SERPL-SCNC: 3.3 MMOL/L (ref 0.5–2)
LDLC SERPL CALC-MCNC: 74 MG/DL (CALC)
LYMPHOCYTES # BLD AUTO: 1.18 THOUSANDS/ÂΜL (ref 0.6–4.47)
LYMPHOCYTES NFR BLD AUTO: 11 % (ref 14–44)
MCH RBC QN AUTO: 30.6 PG (ref 26.8–34.3)
MCH RBC QN AUTO: 31.2 PG (ref 26.8–34.3)
MCH RBC QN AUTO: 31.3 PG (ref 27–33)
MCHC RBC AUTO-ENTMCNC: 32.4 G/DL (ref 31.4–37.4)
MCHC RBC AUTO-ENTMCNC: 33.9 G/DL (ref 31.4–37.4)
MCHC RBC AUTO-ENTMCNC: 33.9 G/DL (ref 32–36)
MCV RBC AUTO: 92 FL (ref 82–98)
MCV RBC AUTO: 92.1 FL (ref 80–100)
MCV RBC AUTO: 95 FL (ref 82–98)
MICROALBUMIN UR-MCNC: 0.7 MG/DL
MONOCYTES # BLD AUTO: 0.42 THOUSAND/ÂΜL (ref 0.17–1.22)
MONOCYTES NFR BLD AUTO: 4 % (ref 4–12)
NEUTROPHILS # BLD AUTO: 8.87 THOUSANDS/ÂΜL (ref 1.85–7.62)
NEUTS SEG NFR BLD AUTO: 82 % (ref 43–75)
NONHDLC SERPL-MCNC: 97 MG/DL (CALC)
NRBC BLD AUTO-RTO: 0 /100 WBCS
PCO2 BLD: 24 MMOL/L (ref 21–32)
PCO2 BLD: 43.1 MM HG (ref 42–50)
PH BLD: 7.33 [PH] (ref 7.3–7.4)
PLATELET # BLD AUTO: 267 THOUSANDS/UL (ref 149–390)
PLATELET # BLD AUTO: 288 THOUSAND/UL (ref 140–400)
PLATELET # BLD AUTO: 293 THOUSANDS/UL (ref 149–390)
PMV BLD AUTO: 10 FL (ref 8.9–12.7)
PMV BLD AUTO: 9.6 FL (ref 8.9–12.7)
PMV BLD REES-ECKER: 10.3 FL (ref 7.5–12.5)
PO2 BLD: 24 MM HG (ref 35–45)
POTASSIUM SERPL-SCNC: 3.4 MMOL/L (ref 3.5–5.3)
POTASSIUM SERPL-SCNC: 3.5 MMOL/L (ref 3.5–5.3)
PROCALCITONIN SERPL-MCNC: 0.16 NG/ML
PROT SERPL-MCNC: 6.5 G/DL (ref 6.1–8.1)
PROT SERPL-MCNC: 7.1 G/DL (ref 6.4–8.4)
PROTHROMBIN TIME: 14.3 SECONDS (ref 12.3–15)
RBC # BLD AUTO: 4.83 MILLION/UL (ref 4.2–5.8)
RBC # BLD AUTO: 4.84 MILLION/UL (ref 3.88–5.62)
RBC # BLD AUTO: 5.03 MILLION/UL (ref 3.88–5.62)
SAO2 % BLD FROM PO2: 37 % (ref 60–85)
SODIUM SERPL-SCNC: 139 MMOL/L (ref 135–146)
SODIUM SERPL-SCNC: 141 MMOL/L (ref 135–147)
SPECIMEN SOURCE: ABNORMAL
TRIGL SERPL-MCNC: 142 MG/DL
TSH SERPL-ACNC: 2.27 MIU/L (ref 0.4–4.5)
WBC # BLD AUTO: 10.45 THOUSAND/UL (ref 4.31–10.16)
WBC # BLD AUTO: 10.75 THOUSAND/UL (ref 4.31–10.16)
WBC # BLD AUTO: 8 THOUSAND/UL (ref 3.8–10.8)

## 2024-08-07 PROCEDURE — 94760 N-INVAS EAR/PLS OXIMETRY 1: CPT

## 2024-08-07 PROCEDURE — NC001 PR NO CHARGE: Performed by: INTERNAL MEDICINE

## 2024-08-07 PROCEDURE — 75743 ARTERY X-RAYS LUNGS: CPT

## 2024-08-07 PROCEDURE — C1894 INTRO/SHEATH, NON-LASER: HCPCS

## 2024-08-07 PROCEDURE — 76937 US GUIDE VASCULAR ACCESS: CPT | Performed by: STUDENT IN AN ORGANIZED HEALTH CARE EDUCATION/TRAINING PROGRAM

## 2024-08-07 PROCEDURE — B31TYZZ FLUOROSCOPY OF LEFT PULMONARY ARTERY USING OTHER CONTRAST: ICD-10-PCS | Performed by: STUDENT IN AN ORGANIZED HEALTH CARE EDUCATION/TRAINING PROGRAM

## 2024-08-07 PROCEDURE — 85379 FIBRIN DEGRADATION QUANT: CPT | Performed by: EMERGENCY MEDICINE

## 2024-08-07 PROCEDURE — 84484 ASSAY OF TROPONIN QUANT: CPT | Performed by: EMERGENCY MEDICINE

## 2024-08-07 PROCEDURE — 02CQ3ZZ EXTIRPATION OF MATTER FROM RIGHT PULMONARY ARTERY, PERCUTANEOUS APPROACH: ICD-10-PCS | Performed by: STUDENT IN AN ORGANIZED HEALTH CARE EDUCATION/TRAINING PROGRAM

## 2024-08-07 PROCEDURE — 37184 PRIM ART M-THRMBC 1ST VSL: CPT

## 2024-08-07 PROCEDURE — NC001 PR NO CHARGE: Performed by: SURGERY

## 2024-08-07 PROCEDURE — 99223 1ST HOSP IP/OBS HIGH 75: CPT | Performed by: INTERNAL MEDICINE

## 2024-08-07 PROCEDURE — C1769 GUIDE WIRE: HCPCS

## 2024-08-07 PROCEDURE — 93306 TTE W/DOPPLER COMPLETE: CPT

## 2024-08-07 PROCEDURE — 85610 PROTHROMBIN TIME: CPT

## 2024-08-07 PROCEDURE — 85025 COMPLETE CBC W/AUTO DIFF WBC: CPT | Performed by: EMERGENCY MEDICINE

## 2024-08-07 PROCEDURE — 82330 ASSAY OF CALCIUM: CPT

## 2024-08-07 PROCEDURE — 71045 X-RAY EXAM CHEST 1 VIEW: CPT

## 2024-08-07 PROCEDURE — 82803 BLOOD GASES ANY COMBINATION: CPT

## 2024-08-07 PROCEDURE — 93306 TTE W/DOPPLER COMPLETE: CPT | Performed by: INTERNAL MEDICINE

## 2024-08-07 PROCEDURE — 85610 PROTHROMBIN TIME: CPT | Performed by: EMERGENCY MEDICINE

## 2024-08-07 PROCEDURE — 71275 CT ANGIOGRAPHY CHEST: CPT

## 2024-08-07 PROCEDURE — 82948 REAGENT STRIP/BLOOD GLUCOSE: CPT

## 2024-08-07 PROCEDURE — 83880 ASSAY OF NATRIURETIC PEPTIDE: CPT | Performed by: EMERGENCY MEDICINE

## 2024-08-07 PROCEDURE — C1757 CATH, THROMBECTOMY/EMBOLECT: HCPCS

## 2024-08-07 PROCEDURE — NC001 PR NO CHARGE: Performed by: NURSE PRACTITIONER

## 2024-08-07 PROCEDURE — 76937 US GUIDE VASCULAR ACCESS: CPT

## 2024-08-07 PROCEDURE — 85027 COMPLETE CBC AUTOMATED: CPT

## 2024-08-07 PROCEDURE — 36015 PLACE CATHETER IN ARTERY: CPT | Performed by: STUDENT IN AN ORGANIZED HEALTH CARE EDUCATION/TRAINING PROGRAM

## 2024-08-07 PROCEDURE — 96375 TX/PRO/DX INJ NEW DRUG ADDON: CPT

## 2024-08-07 PROCEDURE — 37184 PRIM ART M-THRMBC 1ST VSL: CPT | Performed by: STUDENT IN AN ORGANIZED HEALTH CARE EDUCATION/TRAINING PROGRAM

## 2024-08-07 PROCEDURE — 82947 ASSAY GLUCOSE BLOOD QUANT: CPT

## 2024-08-07 PROCEDURE — 99285 EMERGENCY DEPT VISIT HI MDM: CPT

## 2024-08-07 PROCEDURE — 85730 THROMBOPLASTIN TIME PARTIAL: CPT

## 2024-08-07 PROCEDURE — 83605 ASSAY OF LACTIC ACID: CPT | Performed by: EMERGENCY MEDICINE

## 2024-08-07 PROCEDURE — 96365 THER/PROPH/DIAG IV INF INIT: CPT

## 2024-08-07 PROCEDURE — 96367 TX/PROPH/DG ADDL SEQ IV INF: CPT

## 2024-08-07 PROCEDURE — 85730 THROMBOPLASTIN TIME PARTIAL: CPT | Performed by: EMERGENCY MEDICINE

## 2024-08-07 PROCEDURE — B31SYZZ FLUOROSCOPY OF RIGHT PULMONARY ARTERY USING OTHER CONTRAST: ICD-10-PCS | Performed by: STUDENT IN AN ORGANIZED HEALTH CARE EDUCATION/TRAINING PROGRAM

## 2024-08-07 PROCEDURE — 80053 COMPREHEN METABOLIC PANEL: CPT | Performed by: EMERGENCY MEDICINE

## 2024-08-07 PROCEDURE — 36415 COLL VENOUS BLD VENIPUNCTURE: CPT | Performed by: EMERGENCY MEDICINE

## 2024-08-07 PROCEDURE — 93005 ELECTROCARDIOGRAM TRACING: CPT

## 2024-08-07 PROCEDURE — 85347 COAGULATION TIME ACTIVATED: CPT

## 2024-08-07 PROCEDURE — 84145 PROCALCITONIN (PCT): CPT | Performed by: EMERGENCY MEDICINE

## 2024-08-07 RX ORDER — SODIUM CHLORIDE, SODIUM LACTATE, POTASSIUM CHLORIDE, CALCIUM CHLORIDE 600; 310; 30; 20 MG/100ML; MG/100ML; MG/100ML; MG/100ML
INJECTION, SOLUTION INTRAVENOUS CONTINUOUS PRN
Status: DISCONTINUED | OUTPATIENT
Start: 2024-08-07 | End: 2024-08-07

## 2024-08-07 RX ORDER — ALLOPURINOL 100 MG/1
200 TABLET ORAL DAILY
Status: DISCONTINUED | OUTPATIENT
Start: 2024-08-08 | End: 2024-08-10 | Stop reason: HOSPADM

## 2024-08-07 RX ORDER — ATORVASTATIN CALCIUM 40 MG/1
40 TABLET, FILM COATED ORAL
Status: DISCONTINUED | OUTPATIENT
Start: 2024-08-07 | End: 2024-08-07

## 2024-08-07 RX ORDER — HEPARIN SODIUM 1000 [USP'U]/ML
4200 INJECTION, SOLUTION INTRAVENOUS; SUBCUTANEOUS EVERY 6 HOURS PRN
Status: DISCONTINUED | OUTPATIENT
Start: 2024-08-07 | End: 2024-08-10

## 2024-08-07 RX ORDER — SODIUM CHLORIDE, SODIUM GLUCONATE, SODIUM ACETATE, POTASSIUM CHLORIDE, MAGNESIUM CHLORIDE, SODIUM PHOSPHATE, DIBASIC, AND POTASSIUM PHOSPHATE .53; .5; .37; .037; .03; .012; .00082 G/100ML; G/100ML; G/100ML; G/100ML; G/100ML; G/100ML; G/100ML
1000 INJECTION, SOLUTION INTRAVENOUS ONCE
Status: COMPLETED | OUTPATIENT
Start: 2024-08-07 | End: 2024-08-07

## 2024-08-07 RX ORDER — HEPARIN SODIUM 1000 [USP'U]/ML
4200 INJECTION, SOLUTION INTRAVENOUS; SUBCUTANEOUS EVERY 6 HOURS PRN
Status: DISCONTINUED | OUTPATIENT
Start: 2024-08-07 | End: 2024-08-07 | Stop reason: HOSPADM

## 2024-08-07 RX ORDER — HEPARIN SODIUM 1000 [USP'U]/ML
8400 INJECTION, SOLUTION INTRAVENOUS; SUBCUTANEOUS EVERY 6 HOURS PRN
Status: DISCONTINUED | OUTPATIENT
Start: 2024-08-07 | End: 2024-08-07 | Stop reason: HOSPADM

## 2024-08-07 RX ORDER — HEPARIN SODIUM 1000 [USP'U]/ML
8400 INJECTION, SOLUTION INTRAVENOUS; SUBCUTANEOUS EVERY 6 HOURS PRN
Status: DISCONTINUED | OUTPATIENT
Start: 2024-08-07 | End: 2024-08-10

## 2024-08-07 RX ORDER — HEPARIN SODIUM 1000 [USP'U]/ML
INJECTION, SOLUTION INTRAVENOUS; SUBCUTANEOUS AS NEEDED
Status: DISCONTINUED | OUTPATIENT
Start: 2024-08-07 | End: 2024-08-07

## 2024-08-07 RX ORDER — HEPARIN SODIUM 1000 [USP'U]/ML
8400 INJECTION, SOLUTION INTRAVENOUS; SUBCUTANEOUS ONCE
Status: COMPLETED | OUTPATIENT
Start: 2024-08-07 | End: 2024-08-07

## 2024-08-07 RX ORDER — INSULIN LISPRO 100 [IU]/ML
1-6 INJECTION, SOLUTION INTRAVENOUS; SUBCUTANEOUS EVERY 6 HOURS SCHEDULED
Status: DISCONTINUED | OUTPATIENT
Start: 2024-08-07 | End: 2024-08-10 | Stop reason: HOSPADM

## 2024-08-07 RX ORDER — HEPARIN SODIUM 10000 [USP'U]/100ML
3-30 INJECTION, SOLUTION INTRAVENOUS
Status: DISCONTINUED | OUTPATIENT
Start: 2024-08-07 | End: 2024-08-07 | Stop reason: HOSPADM

## 2024-08-07 RX ORDER — CHLORHEXIDINE GLUCONATE ORAL RINSE 1.2 MG/ML
15 SOLUTION DENTAL EVERY 12 HOURS SCHEDULED
Status: DISCONTINUED | OUTPATIENT
Start: 2024-08-07 | End: 2024-08-08

## 2024-08-07 RX ORDER — HEPARIN SODIUM 10000 [USP'U]/100ML
3-30 INJECTION, SOLUTION INTRAVENOUS
Status: DISCONTINUED | OUTPATIENT
Start: 2024-08-07 | End: 2024-08-10

## 2024-08-07 RX ORDER — LIDOCAINE WITH 8.4% SOD BICARB 0.9%(10ML)
SYRINGE (ML) INJECTION AS NEEDED
Status: COMPLETED | OUTPATIENT
Start: 2024-08-07 | End: 2024-08-07

## 2024-08-07 RX ADMIN — IOHEXOL 75 ML: 350 INJECTION, SOLUTION INTRAVENOUS at 22:43

## 2024-08-07 RX ADMIN — SODIUM CHLORIDE, SODIUM LACTATE, POTASSIUM CHLORIDE, AND CALCIUM CHLORIDE: .6; .31; .03; .02 INJECTION, SOLUTION INTRAVENOUS at 20:04

## 2024-08-07 RX ADMIN — HEPARIN SODIUM 18 UNITS/KG/HR: 10000 INJECTION, SOLUTION INTRAVENOUS at 15:46

## 2024-08-07 RX ADMIN — HEPARIN SODIUM 18 UNITS/KG/HR: 10000 INJECTION, SOLUTION INTRAVENOUS at 20:04

## 2024-08-07 RX ADMIN — IOHEXOL 85 ML: 350 INJECTION, SOLUTION INTRAVENOUS at 15:07

## 2024-08-07 RX ADMIN — Medication 10 ML: at 20:45

## 2024-08-07 RX ADMIN — HEPARIN SODIUM 8400 UNITS: 1000 INJECTION, SOLUTION INTRAVENOUS; SUBCUTANEOUS at 15:31

## 2024-08-07 RX ADMIN — SODIUM CHLORIDE, SODIUM GLUCONATE, SODIUM ACETATE, POTASSIUM CHLORIDE, MAGNESIUM CHLORIDE, SODIUM PHOSPHATE, DIBASIC, AND POTASSIUM PHOSPHATE 1000 ML: .53; .5; .37; .037; .03; .012; .00082 INJECTION, SOLUTION INTRAVENOUS at 14:48

## 2024-08-07 RX ADMIN — HEPARIN SODIUM 3000 UNITS: 1000 INJECTION INTRAVENOUS; SUBCUTANEOUS at 21:06

## 2024-08-07 NOTE — EMTALA/ACUTE CARE TRANSFER
[facility] EMERGENCY DEPARTMENT  [address] [facility] [address]  Dept: [phone]      EMTALA TRANSFER CONSENT    NAME Shahbaz Barriga                                         1950                              MRN [id number]    I have been informed of my rights regarding examination, treatment, and transfer   by Dr. Michel Whitley DO    Benefits: Specialized equipment and/or services available at the receiving facility (Include comment)________________________ (PERT team, IR, CT surgery, thrombectomy capabilities)    Risks: Potential for delay in receiving treatment, Potential deterioration of medical condition, Loss of IV, Increased discomfort during transfer, Possible worsening of condition or death during transfer      Consent for Transfer:  I acknowledge that my medical condition has been evaluated and explained to me by the emergency department physician or other qualified medical person and/or my attending physician, who has recommended that I be transferred to the service of  Accepting Physician: Clarita at Accepting Facility Name, City & State : [facility]. The above potential benefits of such transfer, the potential risks associated with such transfer, and the probable risks of not being transferred have been explained to me, and I fully understand them.  The doctor has explained that, in my case, the benefits of transfer outweigh the risks.  I agree to be transferred.    I authorize the performance of emergency medical procedures and treatments upon me in both transit and upon arrival at the receiving facility.  Additionally, I authorize the release of any and all medical records to the receiving facility and request they be transported with me, if possible.  I understand that the safest mode of transportation during a medical emergency is an ambulance and that the Hospital advocates the use of this mode of transport. Risks of traveling to the receiving facility by  car, including absence of medical control, life sustaining equipment, such as oxygen, and medical personnel has been explained to me and I fully understand them.    (EVA CORRECT BOX BELOW)  [X]  I consent to the stated transfer and to be transported by ambulance/helicopter.  [  ]  I consent to the stated transfer, but refuse transportation by ambulance and accept full responsibility for my transportation by car.  I understand the risks of non-ambulance transfers and I exonerate the Hospital and its staff from any deterioration in my condition that results from this refusal.    X___________________________________________    DATE  24  TIME________  Signature of patient or legally responsible individual signing on patient behalf           RELATIONSHIP TO PATIENT_________________________          Provider Certification    NAME Shahbaz Barriga                                         1950                              MRN [id number]    A medical screening exam was performed on the above named patient.  Based on the examination:    Condition Necessitating Transfer The primary encounter diagnosis was Acute pulmonary embolism with acute cor pulmonale (HCC). A diagnosis of Acute respiratory failure with hypoxia (HCC) was also pertinent to this visit.    Patient Condition: The patient has been stabilized such that within reasonable medical probability, no material deterioration of the patient condition or the condition of the unborn child(macy) is likely to result from the transfer    Reason for Transfer: Level of Care needed not available at this facility    Transfer Requirements: Facility B   Space available and qualified personnel available for treatment as acknowledged by PACS  Agreed to accept transfer and to provide appropriate medical treatment as acknowledged by       Clarita  Appropriate medical records of the examination and treatment of the patient are provided at the time of transfer   STAFF INITIAL  WHEN COMPLETED _______  Transfer will be performed by qualified personnel from [facility]  and appropriate transfer equipment as required, including the use of necessary and appropriate life support measures.    Provider Certification: I have examined the patient and explained the following risks and benefits of being transferred/refusing transfer to the patient/family:  General risk, such as traffic hazards, adverse weather conditions, rough terrain or turbulence, possible failure of equipment (including vehicle or aircraft), or consequences of actions of persons outside the control of the transport personnel, Unanticipated needs of medical equipment and personnel during transport, Risk of worsening condition      Based on these reasonable risks and benefits to the patient and/or the unborn child(macy), and based upon the information available at the time of the patient’s examination, I certify that the medical benefits reasonably to be expected from the provision of appropriate medical treatments at another medical facility outweigh the increasing risks, if any, to the individual’s medical condition, and in the case of labor to the unborn child, from effecting the transfer.    X____________________________________________ DATE 08/07/24        TIME_______      ORIGINAL - SEND TO MEDICAL RECORDS   COPY - SEND WITH PATIENT DURING TRANSFER

## 2024-08-07 NOTE — ED NOTES
[facility] MICU bed 2 by Dr JEREMIAS Otto. Report [phone], Report given to Hannah, no other questions     Mayi Cleveland RN  08/07/24 6947

## 2024-08-07 NOTE — PROGRESS NOTES
Documentation of Informed Consent Process for Clinical Research Study      Study title:   PEERLESS II Study    Patient Name:  Shahbaz Barriga                                   YOB: 1950     This signed and dated document shall serve as certification that all of the below listed required elements of informed consent were provided to the subject or legally authorized representative signing the actual Informed Consent Document, both in written and verbal format.     The HIPAA consent is contained within the Informed Consent document, and has also been discussed.    A copy of the actual signed and dated Informed Consent has also been provided to the subject or legally authorized representative, and the original signed document shall be maintained in the research shadow chart.          Element of Informed Consent Discussed Date Initials/ Person obtaining consent   A statement that the study involves research, an explanation of the purposes of the research and the expected duration of the subject's participation, a description of the procedures to be followed, and identification of any procedures which are experimental 08/07/24   Faviola Zaidi    A description of any reasonably foreseeable risks or discomforts to the subject. 08/07/24   Faviola Zaidi    A description of any benefits to the subject or to others which may reasonably be expected from the research. 08/07/24   Faviola Zaidi    A disclosure of appropriate alternative procedures or courses of treatment, if any, that might be advantageous to the subject. 08/07/24   Faviola Zaidi    A statement describing the extent, if any, to which confidentiality of records identifying the subject will be maintained and that notes the possibility that the Food and Drug Administration may inspect the records 08/07/24   Faviola Zaidi    For research involving more than minimal risk, an explanation as to whether any compensation  and an explanation as to whether any medical treatments are available if injury occurs and, if so, what they consist of, or where further information may be obtained. 08/07/24   Faviola Zaidi    An explanation of whom to contact for answers to pertinent questions about the research and research subjects' rights, and whom to contact in the event of a research-related injury to the subject. 08/07/24   Faviola Zaidi    A statement that participation is voluntary, that refusal to participate will involve no penalty or loss of benefits to which the subject is otherwise entitled, and that the subject may discontinue participation at any time without penalty or loss of benefits to which the subject is otherwise entitled. 08/07/24   Faviola Zaidi    A statement that the particular treatment or procedure may involve risks to the subject (or to the embryo or fetus, if the subject is or may become pregnant) which are currently unforeseeable 08/07/24   Faviola Zaidi    Anticipated circumstances under which the subject's participation may be terminated by the investigator without regard to the subject's consent. 08/07/24   Faviola Zaidi    Any additional costs to the subject that may result from participation in the research 08/07/24   Faviola Zaidi    The consequences of a subjects' decision to withdraw from the research and procedures for orderly termination of participation by the subject. 08/07/24   Fvaiola Zaidi    A statement that significant new findings developed during the course of the research which may relate to the subject's willingness to continue participation will be provided to the subject. 08/07/24   Faviola Zaidi    The approximate number of subjects involved in the study. 08/07/24   Faviola Zaidi      The patient speaks, reads and understands English?   [x] Yes  []No     If NO, Name of :___________________________________       speaks, reads, and understands English?  []Yes  []No  [x]N/A     Process utilized to obtain consent:_______________________________________________   _______________________________________________    Subject meets eligibility criteria as outline in the current protocol? [x]Yes  []No      [] N/A - Reason: ___________________________________________________________    The protocol consent was reviewed with the patient and all questions were addressed and answered?  [x]Yes  []No       The subject agreed to participate and the consent was signed and dated?  [x]Yes  []No        Consent was obtained prior to any research procedures being performed?  [x]Yes  []No           Date & Time ICF signed:  08/07/24 /1810        Certification of Person Conducting the Consent Process:                                                                                Faviola Zaidi    08/07/24    Printed Name    Date

## 2024-08-07 NOTE — ED NOTES
[facility] MICU bed 2 by Dr JEREMIAS Otto. Report [phone]. [id number]       Willa Goddard RN  08/07/24 7292

## 2024-08-07 NOTE — ANESTHESIA PREPROCEDURE EVALUATION
"Procedure:  IR PE ENDOVASCULAR THERAPY    \"PMHx prior ischemic CVA on Plavix, BALTA, hypertension, diabetes, CKD4, obesity has had nonproductive cough and mild dyspnea over the last week \"    Relevant Problems   CARDIO   (+) Essential hypertriglyceridemia   (+) Primary hypertension      ENDO   (+) Type 2 diabetes mellitus with stage 3a chronic kidney disease, without long-term current use of insulin (HCC)      /RENAL   (+) Chronic kidney disease, stage 3 (HCC)      MUSCULOSKELETAL   (+) Gout        Physical Exam    Airway    Mallampati score: II  TM Distance: >3 FB  Neck ROM: full     Dental       Cardiovascular  Cardiovascular exam normal    Pulmonary  Pulmonary exam normal     Other Findings        Anesthesia Plan  ASA Score- 4 Emergent    Anesthesia Type- IV sedation with anesthesia with ASA Monitors.         Additional Monitors:     Airway Plan:            Plan Factors-    Chart reviewed. EKG reviewed.  Existing labs reviewed. Patient summary reviewed.                  Induction- intravenous.    Postoperative Plan-     Perioperative Resuscitation Plan - Level 1 - Full Code.       Informed Consent- Anesthetic plan and risks discussed with patient.  I personally reviewed this patient with the CRNA. Discussed and agreed on the Anesthesia Plan with the CRNA..        "

## 2024-08-07 NOTE — H&P
[facility]  H&P: Critical Care  Name: Shahbaz Barriga 74 y.o. male I MRN: [id number]  Unit/Bed#: MICU 02 I Date of Admission: 8/7/2024   Date of Service: 8/7/2024 I Hospital Day: 0      Assessment & Plan   Neuro:     History of CVA  Atorvastatin  Clopidogrel    CV:     Essential Hypertension  Holding PTA Amlodipine  Holding PTA Losartan  Holding PTA Chlorthalidone    Home Medication  Fenofibrate    Pulm:  Shortness of Breath 2/2 Pulmonary Embolism  CTA Chest showing bilateral PE  Transferred to [facility]  BNP WNL at 77. Lactate 3.3, down to 2.7 at two hours  HS Troponin elevated to 60  Echo STAT  Currently on Hep gtt  Enrolled in clinical trial - randomized to IR intervention group    BALTA  Home CPAP     GI:     History of Diverticulitis    :     CHADWICK  BUN 40, Cr 2.28. Prior Creatinine 1.29-1.59 since 7/8/22.  CKD 4  Sees nephrology outpatient; last encounter showing CKD 3a on 4/22/24.  GFR 27. Prior 46 - 59 since 9/3/22.    F/E/N:      F: None  E: BMP + Replete PRN  N: NPO    Heme/Onc:     Gout  Allopurinol    Endo:     Diabetes  Holding PTA Glipizide  SSI, Algorithm 3, Q6H while NPO  Hypoglycemia protocol    ID:   No active issues  Plan: Surveillance    MSK/Skin:   No active issues  Plan: Surveillance/Prevention    Disposition: Critical care       History of Present Illness     HPI: Shahbaz Barriga is a 74 y.o. male with PMHx prior ischemic CVA on Plavix, BALTA, hypertension, diabetes, CKD4, obesity has had nonproductive cough and mild dyspnea over the last week. Presented to [facility] for lightheadedness and dyspnea with syncopal episode and hypoxia. Required 6L NC to maintain sats >92% with no home supplemental O2 requirement previously. Elevated D-dimer followed by CTA PE study that showed bilateral pulmonary emboli. RV to LV ratio 2.18 with BNP WNL and mild Trop elevation. Placed on heparin gtt. Transferred to [facility] MICU for further management and potential IR  intervention/enrollment in clinical trial.    History obtained from chart review.  Review of Systems: Review of Systems   Constitutional:  Negative for fever.   HENT:  Negative for congestion and sinus pain.    Respiratory:  Positive for shortness of breath.    Cardiovascular:  Negative for chest pain and palpitations.   Gastrointestinal:  Negative for abdominal pain and vomiting.   Genitourinary:  Negative for dysuria and hematuria.   Musculoskeletal:  Negative for back pain and myalgias.   Skin:  Negative for rash.   Neurological:  Positive for syncope and light-headedness.    All other systems reviewed and negative unless otherwise stated in HPI above.    Historical Information   Past Medical History:  12/31/2021: Acute respiratory failure with hypoxia ([facility])  No date: CVA (cerebral vascular accident) (Formerly [facility])  No date: Diverticulitis  No date: Gout  05/28/2020: NSVT (nonsustained ventricular tachycardia) ([facility]ly [facility])  12/28/2021: Vasovagal syncope Past Surgical History:  No date: CARDIAC LOOP RECORDER  09/18/2006: COLONOSCOPY      Comment:  complete; 10 yrs  10/23/2017: COLONOSCOPY      Comment:  complete; 5 yrs  05/06/2020: COLONOSCOPY      Comment:  Severe active left-sided colitis, erythematous and                ulcerated mucosa in the rectosigmoid, inflammatory polyp   Current Outpatient Medications   Medication Instructions    allopurinol (ZYLOPRIM) 200 mg, Oral, Daily    amLODIPine (NORVASC) 10 mg, Oral, Daily    ascorbic acid (VITAMIN C) 1,000 mg, Oral, Daily    atorvastatin (LIPITOR) 40 mg, Oral, Daily with dinner    cholecalciferol (VITAMIN D3) 400 Units, Oral, Daily    cloNIDine (CATAPRES) 0.3 mg, Oral, Daily    clopidogrel (PLAVIX) 75 mg, Oral, Daily    Coenzyme Q10 200 MG capsule Oral, Daily    Contour Next Test test strip USE 1 STRIP TO CHECK GLUCOSE ONCE DAILY    fenofibrate (TRICOR) 145 mg, Oral, Daily    glipiZIDE (GLUCOTROL) 5 mg tablet TAKE ONE-HALF (1/2) TABLET DAILY WITH BREAKFAST    losartan (COZAAR)  100 mg, Oral, Daily    metoprolol tartrate (LOPRESSOR) 75 mg, Oral, 2 times daily    sulfaSALAzine (AZULFIDINE) 500 mg, Oral, 4 times daily    No Known Allergies   Social History     Tobacco Use    Smoking status: Never    Smokeless tobacco: Never   Vaping Use    Vaping status: Never Used   Substance Use Topics    Alcohol use: Never    Drug use: Never    Family History   Problem Relation Age of Onset    Breast cancer Mother     Kidney disease Father     Lung cancer Father     Other Father         smoker    Hypertension Other     Colon polyps Neg Hx     Colon cancer Neg Hx           Objective                            Vitals I/O      Most Recent Min/Max in 24hrs   Temp   Temp  Min: 97.8 °F (36.6 °C)  Max: 97.8 °F (36.6 °C)   Pulse 97 Pulse  Min: 92  Max: 113   Resp (!) 26 Resp  Min: 18  Max: 32   /82 BP  Min: 96/62  Max: 145/82   O2 Sat 97 % SpO2  Min: 93 %  Max: 100 %    No intake or output data in the 24 hours ending 08/07/24 1904    Diet NPO    Invasive Monitoring           Physical Exam   Physical Exam  Vitals and nursing note reviewed.   Eyes:      Extraocular Movements: Extraocular movements intact.      Conjunctiva/sclera: Conjunctivae normal.      Pupils: Pupils are equal, round, and reactive to light.   Skin:     General: Skin is warm and dry.      Capillary Refill: Capillary refill takes 2 to 3 seconds.      Coloration: Skin is pale.   HENT:      Head: Normocephalic and atraumatic.      Mouth/Throat:      Mouth: Mucous membranes are moist.      Pharynx: Oropharynx is clear.   Neck:      Trachea: Phonation normal.   Cardiovascular:      Rate and Rhythm: Regular rhythm. Tachycardia present.      Pulses: Normal pulses.   Musculoskeletal:         General: No deformity. Normal range of motion.      Cervical back: Neck supple.   Abdominal:      Palpations: Abdomen is soft.      Tenderness: There is no abdominal tenderness.   Constitutional:       General: He is awake.      Appearance: He is obese. He is  ill-appearing.      Interventions: Nasal cannula in place.   Pulmonary:      Effort: Tachypnea present.      Breath sounds: Normal breath sounds.   Psychiatric:         Behavior: Behavior is cooperative.   Neurological:      General: No focal deficit present.      Mental Status: He is alert and oriented to person, place, and time. Mental status is at baseline.            Diagnostic Studies      EKG: Sinus tachycardia with LAD  Imaging: CTA Chest showing PE I have personally reviewed pertinent films in PACS     Medications:  Scheduled PRN   [START ON 8/8/2024] allopurinol, 200 mg, Daily  chlorhexidine, 15 mL, Q12H JOLIE  heparin, , Once  insulin lispro, 1-6 Units, Q6H JOLIE          Continuous            Labs:    CBC    Recent Labs     08/07/24  1310   WBC 10.75*   HGB 15.4   HCT 47.6        BMP    Recent Labs     08/07/24  1310 08/07/24  1320   SODIUM 141  --    K 3.4*  --      --    CO2 22 24   AGAP 16*  --    BUN 40*  --    CREATININE 2.28*  --    CALCIUM 9.4  --        Coags    Recent Labs     08/07/24  1310   INR 1.06   PTT 31        Additional Electrolytes  Recent Labs     08/07/24  1320   CAIONIZED 1.17          Blood Gas    No recent results  No recent results LFTs  Recent Labs     08/07/24  1310   ALT 31   AST 40*   ALKPHOS 60   ALB 4.1   TBILI 0.80       Infectious  Recent Labs     08/07/24  1310   PROCALCITONI 0.16     Glucose  Recent Labs     08/07/24  1310   GLUC 225*               Sally Manzanares DO

## 2024-08-07 NOTE — CONSULTS
Interventional Radiology  Consultation Note 8/7/2024     Shahbaz Barriga   1950   [id number]    Inpatient Consult to IR  Consult performed by: Dylon Walters MD  Consult ordered by: Sally Magallaens DO        Assessment/Recommendation:   74-year-old man with history of BALTA, HTN, T2DM, CKD, obesity, prior ischemic CVA on Plavix who presented to [facility] Emergency Department earlier today with respiratory distress.      Per chart review, pulse oximetry was in 70s during EMS transport.  Documented vitals early after presentation were notable for HR > 110 bpm, SBP < 100 mmHg.    CT PE was notable for pulmonary embolism involving both main pulmonary arteries with extension into lobar and smaller branches.  There was evidence of right heart strain on CT PE.    Labs were notable for elevated troponin with normal range BNP.    He meets criteria for intermediate-high risk PE with sPESI = 4 (for HR, SBP, O2 sat, and chronic cardiopulmonary disease), evidence of right heart strain on imaging, and positive biomarker.  PERT was activated and we agreed to have him transferred to [facility] for higher level of care.    He was screened for and agreed to enroll in the PEERLESS II trial, and was subsequently randomized to the FlowTriever arm of the trial.    Informed consent for the trial and for pulmonary thromboembolectomy has been obtained.  We will proceed with treatment this evening.      Problem List Items Addressed This Visit    None  Visit Diagnoses       Acute pulmonary embolism, unspecified pulmonary embolism type, unspecified whether acute cor pulmonale present (HCC)    -  Primary    Relevant Orders    Consult to Case Management           31 + minutes, >50% of the total time devoted to medical consultative verbal/EMR discussion between providers. Written report will be generated in the EMR.     Thank you for allowing Interventional Radiology to participate in the care of Shahbaz Barriga.  Please don't hesitate to call or TigerText us with any questions.     Dylon Walters MD    Subjective:     Patient ID: Shahbaz Barriga is a 74 y.o. male.    History of Present Illness    74-year-old man with history of BALTA, HTN, T2DM, CKD, obesity, prior ischemic CVA on Plavix who presented to [facility] Emergency Department earlier today with respiratory distress.      Per chart review, pulse oximetry was in 70s during EMS transport.  Documented vitals early after presentation were notable for HR > 110 bpm, SBP < 100 mmHg.    CT PE was notable for pulmonary embolism involving both main pulmonary arteries with extension into lobar and smaller branches.  There was evidence of right heart strain on CT PE.    Labs were notable for elevated troponin with normal range BNP.    He meets criteria for intermediate-high risk PE with sPESI = 4 (for HR, SBP, O2 sat, and chronic cardiopulmonary disease), evidence of right heart strain on imaging, and positive biomarker.  PERT was activated and we agreed [place] [facility] [place]    He was screened for and agreed to enroll in the PEERLESS II trial, and was subsequently randomized to the FlowTriever arm of the trial.      Past Medical History:   Diagnosis Date    Acute respiratory failure with hypoxia ([facility]) 12/31/2021    CVA (cerebral vascular accident) ([facility])     Diverticulitis     Gout     NSVT (nonsustained ventricular tachycardia) ([facility]) 05/28/2020    Vasovagal syncope 12/28/2021        Past Surgical History:   Procedure Laterality Date    CARDIAC LOOP RECORDER      COLONOSCOPY  09/18/2006    complete; 10 yrs    COLONOSCOPY  10/23/2017    complete; 5 yrs    COLONOSCOPY  05/06/2020    Severe active left-sided colitis, erythematous and ulcerated mucosa in the rectosigmoid, inflammatory polyp        Social History     Tobacco Use   Smoking Status Never   Smokeless Tobacco Never        Social History     Substance and Sexual  "Activity   Alcohol Use Never        Social History     Substance and Sexual Activity   Drug Use Never        No Known Allergies    Current Facility-Administered Medications   Medication Dose Route Frequency Provider Last Rate Last Admin    [START ON 8/8/2024] allopurinol (ZYLOPRIM) tablet 200 mg  200 mg Oral Daily Radha Fonseca, PA-C        chlorhexidine (PERIDEX) 0.12 % oral rinse 15 mL  15 mL Mouth/Throat Q12H [facility] Radha Fonseca, PA-C        heparin (porcine) 25,000 units in 0.45% NaCl 250 mL infusion (premix)  3-30 Units/kg/hr (Order-Specific) Intravenous Titrated Radha Fonseca, PA-C        heparin (porcine) injection 4,200 Units  4,200 Units Intravenous Q6H PRN Radha G Fonseca, PA-C        heparin (porcine) injection 8,400 Units  8,400 Units Intravenous Q6H PRN Radha G Fonseca, PA-C        insulin lispro (HumALOG/ADMELOG) 100 units/mL subcutaneous injection 1-6 Units  1-6 Units Subcutaneous Q6H [facility] Sally Magallanes,             Objective:    Vitals:    08/07/24 1900 08/07/24 1916 08/07/24 1923   BP: 134/82 134/82    Pulse: 97 97    Resp: (!) 26     SpO2: 97%  98%   Weight:  105 kg (231 lb)    Height:  5' 9\" (1.753 m)      Physical Exam:  General: Ill appearing.  HEENT: NC/AT.  EOMI.  CV: RRR  Chest: Tachypneic with oxygen supplementation.  Speaking short sentences.  Abdomen: Soft, ND/NT.  Skin: Warm and dry  Neuro: Alert and oriented x 3.  No focal deficits.      Lab Results   Component Value Date    BNP 77 08/07/2024      Lab Results   Component Value Date    WBC 10.75 (H) 08/07/2024    HGB  08/07/2024      Comment:      Test Error    HCT  08/07/2024      Comment:      Test Error    MCV 95 08/07/2024     08/07/2024     Lab Results   Component Value Date    INR 1.06 08/07/2024    INR 0.94 12/28/2021    INR 1.03 03/19/2020    PROTIME 14.3 08/07/2024    PROTIME 12.5 12/28/2021    PROTIME 13.2 03/19/2020     Lab Results   Component Value Date    PTT 31 08/07/2024         I have personally " reviewed pertinent imaging and laboratory results.     Code Status: Level 1 - Full Code  Advance Directive and Living Will:      Power of :    POLST:      This text is generated with voice recognition software. There may be translation, syntax,  or grammatical errors. If you have any questions, please contact the dictating provider.

## 2024-08-07 NOTE — ED NOTES
Patient denies being able to provide urine sample at this time.      Luclile Thomas RN  08/07/24 2220

## 2024-08-07 NOTE — ED PROVIDER NOTES
History  Chief Complaint   Patient presents with    Respiratory Distress     Patient reports to ED via EMS c/o respiratory distress. Arrives on 10 L via nonrebreather. Denies pain.      74-year-old male with history of prior ischemic CVA on Plavix, BALTA, hypertension, diabetes, CKD, obesity has had nonproductive cough and mild dyspnea over the last week.  No chest pain, lightheadedness, sputum production, chest pain, palpitations.  Today he suddenly became very lightheaded, dizzy and dyspneic after giving a urine sample at the lab.  Medic states that he had a brief syncopal episode for them without any trauma.  Currently feels better, just complaining of dyspnea on exertion.  Pulse ox in the 70s on the way here despite nasal cannula.  Currently 92 to 94% pulse ox on 6 L/min nasal cannula.  No obvious respiratory distress.  He does not appear to volume overloaded.  Denies recent pain or swelling of any extremity, immobilization, long distance travel, history of cancer or thrombophilia, hemoptysis, etc.        Prior to Admission Medications   Prescriptions Last Dose Informant Patient Reported? Taking?   Coenzyme Q10 200 MG capsule  Self Yes No   Sig: Take by mouth daily    Contour Next Test test strip   No No   Sig: USE 1 STRIP TO CHECK GLUCOSE ONCE DAILY   allopurinol (ZYLOPRIM) 100 mg tablet   No No   Sig: TAKE 2 TABLETS DAILY   amLODIPine (NORVASC) 10 mg tablet   No No   Sig: Take 1 tablet (10 mg total) by mouth in the morning   ascorbic acid (VITAMIN C) 1000 MG tablet  Self No No   Sig: Take 1 tablet (1,000 mg total) by mouth daily   atorvastatin (LIPITOR) 40 mg tablet   No No   Sig: Take 1 tablet (40 mg total) by mouth daily with dinner   cholecalciferol (VITAMIN D3) 400 units tablet  Self No No   Sig: Take 1 tablet (400 Units total) by mouth daily   cloNIDine (CATAPRES) 0.3 mg tablet   No No   Sig: Take 1 tablet (0.3 mg total) by mouth in the morning   clopidogrel (PLAVIX) 75 mg tablet   No No   Sig: Take 1  tablet (75 mg total) by mouth daily   fenofibrate (TRICOR) 145 mg tablet   No No   Sig: Take 1 tablet (145 mg total) by mouth daily   glipiZIDE (GLUCOTROL) 5 mg tablet   No No   Sig: TAKE ONE-HALF (1/2) TABLET DAILY WITH BREAKFAST   losartan (COZAAR) 100 MG tablet   No No   Sig: Take 1 tablet (100 mg total) by mouth daily   metoprolol tartrate (LOPRESSOR) 50 mg tablet   No No   Sig: Take 1.5 tablets (75 mg total) by mouth 2 (two) times a day   sulfaSALAzine (AZULFIDINE) 500 mg tablet   No No   Sig: Take 1 tablet (500 mg total) by mouth 4 (four) times a day      Facility-Administered Medications: None       Past Medical History:   Diagnosis Date    Acute respiratory failure with hypoxia ([facility]) 12/31/2021    CVA (cerebral vascular accident) ([facility])     Diverticulitis     Gout     NSVT (nonsustained ventricular tachycardia) ([facility]) 05/28/2020    Vasovagal syncope 12/28/2021       Past Surgical History:   Procedure Laterality Date    CARDIAC LOOP RECORDER      COLONOSCOPY  09/18/2006    complete; 10 yrs    COLONOSCOPY  10/23/2017    complete; 5 yrs    COLONOSCOPY  05/06/2020    Severe active left-sided colitis, erythematous and ulcerated mucosa in the rectosigmoid, inflammatory polyp       Family History   Problem Relation Age of Onset    Breast cancer Mother     Kidney disease Father     Lung cancer Father     Other Father         smoker    Hypertension Other     Colon polyps Neg Hx     Colon cancer Neg Hx      I have reviewed and agree with the history as documented.    E-Cigarette/Vaping    E-Cigarette Use Never User      E-Cigarette/Vaping Substances    Nicotine No     THC No     CBD No     Flavoring No     Other No     Unknown No      Social History     Tobacco Use    Smoking status: Never    Smokeless tobacco: Never   Vaping Use    Vaping status: Never Used   Substance Use Topics    Alcohol use: Never    Drug use: Never       Review of Systems   Constitutional:  Negative for fever.   Respiratory:  Positive for  shortness of breath (today). Negative for cough.    Cardiovascular:  Negative for chest pain, palpitations and leg swelling.   Gastrointestinal:  Negative for blood in stool.   Genitourinary:  Negative for hematuria.       Physical Exam  Physical Exam  Vitals and nursing note reviewed.   Constitutional:       General: He is not in acute distress.     Appearance: He is well-developed. He is obese. He is ill-appearing. He is not diaphoretic.   HENT:      Head: Normocephalic and atraumatic.      Right Ear: External ear normal.      Left Ear: External ear normal.      Nose: Nose normal.      Mouth/Throat:      Mouth: Mucous membranes are moist.      Pharynx: Oropharynx is clear.   Eyes:      General: No scleral icterus.     Conjunctiva/sclera: Conjunctivae normal.   Neck:      Vascular: No JVD.   Cardiovascular:      Rate and Rhythm: Regular rhythm. Tachycardia present.      Pulses: Normal pulses.      Heart sounds: Normal heart sounds.   Pulmonary:      Effort: Pulmonary effort is normal. No respiratory distress.      Comments: Breath sounds slightly distant at left base.  No rales, rhonchi or wheeze  Abdominal:      General: Bowel sounds are normal.      Palpations: Abdomen is soft. There is no mass.      Tenderness: There is no abdominal tenderness.   Musculoskeletal:         General: No swelling or tenderness. Normal range of motion.      Cervical back: Neck supple.      Right lower leg: No edema.      Left lower leg: No edema.   Skin:     General: Skin is warm and dry.      Capillary Refill: Capillary refill takes less than 2 seconds.      Coloration: Skin is pale.      Findings: No rash.   Neurological:      General: No focal deficit present.      Mental Status: He is alert and oriented to person, place, and time. Mental status is at baseline.      Cranial Nerves: No cranial nerve deficit.      Coordination: Coordination normal.      Deep Tendon Reflexes: Reflexes are normal and symmetric.   Psychiatric:          Mood and Affect: Mood normal.         Behavior: Behavior normal.         Vital Signs  ED Triage Vitals   Temperature Pulse Respirations Blood Pressure SpO2   08/07/24 1309 08/07/24 1300 08/07/24 1300 08/07/24 1309 08/07/24 1300   97.8 °F (36.6 °C) (!) 113 (!) 28 113/63 94 %      Temp Source Heart Rate Source Patient Position - Orthostatic VS BP Location FiO2 (%)   08/07/24 1309 08/07/24 1300 08/07/24 1300 08/07/24 1300 08/07/24 1529   Oral Monitor Sitting Left arm 40      Pain Score       08/07/24 1328       No Pain           Vitals:    08/07/24 1625 08/07/24 1630 08/07/24 1647 08/07/24 1700   BP: 145/82 116/70 116/70 126/87   Pulse: 99 100 102 104   Patient Position - Orthostatic VS: Sitting Sitting Lying          Visual Acuity      ED Medications  Medications   multi-electrolyte (PLASMALYTE-A/ISOLYTE-S PH 7.4) IV solution 1,000 mL (0 mL Intravenous Stopped 8/7/24 1540)   iohexol (OMNIPAQUE) 350 MG/ML injection (MULTI-DOSE) 85 mL (85 mL Intravenous Given 8/7/24 1507)   heparin (porcine) injection 8,400 Units (8,400 Units Intravenous Given 8/7/24 1531)       Diagnostic Studies  Results Reviewed       Procedure Component Value Units Date/Time    Lactic acid 2 Hours [[id number]]  (Abnormal) Collected: 08/07/24 1545    Lab Status: Final result Specimen: Blood from Arm, Left Updated: 08/07/24 1628     LACTIC ACID 2.7 mmol/L     Narrative:      Result may be elevated if tourniquet was used during collection.    HS Troponin I 4hr [[id number]]     Lab Status: No result Specimen: Blood     HS Troponin I 2hr [[id number]]     Lab Status: No result Specimen: Blood     HS Troponin 0hr (reflex protocol) [[id number]]  (Abnormal) Collected: 08/07/24 1512    Lab Status: Final result Specimen: Blood from Arm, Left Updated: 08/07/24 1541     hs TnI 0hr 60 ng/L     B-Type Natriuretic Peptide(BNP) [[id number]]  (Normal) Collected: 08/07/24 1310    Lab Status: Final result Specimen: Blood from Arm, Left Updated: 08/07/24 1527     BNP 77  pg/mL     APTT [[id number]]     Lab Status: No result Specimen: Blood     D-Dimer [[id number]]  (Abnormal) Collected: 08/07/24 1310    Lab Status: Final result Specimen: Blood from Arm, Left Updated: 08/07/24 1419     D-Dimer, Quant 11.89 ug/ml FEU     Narrative:      In the evaluation for possible pulmonary embolism, in the appropriate (Well's Score of 4 or less) patient, the age adjusted d-dimer cutoff for this patient can be calculated as:    Age x 0.01 (in ug/mL) for Age-adjusted D-dimer exclusion threshold for a patient over 50 years.    Procalcitonin [[id number]]  (Normal) Collected: 08/07/24 1310    Lab Status: Final result Specimen: Blood from Arm, Left Updated: 08/07/24 1402     Procalcitonin 0.16 ng/ml     Protime-INR [[id number]]  (Normal) Collected: 08/07/24 1310    Lab Status: Final result Specimen: Blood from Arm, Left Updated: 08/07/24 1357     Protime 14.3 seconds      INR 1.06    Narrative:      INR Therapeutic Range    Indication                                             INR Range      Atrial Fibrillation                                               2.0-3.0  Hypercoagulable State                                    2.0.2.3  Left Ventricular Asist Device                            2.0-3.0  Mechanical Heart Valve                                  -    Aortic(with afib, MI, embolism, HF, LA enlargement,    and/or coagulopathy)                                     2.0-3.0 (2.5-3.5)     Mitral                                                             2.5-3.5  Prosthetic/Bioprosthetic Heart Valve               2.0-3.0  Venous thromboembolism (VTE: VT, PE        2.0-3.0    APTT [[id number]]  (Normal) Collected: 08/07/24 1310    Lab Status: Final result Specimen: Blood from Arm, Left Updated: 08/07/24 1357     PTT 31 seconds     Comprehensive metabolic panel [[id number]]  (Abnormal) Collected: 08/07/24 1310    Lab Status: Final result Specimen: Blood from Arm, Left Updated: 08/07/24 1352     Sodium 141 mmol/L       Potassium 3.4 mmol/L      Chloride 103 mmol/L      CO2 22 mmol/L      ANION GAP 16 mmol/L      BUN 40 mg/dL      Creatinine 2.28 mg/dL      Glucose 225 mg/dL      Calcium 9.4 mg/dL      AST 40 U/L      ALT 31 U/L      Alkaline Phosphatase 60 U/L      Total Protein 7.1 g/dL      Albumin 4.1 g/dL      Total Bilirubin 0.80 mg/dL      eGFR 27 ml/min/1.73sq m     Narrative:      National Kid[employer] Foundation guidelines for Chronic Kidney Disease (CKD):     Stage 1 with normal or high GFR (GFR > 90 mL/min/1.73 square meters)    Stage 2 Mild CKD (GFR = 60-89 mL/min/1.73 square meters)    Stage 3A Moderate CKD (GFR = 45-59 mL/min/1.73 square meters)    Stage 3B Moderate CKD (GFR = 30-44 mL/min/1.73 square meters)    Stage 4 Severe CKD (GFR = 15-29 mL/min/1.73 square meters)    Stage 5 End Stage CKD (GFR <15 mL/min/1.73 square meters)  Note: GFR calculation is accurate only with a steady state creatinine    Lactic acid [[id number]]  (Abnormal) Collected: 08/07/24 1310    Lab Status: Final result Specimen: Blood from Arm, Left Updated: 08/07/24 1352     LACTIC ACID 3.3 mmol/L     Narrative:      Result may be elevated if tourniquet was used during collection.    CBC and differential [[id number]]  (Abnormal) Collected: 08/07/24 1310    Lab Status: Final result Specimen: Blood from Arm, Left Updated: 08/07/24 1338     WBC 10.75 Thousand/uL      RBC 5.03 Million/uL      Hemoglobin 15.4 g/dL      Hematocrit 47.6 %      MCV 95 fL      MCH 30.6 pg      MCHC 32.4 g/dL      RDW 14.9 %      MPV 10.0 fL      Platelets 293 Thousands/uL      nRBC 0 /100 WBCs      Segmented % 82 %      Immature Grans % 1 %      Lymphocytes % 11 %      Monocytes % 4 %      Eosinophils Relative 1 %      Basophils Relative 1 %      Absolute Neutrophils 8.87 Thousands/µL      Absolute Immature Grans 0.09 Thousand/uL      Absolute Lymphocytes 1.18 Thousands/µL      Absolute Monocytes 0.42 Thousand/µL      Eosinophils Absolute 0.14 Thousand/µL       "Basophils Absolute 0.05 Thousands/µL     POCT Blood Gas (CG8+) [[id number]]  (Abnormal) Collected: 08/07/24 1320    Lab Status: Final result Specimen: Venous Updated: 08/07/24 1323     ph, Davis ISTAT 7.326     pCO2, Davis i-STAT 43.1 mm HG      pO2, Davis i-STAT 24.0 mm HG      BE, i-STAT -4 mmol/L      HCO3, Davis i-STAT 22.5 mmol/L      CO2, i-STAT 24 mmol/L      O2 Sat, i-STAT 37 %      SODIUM, I-STAT --     Potassium, i-STAT --     Calcium, Ionized i-STAT 1.17 mmol/L      Hct, i-STAT --     Hgb, i-STAT --     Glucose, i-STAT 219 mg/dl      Specimen Type VENOUS    UA w Reflex to Microscopic w Reflex to Culture [[id number]]     Lab Status: No result Specimen: Urine                    CTA ED chest PE study   Final Result by Joleen Javier MD (08/07 1607)      Bilateral severe pulmonary embolism         The calculated ratio of right ventricular to left ventricular diameter (RV/LV ratio) is ...... 2.18      There is a critical finding of acute PE with RV/LV ratio >0.9. Based on PERT algorithm recommendations:    \"  Order STAT biomarkers including troponin, NT-BNP or BNP    \"  Calculate PESI score:   o  If PESI score falls in I-III, with negative biomarkers, please order a PERT priority ECHO.   o  If PESI score falls in IV-V or with positive biomarkers, please order STAT ECHO and alert the campus PERT via PAC at [phone].            I personally discussed this study with TAWANNA WHITLEY on 8/7/2024 4:05 PM.            Workstation performed: [id number]         XR chest 1 view portable   Final Result by Quinn Reveles MD (08/07 6653)      No acute cardiopulmonary disease.            Workstation performed: [id number]                    Procedures  ECG 12 Lead Documentation Only    Date/Time: 8/7/2024 1:31 PM    Performed by: Tawanna Whitley DO  Authorized by: Tawanna Whitley DO    ECG reviewed by me, the ED Provider: yes    Patient location:  ED  Previous ECG:     Previous ECG:  Unavailable    Comparison to " cardiac monitor: Yes    Rhythm:     Rhythm: sinus tachycardia    Ectopy:     Ectopy: none    QRS:     QRS axis:  Left    QRS intervals:  Normal  Conduction:     Conduction: normal    ST segments:     ST segments:  Normal  T waves:     T waves: normal    Q waves:     Q waves:  III and aVF  CriticalCare Time    Date/Time: 8/7/2024 6:53 PM    Performed by: Michel Whitley DO  Authorized by: Michel Whitley DO    Critical care provider statement:     Critical care time (minutes):  60    Critical care start time:  8/7/2024 12:56 PM    Critical care end time:  8/7/2024 4:15 PM    Critical care time was exclusive of:  Separately billable procedures and treating other patients and teaching time    Critical care was necessary to treat or prevent imminent or life-threatening deterioration of the following conditions:  Circulatory failure and respiratory failure    Critical care was time spent personally by me on the following activities:  Obtaining history from patient or surrogate, development of treatment plan with patient or surrogate, discussions with consultants, evaluation of patient's response to treatment, examination of patient, ordering and review of radiographic studies, ordering and review of laboratory studies, re-evaluation of patient's condition and review of old charts    I assumed direction of critical care for this patient from another provider in my specialty: no             ED Course  ED Course as of 08/07/24 1855   Wed Aug 07, 2024   1447 After discussion with radiologist, Dr. Moore I discussed risk and benefits of CTA PE study with patient and his wife.  I explained that his renal function is below normal but that the benefit outweighs the risk at this time.  They are in agreement.                                 SBIRT 20yo+      Flowsheet Row Most Recent Value   Initial Alcohol Screen: US AUDIT-C     1. How often do you have a drink containing alcohol? 0 Filed at: 08/07/2024 0924   2. How many drinks  containing alcohol do you have on a typical day you are drinking?  0 Filed at: 08/07/2024 1259   3a. Male UNDER 65: How often do you have five or more drinks on one occasion? 0 Filed at: 08/07/2024 1259   3b. FEMALE Any Age, or MALE 65+: How often do you have 4 or more drinks on one occassion? 0 Filed at: 08/07/2024 1259   Audit-C Score 0 Filed at: 08/07/2024 1259   EMANUEL: How many times in the past year have you...    Used an illegal drug or used a prescription medication for non-medical reasons? Never Filed at: 08/07/2024 1259            Wells' Criteria for PE      Flowsheet Row Most Recent Value   Wells' Criteria for PE    Clinical signs and symptoms of DVT 0 Filed at: 08/07/2024 1440   PE is primary diagnosis or equally likely 3 Filed at: 08/07/2024 1440   HR >100 1.5 Filed at: 08/07/2024 1440   Immobilization at least 3 days or Surgery in the previous 4 weeks 0 Filed at: 08/07/2024 1440   Previous, objectively diagnosed PE or DVT 0 Filed at: 08/07/2024 1440   Hemoptysis 0 Filed at: 08/07/2024 1440   Malignancy with treatment within 6 months or palliative 0 Filed at: 08/07/2024 1440   Wells' Criteria Total 4.5 Filed at: 08/07/2024 1440                  Medical Decision Making  74-year-old male with diabetes, chronic kidney disease, obesity, BALTA, remote stroke presents after a brief syncopal episode.  Complains of dyspnea.  No recent signs of infection or volume overload.  No recent risk factors for DVT or pulmonary embolism and without prior history of thrombophilia.  Concern for occult pneumonia, he sepsis, COVID infection, dehydration, electrolyte abnormality, acute kidney injury, ACS, cardiac dysrhythmia.  Sepsis workup    No signs of pneumonia, UTI or other infection.  Patient with acute kidney injury and clinically high probability of pulmonary embolism.  Discussed with radiologist.  We feel that the benefit of CTA pulmonary study outweighs the risks to kidney.  This was described to patient and his wife who  agree to go with the imaging study.    Acute pulmonary embolism on CTA.  PESI score V.  Discussed with critical care APN attending.  Heparin started.  Critical care at bedside now for bedside TTE.    Amount and/or Complexity of Data Reviewed  Labs: ordered.  Radiology: ordered and independent interpretation performed.    Risk  Prescription drug management.        PESI  Age: 74 years old  Sex: 10  History of Cancer: 0  History of Heart Failure: 0  History of Chronic Lung Disease: 0  Heart rate greater than or equal to 110: 20  Systolic BP < 100 mmH  Respiratory rate greater than or equal to 30: 0  Temperature <36°C/96.8°F: 0  Altered Mental Status (Disorientation, lethargy, stupor, or coma): 0  O2 saturation <90%: 20  PESI Score Results: 153            Disposition  Final diagnoses:   Acute pulmonary embolism with acute cor pulmonale (HCC)   Acute respiratory failure with hypoxia (HCC)     Time reflects when diagnosis was documented in both MDM as applicable and the Disposition within this note       Time User Action Codes Description Comment    2024  4:20 PM Michel Whitley Add [I26.09] Acute pulmonary embolism with acute cor pulmonale (HCC)     2024  4:20 PM Michel Whitley Add [J96.01] Acute respiratory failure with hypoxia (HCC)           ED Disposition       ED Disposition   Transfer to Another Facility-In Network    Condition   --    Date/Time   Wed Aug 7, 2024 1620    Comment   Shahbaz SUTHERLAND Nithya should be transferred out to B.               MD Documentation      Flowsheet Row Most Recent Value   Patient Condition The patient has been stabilized such that within reasonable medical probability, no material deterioration of the patient condition or the condition of the unborn child(macy) is likely to result from the transfer   Reason for Transfer Level of Care needed not available at this facility   Benefits of Transfer Specialized equipment and/or services available at the receiving facility  (Include comment)________________________  [PERT team, IR, CT surgery, thrombectomy capabilities]   Risks of Transfer Potential for delay in receiving treatment, Potential deterioration of medical condition, Loss of IV, Increased discomfort during transfer, Possible worsening of condition or death during transfer   Accepting Physician Clarita   Accepting Facility Name, [facility]    (Name & Tel number) PACS   Transported by (Company and Unit #) [employer]   Sending MD Whitley   Provider Certification General risk, such as traffic hazards, adverse weather conditions, rough terrain or turbulence, possible failure of equipment (including vehicle or aircraft), or consequences of actions of persons outside the control of the transport personnel, Unanticipated needs of medical equipment and personnel during transport, Risk of worsening condition          RN Documentation      Flowsheet Row Most Recent Value   Accepting Facility Name, [facility]    (Name & Tel number) PACS   Transported by (Company and Unit #) [employer]          Follow-up Information    None         Discharge Medication List as of 8/7/2024  5:15 PM        CONTINUE these medications which have NOT CHANGED    Details   allopurinol (ZYLOPRIM) 100 mg tablet TAKE 2 TABLETS DAILY, Starting Thu 7/25/2024, Normal      amLODIPine (NORVASC) 10 mg tablet Take 1 tablet (10 mg total) by mouth in the morning, Starting Fri 1/12/2024, Normal      ascorbic acid (VITAMIN C) 1000 MG tablet Take 1 tablet (1,000 mg total) by mouth daily, Starting Fri 1/14/2022, No Print      atorvastatin (LIPITOR) 40 mg tablet Take 1 tablet (40 mg total) by mouth daily with dinner, Starting Mon 6/24/2024, Normal      cholecalciferol (VITAMIN D3) 400 units tablet Take 1 tablet (400 Units total) by mouth daily, Starting Fri 1/14/2022, No Print      cloNIDine (CATAPRES) 0.3 mg tablet Take 1 tablet (0.3 mg total) by mouth in the morning, Starting Fri  4/19/2024, Normal      clopidogrel (PLAVIX) 75 mg tablet Take 1 tablet (75 mg total) by mouth daily, Starting Fri 6/28/2024, Normal      Coenzyme Q10 200 MG capsule Take by mouth daily , Historical Med      Contour Next Test test strip USE 1 STRIP TO CHECK GLUCOSE ONCE DAILY, Normal      fenofibrate (TRICOR) 145 mg tablet Take 1 tablet (145 mg total) by mouth daily, Starting Thu 6/6/2024, Normal      glipiZIDE (GLUCOTROL) 5 mg tablet TAKE ONE-HALF (1/2) TABLET DAILY WITH BREAKFAST, Normal      losartan (COZAAR) 100 MG tablet Take 1 tablet (100 mg total) by mouth daily, Starting Mon 4/8/2024, Normal      metoprolol tartrate (LOPRESSOR) 50 mg tablet Take 1.5 tablets (75 mg total) by mouth 2 (two) times a day, Starting Fri 3/22/2024, Until Thu 6/20/2024, Normal      sulfaSALAzine (AZULFIDINE) 500 mg tablet Take 1 tablet (500 mg total) by mouth 4 (four) times a day, Starting Tue 9/19/2023, Normal             No discharge procedures on file.    PDMP Review         Value Time User    PDMP Reviewed  Yes 1/13/2022  6:29 PM Mayi Cai DO            ED Provider  Electronically Signed by             Michel Whitley DO  08/07/24 0247

## 2024-08-07 NOTE — PROGRESS NOTES
"[place] II Clinical Trial    Patient was consented to the clinical trial and randomized to the [facility] Flowtriever intervention arm.    -Current location of PE per CT imaging (check all that apply): Right Main Pulmonary Artery and Left Main Pulmonary Artery  -Has the current PE been treated with therapies prior to enrollment? Yes   -If yes, indicate treatment(s) that occurred prior to enrollment (check all that apply): Anticoagulation  *Note: Treatment with standard-of-care anticoagulation prior to enrollment is allowed. However, subject is excluded if there has been an administration of advanced therapies (bolus, drip/infusion, or catheter-directed thrombolytic therapy, mechanical thrombectomy, ECMO) for the index PE event within 30 days prior to enrollment  -Dyspnea/mMRC score: II -- Walks slower than people of the same age on level ground because of SOB and/or have to stop for breath  -Modified Radha Scale at rest: 0.5: Very, very slight (just noticeable)  -Current/Concomitant DVT: Yes    -If yes, DVT location(s) (check all that apply): Femoropopliteal  -Active bleeding: No   -If yes, specify: N/A  -Simplified Pulmonary Embolism Severity Index (sPESI): 3  -NYHA/WHO Classification: II  -Echo or CTPA Image collected at the visit: Both Echo and CTPA   *Reminder: Modality must match at 48-Hour visit*  -Pre-Procedure Pulmonary Artery Pressure: 72/11  -Pre-Procedure Mean Pulmonary Artery Pressure: 36  -Post-Procedure Pulmonary Artery Pressure: 36/4  -Post-Procedure Mean Pulmonary Artery Pressure: 15    Procedure Details  -Venous Access Time (as defined as \"needle stick\"): 2048  -Initial Flowtriever system Triever Insertion Time: 2100  -Final Flowtriever system Triever Removal Time: 2223  -Date of exit from procedure room for Index Procedure: 8/7/24  -Time of exit from procedure room for Index Procedure: 2249  -Anesthesia (check all that apply): Local without sedation  -Was low-dose adjunctive CDT (up to 10 mg tPA) " administered to patient? No   *NOTE: ? 10 mg tPA is not considered a bailout per protocol.    Access Site  -Ultrasound Assisted: Yes   -Side of access site of the treatment catheter: right  -Access site of the treatment catheter: Common femoral vein  -Closure Device/Type (Check all that apply): Flowstasis  -Were there any access site complications? No    Procedure Summary  -Where was the clot identified (check all that apply): Right Main Pulmonary Artery, Left Main Pulmonary Artery, Right Lobar, and Left Lobar  -Where was aspiration performed or attempted (check all that apply): Right Main Pulmonary Artery, Left Main Pulmonary Artery, Right Lobar, and Left Lobar  -Fluoro Duration (minutes): 30.0  -Net estimated blood loss (mL): 100  -Estimated % Residual Clot in Treated Vessel(s): 20%  -Were there any changes in anticoagulant medication at randomation: No  -Were there any bailouts: No

## 2024-08-08 PROBLEM — I26.99 ACUTE PULMONARY EMBOLISM (HCC): Status: ACTIVE | Noted: 2024-08-08

## 2024-08-08 LAB
ANION GAP SERPL CALCULATED.3IONS-SCNC: 13 MMOL/L (ref 4–13)
ANION GAP SERPL CALCULATED.3IONS-SCNC: 13 MMOL/L (ref 4–13)
AORTIC ROOT: 2.9 CM
APICAL FOUR CHAMBER EJECTION FRACTION: 51 %
APTT PPP: 66 SECONDS (ref 23–34)
APTT PPP: 98 SECONDS (ref 23–34)
APTT PPP: >210 SECONDS (ref 23–34)
APTT PPP: >210 SECONDS (ref 23–34)
ASCENDING AORTA: 3.4 CM
ATRIAL RATE: 107 BPM
BASOPHILS # BLD AUTO: 0.06 THOUSANDS/ÂΜL (ref 0–0.1)
BASOPHILS # BLD AUTO: 0.08 THOUSANDS/ÂΜL (ref 0–0.1)
BASOPHILS NFR BLD AUTO: 1 % (ref 0–1)
BASOPHILS NFR BLD AUTO: 1 % (ref 0–1)
BSA FOR ECHO PROCEDURE: 2.2 M2
BUN SERPL-MCNC: 35 MG/DL (ref 5–25)
BUN SERPL-MCNC: 36 MG/DL (ref 5–25)
CALCIUM SERPL-MCNC: 8.4 MG/DL (ref 8.4–10.2)
CALCIUM SERPL-MCNC: 8.4 MG/DL (ref 8.4–10.2)
CHLORIDE SERPL-SCNC: 105 MMOL/L (ref 96–108)
CHLORIDE SERPL-SCNC: 106 MMOL/L (ref 96–108)
CO2 SERPL-SCNC: 21 MMOL/L (ref 21–32)
CO2 SERPL-SCNC: 22 MMOL/L (ref 21–32)
CREAT SERPL-MCNC: 1.8 MG/DL (ref 0.6–1.3)
CREAT SERPL-MCNC: 1.8 MG/DL (ref 0.6–1.3)
E WAVE DECELERATION TIME: 110 MS
EOSINOPHIL # BLD AUTO: 0.07 THOUSAND/ÂΜL (ref 0–0.61)
EOSINOPHIL # BLD AUTO: 0.14 THOUSAND/ÂΜL (ref 0–0.61)
EOSINOPHIL NFR BLD AUTO: 1 % (ref 0–6)
EOSINOPHIL NFR BLD AUTO: 2 % (ref 0–6)
ERYTHROCYTE [DISTWIDTH] IN BLOOD BY AUTOMATED COUNT: 14.8 % (ref 11.6–15.1)
ERYTHROCYTE [DISTWIDTH] IN BLOOD BY AUTOMATED COUNT: 15 % (ref 11.6–15.1)
FRACTIONAL SHORTENING: 25 (ref 28–44)
GFR SERPL CREATININE-BSD FRML MDRD: 36 ML/MIN/1.73SQ M
GFR SERPL CREATININE-BSD FRML MDRD: 36 ML/MIN/1.73SQ M
GLUCOSE SERPL-MCNC: 113 MG/DL (ref 65–140)
GLUCOSE SERPL-MCNC: 117 MG/DL (ref 65–140)
GLUCOSE SERPL-MCNC: 118 MG/DL (ref 65–140)
GLUCOSE SERPL-MCNC: 121 MG/DL (ref 65–140)
GLUCOSE SERPL-MCNC: 133 MG/DL (ref 65–140)
GLUCOSE SERPL-MCNC: 177 MG/DL (ref 65–140)
GLUCOSE SERPL-MCNC: 196 MG/DL (ref 65–140)
HCT VFR BLD AUTO: 41.2 % (ref 36.5–49.3)
HCT VFR BLD AUTO: 42.4 % (ref 36.5–49.3)
HGB BLD-MCNC: 13.5 G/DL (ref 12–17)
HGB BLD-MCNC: 14.2 G/DL (ref 12–17)
IMM GRANULOCYTES # BLD AUTO: 0.04 THOUSAND/UL (ref 0–0.2)
IMM GRANULOCYTES # BLD AUTO: 0.04 THOUSAND/UL (ref 0–0.2)
IMM GRANULOCYTES NFR BLD AUTO: 0 % (ref 0–2)
IMM GRANULOCYTES NFR BLD AUTO: 1 % (ref 0–2)
INR PPP: 1.25 (ref 0.85–1.19)
INTERVENTRICULAR SEPTUM IN DIASTOLE (PARASTERNAL SHORT AXIS VIEW): 1.1 CM
INTERVENTRICULAR SEPTUM: 1.1 CM (ref 0.6–1.1)
LAAS-AP2: 19.2 CM2
LAAS-AP4: 11.2 CM2
LEFT ATRIUM SIZE: 3.4 CM
LEFT ATRIUM VOLUME (MOD BIPLANE): 36 ML
LEFT ATRIUM VOLUME INDEX (MOD BIPLANE): 16.4 ML/M2
LEFT INTERNAL DIMENSION IN SYSTOLE: 2.4 CM (ref 2.1–4)
LEFT VENTRICULAR INTERNAL DIMENSION IN DIASTOLE: 3.2 CM (ref 3.5–6)
LEFT VENTRICULAR POSTERIOR WALL IN END DIASTOLE: 1 CM
LEFT VENTRICULAR STROKE VOLUME: 20 ML
LVSV (TEICH): 20 ML
LYMPHOCYTES # BLD AUTO: 1.11 THOUSANDS/ÂΜL (ref 0.6–4.47)
LYMPHOCYTES # BLD AUTO: 1.38 THOUSANDS/ÂΜL (ref 0.6–4.47)
LYMPHOCYTES NFR BLD AUTO: 12 % (ref 14–44)
LYMPHOCYTES NFR BLD AUTO: 16 % (ref 14–44)
MAGNESIUM SERPL-MCNC: 2 MG/DL (ref 1.9–2.7)
MCH RBC QN AUTO: 30.7 PG (ref 26.8–34.3)
MCH RBC QN AUTO: 31.4 PG (ref 26.8–34.3)
MCHC RBC AUTO-ENTMCNC: 32.8 G/DL (ref 31.4–37.4)
MCHC RBC AUTO-ENTMCNC: 33.5 G/DL (ref 31.4–37.4)
MCV RBC AUTO: 94 FL (ref 82–98)
MCV RBC AUTO: 94 FL (ref 82–98)
MONOCYTES # BLD AUTO: 0.53 THOUSAND/ÂΜL (ref 0.17–1.22)
MONOCYTES # BLD AUTO: 0.61 THOUSAND/ÂΜL (ref 0.17–1.22)
MONOCYTES NFR BLD AUTO: 6 % (ref 4–12)
MONOCYTES NFR BLD AUTO: 7 % (ref 4–12)
MV E'TISSUE VEL-SEP: 5 CM/S
MV PEAK E VEL: 62 CM/S
MV STENOSIS PRESSURE HALF TIME: 32 MS
MV VALVE AREA P 1/2 METHOD: 6.88
NEUTROPHILS # BLD AUTO: 6.19 THOUSANDS/ÂΜL (ref 1.85–7.62)
NEUTROPHILS # BLD AUTO: 7.12 THOUSANDS/ÂΜL (ref 1.85–7.62)
NEUTS SEG NFR BLD AUTO: 73 % (ref 43–75)
NEUTS SEG NFR BLD AUTO: 80 % (ref 43–75)
NRBC BLD AUTO-RTO: 0 /100 WBCS
NRBC BLD AUTO-RTO: 0 /100 WBCS
P AXIS: 57 DEGREES
PHOSPHATE SERPL-MCNC: 3.1 MG/DL (ref 2.3–4.1)
PLATELET # BLD AUTO: 230 THOUSANDS/UL (ref 149–390)
PLATELET # BLD AUTO: 245 THOUSANDS/UL (ref 149–390)
PMV BLD AUTO: 10 FL (ref 8.9–12.7)
PMV BLD AUTO: 9.8 FL (ref 8.9–12.7)
POTASSIUM SERPL-SCNC: 3.2 MMOL/L (ref 3.5–5.3)
POTASSIUM SERPL-SCNC: 3.2 MMOL/L (ref 3.5–5.3)
PR INTERVAL: 166 MS
PROTHROMBIN TIME: 16 SECONDS (ref 12.3–15)
QRS AXIS: -44 DEGREES
QRSD INTERVAL: 98 MS
QT INTERVAL: 378 MS
QTC INTERVAL: 504 MS
RA PRESSURE ESTIMATED: 8 MMHG
RBC # BLD AUTO: 4.4 MILLION/UL (ref 3.88–5.62)
RBC # BLD AUTO: 4.52 MILLION/UL (ref 3.88–5.62)
RIGHT ATRIUM AREA SYSTOLE A4C: 20.7 CM2
RIGHT VENTRICLE ID DIMENSION: 4.4 CM
RV PSP: 81 MMHG
SL CV LEFT ATRIUM LENGTH A2C: 6.1 CM
SL CV LV EF: 68
SL CV PED ECHO LEFT VENTRICLE DIASTOLIC VOLUME (MOD BIPLANE) 2D: 41 ML
SL CV PED ECHO LEFT VENTRICLE SYSTOLIC VOLUME (MOD BIPLANE) 2D: 21 ML
SODIUM SERPL-SCNC: 140 MMOL/L (ref 135–147)
SODIUM SERPL-SCNC: 140 MMOL/L (ref 135–147)
T WAVE AXIS: 70 DEGREES
TR MAX PG: 73 MMHG
TR PEAK VELOCITY: 4.3 M/S
TRICUSPID ANNULAR PLANE SYSTOLIC EXCURSION: 2.2 CM
TRICUSPID VALVE PEAK REGURGITATION VELOCITY: 4.28 M/S
VENTRICULAR RATE: 107 BPM
WBC # BLD AUTO: 8.44 THOUSAND/UL (ref 4.31–10.16)
WBC # BLD AUTO: 8.93 THOUSAND/UL (ref 4.31–10.16)

## 2024-08-08 PROCEDURE — 82948 REAGENT STRIP/BLOOD GLUCOSE: CPT

## 2024-08-08 PROCEDURE — 83735 ASSAY OF MAGNESIUM: CPT

## 2024-08-08 PROCEDURE — 94760 N-INVAS EAR/PLS OXIMETRY 1: CPT

## 2024-08-08 PROCEDURE — 85025 COMPLETE CBC W/AUTO DIFF WBC: CPT

## 2024-08-08 PROCEDURE — 85730 THROMBOPLASTIN TIME PARTIAL: CPT

## 2024-08-08 PROCEDURE — 99233 SBSQ HOSP IP/OBS HIGH 50: CPT | Performed by: INTERNAL MEDICINE

## 2024-08-08 PROCEDURE — 99232 SBSQ HOSP IP/OBS MODERATE 35: CPT | Performed by: NURSE PRACTITIONER

## 2024-08-08 PROCEDURE — NC001 PR NO CHARGE: Performed by: INTERNAL MEDICINE

## 2024-08-08 PROCEDURE — 85730 THROMBOPLASTIN TIME PARTIAL: CPT | Performed by: INTERNAL MEDICINE

## 2024-08-08 PROCEDURE — 93010 ELECTROCARDIOGRAM REPORT: CPT | Performed by: INTERNAL MEDICINE

## 2024-08-08 PROCEDURE — 80048 BASIC METABOLIC PNL TOTAL CA: CPT

## 2024-08-08 PROCEDURE — 84100 ASSAY OF PHOSPHORUS: CPT

## 2024-08-08 RX ORDER — POTASSIUM CHLORIDE 1500 MG/1
40 TABLET, EXTENDED RELEASE ORAL 2 TIMES DAILY
Status: COMPLETED | OUTPATIENT
Start: 2024-08-08 | End: 2024-08-08

## 2024-08-08 RX ADMIN — POTASSIUM CHLORIDE 40 MEQ: 1500 TABLET, EXTENDED RELEASE ORAL at 17:52

## 2024-08-08 RX ADMIN — HEPARIN SODIUM 15 UNITS/KG/HR: 10000 INJECTION, SOLUTION INTRAVENOUS at 03:40

## 2024-08-08 RX ADMIN — POTASSIUM CHLORIDE 40 MEQ: 1500 TABLET, EXTENDED RELEASE ORAL at 10:00

## 2024-08-08 RX ADMIN — ALLOPURINOL 200 MG: 100 TABLET ORAL at 10:00

## 2024-08-08 RX ADMIN — INSULIN LISPRO 1 UNITS: 100 INJECTION, SOLUTION INTRAVENOUS; SUBCUTANEOUS at 13:07

## 2024-08-08 RX ADMIN — CHLORHEXIDINE GLUCONATE 0.12% ORAL RINSE 15 ML: 1.2 LIQUID ORAL at 10:00

## 2024-08-08 NOTE — SEDATION DOCUMENTATION
Pulmonary angiogram completed by Dr. Walters with Anesthesia support. Right groin dressing clean, dry and intact on transfer. Bedside report given to Chasity NG RN.

## 2024-08-08 NOTE — ANESTHESIA POSTPROCEDURE EVALUATION
Post-Op Assessment Note    CV Status:  Stable  Pain Score: 0    Pain management: adequate       Mental Status:  Alert and awake   Hydration Status:  Stable   PONV Controlled:  None   Airway Patency:  Patent     Post Op Vitals Reviewed: Yes    No anethesia notable event occurred.    Staff: CRNA, Anesthesiologist               BP   141/87   Temp 36.1   Pulse 84   Resp 14   SpO2 98

## 2024-08-08 NOTE — PROGRESS NOTES
[facility]  Post-Op Assessment: Critical Care  Name: Shahbaz Barriga 74 y.o. male I MRN: [id number]  Unit/Bed#: MICU 02 I Date of Admission: 8/7/2024   Date of Service: 8/7/2024 I Hospital Day: 0    Procedure: PE thrombectomy (see associated procedure note)    S: 73yo M evaluated by me s/p IR thrombectomy of bilateral PE. Large thrombus removed from right (see associated media) and left without evaluation likely chronic thrombus. PA pressure improved. Vital signs remained stable throughout procedure. Upon arrival to MICU, patient is awake and alert. Laying flat with no acute complaints of pain, including chest pain, or shortness of breath. Remains on 3L NC with sats >94%. VSS on monitor.    O:   ROS:   All other systems reviewed and negative unless otherwise stated above.    PHYSICAL EXAM:  General: NAD, awake, alert. Laying flat in bed. NC in place.  Head: Normocephalic, atraumatic.  Eyes: EOM-I. No scleral icterus..  ENT: Atraumatic external nose and ears. No stridor. Normal phonation.   Neck: Symmetric, trachea midline. No JVD.   CV: RRR. No murmurs or gallops. Peripheral pulses +2 throughout.  Lungs: Unlabored. No retractions. No tachypnea. CTA, lungs sounds slightly diminished but equal bilateral.   Abd: Flat, nondistended. +BS, soft, nontender.   MSK: FROM, no deformity/injury. +dressing over RIGHT groin access site, C/D/I.  Skin: Warm, dry, intact.   Neuro: Alert and oriented. At baseline. No focal deficits.    A&P:  -Day 0 s/p IR PE thrombectomy  -Post op labs: cbc, bmp, coags  -AM labs ordered  -Keep patient flat for two hours post procedure  -Continue Hep gtt per protocol   -Monitor cardiopulmonary status and O2 needs

## 2024-08-08 NOTE — ANESTHESIA PROCEDURE NOTES
Arterial Line Insertion    Performed by: Calvin Cosme MD  Authorized by: Calvin Cosme MD  Preparation: Patient was prepped and draped in the usual sterile fashion.  Indications: multiple ABGs, respiratory failure and hemodynamic monitoring  Orientation:  Right  Location: radial artery  Procedure Details:  Needle gauge: 20  Seldinger technique: Seldinger technique used  Number of attempts: 1    Post-procedure:  Post-procedure: dressing applied  Waveform: good waveform  Post-procedure CNS: normal  Patient tolerance: Patient tolerated the procedure well with no immediate complications  Comments: US used

## 2024-08-08 NOTE — BRIEF OP NOTE (RAD/CATH)
INTERVENTIONAL RADIOLOGY PROCEDURE NOTE    Date: 8/7/2024    Procedure:   Procedure Summary       Date:  Room / Location:     Anesthesia Start:  Anesthesia Stop:     Procedure:  Diagnosis:     Scheduled Providers:  Responsible Provider:     Anesthesia Type: Not recorded ASA Status: Not recorded            Preoperative diagnosis:   1. Acute pulmonary embolism, unspecified pulmonary embolism type, unspecified whether acute cor pulmonale present (HCC)         Postoperative diagnosis: Same.    Surgeon: Dylon Walters MD     Assistant: None. No qualified resident was available.    Blood loss: 100 mL    Specimens: None     Findings:   Initial pre-procedure ultrasound was notable for right common femoral and femoral vein DVT.  Access was obtained into a patent segment of the right CFV.    Bilateral main pulmonary artery emboli extending into lower lobe branches.    Dominant thrombus burden (over 80%) was on the right.    Initial PA pressure was 72/11, mean 36 mmHg.    Successful and easy right pulmonary thromboembolectomy with brisk extirpation of matter after 3 passes.  Clinical image below.  Minimal residual thrombus on the right side.    Thrombus on the left was firm and selection of the left posterior basal segmental artery could not be achieved, suggesting chronic thrombus.  There was no yield despite aspiration x 15 with the T20 curve and T24 catheters with confirmation of appropriate positioning on pulmonary arteriograms.  This has likely been present chronically.    Completion PA pressure was 36/4, mean 15 mmHg.  Normalization of PA pressure again is consistent with chronic thrombus on the left.    Access site hemostasis obtained with FlowStasis temporary suture.        Complications: None immediate.    Anesthesia: MAC sedation

## 2024-08-08 NOTE — SEDATION DOCUMENTATION
Pre-procedure pulmonary artery pressure: 72/11  Pre-procedure pulmonary artery mean: 36  Post-procedure pulmonary artery pressure: 36/4  Post-procedure pulmonary artery mean: 15

## 2024-08-08 NOTE — PROGRESS NOTES
[facility]  Progress Note: Critical Care  Name: Shahbaz Barriga 74 y.o. male I MRN: [id number]  Unit/Bed#: MICU 02 I Date of Admission: 8/7/2024   Date of Service: 8/8/2024 I Hospital Day: 1    Assessment & Plan   Neuro:      History of CVA  Atorvastatin  Clopidogrel     CV:      Essential Hypertension  Holding PTA Amlodipine  Holding PTA Losartan  Holding PTA Chlorthalidone     Home Medication  Holding PTA Fenofibrate     Pulm:  Shortness of Breath 2/2 Pulmonary Embolism  CTA Chest showing bilateral PE  Transferred to [facility] from [facility]  BNP WNL at 77. Lactate 3.3, down to 2.7 at two hours  HS Troponin elevated to 60  Echo showing RV Cavity Size mildly dilated and mild/moderate reduction in Systolic Function  S/p mechanical thrombectomy. Currently on Hep gtt     BALTA  Home CPAP      GI:      History of Diverticulitis     :      CHADWICK (resolving)  BUN 36, creatinine 1.8. Improved from yesterday.   CKD 3  Sees nephrology outpatient; last encounter showing CKD 3a on 4/22/24.  GFR 36 this AM, improved from yesterday.      F/E/N:       F: None  E: BMP + Replete PRN  N: Controlled Carbohydrate level 2     Heme/Onc:     VTE Ppx Heparin     Gout  Continue PTA Allopurinol     Endo:      Diabetes  Holding PTA Glipizide  SSI, Algorithm 3, Q6H while NPO  Hypoglycemia protocol     ID:   No active issues  Plan: Surveillance     MSK/Skin:   No active issues  Plan: Surveillance/Prevention      Disposition: Critical care    ICU Core Measures     A: Assess, Prevent, and Manage Pain Has pain been assessed? Yes  Need for changes to pain regimen? No   B: Both SAT/SAT  N/A   C: Choice of Sedation RASS Goal: 0 Alert and Calm  Need for changes to sedation or analgesia regimen? No   D: Delirium CAM-ICU: Negative   E: Early Mobility  Plan for early mobility? Yes   F: Family Engagement Plan for family engagement today? Yes       Review of Invasive Devices:        Penny Plan: Keep arterial line for  hemodynamic monitoring    Prophylaxis:  VTE VTE covered by:  heparin (porcine), Intravenous, 15 Units/kg/hr at 08/08/24 0340  heparin (porcine), Intravenous  heparin (porcine), Intravenous       Stress Ulcer  not ordered        Significant 24hr Events     24hr events: Mechanical thrombectomy last night for mechanical thrombectomy; Right sided clot was acute, left sided most likely chronic     Subjective     Review of Systems: Review of Systems   Respiratory:  Negative for apnea, cough, chest tightness, shortness of breath and wheezing.    Cardiovascular:  Negative for chest pain and leg swelling.   Gastrointestinal:  Negative for abdominal pain, constipation, diarrhea, nausea and vomiting.        Objective                            Vitals I/O      Most Recent Min/Max in 24hrs   Temp 98.1 °F (36.7 °C) Temp  Min: 97.8 °F (36.6 °C)  Max: 98.1 °F (36.7 °C)   Pulse 76 Pulse  Min: 69  Max: 113   Resp (!) 23 Resp  Min: 18  Max: 32   /72 BP  Min: 96/62  Max: 145/82   O2 Sat 93 % SpO2  Min: 93 %  Max: 100 %      Intake/Output Summary (Last 24 hours) at 8/8/2024 0758  Last data filed at 8/8/2024 0600  Gross per 24 hour   Intake 305.81 ml   Output 825 ml   Net -519.19 ml       Diet Milad/CHO Controlled; Consistent Carbohydrate Diet Level 2 (5 carb servings/75 grams CHO/meal)    Invasive Monitoring   Arterial Line  Penny /64  Arterial Line BP  Min: 129/56  Max: 169/71   MAP 88 mmHg  Arterial Line MAP (mmHg)  Min: 76 mmHg  Max: 107 mmHg           Physical Exam   Physical Exam  Eyes:      General: No scleral icterus.     Extraocular Movements: Extraocular movements intact.   Skin:     General: Skin is warm and dry.      Coloration: Skin is not pale.   HENT:      Head: Normocephalic.      Nose: No epistaxis or nasogastric tube.      Mouth/Throat:      Mouth: Mucous membranes are moist.   Cardiovascular:      Rate and Rhythm: Normal rate and regular rhythm.      Heart sounds: Normal heart sounds.   Musculoskeletal:       Right lower leg: No edema.      Left lower leg: No edema.   Abdominal:      Palpations: Abdomen is soft.   Constitutional:       General: He is not in acute distress.     Appearance: He is not ill-appearing.      Interventions: He is not intubated.  Pulmonary:      Effort: Pulmonary effort is normal. He is not intubated.      Breath sounds: Normal breath sounds.   Neurological:      General: No focal deficit present.      Mental Status: He is alert.      Cranial Nerves: No dysarthria or facial asymmetry.            Diagnostic Studies      EKG: Normal Sinus rhythm this AM  Imaging: CTA Chest showing bilateral PE I have personally reviewed pertinent films in PACS     Medications:  Scheduled PRN   allopurinol, 200 mg, Daily  chlorhexidine, 15 mL, Q12H JOLIE  insulin lispro, 1-6 Units, Q6H JOLIE  potassium chloride, 40 mEq, BID      heparin (porcine), 4,200 Units, Q6H PRN  heparin (porcine), 8,400 Units, Q6H PRN       Continuous    heparin (porcine), 3-30 Units/kg/hr (Order-Specific), Last Rate: 15 Units/kg/hr (08/08/24 0340)         Labs:    CBC    Recent Labs     08/08/24  0007 08/08/24  0426   WBC 8.93 8.44   HGB 14.2 13.5   HCT 42.4 41.2    230     BMP    Recent Labs     08/08/24  0007 08/08/24  0426   SODIUM 140 140   K 3.2* 3.2*    106   CO2 22 21   AGAP 13 13   BUN 35* 36*   CREATININE 1.80* 1.80*   CALCIUM 8.4 8.4       Coags    Recent Labs     08/07/24  1310 08/08/24  0010   INR 1.06 1.25*   PTT 31 >210*        Additional Electrolytes  Recent Labs     08/07/24  1320 08/08/24  0426   MG  --  2.0   PHOS  --  3.1   CAIONIZED 1.17  --           Blood Gas    No recent results  No recent results LFTs  Recent Labs     08/07/24  1031 08/07/24  1310   ALT 18 31   AST 25 40*   ALKPHOS 67 60   ALB 4.1 4.1   TBILI 0.6 0.80       Infectious  Recent Labs     08/07/24  1310   PROCALCITONI 0.16     Glucose  Recent Labs     08/07/24  1031 08/07/24  1310 08/08/24  0007 08/08/24  0426   GLUC 125* 225* 121 117                Bill De Anda, DO

## 2024-08-08 NOTE — PROGRESS NOTES
Progress Note -Interventional Radiology  Shahbaz Barriga 74 y.o. male MRN: [id number]  Unit/Bed#: MICU 02 Encounter: [id number]    Assessment/Plan:  75 yo male with HTN, DM type 2, BALTA, CKD, prior ischemic CVA on Plavix who presented to ED on 8/7/24 in respiratory distress with pulse ox in 70's.     Review of chart:  -CT chest PE study revealed pulmonary embolism involving both main pulmonary arteries with extension into lobar and smaller branches and evidence of right heart strain.   -Trop 77-->60, BNP 77, Lactic 3.3-->2.7  -He was deemed intermediate-high risk sPESI=4 (HR, O2sat, and chronic cardiopulmonary disease).   -PERT alert activated and after discussion, patient was transferred to [facility] for [place] II evaluation and possible intervention. Patient enrolled in [place] II and randomized to the [facility] Flowtriever intervention arm    Patient now s/p IR bilateral pulmonary arteriogram with pulmonary thromboembolectomy on 8/7/24 with flow stasis closure of Right CFV access site    Patient seen in follow up today:  -Patient hemodynamically stable, afebrile. SpO2 95-97% on 6L O2 via nasal canula. No reports of pain  -Hgb stable 14.2-->13.5  -Patient reporting no chest pain, SOB,or pain at this time  -Right groin access site:  Flow stasis catheter removed. No evidence of bleeding, ecchymosis, edema or hematoma present. Sutures removed. Hemostasis achieved within 5 minutes of manual compression. Gauze and Tegaderm applied.   Bilateral DP and PT pulses palpable, capillary refill < 2 seconds bilateral LE  B/L LE warm, strength full with patient reporting no sensory deficit or pain.   continue to monitor signs/symptoms of bleeding  -Please reach out IR with questions or concerns.         Medical Problems       Problem List       Chronic kidney disease, stage 3 (HCC)    Lab Results   Component Value Date    EGFR 36 08/08/2024    EGFR 36 08/08/2024    EGFR 27 08/07/2024    CREATININE 1.80 (H) 08/08/2024    CREATININE 1.80  "(H) 08/08/2024    CREATININE 2.28 (H) 08/07/2024         Colon, diverticulosis    Elevated C-reactive protein    Elevated PSA    Essential hypertriglyceridemia    Gout    Primary hypertension    Microalbuminuria    Type 2 diabetes mellitus with stage 3a chronic kidney disease, without long-term current use of insulin (HCC)    Overview Signed 2/6/2018  9:38 AM by Rubia Dennis DO     Transitioned From: Type 2 diabetes mellitus without complication         Lab Results   Component Value Date    HGBA1C 6.6 (H) 08/07/2024       Recent Labs     08/07/24  2308 08/08/24  0637   POCGLU 118 113       Blood Sugar Average: Last 72 hrs:  (P) 115.5        Ulcerative rectosigmoiditis without complication (HCC)    Dyslipidemia    Erythrocytosis    History of stroke    Witnessed episode of apnea    Bilateral carotid artery stenosis    Elevated hemoglobin (HCC)    Current use of long term anticoagulation    History of colon polyps             Subjective: Patient reports he feels \"ok\".  He has no chest pain, SOB, numbness/tingling/weakness in extremities and no pain at this time.     Objective:    Vitals:  /67   Pulse 76   Temp 98.1 °F (36.7 °C) (Oral)   Resp (!) 25   Ht 5' 9\" (1.753 m)   Wt 105 kg (231 lb)   SpO2 94%   BMI 34.11 kg/m²   Body mass index is 34.11 kg/m².  Weight (last 2 days)       Date/Time Weight    08/07/24 1916 105 (231)            I/Os:    Intake/Output Summary (Last 24 hours) at 8/8/2024 0946  Last data filed at 8/8/2024 0830  Gross per 24 hour   Intake 345.31 ml   Output 825 ml   Net -479.69 ml       Invasive Devices       Peripheral Intravenous Line  Duration             Peripheral IV 08/07/24 Distal;Left;Upper;Ventral (anterior) Arm <1 day    Peripheral IV 08/07/24 Dorsal (posterior);Left Hand <1 day              Arterial Line  Duration             Arterial Line 08/07/24 Right Radial <1 day                    Physical Exam  Vitals and nursing note reviewed.   Constitutional:       General: He is " not in acute distress.     Appearance: He is not ill-appearing.   HENT:      Head: Normocephalic and atraumatic.   Eyes:      Extraocular Movements: Extraocular movements intact.      Conjunctiva/sclera: Conjunctivae normal.   Cardiovascular:      Rate and Rhythm: Normal rate and regular rhythm.      Comments:   Right groin access site with no evidence of bleeding, hematoma, ecchymosis and patient with no reports of pain at site. Flow stasis device and sutures removed with hemostasis achieved with 5 minutes manual pressure.   DP and PT pulses palpable 2+  Pulmonary:      Effort: Pulmonary effort is normal. No respiratory distress.   Abdominal:      General: There is no distension.      Palpations: Abdomen is soft.   Musculoskeletal:         General: Normal range of motion.      Cervical back: Normal range of motion.      Right lower leg: No edema.      Left lower leg: No edema.   Skin:     General: Skin is warm and dry.   Neurological:      General: No focal deficit present.      Mental Status: He is alert.   Psychiatric:         Mood and Affect: Mood normal.         Behavior: Behavior normal.         Lab Results and Cultures:   CBC with diff:   Lab Results   Component Value Date    WBC 8.44 08/08/2024    HGB 13.5 08/08/2024    HCT 41.2 08/08/2024    MCV 94 08/08/2024     08/08/2024    RBC 4.40 08/08/2024    MCH 30.7 08/08/2024    MCHC 32.8 08/08/2024    RDW 14.8 08/08/2024    MPV 9.8 08/08/2024    NRBC 0 08/08/2024      BMP/CMP:  Lab Results   Component Value Date     07/07/2017    K 3.2 (L) 08/08/2024    K 3.5 08/07/2024     08/08/2024     08/07/2024    CO2 21 08/08/2024    CO2 24 08/07/2024    CO2 25 08/07/2024    BUN 36 (H) 08/08/2024    BUN 38 (H) 08/07/2024    CREATININE 1.80 (H) 08/08/2024    CREATININE 1.38 (H) 07/07/2017    GLUCOSE 219 (H) 08/07/2024    CALCIUM 8.4 08/08/2024    CALCIUM 9.5 08/07/2024    AST 40 (H) 08/07/2024    AST 25 08/07/2024    ALT 31 08/07/2024    ALT 18  08/07/2024    ALKPHOS 60 08/07/2024    ALKPHOS 67 08/07/2024    PROT 7.1 07/07/2017    BILITOT 0.6 07/07/2017    EGFR 36 08/08/2024   ,     Coags:   Lab Results   Component Value Date    PTT >210 (HH) 08/08/2024    INR 1.25 (H) 08/08/2024   ,   Results from last 7 days   Lab Units 08/08/24  0725 08/08/24  0010 08/07/24  1310   PTT seconds >210* >210* 31   INR   --  1.25* 1.06        Lipid Panel:   Lab Results   Component Value Date    CHOL 194 07/07/2017     Lab Results   Component Value Date    HDL 37 (L) 08/07/2024     Lab Results   Component Value Date    HDL 37 (L) 08/07/2024     Lab Results   Component Value Date    LDLCALC 74 08/07/2024     Lab Results   Component Value Date    TRIG 142 08/07/2024       HgbA1c:   Lab Results   Component Value Date    HGBA1C 6.6 (H) 08/07/2024    HGBA1C 6.2 (H) 02/02/2024    HGBA1C 6.6 (H) 07/25/2023       Blood Culture:   Lab Results   Component Value Date    BLOODCX No Growth After 5 Days. 01/01/2022    BLOODCX No Growth After 5 Days. 01/01/2022   ,   Urinalysis:   Lab Results   Component Value Date    COLORU YELLOW 02/11/2022    COLORU Yellow 12/31/2021    CLARITYU Clear 12/31/2021    SPECGRAV 1.023 02/11/2022    SPECGRAV 1.015 12/31/2021    PHUR 5.5 12/31/2021    PHUR 5.0 01/28/2020    LEUKOCYTESUR NEGATIVE 02/11/2022    LEUKOCYTESUR Small (A) 12/31/2021    NITRITE NEGATIVE 02/11/2022    NITRITE Negative 12/31/2021    GLUCOSEU 3+ (A) 02/11/2022    GLUCOSEU Negative 12/31/2021    KETONESU NEGATIVE 02/11/2022    KETONESU Negative 12/31/2021    BILIRUBINUR NEGATIVE 02/11/2022    BILIRUBINUR Negative 12/31/2021    BLOODU NEGATIVE 02/11/2022    BLOODU Negative 12/31/2021   ,   Urine Culture:   Lab Results   Component Value Date    URINECX >100,000 cfu/ml Escherichia coli (A) 01/28/2020   ,       VTE Pharmacologic Prophylaxis: Heparin      Thank you for allowing me to participate in the care of Shahbaz Barriga. Please don't hesitate to call, text, email, or TigerText with any  questions.     This text is generated with voice recognition software. There may be translation, syntax, or grammatical errors. If you have any questions, please contact the dictating provider.

## 2024-08-08 NOTE — PROGRESS NOTES
[place] II Clinical Trial    Patient consented to the [place] II clinical trial for RCT of FlowTriever vs. Anticoagulation alone in pulmonary embolism. Patient to be randomized by clinical trials coordinator. Upon completion of randomization, patient will receive anticoagulants alone or will be transferred to Interventional Radiology for [facility] FlowTriever procedure.    Dyspnea/mMRC Score: II -- Walks slower than people of the same age on level ground because of SOB and/or have to stop for breath    Modified Radha Scale at rest: 3: Moderate    Simplified Pulmonary Embolism Severity Index (sPESI): 2    NYHA/WHO Classification: II    CTPA completed at baseline: Yes       PA Trunk Diameter (optional):   Clot location (check all that apply): Right Main Pulmonary Artery and Left Main Pulmonary Artery    Echo completed/ordered at baseline: Yes     Minimum required labs completed/ordered at baseline (Troponin/HS Troponin; BNP/NT-Pro-BNP; Hemoglobin; Lactate): Yes     Duplex completed/ordered at baseline: No   *If resulted, current/concomitant DVT: Unknown at present time   *If resulted, DVT location(s) (check all that apply): Unknown at this time   *If DVT present, plans to treat DVT during the hospitalization: Unknown at present time

## 2024-08-08 NOTE — PLAN OF CARE
Problem: PAIN - ADULT  Goal: Verbalizes/displays adequate comfort level or baseline comfort level  Description: Interventions:  - Encourage patient to monitor pain and request assistance  - Assess pain using appropriate pain scale  - Administer analgesics based on type and severity of pain and evaluate response  - Implement non-pharmacological measures as appropriate and evaluate response  - Consider cultural and social influences on pain and pain management  - Notify physician/advanced practitioner if interventions unsuccessful or patient reports new pain  Outcome: Progressing     Problem: INFECTION - ADULT  Goal: Absence or prevention of progression during hospitalization  Description: INTERVENTIONS:  - Assess and monitor for signs and symptoms of infection  - Monitor lab/diagnostic results  - Monitor all insertion sites, i.e. indwelling lines, tubes, and drains  - Monitor endotracheal if appropriate and nasal secretions for changes in amount and color  - [place] appropriate cooling/warming therapies per order  - Administer medications as ordered  - Instruct and encourage patient and family to use good hand hygiene technique  - Identify and instruct in appropriate isolation precautions for identified infection/condition  Outcome: Progressing  Goal: Absence of fever/infection during neutropenic period  Description: INTERVENTIONS:  - Monitor WBC    Outcome: Progressing     Problem: SAFETY ADULT  Goal: Patient will remain free of falls  Description: INTERVENTIONS:  - Educate patient/family on patient safety including physical limitations  - Instruct patient to call for assistance with activity   - Consult OT/PT to assist with strengthening/mobility   - Keep Call bell within reach  - Keep bed low and locked with side rails adjusted as appropriate  - Keep care items and personal belongings within reach  - Initiate and maintain comfort rounds  - Make Fall Risk Sign visible to staff  - Offer Toileting every - Hours,  in advance of need  - Initiate/Maintain -alarm  - Obtain necessary fall risk management equipment: -  - Apply yellow socks and bracelet for high fall risk patients  - Consider moving patient to room near nurses station  Outcome: Progressing  Goal: Maintain or return to baseline ADL function  Description: INTERVENTIONS:  -  Assess patient's ability to carry out ADLs; assess patient's baseline for ADL function and identify physical deficits which impact ability to perform ADLs (bathing, care of mouth/teeth, toileting, grooming, dressing, etc.)  - Assess/evaluate cause of self-care deficits   - Assess range of motion  - Assess patient's mobility; develop plan if impaired  - Assess patient's need for assistive devices and provide as appropriate  - Encourage maximum independence but intervene and supervise when necessary  - Involve family in performance of ADLs  - Assess for home care needs following discharge   - Consider OT consult to assist with ADL evaluation and planning for discharge  - Provide patient education as appropriate  Outcome: Progressing  Goal: Maintains/Returns to pre admission functional level  Description: INTERVENTIONS:  - Perform AM-PAC 6 Click Basic Mobility/ Daily Activity assessment daily.  - Set and communicate daily mobility goal to care team and patient/family/caregiver.   - Collaborate with rehabilitation services on mobility goals if consulted  - Perform Range of Motion - times a day.  - Reposition patient every - hours.  - Dangle patient - times a day  - Stand patient - times a day  - Ambulate patient - times a day  - Out of bed to chair - times a day   - Out of bed for meals ----- times a day  - Out of bed for toileting  - Record patient progress and toleration of activity level   Outcome: Progressing     Problem: DISCHARGE PLANNING  Goal: Discharge to home or other facility with appropriate resources  Description: INTERVENTIONS:  - Identify barriers to discharge w/patient and caregiver  -  Arrange for needed discharge resources and transportation as appropriate  - Identify discharge learning needs (meds, wound care, etc.)  - Arrange for interpretive services to assist at discharge as needed  - Refer to Case Management Department for coordinating discharge planning if the patient needs post-hospital services based on physician/advanced practitioner order or complex needs related to functional status, cognitive ability, or social support system  Outcome: Progressing     Problem: Knowledge Deficit  Goal: Patient/family/caregiver demonstrates understanding of disease process, treatment plan, medications, and discharge instructions  Description: Complete learning assessment and assess knowledge base.  Interventions:  - Provide teaching at level of understanding  - Provide teaching via preferred learning methods  Outcome: Progressing      No

## 2024-08-08 NOTE — PROGRESS NOTES
Critical Care Interval Transfer Note:    Brief Hospital Summary:     Mr. Barriga is a 74 year old male who came to us last night from [facility] with a PE. PMH significant for CVA, BALTA, HTN, DM and CKD.    Vitals showed sinus tachycardia. CT showed bilateral PE. Echo showing dilated RV. Patient received mechanical thrombectomy and was anticoagulated with heparin. SOB is improved this morning.       Barriers to discharge:   Monitoring, Anticoagulation status     Consults: IP CONSULT TO CASE MANAGEMENT  INPATIENT CONSULT TO IR    Recommended to review admission imaging for incidental findings and document in discharge navigator: Chart reviewed, no known incidental findings noted at this time.      Discharge Plan: Anticipate discharge in 48-72 hrs to discharge location to be determined pending rehab evaluations.    PT Recommendations: No rehabilitation needs  OT Recommendations: No rehabilitation needs      Patient seen and evaluated by Critical Care today and deemed to be appropriate for transfer to Med Surg. Spoke to Dr. Mills from [facility] to accept transfer. Critical care can be contacted via [facility] with any questions or concerns.

## 2024-08-09 ENCOUNTER — DOCUMENTATION (OUTPATIENT)
Dept: OTHER | Facility: HOSPITAL | Age: 74
End: 2024-08-09

## 2024-08-09 ENCOUNTER — APPOINTMENT (INPATIENT)
Dept: RADIOLOGY | Facility: HOSPITAL | Age: 74
DRG: 163 | End: 2024-08-09
Attending: INTERNAL MEDICINE
Payer: MEDICARE

## 2024-08-09 ENCOUNTER — APPOINTMENT (INPATIENT)
Dept: NON INVASIVE DIAGNOSTICS | Facility: HOSPITAL | Age: 74
DRG: 163 | End: 2024-08-09
Payer: MEDICARE

## 2024-08-09 LAB
ALBUMIN SERPL BCG-MCNC: 3.3 G/DL (ref 3.5–5)
ALP SERPL-CCNC: 45 U/L (ref 34–104)
ALT SERPL W P-5'-P-CCNC: 19 U/L (ref 7–52)
ANION GAP SERPL CALCULATED.3IONS-SCNC: 8 MMOL/L (ref 4–13)
APICAL FOUR CHAMBER EJECTION FRACTION: 66 %
APTT PPP: 31 SECONDS (ref 23–34)
APTT PPP: 45 SECONDS (ref 23–34)
APTT PPP: 86 SECONDS (ref 23–34)
AST SERPL W P-5'-P-CCNC: 27 U/L (ref 13–39)
BASOPHILS # BLD AUTO: 0.07 THOUSANDS/ÂΜL (ref 0–0.1)
BASOPHILS NFR BLD AUTO: 1 % (ref 0–1)
BILIRUB SERPL-MCNC: 0.47 MG/DL (ref 0.2–1)
BSA FOR ECHO PROCEDURE: 2.2 M2
BUN SERPL-MCNC: 38 MG/DL (ref 5–25)
CALCIUM ALBUM COR SERPL-MCNC: 9 MG/DL (ref 8.3–10.1)
CALCIUM SERPL-MCNC: 8.4 MG/DL (ref 8.4–10.2)
CHLORIDE SERPL-SCNC: 110 MMOL/L (ref 96–108)
CO2 SERPL-SCNC: 24 MMOL/L (ref 21–32)
CREAT SERPL-MCNC: 1.76 MG/DL (ref 0.6–1.3)
EOSINOPHIL # BLD AUTO: 0.29 THOUSAND/ÂΜL (ref 0–0.61)
EOSINOPHIL NFR BLD AUTO: 4 % (ref 0–6)
ERYTHROCYTE [DISTWIDTH] IN BLOOD BY AUTOMATED COUNT: 15.1 % (ref 11.6–15.1)
FRACTIONAL SHORTENING: 26 (ref 28–44)
GFR SERPL CREATININE-BSD FRML MDRD: 37 ML/MIN/1.73SQ M
GLUCOSE SERPL-MCNC: 113 MG/DL (ref 65–140)
GLUCOSE SERPL-MCNC: 123 MG/DL (ref 65–140)
GLUCOSE SERPL-MCNC: 137 MG/DL (ref 65–140)
GLUCOSE SERPL-MCNC: 144 MG/DL (ref 65–140)
GLUCOSE SERPL-MCNC: 164 MG/DL (ref 65–140)
GLUCOSE SERPL-MCNC: 179 MG/DL (ref 65–140)
HCT VFR BLD AUTO: 38.3 % (ref 36.5–49.3)
HGB BLD-MCNC: 12.4 G/DL (ref 12–17)
IMM GRANULOCYTES # BLD AUTO: 0.05 THOUSAND/UL (ref 0–0.2)
IMM GRANULOCYTES NFR BLD AUTO: 1 % (ref 0–2)
INTERVENTRICULAR SEPTUM IN DIASTOLE (PARASTERNAL SHORT AXIS VIEW): 1.1 CM
INTERVENTRICULAR SEPTUM: 1.1 CM (ref 0.6–1.1)
LAAS-AP2: 17.5 CM2
LAAS-AP4: 15.1 CM2
LEFT ATRIUM VOLUME (MOD BIPLANE): 43 ML
LEFT ATRIUM VOLUME INDEX (MOD BIPLANE): 19.5 ML/M2
LEFT INTERNAL DIMENSION IN SYSTOLE: 2.5 CM (ref 2.1–4)
LEFT VENTRICULAR INTERNAL DIMENSION IN DIASTOLE: 3.4 CM (ref 3.5–6)
LEFT VENTRICULAR POSTERIOR WALL IN END DIASTOLE: 1.1 CM
LEFT VENTRICULAR STROKE VOLUME: 24 ML
LVSV (TEICH): 24 ML
LYMPHOCYTES # BLD AUTO: 1.29 THOUSANDS/ÂΜL (ref 0.6–4.47)
LYMPHOCYTES NFR BLD AUTO: 19 % (ref 14–44)
MAGNESIUM SERPL-MCNC: 2.1 MG/DL (ref 1.9–2.7)
MCH RBC QN AUTO: 31.2 PG (ref 26.8–34.3)
MCHC RBC AUTO-ENTMCNC: 32.4 G/DL (ref 31.4–37.4)
MCV RBC AUTO: 97 FL (ref 82–98)
MONOCYTES # BLD AUTO: 0.58 THOUSAND/ÂΜL (ref 0.17–1.22)
MONOCYTES NFR BLD AUTO: 9 % (ref 4–12)
NEUTROPHILS # BLD AUTO: 4.52 THOUSANDS/ÂΜL (ref 1.85–7.62)
NEUTS SEG NFR BLD AUTO: 66 % (ref 43–75)
NRBC BLD AUTO-RTO: 0 /100 WBCS
PHOSPHATE SERPL-MCNC: 2.3 MG/DL (ref 2.3–4.1)
PLATELET # BLD AUTO: 218 THOUSANDS/UL (ref 149–390)
PMV BLD AUTO: 10 FL (ref 8.9–12.7)
POTASSIUM SERPL-SCNC: 3.6 MMOL/L (ref 3.5–5.3)
PROT SERPL-MCNC: 5.8 G/DL (ref 6.4–8.4)
RA PRESSURE ESTIMATED: 5 MMHG
RBC # BLD AUTO: 3.97 MILLION/UL (ref 3.88–5.62)
RIGHT ATRIUM AREA SYSTOLE A4C: 14.8 CM2
RIGHT VENTRICLE ID DIMENSION: 4.3 CM
RV PSP: 50 MMHG
SL CV LEFT ATRIUM LENGTH A2C: 5.4 CM
SL CV LV EF: 60
SL CV PED ECHO LEFT VENTRICLE DIASTOLIC VOLUME (MOD BIPLANE) 2D: 46 ML
SL CV PED ECHO LEFT VENTRICLE SYSTOLIC VOLUME (MOD BIPLANE) 2D: 22 ML
SODIUM SERPL-SCNC: 142 MMOL/L (ref 135–147)
TR MAX PG: 45 MMHG
TR PEAK VELOCITY: 3.4 M/S
TRICUSPID ANNULAR PLANE SYSTOLIC EXCURSION: 1.6 CM
TRICUSPID VALVE PEAK REGURGITATION VELOCITY: 3.35 M/S
WBC # BLD AUTO: 6.8 THOUSAND/UL (ref 4.31–10.16)

## 2024-08-09 PROCEDURE — 82948 REAGENT STRIP/BLOOD GLUCOSE: CPT

## 2024-08-09 PROCEDURE — 93308 TTE F-UP OR LMTD: CPT

## 2024-08-09 PROCEDURE — 99232 SBSQ HOSP IP/OBS MODERATE 35: CPT | Performed by: INTERNAL MEDICINE

## 2024-08-09 PROCEDURE — 85730 THROMBOPLASTIN TIME PARTIAL: CPT | Performed by: INTERNAL MEDICINE

## 2024-08-09 PROCEDURE — 97162 PT EVAL MOD COMPLEX 30 MIN: CPT

## 2024-08-09 PROCEDURE — 71045 X-RAY EXAM CHEST 1 VIEW: CPT

## 2024-08-09 PROCEDURE — 85025 COMPLETE CBC W/AUTO DIFF WBC: CPT

## 2024-08-09 PROCEDURE — 93325 DOPPLER ECHO COLOR FLOW MAPG: CPT | Performed by: INTERNAL MEDICINE

## 2024-08-09 PROCEDURE — 83735 ASSAY OF MAGNESIUM: CPT

## 2024-08-09 PROCEDURE — 93308 TTE F-UP OR LMTD: CPT | Performed by: INTERNAL MEDICINE

## 2024-08-09 PROCEDURE — 93321 DOPPLER ECHO F-UP/LMTD STD: CPT | Performed by: INTERNAL MEDICINE

## 2024-08-09 PROCEDURE — 93321 DOPPLER ECHO F-UP/LMTD STD: CPT

## 2024-08-09 PROCEDURE — 84100 ASSAY OF PHOSPHORUS: CPT

## 2024-08-09 PROCEDURE — 80053 COMPREHEN METABOLIC PANEL: CPT

## 2024-08-09 PROCEDURE — 93325 DOPPLER ECHO COLOR FLOW MAPG: CPT

## 2024-08-09 PROCEDURE — 97166 OT EVAL MOD COMPLEX 45 MIN: CPT

## 2024-08-09 RX ADMIN — HEPARIN SODIUM 4200 UNITS: 1000 INJECTION INTRAVENOUS; SUBCUTANEOUS at 06:52

## 2024-08-09 RX ADMIN — HEPARIN SODIUM 10 UNITS/KG/HR: 10000 INJECTION, SOLUTION INTRAVENOUS at 03:37

## 2024-08-09 RX ADMIN — INSULIN LISPRO 1 UNITS: 100 INJECTION, SOLUTION INTRAVENOUS; SUBCUTANEOUS at 18:50

## 2024-08-09 RX ADMIN — ALLOPURINOL 200 MG: 100 TABLET ORAL at 07:16

## 2024-08-09 RX ADMIN — INSULIN LISPRO 1 UNITS: 100 INJECTION, SOLUTION INTRAVENOUS; SUBCUTANEOUS at 11:40

## 2024-08-09 RX ADMIN — HEPARIN SODIUM 8400 UNITS: 1000 INJECTION INTRAVENOUS; SUBCUTANEOUS at 20:25

## 2024-08-09 RX ADMIN — HEPARIN SODIUM 16 UNITS/KG/HR: 10000 INJECTION, SOLUTION INTRAVENOUS at 22:43

## 2024-08-09 NOTE — PLAN OF CARE
Problem: PAIN - ADULT  Goal: Verbalizes/displays adequate comfort level or baseline comfort level  Description: Interventions:  - Encourage patient to monitor pain and request assistance  - Assess pain using appropriate pain scale  - Administer analgesics based on type and severity of pain and evaluate response  - Implement non-pharmacological measures as appropriate and evaluate response  - Consider cultural and social influences on pain and pain management  - Notify physician/advanced practitioner if interventions unsuccessful or patient reports new pain  Outcome: Progressing     Problem: INFECTION - ADULT  Goal: Absence or prevention of progression during hospitalization  Description: INTERVENTIONS:  - Assess and monitor for signs and symptoms of infection  - Monitor lab/diagnostic results  - Monitor all insertion sites, i.e. indwelling lines, tubes, and drains  - Monitor endotracheal if appropriate and nasal secretions for changes in amount and color  - [place] appropriate cooling/warming therapies per order  - Administer medications as ordered  - Instruct and encourage patient and family to use good hand hygiene technique  - Identify and instruct in appropriate isolation precautions for identified infection/condition  Outcome: Progressing  Goal: Absence of fever/infection during neutropenic period  Description: INTERVENTIONS:  - Monitor WBC    Outcome: Progressing     Problem: SAFETY ADULT  Goal: Patient will remain free of falls  Description: INTERVENTIONS:  - Educate patient/family on patient safety including physical limitations  - Instruct patient to call for assistance with activity   - Consult OT/PT to assist with strengthening/mobility   - Keep Call bell within reach  - Keep bed low and locked with side rails adjusted as appropriate  - Keep care items and personal belongings within reach  - Initiate and maintain comfort rounds  - Make Fall Risk Sign visible to staff  - Offer Toileting every  Hours,  in advance of need  - Initiate/Maintain bed alarm  - Obtain necessary fall risk management equipment:   - Apply yellow socks and bracelet for high fall risk patients  - Consider moving patient to room near nurses station  Outcome: Progressing  Goal: Maintain or return to baseline ADL function  Description: INTERVENTIONS:  -  Assess patient's ability to carry out ADLs; assess patient's baseline for ADL function and identify physical deficits which impact ability to perform ADLs (bathing, care of mouth/teeth, toileting, grooming, dressing, etc.)  - Assess/evaluate cause of self-care deficits   - Assess range of motion  - Assess patient's mobility; develop plan if impaired  - Assess patient's need for assistive devices and provide as appropriate  - Encourage maximum independence but intervene and supervise when necessary  - Involve family in performance of ADLs  - Assess for home care needs following discharge   - Consider OT consult to assist with ADL evaluation and planning for discharge  - Provide patient education as appropriate  Outcome: Progressing  Goal: Maintains/Returns to pre admission functional level  Description: INTERVENTIONS:  - Perform AM-PAC 6 Click Basic Mobility/ Daily Activity assessment daily.  - Set and communicate daily mobility goal to care team and patient/family/caregiver.   - Collaborate with rehabilitation services on mobility goals if consulted  - Perform Range of Motion  times a day.  - Reposition patient every  hours.  - Dangle patient  times a day  - Stand patient  times a day  - Ambulate patient  times a day  - Out of bed to chair  times a day   - Out of bed for meals  times a day  - Out of bed for toileting  - Record patient progress and toleration of activity level   Outcome: Progressing     Problem: DISCHARGE PLANNING  Goal: Discharge to home or other facility with appropriate resources  Description: INTERVENTIONS:  - Identify barriers to discharge w/patient and caregiver  - Arrange  for needed discharge resources and transportation as appropriate  - Identify discharge learning needs (meds, wound care, etc.)  - Arrange for interpretive services to assist at discharge as needed  - Refer to Case Management Department for coordinating discharge planning if the patient needs post-hospital services based on physician/advanced practitioner order or complex needs related to functional status, cognitive ability, or social support system  Outcome: Progressing     Problem: Knowledge Deficit  Goal: Patient/family/caregiver demonstrates understanding of disease process, treatment plan, medications, and discharge instructions  Description: Complete learning assessment and assess knowledge base.  Interventions:  - Provide teaching at level of understanding  - Provide teaching via preferred learning methods  Outcome: Progressing

## 2024-08-09 NOTE — CASE MANAGEMENT
Case Management Assessment & Discharge Planning Note    Patient name Shahbaz Barriga  Location [facility] 904/[facility] 904-01 MRN [id number]  : 1950 Date 2024       Current Admission Date: 2024  Current Admission Diagnosis:Acute pulmonary embolism (HCC)   Patient Active Problem List    Diagnosis Date Noted Date Diagnosed    Acute pulmonary embolism (HCC) 2024     Current use of long term anticoagulation 2023     History of colon polyps 2023     Acute hypoxic respiratory failure (HCC) 2021     Elevated hemoglobin (HCC) 2021     Bilateral carotid artery stenosis 2020     Witnessed episode of apnea      History of stroke 2020     Erythrocytosis 2019     Ulcerative rectosigmoiditis without complication (HCC) 2018     Dyslipidemia 2018     Microalbuminuria 2016     Type 2 diabetes mellitus with stage 3a chronic kidney disease, without long-term current use of insulin (HCC) 2016     Colon, diverticulosis 2013     Elevated C-reactive protein 2013     Chronic kidney disease, stage 3 (HCC) 05/15/2012     Elevated PSA 05/15/2012     Essential hypertriglyceridemia 05/15/2012     Gout 05/15/2012     Primary hypertension 05/15/2012       LOS (days): 2  Geometric Mean LOS (GMLOS) (days): 7.5  Days to GMLOS:5.9     OBJECTIVE:    Risk of Unplanned Readmission Score: 12.62         Current admission status: Inpatient       Preferred Pharmacy:   [facility] [address] [place] [id number]  Phone: [phone] Fax: [phone]    [facility] - [place], [place] - [address]  [address] [facility] [address]  Phone: [phone] Fax: [phone]    Primary Care Provider: Rubia Dennis DO    Primary Insurance: MEDICARE  Secondary Insurance: AETNA    ASSESSMENT:  Active Health Care Proxies       Leanna Barriga Health Care Agent - Spouse   Primary  Phone: [phone] (Mobile)  Home Phone: [phone]                           Readmission Root Cause  30 Day Readmission: No    Patient Information  Admitted from:: Home  Mental Status: Alert  During Assessment patient was accompanied by: Not accompanied during assessment  Assessment information provided by:: Patient  Primary Caregiver: Self  Support Systems: Spouse/significant other, Self  County of Residence: [place]  What city do you live in?: [place]  Home entry access options. Select all that apply.: Stairs  Number of steps to enter home.: 4  Type of Current Residence: Trailer Home  Living Arrangements: Lives w/ Spouse/significant other  Is patient a ?: Yes  Is patient active with [facility] ([facility])?: No    Activities of Daily Living Prior to Admission  Functional Status: Independent  Completes ADLs independently?: Yes  Ambulates independently?: Yes  Does patient use assisted devices?: No  Does patient currently own DME?: Yes  What DME does the patient currently own?: Walker, Straight Cane, Bedside Commode, Shower Chair, Other (Comment) (grab bars in shower)  Does patient have a history of Outpatient Therapy (PT/OT)?: Yes  Does the patient have a history of Short-Term Rehab?: Yes ([facility])  Does patient have a history of HHC?: Yes ([facility] and [facility])  Does patient currently have HHC?: No         Patient Information Continued  Income Source: Pension/jail  Does patient have prescription coverage?: Yes  Does patient receive dialysis treatments?: No  Does patient have a history of substance abuse?: No  Does patient have a history of Mental Health Diagnosis?: No         Means of Transportation  Means of Transport to Appts:: Drives Self      Social Determinants of Health (SDOH)      Flowsheet Row Most Recent Value   Housing Stability    In the last 12 months, was there a time when you were not able to pay the mortgage or rent on time? N   In the past 12 months, how many times have  you moved where you were living? 0   At any time in the past 12 months, were you homeless or living in a shelter (including now)? N   Transportation Needs    In the past 12 months, has lack of transportation kept you from medical appointments or from getting medications? no   In the past 12 months, has lack of transportation kept you from meetings, work, or from getting things needed for daily living? No   Food Insecurity    Within the past 12 months, you worried that your food would run out before you got the money to buy more. Never true   Within the past 12 months, the food you bought just didn't last and you didn't have money to get more. Never true   Utilities    In the past 12 months has the electric, gas, oil, or water company threatened to shut off services in your home? No            DISCHARGE DETAILS:    Discharge planning discussed with:: Pt  Freedom of Choice: Yes  Comments - Freedom of Choice: Discussed FOC  CM contacted family/caregiver?: No- see comments (Pt alert and oriented)  Were Treatment Team discharge recommendations reviewed with patient/caregiver?: Yes  Did patient/caregiver verbalize understanding of patient care needs?: Yes  Were patient/caregiver advised of the risks associated with not following Treatment Team discharge recommendations?: Yes                   Other Referral/Resources/Interventions Provided:  Interventions: Prescription Price Check  Referral Comments: Pt informed CM that he contacted his pharmacies regarding cost of Eliquis, at [facility], $140 every 3 months and [facility], $114 every 3 months.  Pt will discuss with his wife regarding cost, pt's wife is also on Eliquis         Treatment Team Recommendation: Home  Discharge Destination Plan:: Home                 CM met with pt to complete assessment and explain CM role.  Pt resides with his wife in a trailer home, has 3-4 XAVIER.  Pt reports being independent with all ADL's prior to admission.  Pt has a cane, walker,  shower chair, commode, and grab bars in shower for DME.  Pt reports previous outpatient PT, STR at [facility], and [facility] through [facility] and [facility].  Pt denies any mental health or substance use hx.  Pt is retired and drives.  CM will continue to follow for discharge planning needs.       Pt informed CM that he did complete price checks for Eliquis, [facility] was $140 every 3 months and [facility] was $114 every 3 months.  Pt will discuss with his wife regarding cost.  Pt's wife is also on Eliquis.

## 2024-08-09 NOTE — OCCUPATIONAL THERAPY NOTE
Occupational Therapy Evaluation     Patient Name: Shahbaz Barriga  Today's Date: 8/9/2024  Problem List  Principal Problem:    Acute pulmonary embolism (HCC)  Active Problems:    Chronic kidney disease, stage 3 (HCC)    Primary hypertension    Type 2 diabetes mellitus with stage 3a chronic kidney disease, without long-term current use of insulin (HCC)    Erythrocytosis    Acute hypoxic respiratory failure (HCC)    Past Medical History  Past Medical History:   Diagnosis Date    Acute respiratory failure with hypoxia (HCC) 12/31/2021    CVA (cerebral vascular accident) (HCC)     Diverticulitis     Gout     NSVT (nonsustained ventricular tachycardia) (HCC) 05/28/2020    Vasovagal syncope 12/28/2021     Past Surgical History  Past Surgical History:   Procedure Laterality Date    CARDIAC LOOP RECORDER      COLONOSCOPY  09/18/2006    complete; 10 yrs    COLONOSCOPY  10/23/2017    complete; 5 yrs    COLONOSCOPY  05/06/2020    Severe active left-sided colitis, erythematous and ulcerated mucosa in the rectosigmoid, inflammatory polyp    IR PE ENDOVASCULAR THERAPY  8/7/2024 08/09/24 1221   OT Last Visit   OT Visit Date 08/09/24   Note Type   Note type Evaluation   Pain Assessment   Pain Assessment Tool 0-10   Pain Score No Pain   Restrictions/Precautions   Weight Bearing Precautions Per Order No   Other Precautions Multiple lines;O2;Fall Risk  (3L O2)   Home Living   Type of Home House   Home Layout One level;Performs ADLs on one level;Able to live on main level with bedroom/bathroom  (4 XAVIER)   Bathroom Shower/Tub Tub/shower unit   Bathroom Toilet Standard   Bathroom Equipment Grab bars in shower;Shower chair;Commode   Home Equipment Walker;Cane  (using SPC PTA)   Prior Function   Level of [place] Independent with ADLs;Independent with functional mobility;Independent with IADLS   Lives With Spouse   Receives Help From Family   IADLs Independent with driving;Independent with meal prep;Independent with  medication management   Falls in the last 6 months 0   Vocational Retired   Lifestyle   Autonomy Pt reports I w/ ADLs, IADLs, and fxnl mobility w/ SPC at baseline; + driving.   Reciprocal Relationships Pt lives w/ his spouse, and she is able to assist w/ ADLs and IADLs as needed.   Service to Others Pt is retired.   Intrinsic Gratification Pt enjoys operating a ham radio.   General   Family/Caregiver Present No   ADL   Where Assessed Edge of bed   Eating Assistance 7  Independent   Grooming Assistance 7  Independent   UB Bathing Assistance 6  Modified Independent   LB Bathing Assistance 5  Supervision/Setup   UB Dressing Assistance 6  Modified independent   LB Dressing Assistance 5  Supervision/Setup   Toileting Assistance  5  Supervision/Setup   Bed Mobility   Supine to Sit 5  Supervision   Additional items HOB elevated;Bedrails;Increased time required   Sit to Supine Unable to assess   Additional Comments Pt seated EOB at end of OT evaluation w/ all needs met and in care of MD.   Transfers   Sit to Stand 5  Supervision   Additional items Increased time required   Stand to Sit 5  Supervision   Additional items Increased time required   Additional Comments completed w/o AD   Functional Mobility   Functional Mobility 5  Supervision   Additional Comments Pt ambulated greater than household distance w/ S and w/o AD.   Additional items   (no AD)   Balance   Static Sitting Fair +   Dynamic Sitting Fair +   Static Standing Fair   Dynamic Standing Fair   Ambulatory Fair   Activity Tolerance   Activity Tolerance Patient tolerated treatment well   Medical Staff Made Aware [place], due to pt's medical complexity and multiple comorbidites   Nurse Made Aware RN clearance prior to session   RUE Assessment   RUE Assessment WFL   LUE Assessment   LUE Assessment WFL   Hand Function   Gross Motor Coordination Functional   Fine Motor Coordination Functional   Cognition   Overall Cognitive Status WFL   Arousal/Participation  Alert;Responsive;Cooperative   Attention Within functional limits   Orientation Level Oriented X4   Memory Within functional limits   Following Commands Follows all commands and directions without difficulty   Comments Pt was pleasant, cooperative, and willing to participate in OT evaluation.   Assessment   Assessment Pt is a 74 y.o. male seen for OT evaluation s/p admission to [facility] on 8/7/2024 due to SOB. Pt diagnosed with Acute pulmonary embolism (HCC). Pt has a significant PMH impacting occupational performance including: Acute respiratory failure with hypoxia (HCC), CVA (cerebral vascular accident) (HCC), Diverticulitis, Gout, NSVT (nonsustained ventricular tachycardia) ([facility]), and Vasovagal syncope. Pt with active OT evaluation and treatment orders and activity orders. PTA, pt living with his spouse in a 1 SH w/ 4 XAVIER. Pt reports I w/ ADLs, IADLs, and fxnl mobility w/ SPC at baseline; + driving. Pt agreeable and willing to participate in OT evaluation. During evaluation, pt was I for eating and grooming, mod I for UB ADLs, and S for LB ADLs and toileting. Pt was also S for bed mobility, transfers, and fxnl mobility w/o AD. Pt performing ADLs at/near baseline level of independence and report having good social support upon return home. No further acute OT needs identified at this time. Recommend continued active ADL participation and mobilization with hospital staff while in the hospital to increase pt’s endurance and strength upon D/C. From OT standpoint, recommend D/C to home with family support when medically cleared. D/C pt from OT caseload at this time.   Goals   Patient Goals to get OOB and walk   Plan   OT Frequency Eval only   Discharge Recommendation   Rehab Resource Intensity Level, OT No post-acute rehabilitation needs   AM-PAC Daily Activity Inpatient   Lower Body Dressing 3   Bathing 3   Toileting 3   Upper Body Dressing 4   Grooming 4   Eating 4   Daily Activity Raw Score 21   Daily  Activity Standardized Score (Calc for Raw Score >=11) 44.27   -PAC Applied Cognition Inpatient   Following a Speech/Presentation 4   Understanding Ordinary Conversation 4   Taking Medications 4   Remembering Where Things Are Placed or Put Away 4   Remembering List of 4-5 Errands 4   Taking Care of Complicated Tasks 4   Applied Cognition Raw Score 24   Applied Cognition Standardized Score 62.21       The patient's raw score on the AM-PAC Daily Activity Inpatient Short Form is 21. A raw score of greater than or equal to 19 suggests the patient may benefit from discharge to home. Please refer to the recommendation of the Occupational Therapist for safe discharge planning.    STEPHANIE Gonzales, OTR/L

## 2024-08-09 NOTE — ASSESSMENT & PLAN NOTE
Lab Results   Component Value Date    HGBA1C 6.6 (H) 08/07/2024       Recent Labs     08/08/24  1143 08/08/24  1835 08/08/24  2349 08/09/24  0545   POCGLU 177* 196* 133 123       Blood Sugar Average: Last 72 hrs:  (P) [id number]  SSI

## 2024-08-09 NOTE — PHYSICAL THERAPY NOTE
Physical Therapy Evaluation     Patient's Name: Shahbaz Barriga    Admitting Diagnosis  Pulmonary embolism (HCC) [I26.99]    Problem List  Patient Active Problem List   Diagnosis    Chronic kidney disease, stage 3 (HCC)    Colon, diverticulosis    Elevated C-reactive protein    Elevated PSA    Essential hypertriglyceridemia    Gout    Primary hypertension    Microalbuminuria    Type 2 diabetes mellitus with stage 3a chronic kidney disease, without long-term current use of insulin (HCC)    Ulcerative rectosigmoiditis without complication (HCC)    Dyslipidemia    Erythrocytosis    History of stroke    Witnessed episode of apnea    Bilateral carotid artery stenosis    Elevated hemoglobin (HCC)    Acute hypoxic respiratory failure (HCC)    Current use of long term anticoagulation    History of colon polyps    Acute pulmonary embolism (HCC)       Past Medical History  Past Medical History:   Diagnosis Date    Acute respiratory failure with hypoxia (HCC) 12/31/2021    CVA (cerebral vascular accident) (HCC)     Diverticulitis     Gout     NSVT (nonsustained ventricular tachycardia) (Former[facility]) 05/28/2020    Vasovagal syncope 12/28/2021       Past Surgical History  Past Surgical History:   Procedure Laterality Date    CARDIAC LOOP RECORDER      COLONOSCOPY  09/18/2006    complete; 10 yrs    COLONOSCOPY  10/23/2017    complete; 5 yrs    COLONOSCOPY  05/06/2020    Severe active left-sided colitis, erythematous and ulcerated mucosa in the rectosigmoid, inflammatory polyp    IR PE ENDOVASCULAR THERAPY  8/7/2024 08/09/24 0854   PT Last Visit   PT Visit Date 08/09/24   Note Type   Note type Evaluation   Pain Assessment   Pain Assessment Tool 0-10   Pain Score No Pain   Restrictions/Precautions   Weight Bearing Precautions Per Order No   Other Precautions Multiple lines;Fall Risk;O2  (3L O2)   Home Living   Type of Home House   Home Layout One level;Performs ADLs on one level;Able to live on main level with  bedroom/bathroom  (4 XAVIER)   Bathroom Shower/Tub Tub/shower unit   Bathroom Toilet Standard   Bathroom Equipment Grab bars in shower;Shower chair;Commode   Home Equipment Walker;Cane  (using cane PRN)   Prior Function   Level of [place] Independent with ADLs;Independent with functional mobility;Independent with IADLS   Lives With Spouse   Receives Help From Family   IADLs Independent with driving;Independent with meal prep;Independent with medication management   Falls in the last 6 months 0   Vocational Retired   General   Family/Caregiver Present No   Cognition   Overall Cognitive Status WFL   Arousal/Participation Cooperative   Attention Within functional limits   Orientation Level Oriented X4   Memory Within functional limits   Following Commands Follows all commands and directions without difficulty   Comments pleasant and cooperative   RLE Assessment   RLE Assessment WFL   LLE Assessment   LLE Assessment WFL   Bed Mobility   Supine to Sit 5  Supervision   Additional items HOB elevated;Bedrails;Increased time required   Sit to Supine Unable to assess   Additional Comments pt left sitting EOB with MD present and all needs within reach   Transfers   Sit to Stand 5  Supervision   Additional items Increased time required   Stand to Sit 5  Supervision   Additional items Increased time required   Additional Comments no AD   Ambulation/Elevation   Gait pattern Excessively slow;Short stride  (Guarded, slowed)   Gait Assistance 5  Supervision  (Steady t/o session, no overt LOB noted)   Assistive Device None   Distance 2 x 70'   Stair Management Assistance 5  Supervision   Stair Management Technique One rail R;Alternating pattern;Foreward   Number of Stairs 4   Ambulation/Elevation Additional Comments SpO2 >93% on 3L O2 t/o session   Balance   Static Sitting Fair +   Dynamic Sitting Fair +   Static Standing Fair   Dynamic Standing Fair   Ambulatory Fair   Activity Tolerance   Activity Tolerance Patient tolerated  treatment well   Medical Staff Made Aware LUKASZ Molina; co-session completed this date 2* increased medical complexity and multiple co-morbidities   Nurse Made Aware RN cleared   Assessment   Prognosis Good   Assessment Pt seen for moderate complexity PT evaluation. Pt with active PT eval/treat and OOB > chair orders. Pt is a 74 y.o. male who was admitted to [facility] on 8/7 with acute PE s/p thromboembolectomy on 8/7. Pt's active problems include: CKD, HTN, type 2 DM, acute hypoxic respiratory failure.Pt  has a past medical history of Acute respiratory failure with hypoxia ([facility]) (12/31/2021), CVA (cerebral vascular accident) ([facility]), Diverticulitis, Gout, NSVT (nonsustained ventricular tachycardia) ([facility]) (05/28/2020), and Vasovagal syncope (12/28/2021). Pt resides with spouse in [facility] with 4 XAVIER and was independent with PRN SPC use prior to hospital admission. Currently upon evaluation pt performing bed mobility tasks at S level, funational transfers at S level and ambulation at S level without AD. Pt was left sitting EOB at the end of PT session with all needs within reach. Pt with no questions or concerns regarding d/c home; appears to be functioning at/ near baseline mobility levels. Pt with no further acute inpatient PT needs at this time- please re-consult if needed. PT to discharge pt and recommends level III resource intensity. Encourage pt to ambulate at least 3-4x/day with restorative/ nursing staff while remaining in hospital. The patient's AM-PAC Basic Mobility Inpatient Short Form Raw Score is 18. A Raw score of greater than 16 suggests the patient may benefit from discharge to home. Please also refer to the recommendation of the Physical Therapist for safe discharge planning.   Goals   Patient Goals to get OOB and walk   Plan   PT Frequency   (eval only- d/c PT)   Discharge Recommendation   Rehab Resource Intensity Level, PT III (Minimum Resource Intensity)  (OPPT)   AM-PAC Basic Mobility  Inpatient   Turning in Flat Bed Without Bedrails 3   Lying on Back to Sitting on Edge of Flat Bed Without Bedrails 3   Moving Bed to Chair 3   Standing Up From Chair Using Arms 3   Walk in Room 3   Climb 3-5 Stairs With Railing 3   Basic Mobility Inpatient Raw Score 18   Basic Mobility Standardized Score 41.05   [facility] Highest Level Of Mobility   -HLM Goal 6: Walk 10 steps or more   JH-HLM Achieved 7: Walk 25 feet or more   Modified [place] Scale   Modified [place] Scale 2           Tania Jarrett, PT DPT

## 2024-08-09 NOTE — ASSESSMENT & PLAN NOTE
s/p IR bilateral pulmonary arteriogram with pulmonary thromboembolectomy on 8/7/24 with flow stasis closure of Right CFV access site  Continue heparin infusion today with plan to change to DOAC tomorrow if remains HD stable

## 2024-08-09 NOTE — PROGRESS NOTES
[facility]  Progress Note  Name: Shahbaz Barriga I  MRN: [id number]  Unit/Bed#: [facility] 904-01 I Date of Admission: 8/7/2024   Date of Service: 8/9/2024 I Hospital Day: 2    Assessment & Plan   Acute hypoxic respiratory failure (HCC)  Assessment & Plan  In setting of PE  Home O2 eval prior to discharge     Type 2 diabetes mellitus with stage 3a chronic kidney disease, without long-term current use of insulin (HCC)  Assessment & Plan  Lab Results   Component Value Date    HGBA1C 6.6 (H) 08/07/2024       Recent Labs     08/08/24  1143 08/08/24  1835 08/08/24  2349 08/09/24  0545   POCGLU 177* 196* 133 123       Blood Sugar Average: Last 72 hrs:  (P) [id number]  SSI    Primary hypertension  Assessment & Plan  BP stable off antihypertensives  Will resume as appropriate     Chronic kidney disease, stage 3 (HCC)  Assessment & Plan  Cr stable  Monitor    * Acute pulmonary embolism (HCC)  Assessment & Plan   s/p IR bilateral pulmonary arteriogram with pulmonary thromboembolectomy on 8/7/24 with flow stasis closure of Right CFV access site  Continue heparin infusion today with plan to change to DOAC tomorrow if remains HD stable             VTE Pharmacologic Prophylaxis:   Moderate Risk (Score 3-4) - Pharmacological DVT Prophylaxis Ordered: heparin drip.    Mobility:   Basic Mobility Inpatient Raw Score: 18  JH-HLM Goal: 6: Walk 10 steps or more  JH-HLM Achieved: 7: Walk 25 feet or more  JH-HLM Goal achieved. Continue to encourage appropriate mobility.    Patient Centered Rounds: I performed bedside rounds with nursing staff today.   Discussions with Specialists or Other Care Team Provider: CM    Education and Discussions with Family / Patient: Updated  (wife) at bedside.    Total Time Spent on Date of Encounter in care of patient: 35 mins. This time was spent on one or more of the following: performing physical exam; counseling and coordination of care; obtaining  or reviewing history; documenting in the medical record; reviewing/ordering tests, medications or procedures; communicating with other healthcare professionals and discussing with patient's family/caregivers.    Current Length of Stay: 2 day(s)  Current Patient Status: Inpatient   Certification Statement: The patient will continue to require additional inpatient hospital stay due to above  Discharge Plan: Anticipate discharge in 24-48 hrs to home.    Code Status: Level 1 - Full Code    Subjective:   Feels better     Objective:     Vitals:   Temp (24hrs), Av.1 °F (36.7 °C), Min:97.9 °F (36.6 °C), Max:98.5 °F (36.9 °C)    Temp:  [97.9 °F (36.6 °C)-98.5 °F (36.9 °C)] 98.2 °F (36.8 °C)  HR:  [81-94] 89  Resp:  [18-27] 18  BP: (102-161)/(77-96) 122/77  SpO2:  [94 %-96 %] 95 %  Body mass index is 34.11 kg/m².     Input and Output Summary (last 24 hours):     Intake/Output Summary (Last 24 hours) at 2024 1121  Last data filed at 2024 1100  Gross per 24 hour   Intake 918.85 ml   Output 925 ml   Net -6.15 ml       Physical Exam:   Physical Exam  Vitals and nursing note reviewed.   Constitutional:       General: He is not in acute distress.     Appearance: He is well-developed.   HENT:      Head: Normocephalic and atraumatic.   Eyes:      Conjunctiva/sclera: Conjunctivae normal.   Cardiovascular:      Rate and Rhythm: Normal rate and regular rhythm.      Heart sounds: No murmur heard.  Pulmonary:      Effort: Pulmonary effort is normal. No respiratory distress.      Breath sounds: Normal breath sounds.   Abdominal:      Palpations: Abdomen is soft.      Tenderness: There is no abdominal tenderness.   Musculoskeletal:         General: No swelling.      Cervical back: Neck supple.   Skin:     General: Skin is warm and dry.      Capillary Refill: Capillary refill takes less than 2 seconds.   Neurological:      Mental Status: He is alert.   Psychiatric:         Mood and Affect: Mood normal.          Additional Data:      Labs:  Results from last 7 days   Lab Units 08/09/24  0539   WBC Thousand/uL 6.80   HEMOGLOBIN g/dL 12.4   HEMATOCRIT % 38.3   PLATELETS Thousands/uL 218   SEGS PCT % 66   LYMPHO PCT % 19   MONO PCT % 9   EOS PCT % 4     Results from last 7 days   Lab Units 08/09/24  0539   SODIUM mmol/L 142   POTASSIUM mmol/L 3.6   CHLORIDE mmol/L 110*   CO2 mmol/L 24   BUN mg/dL 38*   CREATININE mg/dL 1.76*   ANION GAP mmol/L 8   CALCIUM mg/dL 8.4   ALBUMIN g/dL 3.3*   TOTAL BILIRUBIN mg/dL 0.47   ALK PHOS U/L 45   ALT U/L 19   AST U/L 27   GLUCOSE RANDOM mg/dL 113     Results from last 7 days   Lab Units 08/08/24  0010   INR  1.25*     Results from last 7 days   Lab Units 08/09/24  0545 08/08/24  2349 08/08/24  1835 08/08/24  1143 08/08/24  0637 08/07/24  2308   POC GLUCOSE mg/dl 123 133 196* 177* 113 118     Results from last 7 days   Lab Units 08/07/24  1031   HEMOGLOBIN A1C % of total Hgb 6.6*     Results from last 7 days   Lab Units 08/07/24  1545 08/07/24  1310   LACTIC ACID mmol/L 2.7* 3.3*   PROCALCITONIN ng/ml  --  0.16       Lines/Drains:  Invasive Devices       Peripheral Intravenous Line  Duration             Peripheral IV 08/07/24 Distal;Left;Upper;Ventral (anterior) Arm 1 day    Peripheral IV 08/07/24 Dorsal (posterior);Left Hand 1 day                          Imaging: Reviewed radiology reports from this admission including: chest CT scan    Recent Cultures (last 7 days):         Last 24 Hours Medication List:   Current Facility-Administered Medications   Medication Dose Route Frequency Provider Last Rate    allopurinol  200 mg Oral Daily Altagracia Paez MD      heparin (porcine)  3-30 Units/kg/hr (Order-Specific) Intravenous Titrated Altagracia Paez MD 12 Units/kg/hr (08/09/24 0653)    heparin (porcine)  4,200 Units Intravenous Q6H PRN Altagracia Paez MD      heparin (porcine)  8,400 Units Intravenous Q6H PRN Altagracia Paez MD      insulin lispro  1-6 Units Subcutaneous  Q6H [facility] Ela Paez MD          Today, Patient Was Seen By: Gian Tarango MD    **Please Note: This note may have been constructed using a voice recognition system.**

## 2024-08-09 NOTE — APP STUDENT NOTE
REESE STUDENT  Inpatient Progress Note for TRAINING ONLY  Not Part of Legal Medical Record       Progress Note - Shahbaz Barriga 74 y.o. male MRN: [id number]    Unit/Bed#: [facility] 904-01 Encounter: [id number]      Assessment & Plan:  Pulmonary embolism   Patient presented to  ED with dyspnea and dizziness on 8/7   CTA PE study reported bilateral severe pulmonary embolism with evidence of right heart strain  D-dimer 11.89, BNP 77, troponin 0hr 60, lactic acid 3.3  Obstructive pathology as the etiology for syncopal episode during EMS transport   Ultrasound with right common femoral and femoral vein DVT  Participated in [place] trial, assigned to [employer] group   Underwent intervention 8/7 with IR -- successful right pulmonary thromboembolectomy, inability to extract left posterior basal segmental artery clot suggesting chronic thrombus   Right groin access site addressed by IR, appropriate hemostasis achieved after removal of the flow stasis catheter, continue to monitor for s/sx of bleeding at site   Continue heparin anticoagulation and oxygen supplementation, consider weaning today   History of ischemic CVA  Continue atorvastatin and clopidogrel home regimen upon discharge   Hypertension   Holding amlodipine, losartan, chlorthalidone   Reinitiate therapy upon discharge   CHADWICK superimposed on CKD III  Follows with nephrology outpatient   GFR 37 on this AM CMP  Baseline creatinine 1.29-1.59, was at 2.28 on admission   Improving today at 1.76, continue to monitor with daily AM CMP    Subjective:   73 y/o M with PMH of CVA, syncope, HTN, DM, CKD, BALTA presented to [facility] ED 2 days ago following an episode of dizziness and dyspnea after giving a urine sample at a lab. He had a syncopal episode while with EMS without any trauma. During transportation his pulse ox indicated a drop in O2 saturation in the 70s despite placement of nasal cannula oxygen. He describes that he was having SOB for about a week that acutely  "worsened a few days ago. He also had what he believed to be a cold recently that mostly presented with cough. Today, he is feeling much better and almost back to baseline. He is not having trouble breathing and doing well on NC O2. He was able to ambulate down the hallway with therapy team without trouble. Having small bowel movements and passing flatus. Eating and drinking well. Denies fever, chills, night sweats, headache, blurred vision, chest pain, palpitations, cyanosis, abdominal pain, n/v/d, lower extremity pain/aching, edema. Curious about discharge timing. Otherwise, denies concerns or complaints at this time.     Objective:     Vitals: Blood pressure 122/77, pulse 89, temperature 98.2 °F (36.8 °C), resp. rate 18, height 5' 9\" (1.753 m), weight 105 kg (231 lb), SpO2 95%.,Body mass index is 34.11 kg/m².      Intake/Output Summary (Last 24 hours) at 8/9/2024 0850  Last data filed at 8/8/2024 2252  Gross per 24 hour   Intake 920.85 ml   Output 1075 ml   Net -154.15 ml     Physical Exam  Constitutional:       Appearance: Normal appearance.   Cardiovascular:      Rate and Rhythm: Normal rate and regular rhythm.      Pulses: Normal pulses.      Heart sounds: Normal heart sounds.   Pulmonary:      Effort: Pulmonary effort is normal.      Breath sounds: Normal breath sounds.      Comments: Wearing NC O2, saturation 97% while talking with me  Abdominal:      General: Abdomen is flat. Bowel sounds are normal.      Palpations: Abdomen is soft.   Genitourinary:     Comments: Right groin access site for thromboembolectomy, slightly tender but CDI   Skin:     General: Skin is warm and dry.   Neurological:      Mental Status: He is alert. Mental status is at baseline.   Psychiatric:         Mood and Affect: Mood normal.         Behavior: Behavior normal.           Invasive Devices       Peripheral Intravenous Line  Duration             Peripheral IV 08/07/24 Distal;Left;Upper;Ventral (anterior) Arm 1 day    Peripheral IV " 08/07/24 Dorsal (posterior);Left Hand 1 day                    Lab, Imaging and other studies: I have personally reviewed pertinent reports.    VTE Pharmacologic Prophylaxis: Heparin  VTE Mechanical Prophylaxis: sequential compression device

## 2024-08-09 NOTE — PROGRESS NOTES
Progress Note - Pulmonary   Shahbaz Barriga 74 y.o. male MRN: [id number]  Unit/Bed#: [facility] 904-01 Encounter: [id number]    Assessment/Plan:     Brief Summary of Hospital Course:    75 y/o vasculopath here as transfer from [facility] for submassive b/l lobar PE, which was discovered after he initially presented to ED following a syncopal episode I/c/o week-long non-productive cough & worsening SOB/PAGE. Was found to be hypoxic upon arrival, which did not improve with supplemental O2. CTA-PE was obtained which showed severe, bilateral PE in lobar arteries. Patient has since been taken to OR per IR for b/l mechanical thrombectomy which he tolerated well. He has been on Heparin gtt since admission and throughout post-op course.     Admitted to Critical Care during initial stages of hospitalization, including post-op course, however has since been transferred to Med-Surg floors as he has remained HDS. Was requiring 6LNC post-op, with no difficulty down-titrating to 2L by POD#2. Still requiring O2 on ambulation, with last test able to be done at 2L maximum.    Active Problem List:    Submassive Bilateral lobar PE with RHS/Cor pulmonale, now resolved s/p Mechanical thrombectomy  Ax hypoxic respiratory failure, likely 2/2 ax PE, resolving  Cx embolus in L main pulmonary artery, removed per #1  H/o ischemic CVA  BALTA on CPAP  Essential HTN, stable  CHADWICK on CKD4, improving     Assessment/Plan:    This is an elderly man who is now POD#2 s/p IR-directed b/l mechanical thrombectomy. He is doing incredibly well at the moment now off supplemental O2 and without any c/o dyspnea. He is still requiring O2 on ambulation, albeit minimal at 2L, however he has yet to have a repeat walk test since being taken off supplemental O2.     We recommend that pt transition from Heparin gtt to DOAC, starting with one week loading-dose f/b standard DVT/PE dosing thereafter. Pt is to stay on AC for 3-6 months at minimum, and we will plan to establish OP f/u  of DVT/PE at our clinic upon DC.    Otherwise, pt can be considered stable for D/C from pulmonary perspective once he is able to ambulate without need for supplemental O2. Therefore we recommend repeating 6-min walk/ambulatory O2 eval as soon as possible.    Recommendations & Plan:    Transition off Heparin gtt to DOAC as soon as possible, per DVT/PE dosing as suggested below.   Start with Eliquis 10 mg BID x 7 days, then f/b Eliquis 5 mg BID moving forward  Alternatively, Xarelto 15 mg BID x 21 days then f/b 20 mg QD moving forward  To continue DOAC for 3-6 mo at minimum  Repeat ambulatory O2 eval prior to DC  To establish care with  Pulmonology for OP f/u upon DC      Subjective:      24 Hour Events:    NAEO, Mr Barriga was seen earlier this morning resting comfortably in hospital bed and accompanied by wife at bedside. On initial evaluation earlier this morning and prior to rounds pt was tolerating NC @ 2L without any significant desaturation. However and thankfully, by time of evaluation on rounds (about 1-2 hours later) pt was off any supplemental O2 and again tolerating RA well without any significant desaturation.     Otherwise pt states that he is doing well and has no new complaints such as CP, pleurisy, PND/orthopnea, peripheral edema, HA, numbness/weakness, nor any cough/hemoptysis. He states that his SOB has improved greatly compared to admission and is essentially resolved.     ROS:    Review of Systems   All other systems reviewed and are negative.      Medications    Sched:   Current Facility-Administered Medications   Medication Dose Route Frequency Provider Last Rate    allopurinol  200 mg Oral Daily Altagracia Paez MD      heparin (porcine)  3-30 Units/kg/hr (Order-Specific) Intravenous Titrated Altagracia Paez MD 12 Units/kg/hr (08/09/24 0653)    heparin (porcine)  4,200 Units Intravenous Q6H PRN Altagracia Paez MD      heparin (porcine)  8,400 Units Intravenous Q6H  "PRN Altagracia Paez MD      insulin lispro  1-6 Units Subcutaneous Q6H [facility] Altagracia Paez MD         Cont: heparin (porcine), 3-30 Units/kg/hr (Order-Specific), Last Rate: 12 Units/kg/hr (08/09/24 0653)        PRN:   heparin (porcine)    heparin (porcine)      Objective:      Vitals: Blood pressure 122/77, pulse 89, temperature 98.2 °F (36.8 °C), resp. rate 18, height 5' 9\" (1.753 m), weight 105 kg (231 lb), SpO2 95%.,   Weights:   Weight (last 2 days)       Date/Time Weight    08/07/24 1916 105 (231)          BMI: Body mass index is 34.11 kg/m².    I/O:    Intake/Output Summary (Last 24 hours) at 8/9/2024 1142  Last data filed at 8/9/2024 1100  Gross per 24 hour   Intake 918.85 ml   Output 925 ml   Net -6.15 ml       Physical Exam    Physical Exam  Vitals and nursing note reviewed.   Constitutional:       General: He is not in acute distress.     Appearance: He is well-developed.   HENT:      Head: Normocephalic and atraumatic.   Eyes:      Conjunctiva/sclera: Conjunctivae normal.   Cardiovascular:      Rate and Rhythm: Normal rate and regular rhythm.      Heart sounds: No murmur heard.  Pulmonary:      Effort: Pulmonary effort is normal. No respiratory distress.      Breath sounds: Normal breath sounds.   Abdominal:      Palpations: Abdomen is soft.      Tenderness: There is no abdominal tenderness.   Musculoskeletal:         General: No swelling.      Cervical back: Neck supple.   Skin:     General: Skin is warm and dry.      Capillary Refill: Capillary refill takes less than 2 seconds.   Neurological:      Mental Status: He is alert.   Psychiatric:         Mood and Affect: Mood normal.         Labs: I have personally reviewed pertinent lab results.  Results from last 7 days   Lab Units 08/09/24  0539 08/08/24  0426 08/08/24  0007   WBC Thousand/uL 6.80 8.44 8.93   HEMOGLOBIN g/dL 12.4 13.5 14.2   HEMATOCRIT % 38.3 41.2 42.4   PLATELETS Thousands/uL 218 230 245   SEGS PCT % 66 73 80*   MONO " "PCT % 9 7 6   EOS PCT % 4 2 1      Results from last 7 days   Lab Units 08/09/24  0539 08/08/24  0426 08/08/24  0007 08/07/24  1320 08/07/24  1310 08/07/24  1310 08/07/24  1031   POTASSIUM mmol/L 3.6 3.2* 3.2*  --    < > 3.4* 3.5   CHLORIDE mmol/L 110* 106 105  --    < > 103 106   CO2 mmol/L 24 21 22  --    < > 22 25   CO2, I-STAT mmol/L  --   --   --  24  --   --   --    BUN mg/dL 38* 36* 35*  --    < > 40* 38*   CREATININE mg/dL 1.76* 1.80* 1.80*  --    < > 2.28* 1.90*   CALCIUM mg/dL 8.4 8.4 8.4  --    < > 9.4 9.5   ALK PHOS U/L 45  --   --   --   --  60 67   ALT U/L 19  --   --   --   --  31 18   AST U/L 27  --   --   --   --  40* 25   GLUCOSE, ISTAT mg/dl  --   --   --  219*  --   --   --     < > = values in this interval not displayed.     Results from last 7 days   Lab Units 08/09/24  0539 08/08/24  0426   MAGNESIUM mg/dL 2.1 2.0     Results from last 7 days   Lab Units 08/09/24  0539 08/08/24  0426   PHOSPHORUS mg/dL 2.3 3.1      Results from last 7 days   Lab Units 08/09/24  0539 08/08/24  2247 08/08/24  1532 08/08/24  0725 08/08/24  0010 08/07/24  1310   INR   --   --   --   --  1.25* 1.06   PTT seconds 45* 66* 98*   < > >210* 31    < > = values in this interval not displayed.     Results from last 7 days   Lab Units 08/07/24  1545   LACTIC ACID mmol/L 2.7*     0   Lab Value Date/Time    TROPONINI <0.02 03/19/2020 1427    TROPONINI <0.02 01/28/2020 1757    TROPONINI <0.02 01/22/2020 1956       Blood Gasses:     Bicarb per Serum Chem:   Recent Labs     08/07/24  1320 08/08/24  0007 08/08/24  0426 08/09/24  0539   CO2 24 22 21 24   :    ABG:    No results found for: \"PHART\", \"MFP8AUQ\", \"PO2ART\", \"CWJ9AXU\"     VBG:    No results found for: \"PHVEN\", \"MRD8NXD\", \"PO2VEN\", \"MPT6ZYL\"    Microbiology:    No results found for the last 90 days.         Imaging and other studies: I have personally reviewed pertinent reports.      IR PE endovascular therapy   Final Result by Dylon Walters MD (08/08 3596)    "   1. Acute right main pulmonary embolism extending into lobar branches status post successful pulmonary thromboembolectomy with extirpation of matter.  Mean pulmonary artery pressure was decreased from 36 mmHg to 15 mmHg after treatment.      2. Left main pulmonary thromboembolism extending into left lower lobe branches, refractory to FlowTriever thromboembolectomy. Given behavior of this thrombus and normalization of PA pressure after right thromboembolectomy, the left sided process is    chronic.      Plan:   - Continue excellent MICU care   - FlowStasis removal by IR team in the next day.      Workstation performed: [id number]         XR chest portable    (Results Pending)            --------------_-_-_-_-_-_    Pillo Beltrán MD  Internal Medicine Resident, PGY-3  [facility]

## 2024-08-10 ENCOUNTER — DOCUMENTATION (OUTPATIENT)
Dept: OTHER | Facility: HOSPITAL | Age: 74
End: 2024-08-10

## 2024-08-10 VITALS
RESPIRATION RATE: 16 BRPM | OXYGEN SATURATION: 92 % | SYSTOLIC BLOOD PRESSURE: 154 MMHG | TEMPERATURE: 97.9 F | HEIGHT: 69 IN | DIASTOLIC BLOOD PRESSURE: 87 MMHG | WEIGHT: 231.7 LBS | HEART RATE: 82 BPM | BODY MASS INDEX: 34.32 KG/M2

## 2024-08-10 PROBLEM — J96.01 ACUTE HYPOXIC RESPIRATORY FAILURE (HCC): Status: RESOLVED | Noted: 2021-12-31 | Resolved: 2024-08-10

## 2024-08-10 LAB
ANION GAP SERPL CALCULATED.3IONS-SCNC: 9 MMOL/L (ref 4–13)
APTT PPP: 138 SECONDS (ref 23–34)
APTT PPP: 64 SECONDS (ref 23–34)
BUN SERPL-MCNC: 28 MG/DL (ref 5–25)
CALCIUM SERPL-MCNC: 8.9 MG/DL (ref 8.4–10.2)
CHLORIDE SERPL-SCNC: 107 MMOL/L (ref 96–108)
CO2 SERPL-SCNC: 23 MMOL/L (ref 21–32)
CREAT SERPL-MCNC: 1.6 MG/DL (ref 0.6–1.3)
GFR SERPL CREATININE-BSD FRML MDRD: 42 ML/MIN/1.73SQ M
GLUCOSE SERPL-MCNC: 124 MG/DL (ref 65–140)
GLUCOSE SERPL-MCNC: 166 MG/DL (ref 65–140)
GLUCOSE SERPL-MCNC: 182 MG/DL (ref 65–140)
POTASSIUM SERPL-SCNC: 3.7 MMOL/L (ref 3.5–5.3)
SODIUM SERPL-SCNC: 139 MMOL/L (ref 135–147)

## 2024-08-10 PROCEDURE — 94761 N-INVAS EAR/PLS OXIMETRY MLT: CPT

## 2024-08-10 PROCEDURE — 82948 REAGENT STRIP/BLOOD GLUCOSE: CPT

## 2024-08-10 PROCEDURE — 99232 SBSQ HOSP IP/OBS MODERATE 35: CPT | Performed by: INTERNAL MEDICINE

## 2024-08-10 PROCEDURE — 85730 THROMBOPLASTIN TIME PARTIAL: CPT | Performed by: INTERNAL MEDICINE

## 2024-08-10 PROCEDURE — 99239 HOSP IP/OBS DSCHRG MGMT >30: CPT | Performed by: INTERNAL MEDICINE

## 2024-08-10 PROCEDURE — 80048 BASIC METABOLIC PNL TOTAL CA: CPT | Performed by: INTERNAL MEDICINE

## 2024-08-10 RX ORDER — AMLODIPINE BESYLATE 10 MG/1
10 TABLET ORAL DAILY
Status: DISCONTINUED | OUTPATIENT
Start: 2024-08-10 | End: 2024-08-10 | Stop reason: HOSPADM

## 2024-08-10 RX ADMIN — METOPROLOL TARTRATE 75 MG: 50 TABLET, FILM COATED ORAL at 08:59

## 2024-08-10 RX ADMIN — AMLODIPINE BESYLATE 10 MG: 10 TABLET ORAL at 08:59

## 2024-08-10 RX ADMIN — ALLOPURINOL 200 MG: 100 TABLET ORAL at 08:00

## 2024-08-10 RX ADMIN — INSULIN LISPRO 1 UNITS: 100 INJECTION, SOLUTION INTRAVENOUS; SUBCUTANEOUS at 11:35

## 2024-08-10 RX ADMIN — APIXABAN 10 MG: 5 TABLET, FILM COATED ORAL at 08:59

## 2024-08-10 NOTE — PROGRESS NOTES
"Pulmonary Progress Note  Shahbaz Barriga 74 y.o. male MRN: [id number]  Unit/Bed#: [facility] 904-01 Encounter: [id number]      Impressions:    Submassive bilateral lobar PE with cor pulmonale - s/p mechanical thrombectomy on the right (unable to remove left clot - likely more chronic  Acute hypoxic respiratory failure -resolved  BALTA on CPAP    Plans:    Patient does not qualify for supplemental oxygen.  Eliquis - will need a minimum of 6 months for unprovoked PE - may consider lifelong  Stable for discharge from a pulmonary standpoint  Follow-up as an outpatient in our pulmonary office    Chief Complaint:  Feeling great    Subjective:  Overall the patient reports that he is doing great.  No chest pain, shortness of breath or dyspnea.  He was able to walk in the halls without oxygen.    Vitals: Blood pressure 154/87, pulse 82, temperature 97.9 °F (36.6 °C), resp. rate 16, height 5' 9\" (1.753 m), weight 105 kg (231 lb 11.3 oz), SpO2 92%.,  Room air, Body mass index is 34.22 kg/m².      Intake/Output Summary (Last 24 hours) at 8/10/2024 0851  Last data filed at 8/10/2024 0532  Gross per 24 hour   Intake 178 ml   Output 150 ml   Net 28 ml       Physical exam:  General:  Patient is awake, alert, non-toxic and in no acute respiratory distress  Neck: No JVD  CV:  Regular, +S1 and S2, No murmurs, gallops or rubs appreciated  Lungs: Clear to auscultation bilateral without wheeze, rales or rhonci  Abdomen: Soft, +BS, Non-tender, non-distended  Extremities: No clubbing, cyanosis or edema  Neuro: No focal deficits        Tania Otto DO      Portions of the record may have been created with voice recognition software. Occasional wrong word or \"sound a like\" substitutions may have occurred due to the inherent limitations of voice recognition software. Read the chart carefully and recognize, using context, where substitutions have occurred.    "

## 2024-08-10 NOTE — RESPIRATORY THERAPY NOTE
Home Oxygen Qualifying Test     Patient name: Shahbaz Barriga        : 1950   Date of Test:  August 10, 2024  Diagnosis:    Home Oxygen Test:    **Medicare Guidelines require item(s) 1-5 on all ambulatory patients or 1 and 2 on non-ambulatory patients.    1. Baseline SPO2 on Room Air at rest 93 %   If <= 88% on Room Air add O2 via NC to obtain SpO2 >=88%. If LPM needed, document LPM  needed to reach =>88%    SPO2 during exertion on Room Air 94  %  During exertion monitor SPO2. If SPO2 increases >=89%, do not add supplemental oxygen    SPO2 on Oxygen at Rest 94 % at  LPM    SPO2 during exertion on Oxygen  % at  LPM    Test performed during exertion activity.      []  Supplemental Home Oxygen is indicated.    [x]  Client does not qualify for home oxygen.    Respiratory Additional Notes- Pt walked at least 360 feet and did not desaturate lower than 93% SpO2. Also is at 93% sat when at rest and not on any supplemental O2.        Uriah Moore, RT   Resp Care Note

## 2024-08-10 NOTE — RESTORATIVE TECHNICIAN NOTE
Restorative Technician Note      Patient Name: Shahbaz Barriga     Restorative Tech Visit Date: 08/10/24  Note Type: Mobility  Patient Position Upon Consult: Bedside chair  Activity Performed: Ambulated  Assistive Device: Cane  Patient Position at End of Consult: Bedside chair; All needs within reach  Nurse Communication: Nurse aware of consult, application of brace    Conrad Siegel, Restorative Technician

## 2024-08-10 NOTE — PLAN OF CARE
Problem: PAIN - ADULT  Goal: Verbalizes/displays adequate comfort level or baseline comfort level  Description: Interventions:  - Encourage patient to monitor pain and request assistance  - Assess pain using appropriate pain scale  - Administer analgesics based on type and severity of pain and evaluate response  - Implement non-pharmacological measures as appropriate and evaluate response  - Consider cultural and social influences on pain and pain management  - Notify physician/advanced practitioner if interventions unsuccessful or patient reports new pain  Outcome: Progressing     Problem: INFECTION - ADULT  Goal: Absence or prevention of progression during hospitalization  Description: INTERVENTIONS:  - Assess and monitor for signs and symptoms of infection  - Monitor lab/diagnostic results  - Monitor all insertion sites, i.e. indwelling lines, tubes, and drains  - Monitor endotracheal if appropriate and nasal secretions for changes in amount and color  - [place] appropriate cooling/warming therapies per order  - Administer medications as ordered  - Instruct and encourage patient and family to use good hand hygiene technique  - Identify and instruct in appropriate isolation precautions for identified infection/condition  Outcome: Progressing  Goal: Absence of fever/infection during neutropenic period  Description: INTERVENTIONS:  - Monitor WBC    Outcome: Progressing     Problem: SAFETY ADULT  Goal: Patient will remain free of falls  Description: INTERVENTIONS:  - Educate patient/family on patient safety including physical limitations  - Instruct patient to call for assistance with activity   - Consult OT/PT to assist with strengthening/mobility   - Keep Call bell within reach  - Keep bed low and locked with side rails adjusted as appropriate  - Keep care items and personal belongings within reach  - Initiate and maintain comfort rounds  - Make Fall Risk Sign visible to staff  - Offer Toileting every  Hours,  in advance of need  - Initiate/Maintain bed alarm  - Obtain necessary fall risk management equipment:   - Apply yellow socks and bracelet for high fall risk patients  - Consider moving patient to room near nurses station  Outcome: Progressing  Goal: Maintain or return to baseline ADL function  Description: INTERVENTIONS:  -  Assess patient's ability to carry out ADLs; assess patient's baseline for ADL function and identify physical deficits which impact ability to perform ADLs (bathing, care of mouth/teeth, toileting, grooming, dressing, etc.)  - Assess/evaluate cause of self-care deficits   - Assess range of motion  - Assess patient's mobility; develop plan if impaired  - Assess patient's need for assistive devices and provide as appropriate  - Encourage maximum independence but intervene and supervise when necessary  - Involve family in performance of ADLs  - Assess for home care needs following discharge   - Consider OT consult to assist with ADL evaluation and planning for discharge  - Provide patient education as appropriate  Outcome: Progressing  Goal: Maintains/Returns to pre admission functional level  Description: INTERVENTIONS:  - Perform AM-PAC 6 Click Basic Mobility/ Daily Activity assessment daily.  - Set and communicate daily mobility goal to care team and patient/family/caregiver.   - Collaborate with rehabilitation services on mobility goals if consulted  - Perform Range of Motion  times a day.  - Reposition patient every  hours.  - Dangle patient  times a day  - Stand patient  times a day  - Ambulate patient  times a day  - Out of bed to chair  times a day   - Out of bed for meals  times a day  - Out of bed for toileting  - Record patient progress and toleration of activity level   Outcome: Progressing     Problem: DISCHARGE PLANNING  Goal: Discharge to home or other facility with appropriate resources  Description: INTERVENTIONS:  - Identify barriers to discharge w/patient and caregiver  - Arrange  for needed discharge resources and transportation as appropriate  - Identify discharge learning needs (meds, wound care, etc.)  - Arrange for interpretive services to assist at discharge as needed  - Refer to Case Management Department for coordinating discharge planning if the patient needs post-hospital services based on physician/advanced practitioner order or complex needs related to functional status, cognitive ability, or social support system  Outcome: Progressing     Problem: Knowledge Deficit  Goal: Patient/family/caregiver demonstrates understanding of disease process, treatment plan, medications, and discharge instructions  Description: Complete learning assessment and assess knowledge base.  Interventions:  - Provide teaching at level of understanding  - Provide teaching via preferred learning methods  Outcome: Progressing

## 2024-08-10 NOTE — ASSESSMENT & PLAN NOTE
s/p IR bilateral pulmonary arteriogram with pulmonary thromboembolectomy on 8/7/24 with flow stasis closure of Right CFV access site  Change to Eliquis 10 mg BID for 7d followed by 5mg BID  Follow with pulmonology to determine duration  PCP follow up for age appropriate cancer screening

## 2024-08-10 NOTE — ASSESSMENT & PLAN NOTE
BP has been stable off antihypertensives however in last 24 hours has been elevated in eange of 150s  Beta blocker and amlodipine resumed  May resume losartan as OP as appropriate. D/w patient and wife

## 2024-08-10 NOTE — RESTORATIVE TECHNICIAN NOTE
Restorative Technician Note      Patient Name: Shahbaz Barriga     Restorative Tech Visit Date: 08/10/24  Note Type: Mobility  Patient Position Upon Consult: Other (Comment) (In BR;)  Activity Performed: Ambulated  Assistive Device: Roller walker  Patient Position at End of Consult: Bedside chair; All needs within reach    Conard Siegel, Restorative Technician

## 2024-08-10 NOTE — DISCHARGE SUMMARY
[facility]  Discharge- Shahbaz Kennedyy 1950, 74 y.o. male MRN: [id number]  Unit/Bed#: [facility] 904-01 Encounter: [id number]  Primary Care Provider: Rubia Dennis DO   Date and time admitted to hospital: 8/7/2024  6:06 PM    * Acute pulmonary embolism (HCC)  Assessment & Plan   s/p IR bilateral pulmonary arteriogram with pulmonary thromboembolectomy on 8/7/24 with flow stasis closure of Right CFV access site  Change to Eliquis 10 mg BID for 7d followed by 5mg BID  Follow with pulmonology to determine duration  PCP follow up for age appropriate cancer screening     Primary hypertension  Assessment & Plan  BP has been stable off antihypertensives however in last 24 hours has been elevated in eange of 150s  Beta blocker and amlodipine resumed  May resume losartan as OP as appropriate. D/w patient and wife    Chronic kidney disease, stage 3 (HCC)  Assessment & Plan  Cr improved   Monitor as OP    Acute hypoxic respiratory failure (HCC)-resolved as of 8/10/2024  Assessment & Plan  In setting of PE  Home O2 eval prior to discharge. Doesn't qualify for O2  Resolved. On RA            Medical Problems       Resolved Problems  Date Reviewed: 8/9/2024            Resolved    Acute hypoxic respiratory failure (HCC) 8/10/2024     Resolved by  Gian Tarango MD          Admission Date:   Admission Orders (From admission, onward)       Ordered        08/07/24 1810  Inpatient Admission  Once                            Admitting Diagnosis: Pulmonary embolism (HCC) [I26.99]        Summary of Hospital Course: 74-year-old male patient who presented with shortness of breath and hypoxia noted to have bilateral pulmonary embolism.  He is status post bilateral pulmonary arteriogram with thromboembolectomy on August 7 with flow stasis closure of right CFV access site.  Infusion changed to Eliquis.  His symptoms resolved and he is currently on room air and does not qualify for home oxygen.  Medically  stable for discharge with outpatient follow-up with pulmonology      Complications: none    Condition at Discharge: good         Discharge instructions/Information to patient and family:   See after visit summary for information provided to patient and family.      Provisions for Follow-Up Care:  See after visit summary for information related to follow-up care and any pertinent home health orders.      PCP: Rubia Dennis DO    Disposition: Home    Planned Readmission: No    Discharge Statement   I spent 31 minutes discharging the patient. This time was spent on the day of discharge. I had direct contact with the patient on the day of discharge. Additional documentation is required if more than 30 minutes were spent on discharge.     Discharge Medications:  See after visit summary for reconciled discharge medications provided to patient and family.

## 2024-08-12 ENCOUNTER — TELEPHONE (OUTPATIENT)
Dept: OTHER | Facility: HOSPITAL | Age: 74
End: 2024-08-12

## 2024-08-12 ENCOUNTER — TRANSITIONAL CARE MANAGEMENT (OUTPATIENT)
Dept: FAMILY MEDICINE CLINIC | Facility: HOSPITAL | Age: 74
End: 2024-08-12

## 2024-08-12 DIAGNOSIS — I26.99 ACUTE PULMONARY EMBOLISM WITHOUT ACUTE COR PULMONALE, UNSPECIFIED PULMONARY EMBOLISM TYPE (HCC): Primary | ICD-10-CM

## 2024-08-12 NOTE — PROGRESS NOTES
[place] Discharge Visit    Patient participated in [place] clinical trial and was randomly assigned to the FlowTriever intervention arm.    -Date of Discharge: 8/10/24  -Time of Discharge: 1320  -Dyspnea/mMRC Score: I -- SOB when hurrying on level ground or walking a slight hill  -Dyspnea (Modified Radha Scale) at rest: 0: No breathlessness at all  -NYHA/WHO Classification: II  -Supplemental Oxygen: None/Room Air  -Was the patient discharged with a prescription for supplemental oxygen: No  -Location subject is being discharged to: Home/self-care  -Is the subject being discharged to the same location as they were prior to this PE care episode: Yes    -If no, location of subject prior: N/A    Ancillary Therapies  -Were there any ancillary therapies for VTE completed: No   -If yes, specify: N/A  -Were there any changes in the anticoagulant medications: Yes    -If yes, specify:  Eliquis  -Was there an adverse event since initial visit: No   -If yes, specify: N/A  -Was there a bailout not previously recorded: No   -If yes, specify: N/A  -Were there any ICU admissions not previously recorded: No   -If yes, specify: N/A    Vitals:  SpO2: 92% on RA  RR: 16 breaths per min  HR: 82 bpm  BP: 154/87    Overall, patient is Completely satisfied with their PE treatment    Patient verbalized understanding to call coordinator with any new/worsening symptoms or events to occur prior to their office visit to be completed 1 month from randomization date.

## 2024-08-12 NOTE — PROGRESS NOTES
[place] II 48 Hour Follow-Up    Patient participated in [place] II clinical trial and was randomly assigned to the FlowTriever intervention arm.    -Date of Visit: 8/9/24  -Time of Visit: 1545  -Dyspnea/mMRC Score: I -- SOB when hurrying on level ground or walking a slight hill  -Dyspnea (Modified Radha Scale) at rest: 0: No breathlessness at all  -Fatigue (Radha CR10 Scale): 0: No breathlessness at all  -Supplemental Oxygen: Nasal Cannula: 3 lpm  -NYHA/WHO Classification: II  -Echo or CTPA Image collection at this visit: Echo   *Reminder: must match baseline modality  -Were there any changes in the anticoagulant medications: No    -If yes, specify: N/A  -Was there an adverse event since initial visit: No   -If yes, specify: N/A    Considering difficulty breathing, tiredness, ease of movement, and normal activity, the patient reports feeling A lot better compared to how she felt before her PE treatment started.    Vitals:  SpO2: 97% on 3 lpm  RR: 18 breaths per min  HR: 90 bpm  BP: 126/79

## 2024-08-12 NOTE — PROGRESS NOTES
If stopped upon discharge then stay off until seen in office.  Call if sugars at home are elevated > 180-200 consistently

## 2024-08-14 ENCOUNTER — APPOINTMENT (EMERGENCY)
Dept: CT IMAGING | Facility: HOSPITAL | Age: 74
DRG: 871 | End: 2024-08-14
Payer: MEDICARE

## 2024-08-14 ENCOUNTER — OFFICE VISIT (OUTPATIENT)
Dept: URGENT CARE | Facility: CLINIC | Age: 74
End: 2024-08-14
Payer: MEDICARE

## 2024-08-14 ENCOUNTER — HOSPITAL ENCOUNTER (INPATIENT)
Facility: HOSPITAL | Age: 74
LOS: 1 days | Discharge: HOME | DRG: 871 | End: 2024-08-16
Attending: EMERGENCY MEDICINE | Admitting: HOSPITALIST
Payer: MEDICARE

## 2024-08-14 ENCOUNTER — APPOINTMENT (EMERGENCY)
Dept: RADIOLOGY | Facility: HOSPITAL | Age: 74
DRG: 871 | End: 2024-08-14
Payer: MEDICARE

## 2024-08-14 VITALS
DIASTOLIC BLOOD PRESSURE: 72 MMHG | SYSTOLIC BLOOD PRESSURE: 126 MMHG | OXYGEN SATURATION: 96 % | RESPIRATION RATE: 18 BRPM | HEART RATE: 72 BPM | TEMPERATURE: 99.3 F

## 2024-08-14 DIAGNOSIS — J12.82 PNEUMONIA DUE TO COVID-19 VIRUS: Primary | ICD-10-CM

## 2024-08-14 DIAGNOSIS — U07.1 COVID-19: ICD-10-CM

## 2024-08-14 DIAGNOSIS — U07.1 PNEUMONIA DUE TO COVID-19 VIRUS: Primary | ICD-10-CM

## 2024-08-14 DIAGNOSIS — R06.02 SOB (SHORTNESS OF BREATH): Primary | ICD-10-CM

## 2024-08-14 DIAGNOSIS — R09.02 HYPOXIA: ICD-10-CM

## 2024-08-14 PROBLEM — R65.10 SIRS (SYSTEMIC INFLAMMATORY RESPONSE SYNDROME) (HCC): Status: ACTIVE | Noted: 2024-08-14

## 2024-08-14 PROBLEM — Z86.711 HISTORY OF PULMONARY EMBOLUS (PE): Status: ACTIVE | Noted: 2024-08-14

## 2024-08-14 LAB
2HR DELTA HS TROPONIN: -1 NG/L
ALBUMIN SERPL BCG-MCNC: 4 G/DL (ref 3.5–5)
ALP SERPL-CCNC: 71 U/L (ref 34–104)
ALT SERPL W P-5'-P-CCNC: 31 U/L (ref 7–52)
ANION GAP SERPL CALCULATED.3IONS-SCNC: 9 MMOL/L (ref 4–13)
AST SERPL W P-5'-P-CCNC: 40 U/L (ref 13–39)
BASOPHILS # BLD AUTO: 0.04 THOUSANDS/ÂΜL (ref 0–0.1)
BASOPHILS NFR BLD AUTO: 1 % (ref 0–1)
BILIRUB SERPL-MCNC: 0.59 MG/DL (ref 0.2–1)
BNP SERPL-MCNC: 104 PG/ML (ref 0–100)
BUN SERPL-MCNC: 31 MG/DL (ref 5–25)
CALCIUM SERPL-MCNC: 9.5 MG/DL (ref 8.4–10.2)
CARDIAC TROPONIN I PNL SERPL HS: 12 NG/L
CARDIAC TROPONIN I PNL SERPL HS: 13 NG/L
CHLORIDE SERPL-SCNC: 108 MMOL/L (ref 96–108)
CK SERPL-CCNC: 94 U/L (ref 39–308)
CO2 SERPL-SCNC: 23 MMOL/L (ref 21–32)
CREAT SERPL-MCNC: 1.74 MG/DL (ref 0.6–1.3)
CRP SERPL QL: 74.9 MG/L
EOSINOPHIL # BLD AUTO: 0.23 THOUSAND/ÂΜL (ref 0–0.61)
EOSINOPHIL NFR BLD AUTO: 6 % (ref 0–6)
ERYTHROCYTE [DISTWIDTH] IN BLOOD BY AUTOMATED COUNT: 14.8 % (ref 11.6–15.1)
FLUAV RNA RESP QL NAA+PROBE: NEGATIVE
FLUBV RNA RESP QL NAA+PROBE: NEGATIVE
GFR SERPL CREATININE-BSD FRML MDRD: 38 ML/MIN/1.73SQ M
GLUCOSE SERPL-MCNC: 141 MG/DL (ref 65–140)
HCT VFR BLD AUTO: 38.7 % (ref 36.5–49.3)
HGB BLD-MCNC: 13.2 G/DL (ref 12–17)
IMM GRANULOCYTES # BLD AUTO: 0.03 THOUSAND/UL (ref 0–0.2)
IMM GRANULOCYTES NFR BLD AUTO: 1 % (ref 0–2)
LYMPHOCYTES # BLD AUTO: 1.02 THOUSANDS/ÂΜL (ref 0.6–4.47)
LYMPHOCYTES NFR BLD AUTO: 26 % (ref 14–44)
MCH RBC QN AUTO: 32 PG (ref 26.8–34.3)
MCHC RBC AUTO-ENTMCNC: 34.1 G/DL (ref 31.4–37.4)
MCV RBC AUTO: 94 FL (ref 82–98)
MONOCYTES # BLD AUTO: 0.4 THOUSAND/ÂΜL (ref 0.17–1.22)
MONOCYTES NFR BLD AUTO: 10 % (ref 4–12)
NEUTROPHILS # BLD AUTO: 2.19 THOUSANDS/ÂΜL (ref 1.85–7.62)
NEUTS SEG NFR BLD AUTO: 56 % (ref 43–75)
NRBC BLD AUTO-RTO: 0 /100 WBCS
PLATELET # BLD AUTO: 256 THOUSANDS/UL (ref 149–390)
PMV BLD AUTO: 10.1 FL (ref 8.9–12.7)
POTASSIUM SERPL-SCNC: 3.9 MMOL/L (ref 3.5–5.3)
PROT SERPL-MCNC: 7.1 G/DL (ref 6.4–8.4)
RBC # BLD AUTO: 4.13 MILLION/UL (ref 3.88–5.62)
RSV RNA RESP QL NAA+PROBE: NEGATIVE
SARS-COV-2 AG UPPER RESP QL IA: POSITIVE
SARS-COV-2 RNA RESP QL NAA+PROBE: POSITIVE
SODIUM SERPL-SCNC: 140 MMOL/L (ref 135–147)
VALID CONTROL: ABNORMAL
WBC # BLD AUTO: 3.91 THOUSAND/UL (ref 4.31–10.16)

## 2024-08-14 PROCEDURE — 93005 ELECTROCARDIOGRAM TRACING: CPT

## 2024-08-14 PROCEDURE — 96374 THER/PROPH/DIAG INJ IV PUSH: CPT

## 2024-08-14 PROCEDURE — 99223 1ST HOSP IP/OBS HIGH 75: CPT | Performed by: PHYSICIAN ASSISTANT

## 2024-08-14 PROCEDURE — G0463 HOSPITAL OUTPT CLINIC VISIT: HCPCS

## 2024-08-14 PROCEDURE — 84484 ASSAY OF TROPONIN QUANT: CPT

## 2024-08-14 PROCEDURE — 83880 ASSAY OF NATRIURETIC PEPTIDE: CPT | Performed by: EMERGENCY MEDICINE

## 2024-08-14 PROCEDURE — 80053 COMPREHEN METABOLIC PANEL: CPT

## 2024-08-14 PROCEDURE — 87811 SARS-COV-2 COVID19 W/OPTIC: CPT

## 2024-08-14 PROCEDURE — 99213 OFFICE O/P EST LOW 20 MIN: CPT

## 2024-08-14 PROCEDURE — 71275 CT ANGIOGRAPHY CHEST: CPT

## 2024-08-14 PROCEDURE — 0241U HB NFCT DS VIR RESP RNA 4 TRGT: CPT | Performed by: EMERGENCY MEDICINE

## 2024-08-14 PROCEDURE — 86140 C-REACTIVE PROTEIN: CPT | Performed by: PHYSICIAN ASSISTANT

## 2024-08-14 PROCEDURE — 82550 ASSAY OF CK (CPK): CPT | Performed by: PHYSICIAN ASSISTANT

## 2024-08-14 PROCEDURE — 85025 COMPLETE CBC W/AUTO DIFF WBC: CPT

## 2024-08-14 PROCEDURE — 99285 EMERGENCY DEPT VISIT HI MDM: CPT | Performed by: EMERGENCY MEDICINE

## 2024-08-14 PROCEDURE — 36415 COLL VENOUS BLD VENIPUNCTURE: CPT

## 2024-08-14 PROCEDURE — 99285 EMERGENCY DEPT VISIT HI MDM: CPT

## 2024-08-14 RX ORDER — SENNOSIDES 8.6 MG
1 TABLET ORAL
Status: DISCONTINUED | OUTPATIENT
Start: 2024-08-14 | End: 2024-08-16 | Stop reason: HOSPADM

## 2024-08-14 RX ORDER — SULFASALAZINE 500 MG/1
500 TABLET, DELAYED RELEASE ORAL 4 TIMES DAILY
Status: DISCONTINUED | OUTPATIENT
Start: 2024-08-14 | End: 2024-08-16 | Stop reason: HOSPADM

## 2024-08-14 RX ORDER — ONDANSETRON 2 MG/ML
4 INJECTION INTRAMUSCULAR; INTRAVENOUS EVERY 8 HOURS PRN
Status: DISCONTINUED | OUTPATIENT
Start: 2024-08-14 | End: 2024-08-16 | Stop reason: HOSPADM

## 2024-08-14 RX ORDER — ACETAMINOPHEN 325 MG/1
650 TABLET ORAL EVERY 6 HOURS PRN
Status: DISCONTINUED | OUTPATIENT
Start: 2024-08-14 | End: 2024-08-16 | Stop reason: HOSPADM

## 2024-08-14 RX ORDER — CLOPIDOGREL BISULFATE 75 MG/1
75 TABLET ORAL DAILY
Status: DISCONTINUED | OUTPATIENT
Start: 2024-08-15 | End: 2024-08-16 | Stop reason: HOSPADM

## 2024-08-14 RX ORDER — ALLOPURINOL 100 MG/1
200 TABLET ORAL DAILY
Status: DISCONTINUED | OUTPATIENT
Start: 2024-08-15 | End: 2024-08-16 | Stop reason: HOSPADM

## 2024-08-14 RX ORDER — UBIDECARENONE 30 MG
200 CAPSULE ORAL DAILY
Status: DISCONTINUED | OUTPATIENT
Start: 2024-08-15 | End: 2024-08-16 | Stop reason: HOSPADM

## 2024-08-14 RX ORDER — METOPROLOL SUCCINATE 50 MG/1
75 TABLET, EXTENDED RELEASE ORAL 2 TIMES DAILY
Status: ON HOLD | COMMUNITY
End: 2024-08-14 | Stop reason: CLARIF

## 2024-08-14 RX ORDER — GLIPIZIDE 5 MG/1
2.5 TABLET, FILM COATED, EXTENDED RELEASE ORAL DAILY
COMMUNITY
End: 2024-08-16

## 2024-08-14 RX ORDER — FENOFIBRATE 145 MG/1
145 TABLET, COATED ORAL DAILY
Status: DISCONTINUED | OUTPATIENT
Start: 2024-08-15 | End: 2024-08-16 | Stop reason: HOSPADM

## 2024-08-14 RX ORDER — ATORVASTATIN CALCIUM 40 MG/1
40 TABLET, FILM COATED ORAL
Status: DISCONTINUED | OUTPATIENT
Start: 2024-08-15 | End: 2024-08-16 | Stop reason: HOSPADM

## 2024-08-14 RX ORDER — AMLODIPINE BESYLATE 5 MG/1
10 TABLET ORAL DAILY
Status: DISCONTINUED | OUTPATIENT
Start: 2024-08-15 | End: 2024-08-16 | Stop reason: HOSPADM

## 2024-08-14 RX ORDER — MAGNESIUM HYDROXIDE/ALUMINUM HYDROXICE/SIMETHICONE 120; 1200; 1200 MG/30ML; MG/30ML; MG/30ML
30 SUSPENSION ORAL EVERY 6 HOURS PRN
Status: DISCONTINUED | OUTPATIENT
Start: 2024-08-14 | End: 2024-08-16 | Stop reason: HOSPADM

## 2024-08-14 RX ORDER — UBIDECARENONE 30 MG
CAPSULE ORAL DAILY
Status: DISCONTINUED | OUTPATIENT
Start: 2024-08-15 | End: 2024-08-14

## 2024-08-14 RX ORDER — DEXAMETHASONE SODIUM PHOSPHATE 10 MG/ML
10 INJECTION, SOLUTION INTRAMUSCULAR; INTRAVENOUS ONCE
Status: COMPLETED | OUTPATIENT
Start: 2024-08-14 | End: 2024-08-14

## 2024-08-14 RX ORDER — ASCORBIC ACID 500 MG
1000 TABLET ORAL DAILY
Status: DISCONTINUED | OUTPATIENT
Start: 2024-08-15 | End: 2024-08-16 | Stop reason: HOSPADM

## 2024-08-14 RX ADMIN — IOHEXOL 85 ML: 350 INJECTION, SOLUTION INTRAVENOUS at 19:11

## 2024-08-14 RX ADMIN — DEXAMETHASONE SODIUM PHOSPHATE 10 MG: 10 INJECTION INTRAMUSCULAR; INTRAVENOUS at 17:49

## 2024-08-14 NOTE — ED PROVIDER NOTES
History  Chief Complaint   Patient presents with    Shortness of Breath     C/o SOB and cough x1-2 days. From . Denies CP     Patient is a 74-year-old male.  He has a history of CVA, SVT, chronic kidney disease, hypertension, hyperlipidemia, diabetes, and recent pulmonary embolism.  He underwent IR thrombectomy on 8/7.  During his hospitalization he did develop a cough and rhinorrhea.  No fever.  Today he went to urgent care for evaluation of the cough.  He still has shortness of breath, but improved since his procedure.  Patient, however, reports that he feels he should have felt better by now.  Urgent care ran a COVID test.  Patient was COVID-positive and sent to the emergency room.        Prior to Admission Medications   Prescriptions Last Dose Informant Patient Reported? Taking?   Coenzyme Q10 200 MG capsule  Self Yes No   Sig: Take by mouth daily    Contour Next Test test strip   No No   Sig: USE 1 STRIP TO CHECK GLUCOSE ONCE DAILY   allopurinol (ZYLOPRIM) 100 mg tablet   No No   Sig: TAKE 2 TABLETS DAILY   amLODIPine (NORVASC) 10 mg tablet   No No   Sig: Take 1 tablet (10 mg total) by mouth in the morning   apixaban (Eliquis) 5 mg   No No   Sig: Take 2 tablets (10 mg total) by mouth 2 (two) times a day for 7 days, THEN 1 tablet (5 mg total) 2 (two) times a day for 23 days. Continue with 5 mg BID afterward.   ascorbic acid (VITAMIN C) 1000 MG tablet  Self No No   Sig: Take 1 tablet (1,000 mg total) by mouth daily   atorvastatin (LIPITOR) 40 mg tablet   No No   Sig: Take 1 tablet (40 mg total) by mouth daily with dinner   cholecalciferol (VITAMIN D3) 400 units tablet  Self No No   Sig: Take 1 tablet (400 Units total) by mouth daily   clopidogrel (PLAVIX) 75 mg tablet   No No   Sig: Take 1 tablet (75 mg total) by mouth daily   fenofibrate (TRICOR) 145 mg tablet   No No   Sig: Take 1 tablet (145 mg total) by mouth daily   metoprolol tartrate (LOPRESSOR) 50 mg tablet   No No   Sig: Take 1.5 tablets (75 mg total)  by mouth 2 (two) times a day   sulfaSALAzine (AZULFIDINE) 500 mg tablet   No No   Sig: Take 1 tablet (500 mg total) by mouth 4 (four) times a day      Facility-Administered Medications: None       Past Medical History:   Diagnosis Date    Acute respiratory failure with hypoxia ([facility]) 12/31/2021    CVA (cerebral vascular accident) ([facility])     Diverticulitis     Gout     NSVT (nonsustained ventricular tachycardia) ([facility]) 05/28/2020    Vasovagal syncope 12/28/2021       Past Surgical History:   Procedure Laterality Date    CARDIAC LOOP RECORDER      COLONOSCOPY  09/18/2006    complete; 10 yrs    COLONOSCOPY  10/23/2017    complete; 5 yrs    COLONOSCOPY  05/06/2020    Severe active left-sided colitis, erythematous and ulcerated mucosa in the rectosigmoid, inflammatory polyp    IR PE ENDOVASCULAR THERAPY  8/7/2024       Family History   Problem Relation Age of Onset    Breast cancer Mother     Kidney disease Father     Lung cancer Father     Other Father         smoker    Hypertension Other     Colon polyps Neg Hx     Colon cancer Neg Hx      I have reviewed and agree with the history as documented.    E-Cigarette/Vaping    E-Cigarette Use Never User      E-Cigarette/Vaping Substances    Nicotine No     THC No     CBD No     Flavoring No     Other No     Unknown No      Social History     Tobacco Use    Smoking status: Never    Smokeless tobacco: Never   Vaping Use    Vaping status: Never Used   Substance Use Topics    Alcohol use: Never    Drug use: Never       Review of Systems   Constitutional:  Negative for chills and fever.   HENT:  Positive for congestion and rhinorrhea. Negative for sore throat.    Eyes:  Negative for pain, redness and visual disturbance.   Respiratory:  Positive for cough and shortness of breath.    Cardiovascular:  Negative for chest pain and leg swelling.   Gastrointestinal:  Negative for abdominal pain, diarrhea and vomiting.   Endocrine: Negative for polydipsia and polyuria.   Genitourinary:   Negative for dysuria, frequency and hematuria.   Musculoskeletal:  Negative for back pain and neck pain.   Skin:  Negative for rash and wound.   Allergic/Immunologic: Negative for immunocompromised state.   Neurological:  Negative for weakness, numbness and headaches.   Psychiatric/Behavioral:  Negative for hallucinations and suicidal ideas.    All other systems reviewed and are negative.      Physical Exam  Physical Exam  Vitals reviewed.   Constitutional:       General: He is not in acute distress.     Appearance: He is obese. He is not toxic-appearing.   HENT:      Head: Normocephalic and atraumatic.      Nose: Nose normal.      Mouth/Throat:      Mouth: Mucous membranes are moist.   Eyes:      General:         Right eye: No discharge.         Left eye: No discharge.      Conjunctiva/sclera: Conjunctivae normal.   Cardiovascular:      Rate and Rhythm: Normal rate and regular rhythm.      Pulses: Normal pulses.      Heart sounds: Normal heart sounds. No murmur heard.     No friction rub. No gallop.   Pulmonary:      Effort: Tachypnea present.      Breath sounds: Normal breath sounds. No stridor. No decreased breath sounds, wheezing, rhonchi or rales.      Comments: Patient is mildly dyspneic at rest.  Abdominal:      General: Bowel sounds are normal. There is no distension.      Palpations: Abdomen is soft.      Tenderness: There is no abdominal tenderness. There is no right CVA tenderness, left CVA tenderness, guarding or rebound.   Musculoskeletal:         General: No swelling, tenderness, deformity or signs of injury. Normal range of motion.      Cervical back: Normal range of motion and neck supple. No rigidity.      Right lower leg: Edema present.      Left lower leg: Edema present.      Comments: No calf pain or unilateral leg swelling   Skin:     General: Skin is warm and dry.      Coloration: Skin is not jaundiced or pale.      Findings: No bruising, erythema or rash.   Neurological:      General: No focal  deficit present.      Mental Status: He is alert and oriented to person, place, and time.      Cranial Nerves: No facial asymmetry.      Sensory: No sensory deficit.      Motor: Motor function is intact.   Psychiatric:         Mood and Affect: Mood normal.         Behavior: Behavior normal.         Vital Signs  ED Triage Vitals [08/14/24 1720]   Temperature Pulse Respirations Blood Pressure SpO2   98.2 °F (36.8 °C) 85 22 163/85 93 %      Temp Source Heart Rate Source Patient Position - Orthostatic VS BP Location FiO2 (%)   Oral Monitor Sitting Right arm --      Pain Score       No Pain           Vitals:    08/14/24 1900 08/14/24 1945 08/14/24 2000 08/14/24 2030   BP: 127/68 152/77 143/73 153/78   Pulse: 66 68 66 68   Patient Position - Orthostatic VS:  Sitting Sitting Sitting         Visual Acuity      ED Medications  Medications   dexamethasone (PF) (DECADRON) injection 10 mg (10 mg Intravenous Given 8/14/24 1749)   iohexol (OMNIPAQUE) 350 MG/ML injection (SINGLE-DOSE) 85 mL (85 mL Intravenous Given 8/14/24 1911)       Diagnostic Studies  Results Reviewed       Procedure Component Value Units Date/Time    HS Troponin I 2hr [[id number]]  (Normal) Collected: 08/14/24 1945    Lab Status: Final result Specimen: Blood from Arm, Left Updated: 08/14/24 2012     hs TnI 2hr 12 ng/L      Delta 2hr hsTnI -1 ng/L     HS Troponin I 4hr [[id number]]     Lab Status: No result Specimen: Blood     FLU/RSV/COVID - if FLU/RSV clinically relevant [[id number]]  (Abnormal) Collected: 08/14/24 1752    Lab Status: Final result Specimen: Nares from Nose Updated: 08/14/24 1840     SARS-CoV-2 Positive     INFLUENZA A PCR Negative     INFLUENZA B PCR Negative     RSV PCR Negative    Narrative:      This test has been performed using the CoV-2/Flu/RSV plus assay on the [employer] GeneXpert platform. This test has been validated by the  and verified by the performing laboratory.     This test is designed to amplify and detect the  following: nucleocapsid (N), envelope (E), and RNA-dependent RNA polymerase (RdRP) genes of the SARS-CoV-2 genome; matrix (M), basic polymerase (PB2), and acidic protein (PA) segments of the influenza A genome; matrix (M) and non-structural protein (NS) segments of the influenza B genome, and the nucleocapsid genes of RSV A and RSV B.     Positive results are indicative of the presence of Flu A, Flu B, RSV, and/or SARS-CoV-2 RNA. Positive results for SARS-CoV-2 or suspected novel influenza should be reported to state, local, or federal health departments according to local reporting requirements.      All results should be assessed in conjunction with clinical presentation and other laboratory markers for clinical management.     FOR PEDIATRIC PATIENTS - copy/paste COVID Guidelines URL to browser: [email or web address]       HS Troponin 0hr (reflex protocol) [[id number]]  (Normal) Collected: 08/14/24 1752    Lab Status: Final result Specimen: Blood from Arm, Left Updated: 08/14/24 1825     hs TnI 0hr 13 ng/L     B-Type Natriuretic Peptide(BNP) [[id number]]  (Abnormal) Collected: 08/14/24 1752    Lab Status: Final result Specimen: Blood from Arm, Left Updated: 08/14/24 1824      pg/mL     Comprehensive metabolic panel [[id number]]  (Abnormal) Collected: 08/14/24 1752    Lab Status: Final result Specimen: Blood from Arm, Left Updated: 08/14/24 1818     Sodium 140 mmol/L      Potassium 3.9 mmol/L      Chloride 108 mmol/L      CO2 23 mmol/L      ANION GAP 9 mmol/L      BUN 31 mg/dL      Creatinine 1.74 mg/dL      Glucose 141 mg/dL      Calcium 9.5 mg/dL      AST 40 U/L      ALT 31 U/L      Alkaline Phosphatase 71 U/L      Total Protein 7.1 g/dL      Albumin 4.0 g/dL      Total Bilirubin 0.59 mg/dL      eGFR 38 ml/min/1.73sq m     Narrative:      National Kidney Disease Foundation guidelines for Chronic Kidney Disease (CKD):     Stage 1 with normal or high GFR (GFR  > 90 mL/min/1.73 square meters)    Stage 2 Mild CKD (GFR = 60-89 mL/min/1.73 square meters)    Stage 3A Moderate CKD (GFR = 45-59 mL/min/1.73 square meters)    Stage 3B Moderate CKD (GFR = 30-44 mL/min/1.73 square meters)    Stage 4 Severe CKD (GFR = 15-29 mL/min/1.73 square meters)    Stage 5 End Stage CKD (GFR <15 mL/min/1.73 square meters)  Note: GFR calculation is accurate only with a steady state creatinine    CBC and differential [[id number]]  (Abnormal) Collected: 08/14/24 1752    Lab Status: Final result Specimen: Blood from Arm, Left Updated: 08/14/24 1802     WBC 3.91 Thousand/uL      RBC 4.13 Million/uL      Hemoglobin 13.2 g/dL      Hematocrit 38.7 %      MCV 94 fL      MCH 32.0 pg      MCHC 34.1 g/dL      RDW 14.8 %      MPV 10.1 fL      Platelets 256 Thousands/uL      nRBC 0 /100 WBCs      Segmented % 56 %      Immature Grans % 1 %      Lymphocytes % 26 %      Monocytes % 10 %      Eosinophils Relative 6 %      Basophils Relative 1 %      Absolute Neutrophils 2.19 Thousands/µL      Absolute Immature Grans 0.03 Thousand/uL      Absolute Lymphocytes 1.02 Thousands/µL      Absolute Monocytes 0.40 Thousand/µL      Eosinophils Absolute 0.23 Thousand/µL      Basophils Absolute 0.04 Thousands/µL                    CTA ED chest PE study   Final Result by Ranjit Cruz MD (08/14 2001)      Early chronic pulmonary emboli, somewhat reduced clot burden. No evidence of new or acute embolus.      Interstitial and groundglass opacities in the dependent portion of the lungs could represent nonspecific pneumonitis and/or hypoventilatory changes.                  Workstation performed: [id number]                    Procedures  ECG 12 Lead Documentation Only    Date/Time: 8/14/2024 5:38 PM    Performed by: Lonnie Hood MD  Authorized by: Lonnie Hood MD    ECG reviewed by me, the ED Provider: yes    Patient location:  ED  Comments:      Normal sinus rhythm.  Nonspecific T wave abnormality.  Prolonged QT.   Abnormal EKG.  No acute ischemic ST or T wave changes.           ED Course                                 SBIRT 22yo+      Flowsheet Row Most Recent Value   Initial Alcohol Screen: US AUDIT-C     1. How often do you have a drink containing alcohol? 0 Filed at: 08/14/2024 1721   2. How many drinks containing alcohol do you have on a typical day you are drinking?  0 Filed at: 08/14/2024 1721   3b. FEMALE Any Age, or MALE 65+: How often do you have 4 or more drinks on one occassion? 0 Filed at: 08/14/2024 1721   Audit-C Score 0 Filed at: 08/14/2024 1721   EMANUEL: How many times in the past year have you...    Used an illegal drug or used a prescription medication for non-medical reasons? Never Filed at: 08/14/2024 1721                      Medical Decision Making  Ambulatory pulse ox is 89%. CTA of the chest did show groundglass opacities which could represent COVID-pneumonia.  There is no progression of his pulmonary embolisms.  There is no congestive heart failure.  No pneumothorax.  No acute myocardial infarction.  As patient was hypoxic, Decadron was ordered.  Consulted with hospitalist for admission.    Amount and/or Complexity of Data Reviewed  External Data Reviewed: notes.  Labs: ordered. Decision-making details documented in ED Course.  Radiology: ordered. Decision-making details documented in ED Course.  ECG/medicine tests: ordered and independent interpretation performed. Decision-making details documented in ED Course.  Discussion of management or test interpretation with external provider(s): Hospitalist    Risk  Prescription drug management.  Decision regarding hospitalization.                 Disposition  Final diagnoses:   Pneumonia due to COVID-19 virus   Hypoxia     Time reflects when diagnosis was documented in both MDM as applicable and the Disposition within this note       Time User Action Codes Description Comment    8/14/2024  8:46 PM Lonnie Hood Add [U07.1,  J12.82] Pneumonia due to  COVID-19 virus     8/14/2024  8:46 PM Lonnie Hood Add [R09.02] Hypoxia           ED Disposition       ED Disposition   Admit    Condition   Stable    Date/Time   Wed Aug 14, 2024 2047    Comment   --             Follow-up Information    None         Patient's Medications   Discharge Prescriptions    No medications on file       No discharge procedures on file.    PDMP Review         Value Time User    PDMP Reviewed  Yes 1/13/2022  6:29 PM Mayi Cai DO            ED Provider  Electronically Signed by             Lonnie Hood MD  08/14/24 2048

## 2024-08-14 NOTE — PROGRESS NOTES
[facility]        NAME: Shahbaz Barriga is a 74 y.o. male  : 1950    MRN: [id number]  DATE: 2024  TIME: 4:27 PM    Assessment and Plan   SOB (shortness of breath) [R06.02]  1. SOB (shortness of breath)  Transfer to other facility      Patient needs further evaluation of shortness of breath.  Just discharged week ago with bilateral pulmonary embolism.  Positive COVID test today      Patient Instructions       Follow up with PCP in 3-5 days.  Proceed to  ER if symptoms worsen.    If tests have been performed at [facility], our office will contact you with results if changes need to be made to the care plan discussed with you at the visit.  You can review your full results on [facility].    Chief Complaint     Chief Complaint   Patient presents with    Cough     Pt reports a cough & congestion x1 week. Reports being hospitalized 1 week ago with b/l PE. Pulmonary thromboembolectomy done 24. Reports SOB improved but cough continues. Denies fevers.          History of Present Illness       This is a 74-year-old male who presents today with cough for a week.  He states that he was in the hospital for bilateral pulmonary embolisms and was released last week.  He started with a cough while in the hospital.  He states he is slightly more short of breath.  He did test positive for COVID in the office today along with his wife.  He complains of a dry cough and congestion.  We discussed because he has increased shortness of breath he needs to go to the emergency room for further evaluation.  His oxygen saturations in the office were 96%.  He denies any fever or chills    Cough  Associated symptoms include shortness of breath. Pertinent negatives include no chest pain, chills, fever, headaches, postnasal drip or sore throat.       Review of Systems   Review of Systems   Constitutional: Negative.  Negative for chills, fatigue and fever.   HENT:  Positive for congestion. Negative for  postnasal drip and sore throat.    Respiratory:  Positive for cough and shortness of breath.    Cardiovascular:  Negative for chest pain.   Gastrointestinal: Negative.    Genitourinary: Negative.    Musculoskeletal: Negative.    Neurological: Negative.  Negative for headaches.         Current Medications       Current Outpatient Medications:     allopurinol (ZYLOPRIM) 100 mg tablet, TAKE 2 TABLETS DAILY, Disp: 180 tablet, Rfl: 1    amLODIPine (NORVASC) 10 mg tablet, Take 1 tablet (10 mg total) by mouth in the morning, Disp: 90 tablet, Rfl: 2    apixaban (Eliquis) 5 mg, Take 2 tablets (10 mg total) by mouth 2 (two) times a day for 7 days, THEN 1 tablet (5 mg total) 2 (two) times a day for 23 days. Continue with 5 mg BID afterward., Disp: 74 tablet, Rfl: 0    ascorbic acid (VITAMIN C) 1000 MG tablet, Take 1 tablet (1,000 mg total) by mouth daily, Disp: , Rfl: 0    atorvastatin (LIPITOR) 40 mg tablet, Take 1 tablet (40 mg total) by mouth daily with dinner, Disp: 90 tablet, Rfl: 1    cholecalciferol (VITAMIN D3) 400 units tablet, Take 1 tablet (400 Units total) by mouth daily, Disp: , Rfl: 0    clopidogrel (PLAVIX) 75 mg tablet, Take 1 tablet (75 mg total) by mouth daily, Disp: 90 tablet, Rfl: 1    Coenzyme Q10 200 MG capsule, Take by mouth daily , Disp: , Rfl:     fenofibrate (TRICOR) 145 mg tablet, Take 1 tablet (145 mg total) by mouth daily, Disp: 90 tablet, Rfl: 0    sulfaSALAzine (AZULFIDINE) 500 mg tablet, Take 1 tablet (500 mg total) by mouth 4 (four) times a day, Disp: 360 tablet, Rfl: 3    Contour Next Test test strip, USE 1 STRIP TO CHECK GLUCOSE ONCE DAILY, Disp: 100 each, Rfl: 0    metoprolol tartrate (LOPRESSOR) 50 mg tablet, Take 1.5 tablets (75 mg total) by mouth 2 (two) times a day, Disp: 270 tablet, Rfl: 1    Current Allergies     Allergies as of 08/14/2024    (No Known Allergies)            The following portions of the patient's history were reviewed and updated as appropriate: allergies, current  medications, past family history, past medical history, past social history, past surgical history and problem list.     Past Medical History:   Diagnosis Date    Acute respiratory failure with hypoxia ([facility]) 12/31/2021    CVA (cerebral vascular accident) ([facility])     Diverticulitis     Gout     NSVT (nonsustained ventricular tachycardia) ([facility]) 05/28/2020    Vasovagal syncope 12/28/2021       Past Surgical History:   Procedure Laterality Date    CARDIAC LOOP RECORDER      COLONOSCOPY  09/18/2006    complete; 10 yrs    COLONOSCOPY  10/23/2017    complete; 5 yrs    COLONOSCOPY  05/06/2020    Severe active left-sided colitis, erythematous and ulcerated mucosa in the rectosigmoid, inflammatory polyp    IR PE ENDOVASCULAR THERAPY  8/7/2024       Family History   Problem Relation Age of Onset    Breast cancer Mother     Kidney disease Father     Lung cancer Father     Other Father         smoker    Hypertension Other     Colon polyps Neg Hx     Colon cancer Neg Hx          Medications have been verified.        Objective   /72   Pulse 72   Temp 99.3 °F (37.4 °C)   Resp 18   SpO2 96%   No LMP for male patient.       Physical Exam     Physical Exam  Constitutional:       Appearance: Normal appearance.   HENT:      Head: Normocephalic and atraumatic.      Right Ear: Tympanic membrane, ear canal and external ear normal.      Left Ear: Tympanic membrane, ear canal and external ear normal.      Nose: Congestion present.      Mouth/Throat:      Mouth: Mucous membranes are moist.      Pharynx: Posterior oropharyngeal erythema present.   Eyes:      Conjunctiva/sclera: Conjunctivae normal.      Pupils: Pupils are equal, round, and reactive to light.   Cardiovascular:      Rate and Rhythm: Normal rate and regular rhythm.      Pulses: Normal pulses.      Heart sounds: Normal heart sounds.   Pulmonary:      Effort: Pulmonary effort is normal.      Breath sounds: Normal breath sounds. No wheezing, rhonchi or rales.   Abdominal:       General: Abdomen is flat.   Musculoskeletal:         General: Normal range of motion.      Cervical back: Normal range of motion and neck supple.   Skin:     General: Skin is warm and dry.      Capillary Refill: Capillary refill takes less than 2 seconds.   Neurological:      General: No focal deficit present.      Mental Status: He is alert and oriented to person, place, and time.   Psychiatric:         Mood and Affect: Mood normal.         Thought Content: Thought content normal.         Judgment: Judgment normal.

## 2024-08-15 LAB
ANION GAP SERPL CALCULATED.3IONS-SCNC: 7 MMOL/L (ref 4–13)
ATRIAL RATE: 73 BPM
BUN SERPL-MCNC: 29 MG/DL (ref 5–25)
CALCIUM SERPL-MCNC: 9.1 MG/DL (ref 8.4–10.2)
CHLORIDE SERPL-SCNC: 108 MMOL/L (ref 96–108)
CO2 SERPL-SCNC: 24 MMOL/L (ref 21–32)
CREAT SERPL-MCNC: 1.57 MG/DL (ref 0.6–1.3)
ERYTHROCYTE [DISTWIDTH] IN BLOOD BY AUTOMATED COUNT: 14.9 % (ref 11.6–15.1)
GFR SERPL CREATININE-BSD FRML MDRD: 43 ML/MIN/1.73SQ M
GLUCOSE P FAST SERPL-MCNC: 160 MG/DL (ref 65–99)
GLUCOSE SERPL-MCNC: 160 MG/DL (ref 65–140)
HCT VFR BLD AUTO: 41.7 % (ref 36.5–49.3)
HGB BLD-MCNC: 13.4 G/DL (ref 12–17)
MCH RBC QN AUTO: 30.5 PG (ref 26.8–34.3)
MCHC RBC AUTO-ENTMCNC: 32.1 G/DL (ref 31.4–37.4)
MCV RBC AUTO: 95 FL (ref 82–98)
P AXIS: 55 DEGREES
PLATELET # BLD AUTO: 270 THOUSANDS/UL (ref 149–390)
PMV BLD AUTO: 10.4 FL (ref 8.9–12.7)
POTASSIUM SERPL-SCNC: 4.5 MMOL/L (ref 3.5–5.3)
PR INTERVAL: 152 MS
QRS AXIS: -7 DEGREES
QRSD INTERVAL: 96 MS
QT INTERVAL: 440 MS
QTC INTERVAL: 484 MS
RBC # BLD AUTO: 4.39 MILLION/UL (ref 3.88–5.62)
SODIUM SERPL-SCNC: 139 MMOL/L (ref 135–147)
T WAVE AXIS: 14 DEGREES
VENTRICULAR RATE: 73 BPM
WBC # BLD AUTO: 5.37 THOUSAND/UL (ref 4.31–10.16)

## 2024-08-15 PROCEDURE — 85027 COMPLETE CBC AUTOMATED: CPT | Performed by: PHYSICIAN ASSISTANT

## 2024-08-15 PROCEDURE — 99232 SBSQ HOSP IP/OBS MODERATE 35: CPT | Performed by: HOSPITALIST

## 2024-08-15 PROCEDURE — 80048 BASIC METABOLIC PNL TOTAL CA: CPT | Performed by: PHYSICIAN ASSISTANT

## 2024-08-15 PROCEDURE — 93010 ELECTROCARDIOGRAM REPORT: CPT | Performed by: INTERNAL MEDICINE

## 2024-08-15 RX ADMIN — METOPROLOL TARTRATE 75 MG: 50 TABLET, FILM COATED ORAL at 00:33

## 2024-08-15 RX ADMIN — APIXABAN 10 MG: 5 TABLET, FILM COATED ORAL at 00:33

## 2024-08-15 RX ADMIN — METOPROLOL TARTRATE 75 MG: 50 TABLET, FILM COATED ORAL at 08:35

## 2024-08-15 RX ADMIN — ALLOPURINOL 200 MG: 100 TABLET ORAL at 08:35

## 2024-08-15 RX ADMIN — FENOFIBRATE 145 MG: 145 TABLET ORAL at 08:35

## 2024-08-15 RX ADMIN — METOPROLOL TARTRATE 75 MG: 50 TABLET, FILM COATED ORAL at 20:59

## 2024-08-15 RX ADMIN — CHOLECALCIFEROL (VITAMIN D3) 10 MCG (400 UNIT) TABLET 400 UNITS: at 08:35

## 2024-08-15 RX ADMIN — SULFASALAZINE 500 MG: 500 TABLET, DELAYED RELEASE ORAL at 12:21

## 2024-08-15 RX ADMIN — SULFASALAZINE 500 MG: 500 TABLET, DELAYED RELEASE ORAL at 16:57

## 2024-08-15 RX ADMIN — AMLODIPINE BESYLATE 10 MG: 5 TABLET ORAL at 08:35

## 2024-08-15 RX ADMIN — REMDESIVIR 200 MG: 100 INJECTION, POWDER, LYOPHILIZED, FOR SOLUTION INTRAVENOUS at 00:37

## 2024-08-15 RX ADMIN — CLOPIDOGREL 75 MG: 75 TABLET ORAL at 08:35

## 2024-08-15 RX ADMIN — APIXABAN 10 MG: 5 TABLET, FILM COATED ORAL at 16:57

## 2024-08-15 RX ADMIN — SULFASALAZINE 500 MG: 500 TABLET, DELAYED RELEASE ORAL at 00:33

## 2024-08-15 RX ADMIN — APIXABAN 10 MG: 5 TABLET, FILM COATED ORAL at 08:35

## 2024-08-15 RX ADMIN — ATORVASTATIN CALCIUM 40 MG: 40 TABLET, FILM COATED ORAL at 16:57

## 2024-08-15 RX ADMIN — SULFASALAZINE 500 MG: 500 TABLET, DELAYED RELEASE ORAL at 08:35

## 2024-08-15 RX ADMIN — OXYCODONE HYDROCHLORIDE AND ACETAMINOPHEN 1000 MG: 500 TABLET ORAL at 08:35

## 2024-08-15 RX ADMIN — SULFASALAZINE 500 MG: 500 TABLET, DELAYED RELEASE ORAL at 20:59

## 2024-08-15 RX ADMIN — Medication 200 MG: at 08:34

## 2024-08-15 NOTE — H&P
"Critical access hospital  H&P  Name: Shahbaz Barriga 74 y.o. male I MRN: [id number]  Unit/Bed#: -01 I Date of Admission: 8/14/2024   Date of Service: 8/14/2024 I Hospital Day: 0      Assessment & Plan   * Pneumonia due to COVID-19 virus  Assessment & Plan  COVID-19 positive on 8/14-had developed cough and rhinorrhea during recent hospitalization  SpO2 at rest is normal, however with ambulation dropped to 89% on room air, prompting admission  Decadron 6 Mg IV x 1 given in the ED-hold on further dosing as patient not requiring oxygen while at rest  Will start remdesivir due to patient being high risk  Check CK and CRP  D-dimer not checked on admit as he was recently diagnosed with acute PE s/p thrombectomy 8/7 and CTA PE study obtained in the ED only showing early chronic PE and somewhat reduced clot burden, no evidence of new or acute embolus -continue home Eliquis    Hypoxia  Assessment & Plan  SpO2 while ambulating was 89% in the ED  Suspect secondary to COVID-19 pneumonia    History of pulmonary embolus (PE)  Assessment & Plan  Recently admitted at [facility] 8/7-8/10 for acute PE  S/p IR bilateral pulmonary arteriogram with pulmonary thrombectomy on 8/7  Discharged on Eliquis 10 Mg twice daily x 7 days followed by 5 Mg twice daily starting 8/18  Continue home eliquis     SIRS (systemic inflammatory response syndrome) ([facility])  Assessment & Plan  SIRS criteria: Leukopenia and tachypnea  Suspect secondary to COVID-19 pneumonia  No signs of acute bacterial infection at this time  Monitor of antibiotics  Trend CBC and fever curve    Type 2 diabetes mellitus with stage 3a chronic kidney disease, without long-term current use of insulin ([facility])  Assessment & Plan  Lab Results   Component Value Date    HGBA1C 6.6 (H) 08/07/2024       No results for input(s): \"POCGLU\" in the last 72 hours.    Blood Sugar Average: Last 72 hrs:  Was on glipizide 2.5 Mg daily prior to recent admit, however has not yet " "restarted  Monitor glucose on daily labs, no indication for fingersticks at this time      Chronic kidney disease, stage 3 (HCC)  Assessment & Plan  Lab Results   Component Value Date    EGFR 38 08/14/2024    EGFR 42 08/10/2024    EGFR 37 08/09/2024    CREATININE 1.74 (H) 08/14/2024    CREATININE 1.60 (H) 08/10/2024    CREATININE 1.76 (H) 08/09/2024   Baseline creatinine fluctuating greatly over the last year, baseline since hospitalization 8/7-8/10 appears to be 1.6-1.7  Creatinine admission 1.74  Trend BMP daily    Primary hypertension  Assessment & Plan  Home regimen: Metoprolol tartate 75 Mg twice daily, amlodipine 10 Mg daily -was on chlorthalidone and losartan prior to recent admit (these were held on discharge as BP was stable without them)  Continue home metoprolol and amlodipine-discussed with patient need to follow-up with PCP to determine if chlorthalidone and losartan are needed, BP stable without these medications         VTE Pharmacologic Prophylaxis: VTE Score: 6 High Risk (Score >/= 5) - Pharmacological DVT Prophylaxis Ordered: apixaban (Eliquis). Sequential Compression Devices Ordered.  Code Status: Level 1 - Full Code   Discussion with family: Updated  (wife) via phone.    Anticipated Length of Stay: Patient will be admitted on an observation basis with an anticipated length of stay of less than 2 midnights secondary to COVID-19 pneumonia, hypoxia with ambulation.    Chief Complaint: \" I had a cough, runny nose, and shortness of breath\"    History of Present Illness:  Shahbaz Barriga is a 74 y.o. male with a PMH of recently diagnosed PE s/p bilateral thrombectomy on Eliquis, HTN, CKD stage IIIa, and NIDDM 2 who presents with dyspnea that onset 5 days ago.  Reports dyspnea is significantly better than prior to admit for bilateral PEs, but not back to baseline.  Patient reports he also developed a productive cough of unclear characteristic.  Endorses associated rhinorrhea.  No fevers or " chills.  No chest pain.  No abdominal pain, NVD, or urinary symptoms.  Has had normal oral intake.    Review of Systems:  Review of Systems   Constitutional:  Negative for chills and fever.   HENT:  Positive for rhinorrhea.    Respiratory:  Positive for cough and shortness of breath.    Cardiovascular:  Negative for chest pain and leg swelling.   Gastrointestinal:  Negative for abdominal pain, constipation, diarrhea, nausea and vomiting.   Genitourinary:  Negative for difficulty urinating, dysuria and hematuria.   Musculoskeletal:  Negative for gait problem.   Neurological:  Negative for weakness and numbness.   All other systems reviewed and are negative.      Past Medical and Surgical History:   Past Medical History:   Diagnosis Date    Acute respiratory failure with hypoxia ([facility]) 12/31/2021    CVA (cerebral vascular accident) ([facility])     Diverticulitis     Gout     NSVT (nonsustained ventricular tachycardia) ([facility]) 05/28/2020    Vasovagal syncope 12/28/2021       Past Surgical History:   Procedure Laterality Date    CARDIAC LOOP RECORDER      COLONOSCOPY  09/18/2006    complete; 10 yrs    COLONOSCOPY  10/23/2017    complete; 5 yrs    COLONOSCOPY  05/06/2020    Severe active left-sided colitis, erythematous and ulcerated mucosa in the rectosigmoid, inflammatory polyp    IR PE ENDOVASCULAR THERAPY  8/7/2024       Meds/Allergies:  Prior to Admission medications    Medication Sig Start Date End Date Taking? Authorizing Provider   allopurinol (ZYLOPRIM) 100 mg tablet TAKE 2 TABLETS DAILY 7/25/24  Yes Rubia Dennis DO   amLODIPine (NORVASC) 10 mg tablet Take 1 tablet (10 mg total) by mouth in the morning 1/12/24  Yes Rubia Dennis DO   apixaban (Eliquis) 5 mg Take 2 tablets (10 mg total) by mouth 2 (two) times a day for 7 days, THEN 1 tablet (5 mg total) 2 (two) times a day for 23 days. Continue with 5 mg BID afterward. 8/10/24 9/9/24 Yes Gian Tarango MD   ascorbic acid (VITAMIN C) 1000 MG tablet Take 1 tablet  (1,000 mg total) by mouth daily 1/14/22  Yes Mayi Cai, DO   cholecalciferol (VITAMIN D3) 400 units tablet Take 1 tablet (400 Units total) by mouth daily 1/14/22  Yes Mayi Cai, DO   clopidogrel (PLAVIX) 75 mg tablet Take 1 tablet (75 mg total) by mouth daily 6/28/24  Yes Rubia Dennis DO   Coenzyme Q10 200 MG capsule Take by mouth daily    Yes Historical Provider, MD   fenofibrate (TRICOR) 145 mg tablet Take 1 tablet (145 mg total) by mouth daily 6/6/24  Yes Rubia Dennis DO   metoprolol succinate (TOPROL-XL) 50 mg 24 hr tablet Take 50 mg by mouth daily   Yes Historical Provider, MD   sulfaSALAzine (AZULFIDINE) 500 mg tablet Take 1 tablet (500 mg total) by mouth 4 (four) times a day 9/19/23  Yes Abbie Harrell MD   atorvastatin (LIPITOR) 40 mg tablet Take 1 tablet (40 mg total) by mouth daily with dinner  Patient not taking: Reported on 8/14/2024 6/24/24   Rubia Dennis DO   Contour Next Test test strip USE 1 STRIP TO CHECK GLUCOSE ONCE DAILY 6/13/24   Rubia Dennis DO   metoprolol tartrate (LOPRESSOR) 50 mg tablet Take 1.5 tablets (75 mg total) by mouth 2 (two) times a day 3/22/24 6/20/24  Rubia Dennis DO     I have reviewed home medications with patient personally.    Allergies: No Known Allergies    Social History:  Marital Status: /Civil Union   Occupation: Retired  Patient Pre-hospital Living Situation: Home, With spouse  Patient Pre-hospital Level of Mobility: walks  Patient Pre-hospital Diet Restrictions: Carb conscious  Substance Use History:   Social History     Substance and Sexual Activity   Alcohol Use Never     Social History     Tobacco Use   Smoking Status Never   Smokeless Tobacco Never     Social History     Substance and Sexual Activity   Drug Use Never       Family History:  Family History   Problem Relation Age of Onset    Breast cancer Mother     Kidney disease Father     Lung cancer Father     Other Father         smoker    Hypertension Other     Colon polyps Neg Hx      "Colon cancer Neg Hx        Physical Exam:     Vitals:   Blood Pressure: 137/82 (08/14/24 2220)  Pulse: 71 (08/14/24 2220)  Temperature: (!) 97.2 °F (36.2 °C) (08/14/24 2220)  Temp Source: Temporal (08/14/24 2220)  Respirations: 20 (08/14/24 2215)  Height: 5' 9\" (175.3 cm) (08/14/24 2215)  Weight - Scale: 103 kg (226 lb) (08/14/24 2215)  SpO2: 93 % (08/14/24 2220)    Physical Exam  Vitals and nursing note reviewed.   Constitutional:       General: He is not in acute distress.     Appearance: Normal appearance. He is not ill-appearing.      Comments: Pleasant and conversational.   HENT:      Head: Normocephalic.      Nose: Nose normal.      Mouth/Throat:      Mouth: Mucous membranes are moist.   Eyes:      Conjunctiva/sclera: Conjunctivae normal.   Cardiovascular:      Rate and Rhythm: Normal rate and regular rhythm.      Pulses: Normal pulses.   Pulmonary:      Effort: Pulmonary effort is normal. No respiratory distress.      Breath sounds: Normal breath sounds. No wheezing, rhonchi or rales.   Abdominal:      General: Abdomen is flat.      Palpations: Abdomen is soft.      Tenderness: There is no abdominal tenderness.   Musculoskeletal:         General: Normal range of motion.      Cervical back: Normal range of motion.      Right lower leg: No edema.      Left lower leg: No edema.   Skin:     General: Skin is warm and dry.      Coloration: Skin is not pale.   Neurological:      General: No focal deficit present.      Mental Status: He is alert and oriented to person, place, and time.   Psychiatric:         Mood and Affect: Mood normal.         Thought Content: Thought content normal.          Additional Data:     Lab Results:  Results from last 7 days   Lab Units 08/14/24  1752   WBC Thousand/uL 3.91*   HEMOGLOBIN g/dL 13.2   HEMATOCRIT % 38.7   PLATELETS Thousands/uL 256   SEGS PCT % 56   LYMPHO PCT % 26   MONO PCT % 10   EOS PCT % 6     Results from last 7 days   Lab Units 08/14/24  1752   SODIUM mmol/L 140 "   POTASSIUM mmol/L 3.9   CHLORIDE mmol/L 108   CO2 mmol/L 23   BUN mg/dL 31*   CREATININE mg/dL 1.74*   ANION GAP mmol/L 9   CALCIUM mg/dL 9.5   ALBUMIN g/dL 4.0   TOTAL BILIRUBIN mg/dL 0.59   ALK PHOS U/L 71   ALT U/L 31   AST U/L 40*   GLUCOSE RANDOM mg/dL 141*     Results from last 7 days   Lab Units 08/08/24  0010   INR  1.25*     Results from last 7 days   Lab Units 08/10/24  1129 08/10/24  0543 08/09/24  2344 08/09/24  1830 08/09/24  1138 08/09/24  0545 08/08/24  2349 08/08/24  1835 08/08/24  1750 08/08/24  1143 08/08/24  0637   POC GLUCOSE mg/dl 166* 124 137 179* 164* 123 133 196* 144* 177* 113     Lab Results   Component Value Date    HGBA1C 6.6 (H) 08/07/2024    HGBA1C 6.2 (H) 02/02/2024    HGBA1C 6.6 (H) 07/25/2023           Lines/Drains:  Invasive Devices       Peripheral Intravenous Line  Duration             Peripheral IV 08/14/24 Left Antecubital <1 day                        Imaging: Reviewed radiology reports from this admission including: chest CT scan  CTA ED chest PE study   Final Result by Ranjit Cruz MD (08/14 2001)      Early chronic pulmonary emboli, somewhat reduced clot burden. No evidence of new or acute embolus.      Interstitial and groundglass opacities in the dependent portion of the lungs could represent nonspecific pneumonitis and/or hypoventilatory changes.                  Workstation performed: [id number]             EKG and Other Studies Reviewed on Admission:   EKG: Personally Reviewed.  NSR with nonspecific T wave changes.  Prolonged QTc slightly at 484 ms.    ** Please Note: This note has been constructed using a voice recognition system. **

## 2024-08-15 NOTE — ASSESSMENT & PLAN NOTE
"Lab Results   Component Value Date    HGBA1C 6.6 (H) 08/07/2024       No results for input(s): \"POCGLU\" in the last 72 hours.    Blood Sugar Average: Last 72 hrs:  Was on glipizide 2.5 Mg daily prior to recent admit, however has not yet restarted  Monitor glucose on daily labs, no indication for fingersticks at this time    "

## 2024-08-15 NOTE — ASSESSMENT & PLAN NOTE
Recently admitted at [facility] 8/7-8/10 for acute PE  S/p IR bilateral pulmonary arteriogram with pulmonary thrombectomy on 8/7  Discharged on Eliquis 10 Mg twice daily x 7 days followed by 5 Mg twice daily starting 8/18  Continue home eliquis

## 2024-08-15 NOTE — ASSESSMENT & PLAN NOTE
SpO2 while ambulating was 89% in the ED  Suspect secondary to COVID-19 pneumonia. Check amb pulse ox prior to discharge

## 2024-08-15 NOTE — ASSESSMENT & PLAN NOTE
Recently admitted at [facility] 8/7-8/10 for acute PE  S/p IR bilateral pulmonary arteriogram with pulmonary thrombectomy on 8/7  Discharged on Eliquis 10 Mg twice daily x 7 days followed by 5 Mg twice daily starting 8/18  Continue home eliquis

## 2024-08-15 NOTE — PROGRESS NOTES
"[facility]  Progress Note  Name: Shahbaz Barriga I  MRN: [id number]  Unit/Bed#: -01 I Date of Admission: 8/14/2024   Date of Service: 8/15/2024 I Hospital Day: 0    Assessment & Plan   SIRS (systemic inflammatory response syndrome) ([facility])  Assessment & Plan  SIRS criteria: Leukopenia and tachypnea  Suspect secondary to COVID-19 pneumonia  No signs of acute bacterial infection at this time  Monitor of antibiotics  Trend CBC and fever curve    Hypoxia  Assessment & Plan  SpO2 while ambulating was 89% in the ED  Suspect secondary to COVID-19 pneumonia. Check amb pulse ox prior to discharge    History of pulmonary embolus (PE)  Assessment & Plan  [facility] 8/7-8/10 for acute PE  S/p IR bilateral pulmonary arteriogram with pulmonary thrombectomy on 8/7  Discharged on Eliquis 10 Mg twice daily x 7 days followed by 5 Mg twice daily starting 8/18  Continue home eliquis     Type 2 diabetes mellitus with stage 3a chronic kidney disease, without long-term current use of insulin ([facility])  Assessment & Plan  Lab Results   Component Value Date    HGBA1C 6.6 (H) 08/07/2024       No results for input(s): \"POCGLU\" in the last 72 hours.    Blood Sugar Average: Last 72 hrs:  Was on glipizide 2.5 Mg daily prior to recent admit, however has not yet restarted  Monitor glucose on daily labs, no indication for fingersticks at this time      Primary hypertension  Assessment & Plan  Home regimen: Metoprolol tartate 75 Mg twice daily, amlodipine 10 Mg daily -was on chlorthalidone and losartan prior to recent admit (these were held on discharge as BP was stable without them)  Continue home metoprolol and amlodipine-discussed with patient need to follow-up with PCP to determine if chlorthalidone and losartan are needed, BP stable without these medications    Chronic kidney disease, stage 3 ([facility])  Assessment & Plan  Lab Results   Component Value Date    EGFR 38 08/14/2024    EGFR 42 08/10/2024    " EGFR 37 2024    CREATININE 1.74 (H) 2024    CREATININE 1.60 (H) 08/10/2024    CREATININE 1.76 (H) 2024   Baseline creatinine fluctuating greatly over the last year, baseline since hospitalization -8/10 appears to be 1.6-1.7  Creatinine admission 1.74  Trend BMP daily    * Pneumonia due to COVID-19 virus  Assessment & Plan  COVID-19 positive on -had developed cough and rhinorrhea during recent hospitalization  SpO2 at rest is normal, however with ambulation dropped to 89% on room air, prompting admission  Decadron 6 Mg IV x 1 given in the ED-hold on further dosing as patient not requiring oxygen while at rest  Cont  remdesivir due to patient being high risk  D-dimer not checked on admit as he was recently diagnosed with acute PE s/p thrombectomy  and CTA PE study obtained in the ED only showing early chronic PE and somewhat reduced clot burden, no evidence of new or acute embolus -continue home Eliquis          VTE  Prophylaxis:   Pharmacologic: in place  Mechanical VTE Prophylaxis in Place: Yes    Patient Centered Rounds: I have performed bedside rounds with nursing staff today.    Discussions with Specialists or Other Care Team Provider: case management    Education and Discussions with Family / Patient: yes    Mobility:   Basic Mobility Inpatient Raw Score: 23  JH-HLM Goal: 7: Walk 25 feet or more  JH-HLM Achieved: 7: Walk 25 feet or more      Current Length of Stay: 0 day(s)    Current Patient Status: Observation        Code Status: Level 1 - Full Code    Discharge Plan: Pt will require continued inpatient hospitalization.    Subjective:   Pt feels a little better  Still with dypsnea on exertion     Patient is seen and examined at bedside.  All other ROS are negative.    Objective:     Vitals:   Temp (24hrs), Av.8 °F (36.6 °C), Min:97.2 °F (36.2 °C), Max:99.3 °F (37.4 °C)    Temp:  [97.2 °F (36.2 °C)-99.3 °F (37.4 °C)] 97.2 °F (36.2 °C)  HR:  [63-85] 63  Resp:  [16-30] 16  BP:  (126-163)/(68-87) 139/82  SpO2:  [92 %-97 %] 95 %  Body mass index is 33.37 kg/m².     Input and Output Summary (last 24 hours):       Intake/Output Summary (Last 24 hours) at 8/15/2024 1149  Last data filed at 8/15/2024 1103  Gross per 24 hour   Intake 970 ml   Output 400 ml   Net 570 ml       Physical Exam:       GEN: No acute distress, comfortable  HEEENT: No JVD, PERRLA, no scleral icterus  RESP: Lungs decreased   CV: RRR, +s1/s2   ABD: SOFT NON TENDER, POSITIVE BOWEL SOUNDS, NO DISTENTION  PSYCH: CALM  NEURO: Mentation baseline, NO FOCAL DEFICITS  SKIN: NO RASH  EXTREM: NO EDEMA    Additional Data:     Labs:    Results from last 7 days   Lab Units 08/15/24  0549 08/14/24  1752   WBC Thousand/uL 5.37 3.91*   HEMOGLOBIN g/dL 13.4 13.2   HEMATOCRIT % 41.7 38.7   PLATELETS Thousands/uL 270 256   SEGS PCT %  --  56   LYMPHO PCT %  --  26   MONO PCT %  --  10   EOS PCT %  --  6     Results from last 7 days   Lab Units 08/15/24  0549 08/14/24  1752   SODIUM mmol/L 139 140   POTASSIUM mmol/L 4.5 3.9   CHLORIDE mmol/L 108 108   CO2 mmol/L 24 23   BUN mg/dL 29* 31*   CREATININE mg/dL 1.57* 1.74*   ANION GAP mmol/L 7 9   CALCIUM mg/dL 9.1 9.5   ALBUMIN g/dL  --  4.0   TOTAL BILIRUBIN mg/dL  --  0.59   ALK PHOS U/L  --  71   ALT U/L  --  31   AST U/L  --  40*   GLUCOSE RANDOM mg/dL 160* 141*         Results from last 7 days   Lab Units 08/10/24  1129 08/10/24  0543 08/09/24  2344 08/09/24  1830 08/09/24  1138 08/09/24  0545 08/08/24  2349 08/08/24  1835 08/08/24  1750   POC GLUCOSE mg/dl 166* 124 137 179* 164* 123 133 196* 144*               Lines/Drains:  Invasive Devices       Peripheral Intravenous Line  Duration             Peripheral IV 08/14/24 Left Antecubital <1 day                    Telemetry:        * I Have Reviewed All Lab Data Listed Above.           Imaging:     Results for orders placed during the hospital encounter of 08/07/24    XR chest portable    Narrative  XR CHEST PORTABLE    INDICATION:  hypoxia.    COMPARISON: Chest radiograph 8/7/2024    FINDINGS:    Low lung volumes, which causes crowding of bronchovascular markings.  Within that limitation, there is no acute lung opacity. Bibasilar scarring.    No pneumothorax or pleural effusion.    Normal cardiomediastinal silhouette.    Bones are unremarkable for age.    Normal upper abdomen.    Impression  No acute cardiopulmonary disease.        Resident: Payton Barrios I, the attending radiologist, have reviewed the images and agree with the final report above.    Workstation performed: [id number]    No results found for this or any previous visit.      *I have reviewed all imaging reports listed above      Recent Cultures (last 7 days):           Last 24 Hours Medication List:   Current Facility-Administered Medications   Medication Dose Route Frequency Provider Last Rate    acetaminophen  650 mg Oral Q6H PRN Tania L Carpenter, PA-C      allopurinol  200 mg Oral Daily Tania L Carpenter, PA-C      aluminum-magnesium hydroxide-simethicone  30 mL Oral Q6H PRN Tania L Pauline, PA-C      amLODIPine  10 mg Oral Daily Tania L Pauline, PA-C      apixaban  10 mg Oral BID Tania L JULIANNE Carpenter-C      Followed by    [START ON 8/18/2024] apixaban  5 mg Oral BID Tania L Pauline, PA-C      ascorbic acid  1,000 mg Oral Daily Tania L Carpenter, PA-C      atorvastatin  40 mg Oral Daily With Dinner Tania Carpenter, PA-C      cholecalciferol  400 Units Oral Daily Tania L Carpenter, PA-C      clopidogrel  75 mg Oral Daily Tania L Pauline, PA-C      co-enzyme Q-10  200 mg Oral Daily Tania L Carpenter, PA-C      fenofibrate  145 mg Oral Daily Tania L Carpenter, PA-C      metoprolol tartrate  75 mg Oral BID Tania L Pauline, PA-C      ondansetron  4 mg Intravenous Q8H PRN Tania Carpenter, PA-C      [START ON 8/16/2024] remdesivir  100 mg Intravenous Q24H Tania L Pauline, PA-C      senna  1 tablet Oral HS PRN Tania L Pauline, PA-C      sulfaSALAzine  500 mg  Oral 4x Daily Tania Carpenter PA-C          Today, Patient Was Seen By: Cary Ibarra MD    ** Please Note: Dictation voice to text software may have been used in the creation of this document. **

## 2024-08-15 NOTE — ASSESSMENT & PLAN NOTE
COVID-19 positive on 8/14-had developed cough and rhinorrhea during recent hospitalization  SpO2 at rest is normal, however with ambulation dropped to 89% on room air, prompting admission  Decadron 6 Mg IV x 1 given in the ED-hold on further dosing as patient not requiring oxygen while at rest  Will start remdesivir due to patient being high risk  Check CK and CRP  D-dimer not checked on admit as he was recently diagnosed with acute PE s/p thrombectomy 8/7 and CTA PE study obtained in the ED only showing early chronic PE and somewhat reduced clot burden, no evidence of new or acute embolus -continue home Eliquis

## 2024-08-15 NOTE — ASSESSMENT & PLAN NOTE
Lab Results   Component Value Date    EGFR 38 08/14/2024    EGFR 42 08/10/2024    EGFR 37 08/09/2024    CREATININE 1.74 (H) 08/14/2024    CREATININE 1.60 (H) 08/10/2024    CREATININE 1.76 (H) 08/09/2024   Baseline creatinine fluctuating greatly over the last year, baseline since hospitalization 8/7-8/10 appears to be 1.6-1.7  Creatinine admission 1.74  Trend BMP daily

## 2024-08-15 NOTE — ASSESSMENT & PLAN NOTE
SIRS criteria: Leukopenia and tachypnea  Suspect secondary to COVID-19 pneumonia  No signs of acute bacterial infection at this time  Monitor of antibiotics  Trend CBC and fever curve

## 2024-08-15 NOTE — ASSESSMENT & PLAN NOTE
COVID-19 positive on 8/14-had developed cough and rhinorrhea during recent hospitalization  SpO2 at rest is normal, however with ambulation dropped to 89% on room air, prompting admission  Decadron 6 Mg IV x 1 given in the ED-hold on further dosing as patient not requiring oxygen while at rest  Cont  remdesivir due to patient being high risk  D-dimer not checked on admit as he was recently diagnosed with acute PE s/p thrombectomy 8/7 and CTA PE study obtained in the ED only showing early chronic PE and somewhat reduced clot burden, no evidence of new or acute embolus -continue home Eliquis

## 2024-08-15 NOTE — ASSESSMENT & PLAN NOTE
Home regimen: Metoprolol tartate 75 Mg twice daily, amlodipine 10 Mg daily -was on chlorthalidone and losartan prior to recent admit (these were held on discharge as BP was stable without them)  Continue home metoprolol and amlodipine-discussed with patient need to follow-up with PCP to determine if chlorthalidone and losartan are needed, BP stable without these medications

## 2024-08-15 NOTE — PLAN OF CARE

## 2024-08-15 NOTE — PLAN OF CARE
Problem: PAIN - ADULT  Goal: Verbalizes/displays adequate comfort level or baseline comfort level  Description: Interventions:  - Encourage patient to monitor pain and request assistance  - Assess pain using appropriate pain scale  - Administer analgesics based on type and severity of pain and evaluate response  - Implement non-pharmacological measures as appropriate and evaluate response  - Consider cultural and social influences on pain and pain management  - Notify physician/advanced practitioner if interventions unsuccessful or patient reports new pain  Outcome: Not Progressing     Problem: INFECTION - ADULT  Goal: Absence or prevention of progression during hospitalization  Description: INTERVENTIONS:  - Assess and monitor for signs and symptoms of infection  - Monitor lab/diagnostic results  - Monitor all insertion sites, i.e. indwelling lines, tubes, and drains  - Monitor endotracheal if appropriate and nasal secretions for changes in amount and color  - [place] appropriate cooling/warming therapies per order  - Administer medications as ordered  - Instruct and encourage patient and family to use good hand hygiene technique  - Identify and instruct in appropriate isolation precautions for identified infection/condition  Outcome: Not Progressing     Problem: RESPIRATORY - ADULT  Goal: Achieves optimal ventilation and oxygenation  Description: INTERVENTIONS:  - Assess for changes in respiratory status  - Assess for changes in mentation and behavior  - Position to facilitate oxygenation and minimize respiratory effort  - Oxygen administered by appropriate delivery if ordered  - Initiate smoking cessation education as indicated  - Encourage broncho-pulmonary hygiene including cough, deep breathe, Incentive Spirometry  - Assess the need for suctioning and aspirate as needed  - Assess and instruct to report SOB or any respiratory difficulty  - Respiratory Therapy support as indicated  Outcome: Progressing

## 2024-08-16 VITALS
DIASTOLIC BLOOD PRESSURE: 78 MMHG | TEMPERATURE: 97.3 F | SYSTOLIC BLOOD PRESSURE: 133 MMHG | WEIGHT: 226 LBS | RESPIRATION RATE: 18 BRPM | HEIGHT: 69 IN | OXYGEN SATURATION: 95 % | BODY MASS INDEX: 33.47 KG/M2 | HEART RATE: 70 BPM

## 2024-08-16 LAB
ALBUMIN SERPL BCG-MCNC: 3.5 G/DL (ref 3.5–5)
ALP SERPL-CCNC: 47 U/L (ref 34–104)
ALT SERPL W P-5'-P-CCNC: 24 U/L (ref 7–52)
ANION GAP SERPL CALCULATED.3IONS-SCNC: 7 MMOL/L (ref 4–13)
AST SERPL W P-5'-P-CCNC: 28 U/L (ref 13–39)
BASOPHILS # BLD AUTO: 0.05 THOUSANDS/ÂΜL (ref 0–0.1)
BASOPHILS NFR BLD AUTO: 1 % (ref 0–1)
BILIRUB SERPL-MCNC: 0.5 MG/DL (ref 0.2–1)
BUN SERPL-MCNC: 32 MG/DL (ref 5–25)
CALCIUM SERPL-MCNC: 8.7 MG/DL (ref 8.4–10.2)
CHLORIDE SERPL-SCNC: 109 MMOL/L (ref 96–108)
CO2 SERPL-SCNC: 24 MMOL/L (ref 21–32)
CREAT SERPL-MCNC: 1.57 MG/DL (ref 0.6–1.3)
EOSINOPHIL # BLD AUTO: 0.11 THOUSAND/ÂΜL (ref 0–0.61)
EOSINOPHIL NFR BLD AUTO: 2 % (ref 0–6)
ERYTHROCYTE [DISTWIDTH] IN BLOOD BY AUTOMATED COUNT: 14.9 % (ref 11.6–15.1)
GFR SERPL CREATININE-BSD FRML MDRD: 43 ML/MIN/1.73SQ M
GLUCOSE SERPL-MCNC: 120 MG/DL (ref 65–140)
HCT VFR BLD AUTO: 38.2 % (ref 36.5–49.3)
HGB BLD-MCNC: 12.2 G/DL (ref 12–17)
IMM GRANULOCYTES # BLD AUTO: 0.04 THOUSAND/UL (ref 0–0.2)
IMM GRANULOCYTES NFR BLD AUTO: 1 % (ref 0–2)
LYMPHOCYTES # BLD AUTO: 1.57 THOUSANDS/ÂΜL (ref 0.6–4.47)
LYMPHOCYTES NFR BLD AUTO: 33 % (ref 14–44)
MCH RBC QN AUTO: 30.3 PG (ref 26.8–34.3)
MCHC RBC AUTO-ENTMCNC: 31.9 G/DL (ref 31.4–37.4)
MCV RBC AUTO: 95 FL (ref 82–98)
MONOCYTES # BLD AUTO: 0.36 THOUSAND/ÂΜL (ref 0.17–1.22)
MONOCYTES NFR BLD AUTO: 8 % (ref 4–12)
NEUTROPHILS # BLD AUTO: 2.7 THOUSANDS/ÂΜL (ref 1.85–7.62)
NEUTS SEG NFR BLD AUTO: 55 % (ref 43–75)
NRBC BLD AUTO-RTO: 0 /100 WBCS
PLATELET # BLD AUTO: 266 THOUSANDS/UL (ref 149–390)
PMV BLD AUTO: 10 FL (ref 8.9–12.7)
POTASSIUM SERPL-SCNC: 3.9 MMOL/L (ref 3.5–5.3)
PROT SERPL-MCNC: 6 G/DL (ref 6.4–8.4)
RBC # BLD AUTO: 4.02 MILLION/UL (ref 3.88–5.62)
SODIUM SERPL-SCNC: 140 MMOL/L (ref 135–147)
WBC # BLD AUTO: 4.83 THOUSAND/UL (ref 4.31–10.16)

## 2024-08-16 PROCEDURE — 85025 COMPLETE CBC W/AUTO DIFF WBC: CPT | Performed by: HOSPITALIST

## 2024-08-16 PROCEDURE — 80053 COMPREHEN METABOLIC PANEL: CPT | Performed by: HOSPITALIST

## 2024-08-16 PROCEDURE — 99239 HOSP IP/OBS DSCHRG MGMT >30: CPT | Performed by: HOSPITALIST

## 2024-08-16 RX ADMIN — REMDESIVIR 100 MG: 100 INJECTION, POWDER, LYOPHILIZED, FOR SOLUTION INTRAVENOUS at 00:17

## 2024-08-16 RX ADMIN — CHOLECALCIFEROL (VITAMIN D3) 10 MCG (400 UNIT) TABLET 400 UNITS: at 09:08

## 2024-08-16 RX ADMIN — OXYCODONE HYDROCHLORIDE AND ACETAMINOPHEN 1000 MG: 500 TABLET ORAL at 09:07

## 2024-08-16 RX ADMIN — METOPROLOL TARTRATE 75 MG: 50 TABLET, FILM COATED ORAL at 09:07

## 2024-08-16 RX ADMIN — Medication 200 MG: at 09:07

## 2024-08-16 RX ADMIN — APIXABAN 10 MG: 5 TABLET, FILM COATED ORAL at 09:07

## 2024-08-16 RX ADMIN — FENOFIBRATE 145 MG: 145 TABLET ORAL at 09:08

## 2024-08-16 RX ADMIN — SULFASALAZINE 500 MG: 500 TABLET, DELAYED RELEASE ORAL at 09:07

## 2024-08-16 RX ADMIN — CLOPIDOGREL 75 MG: 75 TABLET ORAL at 09:07

## 2024-08-16 RX ADMIN — AMLODIPINE BESYLATE 10 MG: 5 TABLET ORAL at 09:07

## 2024-08-16 RX ADMIN — ALLOPURINOL 200 MG: 100 TABLET ORAL at 09:07

## 2024-08-16 NOTE — PLAN OF CARE
Problem: Potential for Falls  Goal: Patient will remain free of falls  Description: INTERVENTIONS:  - Educate patient/family on patient safety including physical limitations  - Instruct patient to call for assistance with activity   - Consult OT/PT to assist with strengthening/mobility   - Keep Call bell within reach  - Keep bed low and locked with side rails adjusted as appropriate  - Keep care items and personal belongings within reach  - Initiate and maintain comfort rounds  - Make Fall Risk Sign visible to staff  - Offer Toileting every 2 Hours, in advance of need  - Initiate/Maintain bed alarm  - Obtain necessary fall risk management equipment  - Apply yellow socks and bracelet for high fall risk patients  - Consider moving patient to room near nurses station  Outcome: Progressing     Problem: PAIN - ADULT  Goal: Verbalizes/displays adequate comfort level or baseline comfort level  Description: Interventions:  - Encourage patient to monitor pain and request assistance  - Assess pain using appropriate pain scale  - Administer analgesics based on type and severity of pain and evaluate response  - Implement non-pharmacological measures as appropriate and evaluate response  - Consider cultural and social influences on pain and pain management  - Notify physician/advanced practitioner if interventions unsuccessful or patient reports new pain  Outcome: Progressing     Problem: INFECTION - ADULT  Goal: Absence or prevention of progression during hospitalization  Description: INTERVENTIONS:  - Assess and monitor for signs and symptoms of infection  - Monitor lab/diagnostic results  - Monitor all insertion sites, i.e. indwelling lines, tubes, and drains  - Monitor endotracheal if appropriate and nasal secretions for changes in amount and color  - [place] appropriate cooling/warming therapies per order  - Administer medications as ordered  - Instruct and encourage patient and family to use good hand hygiene  technique  - Identify and instruct in appropriate isolation precautions for identified infection/condition  Outcome: Progressing     Problem: SAFETY ADULT  Goal: Patient will remain free of falls  Description: INTERVENTIONS:  - Educate patient/family on patient safety including physical limitations  - Instruct patient to call for assistance with activity   - Consult OT/PT to assist with strengthening/mobility   - Keep Call bell within reach  - Keep bed low and locked with side rails adjusted as appropriate  - Keep care items and personal belongings within reach  - Initiate and maintain comfort rounds  - Make Fall Risk Sign visible to staff  - Offer Toileting every 2 Hours, in advance of need  - Initiate/Maintain bed alarm  - Obtain necessary fall risk management equipment  - Apply yellow socks and bracelet for high fall risk patients  - Consider moving patient to room near nurses station  Outcome: Progressing  Goal: Maintain or return to baseline ADL function  Description: INTERVENTIONS:  -  Assess patient's ability to carry out ADLs; assess patient's baseline for ADL function and identify physical deficits which impact ability to perform ADLs (bathing, care of mouth/teeth, toileting, grooming, dressing, etc.)  - Assess/evaluate cause of self-care deficits   - Assess range of motion  - Assess patient's mobility; develop plan if impaired  - Assess patient's need for assistive devices and provide as appropriate  - Encourage maximum independence but intervene and supervise when necessary  - Involve family in performance of ADLs  - Assess for home care needs following discharge   - Consider OT consult to assist with ADL evaluation and planning for discharge  - Provide patient education as appropriate  Outcome: Progressing  Goal: Maintains/Returns to pre admission functional level  Description: INTERVENTIONS:  - Perform AM-PAC 6 Click Basic Mobility/ Daily Activity assessment daily.  - Set and communicate daily mobility  goal to care team and patient/family/caregiver.   - Collaborate with rehabilitation services on mobility goals if consulted  - Perform Range of Motion 4 times a day.  - Reposition patient every 2 hours.  - Dangle patient 3 times a day  - Stand patient 3 times a day  - Ambulate patient 3 times a day  - Out of bed to chair 3 times a day   - Out of bed for meals 3 times a day  - Out of bed for toileting  - Record patient progress and toleration of activity level   Outcome: Progressing     Problem: DISCHARGE PLANNING  Goal: Discharge to home or other facility with appropriate resources  Description: INTERVENTIONS:  - Identify barriers to discharge w/patient and caregiver  - Arrange for needed discharge resources and transportation as appropriate  - Identify discharge learning needs (meds, wound care, etc.)  - Arrange for interpretive services to assist at discharge as needed  - Refer to Case Management Department for coordinating discharge planning if the patient needs post-hospital services based on physician/advanced practitioner order or complex needs related to functional status, cognitive ability, or social support system  Outcome: Progressing     Problem: Knowledge Deficit  Goal: Patient/family/caregiver demonstrates understanding of disease process, treatment plan, medications, and discharge instructions  Description: Complete learning assessment and assess knowledge base.  Interventions:  - Provide teaching at level of understanding  - Provide teaching via preferred learning methods  Outcome: Progressing     Problem: RESPIRATORY - ADULT  Goal: Achieves optimal ventilation and oxygenation  Description: INTERVENTIONS:  - Assess for changes in respiratory status  - Assess for changes in mentation and behavior  - Position to facilitate oxygenation and minimize respiratory effort  - Oxygen administered by appropriate delivery if ordered  - Initiate smoking cessation education as indicated  - Encourage  broncho-pulmonary hygiene including cough, deep breathe, Incentive Spirometry  - Assess the need for suctioning and aspirate as needed  - Assess and instruct to report SOB or any respiratory difficulty  - Respiratory Therapy support as indicated  Outcome: Progressing

## 2024-08-16 NOTE — ASSESSMENT & PLAN NOTE
Recently admitted at [facility] 8/7-8/10 for acute PE  S/p IR bilateral pulmonary arteriogram with pulmonary thrombectomy on 8/7  Discharged on Eliquis 10 Mg twice daily x 7 days followed by 5 Mg twice daily starting 8/18  Continue home eliquis

## 2024-08-16 NOTE — PLAN OF CARE
Problem: Potential for Falls  Goal: Patient will remain free of falls  Description: INTERVENTIONS:  - Educate patient/family on patient safety including physical limitations  - Instruct patient to call for assistance with activity   - Consult OT/PT to assist with strengthening/mobility   - Keep Call bell within reach  - Keep bed low and locked with side rails adjusted as appropriate  - Keep care items and personal belongings within reach  - Initiate and maintain comfort rounds  - Make Fall Risk Sign visible to staff  - Offer Toileting every 2 Hours, in advance of need  - Initiate/Maintain bed alarm  - Obtain necessary fall risk management equipment  - Apply yellow socks and bracelet for high fall risk patients  - Consider moving patient to room near nurses station  Outcome: Progressing     Problem: PAIN - ADULT  Goal: Verbalizes/displays adequate comfort level or baseline comfort level  Description: Interventions:  - Encourage patient to monitor pain and request assistance  - Assess pain using appropriate pain scale  - Administer analgesics based on type and severity of pain and evaluate response  - Implement non-pharmacological measures as appropriate and evaluate response  - Consider cultural and social influences on pain and pain management  - Notify physician/advanced practitioner if interventions unsuccessful or patient reports new pain  Outcome: Progressing     Problem: INFECTION - ADULT  Goal: Absence or prevention of progression during hospitalization  Description: INTERVENTIONS:  - Assess and monitor for signs and symptoms of infection  - Monitor lab/diagnostic results  - Monitor all insertion sites, i.e. indwelling lines, tubes, and drains  - Monitor endotracheal if appropriate and nasal secretions for changes in amount and color  - [place] appropriate cooling/warming therapies per order  - Administer medications as ordered  - Instruct and encourage patient and family to use good hand hygiene  technique  - Identify and instruct in appropriate isolation precautions for identified infection/condition  Outcome: Progressing     Problem: SAFETY ADULT  Goal: Maintain or return to baseline ADL function  Description: INTERVENTIONS:  -  Assess patient's ability to carry out ADLs; assess patient's baseline for ADL function and identify physical deficits which impact ability to perform ADLs (bathing, care of mouth/teeth, toileting, grooming, dressing, etc.)  - Assess/evaluate cause of self-care deficits   - Assess range of motion  - Assess patient's mobility; develop plan if impaired  - Assess patient's need for assistive devices and provide as appropriate  - Encourage maximum independence but intervene and supervise when necessary  - Involve family in performance of ADLs  - Assess for home care needs following discharge   - Consider OT consult to assist with ADL evaluation and planning for discharge  - Provide patient education as appropriate  Outcome: Progressing

## 2024-08-16 NOTE — DISCHARGE SUMMARY
"[facility]  Discharge- Shahbaz Barriga 1950, 74 y.o. male MRN: [id number]  Unit/Bed#: [facility]01 Encounter: [id number]  Primary Care Provider: Rubia Dennis DO   Date and time admitted to hospital: 8/14/2024  5:18 PM    SIRS (systemic inflammatory response syndrome) ([facility])  Assessment & Plan  SIRS criteria: Leukopenia and tachypnea  Suspect secondary to COVID-19 pneumonia  No signs of acute bacterial infection at this time  Monitor of antibiotics  Trend CBC and fever curve    Hypoxia  Assessment & Plan  SpO2 while ambulating was 89% in the ED  Suspect secondary to COVID-19 pneumonia.   RESOLVED    History of pulmonary embolus (PE)  Assessment & Plan  Recently admitted at \[facility]\"" 8/7-8/10 for acute PE  S/p IR bilateral pulmonary arteriogram with pulmonary thrombectomy on 8/7  Discharged on Eliquis 10 Mg twice daily x 7 days followed by 5 Mg twice daily starting 8/18  Continue home eliquis     Type 2 diabetes mellitus with stage 3a chronic kidney disease, without long-term current use of insulin ([facility])  Assessment & Plan  Lab Results   Component Value Date    HGBA1C 6.6 (H) 08/07/2024       No results for input(s): \"POCGLU\" in the last 72 hours.    Blood Sugar Average: Last 72 hrs:  Was on glipizide 2.5 Mg daily prior to recent admit, however has not yet restarted  Monitor glucose on daily labs, no indication for fingersticks at this time      Primary hypertension  Assessment & Plan  Home regimen: Metoprolol tartate 75 Mg twice daily, amlodipine 10 Mg daily -was on chlorthalidone and losartan prior to recent admit (these were held on discharge as BP was stable without them)  Continue home metoprolol and amlodipine-discussed with patient need to follow-up with PCP to determine if chlorthalidone and losartan are needed, BP stable without these medications    Chronic kidney disease, stage 3 ([facility])  Assessment & Plan  Lab Results   Component Value Date    EGFR 43 08/16/2024    EGFR 43 " 08/15/2024    EGFR 38 08/14/2024    CREATININE 1.57 (H) 08/16/2024    CREATININE 1.57 (H) 08/15/2024    CREATININE 1.74 (H) 08/14/2024   Baseline creatinine fluctuating greatly over the last year, baseline since hospitalization 8/7-8/10 appears to be 1.6-1.7  Creatinine admission 1.74  Trend BMP daily    * Pneumonia due to COVID-19 virus  Assessment & Plan  COVID-19 positive on 8/14-had developed cough and rhinorrhea during recent hospitalization  SpO2 at rest is normal, however with ambulation dropped to 89% on room air, prompting admission  Decadron 6 Mg IV x 1 given in the ED-hold on further dosing as patient not requiring oxygen while at rest  Cont  remdesivir due to patient being high risk  D-dimer not checked on admit as he was recently diagnosed with acute PE s/p thrombectomy 8/7 and CTA PE study obtained in the ED only showing early chronic PE and somewhat reduced clot burden, no evidence of new or acute embolus -continue home Eliquis  Viral sepsis, POA, due to COVID pneumonia as evidenced by +SIRS (Leukopenia and tachypnea) and CXR findings, treated with IV remdesivir, I/O's and monitoring CBC's. - RESOLVED    Pt asymptomatic.           Hospital Course:     Shahbaz Barriga is a 74 y.o. male patient who originally presented to the hospital on   Admission Orders (From admission, onward)       Ordered        08/15/24 1527  INPATIENT ADMISSION  Once            08/14/24 2047  Place in Observation  Once                         due to pneumonia due to COVID-19.  Patient markedly improved with remdesivir.  At the time of discharge he is afebrile, asymptomatic.  Feels at baseline.  No medical complaints.    Please see above list of diagnoses and related plan for additional information.     Physical Exam:    GEN: No acute distress, comfortable  HEEENT: No JVD, PERRLA, no scleral icterus  RESP: Lungs clear to auscultation bilaterally  CV: RRR, +s1/s2   ABD: SOFT NON TENDER, POSITIVE BOWEL SOUNDS, NO DISTENTION  PSYCH:  CALM  NEURO: Mentation baseline, NO FOCAL DEFICITS  SKIN: NO RASH  EXTREM: NO EDEMA    CONSULTING PROVIDERS   None    PROCEDURES PERFORMED  * No surgery found *    RADIOLOGY RESULTS  CTA ED chest PE study    Result Date: 8/14/2024  Narrative: CTA - CHEST WITH IV CONTRAST - PULMONARY ANGIOGRAM INDICATION: Shortness of breath. COMPARISON: 8/7/2024. TECHNIQUE: CTA examination of the chest was performed using angiographic technique according to a protocol specifically tailored to evaluate for pulmonary embolism. Multiplanar 2D reformatted images were created from the source data. In addition, coronal  3D MIP postprocessing was performed on the acquisition scanner. Radiation dose length product (DLP) for this visit: 372 mGy-cm . This examination, like all CT scans performed in the [facility], was performed utilizing techniques to minimize radiation dose exposure, including the use of iterative reconstruction and automated exposure control. IV Contrast: 85 mL of iohexol (OMNIPAQUE) FINDINGS: PULMONARY ARTERIAL TREE: Significant filling defects in the distal main pulmonary arteries, segmental and subsegmental branches bilaterally, somewhat reduced when compared to the prior exam suggesting residual, early chronic pulmonary emboli. No evidence  of new emboli. LUNGS: Stable groundglass and interstitial opacities in the dependent portion of the lungs could represent pneumonitis and/or hypoventilatory changes. There is no tracheal or endobronchial lesion. PLEURA: No effusion. HEART/GREAT VESSELS: Normal heart size. Continued elevation of RV/LV ratio consistent with elevated right-sided pressure. No thoracic aortic aneurysm or dissection. MEDIASTINUM AND SONAM: No lymphadenopathy. CHEST WALL AND LOWER NECK: Unremarkable. VISUALIZED STRUCTURES IN THE UPPER ABDOMEN: Unremarkable. OSSEOUS STRUCTURES: No acute fracture or destructive osseous lesion.     Impression: Early chronic pulmonary emboli, somewhat reduced  clot burden. No evidence of new or acute embolus. Interstitial and groundglass opacities in the dependent portion of the lungs could represent nonspecific pneumonitis and/or hypoventilatory changes. Workstation performed: [id number]     Echo follow up/limited w/ contrast if indicated    Result Date: 8/9/2024  Narrative:   Left Ventricle: Wall thickness is mildly increased. The left ventricular ejection fraction is 60%. Systolic function is normal. Wall motion is normal. The LV base diameter is 4.4 cm.   IVS: There is diastolic flattening of the interventricular septum consistent with right ventricle volume overload.   Right Ventricle: Right ventricular cavity size is mildly dilated. Systolic function is mildly to moderately reduced. Abnormal tricuspid annular plane systolic excursion (TAPSE) < 1.7 cm.   Tricuspid Valve: There is moderate regurgitation. The right ventricular systolic pressure is moderately elevated. The estimated right ventricular systolic pressure is 50.00 mmHg.     XR chest portable    Result Date: 8/9/2024  Narrative: XR CHEST PORTABLE INDICATION: hypoxia. COMPARISON: Chest radiograph 8/7/2024 FINDINGS: Low lung volumes, which causes crowding of bronchovascular markings.  Within that limitation, there is no acute lung opacity. Bibasilar scarring. No pneumothorax or pleural effusion. Normal cardiomediastinal silhouette. Bones are unremarkable for age. Normal upper abdomen.     Impression: No acute cardiopulmonary disease. Resident: Payton Barrios I, the attending radiologist, have reviewed the images and agree with the final report above. 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 Per chart review, pulse oximetry was in 70s during EMS transport.  Documented vitals early after presentation were notable for HR > 110 bpm, SBP < 100 mmHg. CT PE was notable for pulmonary embolism involving both main pulmonary arteries with extension into lobar and smaller branches.  There was evidence of right heart strain on CT PE. Labs were notable for elevated troponin with normal range BNP. He meets criteria for intermediate-high risk PE with sPESI = 4 (for HR, SBP, O2 sat, and chronic cardiopulmonary disease), evidence of right heart strain on imaging, and positive biomarker.  PERT was activated and we agreed to have him transferred to [facility] for higher level of care. He was screened for and agreed to enroll in the PEERLESS II trial, and was subsequently randomized to the FlowTriever arm of the trial. PROCEDURE SUMMARY: Right common femoral vein access under real-time ultrasound guidance Bilateral pulmonary arteriograms with pressure measurements Right pulmonary thromboembolectomy with extirpation of matter Attempted left pulmonary thromboembolectomy Access site closure with FlowStasis PROCEDURE DETAILS: Pre-procedure Consent: Informed consent for the procedure including risks, benefits and alternatives was obtained and time-out was performed prior to the procedure. Preparation: The site was prepared and draped using maximal sterile barrier technique including cutaneous antisepsis. Anesthesia/sedation Level of anesthesia/sedation: Monitored anesthesia care Anesthesia/sedation administered by: Anesthesiology Technique and Findings: Right common femoral vein access under real-time ultrasound guidance Immediate preprocedure ultrasound of the right groin was notable for the presence of occlusive DVT in the right common femoral vein and  femoral vein. The right common femoral vein more cranially and the visible external iliac vein were patent. Given this finding, limited ultrasound was performed of the left groin, without evident DVT in the common femoral or femoral veins. Decision was made to obtain access into the patent central portion of the right common femoral vein. Local anesthetic was administered. A dermatotomy was made. Under real-time ultrasound guidance, a 21-gauge micropuncture needle was advanced into the patent portion of the vein. A permanent image was saved. A microwire was advanced without resistance under fluoroscopic guidance more centrally. The access was upsized to an 8 [place] vascular sheath in Seldinger fashion. Bilateral pulmonary arteriograms with pressure measurements A Bentson wire and angled pigtail catheter were advanced via the sheath. These were navigated without difficulty into the main pulmonary artery. Bilateral pulmonary angiography was performed, demonstrating bulky central thrombus in the right main pulmonary artery extending into upper and lower lobar branches, with a smaller thrombus in the left main pulmonary artery predominantly extending into left lower lobe branches, consistent with prior CT PE protocol findings. A preintervention pulmonary artery pressure was measured at 72/11, mean of 36 mmHg. Right pulmonary thromboembolectomy with extirpation of matter The Bentson wire was advanced into the right main pulmonary artery. The angled pigtail catheter was exchanged for a 4 [place] Irish Tempo Aqua Vert catheter. The catheter and wire were used to select the right lower lobe lateral posterior basal segmental artery. The Bentson wire was exchanged for an Amplatz wire with 1 cm flop for stability. The initial 8 Northern Irish access sheath was exchanged for the FlowTriever access sheath. A Treiver 24 catheter was advanced into the interlobar pulmonary artery and thromboembolectomy was performed with extirpation of matter.   After three passes, there was no  further yield of clot and a post-treatment angiogram demonstrated near-complete clearance of right sided thrombus. Attempted left pulmonary thromboembolectomy The short flop Amplatz wire was then exchanged for a Bentson wire via the Vert catheter. The Bentson wire and the Vert catheter were used to select the left main pulmonary artery. The wire would not pass into the left lower lobe posterior basal segmental  artery as would be typical of this procedure. Numerous attempts were made at selecting one of these branches, but the wire preferentially favored selecting the anteromedial basal segment with some difficulty selecting the lateral basal segment. Ultimately, given inability to select the posterior basal segmental artery, I selected the lateral basal segment artery and exchanged the Bentson for the short flop Amplatz for stability. The Marbyby20 catheter was advanced and thromboembolectomy was attempted. After three passes, there was no yield of thrombus despite encountering expected resistance to flow at the expected location of the clot. A left pulmonary arteriogram was performed, again demonstrating a bulky thrombus in the left main pulmonary artery extending into lower lobe arteries. The posterior basal segment did not opacify at all, suggesting chronic total occlusion. A Triever 20 Curve catheter was introduced and directed at the bulky thrombus. Thromboembolectomy was attempted, again without yield of thrombus. Numerous maneuvers were made to optimize thromboembolectomy with both catheters, but after more than 15 passes and approximately 45 minutes of attempts with confirmation of appropriate catheter positioning on serial pulmonary angiograms, there was no extirpation of matter. The behavior of this left-sided thrombus was consistent with a chronic process. The Treiver catheters were removed and an angled pigtail catheter was replaced within the main pulmonary artery. A  completion pulmonary artery pressure was measured at 36/4, mean of 15 mmHg. Normalization of the pulmonary artery pressure further supports that the left-sided thrombus was chronic with acute right-sided pulmonary embolism as the etiology for his presentation. Access site closure with FlowStasis All devices were removed. Hemostasis at the access site was obtained using a FlowStasis temporary suture followed by 10 minutes of manual pressure as a precaution. Contrast Contrast dose: 75 mL of iohexol (OMNIPAQUE) Radiation Dose Fluoroscopy time: 30 minutes Reference air kerma: 113 mGy Additional Details Specimens removed: None Estimated blood loss (mL): Less than 10 Complications: No immediate complications.     Impression: 1. Acute right main pulmonary embolism extending into lobar branches status post successful pulmonary thromboembolectomy with extirpation of matter.  Mean pulmonary artery pressure was decreased from 36 mmHg to 15 mmHg after treatment. 2. Left main pulmonary thromboembolism extending into left lower lobe branches, refractory to FlowTriever thromboembolectomy. Given behavior of this thrombus and normalization of PA pressure after right thromboembolectomy, the left sided process is chronic. Plan: - Continue excellent MICU care - FlowStasis removal by IR team in the next day. Workstation performed: [id number]     Echo complete w/ contrast if indicated    Result Date: 8/8/2024  Narrative:   Left Ventricle: Wall thickness is mildly increased. There is concentric remodeling. The left ventricular ejection fraction is 68%. Systolic function is vigorous. Wall motion is normal.   IVS: There is both systolic and diastolic flattening of the interventricular septum consistent with right ventricle pressure and volume overload.   Right Ventricle: Right ventricular cavity size is mildly dilated. Systolic function is mildly to moderately reduced.   Right Atrium: The atrium is mildly dilated.   Tricuspid Valve: There  is moderate regurgitation. The right ventricular systolic pressure is severely elevated. The estimated right ventricular systolic pressure is 81.00 mmHg.     CTA ED chest PE study    Result Date: 8/7/2024  Narrative: CTA - CHEST WITH IV CONTRAST - PULMONARY ANGIOGRAM INDICATION: Acute onset dyspnea, hypoxemia, tachycardia and syncope. COMPARISON: None. TECHNIQUE: CTA examination of the chest was performed using angiographic technique according to a protocol specifically tailored to evaluate for pulmonary embolism. Multiplanar 2D reformatted images were created from the source data. In addition, coronal  3D MIP postprocessing was performed on the acquisition scanner. Radiation dose length product (DLP) for this visit: 495.23 mGy-cm . This examination, like all CT scans performed in the [facility], was performed utilizing techniques to minimize radiation dose exposure, including the use of iterative reconstruction and automated exposure control. IV Contrast: 85 mL of iohexol (OMNIPAQUE) FINDINGS: PULMONARY ARTERIAL TREE: Bilateral pulmonary emboli seen with nonocclusive emboli in the right upper lobe segmental vessels Nearly occlusive pulmonary emboli in the left lower lobe pulmonary arteries. Nearly occlusive pulmonary emboli in the right pulmonary artery extending into the right upper lobe pulmonary arteries, partially occlusive pulmonary emboli in the right lower lobe pulmonary LUNGS: Scattered areas of groundglass density seen in the left upper lobe, left lower lobe and right lower lobe with some increased lung markings PLEURA: No pleural effusion seen No pneumothorax seen HEART/GREAT VESSELS: Heart is unremarkable for patient's age. No thoracic aortic aneurysm. Dilatation of the RV cavity seen RV to LV ratio is 2.18 MEDIASTINUM AND SONAM: No significant mediastinal lymph node enlargement CHEST WALL AND LOWER NECK: Unremarkable. VISUALIZED STRUCTURES IN THE UPPER ABDOMEN: Spleen, pancreas and  "adrenal glands appear unremarkable Hepatic steatosis OSSEOUS STRUCTURES: No acute fracture or destructive osseous lesion.     Impression: Bilateral severe pulmonary embolism The calculated ratio of right ventricular to left ventricular diameter (RV/LV ratio) is ...... 2.18 There is a critical finding of acute PE with RV/LV ratio >0.9. Based on PERT algorithm recommendations:  \"  Order STAT biomarkers including troponin, NT-BNP or BNP  \"  Calculate PESI score: o  If PESI score falls in I-III, with negative biomarkers, please order a PERT priority ECHO. o  If PESI score falls in IV-V or with positive biomarkers, please order STAT ECHO and alert the [place] PERT via PAC at [phone]. I personally discussed this study with TAWANNA FERNANDEZ on 8/7/2024 4:05 PM. Workstation performed: [id number]     XR chest 1 view portable    Result Date: 8/7/2024  Narrative: XR CHEST PORTABLE INDICATION: Dyspneic, tachycardic. COMPARISON: 12/30/2021 FINDINGS: Clear lungs. No pneumothorax or pleural effusion. Loop recorder no longer seen. Normal cardiomediastinal silhouette. Bones are unremarkable for age. Normal upper abdomen.     Impression: No acute cardiopulmonary disease. Workstation performed: [id number]       LABS  Results from last 7 days   Lab Units 08/16/24  0434 08/15/24  0549 08/14/24  1752   WBC Thousand/uL 4.83 5.37 3.91*   HEMOGLOBIN g/dL 12.2 13.4 13.2   HEMATOCRIT % 38.2 41.7 38.7   MCV fL 95 95 94   PLATELETS Thousands/uL 266 270 256     Results from last 7 days   Lab Units 08/16/24  0434 08/15/24  0549 08/14/24  1752 08/10/24  0916   SODIUM mmol/L 140 139 140 139   POTASSIUM mmol/L 3.9 4.5 3.9 3.7   CHLORIDE mmol/L 109* 108 108 107   CO2 mmol/L 24 24 23 23   BUN mg/dL 32* 29* 31* 28*   CREATININE mg/dL 1.57* 1.57* 1.74* 1.60*   CALCIUM mg/dL 8.7 9.1 9.5 8.9   ALBUMIN g/dL 3.5  --  4.0  --    TOTAL BILIRUBIN mg/dL 0.50  --  0.59  --    ALK PHOS U/L 47  --  71  --    ALT U/L 24  --  31  --    AST U/L 28  --  40*  --  " "  EGFR ml/min/1.73sq m 43 43 38 42   GLUCOSE RANDOM mg/dL 120 160* 141* 182*     Results from last 7 days   Lab Units 08/14/24  1945 08/14/24  1752   HS TNI 0HR ng/L  --  13   HS TNI 2HR ng/L 12  --               Results from last 7 days   Lab Units 08/10/24  1129 08/10/24  0543 08/09/24  2344 08/09/24  1830 08/09/24  1138   POC GLUCOSE mg/dl 166* 124 137 179* 164*                       Cultures:         Invalid input(s): \"URIBILINOGEN\"        Results from last 7 days   Lab Units 08/14/24  1752   INFLUENZA A PCR  Negative         Condition at Discharge:  good      Discharge instructions/Information to patient and family:   See after visit summary for information provided to patient and family.  The above hospital course, results, incidental findings, need for follow up was discussed in detail with the patient and/or family.    Provisions for Follow-Up Care:  See after visit summary for information related to follow-up care and any pertinent home health orders.      Disposition:     Home       Discharge Statement:  I spent 37 minutes discharging the patient. This time was spent on the day of discharge. I had direct contact with the patient on the day of discharge. Greater than 50% of the total time was spent examining patient, answering all patient questions, arranging and discussing plan of care with patient as well as directly providing post-discharge instructions.  Additional time then spent on discharge activities.    Discharge Medications:  See after visit summary for reconciled discharge medications provided to patient and family.      ** Please Note: This note has been constructed using a voice recognition system **      "

## 2024-08-16 NOTE — PLAN OF CARE
Problem: Potential for Falls  Goal: Patient will remain free of falls  Description: INTERVENTIONS:  - Educate patient/family on patient safety including physical limitations  - Instruct patient to call for assistance with activity   - Consult OT/PT to assist with strengthening/mobility   - Keep Call bell within reach  - Keep bed low and locked with side rails adjusted as appropriate  - Keep care items and personal belongings within reach  - Initiate and maintain comfort rounds  - Make Fall Risk Sign visible to staff  - Offer Toileting every 2 Hours, in advance of need  - Initiate/Maintain bed alarm  - Obtain necessary fall risk management equipment  - Apply yellow socks and bracelet for high fall risk patients  - Consider moving patient to room near nurses station  8/16/2024 1137 by Zoila Murphy, RN  Outcome: Adequate for Discharge  8/16/2024 0735 by Zoila Murphy RN  Outcome: Progressing     Problem: PAIN - ADULT  Goal: Verbalizes/displays adequate comfort level or baseline comfort level  Description: Interventions:  - Encourage patient to monitor pain and request assistance  - Assess pain using appropriate pain scale  - Administer analgesics based on type and severity of pain and evaluate response  - Implement non-pharmacological measures as appropriate and evaluate response  - Consider cultural and social influences on pain and pain management  - Notify physician/advanced practitioner if interventions unsuccessful or patient reports new pain  8/16/2024 1137 by Zoila Murphy, RN  Outcome: Adequate for Discharge  8/16/2024 0735 by Zoila Murphy, RN  Outcome: Progressing     Problem: INFECTION - ADULT  Goal: Absence or prevention of progression during hospitalization  Description: INTERVENTIONS:  - Assess and monitor for signs and symptoms of infection  - Monitor lab/diagnostic results  - Monitor all insertion sites, i.e. indwelling lines, tubes, and drains  - Monitor endotracheal if appropriate and  nasal secretions for changes in amount and color  - [place] appropriate cooling/warming therapies per order  - Administer medications as ordered  - Instruct and encourage patient and family to use good hand hygiene technique  - Identify and instruct in appropriate isolation precautions for identified infection/condition  8/16/2024 1137 by Zoila Murphy RN  Outcome: Adequate for Discharge  8/16/2024 0735 by Zoila Murphy RN  Outcome: Progressing     Problem: SAFETY ADULT  Goal: Patient will remain free of falls  Description: INTERVENTIONS:  - Educate patient/family on patient safety including physical limitations  - Instruct patient to call for assistance with activity   - Consult OT/PT to assist with strengthening/mobility   - Keep Call bell within reach  - Keep bed low and locked with side rails adjusted as appropriate  - Keep care items and personal belongings within reach  - Initiate and maintain comfort rounds  - Make Fall Risk Sign visible to staff  - Offer Toileting every 2 Hours, in advance of need  - Initiate/Maintain bed alarm  - Obtain necessary fall risk management equipment  - Apply yellow socks and bracelet for high fall risk patients  - Consider moving patient to room near nurses station  8/16/2024 1137 by Zoila Murphy RN  Outcome: Adequate for Discharge  8/16/2024 0735 by Zoila Murphy RN  Outcome: Progressing  Goal: Maintain or return to baseline ADL function  Description: INTERVENTIONS:  -  Assess patient's ability to carry out ADLs; assess patient's baseline for ADL function and identify physical deficits which impact ability to perform ADLs (bathing, care of mouth/teeth, toileting, grooming, dressing, etc.)  - Assess/evaluate cause of self-care deficits   - Assess range of motion  - Assess patient's mobility; develop plan if impaired  - Assess patient's need for assistive devices and provide as appropriate  - Encourage maximum independence but intervene and supervise when  necessary  - Involve family in performance of ADLs  - Assess for home care needs following discharge   - Consider OT consult to assist with ADL evaluation and planning for discharge  - Provide patient education as appropriate  8/16/2024 1137 by Zoila Murphy RN  Outcome: Adequate for Discharge  8/16/2024 0735 by Zoila Murphy RN  Outcome: Progressing  Goal: Maintains/Returns to pre admission functional level  Description: INTERVENTIONS:  - Perform AM-PAC 6 Click Basic Mobility/ Daily Activity assessment daily.  - Set and communicate daily mobility goal to care team and patient/family/caregiver.   - Collaborate with rehabilitation services on mobility goals if consulted  - Perform Range of Motion 4 times a day.  - Reposition patient every 2 hours.  - Dangle patient 3 times a day  - Stand patient 3 times a day  - Ambulate patient 3 times a day  - Out of bed to chair 3 times a day   - Out of bed for meals 3 times a day  - Out of bed for toileting  - Record patient progress and toleration of activity level   8/16/2024 1137 by Zoila Murphy RN  Outcome: Adequate for Discharge  8/16/2024 0735 by Zoila Murphy RN  Outcome: Progressing     Problem: DISCHARGE PLANNING  Goal: Discharge to home or other facility with appropriate resources  Description: INTERVENTIONS:  - Identify barriers to discharge w/patient and caregiver  - Arrange for needed discharge resources and transportation as appropriate  - Identify discharge learning needs (meds, wound care, etc.)  - Arrange for interpretive services to assist at discharge as needed  - Refer to Case Management Department for coordinating discharge planning if the patient needs post-hospital services based on physician/advanced practitioner order or complex needs related to functional status, cognitive ability, or social support system  8/16/2024 1137 by Zoila Murphy RN  Outcome: Adequate for Discharge  8/16/2024 0735 by Zoila Murphy RN  Outcome: Progressing      Problem: Knowledge Deficit  Goal: Patient/family/caregiver demonstrates understanding of disease process, treatment plan, medications, and discharge instructions  Description: Complete learning assessment and assess knowledge base.  Interventions:  - Provide teaching at level of understanding  - Provide teaching via preferred learning methods  8/16/2024 1137 by Zoila Murphy RN  Outcome: Adequate for Discharge  8/16/2024 0735 by Zoila Murphy RN  Outcome: Progressing     Problem: RESPIRATORY - ADULT  Goal: Achieves optimal ventilation and oxygenation  Description: INTERVENTIONS:  - Assess for changes in respiratory status  - Assess for changes in mentation and behavior  - Position to facilitate oxygenation and minimize respiratory effort  - Oxygen administered by appropriate delivery if ordered  - Initiate smoking cessation education as indicated  - Encourage broncho-pulmonary hygiene including cough, deep breathe, Incentive Spirometry  - Assess the need for suctioning and aspirate as needed  - Assess and instruct to report SOB or any respiratory difficulty  - Respiratory Therapy support as indicated  8/16/2024 1137 by Zoila Murphy RN  Outcome: Adequate for Discharge  8/16/2024 0735 by Zoila Murphy RN  Outcome: Progressing

## 2024-08-16 NOTE — CASE MANAGEMENT
Case Management Assessment    Patient name Shahbaz Barriga  Location /-01 MRN [id number]  : 1950 Date 2024       Current Admission Date: 2024  Current Admission Diagnosis:Pneumonia due to COVID-19 virus   Patient Active Problem List    Diagnosis Date Noted Date Diagnosed    SOB (shortness of breath) 2024     History of pulmonary embolus (PE) 2024     Hypoxia 2024     SIRS (systemic inflammatory response syndrome) (HCC) 2024     Acute pulmonary embolism (HCC) 2024     Current use of long term anticoagulation 2023     History of colon polyps 2023     Pneumonia due to COVID-19 virus 2021     Elevated hemoglobin (HCC) 2021     Bilateral carotid artery stenosis 2020     Witnessed episode of apnea      History of stroke 2020     Erythrocytosis 2019     Ulcerative rectosigmoiditis without complication ([facility]) 2018     Dyslipidemia 2018     Microalbuminuria 2016     Type 2 diabetes mellitus with stage 3a chronic kidney disease, without long-term current use of insulin (HCC) 2016     Colon, diverticulosis 2013     Elevated C-reactive protein 2013     Chronic kidney disease, stage 3 (HCC) 05/15/2012     Elevated PSA 05/15/2012     Essential hypertriglyceridemia 05/15/2012     Gout 05/15/2012     Primary hypertension 05/15/2012       LOS (days): 1  Geometric Mean LOS (GMLOS) (days):   Days to GMLOS:     OBJECTIVE:  PATIENT READMITTED TO HOSPITAL  Risk of Unplanned Readmission Score: 13.25         Current admission status: Inpatient       Preferred Pharmacy:   [facility] - [place], [place] - [address]  [address] [address]  Phone: [phone] Fax: [phone]    [facility] [place], [place] - [facility] [facility]  [address] [place] [address]  Phone: [phone] Fax: [phone]    [facility]  JULIANNE CRUZ - [address]  [address]  [place] [address]  Phone: [phone] Fax: [phone]    Primary Care Provider: Rubia Dennis DO    Primary Insurance: MEDICARE  Secondary Insurance: AETNA    ASSESSMENT:  Active Health Care Proxies       NithyaLeanna Health Care Agent - Spouse   Primary Phone: [phone] (Mobile)  Home Phone: [phone]                  CM not consulted. No response from spouse from message left yesterday. CM completed chart review and spoke w/ pt's nurse. Pt resides w/ spouse in mobile home w/ 4STE. Indp PTA. Owns/uses SPC, RW, BSC, shower chair, grab bars, and CPAP. Pt is retired and drives. Hx OP PT, STR through [facility] [facility] and HH [facility]. No hx DA/MH. Pt's spouse will transport at discharge.       Social Determinants of Health (SDOH)      Flowsheet Row Most Recent Value   Housing Stability    In the last 12 months, was there a time when you were not able to pay the mortgage or rent on time? N   In the past 12 months, how many times have you moved where you were living? 0   At any time in the past 12 months, were you homeless or living in a shelter (including now)? N   Transportation Needs    In the past 12 months, has lack of transportation kept you from medical appointments or from getting medications? no   In the past 12 months, has lack of transportation kept you from meetings, work, or from getting things needed for daily living? No   Food Insecurity    Within the past 12 months, you worried that your food would run out before you got the money to buy more. Never true   Within the past 12 months, the food you bought just didn't last and you didn't have money to get more. Never true   Utilities    In the past 12 months has the electric, gas, oil, or water company threatened to shut off services in your home? No

## 2024-08-16 NOTE — ASSESSMENT & PLAN NOTE
Lab Results   Component Value Date    EGFR 43 08/16/2024    EGFR 43 08/15/2024    EGFR 38 08/14/2024    CREATININE 1.57 (H) 08/16/2024    CREATININE 1.57 (H) 08/15/2024    CREATININE 1.74 (H) 08/14/2024   Baseline creatinine fluctuating greatly over the last year, baseline since hospitalization 8/7-8/10 appears to be 1.6-1.7  Creatinine admission 1.74  Trend BMP daily

## 2024-08-16 NOTE — NURSING NOTE
Pt ambulated in rm about 50 ft walking back and fourth maintaining pulse oxygen at 96%. Pt jumped up and down and pulse ox stayed at 96%. Pt passed pulse oximetry while ambulating.

## 2024-08-16 NOTE — ASSESSMENT & PLAN NOTE
COVID-19 positive on 8/14-had developed cough and rhinorrhea during recent hospitalization  SpO2 at rest is normal, however with ambulation dropped to 89% on room air, prompting admission  Decadron 6 Mg IV x 1 given in the ED-hold on further dosing as patient not requiring oxygen while at rest  Cont  remdesivir due to patient being high risk  D-dimer not checked on admit as he was recently diagnosed with acute PE s/p thrombectomy 8/7 and CTA PE study obtained in the ED only showing early chronic PE and somewhat reduced clot burden, no evidence of new or acute embolus -continue home Eliquis  Viral sepsis, POA, due to COVID pneumonia as evidenced by +SIRS (Leukopenia and tachypnea) and CXR findings, treated with IV remdesivir, I/O's and monitoring CBC's. - RESOLVED    Pt asymptomatic.

## 2024-08-19 ENCOUNTER — RA CDI HCC (OUTPATIENT)
Dept: OTHER | Facility: HOSPITAL | Age: 74
End: 2024-08-19

## 2024-08-19 ENCOUNTER — TRANSITIONAL CARE MANAGEMENT (OUTPATIENT)
Dept: FAMILY MEDICINE CLINIC | Facility: HOSPITAL | Age: 74
End: 2024-08-19

## 2024-08-20 LAB
KCT BLD-ACNC: 216 SEC (ref 89–137)
KCT BLD-ACNC: 261 SEC (ref 89–137)
SPECIMEN SOURCE: ABNORMAL
SPECIMEN SOURCE: ABNORMAL

## 2024-08-23 ENCOUNTER — OFFICE VISIT (OUTPATIENT)
Dept: FAMILY MEDICINE CLINIC | Facility: HOSPITAL | Age: 74
End: 2024-08-23
Payer: MEDICARE

## 2024-08-23 VITALS
WEIGHT: 232 LBS | BODY MASS INDEX: 34.36 KG/M2 | HEIGHT: 69 IN | TEMPERATURE: 97.8 F | HEART RATE: 57 BPM | SYSTOLIC BLOOD PRESSURE: 142 MMHG | OXYGEN SATURATION: 99 % | DIASTOLIC BLOOD PRESSURE: 94 MMHG

## 2024-08-23 DIAGNOSIS — N17.9 AKI (ACUTE KIDNEY INJURY) (HCC): ICD-10-CM

## 2024-08-23 DIAGNOSIS — I10 PRIMARY HYPERTENSION: ICD-10-CM

## 2024-08-23 DIAGNOSIS — I26.09 ACUTE PULMONARY EMBOLISM WITH ACUTE COR PULMONALE, UNSPECIFIED PULMONARY EMBOLISM TYPE (HCC): ICD-10-CM

## 2024-08-23 DIAGNOSIS — J12.82 PNEUMONIA DUE TO COVID-19 VIRUS: ICD-10-CM

## 2024-08-23 DIAGNOSIS — I26.99 ACUTE PULMONARY EMBOLISM, UNSPECIFIED PULMONARY EMBOLISM TYPE, UNSPECIFIED WHETHER ACUTE COR PULMONALE PRESENT (HCC): ICD-10-CM

## 2024-08-23 DIAGNOSIS — J96.01 ACUTE HYPOXIC RESPIRATORY FAILURE (HCC): ICD-10-CM

## 2024-08-23 DIAGNOSIS — R65.10 SIRS (SYSTEMIC INFLAMMATORY RESPONSE SYNDROME) (HCC): ICD-10-CM

## 2024-08-23 DIAGNOSIS — Z09 HOSPITAL DISCHARGE FOLLOW-UP: Primary | ICD-10-CM

## 2024-08-23 DIAGNOSIS — U07.1 PNEUMONIA DUE TO COVID-19 VIRUS: ICD-10-CM

## 2024-08-23 PROBLEM — K51.30 ULCERATIVE RECTOSIGMOIDITIS WITHOUT COMPLICATION (HCC): Status: RESOLVED | Noted: 2018-02-13 | Resolved: 2024-08-23

## 2024-08-23 PROCEDURE — 99495 TRANSJ CARE MGMT MOD F2F 14D: CPT | Performed by: INTERNAL MEDICINE

## 2024-08-23 RX ORDER — CHLORTHALIDONE 25 MG/1
25 TABLET ORAL DAILY
Qty: 90 TABLET | Refills: 2 | Status: SHIPPED | OUTPATIENT
Start: 2024-08-23

## 2024-08-23 NOTE — ASSESSMENT & PLAN NOTE
Improved with IVF and tx of underlying pulm issues, will restart chlorthalidone and recheck BMP in a month or so - BW order in Epic, will follow

## 2024-08-23 NOTE — ASSESSMENT & PLAN NOTE
BP at home creeping up and was elevated in office today - better by end of appt but still above goal, will restart Chlorthalidone and check BMP in 1 mo - order in Epic

## 2024-08-23 NOTE — ASSESSMENT & PLAN NOTE
Large B/L emboli s/p R thrombectomy, unable to remove L clot, on Eliquis, has f/u with Pulm, I suspect lifelong OAC but will need to clarify with Pulm and have Heme input with his h/o erythrocytosis as well, had cor pulmonale and is in a study and has f/u Echo scheduled, will follow

## 2024-08-23 NOTE — PROGRESS NOTES
Transition of Care Visit  Name: Shahbaz Barriga      : 1950      MRN: [id number]  Encounter Provider: Rubia Dennis DO  Encounter Date: 2024   Encounter department: [facility] CARE [address]     Assessment & Plan   1. Hospital discharge follow-up  2. Acute pulmonary embolism with acute cor pulmonale, unspecified pulmonary embolism type (HCC)  Assessment & Plan:  Large B/L emboli s/p R thrombectomy, unable to remove L clot, on Eliquis, has f/u with Pulm, I suspect lifelong OAC but will need to clarify with Pulm and have Heme input with his h/o erythrocytosis as well, had cor pulmonale and is in a study and has f/u Echo scheduled, will follow  Orders:  -     CBC and differential  3. Acute hypoxic respiratory failure (HCC)  Assessment & Plan:  Resolved after tx of PE and then tx of COVID, no home O2 needed, call with recurrent symptoms  Orders:  -     CBC and differential  4. CHADWICK (acute kidney injury) (HCC)  Assessment & Plan:  Improved with IVF and tx of underlying pulm issues, will restart chlorthalidone and recheck BMP in a month or so - BW order in Epic, will follow  Orders:  -     Basic metabolic panel; Future  -     Basic metabolic panel  5. Pneumonia due to COVID-19 virus  Assessment & Plan:  Tx with Remdesivir while IP, still has residual cough but exam and O2 sat great, d/w pt that cough can last 3 mos after COVID, may try OTC cough med prn, call with new/worse symptoms  6. SIRS (systemic inflammatory response syndrome) (HCC)  Assessment & Plan:  D/t COVID, s/p tx with Remdesivir with resolution of SIRS, no current symptoms  7. Acute pulmonary embolism, unspecified pulmonary embolism type, unspecified whether acute cor pulmonale present (HCC)  Assessment & Plan:  Large B/L emboli s/p R thrombectomy, unable to remove L clot, on Eliquis, has f/u with Pulm, I suspect lifelong OAC but will need to clarify with Pulm and have Heme input with his h/o erythrocytosis as well, had  cor pulmonale and is in a study and has f/u Echo scheduled, will follow  8. Primary hypertension  Assessment & Plan:  BP at home creeping up and was elevated in office today - better by end of appt but still above goal, will restart Chlorthalidone and check BMP in 1 mo - order in Epic  Orders:  -     chlorthalidone 25 mg tablet; Take 1 tablet (25 mg total) by mouth daily  -     Basic metabolic panel; Future  -     Basic metabolic panel       Colonscopy 6/23 - 2 yrs    BW 8/24 (A1C 6.6)  - WILL ORDER AT NEXT APPT for 6 mos  DM foot exam 2/24  DM eye exam 10/23 - 2 yrs      History of Present Illness     Transitional Care Management Review:   Shahbaz Barriga is a 74 y.o. male here for TCM follow up. Pt was admitted to [facility] from 8/7/24 to 8/10/24 and then again from 8/14/24 to 8/16/24.  Records were reviewed by myself in detail and events are summarized below.    During the TCM phone call patient stated:  TCM Call       Date and time call was made  8/19/2024 10:37 AM    Hospital care reviewed  Records reviewed    Patient was hospitialized at  [facility]    Date of Admission  08/14/24    Date of discharge  08/16/24    Diagnosis  Pneumonia due to COVID-19 virus    Disposition  Home    Were the patients medications reviewed and updated  No    Current Symptoms  None    Weakness severity  Mild          TCM Call       Post hospital issues  None    Should patient be enrolled in anticoag monitoring?  No    Scheduled for follow up?  Yes    Patients specialists  Neurologist; Other (comment)    Neurologist name   Neuro    Other specialists names  Oral Surgeon, Dr. Gonzalez     Did you obtain your prescribed medications  Yes    Do you need help managing your prescriptions or medications  No    Is transportation to your appointment needed  No    I have advised the patient to call PCP with any new or worsening symptoms  Anastasiya Cosme MA    Counseling  Family  Wife, Leanna          HPI Pt presented to [facility] ED on 8/7/24 with  "SOB.  EMS called and O2 sat low and was on 10 L NRB on arrival to ED.  , RR 28 and O2 sat 94% on 6L in ED.  Exam notable for ill appearing, tachycardic and distant breath sounds L base.  Work up in ED notable for lactate 2.7, troponin 60, WBC 10.75, K 3.4, Bun/Cr 40/2.28, Glu 225 and AST 40.  Rest of CMP/CBC/PT/PTT/BNP were wnl.  ECG showed sinus tach and Qwave in III and aVF.  CTA PE showed B/L severe PE.  Pt was transferred to [facility] and started on heparin gtt.  Echo was done and EF 68% and R vent pressure and volume overload was present.  Pt went to IR on 8/7/24 and had a R pulm thrombectomy.  L thrombus was firm and likely chronic and not able to be treated.  O2 was able to be weaned down from 6 L to 2 L.  He did not qualify for home O2 based on O2 desat with ambulation.  Discussed at least 6 mos of OAC or poss lifelong d/t severity of clot would be needed. Pt was switched from heparin gtt to Eliquis. He was stable for discharge on 8/10/24.   Med list was reviewed.  Pt went to  on 8/14/24 with cough and SOB.  He tested + for COVID and had drop in O2 sat and was sent to ED.  He was admitted from 8/14/24 to 8/16/24 for acute COVID, SIRS.  He was tx with Decadron x 1 in ED and then Remdesivir.  He did have a CTA PE done and no acute PE noted, interstitial and GGO in dependent portions of lungs were noted.  ECG did show prolonged Qtc.  Pt did well clinically and was discharged home on 8/16/24.  Med list reviewed.     He has been doing well since discharge. He is feeling \"a lot better\".  He notes no recent F/C. He has a lingering cough but only notes SOB with significant activity.  He notes no wheezing/hemoptysis.  He notes no CP/palp/LE edema/orthopnea/PND.   He is using CPAP tx nightly and has noted great benefit with it since it was started in July. BP a bit high today but he has been checking it at home and it is much lower at home - home readings reviewed and are fluctuating 120/60's to 150's/90's.  He has " "f/u with Dr. Denny/Sumeet in Sept.  He reports this is his first blood clot and is not aware of family h/o clots.  The event was unprovoked.  He has a f/u Echo scheduled.        Review of Systems   Constitutional:  Negative for chills and fever.   HENT:  Negative for congestion and sore throat.    Eyes:  Negative for pain and visual disturbance.   Respiratory:  Positive for shortness of breath. Negative for cough and wheezing.    Cardiovascular:  Negative for chest pain, palpitations and leg swelling.   Gastrointestinal:  Negative for abdominal pain, blood in stool, constipation, diarrhea, nausea and vomiting.   Genitourinary:  Negative for difficulty urinating and dysuria.   Musculoskeletal:  Negative for arthralgias and myalgias.   Skin:  Negative for rash and wound.   Neurological:  Negative for dizziness and headaches.   Hematological:  Does not bruise/bleed easily.   Psychiatric/Behavioral:  Negative for confusion.      Objective     /94   Pulse 57   Temp 97.8 °F (36.6 °C) (Tympanic)   Ht 5' 9\" (1.753 m)   Wt 105 kg (232 lb)   SpO2 99%   BMI 34.26 kg/m²     Physical Exam  Vitals and nursing note reviewed.   Constitutional:       General: He is not in acute distress.     Appearance: He is well-developed. He is obese. He is not ill-appearing.   HENT:      Head: Normocephalic and atraumatic.      Right Ear: External ear normal.      Left Ear: External ear normal.   Eyes:      General:         Right eye: No discharge.         Left eye: No discharge.      Conjunctiva/sclera: Conjunctivae normal.   Neck:      Trachea: No tracheal deviation.   Cardiovascular:      Rate and Rhythm: Normal rate and regular rhythm.      Heart sounds: Normal heart sounds. No murmur heard.  Pulmonary:      Effort: Pulmonary effort is normal. No respiratory distress.      Breath sounds: Normal breath sounds. No wheezing, rhonchi or rales.   Abdominal:      General: There is no distension.      Palpations: Abdomen is soft.      " Tenderness: There is no abdominal tenderness. There is no guarding or rebound.   Musculoskeletal:         General: No deformity or signs of injury.      Cervical back: Neck supple.      Right lower leg: No edema.      Left lower leg: No edema.   Skin:     General: Skin is warm and dry.      Coloration: Skin is not pale.      Findings: No bruising or rash.   Neurological:      General: No focal deficit present.      Mental Status: He is alert. Mental status is at baseline.      Motor: No abnormal muscle tone.      Gait: Gait normal.   Psychiatric:         Mood and Affect: Mood normal.         Behavior: Behavior normal.         Thought Content: Thought content normal.         Judgment: Judgment normal.       Medications have been reviewed by provider in current encounter    Administrative Statements

## 2024-08-23 NOTE — ASSESSMENT & PLAN NOTE
Tx with Remdesivir while IP, still has residual cough but exam and O2 sat great, d/w pt that cough can last 3 mos after COVID, may try OTC cough med prn, call with new/worse symptoms

## 2024-08-26 DIAGNOSIS — E78.1 ESSENTIAL HYPERTRIGLYCERIDEMIA: ICD-10-CM

## 2024-08-27 RX ORDER — FENOFIBRATE 145 MG/1
145 TABLET, COATED ORAL DAILY
Qty: 90 TABLET | Refills: 1 | Status: SHIPPED | OUTPATIENT
Start: 2024-08-27

## 2024-08-28 DIAGNOSIS — I26.99 ACUTE PULMONARY EMBOLISM, UNSPECIFIED PULMONARY EMBOLISM TYPE, UNSPECIFIED WHETHER ACUTE COR PULMONALE PRESENT (HCC): ICD-10-CM

## 2024-08-28 NOTE — TELEPHONE ENCOUNTER
Reason for call:   [x] Refill   [] Prior Auth  [] Other:     Office:   [x] PCP/Provider -   [] Specialty/Provider -     Medication:     apixaban (Eliquis) 5 mg       Dose/Frequency:    10 mg, 2 times daily Starting Sat 8/10/2024, For 7 days, THEN 5 mg, 2 times daily Starting Sat 8/17/2024, For 23 days Oral       Quantity:  74 tablet     Pharmacy: [facility] - [address] [phone]     Does the patient have enough for 3 days?   [] Yes   [x] No - Send as HP to POD

## 2024-08-29 DIAGNOSIS — I26.99 ACUTE PULMONARY EMBOLISM, UNSPECIFIED PULMONARY EMBOLISM TYPE, UNSPECIFIED WHETHER ACUTE COR PULMONALE PRESENT (HCC): ICD-10-CM

## 2024-09-03 ENCOUNTER — TELEPHONE (OUTPATIENT)
Age: 74
End: 2024-09-03

## 2024-09-03 DIAGNOSIS — I26.99 ACUTE PULMONARY EMBOLISM, UNSPECIFIED PULMONARY EMBOLISM TYPE, UNSPECIFIED WHETHER ACUTE COR PULMONALE PRESENT (HCC): ICD-10-CM

## 2024-09-03 NOTE — TELEPHONE ENCOUNTER
Wife called needs clinical staff to [facility] to [facility] in order for pt to receive his Eliquis. Ph# [phone]

## 2024-09-09 DIAGNOSIS — E11.22 TYPE 2 DIABETES MELLITUS WITH STAGE 3A CHRONIC KIDNEY DISEASE, WITHOUT LONG-TERM CURRENT USE OF INSULIN (HCC): ICD-10-CM

## 2024-09-09 DIAGNOSIS — N18.31 TYPE 2 DIABETES MELLITUS WITH STAGE 3A CHRONIC KIDNEY DISEASE, WITHOUT LONG-TERM CURRENT USE OF INSULIN (HCC): ICD-10-CM

## 2024-09-10 ENCOUNTER — OFFICE VISIT (OUTPATIENT)
Dept: PULMONOLOGY | Facility: CLINIC | Age: 74
End: 2024-09-10

## 2024-09-10 ENCOUNTER — DOCUMENTATION (OUTPATIENT)
Dept: OTHER | Facility: HOSPITAL | Age: 74
End: 2024-09-10

## 2024-09-10 ENCOUNTER — HOSPITAL ENCOUNTER (OUTPATIENT)
Dept: NON INVASIVE DIAGNOSTICS | Facility: HOSPITAL | Age: 74
Discharge: HOME | End: 2024-09-10
Attending: INTERNAL MEDICINE
Payer: MEDICARE

## 2024-09-10 VITALS
HEIGHT: 69 IN | DIASTOLIC BLOOD PRESSURE: 64 MMHG | SYSTOLIC BLOOD PRESSURE: 106 MMHG | BODY MASS INDEX: 34.21 KG/M2 | WEIGHT: 231 LBS | HEART RATE: 62 BPM | TEMPERATURE: 97.7 F | OXYGEN SATURATION: 99 %

## 2024-09-10 VITALS
HEIGHT: 69 IN | BODY MASS INDEX: 34.21 KG/M2 | DIASTOLIC BLOOD PRESSURE: 64 MMHG | SYSTOLIC BLOOD PRESSURE: 106 MMHG | HEART RATE: 63 BPM | WEIGHT: 231 LBS

## 2024-09-10 DIAGNOSIS — R06.09 DOE (DYSPNEA ON EXERTION): ICD-10-CM

## 2024-09-10 DIAGNOSIS — I26.09 OTHER ACUTE PULMONARY EMBOLISM WITH ACUTE COR PULMONALE (HCC): Primary | ICD-10-CM

## 2024-09-10 DIAGNOSIS — I26.99 ACUTE PULMONARY EMBOLISM WITHOUT ACUTE COR PULMONALE, UNSPECIFIED PULMONARY EMBOLISM TYPE (HCC): ICD-10-CM

## 2024-09-10 DIAGNOSIS — G47.33 OSA (OBSTRUCTIVE SLEEP APNEA): ICD-10-CM

## 2024-09-10 DIAGNOSIS — I26.99 ACUTE PULMONARY EMBOLISM WITHOUT ACUTE COR PULMONALE, UNSPECIFIED PULMONARY EMBOLISM TYPE (HCC): Primary | ICD-10-CM

## 2024-09-10 DIAGNOSIS — E66.09 CLASS 1 OBESITY DUE TO EXCESS CALORIES WITHOUT SERIOUS COMORBIDITY WITH BODY MASS INDEX (BMI) OF 34.0 TO 34.9 IN ADULT: ICD-10-CM

## 2024-09-10 LAB
AORTIC ROOT: 3.3 CM
APICAL FOUR CHAMBER EJECTION FRACTION: 63 %
AV REGURGITATION PRESSURE HALF TIME: 1030 MS
BSA FOR ECHO PROCEDURE: 2.2 M2
FRACTIONAL SHORTENING: 40 (ref 28–44)
INTERVENTRICULAR SEPTUM IN DIASTOLE (PARASTERNAL SHORT AXIS VIEW): 1.3 CM
INTERVENTRICULAR SEPTUM: 1.3 CM (ref 0.6–1.1)
LAAS-AP2: 20.4 CM2
LAAS-AP4: 15.7 CM2
LEFT ATRIUM SIZE: 3.3 CM
LEFT ATRIUM VOLUME (MOD BIPLANE): 50 ML
LEFT ATRIUM VOLUME INDEX (MOD BIPLANE): 22.7 ML/M2
LEFT INTERNAL DIMENSION IN SYSTOLE: 2.4 CM (ref 2.1–4)
LEFT VENTRICULAR INTERNAL DIMENSION IN DIASTOLE: 4 CM (ref 3.5–6)
LEFT VENTRICULAR POSTERIOR WALL IN END DIASTOLE: 1 CM
LEFT VENTRICULAR STROKE VOLUME: 49 ML
LVSV (TEICH): 49 ML
RA PRESSURE ESTIMATED: 10 MMHG
RIGHT ATRIAL 2D VOLUME: 52 ML
RIGHT ATRIUM AREA SYSTOLE A4C: 20.4 CM2
RIGHT VENTRICLE ID DIMENSION: 3.6 CM
RV PSP: 45 MMHG
SL CV AV DECELERATION TIME RETROGRADE: 3551 MS
SL CV AV PEAK GRADIENT RETROGRADE: 72 MMHG
SL CV LEFT ATRIUM LENGTH A2C: 5.9 CM
SL CV LV EF: 55
SL CV PED ECHO LEFT VENTRICLE DIASTOLIC VOLUME (MOD BIPLANE) 2D: 69 ML
SL CV PED ECHO LEFT VENTRICLE SYSTOLIC VOLUME (MOD BIPLANE) 2D: 20 ML
TR MAX PG: 35 MMHG
TR PEAK VELOCITY: 3 M/S
TRICUSPID ANNULAR PLANE SYSTOLIC EXCURSION: 1.5 CM
TRICUSPID VALVE PEAK REGURGITATION VELOCITY: 2.96 M/S

## 2024-09-10 PROCEDURE — 93321 DOPPLER ECHO F-UP/LMTD STD: CPT

## 2024-09-10 PROCEDURE — 93325 DOPPLER ECHO COLOR FLOW MAPG: CPT | Performed by: INTERNAL MEDICINE

## 2024-09-10 PROCEDURE — 93308 TTE F-UP OR LMTD: CPT | Performed by: INTERNAL MEDICINE

## 2024-09-10 PROCEDURE — 93308 TTE F-UP OR LMTD: CPT

## 2024-09-10 PROCEDURE — 93321 DOPPLER ECHO F-UP/LMTD STD: CPT | Performed by: INTERNAL MEDICINE

## 2024-09-10 PROCEDURE — 93325 DOPPLER ECHO COLOR FLOW MAPG: CPT

## 2024-09-10 NOTE — PROGRESS NOTES
"Office Progress Note - Pulmonary    Shahbaz ENA Barriga 74 y.o. male MRN: [id number]    Encounter: [id number]      Assessment:  Acute pulmonary embolism status post clot removal as part of the clinical trial.  Obstructive sleep apnea on CPAP.  Dyspnea on exertion.  Obesity.  Recent COVID-19 infection.    Plan:   6-minute walk test.  He is scheduled for 2D echo as part of the study.  Eliquis 5 mg p.o. twice daily.  Nocturnal CPAP.  Weight loss.  Follow-up in 2 months.    Discussion:   The patient's acute pulmonary embolism has markedly improved clinically.  He is tolerating the Eliquis 5 mg p.o. twice daily.  He did not desaturate on the 6-minute walk test.  He will need anticoagulation for the rest of his life as this submassive acute PE was unprovoked.  After 6 months of full dose of the Eliquis it can be reduced to 2.5 mg twice daily.  He is compliant with the nocturnal CPAP.  His obstructive sleep apnea is well treated.  Follow-up in 2 months.      Subjective:   The patient is here for a follow-up visit.  He was admitted in mid August with acute pulmonary embolism.  He pulmonary clot removal as he was randomized in the clinical study.  He has dyspnea on exertion however has markedly improved.  No significant cough, wheezing or sputum production.  After he was discharged from the hospital he had COVID-19 infection.  It has improved now.  He is on nocturnal CPAP for obstructive sleep apnea.  He uses every night.    Review of systems:  A 12 point system review is done and aside from what is stated above the rest of the review of systems is negative.      Family history and social history are reviewed.    Medications list is reviewed.      Vitals: Blood pressure 106/64, pulse 62, temperature 97.7 °F (36.5 °C), temperature source Tympanic, height 5' 9\" (1.753 m), weight 105 kg (231 lb), SpO2 99%.,     Physical Exam  Gen: Awake, alert, oriented x 3, no acute distress  HEENT: Mucous membranes moist, no oral lesions, no " thrush  NECK: No accessory muscle use, JVP not elevated  Cardiac: Regular, single S1, single S2, no murmurs, no rubs, no gallops  Lungs: Diminished breath sounds.  No wheezing or rhonchi.  Abdomen: normoactive bowel sounds, soft nontender, nondistended, no rebound or rigidity, no guarding  Extremities: no cyanosis, no clubbing, no edema  Neuro:  Grossly nonfocal.  Skin:  No rash.    Lab Results   Component Value Date    WBC 4.83 08/16/2024    HGB 12.2 08/16/2024    HCT 38.2 08/16/2024    MCV 95 08/16/2024     08/16/2024       Lab Results   Component Value Date    SODIUM 140 08/16/2024    K 3.9 08/16/2024     (H) 08/16/2024    CO2 24 08/16/2024    BUN 32 (H) 08/16/2024    CREATININE 1.57 (H) 08/16/2024    GLUC 120 08/16/2024    CALCIUM 8.7 08/16/2024     CT of the chest is reviewed on the PACS system.  It showed bilateral acute pulmonary embolism.    6-minute walk test was done today in the office.  The patient started with a heart rate of 61 bpm and O2 saturation 98%.  At the end of the test the heart rate was 82 bpm and O2 saturation 97%.  He walked 153 m.

## 2024-09-10 NOTE — PROGRESS NOTES
[place] 1 Month Follow Up    Patient participated in [place] II clinical trial and was randomly assigned to the FlowTriever intervention arm. Patient seen in office by Dr. Denny.    -Date of Visit: 9/10/24  -Dyspnea/mMRC Score: 0 -- Breathless with strenuous exercise only  -Dyspnea (Modified Radha Scale) at rest: 0: No breathlessness at all   *Note: This should be taken pre 6-minute walk test  -Fatigue (Radha CR10 Scale): 0: No breathlessness at all  -Supplemental Oxygen: None/Room Air  -NYHA/WHO Classification: I  -New appearance of symptoms or progression of existing symptoms: No  -New or worsening clinical evidence of RV failure: No  -Syncope observed or reported since last visit: No  -Were there any changes in the anticoagulant medications: No   -If yes, specify: N/A    -Echo ordered and to be completed after office visit.    -Current working status: Not working prior to PE    Vitals:  -SpO2: 99%  -RR: 16 breaths per minute  -HR: 62 bpm  -BP: 106/64      6 minute Walk test results  -Supplemental oxygen (during test): None/Room Air  Start   -Start time of test: 0915  -HR: 61 bpm  -BP: 106/64  -Dyspnea (Modified Radha Scale): 0: No breathlessness at all  -Fatigue (Radha CR10 Scale): 0 - No Exertion  -SpO2: 98%  End  -End time of test: 0921  -HR: 82 bpm  -BP: 110/70  -Dyspnea (Modified Radha Scale): 0: No breathlessness at all  -Fatigue (Radha CR10 Scale): 0 - No Exertion  -SpO2: 97%  -Was the 6 minute walking test completed? Yes   -Any symptoms at the end of the exercise: No  -What if anything kept from walking further: N/A  -Highest heart rate during test: 82 bpm  -Total distance walked: 153 meters  Overall satisfaction with PE treatment: Completely satisfied    Patient verbalized understanding to call coordinator with any new/worsened symptoms or events prior to next office visit to be completed in 2 months.

## 2024-09-13 PROBLEM — J12.82 PNEUMONIA DUE TO COVID-19 VIRUS: Status: RESOLVED | Noted: 2021-12-28 | Resolved: 2024-09-13

## 2024-09-13 PROBLEM — U07.1 PNEUMONIA DUE TO COVID-19 VIRUS: Status: RESOLVED | Noted: 2021-12-28 | Resolved: 2024-09-13

## 2024-09-23 DIAGNOSIS — I10 PRIMARY HYPERTENSION: ICD-10-CM

## 2024-09-24 RX ORDER — METOPROLOL TARTRATE 50 MG
75 TABLET ORAL 2 TIMES DAILY
Qty: 270 TABLET | Refills: 1 | Status: SHIPPED | OUTPATIENT
Start: 2024-09-24 | End: 2025-03-23

## 2024-10-07 DIAGNOSIS — I10 PRIMARY HYPERTENSION: ICD-10-CM

## 2024-10-09 RX ORDER — AMLODIPINE BESYLATE 10 MG/1
10 TABLET ORAL EVERY MORNING
Qty: 90 TABLET | Refills: 1 | Status: SHIPPED | OUTPATIENT
Start: 2024-10-09

## 2024-10-29 NOTE — PROGRESS NOTES
HEMATOLOGY / ONCOLOGY CLINIC FOLLOW UP NOTE    Primary Care Provider: Rubia Dennis DO  Referring Provider:    MRN: [id number]  : 1950    Reason for Encounter: Follow-up for erythrocytosis      Interval History: Patient returns for follow-up visit for secondary polycythemia. He is on a therapeutic phlebotomy schedule every 8 weeks.  He was last phlebotomized on 2024.    He was hospitalized in August with an acute PE requiring IR bilateral pulmonary anterior gram and pulmonary thrombectomy on 2024. The calculated ratio of right ventricular to left ventricular diameter (RV/LV ratio) was 2.18  indicating heart strain. He is on Eliquis and will require lifelong anticoagulation.  He is tolerating Eliquis without any side effects.  No bleeding/bruising.  Most recent CBC shows hemoglobin 16.5, hematocrit 50.  Iron panel normal, serum ferritin 19    He is feeling well.  Denies any chest pain, shortness of breath      REVIEW OF SYSTEMS:  Please note that a 14-point review of systems was performed to include Constitutional, HEENT, Respiratory, CVS, GI, , Musculoskeletal, Integumentary, Neurologic, Rheumatologic, Endocrinologic, Psychiatric, Lymphatic, and Hematologic/Oncologic systems were reviewed and are negative unless otherwise stated in HPI. Positive and negative findings pertinent to this evaluation are incorporated into the history of present illness.      ECOG PS: 0    PROBLEM LIST:  Patient Active Problem List   Diagnosis    Chronic kidney disease, stage 3 (HCC)    Colon, diverticulosis    Elevated C-reactive protein    Elevated PSA    Essential hypertriglyceridemia    Gout    Primary hypertension    Microalbuminuria    Type 2 diabetes mellitus with stage 3a chronic kidney disease, without long-term current use of insulin (HCC)    Dyslipidemia    Erythrocytosis    History of stroke    Witnessed episode of apnea    Bilateral carotid artery stenosis    Elevated hemoglobin (HCC)    Current  use of long term anticoagulation    History of colon polyps    Acute pulmonary embolism (HCC)    History of pulmonary embolus (PE)    Hypoxia    SIRS (systemic inflammatory response syndrome) ([facility])       Assessment / Plan:  1. Erythrocytosis    2. Iron deficiency anemia, unspecified iron deficiency anemia type    3. Acute pulmonary embolism with acute cor pulmonale, unspecified pulmonary embolism type (HCC)      Patient is a pleasant 74-year-old gentleman with a history of erythrocytosis.  Myeloproliferative work-up was negative for primary polycythemia or myeloproliferative disorder.  He has required therapeutic phlebotomy due to history of CVA and persistently elevated H&H.    More recently, he was found to have significant PE with associated heart strain requiring IR thrombectomy.  He is on Eliquis and understands he he will require lifelong anticoagulation given severity of blood clot with associated heart strain.    He was last phlebotomized in July for hematocrit 55.  Most recent hematocrit is 50.  At this time, he does not require therapeutic phlebotomy.  I will adjust his phlebotomy regimen to every 16 weeks with a goal to remove 500 mL should hematocrit be >51.   Patient is in agreement with this plan of care.    He will return for a follow-up in 9 months with repeat blood work.  He is aware to call anytime with questions or concerns    I spent 30 minutes on chart review, face to face counseling time, coordination of care and documentation.    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    PAST SURGICAL HISTORY:   has a past surgical history that includes Cardiac Loop  Recorder; Colonoscopy (09/18/2006); Colonoscopy (10/23/2017); Colonoscopy (05/06/2020); IR PE endovascular therapy (08/07/2024); and Tonsillectomy.    CURRENT MEDICATIONS  Current Outpatient Medications   Medication Sig Dispense Refill    allopurinol (ZYLOPRIM) 100 mg tablet TAKE 2 TABLETS DAILY 180 tablet 1    amLODIPine (NORVASC) 10 mg tablet TAKE 1 TABLET IN THE MORNING 90 tablet 1    apixaban (Eliquis) 5 mg Take 1 tablet (5 mg total) by mouth 2 (two) times a day 180 tablet 1    ascorbic acid (VITAMIN C) 1000 MG tablet Take 1 tablet (1,000 mg total) by mouth daily  0    atorvastatin (LIPITOR) 40 mg tablet Take 1 tablet (40 mg total) by mouth daily with dinner 90 tablet 1    chlorthalidone 25 mg tablet Take 1 tablet (25 mg total) by mouth daily 90 tablet 2    cholecalciferol (VITAMIN D3) 400 units tablet Take 1 tablet (400 Units total) by mouth daily  0    clopidogrel (PLAVIX) 75 mg tablet Take 1 tablet (75 mg total) by mouth daily 90 tablet 1    Coenzyme Q10 200 MG capsule Take by mouth daily       fenofibrate (TRICOR) 145 mg tablet Take 1 tablet (145 mg total) by mouth daily 90 tablet 1    glipiZIDE 2.5 MG TABS Take by mouth in the morning      glucose blood (Contour Next Test) test strip USE 1 STRIP TO CHECK GLUCOSE ONCE DAILY 100 each 1    metoprolol tartrate (LOPRESSOR) 50 mg tablet Take 1.5 tablets (75 mg total) by mouth 2 (two) times a day 270 tablet 1    sulfaSALAzine (AZULFIDINE) 500 mg tablet Take 1 tablet (500 mg total) by mouth 4 (four) times a day 360 tablet 3     No current facility-administered medications for this visit.     [unfilled]    SOCIAL HISTORY:   reports that he has never smoked. He has never used smokeless tobacco. He reports that he does not drink alcohol and does not use drugs.     FAMILY HISTORY:  family history includes Breast cancer in his mother; Cancer in his father and mother; Hypertension in his other; Kidney disease in his father; Lung cancer in his father; Other in his father.  "    ALLERGIES:  has No Known Allergies.      Physical Exam:  Vital Signs:   Visit Vitals  /95 (BP Location: Right arm, Patient Position: Sitting, Cuff Size: Standard)   Pulse 66   Temp 97.6 °F (36.4 °C) (Temporal)   Ht 5' 9\" (1.753 m)   Wt 109 kg (240 lb)   SpO2 97%   BMI 35.44 kg/m²   Smoking Status Never   BSA 2.23 m²     Body mass index is 35.44 kg/m².  Body surface area is 2.23 meters squared.    Physical Exam  Constitutional:       General: He is not in acute distress.     Appearance: He is well-developed. He is not diaphoretic.   HENT:      Head: Normocephalic and atraumatic.      Mouth/Throat:      Pharynx: No oropharyngeal exudate.   Eyes:      General: No scleral icterus.     Pupils: Pupils are equal, round, and reactive to light.   Cardiovascular:      Rate and Rhythm: Normal rate and regular rhythm.      Heart sounds: No murmur heard.  Pulmonary:      Effort: Pulmonary effort is normal. No respiratory distress.      Breath sounds: Normal breath sounds.   Abdominal:      General: Bowel sounds are normal. There is no distension.      Palpations: Abdomen is soft. There is no mass.      Tenderness: There is no abdominal tenderness.   Musculoskeletal:         General: Normal range of motion.      Cervical back: Normal range of motion and neck supple.   Lymphadenopathy:      Cervical: No cervical adenopathy.   Skin:     General: Skin is warm and dry.      Findings: No bruising.   Neurological:      Mental Status: He is alert and oriented to person, place, and time.   Psychiatric:         Mood and Affect: Mood normal.         Behavior: Behavior normal.         Thought Content: Thought content normal.         Judgment: Judgment normal.         Labs:  Lab Results   Component Value Date    WBC 4.83 08/16/2024    HGB 12.2 08/16/2024    HCT 38.2 08/16/2024    MCV 95 08/16/2024     08/16/2024     Lab Results   Component Value Date     07/07/2017    SODIUM 140 08/16/2024    K 3.9 08/16/2024     " (H) 08/16/2024    CO2 24 08/16/2024    AGAP 7 08/16/2024    BUN 32 (H) 08/16/2024    CREATININE 1.57 (H) 08/16/2024    GLUC 120 08/16/2024    GLUF 160 (H) 08/15/2024    CALCIUM 8.7 08/16/2024    AST 28 08/16/2024    ALT 24 08/16/2024    ALKPHOS 47 08/16/2024    PROT 7.1 07/07/2017    TP 6.0 (L) 08/16/2024    BILITOT 0.6 07/07/2017    TBILI 0.50 08/16/2024    EGFR 43 08/16/2024

## 2024-10-31 ENCOUNTER — HOSPITAL ENCOUNTER (OUTPATIENT)
Dept: INFUSION CENTER | Facility: HOSPITAL | Age: 74
Discharge: HOME | End: 2024-10-31
Attending: INTERNAL MEDICINE

## 2024-10-31 ENCOUNTER — TELEPHONE (OUTPATIENT)
Age: 74
End: 2024-10-31

## 2024-10-31 ENCOUNTER — OFFICE VISIT (OUTPATIENT)
Age: 74
End: 2024-10-31
Payer: MEDICARE

## 2024-10-31 VITALS
SYSTOLIC BLOOD PRESSURE: 165 MMHG | DIASTOLIC BLOOD PRESSURE: 95 MMHG | HEIGHT: 69 IN | WEIGHT: 240 LBS | OXYGEN SATURATION: 97 % | HEART RATE: 66 BPM | BODY MASS INDEX: 35.55 KG/M2 | TEMPERATURE: 97.6 F

## 2024-10-31 DIAGNOSIS — D75.1 ERYTHROCYTOSIS: Primary | ICD-10-CM

## 2024-10-31 DIAGNOSIS — I26.09 ACUTE PULMONARY EMBOLISM WITH ACUTE COR PULMONALE, UNSPECIFIED PULMONARY EMBOLISM TYPE (HCC): ICD-10-CM

## 2024-10-31 DIAGNOSIS — D50.9 IRON DEFICIENCY ANEMIA, UNSPECIFIED IRON DEFICIENCY ANEMIA TYPE: ICD-10-CM

## 2024-10-31 PROCEDURE — 99214 OFFICE O/P EST MOD 30 MIN: CPT | Performed by: NURSE PRACTITIONER

## 2024-10-31 RX ORDER — GLIPIZIDE 2.5 MG/1
TABLET ORAL DAILY
COMMUNITY
End: 2024-11-01 | Stop reason: SDUPTHER

## 2024-11-01 DIAGNOSIS — N18.31 TYPE 2 DIABETES MELLITUS WITH STAGE 3A CHRONIC KIDNEY DISEASE, WITHOUT LONG-TERM CURRENT USE OF INSULIN (HCC): Primary | ICD-10-CM

## 2024-11-01 DIAGNOSIS — E11.22 TYPE 2 DIABETES MELLITUS WITH STAGE 3A CHRONIC KIDNEY DISEASE, WITHOUT LONG-TERM CURRENT USE OF INSULIN (HCC): Primary | ICD-10-CM

## 2024-11-01 DIAGNOSIS — K51.30 ULCERATIVE RECTOSIGMOIDITIS WITHOUT COMPLICATION (HCC): ICD-10-CM

## 2024-11-01 RX ORDER — GLIPIZIDE 2.5 MG/1
1 TABLET ORAL DAILY
Qty: 90 TABLET | Refills: 1 | Status: SHIPPED | OUTPATIENT
Start: 2024-11-01

## 2024-11-01 RX ORDER — SULFASALAZINE 500 MG/1
500 TABLET ORAL 4 TIMES DAILY
Qty: 360 TABLET | Refills: 0 | Status: SHIPPED | OUTPATIENT
Start: 2024-11-01

## 2024-11-05 ENCOUNTER — TELEPHONE (OUTPATIENT)
Age: 74
End: 2024-11-05

## 2024-11-05 DIAGNOSIS — E11.22 TYPE 2 DIABETES MELLITUS WITH STAGE 3A CHRONIC KIDNEY DISEASE, WITHOUT LONG-TERM CURRENT USE OF INSULIN (HCC): ICD-10-CM

## 2024-11-05 DIAGNOSIS — N18.31 TYPE 2 DIABETES MELLITUS WITH STAGE 3A CHRONIC KIDNEY DISEASE, WITHOUT LONG-TERM CURRENT USE OF INSULIN (HCC): ICD-10-CM

## 2024-11-05 RX ORDER — GLIPIZIDE 5 MG/1
2.5 TABLET ORAL DAILY
Qty: 90 TABLET | Refills: 1 | Status: SHIPPED | OUTPATIENT
Start: 2024-11-05

## 2024-11-05 NOTE — TELEPHONE ENCOUNTER
[facility] called to report that patients plan does not cover the 2.5 MG Glipizide tabs - they do cover the Glipizide ER tablets at strengths 2.5 mg, 5 mg, or 10 mg.   They also cover 5 mg Glipizide.  Please consult with provider for appropriate change and call [facility] back at [phone]   Ref # [id number]  [facility] requested marked as urgent thank you

## 2024-11-05 NOTE — TELEPHONE ENCOUNTER
Will change Glipizide to 5 mg 1/2 tab PO q day - [facility]- please also notify pt of changes and call [facility] back as requested below.

## 2024-11-06 ENCOUNTER — HOSPITAL ENCOUNTER (OUTPATIENT)
Dept: NON INVASIVE DIAGNOSTICS | Facility: HOSPITAL | Age: 74
Discharge: HOME | End: 2024-11-06
Payer: MEDICARE

## 2024-11-06 ENCOUNTER — DOCUMENTATION (OUTPATIENT)
Dept: OTHER | Facility: HOSPITAL | Age: 74
End: 2024-11-06

## 2024-11-06 ENCOUNTER — OFFICE VISIT (OUTPATIENT)
Dept: PULMONOLOGY | Facility: CLINIC | Age: 74
End: 2024-11-06
Payer: MEDICARE

## 2024-11-06 VITALS
OXYGEN SATURATION: 98 % | TEMPERATURE: 98.3 F | HEART RATE: 79 BPM | SYSTOLIC BLOOD PRESSURE: 136 MMHG | DIASTOLIC BLOOD PRESSURE: 76 MMHG

## 2024-11-06 VITALS
HEART RATE: 66 BPM | DIASTOLIC BLOOD PRESSURE: 95 MMHG | WEIGHT: 240 LBS | SYSTOLIC BLOOD PRESSURE: 165 MMHG | BODY MASS INDEX: 35.55 KG/M2 | HEIGHT: 69 IN

## 2024-11-06 DIAGNOSIS — I26.99 ACUTE PULMONARY EMBOLISM WITHOUT ACUTE COR PULMONALE, UNSPECIFIED PULMONARY EMBOLISM TYPE (HCC): ICD-10-CM

## 2024-11-06 DIAGNOSIS — I26.09 ACUTE PULMONARY EMBOLISM WITH ACUTE COR PULMONALE, UNSPECIFIED PULMONARY EMBOLISM TYPE (HCC): Primary | ICD-10-CM

## 2024-11-06 LAB
AORTIC ROOT: 3.1 CM
APICAL FOUR CHAMBER EJECTION FRACTION: 61 %
BSA FOR ECHO PROCEDURE: 2.23 M2
FRACTIONAL SHORTENING: 39 (ref 28–44)
INTERVENTRICULAR SEPTUM IN DIASTOLE (PARASTERNAL SHORT AXIS VIEW): 1.2 CM
INTERVENTRICULAR SEPTUM: 1.2 CM (ref 0.6–1.1)
LAAS-AP2: 20.1 CM2
LAAS-AP4: 20.4 CM2
LEFT ATRIUM SIZE: 3.2 CM
LEFT ATRIUM VOLUME (MOD BIPLANE): 57 ML
LEFT ATRIUM VOLUME INDEX (MOD BIPLANE): 25.6 ML/M2
LEFT INTERNAL DIMENSION IN SYSTOLE: 2.3 CM (ref 2.1–4)
LEFT VENTRICULAR INTERNAL DIMENSION IN DIASTOLE: 3.8 CM (ref 3.5–6)
LEFT VENTRICULAR POSTERIOR WALL IN END DIASTOLE: 1.1 CM
LEFT VENTRICULAR STROKE VOLUME: 42 ML
LVSV (TEICH): 42 ML
RIGHT ATRIUM AREA SYSTOLE A4C: 20.2 CM2
RIGHT VENTRICLE ID DIMENSION: 5.1 CM
SL CV LEFT ATRIUM LENGTH A2C: 6 CM
SL CV LV EF: 60
SL CV PED ECHO LEFT VENTRICLE DIASTOLIC VOLUME (MOD BIPLANE) 2D: 61 ML
SL CV PED ECHO LEFT VENTRICLE SYSTOLIC VOLUME (MOD BIPLANE) 2D: 18 ML
TASV: 9 CM/S
TR MAX PG: 22 MMHG
TR PEAK VELOCITY: 2.3 M/S
TRICUSPID ANNULAR PLANE SYSTOLIC EXCURSION: 2.2 CM
TRICUSPID VALVE PEAK REGURGITATION VELOCITY: 2.32 M/S

## 2024-11-06 PROCEDURE — 99214 OFFICE O/P EST MOD 30 MIN: CPT | Performed by: INTERNAL MEDICINE

## 2024-11-06 PROCEDURE — 93321 DOPPLER ECHO F-UP/LMTD STD: CPT

## 2024-11-06 PROCEDURE — 93321 DOPPLER ECHO F-UP/LMTD STD: CPT | Performed by: STUDENT IN AN ORGANIZED HEALTH CARE EDUCATION/TRAINING PROGRAM

## 2024-11-06 PROCEDURE — 93308 TTE F-UP OR LMTD: CPT | Performed by: STUDENT IN AN ORGANIZED HEALTH CARE EDUCATION/TRAINING PROGRAM

## 2024-11-06 PROCEDURE — 94618 PULMONARY STRESS TESTING: CPT

## 2024-11-06 PROCEDURE — 93325 DOPPLER ECHO COLOR FLOW MAPG: CPT

## 2024-11-06 PROCEDURE — 93325 DOPPLER ECHO COLOR FLOW MAPG: CPT | Performed by: STUDENT IN AN ORGANIZED HEALTH CARE EDUCATION/TRAINING PROGRAM

## 2024-11-06 PROCEDURE — 93308 TTE F-UP OR LMTD: CPT

## 2024-11-06 NOTE — PROGRESS NOTES
Pulmonary Follow Up Note   Shahbaz Barriga 74 y.o. male MRN: [id number]  11/6/2024    Assessment:    Acute pulmonary embolism (HCC)  Chandler is going to continue full dose Eliquis out to 6 months and then decrease to 2.5 mg twice daily.    Advised the patient that if his PCP feels comfortable managing this, he does not need additional follow-up  If his PCP is uncomfortable managing this, we will see him back in 3 months  The patient has returned to his respiratory baseline, and there is no current suspicion for chronic thromboembolic disease    Plan:    Diagnoses and all orders for this visit:    Acute pulmonary embolism with acute cor pulmonale, unspecified pulmonary embolism type (HCC)  -     POCT Oxygen Titration    Follow up as needed.    History of Present Illness   HPI:  Shahbaz Barriga is a 74 y.o. male who presents for his 3-month follow-up no acute concerns at the moment.  He feels like he has recovered to his baseline.  He is not limited by any shortness of breath with activity and he has no significant lower extremity edema.  He is retired.  No new concerns.    [place] II 3 Month Follow Up    Patient participated in [place] II clinical trial and was randomly assigned to the FlowTriever intervention arm.    Date of Visit: 11/6/24  -Dyspnea/mMRC Score: 0 -- Breathless with strenuous exercise only  -Dyspnea (Modified Radha Scale) at rest: 0: No breathlessness at all   *Note: This should be taken pre 6-minute walk test  -Fatigue (Radha CR10 Scale): 0: No breathlessness at all  -Supplemental Oxygen: None/Room Air  -NYHA/WHO Classification: I  -New appearance of symptoms or progression of existing symptoms: No  -New or worsening clinical evidence of RV failure: No  -Syncope observed or reported since last visit: No  -Were there any changes in the anticoagulant medications: No   -If yes, specify: N/A    Return to work  Current working status: Retired; not working    Answers submitted by the patient for this  visit:  Pulmonology Questionnaire (Submitted on 10/30/2024)  Chief Complaint: Primary symptoms  Do you have shortness of breath that occurs with effort or exertion?: No  Do you have ear congestion?: No  Do you have heartburn?: No  Do you have fatigue?: No  Do you have nasal congestion?: No  Do you have shortness of breath when lying flat?: No  Do you have shortness of breath when you wake up?: No  Do you have sweats?: No  Have you experienced weight loss?: No  Which of the following makes your symptoms worse?: nothing  Which of the following makes your symptoms better?: nothing    Review of Systems   Constitutional:  Negative for appetite change and fever.   HENT:  Negative for ear pain, postnasal drip, rhinorrhea, sneezing, sore throat and trouble swallowing.    Respiratory:  Negative for shortness of breath.    Cardiovascular:  Negative for chest pain.   Musculoskeletal:  Negative for myalgias.   Neurological:  Negative for headaches.     Historical Information   Past Medical History:   Diagnosis Date    Alpha-1-antitrypsin deficiency ([facility])     CVA (cerebral vascular accident) ([facility])     Diabetes mellitus ([facility])     Diverticulitis     Gout     Hypertension     NSVT (nonsustained ventricular tachycardia) ([facility]) 05/28/2020    Sleep apnea, obstructive     Ulcerative rectosigmoiditis without complication ([facility]) 02/13/2018    Vasovagal syncope 12/28/2021     Past Surgical History:   Procedure Laterality Date    CARDIAC LOOP RECORDER      COLONOSCOPY  09/18/2006    complete; 10 yrs    COLONOSCOPY  10/23/2017    complete; 5 yrs    COLONOSCOPY  05/06/2020    Severe active left-sided colitis, erythematous and ulcerated mucosa in the rectosigmoid, inflammatory polyp    IR PE ENDOVASCULAR THERAPY  08/07/2024    TONSILLECTOMY       Family History   Problem Relation Age of Onset    Breast cancer Mother     Cancer Mother     Kidney disease Father     Lung cancer Father     Other Father         smoker    Cancer Father      Hypertension Other     Colon polyps Neg Hx     Colon cancer Neg Hx        Meds/Allergies     Current Outpatient Medications:     allopurinol (ZYLOPRIM) 100 mg tablet, TAKE 2 TABLETS DAILY, Disp: 180 tablet, Rfl: 1    amLODIPine (NORVASC) 10 mg tablet, TAKE 1 TABLET IN THE MORNING, Disp: 90 tablet, Rfl: 1    apixaban (Eliquis) 5 mg, Take 1 tablet (5 mg total) by mouth 2 (two) times a day, Disp: 180 tablet, Rfl: 1    ascorbic acid (VITAMIN C) 1000 MG tablet, Take 1 tablet (1,000 mg total) by mouth daily, Disp: , Rfl: 0    atorvastatin (LIPITOR) 40 mg tablet, Take 1 tablet (40 mg total) by mouth daily with dinner, Disp: 90 tablet, Rfl: 1    chlorthalidone 25 mg tablet, Take 1 tablet (25 mg total) by mouth daily, Disp: 90 tablet, Rfl: 2    cholecalciferol (VITAMIN D3) 400 units tablet, Take 1 tablet (400 Units total) by mouth daily, Disp: , Rfl: 0    clopidogrel (PLAVIX) 75 mg tablet, Take 1 tablet (75 mg total) by mouth daily, Disp: 90 tablet, Rfl: 1    Coenzyme Q10 200 MG capsule, Take by mouth daily , Disp: , Rfl:     fenofibrate (TRICOR) 145 mg tablet, Take 1 tablet (145 mg total) by mouth daily, Disp: 90 tablet, Rfl: 1    glipiZIDE (GLUCOTROL) 5 mg tablet, Take 0.5 tablets (2.5 mg total) by mouth in the morning, Disp: 90 tablet, Rfl: 1    glipiZIDE 2.5 MG TABS, Take 1 tablet by mouth in the morning, Disp: 90 tablet, Rfl: 1    glucose blood (Contour Next Test) test strip, USE 1 STRIP TO CHECK GLUCOSE ONCE DAILY, Disp: 100 each, Rfl: 1    metoprolol tartrate (LOPRESSOR) 50 mg tablet, Take 1.5 tablets (75 mg total) by mouth 2 (two) times a day, Disp: 270 tablet, Rfl: 1    sulfaSALAzine (AZULFIDINE) 500 mg tablet, Take 1 tablet (500 mg total) by mouth 4 (four) times a day, Disp: 360 tablet, Rfl: 0  No Known Allergies    Vitals: Blood pressure 136/76, pulse 79, temperature 98.3 °F (36.8 °C), temperature source Tympanic, SpO2 98%. There is no height or weight on file to calculate BMI. Oxygen Therapy  SpO2: 98 %  Oxygen  "Therapy: None (Room air)    Physical Exam  Physical Exam  Vitals reviewed.   Constitutional:       General: He is not in acute distress.     Appearance: Normal appearance. He is well-developed. He is not ill-appearing.   HENT:      Head: Normocephalic and atraumatic.   Eyes:      General: No scleral icterus.     Conjunctiva/sclera: Conjunctivae normal.   Neck:      Thyroid: No thyromegaly.      Vascular: No JVD.   Cardiovascular:      Rate and Rhythm: Normal rate and regular rhythm.      Heart sounds: Normal heart sounds. No murmur heard.     No friction rub. No gallop.   Pulmonary:      Effort: Pulmonary effort is normal. No respiratory distress.      Breath sounds: Normal breath sounds. No wheezing or rales.   Musculoskeletal:      Cervical back: Neck supple.      Right lower leg: No edema.      Left lower leg: No edema.   Skin:     General: Skin is warm and dry.      Findings: No rash.   Neurological:      General: No focal deficit present.      Mental Status: He is alert and oriented to person, place, and time. Mental status is at baseline.   Psychiatric:         Mood and Affect: Mood normal.         Behavior: Behavior normal.         Labs: I have personally reviewed pertinent lab results.  Lab Results   Component Value Date    WBC 4.83 08/16/2024    HGB 12.2 08/16/2024    HCT 38.2 08/16/2024    MCV 95 08/16/2024     08/16/2024     Lab Results   Component Value Date    GLUCOSE 219 (H) 08/07/2024    CALCIUM 8.7 08/16/2024     07/07/2017    K 3.9 08/16/2024    CO2 24 08/16/2024     (H) 08/16/2024    BUN 32 (H) 08/16/2024    CREATININE 1.57 (H) 08/16/2024     No results found for: \"IGE\"  Lab Results   Component Value Date    ALT 24 08/16/2024    AST 28 08/16/2024    ALKPHOS 47 08/16/2024    BILITOT 0.6 07/07/2017       Imaging and other studies: I have personally reviewed relevant films in PACS.    EKG, Pathology, and Other Studies: I have personally reviewed relevant reports in Epic.    Demarco " SUSANNE Mancini.  [facility]

## 2024-11-06 NOTE — ASSESSMENT & PLAN NOTE
Chandler is going to continue full dose Eliquis out to 6 months and then decrease to 2.5 mg twice daily.    Advised the patient that if his PCP feels comfortable managing this, he does not need additional follow-up  If his PCP is uncomfortable managing this, we will see him back in 3 months  The patient has returned to his respiratory baseline, and there is no current suspicion for chronic thromboembolic disease

## 2024-11-18 ENCOUNTER — HOSPITAL ENCOUNTER (OUTPATIENT)
Dept: INFUSION CENTER | Facility: HOSPITAL | Age: 74
Discharge: HOME | End: 2024-11-18
Attending: INTERNAL MEDICINE

## 2024-11-22 ENCOUNTER — RA CDI HCC (OUTPATIENT)
Dept: OTHER | Facility: HOSPITAL | Age: 74
End: 2024-11-22

## 2024-11-29 ENCOUNTER — OFFICE VISIT (OUTPATIENT)
Dept: FAMILY MEDICINE CLINIC | Facility: HOSPITAL | Age: 74
End: 2024-11-29
Payer: MEDICARE

## 2024-11-29 VITALS
WEIGHT: 238.8 LBS | TEMPERATURE: 98 F | HEART RATE: 66 BPM | DIASTOLIC BLOOD PRESSURE: 86 MMHG | SYSTOLIC BLOOD PRESSURE: 142 MMHG | HEIGHT: 69 IN | BODY MASS INDEX: 35.37 KG/M2 | OXYGEN SATURATION: 98 %

## 2024-11-29 DIAGNOSIS — Z12.5 PROSTATE CANCER SCREENING: ICD-10-CM

## 2024-11-29 DIAGNOSIS — I26.09 ACUTE PULMONARY EMBOLISM WITH ACUTE COR PULMONALE, UNSPECIFIED PULMONARY EMBOLISM TYPE (HCC): Primary | ICD-10-CM

## 2024-11-29 DIAGNOSIS — I10 PRIMARY HYPERTENSION: ICD-10-CM

## 2024-11-29 DIAGNOSIS — Z23 ENCOUNTER FOR IMMUNIZATION: ICD-10-CM

## 2024-11-29 DIAGNOSIS — E11.22 TYPE 2 DIABETES MELLITUS WITH STAGE 3A CHRONIC KIDNEY DISEASE, WITHOUT LONG-TERM CURRENT USE OF INSULIN (HCC): ICD-10-CM

## 2024-11-29 DIAGNOSIS — N18.31 TYPE 2 DIABETES MELLITUS WITH STAGE 3A CHRONIC KIDNEY DISEASE, WITHOUT LONG-TERM CURRENT USE OF INSULIN (HCC): ICD-10-CM

## 2024-11-29 DIAGNOSIS — D75.1 ERYTHROCYTOSIS: ICD-10-CM

## 2024-11-29 DIAGNOSIS — E66.01 SEVERE OBESITY (BMI 35.0-39.9) WITH COMORBIDITY (HCC): ICD-10-CM

## 2024-11-29 DIAGNOSIS — D58.2 ELEVATED HEMOGLOBIN (HCC): ICD-10-CM

## 2024-11-29 DIAGNOSIS — Z00.00 MEDICARE ANNUAL WELLNESS VISIT, SUBSEQUENT: ICD-10-CM

## 2024-11-29 PROBLEM — R09.02 HYPOXIA: Status: RESOLVED | Noted: 2024-08-14 | Resolved: 2024-11-29

## 2024-11-29 PROBLEM — R65.10 SIRS (SYSTEMIC INFLAMMATORY RESPONSE SYNDROME) (HCC): Status: RESOLVED | Noted: 2024-08-14 | Resolved: 2024-11-29

## 2024-11-29 PROCEDURE — G0008 ADMIN INFLUENZA VIRUS VAC: HCPCS

## 2024-11-29 PROCEDURE — 99214 OFFICE O/P EST MOD 30 MIN: CPT | Performed by: INTERNAL MEDICINE

## 2024-11-29 PROCEDURE — 90662 IIV NO PRSV INCREASED AG IM: CPT

## 2024-11-29 PROCEDURE — G0438 PPPS, INITIAL VISIT: HCPCS | Performed by: INTERNAL MEDICINE

## 2024-11-29 NOTE — ASSESSMENT & PLAN NOTE
BP up today, only mildly improved by end of visit, pt reporting much lower readings at home, con't current BP regimen for now, bring HOME CUFF and HOME READINGS to next appt, will adjust meds accordingly

## 2024-11-29 NOTE — ASSESSMENT & PLAN NOTE
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

## 2024-11-29 NOTE — ASSESSMENT & PLAN NOTE
Now on lifelong OAC, has seen Pulm was told could do f/u with PCP and con't Eliquis 5 mg bid x 6 mos then switch over to 2.5 mg bid for lifelong, 6 mos will be mid Feb 2025, will re-eval in Feb and discuss decrease to 2.5 mg bid at that time, call with bleeding/bruising issues

## 2024-11-29 NOTE — ASSESSMENT & PLAN NOTE
Has seen Heme for f/u, on phlebotomy q 16 wks - last course was D/C'd d/t good labs, con't labs/tx/follow up as per specialist, will follow

## 2024-11-29 NOTE — PROGRESS NOTES
Name: Shahbaz Barriga      : 1950      MRN: [id number]  Encounter Provider: Rubia Dennis DO  Encounter Date: 2024   Encounter department: [facility] PRIMARY CARE [address]     Assessment & Plan  Acute pulmonary embolism with acute cor pulmonale, unspecified pulmonary embolism type (HCC)  Now on lifelong OAC, has seen Pulm was told could do f/u with PCP and con't Eliquis 5 mg bid x 6 mos then switch over to 2.5 mg bid for lifelong, 6 mos will be mid 2025, will re-eval in Feb and discuss decrease to 2.5 mg bid at that time, call with bleeding/bruising issues       Erythrocytosis  Has seen Heme for f/u, on phlebotomy q 16 wks - last course was D/C'd d/t good labs, con't labs/tx/follow up as per specialist, will follow       Elevated hemoglobin (HCC)  Has seen Heme for f/u, on phlebotomy q 16 wks - last course was D/C'd d/t good labs, con't labs/tx/follow up as per specialist, will follow       Primary hypertension  BP up today, only mildly improved by end of visit, pt reporting much lower readings at home, con't current BP regimen for now, bring HOME CUFF and HOME READINGS to next appt, will adjust meds accordingly       Type 2 diabetes mellitus with stage 3a chronic kidney disease, without long-term current use of insulin (HCC)    Lab Results   Component Value Date    HGBA1C 6.6 (H) 2024   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 - controlled with A1C 6.6 - Glipizide restarted a month or so ago when sugar started creeping up, DM labs due in Feb - BW order given, DM foot exam done  and eye exam 10/23, he is on a statin but not an ACE/ARB, will follow  Orders:    Hemoglobin A1C With EAG; Future    Basic metabolic panel; Future    Medicare annual wellness visit, subsequent         Severe obesity (BMI 35.0-39.9) with comorbidity (HCC)    Healthy diet and regular formal exercise and healthy wgt encouraged       BMI 35.0-35.9,adult         Prostate cancer  screening    Orders:    PSA, Total Screen; Future    Encounter for immunization    Orders:    influenza vaccine, high-dose, PF 0.5 mL (Fluzone High Dose)      Depression Screening and Follow-up Plan: Patient was screened for depression during today's encounter. They screened negative with a PHQ-2 score of 0.      Preventive health issues were discussed with patient, and age appropriate screening tests were ordered as noted in patient's After Visit Summary. Personalized health advice and appropriate referrals for health education or preventive services given if needed, as noted in patient's After Visit Summary.      Colonoscopy 6/23 - 2 yrs    BW 8/24 (A1C 6.6)  DM foot exam 2/24  DM eye exam 10/ 23 - 2 yrs    Flu vaccine given today    Just got a new cat (Shania)      History of Present Illness     HPI  Pt here for follow up appt and AWV    Pt saw Pulm (Dr. Denny) in Sept for f/u PE with cor pulmonale s/p clot removal - OV note reviewed.  He was told to con't Eliquis bid and f/u with an Echo as part of the study he is in.  6MWT was done and reportedly he did not desaturate.  He was told after 6 mos of full dose Eliquis that he could decrease to 2.5 mg bid.  He was told to f/u in 2 mos. He had f/u in early Nov with Dr. Mancini - OV note reviewed.  Full dose Eliquis and then 2.5 mg bid was recommended.  He was told to f/u prn.  He is taking the Eliquis regularly and notes no issues bleeding/bruising.     Pt saw Heme/Onc NP (Yaquelin Pfeiffer) in Oct for f/u erythrocytosis with recent PE - OV note reviewed. His H/H was reviewed.  It was not felt he needed therapeutic phlebotomy.  He was told to decrease phlebotomy regimen to once every 16 wks for hematocrit goal of <51.  He was told to f/u in 9 mos.     BP elevated today.  Meds reviewed.  He is taking his medication daily as directed w/o SE. He does check his BP at home and reports he is usually around 130's/70-80's.  He notes no frequent HA's/dizziness/double  vision/CP.    BS due in Feb. He was off Glipizide after hospital stay but has restarted it the past 4 wks or so. Sugars have come down and is around 150's.  He is UTD on DM foot and eye exam.    Patient Care Team:  Rubia Dennis DO as PCP - General  Speedy Kearns (Internal Medicine)  Abbie Harrell MD (Gastroenterology)  ROSALINA Leo (Hematology and Oncology)  Wild Perez MD (Nephrology)    Review of Systems   Constitutional:  Negative for chills and fever.   HENT:  Negative for congestion, hearing loss and trouble swallowing.    Eyes:  Negative for pain and visual disturbance.   Respiratory:  Negative for cough and shortness of breath.    Cardiovascular:  Negative for chest pain, palpitations and leg swelling.   Gastrointestinal:  Negative for abdominal pain, blood in stool, constipation, diarrhea, nausea and vomiting.   Genitourinary:  Negative for difficulty urinating and dysuria.   Musculoskeletal:  Negative for arthralgias and myalgias.   Skin:  Negative for rash and wound.   Neurological:  Negative for dizziness and headaches.   Hematological:  Does not bruise/bleed easily.   Psychiatric/Behavioral:  Negative for confusion and dysphoric mood.      Medical History Reviewed by provider this encounter:       Annual Wellness Visit Questionnaire   Shahbaz is here for his Subsequent Wellness visit. Last Medicare Wellness visit information reviewed, patient interviewed and updates made to the record today.      Health Risk Assessment:   Patient rates overall health as good. Patient feels that their physical health rating is slightly better. Patient is very satisfied with their life. Eyesight was rated as same. Hearing was rated as same. Patient feels that their emotional and mental health rating is much better. Patients states they are never, rarely angry. Patient states they are never, rarely unusually tired/fatigued. Pain experienced in the last 7 days has been none. Patient states  that he has experienced no weight loss or gain in last 6 months.     Depression Screening:   PHQ-2 Score: 0      Fall Risk Screening:   In the past year, patient has experienced: no history of falling in past year      Home Safety:  Patient does not have trouble with stairs inside or outside of their home. Patient has working smoke alarms and has working carbon monoxide detector. Home safety hazards include: none.     Nutrition:   Current diet is No Added Salt.     Medications:   Patient is not currently taking any over-the-counter supplements. Patient is able to manage medications.     Activities of Daily Living (ADLs)/Instrumental Activities of Daily Living (IADLs):   Walk and transfer into and out of bed and chair?: Yes  Dress and groom yourself?: Yes    Bathe or shower yourself?: Yes    Feed yourself? Yes  Do your laundry/housekeeping?: Yes  Manage your money, pay your bills and track your expenses?: Yes  Make your own meals?: Yes    Do your own shopping?: Yes    Previous Hospitalizations:   Any hospitalizations or ED visits within the last 12 months?: Yes    How many hospitalizations have you had in the last year?: 1-2    Advance Care Planning:   Living will: No    Durable POA for healthcare: No    Advanced directive: No    ACP document given: Yes      Cognitive Screening:   Provider or family/friend/caregiver concerned regarding cognition?: No    PREVENTIVE SCREENINGS      Cardiovascular Screening:    General: History Lipid Disorder, Screening Current and Risks and Benefits Discussed      Diabetes Screening:     General: History Diabetes, Risks and Benefits Discussed and Screening Current      Colorectal Cancer Screening:     General: Screening Current      Prostate Cancer Screening:    General: Risks and Benefits Discussed    Due for: PSA      Osteoporosis Screening:    General: Risks and Benefits Discussed and Screening Not Indicated      Abdominal Aortic Aneurysm (AAA) Screening:    Risk factors include:  age between 65-76 yo        General: Risks and Benefits Discussed and Screening Not Indicated      Lung Cancer Screening:     General: Screening Not Indicated and Risks and Benefits Discussed      Hepatitis C Screening:    General: Screening Current and Risks and Benefits Discussed    Screening, Brief Intervention, and Referral to Treatment (SBIRT)    Screening  Typical number of drinks in a day: 0  Typical number of drinks in a week: 0  Interpretation: Low risk drinking behavior.    AUDIT-C Screenin) How often did you have a drink containing alcohol in the past year? never  2) How many drinks did you have on a typical day when you were drinking in the past year? 0  3) How often did you have 6 or more drinks on one occasion in the past year? never    AUDIT-C Score: 0  Interpretation: Score 0-3 (male): Negative screen for alcohol misuse    Single Item Drug Screening:  How often have you used an illegal drug (including marijuana) or a prescription medication for non-medical reasons in the past year? never    Single Item Drug Screen Score: 0  Interpretation: Negative screen for possible drug use disorder    Social Drivers of Health     Financial Resource Strain: Low Risk  (2023)    Overall Financial Resource Strain (CARDIA)     Difficulty of Paying Living Expenses: Not hard at all   Food Insecurity: No Food Insecurity (2024)    Hunger Vital Sign     Worried About Running Out of Food in the Last Year: Never true     Ran Out of Food in the Last Year: Never true   Transportation Needs: No Transportation Needs (2024)    PRAPARE - Transportation     Lack of Transportation (Medical): No     Lack of Transportation (Non-Medical): No   Housing Stability: Low Risk  (2024)    Housing Stability Vital Sign     Unable to Pay for Housing in the Last Year: No     Number of Times Moved in the Last Year: 0     Homeless in the Last Year: No   Utilities: Not At Risk (2024)    [facility] Utilities     Threatened  "with loss of utilities: No     Vision Screening    Right eye Left eye Both eyes   Without correction      With correction 20/50 20/50 20/40       Objective   /86   Pulse 66   Temp 98 °F (36.7 °C) (Tympanic)   Ht 5' 9\" (1.753 m)   Wt 108 kg (238 lb 12.8 oz)   SpO2 98%   BMI 35.26 kg/m²     Physical Exam  Vitals and nursing note reviewed.   Constitutional:       General: He is not in acute distress.     Appearance: He is well-developed. He is not ill-appearing.   HENT:      Head: Normocephalic and atraumatic.      Right Ear: Tympanic membrane and external ear normal. There is no impacted cerumen.      Left Ear: External ear normal.      Ears:      Comments: Green debris in L canal partially obscuring TM  Eyes:      General:         Right eye: No discharge.         Left eye: No discharge.      Conjunctiva/sclera: Conjunctivae normal.   Neck:      Vascular: No carotid bruit.      Trachea: No tracheal deviation.   Cardiovascular:      Rate and Rhythm: Normal rate and regular rhythm.      Heart sounds: Normal heart sounds. No murmur heard.  Pulmonary:      Effort: Pulmonary effort is normal. No respiratory distress.      Breath sounds: Normal breath sounds. No wheezing, rhonchi or rales.   Abdominal:      General: There is no distension.      Palpations: Abdomen is soft.      Tenderness: There is no abdominal tenderness. There is no guarding or rebound.   Musculoskeletal:      Cervical back: Neck supple.      Right lower leg: No edema.      Left lower leg: No edema.   Lymphadenopathy:      Cervical: No cervical adenopathy.   Skin:     General: Skin is warm and dry.      Coloration: Skin is not pale.      Findings: No bruising or rash.   Neurological:      General: No focal deficit present.      Mental Status: He is alert. Mental status is at baseline.      Motor: No abnormal muscle tone.      Gait: Gait normal.   Psychiatric:         Mood and Affect: Mood normal.         Behavior: Behavior normal.         " Thought Content: Thought content normal.         Judgment: Judgment normal.

## 2024-11-29 NOTE — PATIENT INSTRUCTIONS
Medicare Preventive Visit Patient Instructions  Thank you for completing your Welcome to Medicare Visit or Medicare Annual Wellness Visit today. Your next wellness visit will be due in one year (11/30/2025).  The screening/preventive services that you may require over the next 5-10 years are detailed below. Some tests may not apply to you based off risk factors and/or age. Screening tests ordered at today's visit but not completed yet may show as past due. Also, please note that scanned in results may not display below.  Preventive Screenings:  Service Recommendations Previous Testing/Comments   Colorectal Cancer Screening  Colonoscopy    Fecal Occult Blood Test (FOBT)/Fecal Immunochemical Test (FIT)  Fecal DNA/Cologuard Test  Flexible Sigmoidoscopy Age: 45-75 years old   Colonoscopy: every 10 years (May be performed more frequently if at higher risk)  OR  FOBT/FIT: every 1 year  OR  Cologuard: every 3 years  OR  Sigmoidoscopy: every 5 years  Screening may be recommended earlier than age 45 if at higher risk for colorectal cancer. Also, an individualized decision between you and your healthcare provider will decide whether screening between the ages of 76-85 would be appropriate. Colonoscopy: 06/14/2023  FOBT/FIT: Not on file  Cologuard: Not on file  Sigmoidoscopy: Not on file    Screening Current     Prostate Cancer Screening Individualized decision between patient and health care provider in men between ages of 55-69   Medicare will cover every 12 months beginning on the day after your 50th birthday PSA: 3.0 ng/mL           Hepatitis C Screening Once for adults born between 1945 and 1965  More frequently in patients at high risk for Hepatitis C Hep C Antibody: 01/21/2023    Screening Current   Diabetes Screening 1-2 times per year if you're at risk for diabetes or have pre-diabetes Fasting glucose: 160 mg/dL (8/15/2024)  A1C: 6.6 % of total Hgb (8/7/2024)  Screening Not Indicated  History Diabetes   Cholesterol  Screening Once every 5 years if you don't have a lipid disorder. May order more often based on risk factors. Lipid panel: 08/07/2024  Screening Not Indicated  History Lipid Disorder      Other Preventive Screenings Covered by Medicare:  Abdominal Aortic Aneurysm (AAA) Screening: covered once if your at risk. You're considered to be at risk if you have a family history of AAA or a male between the age of 65-75 who smoking at least 100 cigarettes in your lifetime.  Lung Cancer Screening: covers low dose CT scan once per year if you meet all of the following conditions: (1) Age 55-77; (2) No signs or symptoms of lung cancer; (3) Current smoker or have quit smoking within the last 15 years; (4) You have a tobacco smoking history of at least 20 pack years (packs per day x number of years you smoked); (5) You get a written order from a healthcare provider.  Glaucoma Screening: covered annually if you're considered high risk: (1) You have diabetes OR (2) Family history of glaucoma OR (3)  aged 50 and older OR (4)  American aged 65 and older  Osteoporosis Screening: covered every 2 years if you meet one of the following conditions: (1) Have a vertebral abnormality; (2) On glucocorticoid therapy for more than 3 months; (3) Have primary hyperparathyroidism; (4) On osteoporosis medications and need to assess response to drug therapy.  HIV Screening: covered annually if you're between the age of 15-65. Also covered annually if you are younger than 15 and older than 65 with risk factors for HIV infection. For pregnant patients, it is covered up to 3 times per pregnancy.    Immunizations:  Immunization Recommendations   Influenza Vaccine Annual influenza vaccination during flu season is recommended for all persons aged >= 6 months who do not have contraindications   Pneumococcal Vaccine   * Pneumococcal conjugate vaccine = PCV13 (Prevnar 13), PCV15 (Vaxneuvance), PCV20 (Prevnar 20)  * Pneumococcal  polysaccharide vaccine = PPSV23 (Pneumovax) Adults 19-65 yo with certain risk factors or if 65+ yo  If never received any pneumonia vaccine: recommend Prevnar 20 (PCV20)  Give PCV20 if previously received 1 dose of PCV13 or PPSV23   Hepatitis B Vaccine 3 dose series if at intermediate or high risk (ex: diabetes, end stage renal disease, liver disease)   Respiratory syncytial virus (RSV) Vaccine - COVERED BY MEDICARE PART D  * RSVPreF3 (Arexvy) CDC recommends that adults 60 years of age and older may receive a single dose of RSV vaccine using shared clinical decision-making (SCDM)   Tetanus (Td) Vaccine - COST NOT COVERED BY MEDICARE PART B Following completion of primary series, a booster dose should be given every 10 years to maintain immunity against tetanus. Td may also be given as tetanus wound prophylaxis.   Tdap Vaccine - COST NOT COVERED BY MEDICARE PART B Recommended at least once for all adults. For pregnant patients, recommended with each pregnancy.   Shingles Vaccine (Shingrix) - COST NOT COVERED BY MEDICARE PART B  2 shot series recommended in those 19 years and older who have or will have weakened immune systems or those 50 years and older     Health Maintenance Due:      Topic Date Due   • Colorectal Cancer Screening  06/13/2025   • Hepatitis C Screening  Completed     Immunizations Due:      Topic Date Due   • Influenza Vaccine (1) 09/01/2024   • COVID-19 Vaccine (3 - 2024-25 season) 09/01/2024     Advance Directives   What are advance directives?  Advance directives are legal documents that state your wishes and plans for medical care. These plans are made ahead of time in case you lose your ability to make decisions for yourself. Advance directives can apply to any medical decision, such as the treatments you want, and if you want to donate organs.   What are the types of advance directives?  There are many types of advance directives, and each state has rules about how to use them. You may choose a  combination of any of the following:  Living will:  This is a written record of the treatment you want. You can also choose which treatments you do not want, which to limit, and which to stop at a certain time. This includes surgery, medicine, IV fluid, and tube feedings.   Durable power of  for healthcare (DPAHC):  This is a written record that states who you want to make healthcare choices for you when you are unable to make them for yourself. This person, called a proxy, is usually a family member or a friend. You may choose more than 1 proxy.  Do not resuscitate (DNR) order:  A DNR order is used in case your heart stops beating or you stop breathing. It is a request not to have certain forms of treatment, such as CPR. A DNR order may be included in other types of advance directives.  Medical directive:  This covers the care that you want if you are in a coma, near death, or unable to make decisions for yourself. You can list the treatments you want for each condition. Treatment may include pain medicine, surgery, blood transfusions, dialysis, IV or tube feedings, and a ventilator (breathing machine).  Values history:  This document has questions about your views, beliefs, and how you feel and think about life. This information can help others choose the care that you would choose.  Why are advance directives important?  An advance directive helps you control your care. Although spoken wishes may be used, it is better to have your wishes written down. Spoken wishes can be misunderstood, or not followed. Treatments may be given even if you do not want them. An advance directive may make it easier for your family to make difficult choices about your care.   Weight Management   Why it is important to manage your weight:  Being overweight increases your risk of health conditions such as heart disease, high blood pressure, type 2 diabetes, and certain types of cancer. It can also increase your risk for  osteoarthritis, sleep apnea, and other respiratory problems. Aim for a slow, steady weight loss. Even a small amount of weight loss can lower your risk of health problems.  How to lose weight safely:  A safe and healthy way to lose weight is to eat fewer calories and get regular exercise. You can lose up about 1 pound a week by decreasing the number of calories you eat by 500 calories each day.   Healthy meal plan for weight management:  A healthy meal plan includes a variety of foods, contains fewer calories, and helps you stay healthy. A healthy meal plan includes the following:  Eat whole-grain foods more often.  A healthy meal plan should contain fiber. Fiber is the part of grains, fruits, and vegetables that is not broken down by your body. Whole-grain foods are healthy and provide extra fiber in your diet. Some examples of whole-grain foods are whole-wheat breads and pastas, oatmeal, brown rice, and bulgur.  Eat a variety of vegetables every day.  Include dark, leafy greens such as spinach, kale, samreen greens, and mustard greens. Eat yellow and orange vegetables such as carrots, sweet potatoes, and winter squash.   Eat a variety of fruits every day.  Choose fresh or canned fruit (canned in its own juice or light syrup) instead of juice. Fruit juice has very little or no fiber.  Eat low-fat dairy foods.  Drink fat-free (skim) milk or 1% milk. Eat fat-free yogurt and low-fat cottage cheese. Try low-fat cheeses such as mozzarella and other reduced-fat cheeses.  Choose meat and other protein foods that are low in fat.  Choose beans or other legumes such as split peas or lentils. Choose fish, skinless poultry (chicken or turkey), or lean cuts of red meat (beef or pork). Before you cook meat or poultry, cut off any visible fat.   Use less fat and oil.  Try baking foods instead of frying them. Add less fat, such as margarine, sour cream, regular salad dressing and mayonnaise to foods. Eat fewer high-fat foods. Some  examples of high-fat foods include french fries, doughnuts, ice cream, and cakes.  Eat fewer sweets.  Limit foods and drinks that are high in sugar. This includes candy, cookies, regular soda, and sweetened drinks.  Exercise:  Exercise at least 30 minutes per day on most days of the week. Some examples of exercise include walking, biking, dancing, and swimming. You can also fit in more physical activity by taking the stairs instead of the elevator or parking farther away from stores. Ask your healthcare provider about the best exercise plan for you.      © Copyright [employer] 2018 Information is for End User's use only and may not be sold, redistributed or otherwise used for commercial purposes. All illustrations and images included in CareNotes® are the copyrighted property of [employer] or [employer]

## 2024-12-06 DIAGNOSIS — I63.9 CVA (CEREBRAL VASCULAR ACCIDENT) (HCC): ICD-10-CM

## 2024-12-06 RX ORDER — ATORVASTATIN CALCIUM 40 MG/1
TABLET, FILM COATED ORAL
Qty: 90 TABLET | Refills: 1 | Status: SHIPPED | OUTPATIENT
Start: 2024-12-06

## 2024-12-20 DIAGNOSIS — I10 PRIMARY HYPERTENSION: ICD-10-CM

## 2024-12-20 DIAGNOSIS — Z86.73 HISTORY OF STROKE: ICD-10-CM

## 2024-12-20 RX ORDER — CLOPIDOGREL BISULFATE 75 MG/1
75 TABLET ORAL DAILY
Qty: 90 TABLET | Refills: 1 | Status: SHIPPED | OUTPATIENT
Start: 2024-12-20

## 2024-12-20 RX ORDER — METOPROLOL TARTRATE 50 MG
75 TABLET ORAL 2 TIMES DAILY
Qty: 270 TABLET | Refills: 1 | Status: SHIPPED | OUTPATIENT
Start: 2024-12-20 | End: 2025-06-18

## 2025-01-31 DIAGNOSIS — M1A.9XX0 CHRONIC GOUT WITHOUT TOPHUS, UNSPECIFIED CAUSE, UNSPECIFIED SITE: ICD-10-CM

## 2025-01-31 RX ORDER — ALLOPURINOL 100 MG/1
200 TABLET ORAL DAILY
Qty: 180 TABLET | Refills: 1 | Status: SHIPPED | OUTPATIENT
Start: 2025-01-31

## 2025-02-14 DIAGNOSIS — K51.30 ULCERATIVE RECTOSIGMOIDITIS WITHOUT COMPLICATION (HCC): ICD-10-CM

## 2025-02-14 RX ORDER — SULFASALAZINE 500 MG/1
500 TABLET ORAL 4 TIMES DAILY
Qty: 120 TABLET | Refills: 0 | Status: SHIPPED | OUTPATIENT
Start: 2025-02-14 | End: 2025-03-16

## 2025-02-18 NOTE — PROGRESS NOTES
Girard II 3 Month Follow Up      Patient participated in Girard II clinical trial and was randomly assigned to the FlowTriever intervention arm.    Date of Visit: 11/06/2024  -SpO2: 98%  -RR: 16 bpm  -HR: 79  -BP: 138/76    -Dyspnea/mMRC Score: 0 -- Breathless with strenuous exercise only  -Dyspnea (Modified Radha Scale) at rest: 0: No breathlessness at all   *Note: This should be taken pre 6-minute walk test  -Fatigue (Radha CR10 Scale): 0: No breathlessness at all  -Supplemental Oxygen: None/Room Air  -NYHA/WHO Classification: I  -New appearance of symptoms or progression of existing symptoms: No  -New or worsening clinical evidence of RV failure: No  -Syncope observed or reported since last visit: No  -Were there any changes in the anticoagulant medications: No   -If yes, specify: N/A    Cardiopulmonary Exercise Testing (Optional)  -Cardiopulmonary exercise testing conducted: No   -If yes, specify: N/A    Return to work  Current working status: N/A--not working prior to PE    6 minute Walk test results   Start Time: 1115  HR: 78  BP: 138/76  Dyspnea: 0    Patient denies any adverse events since last visit. Patient states he is completely satisfied with his PE treatment. Verbalized understanding to call pulmonary office should he experience any issues in the future.  Fatigue: 0  SpO2: 97%  End Time: 1121  Highest Heart Rate: 85  Total Distance Walked: 279 meters

## 2025-02-19 LAB
BUN SERPL-MCNC: 30 MG/DL (ref 7–25)
BUN/CREAT SERPL: 19 (CALC) (ref 6–22)
CALCIUM SERPL-MCNC: 10 MG/DL (ref 8.6–10.3)
CHLORIDE SERPL-SCNC: 104 MMOL/L (ref 98–110)
CO2 SERPL-SCNC: 25 MMOL/L (ref 20–32)
CREAT SERPL-MCNC: 1.56 MG/DL (ref 0.7–1.28)
EST. AVERAGE GLUCOSE BLD GHB EST-MCNC: 180 MG/DL
EST. AVERAGE GLUCOSE BLD GHB EST-SCNC: 10 MMOL/L
GFR/BSA.PRED SERPLBLD CYS-BASED-ARV: 46 ML/MIN/1.73M2
GLUCOSE SERPL-MCNC: 174 MG/DL (ref 65–99)
HBA1C MFR BLD: 7.9 % OF TOTAL HGB
POTASSIUM SERPL-SCNC: 3.3 MMOL/L (ref 3.5–5.3)
PSA SERPL-MCNC: 2.05 NG/ML
SODIUM SERPL-SCNC: 142 MMOL/L (ref 135–146)

## 2025-02-27 ENCOUNTER — OFFICE VISIT (OUTPATIENT)
Dept: FAMILY MEDICINE CLINIC | Facility: HOSPITAL | Age: 75
End: 2025-02-27
Payer: MEDICARE

## 2025-02-27 VITALS
WEIGHT: 245.8 LBS | HEART RATE: 72 BPM | OXYGEN SATURATION: 96 % | TEMPERATURE: 98.1 F | DIASTOLIC BLOOD PRESSURE: 102 MMHG | BODY MASS INDEX: 36.4 KG/M2 | SYSTOLIC BLOOD PRESSURE: 186 MMHG | HEIGHT: 69 IN

## 2025-02-27 DIAGNOSIS — I26.09 ACUTE PULMONARY EMBOLISM WITH ACUTE COR PULMONALE, UNSPECIFIED PULMONARY EMBOLISM TYPE (HCC): ICD-10-CM

## 2025-02-27 DIAGNOSIS — N18.31 TYPE 2 DIABETES MELLITUS WITH STAGE 3A CHRONIC KIDNEY DISEASE, WITHOUT LONG-TERM CURRENT USE OF INSULIN (HCC): Primary | ICD-10-CM

## 2025-02-27 DIAGNOSIS — N18.31 STAGE 3A CHRONIC KIDNEY DISEASE (HCC): ICD-10-CM

## 2025-02-27 DIAGNOSIS — E66.01 SEVERE OBESITY (BMI 35.0-39.9) WITH COMORBIDITY (HCC): ICD-10-CM

## 2025-02-27 DIAGNOSIS — I26.99 ACUTE PULMONARY EMBOLISM, UNSPECIFIED PULMONARY EMBOLISM TYPE, UNSPECIFIED WHETHER ACUTE COR PULMONALE PRESENT (HCC): ICD-10-CM

## 2025-02-27 DIAGNOSIS — I65.23 BILATERAL CAROTID ARTERY STENOSIS: ICD-10-CM

## 2025-02-27 DIAGNOSIS — Z79.01 CURRENT USE OF LONG TERM ANTICOAGULATION: ICD-10-CM

## 2025-02-27 DIAGNOSIS — K86.2 PANCREATIC CYST: ICD-10-CM

## 2025-02-27 DIAGNOSIS — I10 PRIMARY HYPERTENSION: ICD-10-CM

## 2025-02-27 DIAGNOSIS — E87.6 HYPOKALEMIA: ICD-10-CM

## 2025-02-27 DIAGNOSIS — E11.22 TYPE 2 DIABETES MELLITUS WITH STAGE 3A CHRONIC KIDNEY DISEASE, WITHOUT LONG-TERM CURRENT USE OF INSULIN (HCC): Primary | ICD-10-CM

## 2025-02-27 DIAGNOSIS — K51.30 ULCERATIVE RECTOSIGMOIDITIS WITHOUT COMPLICATION (HCC): ICD-10-CM

## 2025-02-27 PROBLEM — Q61.02 MULTIPLE RENAL CYSTS: Status: ACTIVE | Noted: 2024-04-22

## 2025-02-27 PROCEDURE — 99214 OFFICE O/P EST MOD 30 MIN: CPT | Performed by: INTERNAL MEDICINE

## 2025-02-27 PROCEDURE — G2211 COMPLEX E/M VISIT ADD ON: HCPCS | Performed by: INTERNAL MEDICINE

## 2025-02-27 RX ORDER — GLIPIZIDE 5 MG/1
2.5 TABLET ORAL
Qty: 180 TABLET | Refills: 1 | Status: SHIPPED | OUTPATIENT
Start: 2025-02-27

## 2025-02-27 RX ORDER — LOSARTAN POTASSIUM 25 MG/1
25 TABLET ORAL DAILY
Qty: 30 TABLET | Refills: 0 | Status: SHIPPED | OUTPATIENT
Start: 2025-02-27

## 2025-02-27 NOTE — PATIENT INSTRUCTIONS
"Patient Education     Type 2 diabetes   The Basics   Written by the doctors and editors at [facility]   What is type 2 diabetes? -- This is a disorder that disrupts the way the body uses sugar. It is sometimes called type 2 diabetes mellitus.  All of the cells in the body need sugar to work normally. Sugar gets into the cells with the help of a hormone called insulin. Insulin is made by the pancreas, an organ in the belly. If there is not enough insulin, or if cells in the body don't respond normally to insulin, sugar builds up in the blood. That is what happens to people with diabetes.  There are 2 different types of diabetes:   In type 1 diabetes, the pancreas makes little or no insulin.   In type 2 diabetes, the pancreas still makes some insulin, but the cells in the body stop responding normally. Eventually, the pancreas cannot make enough insulin to keep up.  Having excess body weight or obesity increases a person's risk of developing type 2 diabetes. But people without excess body weight can get diabetes, too.  What are the symptoms of type 2 diabetes? -- Type 2 diabetes usually causes no symptoms. When symptoms do happen, they include:   Needing to urinate often   Intense thirst   Blurry vision  Can diabetes lead to other health problems? -- Yes. Type 2 diabetes might not make you feel sick. But if it is not managed, it can lead to serious problems over time, such as:   Heart attacks   Strokes   Kidney disease   Vision problems (or even blindness)   Pain or loss of feeling in the hands and feet   Needing to have fingers, toes, or other body parts removed (amputated)  How do I know if I have type 2 diabetes? -- Your doctor or nurse can do a blood test. There are 2 tests that can be used for this. Both involve measuring the amount of sugar in your blood, called your \"blood sugar\" or \"blood glucose\":   One of the tests measures your blood sugar at the time the blood sample is taken. This test is done in the " "morning. You can't eat or drink anything except water for at least 8 hours before the test.   The other test shows what your average blood sugar has been for the past 2 to 3 months. This blood test is called \"hemoglobin A1C\" or just \"A1C.\" It can be checked at any time of the day, even if you have recently eaten.  How is type 2 diabetes treated? -- The goals of treatment are to manage your blood sugar and lower the risk of future problems that can happen in people with diabetes.  Treatment might include:   Lifestyle changes - This is an important part of managing diabetes. It includes eating healthy foods and getting plenty of physical activity.   Medicines - There are a few medicines that help lower blood sugar. Some people need to take pills that help the body make more insulin or that help insulin do its job. Others need insulin shots.  Depending on what medicines you take, you might need to check your blood sugar regularly at home. But not everyone with type 2 diabetes needs to do this. Your doctor or nurse will tell you if you should be checking your blood sugar, and when and how to do this.  Sometimes, people with type 2 diabetes also need medicines to help prevent problems caused by the disease. For instance, medicines used to lower blood pressure can reduce the chances of a heart attack or stroke.   General medical care - It's also important to take care of other areas of your health. This includes watching your blood pressure and cholesterol levels. You should also get certain vaccines, such as vaccines to protect against the flu and coronavirus disease 2019 (\"COVID-19\"). Some people also need a vaccine to prevent pneumonia.  Can type 2 diabetes be prevented? -- Yes. To lower your chances of getting type 2 diabetes, the most important thing you can do is eat a healthy diet and get plenty of physical activity. This can help you lose weight if you are overweight. But eating well and being active are also good " for your overall health. Even gentle activity, like walking, has benefits.  If you smoke, quitting can also lower your risk of type 2 diabetes. Quitting smoking can be difficult, but your doctor or nurse can help.  All topics are updated as new evidence becomes available and our peer review process is complete.  This topic retrieved from [employer] on: Apr 24, 2024.  Topic 00668 Version 23.0  Release: 32.3.2 - C32.113  © 2024 UpToDate, Inc. and/or its affiliates. All rights reserved.  Consumer Information Use and Disclaimer   Disclaimer: This generalized information is a limited summary of diagnosis, treatment, and/or medication information. It is not meant to be comprehensive and should be used as a tool to help the user understand and/or assess potential diagnostic and treatment options. It does NOT include all information about conditions, treatments, medications, side effects, or risks that may apply to a specific patient. It is not intended to be medical advice or a substitute for the medical advice, diagnosis, or treatment of a health care provider based on the health care provider's examination and assessment of a patient's specific and unique circumstances. Patients must speak with a health care provider for complete information about their health, medical questions, and treatment options, including any risks or benefits regarding use of medications. This information does not endorse any treatments or medications as safe, effective, or approved for treating a specific patient. UpToDate, Inc. and its affiliates disclaim any warranty or liability relating to this information or the use thereof.The use of this information is governed by the Terms of Use, available at https://www.wolterskluwer.com/en/know/clinical-effectiveness-terms. 2024© UpToDate, Inc. and its affiliates and/or licensors. All rights reserved.  Copyright   © 2024 UpToDate, Inc. and/or its affiliates. All rights reserved.    Patient Education    "  Foot care for people with diabetes   The Basics   Written by the doctors and editors at [facility]   Why is foot care important if I have diabetes? -- Diabetes can cause nerve damage if your blood sugar is high for a long time. The medical term for this is \"diabetic neuropathy.\"  If you have problems with the nerves in your feet, you might not be able to feel pain in your foot. Normally, people feel pain when they get a cut or a blister on their foot. The pain tells them that they need to treat their cut so it can heal. But people with nerve damage might not feel any pain when their feet get hurt. They might not even know that they have a cut, so they might not treat it. Problems that aren't treated right away can get much worse. For example, an untreated cut can get infected and turn into an open sore.  High blood sugar can also damage blood vessels and decrease blood flow to your feet. This can weaken your skin and make wounds take longer to heal. You are also more likely to get an infection if you have high blood sugar.  How do I take care of my feet? -- Taking good care of your feet can help prevent foot problems. You should:   Wash your feet every day with soap and warm water. Pat your feet dry, and be sure to dry the skin between your toes.   Keep your feet moisturized. Put lotion on the tops and bottoms of your feet, but not between your toes.   Check your feet every day (figure 1). Look for cuts, blisters, redness, or swelling. Use a mirror, or ask someone to help you check the bottoms of your feet. Check all parts of the foot, especially between the toes. Look for broken skin, ulcers, blisters, or redness.   Trim your toenails straight across when needed (figure 2). Do not cut the corners of your toenails. File rough edges. Do not cut your cuticles. Ask for help if you cannot see well or have problems reaching your feet.   Ask your doctor or nurse to check your feet at each visit. Take your shoes and socks " off for these checks.   See a foot care provider (such as a podiatrist) if you have an ingrown toenail, corn, or callus. Do not try to remove corns and calluses yourself.  How do I protect my feet from injury? -- There are several ways to protect your feet. You can:   Wear shoes and socks at all times, even at home. Do not walk barefoot. Wear swim shoes if you go to the beach or a swimming pool.   Choose shoes that fit well. They should not be not too tight or too loose. Your shoes should have plenty of room for your toes (figure 3). Your doctor might give you a prescription for special shoes. Check to see if they are covered by your insurance.   Check your shoes each time before you put them on to make sure that the lining is smooth. Also check to make sure that there is nothing inside the shoes before putting them on.   Do not wear shoes that expose any part of the foot, like sandals, thongs, or clogs.   Wear cotton socks that fit loosely. Do not wear shoes without socks.   Protect your feet from heat and cold. Test bath water before putting your feet in it to make sure that it is not too hot. Do not walk barefoot on hot ground. Take extra care when going outside in the cold and wear warm socks.  What else should I know? -- You can lower your risk for foot problems by keeping your blood sugar levels as close to your goal as possible. Other things you can do include:   Move your ankles and toes often to help with blood flow. You can wear a support stocking to help with swelling.   Walk often. Regular walking helps blood flow.   If you smoke, try to quit. Your doctor or nurse can help. Smoking causes poor blood flow to your feet and can damage your nerves.  When should I call the doctor? -- Call your doctor or nurse for advice if you have:   A fever of 100.4°F (38°C) or higher, chills, or a wound that will not heal   Swelling, redness, warmth around a wound, a foul smell coming from a wound, or yellowish, greenish,  or bloody discharge   Sores or blisters on your feet that hurt more or less than you would expect   Numbness or tingling in your foot or leg   Corns, calluses, blisters, or new sores on your foot   Very dry, scaly, or cracked skin on your feet   Changes in the way your foot joints or arch look  All topics are updated as new evidence becomes available and our peer review process is complete.  This topic retrieved from [employer] on: Mar 13, 2024.  Topic 531146 Version 2.0  Release: 32.2.4 - C32.71  © 2024 UpToDate, Inc. and/or its affiliates. All rights reserved.  figure 1: Foot check for people with diabetes     People with diabetes should check both of their feet every day. It is important to check your feet all over, including in between your toes. If you can't see the bottom of your foot, use a mirror or ask another person to check for you. Let your doctor or nurse know if you find any:  Redness   Cuts or cracks in the skin   Blisters   Swelling   Graphic 35569 Version 3.0  figure 2: Trim your toenails     Trim your toenails straight across and smooth them with a nail file.  Graphic 99512 Version 2.0  figure 3: Correct shoe shape     Choose shoes that fit the right way and are not too tight or too loose. Your shoes should have plenty of room for your toes.  Graphic 15606 Version 2.0  Consumer Information Use and Disclaimer   Disclaimer: This generalized information is a limited summary of diagnosis, treatment, and/or medication information. It is not meant to be comprehensive and should be used as a tool to help the user understand and/or assess potential diagnostic and treatment options. It does NOT include all information about conditions, treatments, medications, side effects, or risks that may apply to a specific patient. It is not intended to be medical advice or a substitute for the medical advice, diagnosis, or treatment of a health care provider based on the health care provider's examination and assessment  "of a patient's specific and unique circumstances. Patients must speak with a health care provider for complete information about their health, medical questions, and treatment options, including any risks or benefits regarding use of medications. This information does not endorse any treatments or medications as safe, effective, or approved for treating a specific patient. UpToDate, Inc. and its affiliates disclaim any warranty or liability relating to this information or the use thereof.The use of this information is governed by the Terms of Use, available at https://www.Smart Voicemail.com/en/know/clinical-effectiveness-terms. 2024© UpToDate, Inc. and its affiliates and/or licensors. All rights reserved.  Copyright   © 2024 UpToDate, Inc. and/or its affiliates. All rights reserved.    Patient Education     Low blood sugar in people with diabetes   The Basics   Written by the doctors and editors at [employer]   What is low blood sugar? -- This is when the level of sugar in a person's blood gets too low. It is also called \"hypoglycemia.\"  Low blood sugar can cause symptoms ranging from sweating and feeling hungry to passing out.  Low blood sugar can happen in people with diabetes who take certain medicines, including insulin, other medicines given as shots, and some types of pills.  When can people with diabetes get low blood sugar? -- People with diabetes can get low blood sugar when they:   Take too much medicine, including insulin, other medicines given as shots, or certain diabetes pills   Do not eat enough food   Exercise too much without eating a snack or reducing their insulin dose   Wait too long between meals   Drink too much alcohol or drink alcohol on an empty stomach  What are the symptoms of low blood sugar? -- The symptoms can be different from person to person, and can change over time. During the early stages of low blood sugar, a person can:   Sweat or tremble   Feel hungry   Feel worried  People who " "have early symptoms should check their blood sugar level to see if it is low and needs to be treated. If low blood sugar levels are not treated, severe symptoms can occur. These can include:   Trouble walking or feeling weak   Trouble seeing clearly   Being confused or acting in a strange way   Passing out or having a seizure  Some people do not get symptoms during the early stages of low blood sugar. Doctors sometimes call this \"hypoglycemia unawareness.\" People with hypoglycemia unawareness are more likely to have severe symptoms, because they might not know that they have low blood sugar until they have severe symptoms. Hypoglycemia unawareness is more likely in people who:   Have had type 1 diabetes for more than 5 to 10 years   Have frequent episodes of low blood sugar   Use insulin to keep their blood sugar level tightly managed   Are tired   Drink a lot of alcohol   Take certain medicines for high blood pressure or diabetes  How is low blood sugar treated? -- It can be treated with:   Quick sources of sugar - People can eat or drink quick sources of sugar (table 1). Foods that have fat, such as chocolate or cheese, do not treat low blood sugar as quickly. You and a family member should carry a quick source of sugar at all times.   A dose of glucagon - Glucagon is a hormone that can quickly raise blood sugar levels and stop severe symptoms. It comes as a shot (figure 1) or a nose spray. If your doctor recommends that you carry glucagon with you, they will tell you when and how to use it. If possible, it's also a good idea to have a family member, friend, or roommate learn how to give you glucagon. That way, they can give it to you if you can't do it yourself.  After treating low blood sugar, it is very important to recheck your blood sugar level to make sure that it rises and stays in the normal range. Once your blood sugar is normal, eat a small snack that contains protein, fat, and carbohydrate. This can " help keep your blood sugar stable.  What should I do after treatment? -- After treatment for low blood sugar, most people can get back to their usual routine. But your doctor or nurse might recommend that you check your blood sugar level more often during the next 2 to 3 days.  If your low blood sugar was treated with glucagon, call your doctor or nurse. They might change the dose of your diabetes medicine.  How can I prevent low blood sugar? -- The best way is to:   Check your blood sugar levels often - Your doctor or nurse will tell you how and when to check your blood sugar levels at home. They will also tell you what your blood sugar levels should be, and when to treat low blood sugar.   Learn the symptoms of low blood sugar, and be ready to treat it in the early stages. Treating low blood sugar early can prevent severe symptoms.  When should I go to a hospital or call for an ambulance? -- A family member or friend should take you to a hospital or call for an ambulance (in the [place] and [place], call 9-1-1) if you:   Are still confused 15 minutes after being treated with a dose of glucagon   Have passed out, and there is no glucagon nearby   Still have low blood sugar after treatment  If you have low blood sugar, do not try to drive yourself to the hospital. Driving with low blood sugar can be dangerous.  All topics are updated as new evidence becomes available and our peer review process is complete.  This topic retrieved from [employer] on: Apr 11, 2024.  Topic 57029 Version 22.0  Release: 32.3.2 - C32.100  © 2024 UpToDate, Inc. and/or its affiliates. All rights reserved.  table 1: Quick sources of sugar to treat low blood sugar  3 or 4 glucose tablets   ½ cup of juice or regular soda (not sugar-free)   2 tablespoons of raisins   4 or 5 saltine crackers   1 tablespoon of sugar   1 tablespoon of honey or corn syrup   6 to 8 hard candies   These sources of sugar act quickly to treat low blood sugar levels. People with  "diabetes who use insulin or certain other diabetes medicines should carry at least 1 of these items at all times.  Graphic 53860 Version 4.0  figure 1: Glucagon autoinjector     Some people carry glucagon in the form of an autoinjector \"pen.\" This makes it easy to give a dose into the upper arm, thigh, or belly.  Graphic 731906 Version 2.0  Consumer Information Use and Disclaimer   Disclaimer: This generalized information is a limited summary of diagnosis, treatment, and/or medication information. It is not meant to be comprehensive and should be used as a tool to help the user understand and/or assess potential diagnostic and treatment options. It does NOT include all information about conditions, treatments, medications, side effects, or risks that may apply to a specific patient. It is not intended to be medical advice or a substitute for the medical advice, diagnosis, or treatment of a health care provider based on the health care provider's examination and assessment of a patient's specific and unique circumstances. Patients must speak with a health care provider for complete information about their health, medical questions, and treatment options, including any risks or benefits regarding use of medications. This information does not endorse any treatments or medications as safe, effective, or approved for treating a specific patient. UpToDate, Inc. and its affiliates disclaim any warranty or liability relating to this information or the use thereof.The use of this information is governed by the Terms of Use, available at https://www.Contents FirsttersenGreetuwer.com/en/know/clinical-effectiveness-terms. 2024© UpToDate, Inc. and its affiliates and/or licensors. All rights reserved.  Copyright   © 2024 UpToDate, Inc. and/or its affiliates. All rights reserved.    "

## 2025-02-27 NOTE — PROGRESS NOTES
Name: Shahbaz Barriga      : 1950      MRN: [id number]  Encounter Provider: Rubia Dennis DO  Encounter Date: 2025   Encounter department: [facility] CARE [address]   :  Assessment & Plan  Type 2 diabetes mellitus with stage 3a chronic kidney disease, without long-term current use of insulin (HCC)    Lab Results   Component Value Date    HGBA1C 7.9 (H) 2025   DM type 2 with nephropathy CKD stage 3a and hyperglycemia - uncontrolled with A1C jumping up to 7.9 - urged to get back on track with diet and increase exercise, increase Glipizide from 5 mg 1/2 tab once daily to 5 mg 1/2 tab bid, s/sx of hypoglycemia reviewed - call if they occur, recheck BW in 3 mo - BW order given, DM foot exam done today, DM eye exam 10/23 - 2 yrs, ARB restarted today, he is on a statin, will follow  Orders:    glipiZIDE (GLUCOTROL) 5 mg tablet; Take 0.5 tablets (2.5 mg total) by mouth 2 (two) times a day before meals    Basic metabolic panel; Future    Hemoglobin A1c (w/out EAG) ([facility] ONLY); Future    losartan (COZAAR) 25 mg tablet; Take 1 tablet (25 mg total) by mouth daily    Stage 3a chronic kidney disease (HCC)  Lab Results   Component Value Date    EGFR 46 (L) 2025    EGFR 43 2024    EGFR 43 08/15/2024    CREATININE 1.56 (H) 2025    CREATININE 1.57 (H) 2024    CREATININE 1.57 (H) 08/15/2024   Cr stable, encouraged to keep hydrated and avoid NSAIDs, importance of BP/BS control reviewed, will restart ARB and  check BMP in 1 wk - BW order given  Orders:    Basic metabolic panel; Future    Basic metabolic panel; Future    Severe obesity (BMI 35.0-39.9) with comorbidity (HCC)    Urged to get back on track with diet and increase exercise       Hypokalemia  Encouraged to con't healthy diet, restart ARB today, recheck BMP in 1 wk - BW order given, will follow       Acute pulmonary embolism with acute cor pulmonale, unspecified pulmonary embolism type (HCC)  Currently at  baseline, lifelong OAC needed, was told by Pulm to con't Eliquis full dose for 6 mos and then decrease to 2.5 mg bid - he was told PCP could manage this if I was comfortable- will decrease Eliquis to 2.5 bid, call with any recurrent symptoms       Acute pulmonary embolism, unspecified pulmonary embolism type, unspecified whether acute cor pulmonale present (HCC)    Orders:    apixaban (Eliquis) 2.5 mg; Take 1 tablet (2.5 mg total) by mouth 2 (two) times a day    Current use of long term anticoagulation  Currently w/o bleeding issues - call if this occurs       Ulcerative rectosigmoiditis without complication (HCC)  No current GI symptoms, on sulfasalazine, has appt with GI next week, will follow       Pancreatic cyst  MRI annually - order given for May, will follow  Orders:    MRI abdomen wo contrast and mrcp; Future    Primary hypertension  BP high today, home cuff brought in and was reading > 20 mmHg higher then our in office cuff, home reading high as well, will restart Losartan at low dose of 25 mg 1 tab PO q day, SE reviewed, check BMP in 1 wk - order given, con't all other BP meds, encouraged to watch diet and increase exercise, will follow  Orders:    losartan (COZAAR) 25 mg tablet; Take 1 tablet (25 mg total) by mouth daily    Bilateral carotid artery stenosis  CUS due - order given, on ASA and statin, will follow  Orders:    VAS carotid complete study; Future          Depression Screening and Follow-up Plan: Patient was screened for depression during today's encounter. They screened negative with a PHQ-2 score of 0.      Colonoscopy 6/23 - 2 yrs    PSA 2/25    CUS 2/24 <50% B/L carotids - order given    MRI pancrease 5/24 - 1 yr - order given    BW 2/25 (A1c 7.9)  DM foot exam done in office today  DM eye exam 10/23 - 2 yrs  UR microalbumin:Cr 8/24      History of Present Illness   HPI Pt here for follow up appt and BW results    BW results were d/w pt in detail: FBS/A1C 174/7.9, BUN/Cr 30/1.56 (GFR 46), K  3.3, rest of BMP and PSA were wnl     Def of controlled vs uncontrolled DM was reviewed.  Diet was reviewed - wgt up 7 lbs from Nov 24. He has been less active and diet has not been as good.  He has had more carbs recently.  He does keep active on the treadmill 3 x's a day for 5 min.  He is taking his DM meds as directed.  He checks his sugar once a day in the am and has jumped up - has had a few readings > 200.  He is UTD on DM eye exam (10/23 -2 yrs) and is due for his DM foot exam.  CKD stage 3 was reviewed. Importance of BP/BS control as well as fluid intake and NSAID use was reviewed. He had a telemedicine visit with Nephro (Dr. Perez) in Jan 25 - OV note reviewed.  He is on statin but is NOT on an ACE/ARB.     Pulm note from Nov 24 was again reviewed.  Pt with PE and was told needed lifelong OAC.  Was told to con't Eliquis full dose for 6 mos and then decrease to 2.5 mg bid. He was told PCP could manage this if I was comfortable. He was told f/u prn. Echo was done Nov 24 and EF was 60%.  RA was mildly dilated and RV was moderately dilated. Mild AR and mild MR was noted.     Pt has appt with GI (Dr. Harrell) in March for f/u h/o ulcerative rectosigmoiditis.  He is on sulfasalazine qid.  He had a pancreatic cyst noted on imaging in May.  1 yr follow up was recommended.  He notes no N/V/abd pain/D/blood in the stools/unintentional wgt loss.    BP very high today and meds were reviewed and no changes have occurred.  He denies missing doses of meds or SE with the meds.  He does check his BP outside the office and is averaging 140's/70's.  He notes no frequent Ha's/dizziness/double vision/CP.     Review of Systems   Constitutional:  Negative for chills, fatigue, fever and unexpected weight change.   HENT:  Negative for congestion and sore throat.    Eyes:  Negative for pain and visual disturbance.   Respiratory:  Negative for cough and shortness of breath.    Cardiovascular:  Negative for chest pain, palpitations and  "leg swelling.   Gastrointestinal:  Negative for abdominal pain, blood in stool, constipation, diarrhea, nausea and vomiting.   Genitourinary:  Negative for difficulty urinating and dysuria.   Musculoskeletal:  Negative for arthralgias and gait problem.   Skin:  Negative for rash and wound.   Neurological:  Negative for dizziness and headaches.   Hematological:  Does not bruise/bleed easily.   Psychiatric/Behavioral:  Negative for confusion and dysphoric mood.        Objective   BP (!) 186/102 (BP Location: Left arm, Patient Position: Sitting, Cuff Size: Large)   Pulse 72   Temp 98.1 °F (36.7 °C)   Ht 5' 9\" (1.753 m)   Wt 111 kg (245 lb 12.8 oz)   SpO2 96%   BMI 36.30 kg/m²      Physical Exam  Vitals and nursing note reviewed.   Constitutional:       General: He is not in acute distress.     Appearance: He is well-developed. He is obese. He is not ill-appearing.   HENT:      Head: Normocephalic and atraumatic.      Right Ear: External ear normal.      Left Ear: External ear normal.   Eyes:      General:         Right eye: No discharge.         Left eye: No discharge.      Conjunctiva/sclera: Conjunctivae normal.   Neck:      Trachea: No tracheal deviation.   Cardiovascular:      Rate and Rhythm: Normal rate and regular rhythm.      Pulses: no weak pulses.           Dorsalis pedis pulses are 2+ on the right side and 2+ on the left side.      Heart sounds: Normal heart sounds. No murmur heard.  Pulmonary:      Effort: Pulmonary effort is normal. No respiratory distress.      Breath sounds: Normal breath sounds. No wheezing, rhonchi or rales.   Abdominal:      General: There is no distension.      Palpations: Abdomen is soft.      Tenderness: There is no abdominal tenderness. There is no guarding or rebound.   Musculoskeletal:      Cervical back: Neck supple.      Right lower leg: No edema.      Left lower leg: No edema.   Feet:      Right foot:      Skin integrity: Dry skin present. No ulcer, skin breakdown, " erythema, warmth or callus.      Left foot:      Skin integrity: Dry skin present. No ulcer, skin breakdown, erythema, warmth or callus.   Skin:     General: Skin is warm and dry.      Coloration: Skin is not pale.      Findings: No rash.   Neurological:      General: No focal deficit present.      Mental Status: He is alert. Mental status is at baseline.      Motor: No abnormal muscle tone.      Gait: Gait normal.   Psychiatric:         Mood and Affect: Mood normal.         Behavior: Behavior normal.         Thought Content: Thought content normal.         Judgment: Judgment normal.     Diabetic Foot Exam    Patient's shoes and socks removed.    Right Foot/Ankle   Right Foot Inspection  Skin Exam: skin normal, skin intact and dry skin. No warmth, no callus, no erythema, no maceration, no abnormal color, no pre-ulcer, no ulcer and no callus.     Toe Exam: ROM and strength within normal limits.     Sensory   Vibration: intact  Monofilament testing: intact    Vascular  The right DP pulse is 2+.     Left Foot/Ankle  Left Foot Inspection  Skin Exam: skin normal, skin intact and dry skin. No warmth, no erythema, no maceration, normal color, no pre-ulcer, no ulcer and no callus.     Toe Exam: ROM and strength within normal limits.     Sensory   Vibration: intact  Monofilament testing: intact    Vascular  The left DP pulse is 2+.     Assign Risk Category  No deformity present  No loss of protective sensation  No weak pulses  Risk: 0

## 2025-02-27 NOTE — ASSESSMENT & PLAN NOTE
Lab Results   Component Value Date    HGBA1C 7.9 (H) 02/18/2025   DM type 2 with nephropathy CKD stage 3a and hyperglycemia - uncontrolled with A1C jumping up to 7.9 - urged to get back on track with diet and increase exercise, increase Glipizide from 5 mg 1/2 tab once daily to 5 mg 1/2 tab bid, s/sx of hypoglycemia reviewed - call if they occur, recheck BW in 3 mo - BW order given, DM foot exam done today, DM eye exam 10/23 - 2 yrs, ARB restarted today, he is on a statin, will follow  Orders:    glipiZIDE (GLUCOTROL) 5 mg tablet; Take 0.5 tablets (2.5 mg total) by mouth 2 (two) times a day before meals    Basic metabolic panel; Future    Hemoglobin A1c (w/out EAG) ([facility] ONLY); Future    losartan (COZAAR) 25 mg tablet; Take 1 tablet (25 mg total) by mouth daily

## 2025-02-27 NOTE — ASSESSMENT & PLAN NOTE
MRI annually - order given for May, will follow  Orders:    MRI abdomen wo contrast and mrcp; Future

## 2025-02-27 NOTE — ASSESSMENT & PLAN NOTE
Orders:    apixaban (Eliquis) 2.5 mg; Take 1 tablet (2.5 mg total) by mouth 2 (two) times a day

## 2025-02-27 NOTE — ASSESSMENT & PLAN NOTE
Lab Results   Component Value Date    EGFR 46 (L) 02/18/2025    EGFR 43 08/16/2024    EGFR 43 08/15/2024    CREATININE 1.56 (H) 02/18/2025    CREATININE 1.57 (H) 08/16/2024    CREATININE 1.57 (H) 08/15/2024   Cr stable, encouraged to keep hydrated and avoid NSAIDs, importance of BP/BS control reviewed, will restart ARB and  check BMP in 1 wk - BW order given  Orders:    Basic metabolic panel; Future    Basic metabolic panel; Future

## 2025-02-27 NOTE — ASSESSMENT & PLAN NOTE
BP high today, home cuff brought in and was reading > 20 mmHg higher then our in office cuff, home reading high as well, will restart Losartan at low dose of 25 mg 1 tab PO q day, SE reviewed, check BMP in 1 wk - order given, con't all other BP meds, encouraged to watch diet and increase exercise, will follow  Orders:    losartan (COZAAR) 25 mg tablet; Take 1 tablet (25 mg total) by mouth daily

## 2025-02-27 NOTE — ASSESSMENT & PLAN NOTE
Currently at baseline, lifelong OAC needed, was told by Pulm to con't Eliquis full dose for 6 mos and then decrease to 2.5 mg bid - he was told PCP could manage this if I was comfortable- will decrease Eliquis to 2.5 bid, call with any recurrent symptoms

## 2025-02-27 NOTE — ASSESSMENT & PLAN NOTE
CUS due - order given, on ASA and statin, will follow  Orders:    VAS carotid complete study; Future

## 2025-02-28 DIAGNOSIS — E78.1 ESSENTIAL HYPERTRIGLYCERIDEMIA: ICD-10-CM

## 2025-02-28 RX ORDER — FENOFIBRATE 145 MG/1
145 TABLET, COATED ORAL DAILY
Qty: 90 TABLET | Refills: 1 | Status: SHIPPED | OUTPATIENT
Start: 2025-02-28

## 2025-03-04 DIAGNOSIS — N18.31 TYPE 2 DIABETES MELLITUS WITH STAGE 3A CHRONIC KIDNEY DISEASE, WITHOUT LONG-TERM CURRENT USE OF INSULIN (HCC): ICD-10-CM

## 2025-03-04 DIAGNOSIS — E11.22 TYPE 2 DIABETES MELLITUS WITH STAGE 3A CHRONIC KIDNEY DISEASE, WITHOUT LONG-TERM CURRENT USE OF INSULIN (HCC): ICD-10-CM

## 2025-03-06 ENCOUNTER — TELEPHONE (OUTPATIENT)
Dept: GASTROENTEROLOGY | Facility: CLINIC | Age: 75
End: 2025-03-06

## 2025-03-06 ENCOUNTER — OFFICE VISIT (OUTPATIENT)
Dept: GASTROENTEROLOGY | Facility: CLINIC | Age: 75
End: 2025-03-06
Payer: MEDICARE

## 2025-03-06 ENCOUNTER — RESULTS FOLLOW-UP (OUTPATIENT)
Dept: FAMILY MEDICINE CLINIC | Facility: HOSPITAL | Age: 75
End: 2025-03-06

## 2025-03-06 VITALS
BODY MASS INDEX: 36.14 KG/M2 | HEIGHT: 69 IN | DIASTOLIC BLOOD PRESSURE: 82 MMHG | WEIGHT: 244 LBS | SYSTOLIC BLOOD PRESSURE: 142 MMHG

## 2025-03-06 DIAGNOSIS — K86.2 PANCREATIC CYST: ICD-10-CM

## 2025-03-06 DIAGNOSIS — K51.30 ULCERATIVE RECTOSIGMOIDITIS WITHOUT COMPLICATION (HCC): Primary | ICD-10-CM

## 2025-03-06 DIAGNOSIS — Z79.01 CURRENT USE OF LONG TERM ANTICOAGULATION: ICD-10-CM

## 2025-03-06 DIAGNOSIS — N18.31 TYPE 2 DIABETES MELLITUS WITH STAGE 3A CHRONIC KIDNEY DISEASE, WITHOUT LONG-TERM CURRENT USE OF INSULIN (HCC): ICD-10-CM

## 2025-03-06 DIAGNOSIS — Z86.0100 HISTORY OF COLON POLYPS: ICD-10-CM

## 2025-03-06 DIAGNOSIS — E11.22 TYPE 2 DIABETES MELLITUS WITH STAGE 3A CHRONIC KIDNEY DISEASE, WITHOUT LONG-TERM CURRENT USE OF INSULIN (HCC): ICD-10-CM

## 2025-03-06 DIAGNOSIS — I63.89 CEREBROVASCULAR ACCIDENT (CVA) DUE TO OTHER MECHANISM (HCC): ICD-10-CM

## 2025-03-06 PROBLEM — I63.9 CEREBROVASCULAR ACCIDENT (HCC): Status: ACTIVE | Noted: 2025-03-06

## 2025-03-06 LAB
BUN SERPL-MCNC: 37 MG/DL (ref 7–25)
BUN/CREAT SERPL: 20 (CALC) (ref 6–22)
CALCIUM SERPL-MCNC: 9.7 MG/DL (ref 8.6–10.3)
CHLORIDE SERPL-SCNC: 107 MMOL/L (ref 98–110)
CO2 SERPL-SCNC: 28 MMOL/L (ref 20–32)
CREAT SERPL-MCNC: 1.82 MG/DL (ref 0.7–1.28)
GFR/BSA.PRED SERPLBLD CYS-BASED-ARV: 38 ML/MIN/1.73M2
GLUCOSE SERPL-MCNC: 142 MG/DL (ref 65–139)
POTASSIUM SERPL-SCNC: 3.6 MMOL/L (ref 3.5–5.3)
SODIUM SERPL-SCNC: 144 MMOL/L (ref 135–146)

## 2025-03-06 PROCEDURE — G2211 COMPLEX E/M VISIT ADD ON: HCPCS | Performed by: INTERNAL MEDICINE

## 2025-03-06 PROCEDURE — 99214 OFFICE O/P EST MOD 30 MIN: CPT | Performed by: INTERNAL MEDICINE

## 2025-03-06 RX ORDER — POLYETHYLENE GLYCOL 3350 17 G/17G
238 POWDER, FOR SOLUTION ORAL ONCE
Qty: 238 G | Refills: 0 | Status: SHIPPED | OUTPATIENT
Start: 2025-03-06 | End: 2025-03-06

## 2025-03-06 RX ORDER — SODIUM CHLORIDE, SODIUM LACTATE, POTASSIUM CHLORIDE, CALCIUM CHLORIDE 600; 310; 30; 20 MG/100ML; MG/100ML; MG/100ML; MG/100ML
125 INJECTION, SOLUTION INTRAVENOUS CONTINUOUS
OUTPATIENT
Start: 2025-03-06

## 2025-03-06 RX ORDER — SULFASALAZINE 500 MG/1
500 TABLET ORAL 4 TIMES DAILY
Qty: 360 TABLET | Refills: 3 | Status: SHIPPED | OUTPATIENT
Start: 2025-03-06 | End: 2026-03-01

## 2025-03-06 NOTE — ASSESSMENT & PLAN NOTE
On Eliquis for h/o PE. Will need to hold 2 days prior to colonoscopy.    Patient on chronic anticoagulation. Risks and benefits of holding the anticoagulation prior to the procedure discussed with the patient. Patient understands the risks of embolic events while holding the anticoagulation. Patient understands the risks of bleeding if continuation of the anticoagulation is chosen.

## 2025-03-06 NOTE — PROGRESS NOTES
Name: Shahbaz Barriga      : 1950      MRN: [id number]  Encounter Provider: Abbie Harrell MD  Encounter Date: 3/6/2025   Encounter department: [facility]  :  Assessment & Plan  Ulcerative rectosigmoiditis without complication (HCC)    75M w ulcerative colitis here today for f/u. No complaints. Doing well.     - medication management discussed with the patient  - immunizations reviewed: Recommend annual flu shot.  Stay up-to-date with pneumococcal vaccination, zoster vaccination.  Recommend non live vaccinations.  - annual blood work next due 2025 : CBC, CMP, CRP  - colon cancer surveillance up-to-date - next due now  - annual dermatology exam    Orders:  •  sulfaSALAzine (AZULFIDINE) 500 mg tablet; Take 1 tablet (500 mg total) by mouth 4 (four) times a day  •  C-reactive protein; Future  •  CBC and differential; Future  •  Comprehensive metabolic panel; Future  •  Colonoscopy; Future  •  polyethylene glycol (MiraLax) 17 GM/SCOOP powder; Take 238 g by mouth once for 1 dose Take 238 g my mouth. Use as directed  •  Ambulatory Referral to Dermatology; Future    Pancreatic cyst  MRI scheduled for 2025       History of colon polyps  Recall now       Current use of long term anticoagulation  On Eliquis for h/o PE. Will need to hold 2 days prior to colonoscopy.    Patient on chronic anticoagulation. Risks and benefits of holding the anticoagulation prior to the procedure discussed with the patient. Patient understands the risks of embolic events while holding the anticoagulation. Patient understands the risks of bleeding if continuation of the anticoagulation is chosen.       Cerebrovascular accident (CVA) due to other mechanism (HCC)  On Plavix, will need to be held 5 days prior to colonoscopy.           History of Present Illness     Shahbaz Barriga is a 75 y.o. male who presents today with his wife for follow-up.  History of ulcerative colitis, mainly left side, on  sulfasalazine.  Doing well.  Denies any nausea vomiting, reflux, dysphagia, abdominal pain, diarrhea, GI bleeding.  Weight is stable and appetite is good.  Denies any significant joint pains or rashes.  Still volunteers with the fire company.    History obtained from: patient    Review of Systems   Constitutional:  Negative for chills and fever.   HENT:  Negative for ear pain and sore throat.    Eyes:  Negative for pain and visual disturbance.   Respiratory:  Negative for cough and shortness of breath.    Cardiovascular:  Negative for chest pain and palpitations.   Gastrointestinal:  Negative for abdominal pain and vomiting.   Genitourinary:  Negative for dysuria and hematuria.   Musculoskeletal:  Negative for arthralgias and back pain.   Skin:  Negative for color change and rash.   Neurological:  Negative for seizures and syncope.   All other systems reviewed and are negative.    Past Medical History   Past Medical History:   Diagnosis Date   • Alpha-1-antitrypsin deficiency ([facility])    • Chronic kidney disease    • CVA (cerebral vascular accident) ([facility])    • Diabetes mellitus ([facility])    • Diverticulitis    • Gout    • Hyperlipidemia    • Hypertension    • Hypoxia 08/14/2024   • NSVT (nonsustained ventricular tachycardia) ([facility]) 05/28/2020   • SIRS (systemic inflammatory response syndrome) ([facility]) 08/14/2024   • Sleep apnea, obstructive    • Ulcerative colitis ([facility])    • Ulcerative rectosigmoiditis without complication ([facility]) 02/13/2018   • Vasovagal syncope 12/28/2021     Past Surgical History:   Procedure Laterality Date   • CARDIAC LOOP RECORDER     • COLONOSCOPY  09/18/2006    complete; 10 yrs   • COLONOSCOPY  10/23/2017    complete; 5 yrs   • COLONOSCOPY  05/06/2020    Severe active left-sided colitis, erythematous and ulcerated mucosa in the rectosigmoid, inflammatory polyp   • IR PE ENDOVASCULAR THERAPY  08/07/2024   • TONSILLECTOMY       Family History   Problem Relation Age of Onset   • Breast cancer Mother    •  Cancer Mother    • Kidney disease Father    • Lung cancer Father    • Other Father         smoker   • Cancer Father    • Hypertension Other    • Colon polyps Neg Hx    • Colon cancer Neg Hx       reports that he has never smoked. He has never used smokeless tobacco. He reports that he does not drink alcohol and does not use drugs.  Current Outpatient Medications   Medication Instructions   • allopurinol (ZYLOPRIM) 200 mg, Oral, Daily   • amLODIPine (NORVASC) 10 mg, Oral, Every morning   • apixaban (ELIQUIS) 2.5 mg, Oral, 2 times daily   • ascorbic acid (VITAMIN C) 1,000 mg, Oral, Daily   • atorvastatin (LIPITOR) 40 mg tablet TAKE 1 TABLET BY MOUTH ONCE DAILY WITH SUPPER   • chlorthalidone 25 mg, Oral, Daily   • cholecalciferol (VITAMIN D3) 400 Units, Oral, Daily   • clopidogrel (PLAVIX) 75 mg, Oral, Daily   • Coenzyme Q10 200 MG capsule Daily   • fenofibrate (TRICOR) 145 mg, Oral, Daily   • glipiZIDE (GLUCOTROL) 2.5 mg, Oral, 2 times daily before meals   • glucose blood (Contour Next Test) test strip Use one strip to test blood sugar once daily.   • losartan (COZAAR) 25 mg, Oral, Daily   • metoprolol tartrate (LOPRESSOR) 75 mg, Oral, 2 times daily   • polyethylene glycol (MIRALAX) 238 g, Oral, Once, Take 238 g my mouth. Use as directed   • sulfaSALAzine (AZULFIDINE) 500 mg, Oral, 4 times daily   No Known Allergies   Current Outpatient Medications on File Prior to Visit   Medication Sig Dispense Refill   • allopurinol (ZYLOPRIM) 100 mg tablet Take 2 tablets (200 mg total) by mouth daily 180 tablet 1   • amLODIPine (NORVASC) 10 mg tablet TAKE 1 TABLET IN THE MORNING 90 tablet 1   • apixaban (Eliquis) 2.5 mg Take 1 tablet (2.5 mg total) by mouth 2 (two) times a day 180 tablet 2   • ascorbic acid (VITAMIN C) 1000 MG tablet Take 1 tablet (1,000 mg total) by mouth daily  0   • atorvastatin (LIPITOR) 40 mg tablet TAKE 1 TABLET BY MOUTH ONCE DAILY WITH SUPPER 90 tablet 1   • chlorthalidone 25 mg tablet Take 1 tablet (25 mg  "total) by mouth daily 90 tablet 2   • cholecalciferol (VITAMIN D3) 400 units tablet Take 1 tablet (400 Units total) by mouth daily  0   • clopidogrel (PLAVIX) 75 mg tablet Take 1 tablet (75 mg total) by mouth daily 90 tablet 1   • Coenzyme Q10 200 MG capsule Take by mouth daily      • fenofibrate (TRICOR) 145 mg tablet Take 1 tablet (145 mg total) by mouth daily 90 tablet 1   • glipiZIDE (GLUCOTROL) 5 mg tablet Take 0.5 tablets (2.5 mg total) by mouth 2 (two) times a day before meals 180 tablet 1   • losartan (COZAAR) 25 mg tablet Take 1 tablet (25 mg total) by mouth daily 30 tablet 0   • metoprolol tartrate (LOPRESSOR) 50 mg tablet Take 1.5 tablets (75 mg total) by mouth 2 (two) times a day 270 tablet 1   • [DISCONTINUED] sulfaSALAzine (AZULFIDINE) 500 mg tablet Take 1 tablet (500 mg total) by mouth 4 (four) times a day 120 tablet 0   • [DISCONTINUED] glucose blood (Contour Next Test) test strip Use as instructed 100 each 1     No current facility-administered medications on file prior to visit.      Social History     Tobacco Use   • Smoking status: Never   • Smokeless tobacco: Never   Vaping Use   • Vaping status: Never Used   Substance and Sexual Activity   • Alcohol use: Never   • Drug use: Never   • Sexual activity: Yes     Partners: Female     Birth control/protection: None        Objective   /82   Ht 5' 9\" (1.753 m)   Wt 111 kg (244 lb)   BMI 36.03 kg/m²      Physical Exam  Vitals and nursing note reviewed.   Constitutional:       General: He is not in acute distress.     Appearance: Normal appearance. He is well-developed.   HENT:      Head: Normocephalic and atraumatic.   Eyes:      General: No scleral icterus.     Conjunctiva/sclera: Conjunctivae normal.   Cardiovascular:      Rate and Rhythm: Normal rate and regular rhythm.      Heart sounds: No murmur heard.  Pulmonary:      Effort: Pulmonary effort is normal. No respiratory distress.      Breath sounds: Normal breath sounds.   Abdominal:      " General: Bowel sounds are normal. There is no distension.      Palpations: Abdomen is soft.      Tenderness: There is no abdominal tenderness.   Musculoskeletal:         General: No swelling. Normal range of motion.      Cervical back: Neck supple.   Skin:     General: Skin is warm and dry.   Neurological:      General: No focal deficit present.      Mental Status: He is alert and oriented to person, place, and time.   Psychiatric:         Mood and Affect: Mood normal.

## 2025-03-06 NOTE — TELEPHONE ENCOUNTER
Pharmacy requesting new script w/ frequency of testing.    Per last night looks like testing once daily in am?

## 2025-03-06 NOTE — ASSESSMENT & PLAN NOTE
75M w ulcerative colitis here today for f/u. No complaints. Doing well.     - medication management discussed with the patient  - immunizations reviewed: Recommend annual flu shot.  Stay up-to-date with pneumococcal vaccination, zoster vaccination.  Recommend non live vaccinations.  - annual blood work next due 8/2025 : CBC, CMP, CRP  - colon cancer surveillance up-to-date - next due now  - annual dermatology exam    Orders:  •  sulfaSALAzine (AZULFIDINE) 500 mg tablet; Take 1 tablet (500 mg total) by mouth 4 (four) times a day  •  C-reactive protein; Future  •  CBC and differential; Future  •  Comprehensive metabolic panel; Future  •  Colonoscopy; Future  •  polyethylene glycol (MiraLax) 17 GM/SCOOP powder; Take 238 g by mouth once for 1 dose Take 238 g my mouth. Use as directed  •  Ambulatory Referral to Dermatology; Future

## 2025-03-06 NOTE — TELEPHONE ENCOUNTER
Scheduled date of colonoscopy (as of today): 4/30/25  Physician performing colonoscopy: FRANCK  Location of colonoscopy: [facility]  Bowel prep reviewed with patient: Miralax/Min  Instructions reviewed with patient by: MA  Clearances: Eliquis and Plavix

## 2025-03-07 NOTE — PROGRESS NOTES
Based on lab work from 03/05/2025, Hct of 53.2 meets parameters (Hct > 51) for therapeutic phlebotomy schedule for 03/10/2025.

## 2025-03-10 ENCOUNTER — HOSPITAL ENCOUNTER (OUTPATIENT)
Dept: INFUSION CENTER | Facility: HOSPITAL | Age: 75
Discharge: HOME | End: 2025-03-10
Attending: INTERNAL MEDICINE
Payer: MEDICARE

## 2025-03-10 VITALS
DIASTOLIC BLOOD PRESSURE: 84 MMHG | RESPIRATION RATE: 18 BRPM | SYSTOLIC BLOOD PRESSURE: 159 MMHG | TEMPERATURE: 98.1 F | HEART RATE: 60 BPM | OXYGEN SATURATION: 94 %

## 2025-03-10 DIAGNOSIS — D75.1 ERYTHROCYTOSIS: Primary | ICD-10-CM

## 2025-03-10 PROCEDURE — 99195 PHLEBOTOMY: CPT

## 2025-03-10 NOTE — PROGRESS NOTES
Patient here for therapeutic phlebotomy. Vital signs stable. Hemoglobin is 17.4 and Hematocrit 53.2 from labs drawn on 03/05/25. Started at 1350 and ended at 1402. 500 ml blood removed. Gauze dressing applied and pressure held. Patient tolerated well. Vital signs repeated and are stable and patient observed for any reactions.    Aware of next appt 06/30/25 @ 0200 pm

## 2025-03-20 LAB
ALBUMIN SERPL-MCNC: 4.3 G/DL (ref 3.6–5.1)
ALBUMIN/GLOB SERPL: 2 (CALC) (ref 1–2.5)
ALP SERPL-CCNC: 69 U/L (ref 35–144)
ALT SERPL-CCNC: 28 U/L (ref 9–46)
AST SERPL-CCNC: 30 U/L (ref 10–35)
BASOPHILS # BLD AUTO: 59 CELLS/UL (ref 0–200)
BASOPHILS NFR BLD AUTO: 0.9 %
BILIRUB SERPL-MCNC: 0.6 MG/DL (ref 0.2–1.2)
BUN SERPL-MCNC: 32 MG/DL (ref 7–25)
BUN/CREAT SERPL: 21 (CALC) (ref 6–22)
CALCIUM SERPL-MCNC: 9.6 MG/DL (ref 8.6–10.3)
CHLORIDE SERPL-SCNC: 109 MMOL/L (ref 98–110)
CO2 SERPL-SCNC: 22 MMOL/L (ref 20–32)
CREAT SERPL-MCNC: 1.54 MG/DL (ref 0.7–1.28)
CRP SERPL-MCNC: 8.5 MG/L
EOSINOPHIL # BLD AUTO: 241 CELLS/UL (ref 15–500)
EOSINOPHIL NFR BLD AUTO: 3.7 %
ERYTHROCYTE [DISTWIDTH] IN BLOOD BY AUTOMATED COUNT: 13.2 % (ref 11–15)
GFR/BSA.PRED SERPLBLD CYS-BASED-ARV: 47 ML/MIN/1.73M2
GLOBULIN SER CALC-MCNC: 2.1 G/DL (CALC) (ref 1.9–3.7)
GLUCOSE SERPL-MCNC: 145 MG/DL (ref 65–99)
HBA1C MFR BLD: 7.6 % OF TOTAL HGB
HCT VFR BLD AUTO: 46.8 % (ref 38.5–50)
HGB BLD-MCNC: 15.9 G/DL (ref 13.2–17.1)
LYMPHOCYTES # BLD AUTO: 1859 CELLS/UL (ref 850–3900)
LYMPHOCYTES NFR BLD AUTO: 28.6 %
MCH RBC QN AUTO: 32.6 PG (ref 27–33)
MCHC RBC AUTO-ENTMCNC: 34 G/DL (ref 32–36)
MCV RBC AUTO: 95.9 FL (ref 80–100)
MONOCYTES # BLD AUTO: 501 CELLS/UL (ref 200–950)
MONOCYTES NFR BLD AUTO: 7.7 %
NEUTROPHILS # BLD AUTO: 3842 CELLS/UL (ref 1500–7800)
NEUTROPHILS NFR BLD AUTO: 59.1 %
PLATELET # BLD AUTO: 243 THOUSAND/UL (ref 140–400)
PMV BLD REES-ECKER: 10.3 FL (ref 7.5–12.5)
POTASSIUM SERPL-SCNC: 3.5 MMOL/L (ref 3.5–5.3)
PROT SERPL-MCNC: 6.4 G/DL (ref 6.1–8.1)
RBC # BLD AUTO: 4.88 MILLION/UL (ref 4.2–5.8)
SODIUM SERPL-SCNC: 142 MMOL/L (ref 135–146)
WBC # BLD AUTO: 6.5 THOUSAND/UL (ref 3.8–10.8)

## 2025-03-21 ENCOUNTER — RA CDI HCC (OUTPATIENT)
Dept: OTHER | Facility: HOSPITAL | Age: 75
End: 2025-03-21

## 2025-03-24 ENCOUNTER — HOSPITAL ENCOUNTER (OUTPATIENT)
Dept: NON INVASIVE DIAGNOSTICS | Facility: HOSPITAL | Age: 75
Discharge: HOME | End: 2025-03-24
Payer: MEDICARE

## 2025-03-24 DIAGNOSIS — I65.23 BILATERAL CAROTID ARTERY STENOSIS: ICD-10-CM

## 2025-03-24 PROCEDURE — 93880 EXTRACRANIAL BILAT STUDY: CPT | Performed by: SURGERY

## 2025-03-24 PROCEDURE — 93880 EXTRACRANIAL BILAT STUDY: CPT

## 2025-03-25 ENCOUNTER — TELEPHONE (OUTPATIENT)
Dept: GASTROENTEROLOGY | Facility: CLINIC | Age: 75
End: 2025-03-25

## 2025-03-25 NOTE — TELEPHONE ENCOUNTER
Our mutual patient is scheduled for a procedure: Colonoscopy    On 04/30/2025    With: Dr. Harrell    He/She is taking the following blood thinner: Plavix and Eliquis    Can this be stopped Eliquis 2 Days prior  and Plavix 5 days prior to the procedure?    Physician approving Clearance: Dr. Dennis    Last dose will be:  Eliquis 4/27; plavix 4/24/2025

## 2025-03-25 NOTE — TELEPHONE ENCOUNTER
GI:  Eliquis can be stopped 2 days prior and Plavix 5 days prior to colonoscopy.    [facility]: please notify pt of this and advise him that he is at increased risk for blood clots during this time but only 2 days of the Eliquis so risk is short lived.  Keep moving and avoid long car trips or immobility.  To ED with any concern of DVT or CO (leg swelling/redness/warmth or CP/SOB/coughing blood)

## 2025-03-27 ENCOUNTER — OFFICE VISIT (OUTPATIENT)
Dept: FAMILY MEDICINE CLINIC | Facility: HOSPITAL | Age: 75
End: 2025-03-27
Payer: MEDICARE

## 2025-03-27 VITALS
OXYGEN SATURATION: 97 % | DIASTOLIC BLOOD PRESSURE: 82 MMHG | WEIGHT: 247 LBS | HEIGHT: 69 IN | BODY MASS INDEX: 36.58 KG/M2 | SYSTOLIC BLOOD PRESSURE: 139 MMHG | HEART RATE: 62 BPM | TEMPERATURE: 98.3 F

## 2025-03-27 DIAGNOSIS — E11.22 TYPE 2 DIABETES MELLITUS WITH STAGE 3A CHRONIC KIDNEY DISEASE, WITHOUT LONG-TERM CURRENT USE OF INSULIN (HCC): ICD-10-CM

## 2025-03-27 DIAGNOSIS — I10 PRIMARY HYPERTENSION: Primary | ICD-10-CM

## 2025-03-27 DIAGNOSIS — N18.31 TYPE 2 DIABETES MELLITUS WITH STAGE 3A CHRONIC KIDNEY DISEASE, WITHOUT LONG-TERM CURRENT USE OF INSULIN (HCC): ICD-10-CM

## 2025-03-27 PROCEDURE — 99214 OFFICE O/P EST MOD 30 MIN: CPT | Performed by: INTERNAL MEDICINE

## 2025-03-27 PROCEDURE — G2211 COMPLEX E/M VISIT ADD ON: HCPCS | Performed by: INTERNAL MEDICINE

## 2025-03-27 RX ORDER — LOSARTAN POTASSIUM 50 MG/1
50 TABLET ORAL DAILY
Qty: 30 TABLET | Refills: 2 | Status: SHIPPED | OUTPATIENT
Start: 2025-03-27

## 2025-03-27 NOTE — ASSESSMENT & PLAN NOTE
Lab Results   Component Value Date    HGBA1C 7.6 (H) 03/19/2025   Lab did his A1C in March and they were not to be done till May/Ina - new order given for June, will follow on current regimen, call with any low readings  Orders:    losartan (COZAAR) 50 mg tablet; Take 1 tablet (50 mg total) by mouth daily    Basic metabolic panel; Future    Hemoglobin A1C With EAG; Future

## 2025-03-27 NOTE — ASSESSMENT & PLAN NOTE
BP slightly better but not yet at goal (<130/80), will increase Losartan from 25 mg to 50 mg, will recheck in 2-3 mos, has home cuff linked to renal office and they get his BP readings, urged healthy diet/wgt loss, will follow  Orders:    losartan (COZAAR) 50 mg tablet; Take 1 tablet (50 mg total) by mouth daily    Basic metabolic panel; Future

## 2025-03-27 NOTE — PROGRESS NOTES
Name: Shahbaz Barriga      : 1950      MRN: [id number]  Encounter Provider: Rubia Dennis DO  Encounter Date: 3/27/2025   Encounter department: [facility] CARE [address]   :  Assessment & Plan  Primary hypertension  BP slightly better but not yet at goal (<130/80), will increase Losartan from 25 mg to 50 mg, will recheck in 2-3 mos, has home cuff linked to renal office and they get his BP readings, urged healthy diet/wgt loss, will follow  Orders:    losartan (COZAAR) 50 mg tablet; Take 1 tablet (50 mg total) by mouth daily    Basic metabolic panel; Future    Type 2 diabetes mellitus with stage 3a chronic kidney disease, without long-term current use of insulin ([facility])    Lab Results   Component Value Date    HGBA1C 7.6 (H) 2025   Lab did his A1C in March and they were not to be done till May/Nia - new order given for , will follow on current regimen, call with any low readings  Orders:    losartan (COZAAR) 50 mg tablet; Take 1 tablet (50 mg total) by mouth daily    Basic metabolic panel; Future    Hemoglobin A1C With EAG; Future      Colonoscopy  - 2 yrs    PSA     BW 3/25 (A1C 7.6)  DM foot exam   DM eye exam 10/23 - 2 yrs  Ur protein:Cr        History of Present Illness   HPI Pt here for BP check    Last visit 25 pts BP was elevated and we restarted Losartan at 25 mg 1 tab PO q day. Pt is here for BP/med check.  BP above goal again today and meds were reviewed and med list is UTD.  He denies missing doses of meds or SE with the meds.  He does check his BP outside the office and home readings brought in and reviewed - 30 days average 134/78.  He notes no frequent HA's/dizziness/double vision/CP.      Last visit we also increased pts Glipizide d/t hyperglycemia/uncontrolled DM. He has been taking the rx bid w/o SE/hypoglycemia. He notes no low BS readings.       Review of Systems   Constitutional:  Negative for chills and fever.   Respiratory:   "Negative for cough and shortness of breath.    Cardiovascular:  Negative for chest pain and palpitations.   Gastrointestinal:  Negative for diarrhea, nausea and vomiting.   Skin:  Negative for rash and wound.   Neurological:  Negative for dizziness and headaches.       Objective   /82 (BP Location: Left arm, Patient Position: Sitting, Cuff Size: Large)   Pulse 62   Temp 98.3 °F (36.8 °C)   Ht 5' 9\" (1.753 m)   Wt 112 kg (247 lb)   SpO2 97%   BMI 36.48 kg/m²      Physical Exam  Vitals and nursing note reviewed.   Constitutional:       General: He is not in acute distress.     Appearance: He is obese. He is not ill-appearing.   HENT:      Head: Normocephalic and atraumatic.   Eyes:      General:         Right eye: No discharge.         Left eye: No discharge.      Conjunctiva/sclera: Conjunctivae normal.   Pulmonary:      Effort: Pulmonary effort is normal. No respiratory distress.   Skin:     Coloration: Skin is not pale.      Findings: No rash.   Neurological:      Gait: Gait normal.   Psychiatric:         Mood and Affect: Mood normal.         Behavior: Behavior normal.         Thought Content: Thought content normal.         Judgment: Judgment normal.         "

## 2025-04-11 DIAGNOSIS — I10 PRIMARY HYPERTENSION: ICD-10-CM

## 2025-04-11 RX ORDER — AMLODIPINE BESYLATE 10 MG/1
10 TABLET ORAL EVERY MORNING
Qty: 90 TABLET | Refills: 1 | Status: SHIPPED | OUTPATIENT
Start: 2025-04-11

## 2025-04-15 ENCOUNTER — TELEPHONE (OUTPATIENT)
Dept: GASTROENTEROLOGY | Facility: CLINIC | Age: 75
End: 2025-04-15

## 2025-04-15 NOTE — TELEPHONE ENCOUNTER
Procedure confirmed  Colonoscopy     Via: [email or web address]    Instructions given: Given to Patient at Visit     Prep Given: Miralax/Dulcolax    Call the office if there are any questions.

## 2025-04-30 ENCOUNTER — ANESTHESIA (OUTPATIENT)
Dept: GASTROENTEROLOGY | Facility: HOSPITAL | Age: 75
End: 2025-04-30
Payer: MEDICARE

## 2025-04-30 ENCOUNTER — HOSPITAL ENCOUNTER (OUTPATIENT)
Dept: GASTROENTEROLOGY | Facility: HOSPITAL | Age: 75
Setting detail: OUTPATIENT SURGERY
Discharge: HOME | End: 2025-04-30
Attending: INTERNAL MEDICINE
Payer: MEDICARE

## 2025-04-30 ENCOUNTER — ANESTHESIA EVENT (OUTPATIENT)
Dept: GASTROENTEROLOGY | Facility: HOSPITAL | Age: 75
End: 2025-04-30
Payer: MEDICARE

## 2025-04-30 VITALS
SYSTOLIC BLOOD PRESSURE: 122 MMHG | RESPIRATION RATE: 16 BRPM | TEMPERATURE: 97.6 F | OXYGEN SATURATION: 96 % | HEART RATE: 84 BPM | BODY MASS INDEX: 35.99 KG/M2 | HEIGHT: 69 IN | WEIGHT: 243 LBS | DIASTOLIC BLOOD PRESSURE: 72 MMHG

## 2025-04-30 DIAGNOSIS — I26.99 ACUTE PULMONARY EMBOLISM, UNSPECIFIED PULMONARY EMBOLISM TYPE, UNSPECIFIED WHETHER ACUTE COR PULMONALE PRESENT (HCC): ICD-10-CM

## 2025-04-30 DIAGNOSIS — K51.30 ULCERATIVE RECTOSIGMOIDITIS WITHOUT COMPLICATION (HCC): ICD-10-CM

## 2025-04-30 LAB — GLUCOSE SERPL-MCNC: 184 MG/DL (ref 65–140)

## 2025-04-30 PROCEDURE — 82948 REAGENT STRIP/BLOOD GLUCOSE: CPT

## 2025-04-30 PROCEDURE — 45380 COLONOSCOPY AND BIOPSY: CPT | Performed by: INTERNAL MEDICINE

## 2025-04-30 PROCEDURE — 88305 TISSUE EXAM BY PATHOLOGIST: CPT | Performed by: STUDENT IN AN ORGANIZED HEALTH CARE EDUCATION/TRAINING PROGRAM

## 2025-04-30 PROCEDURE — 45385 COLONOSCOPY W/LESION REMOVAL: CPT | Performed by: INTERNAL MEDICINE

## 2025-04-30 RX ORDER — SODIUM CHLORIDE, SODIUM LACTATE, POTASSIUM CHLORIDE, CALCIUM CHLORIDE 600; 310; 30; 20 MG/100ML; MG/100ML; MG/100ML; MG/100ML
125 INJECTION, SOLUTION INTRAVENOUS CONTINUOUS
Status: DISCONTINUED | OUTPATIENT
Start: 2025-04-30 | End: 2025-05-04 | Stop reason: HOSPADM

## 2025-04-30 RX ORDER — SODIUM CHLORIDE, SODIUM LACTATE, POTASSIUM CHLORIDE, CALCIUM CHLORIDE 600; 310; 30; 20 MG/100ML; MG/100ML; MG/100ML; MG/100ML
INJECTION, SOLUTION INTRAVENOUS CONTINUOUS PRN
Status: DISCONTINUED | OUTPATIENT
Start: 2025-04-30 | End: 2025-04-30

## 2025-04-30 RX ORDER — ONDANSETRON 2 MG/ML
4 INJECTION INTRAMUSCULAR; INTRAVENOUS ONCE AS NEEDED
Status: DISCONTINUED | OUTPATIENT
Start: 2025-04-30 | End: 2025-05-04 | Stop reason: HOSPADM

## 2025-04-30 RX ORDER — EPHEDRINE SULFATE 50 MG/ML
INJECTION INTRAVENOUS AS NEEDED
Status: DISCONTINUED | OUTPATIENT
Start: 2025-04-30 | End: 2025-04-30

## 2025-04-30 RX ORDER — PROPOFOL 10 MG/ML
INJECTION, EMULSION INTRAVENOUS CONTINUOUS PRN
Status: DISCONTINUED | OUTPATIENT
Start: 2025-04-30 | End: 2025-04-30

## 2025-04-30 RX ADMIN — EPHEDRINE SULFATE 10 MG: 50 INJECTION INTRAVENOUS at 10:09

## 2025-04-30 RX ADMIN — PROPOFOL 100 MG: 10 INJECTION, EMULSION INTRAVENOUS at 09:45

## 2025-04-30 RX ADMIN — EPHEDRINE SULFATE 10 MG: 50 INJECTION INTRAVENOUS at 10:05

## 2025-04-30 RX ADMIN — PROPOFOL 50 MG: 10 INJECTION, EMULSION INTRAVENOUS at 09:47

## 2025-04-30 RX ADMIN — PROPOFOL 100 MCG/KG/MIN: 10 INJECTION, EMULSION INTRAVENOUS at 09:48

## 2025-04-30 RX ADMIN — SODIUM CHLORIDE, SODIUM LACTATE, POTASSIUM CHLORIDE, AND CALCIUM CHLORIDE: .6; .31; .03; .02 INJECTION, SOLUTION INTRAVENOUS at 09:31

## 2025-04-30 NOTE — ANESTHESIA PREPROCEDURE EVALUATION
Procedure:  COLONOSCOPY    Relevant Problems   CARDIO   (+) Acute pulmonary embolism (HCC)   (+) Bilateral carotid artery stenosis   (+) Essential hypertriglyceridemia   (+) Primary hypertension      ENDO   (+) Type 2 diabetes mellitus with stage 3a chronic kidney disease, without long-term current use of insulin (HCC)      GI/HEPATIC   (+) Pancreatic cyst      /RENAL   (+) Chronic kidney disease, stage 3 (HCC)      MUSCULOSKELETAL   (+) Gout      NEURO/PSYCH   (+) Cerebrovascular accident (HCC)    11/6/24 Left Ventricle: Left ventricular cavity size is normal. Wall thickness is mildly increased. There is concentric remodeling. The left ventricular ejection fraction is 60% by visual estimation. Systolic function is normal. Wall motion is normal.    Right Ventricle: Right ventricular cavity size is moderately dilated. RV basal diameter is 5.2 cm. Systolic function is low normal. TASV is 9.0 cm/s.    Right Atrium: The atrium is mildly dilated.    Aortic Valve: There is mild regurgitation.    Mitral Valve: There is mild regurgitation with a centrally directed jet.    8/14/24 CT chest Early chronic pulmonary emboli, somewhat reduced clot burden. No evidence of new or acute embolus.     Interstitial and groundglass opacities in the dependent portion of the lungs could represent nonspecific pneumonitis and/or hypoventilatory changes.     Physical Exam    Airway    Mallampati score: III  TM Distance: >3 FB  Neck ROM: full     Dental        Cardiovascular      Pulmonary      Other Findings        Anesthesia Plan  ASA Score- 3     Anesthesia Type- IV sedation with anesthesia with ASA Monitors.         Additional Monitors:     Airway Plan:            Plan Factors-Exercise tolerance (METS): >4 METS.    Chart reviewed.  Imaging results reviewed. Existing labs reviewed. Patient summary reviewed.    Patient is not a current smoker.              Induction- intravenous.    Postoperative Plan-         Informed Consent- Anesthetic  plan and risks discussed with patient.  I personally reviewed this patient with the CRNA. Discussed and agreed on the Anesthesia Plan with the CRNA..      NPO Status:  No vitals data found for the desired time range.

## 2025-04-30 NOTE — H&P
History and Physical - [facility]    Shahbaz Barriga 75 y.o. male MRN: [id number]      HPI: Shahbaz Barriga is a 75 y.o. male who presents for UC, on asacol. On eliquis and plavix, held for procedure.    No Known Allergies      REVIEW OF SYSTEMS: Per the HPI, and otherwise unremarkable.    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    Vasovagal syncope 12/28/2021     Past Surgical History:   Procedure Laterality Date    CARDIAC LOOP RECORDER      COLONOSCOPY  09/18/2006    complete; 10 yrs    COLONOSCOPY  10/23/2017    complete; 5 yrs    COLONOSCOPY  05/06/2020    Severe active left-sided colitis, erythematous and ulcerated mucosa in the rectosigmoid, inflammatory polyp    IR PE ENDOVASCULAR THERAPY  08/07/2024    TONSILLECTOMY       Social History   Social History     Substance and Sexual Activity   Alcohol Use Never     Social History     Substance and Sexual Activity   Drug Use Never     Social History     Tobacco Use   Smoking Status Never   Smokeless Tobacco Never     Family History   Problem Relation Age of Onset    Breast cancer Mother     Cancer Mother     Kidney disease Father     Lung cancer Father     Other Father         smoker    Cancer Father     Hypertension Other     Colon polyps Neg Hx     Colon cancer Neg Hx        Meds/Allergies       Current Outpatient Medications:     allopurinol (ZYLOPRIM) 100 mg tablet    amLODIPine (NORVASC) 10 mg tablet    ascorbic acid (VITAMIN C) 1000 MG tablet    atorvastatin (LIPITOR) 40 mg tablet     "chlorthalidone 25 mg tablet    cholecalciferol (VITAMIN D3) 400 units tablet    Coenzyme Q10 200 MG capsule    fenofibrate (TRICOR) 145 mg tablet    glipiZIDE (GLUCOTROL) 5 mg tablet    losartan (COZAAR) 50 mg tablet    metoprolol tartrate (LOPRESSOR) 50 mg tablet    sulfaSALAzine (AZULFIDINE) 500 mg tablet    apixaban (Eliquis) 2.5 mg    clopidogrel (PLAVIX) 75 mg tablet    glucose blood (Contour Next Test) test strip    polyethylene glycol (MiraLax) 17 GM/SCOOP powder    Current Facility-Administered Medications:     lactated ringers infusion, 125 mL/hr, Intravenous, Continuous    ondansetron (ZOFRAN) injection 4 mg, 4 mg, Intravenous, Once PRN    Facility-Administered Medications Ordered in Other Encounters:     lactated ringers infusion, , Intravenous, Continuous PRN, New Bag at 04/30/25 0931        Objective     /93   Pulse 76   Temp 97.6 °F (36.4 °C) (Temporal)   Resp 16   Ht 5' 9\" (1.753 m)   Wt 110 kg (243 lb)   SpO2 95%   BMI 35.88 kg/m²       PHYSICAL EXAM    Gen: NAD AAOx3  Head: Normocephalic, Atraumatic  CV: S1S2 RRR no m/r/g  CHEST: Clear b/l no c/r/w  ABD: soft, +BS NT/ND no masses  EXT: no edema      ASSESSMENT/PLAN:  This is a 75 y.o. year old male here for colonoscopy, and he is stable and optimized for his procedure.        "

## 2025-04-30 NOTE — ANESTHESIA POSTPROCEDURE EVALUATION
Post-Op Assessment Note    CV Status:  Stable  Pain Score: 0    Pain management: adequate       Hydration Status:  Stable   PONV Controlled:  None   Airway Patency:  Patent  There is a medical reason for not screening for obstructive sleep apnea and/or for not using two or more mitigation strategies   Post Op Vitals Reviewed: Yes    No anethesia notable event occurred.    Staff: CRNA           Last Filed PACU Vitals:  Vitals Value Taken Time   Temp     Pulse 75    BP 90/55    Resp 17    SpO2 96

## 2025-05-05 ENCOUNTER — HOSPITAL ENCOUNTER (OUTPATIENT)
Dept: MRI IMAGING | Facility: HOSPITAL | Age: 75
Discharge: HOME | End: 2025-05-05
Payer: MEDICARE

## 2025-05-05 ENCOUNTER — RESULTS FOLLOW-UP (OUTPATIENT)
Dept: GASTROENTEROLOGY | Facility: CLINIC | Age: 75
End: 2025-05-05

## 2025-05-05 DIAGNOSIS — K86.2 PANCREATIC CYST: ICD-10-CM

## 2025-05-05 PROCEDURE — 74181 MRI ABDOMEN W/O CONTRAST: CPT

## 2025-05-05 PROCEDURE — 88305 TISSUE EXAM BY PATHOLOGIST: CPT | Performed by: STUDENT IN AN ORGANIZED HEALTH CARE EDUCATION/TRAINING PROGRAM

## 2025-05-16 DIAGNOSIS — I26.99 ACUTE PULMONARY EMBOLISM, UNSPECIFIED PULMONARY EMBOLISM TYPE, UNSPECIFIED WHETHER ACUTE COR PULMONALE PRESENT (HCC): ICD-10-CM

## 2025-06-13 DIAGNOSIS — E11.22 TYPE 2 DIABETES MELLITUS WITH STAGE 3A CHRONIC KIDNEY DISEASE, WITHOUT LONG-TERM CURRENT USE OF INSULIN (HCC): ICD-10-CM

## 2025-06-13 DIAGNOSIS — N18.31 TYPE 2 DIABETES MELLITUS WITH STAGE 3A CHRONIC KIDNEY DISEASE, WITHOUT LONG-TERM CURRENT USE OF INSULIN (HCC): ICD-10-CM

## 2025-06-13 DIAGNOSIS — I10 PRIMARY HYPERTENSION: ICD-10-CM

## 2025-06-16 DIAGNOSIS — I63.9 CVA (CEREBRAL VASCULAR ACCIDENT) (HCC): ICD-10-CM

## 2025-06-16 RX ORDER — ATORVASTATIN CALCIUM 40 MG/1
TABLET, FILM COATED ORAL
Qty: 90 TABLET | Refills: 1 | Status: SHIPPED | OUTPATIENT
Start: 2025-06-16

## 2025-06-16 RX ORDER — LOSARTAN POTASSIUM 50 MG/1
50 TABLET ORAL DAILY
Qty: 90 TABLET | Refills: 2 | Status: SHIPPED | OUTPATIENT
Start: 2025-06-16

## 2025-06-17 ENCOUNTER — TELEPHONE (OUTPATIENT)
Age: 75
End: 2025-06-17

## 2025-06-17 NOTE — TELEPHONE ENCOUNTER
Patient states he missed a call from the office. If anyone was trying to reach him please call him back. I do not see an encounter.

## 2025-06-19 DIAGNOSIS — D75.1 ERYTHROCYTOSIS: Primary | ICD-10-CM

## 2025-06-19 DIAGNOSIS — D50.9 IRON DEFICIENCY ANEMIA, UNSPECIFIED IRON DEFICIENCY ANEMIA TYPE: ICD-10-CM

## 2025-06-19 LAB
BUN SERPL-MCNC: 24 MG/DL (ref 7–25)
BUN/CREAT SERPL: 14 (CALC) (ref 6–22)
CALCIUM SERPL-MCNC: 9.7 MG/DL (ref 8.6–10.3)
CHLORIDE SERPL-SCNC: 107 MMOL/L (ref 98–110)
CO2 SERPL-SCNC: 22 MMOL/L (ref 20–32)
CREAT SERPL-MCNC: 1.68 MG/DL (ref 0.7–1.28)
EST. AVERAGE GLUCOSE BLD GHB EST-MCNC: 166 MG/DL
EST. AVERAGE GLUCOSE BLD GHB EST-SCNC: 9.2 MMOL/L
GFR/BSA.PRED SERPLBLD CYS-BASED-ARV: 42 ML/MIN/1.73M2
GLUCOSE SERPL-MCNC: 125 MG/DL (ref 65–99)
HBA1C MFR BLD: 7.4 %
POTASSIUM SERPL-SCNC: 3.6 MMOL/L (ref 3.5–5.3)
SODIUM SERPL-SCNC: 141 MMOL/L (ref 135–146)

## 2025-06-27 ENCOUNTER — OFFICE VISIT (OUTPATIENT)
Dept: FAMILY MEDICINE CLINIC | Facility: HOSPITAL | Age: 75
End: 2025-06-27
Payer: MEDICARE

## 2025-06-27 VITALS
HEIGHT: 69 IN | OXYGEN SATURATION: 97 % | SYSTOLIC BLOOD PRESSURE: 142 MMHG | TEMPERATURE: 97.8 F | DIASTOLIC BLOOD PRESSURE: 82 MMHG | BODY MASS INDEX: 37.59 KG/M2 | HEART RATE: 65 BPM | WEIGHT: 253.8 LBS

## 2025-06-27 DIAGNOSIS — N18.31 TYPE 2 DIABETES MELLITUS WITH STAGE 3A CHRONIC KIDNEY DISEASE, WITHOUT LONG-TERM CURRENT USE OF INSULIN (HCC): Primary | ICD-10-CM

## 2025-06-27 DIAGNOSIS — K51.30 ULCERATIVE RECTOSIGMOIDITIS WITHOUT COMPLICATION (HCC): ICD-10-CM

## 2025-06-27 DIAGNOSIS — N18.32 STAGE 3B CHRONIC KIDNEY DISEASE (HCC): ICD-10-CM

## 2025-06-27 DIAGNOSIS — E11.22 TYPE 2 DIABETES MELLITUS WITH STAGE 3A CHRONIC KIDNEY DISEASE, WITHOUT LONG-TERM CURRENT USE OF INSULIN (HCC): Primary | ICD-10-CM

## 2025-06-27 DIAGNOSIS — I10 PRIMARY HYPERTENSION: ICD-10-CM

## 2025-06-27 PROCEDURE — G2211 COMPLEX E/M VISIT ADD ON: HCPCS | Performed by: INTERNAL MEDICINE

## 2025-06-27 PROCEDURE — 99214 OFFICE O/P EST MOD 30 MIN: CPT | Performed by: INTERNAL MEDICINE

## 2025-06-27 NOTE — ASSESSMENT & PLAN NOTE
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

## 2025-06-27 NOTE — ASSESSMENT & PLAN NOTE
Lab Results   Component Value Date    EGFR 42 (L) 06/19/2025    EGFR 47 (L) 03/19/2025    EGFR 38 (L) 03/05/2025    CREATININE 1.68 (H) 06/19/2025    CREATININE 1.54 (H) 03/19/2025    CREATININE 1.82 (H) 03/05/2025   GFR stable, importance of BP/BS control as well as keeping hydrated and avoiding NSAIDs reviewed, he is on an ARB, he follow with Nephro  Orders:    Comprehensive metabolic panel; Future    Lipid Panel with Direct LDL reflex; Future    TSH, 3rd generation with Free T4 reflex; Future    Hemoglobin A1c (w/out EAG) ([facility] ONLY); Future    CBC and Platelet; Future

## 2025-06-27 NOTE — PROGRESS NOTES
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 - uncontrolled but improved and acceptable for age with A1C 7.4 - urged healthy diet and increase formal exercise, con't current Glipizide for now, recheck BW in 4 mos - BW order given, on an ARB and statin, UTD on DM foot exam () and eye exam (10/23 - 2 yrs), follows with renal for his CKD as well  Orders:    Comprehensive metabolic panel; Future    Lipid Panel with Direct LDL reflex; Future    TSH, 3rd generation with Free T4 reflex; Future    Hemoglobin A1c (w/out EAG) ([facility] ONLY); Future    CBC and Platelet; Future    Stage 3b chronic kidney disease (HCC)  Lab Results   Component Value Date    EGFR 42 (L) 2025    EGFR 47 (L) 2025    EGFR 38 (L) 2025    CREATININE 1.68 (H) 2025    CREATININE 1.54 (H) 2025    CREATININE 1.82 (H) 2025   GFR stable, importance of BP/BS control as well as keeping hydrated and avoiding NSAIDs reviewed, he is on an ARB, he follow with Nephro  Orders:    Comprehensive metabolic panel; Future    Lipid Panel with Direct LDL reflex; Future    TSH, 3rd generation with Free T4 reflex; Future    Hemoglobin A1c (w/out EAG) ([facility] ONLY); Future    CBC and Platelet; Future    Ulcerative rectosigmoiditis without complication (HCC)  Just had colonoscopy with GI - no active colitis noted, con't sulfasalazine and f/u as per GI  Orders:    Comprehensive metabolic panel; Future    Lipid Panel with Direct LDL reflex; Future    TSH, 3rd generation with Free T4 reflex; Future    Hemoglobin A1c (w/out EAG) ([facility] ONLY);  "Future    CBC and Platelet; Future    Primary hypertension  BP great by end of appt, con't current meds, recheck in 4 mos  Orders:    Comprehensive metabolic panel; Future    Lipid Panel with Direct LDL reflex; Future    TSH, 3rd generation with Free T4 reflex; Future    Hemoglobin A1c (w/out EAG) ([facility] ONLY); Future    CBC and Platelet; Future         Colonoscopy 4/25 - 2 yrs    PSA 2/25    BW 6/25 (A1C 7.4) - order given for 4 mos  DM foot exam 2/25  DM eye exam 10/23 - 2 yrs  Ur microalbumin:Cr 1/25    FLP 8/24 - order given for 4 mos      History of Present Illness   HPI Pt here for follow up appt and BW results    BW results were d/w pt in detail: FBS/A1C 125/7.4, BUN/Cr 24/1.68 (GFR 42), rest of BMP was wnl     Def of controlled vs uncontrolled DM was reviewed.  Diet was reviewed - wgt up 5 lbs from March 25.  He eats sweets if \"they are there\" and is trying to cut back on his carbs.  He does walk on the treadmill for exercise.  He checks his BS once daily in the am fasting.  He reports his 30 day average is 155 on his sony.  He is taking his DM meds as directed.  CKD stage 3 was reviewed.  Importance of BS control as well as BP control and avoiding dehydration and NSAIDs was reviewed. He is UTD on DM foot exam (2/25) and eye exam (10/23 - 2 yrs).  He is on statin and an ARB.     Pt had his colonoscopy with Dr. Harrell in April for f/u UC - procedure note reviewed.  Severe diverticulosis was noted as was a polyp in the hepatic flexure.  Rest of colon was normal.  He remains on sulfsalazine 4 x's daily.      Last visit in March his BP was elevated and we subsequently increased his Losartan form 25 mg to 50 mg 1 tab PO q day. He is here for a BP/med check. BP at upper end of goal today and meds were reviewed and med list is UTD.  He denies missing doses of meds or SE with the meds.  He does continue to check his BP outside the office. 30 day average reportedly is 124/73.  He notes no frequent Ha's/dizziness/double " "vision/CP.       Review of Systems   Constitutional:  Negative for chills and fever.   HENT:  Negative for congestion and sore throat.    Eyes:  Negative for pain and visual disturbance.   Respiratory:  Negative for cough and shortness of breath.    Cardiovascular:  Negative for chest pain, palpitations and leg swelling.   Gastrointestinal:  Negative for abdominal pain, blood in stool, constipation, diarrhea, nausea and vomiting.   Genitourinary:  Negative for difficulty urinating and dysuria.   Musculoskeletal:  Negative for arthralgias and gait problem.   Skin:  Negative for rash and wound.   Neurological:  Negative for dizziness and headaches.   Hematological:  Does not bruise/bleed easily.   Psychiatric/Behavioral:  Negative for sleep disturbance.        Objective   /82 (BP Location: Left arm, Patient Position: Sitting, Cuff Size: Large)   Pulse 65   Temp 97.8 °F (36.6 °C)   Ht 5' 9\" (1.753 m)   Wt 115 kg (253 lb 12.8 oz)   SpO2 97%   BMI 37.48 kg/m²      Physical Exam  Vitals and nursing note reviewed.   Constitutional:       General: He is not in acute distress.     Appearance: He is not ill-appearing.   HENT:      Head: Normocephalic and atraumatic.      Right Ear: External ear normal.      Left Ear: External ear normal.     Eyes:      General:         Right eye: No discharge.         Left eye: No discharge.      Conjunctiva/sclera: Conjunctivae normal.     Pulmonary:      Effort: Pulmonary effort is normal. No respiratory distress.     Skin:     Coloration: Skin is not pale.      Findings: No rash.     Neurological:      Mental Status: Mental status is at baseline.      Gait: Gait normal.     Psychiatric:         Mood and Affect: Mood normal.         Behavior: Behavior normal.         Thought Content: Thought content normal.         Judgment: Judgment normal.         "

## 2025-06-27 NOTE — ASSESSMENT & PLAN NOTE
BP great by end of appt, con't current meds, recheck in 4 mos  Orders:    Comprehensive metabolic panel; Future    Lipid Panel with Direct LDL reflex; Future    TSH, 3rd generation with Free T4 reflex; Future    Hemoglobin A1c (w/out EAG) ([facility] ONLY); Future    CBC and Platelet; Future

## 2025-06-30 ENCOUNTER — HOSPITAL ENCOUNTER (OUTPATIENT)
Dept: INFUSION CENTER | Facility: HOSPITAL | Age: 75
Discharge: HOME | End: 2025-06-30
Attending: INTERNAL MEDICINE

## 2025-06-30 VITALS
OXYGEN SATURATION: 91 % | SYSTOLIC BLOOD PRESSURE: 152 MMHG | HEART RATE: 59 BPM | TEMPERATURE: 97.7 F | DIASTOLIC BLOOD PRESSURE: 78 MMHG

## 2025-06-30 DIAGNOSIS — Z86.73 HISTORY OF STROKE: ICD-10-CM

## 2025-06-30 DIAGNOSIS — D75.1 ERYTHROCYTOSIS: ICD-10-CM

## 2025-06-30 DIAGNOSIS — I10 PRIMARY HYPERTENSION: ICD-10-CM

## 2025-06-30 NOTE — PROGRESS NOTES
Shahbaz Barriga  was not tx. Labs resulted were out of parameters.     Shahbaz Barriga is aware of future appt on 10/20/2025 at 1430.     AVS printed and given to Shahbaz Barriga:    No (Declined by Shahbaz Barriga)

## 2025-07-02 RX ORDER — METOPROLOL TARTRATE 50 MG
75 TABLET ORAL 2 TIMES DAILY
Qty: 270 TABLET | Refills: 1 | Status: SHIPPED | OUTPATIENT
Start: 2025-07-02

## 2025-07-02 RX ORDER — CLOPIDOGREL BISULFATE 75 MG/1
75 TABLET ORAL DAILY
Qty: 90 TABLET | Refills: 1 | Status: SHIPPED | OUTPATIENT
Start: 2025-07-02

## 2025-07-14 DIAGNOSIS — I10 PRIMARY HYPERTENSION: ICD-10-CM

## 2025-07-15 RX ORDER — CHLORTHALIDONE 25 MG/1
25 TABLET ORAL DAILY
Qty: 90 TABLET | Refills: 1 | Status: SHIPPED | OUTPATIENT
Start: 2025-07-15

## 2025-07-21 DIAGNOSIS — M1A.9XX0 CHRONIC GOUT WITHOUT TOPHUS, UNSPECIFIED CAUSE, UNSPECIFIED SITE: ICD-10-CM

## 2025-07-23 DIAGNOSIS — M1A.9XX0 CHRONIC GOUT WITHOUT TOPHUS, UNSPECIFIED CAUSE, UNSPECIFIED SITE: ICD-10-CM

## 2025-07-23 RX ORDER — ALLOPURINOL 100 MG/1
200 TABLET ORAL DAILY
Qty: 180 TABLET | Refills: 1 | Status: SHIPPED | OUTPATIENT
Start: 2025-07-23

## 2025-07-25 RX ORDER — ALLOPURINOL 100 MG/1
200 TABLET ORAL DAILY
Qty: 180 TABLET | Refills: 3 | OUTPATIENT
Start: 2025-07-25

## 2025-08-22 ENCOUNTER — OFFICE VISIT (OUTPATIENT)
Age: 75
End: 2025-08-22
Payer: MEDICARE

## 2025-08-22 VITALS
WEIGHT: 251 LBS | OXYGEN SATURATION: 97 % | DIASTOLIC BLOOD PRESSURE: 91 MMHG | SYSTOLIC BLOOD PRESSURE: 152 MMHG | HEIGHT: 69 IN | RESPIRATION RATE: 16 BRPM | TEMPERATURE: 97.6 F | BODY MASS INDEX: 37.18 KG/M2 | HEART RATE: 71 BPM

## 2025-08-22 DIAGNOSIS — I26.09 ACUTE PULMONARY EMBOLISM WITH ACUTE COR PULMONALE, UNSPECIFIED PULMONARY EMBOLISM TYPE (HCC): ICD-10-CM

## 2025-08-22 DIAGNOSIS — D75.1 ERYTHROCYTOSIS: Primary | ICD-10-CM

## 2025-08-22 DIAGNOSIS — Z79.01 CURRENT USE OF LONG TERM ANTICOAGULATION: ICD-10-CM

## 2025-08-22 PROCEDURE — 99213 OFFICE O/P EST LOW 20 MIN: CPT | Performed by: NURSE PRACTITIONER
